# Patient Record
Sex: MALE | Race: BLACK OR AFRICAN AMERICAN | Employment: OTHER | URBAN - METROPOLITAN AREA
[De-identification: names, ages, dates, MRNs, and addresses within clinical notes are randomized per-mention and may not be internally consistent; named-entity substitution may affect disease eponyms.]

---

## 2017-04-05 ENCOUNTER — APPOINTMENT (OUTPATIENT)
Dept: GENERAL RADIOLOGY | Age: 67
End: 2017-04-05
Payer: MEDICARE

## 2017-04-05 ENCOUNTER — HOSPITAL ENCOUNTER (EMERGENCY)
Age: 67
Discharge: HOME | End: 2017-04-05
Attending: EMERGENCY MEDICINE
Payer: MEDICARE

## 2017-04-05 VITALS
BODY MASS INDEX: 23.46 KG/M2 | SYSTOLIC BLOOD PRESSURE: 143 MMHG | OXYGEN SATURATION: 100 % | WEIGHT: 146 LBS | HEIGHT: 66 IN | RESPIRATION RATE: 16 BRPM | TEMPERATURE: 98.8 F | DIASTOLIC BLOOD PRESSURE: 77 MMHG | HEART RATE: 94 BPM

## 2017-04-05 DIAGNOSIS — R06.02 SOB (SHORTNESS OF BREATH): ICD-10-CM

## 2017-04-05 DIAGNOSIS — D50.9 IRON DEFICIENCY ANEMIA, UNSPECIFIED IRON DEFICIENCY ANEMIA TYPE: Primary | ICD-10-CM

## 2017-04-05 LAB
ABSOLUTE EOS #: 0.24 K/UL (ref 0–0.4)
ABSOLUTE LYMPH #: 2.43 K/UL (ref 1–4.8)
ABSOLUTE MONO #: 0.81 K/UL (ref 0.2–0.8)
ANION GAP SERPL CALCULATED.3IONS-SCNC: 16 MMOL/L (ref 9–17)
BASOPHILS # BLD: 1 % (ref 0–2)
BASOPHILS ABSOLUTE: 0.08 K/UL (ref 0–0.2)
BNP INTERPRETATION: ABNORMAL
BUN BLDV-MCNC: 12 MG/DL (ref 8–23)
BUN/CREAT BLD: 14 (ref 9–20)
CALCIUM SERPL-MCNC: 7.6 MG/DL (ref 8.6–10.4)
CHLORIDE BLD-SCNC: 97 MMOL/L (ref 98–107)
CO2: 20 MMOL/L (ref 20–31)
CREAT SERPL-MCNC: 0.87 MG/DL (ref 0.7–1.2)
DATE, STOOL #1: NORMAL
DATE, STOOL #2: NORMAL
DATE, STOOL #3: NORMAL
DIFFERENTIAL TYPE: ABNORMAL
EOSINOPHILS RELATIVE PERCENT: 3 % (ref 1–4)
GFR AFRICAN AMERICAN: >60 ML/MIN
GFR NON-AFRICAN AMERICAN: >60 ML/MIN
GFR SERPL CREATININE-BSD FRML MDRD: ABNORMAL ML/MIN/{1.73_M2}
GFR SERPL CREATININE-BSD FRML MDRD: ABNORMAL ML/MIN/{1.73_M2}
GLUCOSE BLD-MCNC: 73 MG/DL (ref 70–99)
HCT VFR BLD CALC: 23.5 % (ref 41–53)
HEMOCCULT SP1 STL QL: NEGATIVE
HEMOCCULT SP2 STL QL: NORMAL
HEMOCCULT SP3 STL QL: NORMAL
HEMOGLOBIN: 7.6 G/DL (ref 13.5–17.5)
LYMPHOCYTES # BLD: 30 % (ref 24–44)
MCH RBC QN AUTO: 24.1 PG (ref 26–34)
MCHC RBC AUTO-ENTMCNC: 32.3 G/DL (ref 31–37)
MCV RBC AUTO: 74.6 FL (ref 80–100)
MONOCYTES # BLD: 10 % (ref 1–7)
MORPHOLOGY: ABNORMAL
MYOGLOBIN: 69 NG/ML (ref 28–72)
PDW BLD-RTO: 21.9 % (ref 11.5–14.5)
PLATELET # BLD: 307 K/UL (ref 130–400)
PLATELET ESTIMATE: ABNORMAL
PMV BLD AUTO: ABNORMAL FL (ref 6–12)
POTASSIUM SERPL-SCNC: 3.7 MMOL/L (ref 3.7–5.3)
PRO-BNP: 1767 PG/ML
RBC # BLD: 3.15 M/UL (ref 4.5–5.9)
RBC # BLD: ABNORMAL 10*6/UL
SEG NEUTROPHILS: 56 % (ref 36–66)
SEGMENTED NEUTROPHILS ABSOLUTE COUNT: 4.54 K/UL (ref 1.8–7.7)
SODIUM BLD-SCNC: 133 MMOL/L (ref 135–144)
TIME, STOOL #1: 2132
TIME, STOOL #2: NORMAL
TIME, STOOL #3: NORMAL
TROPONIN INTERP: NORMAL
TROPONIN T: <0.03 NG/ML
WBC # BLD: 8.1 K/UL (ref 3.5–11)
WBC # BLD: ABNORMAL 10*3/UL

## 2017-04-05 PROCEDURE — 80048 BASIC METABOLIC PNL TOTAL CA: CPT

## 2017-04-05 PROCEDURE — 93005 ELECTROCARDIOGRAM TRACING: CPT

## 2017-04-05 PROCEDURE — 94760 N-INVAS EAR/PLS OXIMETRY 1: CPT

## 2017-04-05 PROCEDURE — 99285 EMERGENCY DEPT VISIT HI MDM: CPT

## 2017-04-05 PROCEDURE — 6360000002 HC RX W HCPCS: Performed by: PHYSICIAN ASSISTANT

## 2017-04-05 PROCEDURE — 85025 COMPLETE CBC W/AUTO DIFF WBC: CPT

## 2017-04-05 PROCEDURE — 83874 ASSAY OF MYOGLOBIN: CPT

## 2017-04-05 PROCEDURE — 71010 XR CHEST PORTABLE: CPT

## 2017-04-05 PROCEDURE — 94640 AIRWAY INHALATION TREATMENT: CPT

## 2017-04-05 PROCEDURE — 83880 ASSAY OF NATRIURETIC PEPTIDE: CPT

## 2017-04-05 PROCEDURE — 96374 THER/PROPH/DIAG INJ IV PUSH: CPT

## 2017-04-05 PROCEDURE — 84484 ASSAY OF TROPONIN QUANT: CPT

## 2017-04-05 PROCEDURE — G0328 FECAL BLOOD SCRN IMMUNOASSAY: HCPCS

## 2017-04-05 RX ORDER — ALBUTEROL SULFATE 2.5 MG/3ML
5 SOLUTION RESPIRATORY (INHALATION)
Status: DISCONTINUED | OUTPATIENT
Start: 2017-04-05 | End: 2017-04-06 | Stop reason: HOSPADM

## 2017-04-05 RX ORDER — ALBUTEROL SULFATE 90 UG/1
2 AEROSOL, METERED RESPIRATORY (INHALATION)
Status: DISCONTINUED | OUTPATIENT
Start: 2017-04-05 | End: 2017-04-06 | Stop reason: HOSPADM

## 2017-04-05 RX ORDER — GUAIFENESIN 600 MG/1
1200 TABLET, EXTENDED RELEASE ORAL 2 TIMES DAILY
Status: ON HOLD | COMMUNITY
End: 2019-10-26

## 2017-04-05 RX ORDER — IPRATROPIUM BROMIDE AND ALBUTEROL SULFATE 2.5; .5 MG/3ML; MG/3ML
1 SOLUTION RESPIRATORY (INHALATION)
Status: DISCONTINUED | OUTPATIENT
Start: 2017-04-05 | End: 2017-04-06 | Stop reason: HOSPADM

## 2017-04-05 RX ORDER — AMOXICILLIN AND CLAVULANATE POTASSIUM 875; 125 MG/1; MG/1
1 TABLET, FILM COATED ORAL 2 TIMES DAILY
Status: ON HOLD | COMMUNITY
End: 2019-10-26

## 2017-04-05 RX ORDER — CITALOPRAM 10 MG/1
10 TABLET ORAL DAILY
Status: ON HOLD | COMMUNITY
End: 2019-10-26

## 2017-04-05 RX ORDER — BICALUTAMIDE 50 MG/1
50 TABLET, FILM COATED ORAL DAILY
Status: ON HOLD | COMMUNITY
End: 2019-10-26

## 2017-04-05 RX ORDER — METHYLPREDNISOLONE SODIUM SUCCINATE 125 MG/2ML
125 INJECTION, POWDER, LYOPHILIZED, FOR SOLUTION INTRAMUSCULAR; INTRAVENOUS ONCE
Status: COMPLETED | OUTPATIENT
Start: 2017-04-05 | End: 2017-04-05

## 2017-04-05 RX ORDER — TAMSULOSIN HYDROCHLORIDE 0.4 MG/1
0.4 CAPSULE ORAL DAILY
Status: ON HOLD | COMMUNITY
End: 2019-10-26

## 2017-04-05 RX ORDER — FERROUS SULFATE 325(65) MG
325 TABLET ORAL
COMMUNITY
End: 2020-02-25 | Stop reason: ALTCHOICE

## 2017-04-05 RX ADMIN — METHYLPREDNISOLONE SODIUM SUCCINATE 125 MG: 125 INJECTION, POWDER, FOR SOLUTION INTRAMUSCULAR; INTRAVENOUS at 21:04

## 2017-04-05 RX ADMIN — ALBUTEROL SULFATE 5 MG: 5 SOLUTION RESPIRATORY (INHALATION) at 20:35

## 2017-04-06 LAB
EKG ATRIAL RATE: 80 BPM
EKG P AXIS: 26 DEGREES
EKG P-R INTERVAL: 136 MS
EKG Q-T INTERVAL: 396 MS
EKG QRS DURATION: 86 MS
EKG QTC CALCULATION (BAZETT): 456 MS
EKG R AXIS: -7 DEGREES
EKG T AXIS: -5 DEGREES
EKG VENTRICULAR RATE: 80 BPM

## 2018-01-12 ENCOUNTER — HOSPITAL ENCOUNTER (EMERGENCY)
Age: 68
Discharge: HOME | End: 2018-01-12
Attending: EMERGENCY MEDICINE
Payer: MEDICARE

## 2018-01-12 ENCOUNTER — APPOINTMENT (OUTPATIENT)
Dept: CT IMAGING | Age: 68
End: 2018-01-12
Payer: MEDICARE

## 2018-01-12 VITALS
DIASTOLIC BLOOD PRESSURE: 89 MMHG | TEMPERATURE: 98.3 F | WEIGHT: 146 LBS | SYSTOLIC BLOOD PRESSURE: 153 MMHG | RESPIRATION RATE: 18 BRPM | HEIGHT: 66 IN | BODY MASS INDEX: 23.46 KG/M2 | OXYGEN SATURATION: 97 % | HEART RATE: 93 BPM

## 2018-01-12 DIAGNOSIS — K13.70 ORAL LESION: Primary | ICD-10-CM

## 2018-01-12 LAB
ABSOLUTE EOS #: 0 K/UL (ref 0–0.4)
ABSOLUTE IMMATURE GRANULOCYTE: ABNORMAL K/UL (ref 0–0.3)
ABSOLUTE LYMPH #: 0.5 K/UL (ref 1–4.8)
ABSOLUTE MONO #: 0.2 K/UL (ref 0.2–0.8)
ANION GAP SERPL CALCULATED.3IONS-SCNC: 15 MMOL/L (ref 9–17)
BASOPHILS # BLD: 1 % (ref 0–2)
BASOPHILS ABSOLUTE: 0 K/UL (ref 0–0.2)
BUN BLDV-MCNC: 20 MG/DL (ref 8–23)
BUN/CREAT BLD: 17 (ref 9–20)
CALCIUM SERPL-MCNC: 8.4 MG/DL (ref 8.6–10.4)
CHLORIDE BLD-SCNC: 99 MMOL/L (ref 98–107)
CO2: 20 MMOL/L (ref 20–31)
CREAT SERPL-MCNC: 1.17 MG/DL (ref 0.7–1.2)
DIFFERENTIAL TYPE: ABNORMAL
EOSINOPHILS RELATIVE PERCENT: 0 % (ref 1–4)
GFR AFRICAN AMERICAN: >60 ML/MIN
GFR NON-AFRICAN AMERICAN: >60 ML/MIN
GFR SERPL CREATININE-BSD FRML MDRD: ABNORMAL ML/MIN/{1.73_M2}
GFR SERPL CREATININE-BSD FRML MDRD: ABNORMAL ML/MIN/{1.73_M2}
GLUCOSE BLD-MCNC: 116 MG/DL (ref 70–99)
HCT VFR BLD CALC: 38.5 % (ref 41–53)
HEMOGLOBIN: 12.7 G/DL (ref 13.5–17.5)
IMMATURE GRANULOCYTES: ABNORMAL %
LYMPHOCYTES # BLD: 6 % (ref 24–44)
MCH RBC QN AUTO: 30.4 PG (ref 26–34)
MCHC RBC AUTO-ENTMCNC: 33 G/DL (ref 31–37)
MCV RBC AUTO: 92.1 FL (ref 80–100)
MONOCYTES # BLD: 3 % (ref 1–7)
PDW BLD-RTO: 15.3 % (ref 11.5–14.5)
PLATELET # BLD: 306 K/UL (ref 130–400)
PLATELET ESTIMATE: ABNORMAL
PMV BLD AUTO: 8.2 FL (ref 6–12)
POTASSIUM SERPL-SCNC: 4.3 MMOL/L (ref 3.7–5.3)
RBC # BLD: 4.18 M/UL (ref 4.5–5.9)
RBC # BLD: ABNORMAL 10*6/UL
SEG NEUTROPHILS: 90 % (ref 36–66)
SEGMENTED NEUTROPHILS ABSOLUTE COUNT: 7.7 K/UL (ref 1.8–7.7)
SODIUM BLD-SCNC: 134 MMOL/L (ref 135–144)
WBC # BLD: 8.5 K/UL (ref 3.5–11)
WBC # BLD: ABNORMAL 10*3/UL

## 2018-01-12 PROCEDURE — 2580000003 HC RX 258: Performed by: NURSE PRACTITIONER

## 2018-01-12 PROCEDURE — 80048 BASIC METABOLIC PNL TOTAL CA: CPT

## 2018-01-12 PROCEDURE — 70491 CT SOFT TISSUE NECK W/DYE: CPT

## 2018-01-12 PROCEDURE — 85025 COMPLETE CBC W/AUTO DIFF WBC: CPT

## 2018-01-12 PROCEDURE — 6360000004 HC RX CONTRAST MEDICATION: Performed by: NURSE PRACTITIONER

## 2018-01-12 PROCEDURE — 99283 EMERGENCY DEPT VISIT LOW MDM: CPT

## 2018-01-12 RX ORDER — SODIUM CHLORIDE 0.9 % (FLUSH) 0.9 %
10 SYRINGE (ML) INJECTION
Status: COMPLETED | OUTPATIENT
Start: 2018-01-12 | End: 2018-01-12

## 2018-01-12 RX ORDER — 0.9 % SODIUM CHLORIDE 0.9 %
50 INTRAVENOUS SOLUTION INTRAVENOUS ONCE
Status: COMPLETED | OUTPATIENT
Start: 2018-01-12 | End: 2018-01-12

## 2018-01-12 RX ADMIN — IOPAMIDOL 75 ML: 755 INJECTION, SOLUTION INTRAVENOUS at 10:44

## 2018-01-12 RX ADMIN — SODIUM CHLORIDE 50 ML: 9 INJECTION, SOLUTION INTRAVENOUS at 10:44

## 2018-01-12 RX ADMIN — Medication 10 ML: at 10:45

## 2018-01-12 ASSESSMENT — ENCOUNTER SYMPTOMS
COLOR CHANGE: 0
SORE THROAT: 0
COUGH: 0
SHORTNESS OF BREATH: 0
FACIAL SWELLING: 0
BACK PAIN: 0
TROUBLE SWALLOWING: 0

## 2018-01-12 ASSESSMENT — PAIN DESCRIPTION - LOCATION: LOCATION: TEETH

## 2018-01-12 ASSESSMENT — PAIN DESCRIPTION - DESCRIPTORS: DESCRIPTORS: ACHING;CONSTANT

## 2018-01-12 ASSESSMENT — PAIN SCALES - GENERAL: PAINLEVEL_OUTOF10: 8

## 2018-01-12 ASSESSMENT — PAIN DESCRIPTION - ORIENTATION: ORIENTATION: RIGHT;LOWER

## 2018-01-12 ASSESSMENT — PAIN DESCRIPTION - FREQUENCY: FREQUENCY: CONTINUOUS

## 2018-01-12 NOTE — ED PROVIDER NOTES
The patient was seen and examined by me in conjunction with the mid-level provider. I agree with his/her assessment and treatment plan. The patient has a mass on the floor of his mouth which appears to be necrotic and cancerous per radiologist.  He will follow-up promptly with his family physician. He does not require admission to the hospital.  Findings were discussed with the patient and his wife.      Mai Burciaga MD  01/12/18 7766

## 2018-01-12 NOTE — ED PROVIDER NOTES
Team [address] ED  eMERGENCY dEPARTMENT eNCOUnter      Pt Name: Franny Short  MRN: [id number]  [place] 1950  Date of evaluation: 1/12/2018  Provider: Lillie Humphrey NP    CHIEF COMPLAINT       Chief Complaint   Patient presents with    Dental Pain     right lower         HISTORY OF PRESENT ILLNESS  (Location/Symptom, Timing/Onset, Context/Setting, Quality, Duration, Modifying Factors, Severity.)   Franny Short is a 74 y.o. male who presents to the emergency department via private auto for lower dental pain. The patient is a poor historian. Onset is unknown. Wife states he told her about the discomfort today. He has been unable to put in his dentures due to swelling and pain since at least yesterday. Denies fever, chills, difficulty swallowing, SOB. Rates his pain 8/10 at this time. He has a history of lung CA. Nursing Notes were reviewed. ALLERGIES     Review of patient's allergies indicates no known allergies. CURRENT MEDICATIONS       Discharge Medication List as of 1/12/2018 11:52 AM      CONTINUE these medications which have NOT CHANGED    Details   amoxicillin-clavulanate (AUGMENTIN) 875-125 MG per tablet Take 1 tablet by mouth 2 times dailyHistorical Med      guaiFENesin (MUCINEX) 600 MG extended release tablet Take 1,200 mg by mouth 2 times dailyHistorical Med      tamsulosin (FLOMAX) 0.4 MG capsule Take 0.4 mg by mouth dailyHistorical Med      ferrous sulfate 325 (65 FE) MG tablet Take 325 mg by mouth daily (with breakfast)Historical Med      bicalutamide (CASODEX) 50 MG chemo tablet Take 50 mg by mouth dailyHistorical Med      citalopram (CELEXA) 10 MG tablet Take 10 mg by mouth dailyHistorical Med      ibuprofen (ADVIL;MOTRIN) 600 MG tablet Take 1 tablet by mouth every 8 hours as needed for Pain, Disp-20 tablet, R-0Print      ledipasvir-sofosbuvir (HARVONI)  MG per tablet Take 1 tablet by mouth daily.       vitamin B-12 (CYANOCOBALAMIN) 500 MCG tablet Take 500 mcg the left mandible with focus of low-density posteriorly is concerning for a neoplasm with internal necrosis. 2. Suspected bilateral level 1 B lymph nodes are concerning for metastases. 3. Advanced degenerative changes of the cervical spine are grossly similar to the prior exam. 4. Ectasia of the distal aortic arch and proximal descending thoracic aorta. Could consider dedicated CTA for further evaluation, as indicated. LABS:  Labs Reviewed   CBC WITH AUTO DIFFERENTIAL - Abnormal; Notable for the following:        Result Value    RBC 4.18 (*)     Hemoglobin 12.7 (*)     Hematocrit 38.5 (*)     RDW 15.3 (*)     Seg Neutrophils 90 (*)     Lymphocytes 6 (*)     Eosinophils % 0 (*)     Absolute Lymph # 0.50 (*)     All other components within normal limits   BASIC METABOLIC PANEL - Abnormal; Notable for the following:     Glucose 116 (*)     Calcium 8.4 (*)     Sodium 134 (*)     All other components within normal limits       All other labs were within normal range or not returned as of this dictation. EMERGENCY DEPARTMENT COURSE and DIFFERENTIAL DIAGNOSIS/MDM:   Vitals:    Vitals:    01/12/18 0917   BP: (!) 153/89   Pulse: 93   Resp: 18   Temp: 98.3 °F (36.8 °C)   TempSrc: Oral   SpO2: 97%   Weight: 146 lb (66.2 kg)   Height: 5' 6\" (1.676 m)         MEDICATIONS GIVEN IN THE ED:  Medications   iopamidol (ISOVUE-370) 76 % injection 75 mL (75 mLs Intravenous Given 1/12/18 1044)   0.9 % sodium chloride bolus (0 mLs Intravenous Stopped 1/12/18 1156)   sodium chloride flush 0.9 % injection 10 mL (10 mLs Intravenous Given 1/12/18 1045)       CLINICAL DECISION MAKING:  The patient presented alert with a nontoxic appearance and was seen in conjunction with Dr. Joanne Agudelo. CT scan showed an aggressive soft tissue lesion associated with the anterior body of the left mandible with focus of low-density posteriorly is concerning for a neoplasm with internal necrosis. The patient was made aware of the findings.  Dr. Joanne Agudelo put a call out to his pcp. Follow up with pcp in 2-3 days for further evaluation and treatment, return to ED if condition worsens. FINAL IMPRESSION      1.  Oral lesion            DISPOSITION/PLAN   DISPOSITION Decision To Discharge 01/12/2018 11:52:04 AM      PATIENT REFERRED TO:   Ayo Andrade MD  [address] [place] [address]  [phone]    Schedule an appointment as soon as possible for a visit in 2 days      [facility] ED  [address] [facility]  [phone]    If symptoms worsen      DISCHARGE MEDICATIONS:     Discharge Medication List as of 1/12/2018 11:52 AM              (Please note that portions of this note were completed with a voice recognition program.  Efforts were made to edit the dictations but occasionally words are mis-transcribed.)    SHAUN Swanson NP  01/12/18 1936

## 2018-01-25 ENCOUNTER — HOSPITAL ENCOUNTER (OUTPATIENT)
Facility: MEDICAL CENTER | Age: 68
End: 2018-01-25
Payer: MEDICARE

## 2018-01-26 ENCOUNTER — TELEPHONE (OUTPATIENT)
Dept: ONCOLOGY | Age: 68
End: 2018-01-26

## 2018-01-26 ENCOUNTER — INITIAL CONSULT (OUTPATIENT)
Dept: ONCOLOGY | Age: 68
End: 2018-01-26
Payer: MEDICARE

## 2018-01-26 VITALS
HEIGHT: 67 IN | TEMPERATURE: 97.6 F | HEART RATE: 95 BPM | SYSTOLIC BLOOD PRESSURE: 158 MMHG | WEIGHT: 147 LBS | RESPIRATION RATE: 18 BRPM | BODY MASS INDEX: 23.07 KG/M2 | DIASTOLIC BLOOD PRESSURE: 99 MMHG

## 2018-01-26 DIAGNOSIS — C04.0 CANCER OF ANTERIOR PORTION OF FLOOR OF MOUTH (HCC): ICD-10-CM

## 2018-01-26 DIAGNOSIS — C32.9 LARYNGEAL CANCER (HCC): ICD-10-CM

## 2018-01-26 PROCEDURE — G8484 FLU IMMUNIZE NO ADMIN: HCPCS | Performed by: INTERNAL MEDICINE

## 2018-01-26 PROCEDURE — G8420 CALC BMI NORM PARAMETERS: HCPCS | Performed by: INTERNAL MEDICINE

## 2018-01-26 PROCEDURE — 4040F PNEUMOC VAC/ADMIN/RCVD: CPT | Performed by: INTERNAL MEDICINE

## 2018-01-26 PROCEDURE — 99205 OFFICE O/P NEW HI 60 MIN: CPT | Performed by: INTERNAL MEDICINE

## 2018-01-26 PROCEDURE — 99201 HC NEW PT, E/M LEVEL 1: CPT

## 2018-01-26 PROCEDURE — G8427 DOCREV CUR MEDS BY ELIG CLIN: HCPCS | Performed by: INTERNAL MEDICINE

## 2018-01-26 PROCEDURE — 3017F COLORECTAL CA SCREEN DOC REV: CPT | Performed by: INTERNAL MEDICINE

## 2018-01-26 NOTE — TELEPHONE ENCOUNTER
Troy Finch MD CONSULTANT  DR Blade Mccoy IN TO SEE PATIENT  ORDERS RECEIVED  RV 3-4 WEEKS   MD VISIT 2/26/18 @ 12NOON  AVS PRINTED AND GIVEN TO PATIENT W/ INSTRUCTIONS  PATIENT DISCHARGED AMBULATORY

## 2018-01-26 NOTE — PROGRESS NOTES
frequency, urgency or incontinence. Neurological: Denies headaches, decreased LOC, no sensory or motor focal deficits. Musculoskeletal:  No arthralgia no back pain or joint swelling. Skin: There are no rashes or bleeding. Psychiatric:  No anxiety, no depression. Endocrine: No diabetes or thyroid disease. Hematologic: No bleeding, no adenopathy. PHYSICAL EXAM: Shows a well appearing 74y.o.-year-old male who is not in pain or distress. Vital Signs: Blood pressure (!) 158/99, pulse 95, temperature 97.6 °F (36.4 °C), temperature source Oral, resp. rate 18, height 5' 7\" (1.702 m), weight 147 lb (66.7 kg). HEENT: Edentulous, mass on underside of tongue, mass on floor of mouth near exposed bone. Normocephalic and atraumatic. Pupils are equal, round, reactive to light and accommodation. Extraocular muscles are intact. Neck: Showed no JVD, no carotid bruit . Tracheostomy  Lungs: Clear to auscultation bilaterally. Heart: Regular without any murmur. Abdomen: Soft, nontender. No hepatosplenomegaly. Extremities: Lower extremities show no edema, clubbing, or cyanosis. Breasts: Examination not done today. Neuro exam: intact cranial nerves bilaterally no motor or sensory deficit, gait is normal. Lymphatic: no adenopathy appreciated in the supraclavicular, axillary, cervical or inguinal area    REVIEW OF RADIOLOGICAL RESULTS:   01/12/2018 CT SOFT TISSUE NECK W CONTRAST IMPRESSION:  1. Aggressive soft tissue lesion associated with the anterior body of the   left mandible with focus of low-density posteriorly is concerning for a   neoplasm with internal necrosis. 2. Suspected bilateral level 1 B lymph nodes are concerning for metastases. 3. Advanced degenerative changes of the cervical spine are grossly similar to   the prior exam.   4. Ectasia of the distal aortic arch and proximal descending thoracic aorta. Could consider dedicated CTA for further evaluation, as indicated.      PATHOLOGY:      REVIEW OF LABORATORY DATA:   Lab Results   Component Value Date    WBC 8.5 01/12/2018    HGB 12.7 (L) 01/12/2018    HCT 38.5 (L) 01/12/2018    MCV 92.1 01/12/2018     01/12/2018       Chemistry        Component Value Date/Time     (L) 01/12/2018 0018    K 4.3 01/12/2018 0018    CL 99 01/12/2018 0018    CO2 20 01/12/2018 0018    BUN 20 01/12/2018 0018    CREATININE 1.17 01/12/2018 0018        Component Value Date/Time    CALCIUM 8.4 (L) 01/12/2018 0018            IMPRESSION:   1. Hx of laryngeal cancer, s/p laryngectomy, did not need XRt or chemo. 2. 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 PLAN:   1. We reviewed his recent work up and pathology results. 2. He understands plan for PET staging and surgery. Surgery would take place at [facility]. 3. I explained that I doubt his disease is resectable, await PEt scan and evaluation at [address] [place] [address]. 4.  he may need chemo , or chemo radiation    5. I will consult with Dr. Sukhdev Rashid.

## 2018-01-26 NOTE — LETTER
metastases. 3. Advanced degenerative changes of the cervical spine are grossly similar to   the prior exam.   4. Ectasia of the distal aortic arch and proximal descending thoracic aorta. Could consider dedicated CTA for further evaluation, as indicated. PATHOLOGY:      REVIEW OF LABORATORY DATA:   Lab Results   Component Value Date    WBC 8.5 01/12/2018    HGB 12.7 (L) 01/12/2018    HCT 38.5 (L) 01/12/2018    MCV 92.1 01/12/2018     01/12/2018       Chemistry        Component Value Date/Time     (L) 01/12/2018 0018    K 4.3 01/12/2018 0018    CL 99 01/12/2018 0018    CO2 20 01/12/2018 0018    BUN 20 01/12/2018 0018    CREATININE 1.17 01/12/2018 0018        Component Value Date/Time    CALCIUM 8.4 (L) 01/12/2018 0018            IMPRESSION:   1. Hx of laryngeal cancer, s/p laryngectomy, did not need XRt or chemo. 2. 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 PLAN:   1. We reviewed his recent work up and pathology results. 2. He understands plan for PET staging and surgery. Surgery would take place at [facility]. 3. I explained that I doubt his disease is resectable, await PEt scan and evaluation at [address] [place] [address]. 4.  he may need chemo , or chemo radiation    5. I will consult with Dr. Sherice Benjamin. If you have questions, please do not hesitate to call me. I look forward to following Tino along with you.     Sincerely,    Flash Moya MD  Hematology/ Oncology  Cell [phone]

## 2018-01-28 PROBLEM — C32.9 LARYNGEAL CANCER (HCC): Status: ACTIVE | Noted: 2018-01-28

## 2018-01-28 PROBLEM — C04.0: Status: ACTIVE | Noted: 2018-01-28

## 2018-01-29 ENCOUNTER — HOSPITAL ENCOUNTER (OUTPATIENT)
Dept: CT IMAGING | Age: 68
Discharge: HOME | End: 2018-01-29
Payer: MEDICARE

## 2018-01-29 DIAGNOSIS — C32.0 MALIGNANT NEOPLASM OF GLOTTIS (HCC): ICD-10-CM

## 2018-01-29 DIAGNOSIS — D37.09 NEOPLASM OF UNCERTAIN BEHAVIOR OF OTHER SPECIFIED SITES OF THE ORAL CAVITY: ICD-10-CM

## 2018-01-29 PROCEDURE — 6360000004 HC RX CONTRAST MEDICATION: Performed by: OTOLARYNGOLOGY

## 2018-01-29 PROCEDURE — 70491 CT SOFT TISSUE NECK W/DYE: CPT

## 2018-01-29 RX ADMIN — IOPAMIDOL 75 ML: 755 INJECTION, SOLUTION INTRAVENOUS at 10:10

## 2018-02-01 ENCOUNTER — HOSPITAL ENCOUNTER (OUTPATIENT)
Dept: RADIATION ONCOLOGY | Facility: MEDICAL CENTER | Age: 68
Discharge: HOME | End: 2018-02-01
Payer: MEDICARE

## 2018-02-01 PROCEDURE — 99214 OFFICE O/P EST MOD 30 MIN: CPT | Performed by: RADIOLOGY

## 2018-02-01 PROCEDURE — 31575 DIAGNOSTIC LARYNGOSCOPY: CPT | Performed by: RADIOLOGY

## 2018-02-02 ENCOUNTER — TELEPHONE (OUTPATIENT)
Dept: ONCOLOGY | Age: 68
End: 2018-02-02

## 2018-02-02 ENCOUNTER — HOSPITAL ENCOUNTER (OUTPATIENT)
Dept: NUCLEAR MEDICINE | Age: 68
Discharge: HOME | End: 2018-02-04
Payer: MEDICARE

## 2018-02-02 DIAGNOSIS — C04.0 CANCER OF ANTERIOR PORTION OF FLOOR OF MOUTH (HCC): ICD-10-CM

## 2018-02-02 PROCEDURE — 78815 PET IMAGE W/CT SKULL-THIGH: CPT

## 2018-02-02 PROCEDURE — 3430000000 HC RX DIAGNOSTIC RADIOPHARMACEUTICAL: Performed by: OTOLARYNGOLOGY

## 2018-02-02 PROCEDURE — A9552 F18 FDG: HCPCS | Performed by: OTOLARYNGOLOGY

## 2018-02-02 RX ORDER — FLUDEOXYGLUCOSE F 18 200 MCI/ML
15.72 INJECTION, SOLUTION INTRAVENOUS
Status: COMPLETED | OUTPATIENT
Start: 2018-02-02 | End: 2018-02-02

## 2018-02-02 RX ADMIN — FLUDEOXYGLUCOSE F 18 15.72 MILLICURIE: 200 INJECTION, SOLUTION INTRAVENOUS at 08:47

## 2018-02-09 ENCOUNTER — TELEPHONE (OUTPATIENT)
Dept: ONCOLOGY | Age: 68
End: 2018-02-09

## 2018-02-23 ENCOUNTER — HOSPITAL ENCOUNTER (OUTPATIENT)
Facility: MEDICAL CENTER | Age: 68
End: 2018-02-23
Payer: MEDICARE

## 2018-02-26 ENCOUNTER — TELEPHONE (OUTPATIENT)
Dept: ONCOLOGY | Age: 68
End: 2018-02-26

## 2018-02-28 ENCOUNTER — TELEPHONE (OUTPATIENT)
Dept: ONCOLOGY | Age: 68
End: 2018-02-28

## 2018-02-28 NOTE — TELEPHONE ENCOUNTER
REMY coordinated with Guanaco Lindsay, [facility], to get patient to his consult there. Her department is subsidizing a one-night stay at a local hotel for patient's consult for surgery and PAT appointment. Diana Li spoke with patient's sister, Roslyn Haywood, and patient will pay $25 toward the cost of the hotel room and Diana Li states this is a one-time program and patient/sister aware. REMY then spoke with Roslyn Haywood and mailed her a $25 [employer] gas card to off-set the cost of gas. Patient has Medicare and Medicaid in place and may be eligible for Medicaid transportation through [facility] which SW will look into as well, but sister is currently willing to transport patient to appointments. SW will continue to follow for any needs going forward. Radha Carroll.  Niki Moseley

## 2018-03-09 ENCOUNTER — TELEPHONE (OUTPATIENT)
Dept: ONCOLOGY | Age: 68
End: 2018-03-09

## 2018-03-19 ENCOUNTER — TELEPHONE (OUTPATIENT)
Dept: ONCOLOGY | Age: 68
End: 2018-03-19

## 2018-03-22 ENCOUNTER — TELEPHONE (OUTPATIENT)
Dept: ONCOLOGY | Age: 68
End: 2018-03-22

## 2018-03-28 ENCOUNTER — TELEPHONE (OUTPATIENT)
Dept: ONCOLOGY | Age: 68
End: 2018-03-28

## 2018-04-24 ENCOUNTER — TELEPHONE (OUTPATIENT)
Dept: ONCOLOGY | Age: 68
End: 2018-04-24

## 2018-04-25 ENCOUNTER — TELEPHONE (OUTPATIENT)
Dept: ONCOLOGY | Age: 68
End: 2018-04-25

## 2018-04-26 ENCOUNTER — TELEPHONE (OUTPATIENT)
Dept: ONCOLOGY | Age: 68
End: 2018-04-26

## 2018-05-01 ENCOUNTER — TELEPHONE (OUTPATIENT)
Dept: ONCOLOGY | Age: 68
End: 2018-05-01

## 2018-05-08 ENCOUNTER — TELEPHONE (OUTPATIENT)
Dept: ONCOLOGY | Age: 68
End: 2018-05-08

## 2018-05-10 ENCOUNTER — HOSPITAL ENCOUNTER (OUTPATIENT)
Dept: RADIATION ONCOLOGY | Facility: MEDICAL CENTER | Age: 68
Discharge: HOME | End: 2018-05-10
Payer: MEDICARE

## 2018-05-11 ENCOUNTER — TELEPHONE (OUTPATIENT)
Dept: ONCOLOGY | Age: 68
End: 2018-05-11

## 2018-05-14 ENCOUNTER — TELEPHONE (OUTPATIENT)
Dept: ONCOLOGY | Age: 68
End: 2018-05-14

## 2018-06-10 ENCOUNTER — HOSPITAL ENCOUNTER (EMERGENCY)
Age: 68
Discharge: HOME | End: 2018-06-10
Attending: EMERGENCY MEDICINE
Payer: MEDICARE

## 2018-06-10 ENCOUNTER — APPOINTMENT (OUTPATIENT)
Dept: GENERAL RADIOLOGY | Age: 68
End: 2018-06-10
Payer: MEDICARE

## 2018-06-10 VITALS
RESPIRATION RATE: 18 BRPM | TEMPERATURE: 97.4 F | DIASTOLIC BLOOD PRESSURE: 89 MMHG | WEIGHT: 137.13 LBS | BODY MASS INDEX: 25.89 KG/M2 | HEART RATE: 70 BPM | OXYGEN SATURATION: 100 % | SYSTOLIC BLOOD PRESSURE: 146 MMHG | HEIGHT: 61 IN

## 2018-06-10 DIAGNOSIS — K59.00 CONSTIPATION, UNSPECIFIED CONSTIPATION TYPE: Primary | ICD-10-CM

## 2018-06-10 PROCEDURE — 74018 RADEX ABDOMEN 1 VIEW: CPT

## 2018-06-10 PROCEDURE — 6370000000 HC RX 637 (ALT 250 FOR IP): Performed by: EMERGENCY MEDICINE

## 2018-06-10 PROCEDURE — 99283 EMERGENCY DEPT VISIT LOW MDM: CPT

## 2018-06-10 RX ADMIN — MAGESIUM CITRATE 296 ML: 1.75 LIQUID ORAL at 19:21

## 2018-06-10 ASSESSMENT — ENCOUNTER SYMPTOMS
EYES NEGATIVE: 1
RESPIRATORY NEGATIVE: 1
CONSTIPATION: 1
ALLERGIC/IMMUNOLOGIC NEGATIVE: 1

## 2018-06-10 ASSESSMENT — PAIN SCALES - WONG BAKER: WONGBAKER_NUMERICALRESPONSE: 4

## 2018-06-10 ASSESSMENT — PAIN DESCRIPTION - FREQUENCY: FREQUENCY: CONTINUOUS

## 2018-06-10 ASSESSMENT — PAIN DESCRIPTION - ORIENTATION: ORIENTATION: MID;LOWER

## 2018-06-10 ASSESSMENT — PAIN DESCRIPTION - PAIN TYPE: TYPE: ACUTE PAIN

## 2018-06-10 ASSESSMENT — PAIN DESCRIPTION - DESCRIPTORS: DESCRIPTORS: SHARP;CONSTANT

## 2018-06-10 ASSESSMENT — PAIN DESCRIPTION - LOCATION: LOCATION: ABDOMEN

## 2018-06-10 ASSESSMENT — PAIN SCALES - GENERAL: PAINLEVEL_OUTOF10: 10

## 2018-06-12 ENCOUNTER — HOSPITAL ENCOUNTER (OUTPATIENT)
Facility: MEDICAL CENTER | Age: 68
End: 2018-06-12
Payer: MEDICARE

## 2018-06-27 ENCOUNTER — TELEPHONE (OUTPATIENT)
Dept: ONCOLOGY | Age: 68
End: 2018-06-27

## 2018-07-18 ENCOUNTER — TELEPHONE (OUTPATIENT)
Dept: ONCOLOGY | Age: 68
End: 2018-07-18

## 2018-07-18 NOTE — TELEPHONE ENCOUNTER
Upon reviewing [facility] care everywhere noted pt completed f/u with Dr. Emery Sweet 6/19/18 & scheduled for f/u 8/23/18. Upon review of [facility] noted no certified letter sent to pt. Spoke with Vivienne Nunez., MHRO/HS, updated on findings. Amy Camargo stated will speak with Dr. Ivette Yanes. Active navigation to be dc'd.

## 2018-10-10 ENCOUNTER — HOSPITAL ENCOUNTER (OUTPATIENT)
Age: 68
Discharge: HOME | End: 2018-10-10
Payer: MEDICARE

## 2018-10-10 LAB
ANION GAP SERPL CALCULATED.3IONS-SCNC: 16 MMOL/L (ref 9–17)
BUN BLDV-MCNC: 27 MG/DL (ref 8–23)
BUN/CREAT BLD: ABNORMAL (ref 9–20)
CALCIUM SERPL-MCNC: 8 MG/DL (ref 8.6–10.4)
CHLORIDE BLD-SCNC: 103 MMOL/L (ref 98–107)
CO2: 20 MMOL/L (ref 20–31)
CREAT SERPL-MCNC: 1.02 MG/DL (ref 0.7–1.2)
GFR AFRICAN AMERICAN: >60 ML/MIN
GFR NON-AFRICAN AMERICAN: >60 ML/MIN
GFR SERPL CREATININE-BSD FRML MDRD: ABNORMAL ML/MIN/{1.73_M2}
GFR SERPL CREATININE-BSD FRML MDRD: ABNORMAL ML/MIN/{1.73_M2}
GLUCOSE BLD-MCNC: 75 MG/DL (ref 70–99)
POTASSIUM SERPL-SCNC: 3.8 MMOL/L (ref 3.7–5.3)
SODIUM BLD-SCNC: 139 MMOL/L (ref 135–144)
THYROXINE, FREE: 0.98 NG/DL (ref 0.93–1.7)
TSH SERPL DL<=0.05 MIU/L-ACNC: 1.57 MIU/L (ref 0.3–5)

## 2018-10-10 PROCEDURE — 84443 ASSAY THYROID STIM HORMONE: CPT

## 2018-10-10 PROCEDURE — 84439 ASSAY OF FREE THYROXINE: CPT

## 2018-10-10 PROCEDURE — 36415 COLL VENOUS BLD VENIPUNCTURE: CPT

## 2018-10-10 PROCEDURE — 80048 BASIC METABOLIC PNL TOTAL CA: CPT

## 2018-11-13 ENCOUNTER — HOSPITAL ENCOUNTER (EMERGENCY)
Age: 68
Discharge: HOME | End: 2018-11-13
Attending: EMERGENCY MEDICINE
Payer: MEDICARE

## 2018-11-13 VITALS
RESPIRATION RATE: 18 BRPM | DIASTOLIC BLOOD PRESSURE: 83 MMHG | HEIGHT: 66 IN | WEIGHT: 156 LBS | BODY MASS INDEX: 25.07 KG/M2 | HEART RATE: 86 BPM | SYSTOLIC BLOOD PRESSURE: 133 MMHG | OXYGEN SATURATION: 97 % | TEMPERATURE: 98.2 F

## 2018-11-13 DIAGNOSIS — R10.9 ABDOMINAL PAIN, UNSPECIFIED ABDOMINAL LOCATION: Primary | ICD-10-CM

## 2018-11-13 PROCEDURE — 99283 EMERGENCY DEPT VISIT LOW MDM: CPT

## 2018-11-13 RX ORDER — DICYCLOMINE HYDROCHLORIDE 10 MG/1
10 CAPSULE ORAL EVERY 6 HOURS PRN
Qty: 20 CAPSULE | Refills: 0 | Status: SHIPPED | OUTPATIENT
Start: 2018-11-13 | End: 2020-02-25 | Stop reason: ALTCHOICE

## 2018-11-13 ASSESSMENT — ENCOUNTER SYMPTOMS
WHEEZING: 0
SHORTNESS OF BREATH: 0
ABDOMINAL PAIN: 0
DIARRHEA: 0
NAUSEA: 0
COLOR CHANGE: 0
COUGH: 0
SINUS PRESSURE: 0
CONSTIPATION: 0
VOMITING: 0
RHINORRHEA: 0
SORE THROAT: 0

## 2018-11-13 ASSESSMENT — PAIN DESCRIPTION - LOCATION: LOCATION: ABDOMEN

## 2018-11-13 ASSESSMENT — PAIN SCALES - GENERAL: PAINLEVEL_OUTOF10: 7

## 2018-11-13 NOTE — ED PROVIDER NOTES
[address] [place] [address] ED  eMERGENCY dEPARTMENT eNCOUnter      Pt Name: Mitali Lora  MRN: [id number]  [place] 1950  Date of evaluation: 11/13/2018  Provider: Michael Ruelas NP, APRN - CNP    CHIEF COMPLAINT       Chief Complaint   Patient presents with    Other     pain at feeding tube site         HISTORY OF PRESENT ILLNESS  (Location/Symptom, Timing/Onset, Context/Setting, Quality, Duration, Modifying Factors, Severity.)   Mitali Lora is a 79 y.o. male who presents to the emergency department today by private automobile for evaluation of pain at the feeding tube site. The patient has had his 2 been for the last 7 or 8 months. He was placed at [facility].  He states that he has a history of laryngeal cancer which is why the feeding tube was placed. He takes all of his food and liquids by mouth and he has not used his feeding tube in over 4 months. He comes to the emergency room today to try to get his feeding tube removed       Nursing Notes were reviewed. ALLERGIES     Patient has no known allergies.     CURRENT MEDICATIONS       Discharge Medication List as of 11/13/2018  1:39 PM      CONTINUE these medications which have NOT CHANGED    Details   amoxicillin-clavulanate (AUGMENTIN) 875-125 MG per tablet Take 1 tablet by mouth 2 times dailyHistorical Med      guaiFENesin (MUCINEX) 600 MG extended release tablet Take 1,200 mg by mouth 2 times dailyHistorical Med      tamsulosin (FLOMAX) 0.4 MG capsule Take 0.4 mg by mouth dailyHistorical Med      ferrous sulfate 325 (65 FE) MG tablet Take 325 mg by mouth daily (with breakfast)Historical Med      bicalutamide (CASODEX) 50 MG chemo tablet Take 50 mg by mouth dailyHistorical Med      citalopram (CELEXA) 10 MG tablet Take 10 mg by mouth dailyHistorical Med      ibuprofen (ADVIL;MOTRIN) 600 MG tablet Take 1 tablet by mouth every 8 hours as needed for Pain, Disp-20 tablet, R-0Print      ledipasvir-sofosbuvir (HARVONI)  MG per tablet Take 1 Source Pulse Resp SpO2 Height Weight   133/83 98.2 °F (36.8 °C) Oral 86 18 97 % 5' 6\" (1.676 m) 156 lb (70.8 kg)       Physical Exam   Constitutional: He is oriented to person, place, and time. He appears well-developed and well-nourished. HENT:   Head: Normocephalic and atraumatic. Mouth/Throat: Oropharynx is clear and moist.   Eyes: Pupils are equal, round, and reactive to light. Conjunctivae are normal.   Neck: Normal range of motion. Neck supple. Cardiovascular: Normal rate and regular rhythm. Pulmonary/Chest: Effort normal and breath sounds normal. No stridor. No respiratory distress. Abdominal: Soft. Bowel sounds are normal.       Musculoskeletal: Normal range of motion. Lymphadenopathy:     He has no cervical adenopathy. Neurological: He is alert and oriented to person, place, and time. Skin: Skin is warm and dry. No rash noted. Psychiatric: He has a normal mood and affect. Vitals reviewed. LABS:  Labs Reviewed - No data to display    All other labs were within normal range or not returned as of this dictation. EMERGENCY DEPARTMENT COURSE and DIFFERENTIAL DIAGNOSIS/MDM:   Vitals:    Vitals:    11/13/18 1209   BP: 133/83   Pulse: 86   Resp: 18   Temp: 98.2 °F (36.8 °C)   TempSrc: Oral   SpO2: 97%   Weight: 156 lb (70.8 kg)   Height: 5' 6\" (1.676 m)       Medical Decision Making: I found that this patient had his  tube placed or replaced by Dr Preston Cabello in April. I spoke with Their office and the soonest they can see him in the office for an appointment is January 3rd at 215, the patient and visitor were made aware. He will be placed on bentyl for pain. Reurn for worsening pain, vomiting, fever or another concerns  FINAL IMPRESSION      1.  Abdominal pain, unspecified abdominal location          DISPOSITION/PLAN   DISPOSITION Decision To Discharge 11/13/2018 01:38:43 PM      PATIENT REFERRED TO:   Jennifer Lovell MD  [address] [facility]  [phone]    Call in 2

## 2019-10-11 ENCOUNTER — HOSPITAL ENCOUNTER (OUTPATIENT)
Age: 69
Discharge: HOME | End: 2019-10-11
Payer: MEDICARE

## 2019-10-11 LAB
ANION GAP SERPL CALCULATED.3IONS-SCNC: 17 MMOL/L (ref 9–17)
BUN BLDV-MCNC: 17 MG/DL (ref 8–23)
BUN/CREAT BLD: ABNORMAL (ref 9–20)
CALCIUM SERPL-MCNC: 8.5 MG/DL (ref 8.6–10.4)
CHLORIDE BLD-SCNC: 99 MMOL/L (ref 98–107)
CO2: 17 MMOL/L (ref 20–31)
CREAT SERPL-MCNC: 1.65 MG/DL (ref 0.7–1.2)
GFR AFRICAN AMERICAN: 51 ML/MIN
GFR NON-AFRICAN AMERICAN: 42 ML/MIN
GFR SERPL CREATININE-BSD FRML MDRD: ABNORMAL ML/MIN/{1.73_M2}
GFR SERPL CREATININE-BSD FRML MDRD: ABNORMAL ML/MIN/{1.73_M2}
GLUCOSE BLD-MCNC: 86 MG/DL (ref 70–99)
POTASSIUM SERPL-SCNC: 4.3 MMOL/L (ref 3.7–5.3)
SODIUM BLD-SCNC: 133 MMOL/L (ref 135–144)
THYROXINE, FREE: 0.84 NG/DL (ref 0.93–1.7)
TSH SERPL DL<=0.05 MIU/L-ACNC: 1.9 MIU/L (ref 0.3–5)

## 2019-10-11 PROCEDURE — 36415 COLL VENOUS BLD VENIPUNCTURE: CPT

## 2019-10-11 PROCEDURE — 80048 BASIC METABOLIC PNL TOTAL CA: CPT

## 2019-10-11 PROCEDURE — 84443 ASSAY THYROID STIM HORMONE: CPT

## 2019-10-11 PROCEDURE — 84439 ASSAY OF FREE THYROXINE: CPT

## 2019-10-26 ENCOUNTER — HOSPITAL ENCOUNTER (INPATIENT)
Age: 69
LOS: 4 days | Discharge: HOME | DRG: 146 | End: 2019-10-30
Attending: EMERGENCY MEDICINE | Admitting: INTERNAL MEDICINE
Payer: MEDICARE

## 2019-10-26 ENCOUNTER — APPOINTMENT (OUTPATIENT)
Dept: CT IMAGING | Age: 69
DRG: 146 | End: 2019-10-26
Payer: MEDICARE

## 2019-10-26 DIAGNOSIS — N18.2 CHRONIC KIDNEY DISEASE, STAGE 2 (MILD): ICD-10-CM

## 2019-10-26 DIAGNOSIS — R22.0 MANDIBULAR SWELLING: Primary | ICD-10-CM

## 2019-10-26 LAB
ABSOLUTE EOS #: 0 K/UL (ref 0–0.4)
ABSOLUTE EOS #: 0.69 K/UL (ref 0–0.44)
ABSOLUTE IMMATURE GRANULOCYTE: 0 K/UL (ref 0–0.3)
ABSOLUTE IMMATURE GRANULOCYTE: 0.01 K/UL (ref 0–0.3)
ABSOLUTE LYMPH #: 0.42 K/UL (ref 1–4.8)
ABSOLUTE LYMPH #: 1.69 K/UL (ref 1.1–3.7)
ABSOLUTE MONO #: 0.04 K/UL (ref 0.2–0.8)
ABSOLUTE MONO #: 0.65 K/UL (ref 0.1–1.2)
ANION GAP SERPL CALCULATED.3IONS-SCNC: 15 MMOL/L (ref 9–17)
ANION GAP SERPL CALCULATED.3IONS-SCNC: 17 MMOL/L (ref 9–17)
BASOPHILS # BLD: 0 %
BASOPHILS # BLD: 1 % (ref 0–2)
BASOPHILS ABSOLUTE: 0 K/UL (ref 0–0.2)
BASOPHILS ABSOLUTE: 0.05 K/UL (ref 0–0.2)
BUN BLDV-MCNC: 19 MG/DL (ref 8–23)
BUN BLDV-MCNC: 22 MG/DL (ref 8–23)
BUN/CREAT BLD: 12 (ref 9–20)
BUN/CREAT BLD: 14 (ref 9–20)
CALCIUM SERPL-MCNC: 7.9 MG/DL (ref 8.6–10.4)
CALCIUM SERPL-MCNC: 8.1 MG/DL (ref 8.6–10.4)
CHLORIDE BLD-SCNC: 102 MMOL/L (ref 98–107)
CHLORIDE BLD-SCNC: 96 MMOL/L (ref 98–107)
CO2: 15 MMOL/L (ref 20–31)
CO2: 19 MMOL/L (ref 20–31)
CREAT SERPL-MCNC: 1.58 MG/DL (ref 0.7–1.2)
CREAT SERPL-MCNC: 1.58 MG/DL (ref 0.7–1.2)
DIFFERENTIAL TYPE: ABNORMAL
DIFFERENTIAL TYPE: ABNORMAL
EOSINOPHILS RELATIVE PERCENT: 0 % (ref 1–4)
EOSINOPHILS RELATIVE PERCENT: 14 % (ref 1–4)
GFR AFRICAN AMERICAN: 53 ML/MIN
GFR AFRICAN AMERICAN: 53 ML/MIN
GFR NON-AFRICAN AMERICAN: 44 ML/MIN
GFR NON-AFRICAN AMERICAN: 44 ML/MIN
GFR SERPL CREATININE-BSD FRML MDRD: ABNORMAL ML/MIN/{1.73_M2}
GLUCOSE BLD-MCNC: 107 MG/DL (ref 70–99)
GLUCOSE BLD-MCNC: 282 MG/DL (ref 75–110)
GLUCOSE BLD-MCNC: 83 MG/DL (ref 70–99)
GLUCOSE BLD-MCNC: 94 MG/DL (ref 75–110)
GLUCOSE BLD-MCNC: 97 MG/DL (ref 75–110)
HCT VFR BLD CALC: 31.1 % (ref 40.7–50.3)
HCT VFR BLD CALC: 33.3 % (ref 40.7–50.3)
HEMOGLOBIN: 10.4 G/DL (ref 13–17)
HEMOGLOBIN: 10.7 G/DL (ref 13–17)
IMMATURE GRANULOCYTES: 0 %
IMMATURE GRANULOCYTES: 0 %
LYMPHOCYTES # BLD: 11 % (ref 24–44)
LYMPHOCYTES # BLD: 33 % (ref 24–43)
MCH RBC QN AUTO: 27.5 PG (ref 25.2–33.5)
MCH RBC QN AUTO: 28.1 PG (ref 25.2–33.5)
MCHC RBC AUTO-ENTMCNC: 32.1 G/DL (ref 28.4–34.8)
MCHC RBC AUTO-ENTMCNC: 33.4 G/DL (ref 28.4–34.8)
MCV RBC AUTO: 84.1 FL (ref 82.6–102.9)
MCV RBC AUTO: 85.6 FL (ref 82.6–102.9)
MONOCYTES # BLD: 1 % (ref 1–7)
MONOCYTES # BLD: 13 % (ref 3–12)
NRBC AUTOMATED: 0 PER 100 WBC
NRBC AUTOMATED: 0 PER 100 WBC
PDW BLD-RTO: 15.6 % (ref 11.8–14.4)
PDW BLD-RTO: 15.8 % (ref 11.8–14.4)
PLATELET # BLD: 234 K/UL (ref 138–453)
PLATELET # BLD: 237 K/UL (ref 138–453)
PLATELET ESTIMATE: ABNORMAL
PLATELET ESTIMATE: ABNORMAL
PMV BLD AUTO: 9.4 FL (ref 8.1–13.5)
PMV BLD AUTO: 9.4 FL (ref 8.1–13.5)
POTASSIUM SERPL-SCNC: 4.1 MMOL/L (ref 3.7–5.3)
POTASSIUM SERPL-SCNC: 4.4 MMOL/L (ref 3.7–5.3)
RBC # BLD: 3.7 M/UL (ref 4.21–5.77)
RBC # BLD: 3.89 M/UL (ref 4.21–5.77)
RBC # BLD: ABNORMAL 10*6/UL
RBC # BLD: ABNORMAL 10*6/UL
SEG NEUTROPHILS: 39 % (ref 36–65)
SEG NEUTROPHILS: 88 % (ref 36–66)
SEGMENTED NEUTROPHILS ABSOLUTE COUNT: 1.99 K/UL (ref 1.5–8.1)
SEGMENTED NEUTROPHILS ABSOLUTE COUNT: 3.34 K/UL (ref 1.8–7.7)
SODIUM BLD-SCNC: 130 MMOL/L (ref 135–144)
SODIUM BLD-SCNC: 134 MMOL/L (ref 135–144)
WBC # BLD: 3.8 K/UL (ref 3.5–11.3)
WBC # BLD: 5.1 K/UL (ref 3.5–11.3)
WBC # BLD: ABNORMAL 10*3/UL
WBC # BLD: ABNORMAL 10*3/UL

## 2019-10-26 PROCEDURE — 36415 COLL VENOUS BLD VENIPUNCTURE: CPT

## 2019-10-26 PROCEDURE — 83036 HEMOGLOBIN GLYCOSYLATED A1C: CPT

## 2019-10-26 PROCEDURE — 2580000003 HC RX 258: Performed by: EMERGENCY MEDICINE

## 2019-10-26 PROCEDURE — 82947 ASSAY GLUCOSE BLOOD QUANT: CPT

## 2019-10-26 PROCEDURE — 96374 THER/PROPH/DIAG INJ IV PUSH: CPT

## 2019-10-26 PROCEDURE — 2060000000 HC ICU INTERMEDIATE R&B

## 2019-10-26 PROCEDURE — 94761 N-INVAS EAR/PLS OXIMETRY MLT: CPT

## 2019-10-26 PROCEDURE — 96375 TX/PRO/DX INJ NEW DRUG ADDON: CPT

## 2019-10-26 PROCEDURE — 6370000000 HC RX 637 (ALT 250 FOR IP): Performed by: INTERNAL MEDICINE

## 2019-10-26 PROCEDURE — 6360000002 HC RX W HCPCS: Performed by: INTERNAL MEDICINE

## 2019-10-26 PROCEDURE — 80048 BASIC METABOLIC PNL TOTAL CA: CPT

## 2019-10-26 PROCEDURE — 31502 CHANGE OF WINDPIPE AIRWAY: CPT

## 2019-10-26 PROCEDURE — 99285 EMERGENCY DEPT VISIT HI MDM: CPT

## 2019-10-26 PROCEDURE — 6360000004 HC RX CONTRAST MEDICATION: Performed by: EMERGENCY MEDICINE

## 2019-10-26 PROCEDURE — 85025 COMPLETE CBC W/AUTO DIFF WBC: CPT

## 2019-10-26 PROCEDURE — 99222 1ST HOSP IP/OBS MODERATE 55: CPT | Performed by: INTERNAL MEDICINE

## 2019-10-26 PROCEDURE — 70491 CT SOFT TISSUE NECK W/DYE: CPT

## 2019-10-26 PROCEDURE — 6360000002 HC RX W HCPCS: Performed by: EMERGENCY MEDICINE

## 2019-10-26 PROCEDURE — 2580000003 HC RX 258: Performed by: INTERNAL MEDICINE

## 2019-10-26 PROCEDURE — 2500000003 HC RX 250 WO HCPCS: Performed by: EMERGENCY MEDICINE

## 2019-10-26 RX ORDER — LANOLIN ALCOHOL/MO/W.PET/CERES
325 CREAM (GRAM) TOPICAL
Status: DISCONTINUED | OUTPATIENT
Start: 2019-10-26 | End: 2019-10-30 | Stop reason: HOSPADM

## 2019-10-26 RX ORDER — 0.9 % SODIUM CHLORIDE 0.9 %
80 INTRAVENOUS SOLUTION INTRAVENOUS ONCE
Status: COMPLETED | OUTPATIENT
Start: 2019-10-26 | End: 2019-10-26

## 2019-10-26 RX ORDER — MORPHINE SULFATE 4 MG/ML
4 INJECTION, SOLUTION INTRAMUSCULAR; INTRAVENOUS
Status: DISCONTINUED | OUTPATIENT
Start: 2019-10-26 | End: 2019-10-30 | Stop reason: HOSPADM

## 2019-10-26 RX ORDER — SODIUM CHLORIDE 9 MG/ML
INJECTION, SOLUTION INTRAVENOUS CONTINUOUS
Status: DISCONTINUED | OUTPATIENT
Start: 2019-10-26 | End: 2019-10-28

## 2019-10-26 RX ORDER — ACETAMINOPHEN 325 MG/1
650 TABLET ORAL EVERY 4 HOURS PRN
Status: DISCONTINUED | OUTPATIENT
Start: 2019-10-26 | End: 2019-10-30 | Stop reason: HOSPADM

## 2019-10-26 RX ORDER — MORPHINE SULFATE 2 MG/ML
2 INJECTION, SOLUTION INTRAMUSCULAR; INTRAVENOUS
Status: DISCONTINUED | OUTPATIENT
Start: 2019-10-26 | End: 2019-10-30 | Stop reason: HOSPADM

## 2019-10-26 RX ORDER — DEXTROSE MONOHYDRATE 50 MG/ML
100 INJECTION, SOLUTION INTRAVENOUS PRN
Status: DISCONTINUED | OUTPATIENT
Start: 2019-10-26 | End: 2019-10-30 | Stop reason: HOSPADM

## 2019-10-26 RX ORDER — LANOLIN ALCOHOL/MO/W.PET/CERES
500 CREAM (GRAM) TOPICAL DAILY
Status: DISCONTINUED | OUTPATIENT
Start: 2019-10-26 | End: 2019-10-30 | Stop reason: HOSPADM

## 2019-10-26 RX ORDER — METHYLPREDNISOLONE SODIUM SUCCINATE 125 MG/2ML
125 INJECTION, POWDER, LYOPHILIZED, FOR SOLUTION INTRAMUSCULAR; INTRAVENOUS ONCE
Status: COMPLETED | OUTPATIENT
Start: 2019-10-26 | End: 2019-10-26

## 2019-10-26 RX ORDER — ALLOPURINOL 300 MG/1
300 TABLET ORAL DAILY
Status: DISCONTINUED | OUTPATIENT
Start: 2019-10-26 | End: 2019-10-30 | Stop reason: HOSPADM

## 2019-10-26 RX ORDER — ALBUTEROL SULFATE 90 UG/1
2 AEROSOL, METERED RESPIRATORY (INHALATION) EVERY 6 HOURS PRN
COMMUNITY

## 2019-10-26 RX ORDER — OMEPRAZOLE 20 MG/1
20 CAPSULE, DELAYED RELEASE ORAL
COMMUNITY

## 2019-10-26 RX ORDER — SODIUM CHLORIDE 0.9 % (FLUSH) 0.9 %
10 SYRINGE (ML) INJECTION EVERY 12 HOURS SCHEDULED
Status: DISCONTINUED | OUTPATIENT
Start: 2019-10-26 | End: 2019-10-30 | Stop reason: HOSPADM

## 2019-10-26 RX ORDER — BUDESONIDE AND FORMOTEROL FUMARATE DIHYDRATE 160; 4.5 UG/1; UG/1
2 AEROSOL RESPIRATORY (INHALATION) 2 TIMES DAILY
COMMUNITY

## 2019-10-26 RX ORDER — METHYLPREDNISOLONE SODIUM SUCCINATE 40 MG/ML
40 INJECTION, POWDER, LYOPHILIZED, FOR SOLUTION INTRAMUSCULAR; INTRAVENOUS EVERY 6 HOURS
Status: DISCONTINUED | OUTPATIENT
Start: 2019-10-26 | End: 2019-10-27

## 2019-10-26 RX ORDER — DEXTROSE MONOHYDRATE 25 G/50ML
12.5 INJECTION, SOLUTION INTRAVENOUS PRN
Status: DISCONTINUED | OUTPATIENT
Start: 2019-10-26 | End: 2019-10-30 | Stop reason: HOSPADM

## 2019-10-26 RX ORDER — DIPHENHYDRAMINE HYDROCHLORIDE 50 MG/ML
25 INJECTION INTRAMUSCULAR; INTRAVENOUS ONCE
Status: COMPLETED | OUTPATIENT
Start: 2019-10-26 | End: 2019-10-26

## 2019-10-26 RX ORDER — SODIUM CHLORIDE 0.9 % (FLUSH) 0.9 %
10 SYRINGE (ML) INJECTION PRN
Status: DISCONTINUED | OUTPATIENT
Start: 2019-10-26 | End: 2019-10-29 | Stop reason: SDUPTHER

## 2019-10-26 RX ORDER — NICOTINE POLACRILEX 4 MG
15 LOZENGE BUCCAL PRN
Status: DISCONTINUED | OUTPATIENT
Start: 2019-10-26 | End: 2019-10-30 | Stop reason: HOSPADM

## 2019-10-26 RX ORDER — IBUPROFEN 400 MG/1
400 TABLET ORAL EVERY 8 HOURS PRN
Status: ON HOLD | COMMUNITY
End: 2019-10-30 | Stop reason: HOSPADM

## 2019-10-26 RX ORDER — SODIUM CHLORIDE 0.9 % (FLUSH) 0.9 %
10 SYRINGE (ML) INJECTION PRN
Status: DISCONTINUED | OUTPATIENT
Start: 2019-10-26 | End: 2019-10-30 | Stop reason: HOSPADM

## 2019-10-26 RX ORDER — CITALOPRAM 10 MG/1
10 TABLET ORAL DAILY
Status: DISCONTINUED | OUTPATIENT
Start: 2019-10-26 | End: 2019-10-26

## 2019-10-26 RX ORDER — ALBUTEROL SULFATE 2.5 MG/3ML
2.5 SOLUTION RESPIRATORY (INHALATION)
Status: DISCONTINUED | OUTPATIENT
Start: 2019-10-26 | End: 2019-10-30 | Stop reason: HOSPADM

## 2019-10-26 RX ORDER — DOCUSATE SODIUM 100 MG/1
100 CAPSULE, LIQUID FILLED ORAL 2 TIMES DAILY
Status: DISCONTINUED | OUTPATIENT
Start: 2019-10-26 | End: 2019-10-27

## 2019-10-26 RX ADMIN — Medication 10 ML: at 02:00

## 2019-10-26 RX ADMIN — METHYLPREDNISOLONE SODIUM SUCCINATE 62.5 MG: 125 INJECTION, POWDER, FOR SOLUTION INTRAMUSCULAR; INTRAVENOUS at 00:28

## 2019-10-26 RX ADMIN — SODIUM CHLORIDE: 9 INJECTION, SOLUTION INTRAVENOUS at 13:37

## 2019-10-26 RX ADMIN — METHYLPREDNISOLONE SODIUM SUCCINATE 40 MG: 40 INJECTION, POWDER, FOR SOLUTION INTRAMUSCULAR; INTRAVENOUS at 13:38

## 2019-10-26 RX ADMIN — SODIUM CHLORIDE 80 ML: 9 INJECTION, SOLUTION INTRAVENOUS at 02:00

## 2019-10-26 RX ADMIN — ENOXAPARIN SODIUM 40 MG: 40 INJECTION SUBCUTANEOUS at 20:51

## 2019-10-26 RX ADMIN — DIPHENHYDRAMINE HYDROCHLORIDE 25 MG: 50 INJECTION, SOLUTION INTRAMUSCULAR; INTRAVENOUS at 00:27

## 2019-10-26 RX ADMIN — SODIUM CHLORIDE: 9 INJECTION, SOLUTION INTRAVENOUS at 03:35

## 2019-10-26 RX ADMIN — Medication 10 ML: at 03:36

## 2019-10-26 RX ADMIN — IOPAMIDOL 75 ML: 755 INJECTION, SOLUTION INTRAVENOUS at 02:00

## 2019-10-26 RX ADMIN — METHYLPREDNISOLONE SODIUM SUCCINATE 40 MG: 40 INJECTION, POWDER, FOR SOLUTION INTRAMUSCULAR; INTRAVENOUS at 20:47

## 2019-10-26 RX ADMIN — FAMOTIDINE 20 MG: 10 INJECTION, SOLUTION INTRAVENOUS at 00:27

## 2019-10-26 RX ADMIN — Medication 10 ML: at 08:54

## 2019-10-26 RX ADMIN — METHYLPREDNISOLONE SODIUM SUCCINATE 40 MG: 40 INJECTION, POWDER, FOR SOLUTION INTRAMUSCULAR; INTRAVENOUS at 08:54

## 2019-10-26 RX ADMIN — INSULIN LISPRO 2 UNITS: 100 INJECTION, SOLUTION INTRAVENOUS; SUBCUTANEOUS at 20:51

## 2019-10-26 ASSESSMENT — PAIN SCALES - GENERAL: PAINLEVEL_OUTOF10: 0

## 2019-10-27 LAB
ABSOLUTE EOS #: 0 K/UL (ref 0–0.4)
ABSOLUTE IMMATURE GRANULOCYTE: 0 K/UL (ref 0–0.3)
ABSOLUTE LYMPH #: 0.5 K/UL (ref 1–4.8)
ABSOLUTE MONO #: 0.07 K/UL (ref 0.2–0.8)
ANION GAP SERPL CALCULATED.3IONS-SCNC: 15 MMOL/L (ref 9–17)
ANION GAP SERPL CALCULATED.3IONS-SCNC: 20 MMOL/L (ref 9–17)
BASOPHILS # BLD: 0 %
BASOPHILS ABSOLUTE: 0 K/UL (ref 0–0.2)
BUN BLDV-MCNC: 28 MG/DL (ref 8–23)
BUN BLDV-MCNC: 28 MG/DL (ref 8–23)
BUN/CREAT BLD: 16 (ref 9–20)
BUN/CREAT BLD: 17 (ref 9–20)
CALCIUM SERPL-MCNC: 7.8 MG/DL (ref 8.6–10.4)
CALCIUM SERPL-MCNC: 8.1 MG/DL (ref 8.6–10.4)
CHLORIDE BLD-SCNC: 101 MMOL/L (ref 98–107)
CHLORIDE BLD-SCNC: 96 MMOL/L (ref 98–107)
CO2: 17 MMOL/L (ref 20–31)
CO2: 17 MMOL/L (ref 20–31)
CREAT SERPL-MCNC: 1.68 MG/DL (ref 0.7–1.2)
CREAT SERPL-MCNC: 1.8 MG/DL (ref 0.7–1.2)
DIFFERENTIAL TYPE: ABNORMAL
EOSINOPHILS RELATIVE PERCENT: 0 % (ref 1–4)
ESTIMATED AVERAGE GLUCOSE: 108 MG/DL
FERRITIN: 53 UG/L (ref 30–400)
FIO2: 21
FOLATE: 9.8 NG/ML
GFR AFRICAN AMERICAN: 46 ML/MIN
GFR AFRICAN AMERICAN: 50 ML/MIN
GFR NON-AFRICAN AMERICAN: 38 ML/MIN
GFR NON-AFRICAN AMERICAN: 41 ML/MIN
GFR SERPL CREATININE-BSD FRML MDRD: ABNORMAL ML/MIN/{1.73_M2}
GLUCOSE BLD-MCNC: 118 MG/DL (ref 75–110)
GLUCOSE BLD-MCNC: 123 MG/DL (ref 75–110)
GLUCOSE BLD-MCNC: 126 MG/DL (ref 70–99)
GLUCOSE BLD-MCNC: 136 MG/DL (ref 75–110)
GLUCOSE BLD-MCNC: 146 MG/DL (ref 70–99)
GLUCOSE BLD-MCNC: 166 MG/DL (ref 75–110)
HBA1C MFR BLD: 5.4 % (ref 4–6)
HCT VFR BLD CALC: 29.9 % (ref 40.7–50.3)
HEMOGLOBIN: 9.9 G/DL (ref 13–17)
IMMATURE GRANULOCYTES: 0 %
IRON: 39 UG/DL (ref 59–158)
LYMPHOCYTES # BLD: 14 % (ref 24–44)
MCH RBC QN AUTO: 28 PG (ref 25.2–33.5)
MCHC RBC AUTO-ENTMCNC: 33.1 G/DL (ref 28.4–34.8)
MCV RBC AUTO: 84.7 FL (ref 82.6–102.9)
MONOCYTES # BLD: 2 % (ref 1–7)
NEGATIVE BASE EXCESS, ART: 11 (ref 0–2)
NRBC AUTOMATED: 0 PER 100 WBC
O2 DEVICE/FLOW/%: ABNORMAL
PATIENT TEMP: ABNORMAL
PDW BLD-RTO: 15.8 % (ref 11.8–14.4)
PHOSPHORUS: 2.6 MG/DL (ref 2.5–4.5)
PLATELET # BLD: 232 K/UL (ref 138–453)
PLATELET ESTIMATE: ABNORMAL
PMV BLD AUTO: 9.9 FL (ref 8.1–13.5)
POC HCO3: 14.2 MMOL/L (ref 22–27)
POC O2 SATURATION: 98 %
POC PCO2 TEMP: ABNORMAL MM HG
POC PCO2: 24 MM HG (ref 32–45)
POC PH TEMP: ABNORMAL
POC PH: 7.38 (ref 7.35–7.45)
POC PO2 TEMP: ABNORMAL MM HG
POC PO2: 99 MM HG (ref 75–95)
POSITIVE BASE EXCESS, ART: ABNORMAL (ref 0–2)
POTASSIUM SERPL-SCNC: 3.5 MMOL/L (ref 3.7–5.3)
POTASSIUM SERPL-SCNC: 3.7 MMOL/L (ref 3.7–5.3)
RBC # BLD: 3.53 M/UL (ref 4.21–5.77)
RBC # BLD: ABNORMAL 10*6/UL
SEG NEUTROPHILS: 84 % (ref 36–66)
SEGMENTED NEUTROPHILS ABSOLUTE COUNT: 3.03 K/UL (ref 1.8–7.7)
SODIUM BLD-SCNC: 133 MMOL/L (ref 135–144)
SODIUM BLD-SCNC: 133 MMOL/L (ref 135–144)
TCO2 (CALC), ART: 15 MMOL/L (ref 23–28)
VITAMIN B-12: 1051 PG/ML (ref 232–1245)
WBC # BLD: 3.6 K/UL (ref 3.5–11.3)
WBC # BLD: ABNORMAL 10*3/UL

## 2019-10-27 PROCEDURE — 83540 ASSAY OF IRON: CPT

## 2019-10-27 PROCEDURE — 84155 ASSAY OF PROTEIN SERUM: CPT

## 2019-10-27 PROCEDURE — 86038 ANTINUCLEAR ANTIBODIES: CPT

## 2019-10-27 PROCEDURE — 84165 PROTEIN E-PHORESIS SERUM: CPT

## 2019-10-27 PROCEDURE — 82746 ASSAY OF FOLIC ACID SERUM: CPT

## 2019-10-27 PROCEDURE — 82607 VITAMIN B-12: CPT

## 2019-10-27 PROCEDURE — 6370000000 HC RX 637 (ALT 250 FOR IP): Performed by: INTERNAL MEDICINE

## 2019-10-27 PROCEDURE — 85025 COMPLETE CBC W/AUTO DIFF WBC: CPT

## 2019-10-27 PROCEDURE — 83970 ASSAY OF PARATHORMONE: CPT

## 2019-10-27 PROCEDURE — 6360000002 HC RX W HCPCS: Performed by: INTERNAL MEDICINE

## 2019-10-27 PROCEDURE — 82803 BLOOD GASES ANY COMBINATION: CPT

## 2019-10-27 PROCEDURE — 84100 ASSAY OF PHOSPHORUS: CPT

## 2019-10-27 PROCEDURE — 82306 VITAMIN D 25 HYDROXY: CPT

## 2019-10-27 PROCEDURE — 2580000003 HC RX 258: Performed by: INTERNAL MEDICINE

## 2019-10-27 PROCEDURE — 82728 ASSAY OF FERRITIN: CPT

## 2019-10-27 PROCEDURE — 82947 ASSAY GLUCOSE BLOOD QUANT: CPT

## 2019-10-27 PROCEDURE — 1200000000 HC SEMI PRIVATE

## 2019-10-27 PROCEDURE — 80048 BASIC METABOLIC PNL TOTAL CA: CPT

## 2019-10-27 PROCEDURE — 36415 COLL VENOUS BLD VENIPUNCTURE: CPT

## 2019-10-27 RX ORDER — HEPARIN SODIUM 5000 [USP'U]/ML
5000 INJECTION, SOLUTION INTRAVENOUS; SUBCUTANEOUS EVERY 8 HOURS SCHEDULED
Status: DISCONTINUED | OUTPATIENT
Start: 2019-10-27 | End: 2019-10-27 | Stop reason: SDUPTHER

## 2019-10-27 RX ORDER — HEPARIN SODIUM 5000 [USP'U]/ML
5000 INJECTION, SOLUTION INTRAVENOUS; SUBCUTANEOUS EVERY 8 HOURS SCHEDULED
Status: DISCONTINUED | OUTPATIENT
Start: 2019-10-27 | End: 2019-10-30 | Stop reason: HOSPADM

## 2019-10-27 RX ORDER — DOCUSATE SODIUM 100 MG/1
100 CAPSULE, LIQUID FILLED ORAL 2 TIMES DAILY
Status: DISCONTINUED | OUTPATIENT
Start: 2019-10-28 | End: 2019-10-30 | Stop reason: HOSPADM

## 2019-10-27 RX ADMIN — FERROUS SULFATE TAB EC 325 MG (65 MG FE EQUIVALENT) 325 MG: 325 (65 FE) TABLET DELAYED RESPONSE at 08:19

## 2019-10-27 RX ADMIN — ALLOPURINOL 300 MG: 300 TABLET ORAL at 08:19

## 2019-10-27 RX ADMIN — DOCUSATE SODIUM 100 MG: 100 CAPSULE, LIQUID FILLED ORAL at 20:31

## 2019-10-27 RX ADMIN — INSULIN LISPRO 1 UNITS: 100 INJECTION, SOLUTION INTRAVENOUS; SUBCUTANEOUS at 20:31

## 2019-10-27 RX ADMIN — HEPARIN SODIUM 5000 UNITS: 5000 INJECTION INTRAVENOUS; SUBCUTANEOUS at 21:33

## 2019-10-27 RX ADMIN — AMLODIPINE BESYLATE 7.5 MG: 5 TABLET ORAL at 08:19

## 2019-10-27 RX ADMIN — ENOXAPARIN SODIUM 40 MG: 40 INJECTION SUBCUTANEOUS at 08:18

## 2019-10-27 RX ADMIN — METHYLPREDNISOLONE SODIUM SUCCINATE 40 MG: 40 INJECTION, POWDER, FOR SOLUTION INTRAMUSCULAR; INTRAVENOUS at 08:18

## 2019-10-27 RX ADMIN — SODIUM CHLORIDE: 9 INJECTION, SOLUTION INTRAVENOUS at 23:19

## 2019-10-27 RX ADMIN — AMLODIPINE BESYLATE 7.5 MG: 5 TABLET ORAL at 00:06

## 2019-10-27 RX ADMIN — CYANOCOBALAMIN TAB 1000 MCG 500 MCG: 1000 TAB at 08:19

## 2019-10-27 RX ADMIN — DOCUSATE SODIUM 100 MG: 100 CAPSULE, LIQUID FILLED ORAL at 08:19

## 2019-10-27 RX ADMIN — METHYLPREDNISOLONE SODIUM SUCCINATE 40 MG: 40 INJECTION, POWDER, FOR SOLUTION INTRAMUSCULAR; INTRAVENOUS at 01:55

## 2019-10-27 RX ADMIN — SODIUM CHLORIDE: 9 INJECTION, SOLUTION INTRAVENOUS at 03:30

## 2019-10-27 RX ADMIN — ACETAMINOPHEN 650 MG: 325 TABLET ORAL at 00:06

## 2019-10-27 ASSESSMENT — PAIN SCALES - GENERAL
PAINLEVEL_OUTOF10: 0
PAINLEVEL_OUTOF10: 4

## 2019-10-28 ENCOUNTER — APPOINTMENT (OUTPATIENT)
Dept: ULTRASOUND IMAGING | Age: 69
DRG: 146 | End: 2019-10-28
Payer: MEDICARE

## 2019-10-28 ENCOUNTER — APPOINTMENT (OUTPATIENT)
Dept: GENERAL RADIOLOGY | Age: 69
DRG: 146 | End: 2019-10-28
Payer: MEDICARE

## 2019-10-28 PROBLEM — J44.9 COPD (CHRONIC OBSTRUCTIVE PULMONARY DISEASE) (HCC): Status: ACTIVE | Noted: 2019-10-28

## 2019-10-28 LAB
-: ABNORMAL
ABSOLUTE EOS #: <0.03 K/UL (ref 0–0.44)
ABSOLUTE IMMATURE GRANULOCYTE: 0.02 K/UL (ref 0–0.3)
ABSOLUTE LYMPH #: 0.75 K/UL (ref 1.1–3.7)
ABSOLUTE MONO #: 0.49 K/UL (ref 0.1–1.2)
AMORPHOUS: ABNORMAL
ANION GAP SERPL CALCULATED.3IONS-SCNC: 14 MMOL/L (ref 9–17)
BACTERIA: ABNORMAL
BASOPHILS # BLD: 0 % (ref 0–2)
BASOPHILS ABSOLUTE: <0.03 K/UL (ref 0–0.2)
BILIRUBIN URINE: NEGATIVE
BUN BLDV-MCNC: 31 MG/DL (ref 8–23)
BUN/CREAT BLD: 19 (ref 9–20)
CALCIUM SERPL-MCNC: 7.8 MG/DL (ref 8.6–10.4)
CASTS UA: ABNORMAL /LPF
CHLORIDE BLD-SCNC: 103 MMOL/L (ref 98–107)
CO2: 19 MMOL/L (ref 20–31)
COLOR: ABNORMAL
COMMENT UA: ABNORMAL
CREAT SERPL-MCNC: 1.63 MG/DL (ref 0.7–1.2)
CREATININE URINE: 17.6 MG/DL (ref 39–259)
CRYSTALS, UA: ABNORMAL /HPF
DIFFERENTIAL TYPE: ABNORMAL
EOSINOPHILS RELATIVE PERCENT: 0 % (ref 1–4)
EPITHELIAL CELLS UA: ABNORMAL /HPF (ref 0–5)
GFR AFRICAN AMERICAN: 51 ML/MIN
GFR NON-AFRICAN AMERICAN: 42 ML/MIN
GFR SERPL CREATININE-BSD FRML MDRD: ABNORMAL ML/MIN/{1.73_M2}
GFR SERPL CREATININE-BSD FRML MDRD: ABNORMAL ML/MIN/{1.73_M2}
GLUCOSE BLD-MCNC: 101 MG/DL (ref 70–99)
GLUCOSE BLD-MCNC: 67 MG/DL (ref 75–110)
GLUCOSE BLD-MCNC: 94 MG/DL (ref 75–110)
GLUCOSE BLD-MCNC: 95 MG/DL (ref 75–110)
GLUCOSE BLD-MCNC: 96 MG/DL (ref 75–110)
GLUCOSE BLD-MCNC: 97 MG/DL (ref 75–110)
GLUCOSE URINE: NEGATIVE
HCT VFR BLD CALC: 28.9 % (ref 40.7–50.3)
HEMOGLOBIN: 9.5 G/DL (ref 13–17)
IMMATURE GRANULOCYTES: 0 %
KETONES, URINE: NEGATIVE
LEUKOCYTE ESTERASE, URINE: NEGATIVE
LYMPHOCYTES # BLD: 12 % (ref 24–43)
MAGNESIUM: 1.3 MG/DL (ref 1.6–2.6)
MCH RBC QN AUTO: 27.5 PG (ref 25.2–33.5)
MCHC RBC AUTO-ENTMCNC: 32.9 G/DL (ref 28.4–34.8)
MCV RBC AUTO: 83.8 FL (ref 82.6–102.9)
MONOCYTES # BLD: 8 % (ref 3–12)
MUCUS: ABNORMAL
NITRITE, URINE: NEGATIVE
NRBC AUTOMATED: 0 PER 100 WBC
OTHER OBSERVATIONS UA: ABNORMAL
PDW BLD-RTO: 15.6 % (ref 11.8–14.4)
PH UA: 6.5 (ref 5–8)
PHOSPHORUS: 2.7 MG/DL (ref 2.5–4.5)
PLATELET # BLD: 210 K/UL (ref 138–453)
PLATELET ESTIMATE: ABNORMAL
PMV BLD AUTO: 9.8 FL (ref 8.1–13.5)
POTASSIUM SERPL-SCNC: 3.3 MMOL/L (ref 3.7–5.3)
POTASSIUM SERPL-SCNC: 3.7 MMOL/L (ref 3.7–5.3)
PROTEIN UA: ABNORMAL
PTH INTACT: 104.6 PG/ML (ref 15–65)
RBC # BLD: 3.45 M/UL (ref 4.21–5.77)
RBC # BLD: ABNORMAL 10*6/UL
RBC UA: ABNORMAL /HPF (ref 0–2)
RENAL EPITHELIAL, UA: ABNORMAL /HPF
SEG NEUTROPHILS: 81 % (ref 36–65)
SEGMENTED NEUTROPHILS ABSOLUTE COUNT: 5.29 K/UL (ref 1.5–8.1)
SODIUM BLD-SCNC: 136 MMOL/L (ref 135–144)
SODIUM,UR: 58 MMOL/L
SPECIFIC GRAVITY UA: 1.01 (ref 1–1.03)
TRICHOMONAS: ABNORMAL
TURBIDITY: CLEAR
URINE HGB: ABNORMAL
UROBILINOGEN, URINE: NORMAL
VITAMIN D 25-HYDROXY: 8.8 NG/ML (ref 30–100)
WBC # BLD: 6.6 K/UL (ref 3.5–11.3)
WBC # BLD: ABNORMAL 10*3/UL
WBC UA: ABNORMAL /HPF (ref 0–5)
YEAST: ABNORMAL

## 2019-10-28 PROCEDURE — G0378 HOSPITAL OBSERVATION PER HR: HCPCS

## 2019-10-28 PROCEDURE — 71045 X-RAY EXAM CHEST 1 VIEW: CPT

## 2019-10-28 PROCEDURE — 84132 ASSAY OF SERUM POTASSIUM: CPT

## 2019-10-28 PROCEDURE — 84156 ASSAY OF PROTEIN URINE: CPT

## 2019-10-28 PROCEDURE — 84166 PROTEIN E-PHORESIS/URINE/CSF: CPT

## 2019-10-28 PROCEDURE — 82947 ASSAY GLUCOSE BLOOD QUANT: CPT

## 2019-10-28 PROCEDURE — 36415 COLL VENOUS BLD VENIPUNCTURE: CPT

## 2019-10-28 PROCEDURE — 84100 ASSAY OF PHOSPHORUS: CPT

## 2019-10-28 PROCEDURE — 6360000002 HC RX W HCPCS: Performed by: INTERNAL MEDICINE

## 2019-10-28 PROCEDURE — 82570 ASSAY OF URINE CREATININE: CPT

## 2019-10-28 PROCEDURE — 83735 ASSAY OF MAGNESIUM: CPT

## 2019-10-28 PROCEDURE — 84300 ASSAY OF URINE SODIUM: CPT

## 2019-10-28 PROCEDURE — 2060000000 HC ICU INTERMEDIATE R&B

## 2019-10-28 PROCEDURE — 6370000000 HC RX 637 (ALT 250 FOR IP): Performed by: INTERNAL MEDICINE

## 2019-10-28 PROCEDURE — 2580000003 HC RX 258: Performed by: INTERNAL MEDICINE

## 2019-10-28 PROCEDURE — 80048 BASIC METABOLIC PNL TOTAL CA: CPT

## 2019-10-28 PROCEDURE — 81001 URINALYSIS AUTO W/SCOPE: CPT

## 2019-10-28 PROCEDURE — 85025 COMPLETE CBC W/AUTO DIFF WBC: CPT

## 2019-10-28 PROCEDURE — 6360000002 HC RX W HCPCS: Performed by: NURSE PRACTITIONER

## 2019-10-28 PROCEDURE — 76775 US EXAM ABDO BACK WALL LIM: CPT

## 2019-10-28 RX ORDER — MAGNESIUM SULFATE 1 G/100ML
1 INJECTION INTRAVENOUS
Status: COMPLETED | OUTPATIENT
Start: 2019-10-28 | End: 2019-10-28

## 2019-10-28 RX ADMIN — HEPARIN SODIUM 5000 UNITS: 5000 INJECTION INTRAVENOUS; SUBCUTANEOUS at 13:13

## 2019-10-28 RX ADMIN — SODIUM CHLORIDE: 9 INJECTION, SOLUTION INTRAVENOUS at 13:37

## 2019-10-28 RX ADMIN — POTASSIUM BICARBONATE 20 MEQ: 782 TABLET, EFFERVESCENT ORAL at 13:13

## 2019-10-28 RX ADMIN — FERROUS SULFATE TAB EC 325 MG (65 MG FE EQUIVALENT) 325 MG: 325 (65 FE) TABLET DELAYED RESPONSE at 09:14

## 2019-10-28 RX ADMIN — Medication 10 ML: at 21:42

## 2019-10-28 RX ADMIN — MAGNESIUM SULFATE HEPTAHYDRATE 1 G: 1 INJECTION, SOLUTION INTRAVENOUS at 16:45

## 2019-10-28 RX ADMIN — AMLODIPINE BESYLATE 7.5 MG: 5 TABLET ORAL at 09:14

## 2019-10-28 RX ADMIN — MAGNESIUM SULFATE HEPTAHYDRATE 1 G: 1 INJECTION, SOLUTION INTRAVENOUS at 17:47

## 2019-10-28 RX ADMIN — DOCUSATE SODIUM 100 MG: 100 CAPSULE, LIQUID FILLED ORAL at 21:43

## 2019-10-28 RX ADMIN — HEPARIN SODIUM 5000 UNITS: 5000 INJECTION INTRAVENOUS; SUBCUTANEOUS at 05:44

## 2019-10-28 RX ADMIN — HEPARIN SODIUM 5000 UNITS: 5000 INJECTION INTRAVENOUS; SUBCUTANEOUS at 21:43

## 2019-10-28 RX ADMIN — CYANOCOBALAMIN TAB 1000 MCG 500 MCG: 1000 TAB at 09:14

## 2019-10-28 RX ADMIN — ALLOPURINOL 300 MG: 300 TABLET ORAL at 09:14

## 2019-10-28 RX ADMIN — DOCUSATE SODIUM 100 MG: 100 CAPSULE, LIQUID FILLED ORAL at 09:14

## 2019-10-28 ASSESSMENT — PAIN SCALES - GENERAL
PAINLEVEL_OUTOF10: 0
PAINLEVEL_OUTOF10: 0

## 2019-10-29 PROBLEM — E43 SEVERE MALNUTRITION (HCC): Chronic | Status: ACTIVE | Noted: 2019-10-29

## 2019-10-29 LAB
ABSOLUTE EOS #: 0.08 K/UL (ref 0–0.44)
ABSOLUTE IMMATURE GRANULOCYTE: 0.01 K/UL (ref 0–0.3)
ABSOLUTE LYMPH #: 0.89 K/UL (ref 1.1–3.7)
ABSOLUTE MONO #: 0.45 K/UL (ref 0.1–1.2)
ALBUMIN (CALCULATED): 3.1 G/DL (ref 3.2–5.2)
ALBUMIN PERCENT: 51 % (ref 45–65)
ALPHA 1 PERCENT: 3 % (ref 3–6)
ALPHA 2 PERCENT: 14 % (ref 6–13)
ALPHA-1-GLOBULIN: 0.2 G/DL (ref 0.1–0.4)
ALPHA-2-GLOBULIN: 0.9 G/DL (ref 0.5–0.9)
ANION GAP SERPL CALCULATED.3IONS-SCNC: 10 MMOL/L (ref 9–17)
ANION GAP SERPL CALCULATED.3IONS-SCNC: 13 MMOL/L (ref 9–17)
ANTI-NUCLEAR ANTIBODY (ANA): NEGATIVE
BASOPHILS # BLD: 0 % (ref 0–2)
BASOPHILS ABSOLUTE: <0.03 K/UL (ref 0–0.2)
BETA GLOBULIN: 0.9 G/DL (ref 0.5–1.1)
BETA PERCENT: 14 % (ref 11–19)
BUN BLDV-MCNC: 28 MG/DL (ref 8–23)
BUN BLDV-MCNC: 28 MG/DL (ref 8–23)
BUN/CREAT BLD: 18 (ref 9–20)
BUN/CREAT BLD: 21 (ref 9–20)
CALCIUM SERPL-MCNC: 7.8 MG/DL (ref 8.6–10.4)
CALCIUM SERPL-MCNC: 8 MG/DL (ref 8.6–10.4)
CHLORIDE BLD-SCNC: 101 MMOL/L (ref 98–107)
CHLORIDE BLD-SCNC: 101 MMOL/L (ref 98–107)
CO2: 20 MMOL/L (ref 20–31)
CO2: 22 MMOL/L (ref 20–31)
CREAT SERPL-MCNC: 1.32 MG/DL (ref 0.7–1.2)
CREAT SERPL-MCNC: 1.57 MG/DL (ref 0.7–1.2)
DIFFERENTIAL TYPE: ABNORMAL
EOSINOPHILS RELATIVE PERCENT: 1 % (ref 1–4)
GAMMA GLOBULIN %: 19 % (ref 9–20)
GAMMA GLOBULIN: 1.2 G/DL (ref 0.5–1.5)
GFR AFRICAN AMERICAN: 54 ML/MIN
GFR AFRICAN AMERICAN: >60 ML/MIN
GFR NON-AFRICAN AMERICAN: 44 ML/MIN
GFR NON-AFRICAN AMERICAN: 54 ML/MIN
GFR SERPL CREATININE-BSD FRML MDRD: ABNORMAL ML/MIN/{1.73_M2}
GLUCOSE BLD-MCNC: 107 MG/DL (ref 75–110)
GLUCOSE BLD-MCNC: 63 MG/DL (ref 75–110)
GLUCOSE BLD-MCNC: 88 MG/DL (ref 75–110)
GLUCOSE BLD-MCNC: 92 MG/DL (ref 75–110)
GLUCOSE BLD-MCNC: 94 MG/DL (ref 70–99)
GLUCOSE BLD-MCNC: 99 MG/DL (ref 70–99)
HCT VFR BLD CALC: 33.2 % (ref 40.7–50.3)
HEMOGLOBIN: 10.8 G/DL (ref 13–17)
IMMATURE GRANULOCYTES: 0 %
LYMPHOCYTES # BLD: 16 % (ref 24–43)
MAGNESIUM: 1.6 MG/DL (ref 1.6–2.6)
MCH RBC QN AUTO: 27.6 PG (ref 25.2–33.5)
MCHC RBC AUTO-ENTMCNC: 32.5 G/DL (ref 28.4–34.8)
MCV RBC AUTO: 84.9 FL (ref 82.6–102.9)
MONOCYTES # BLD: 8 % (ref 3–12)
NRBC AUTOMATED: 0 PER 100 WBC
P E INTERPRETATION, U: NORMAL
PATHOLOGIST: ABNORMAL
PATHOLOGIST: NORMAL
PDW BLD-RTO: 15.8 % (ref 11.8–14.4)
PHOSPHORUS: 2.7 MG/DL (ref 2.5–4.5)
PLATELET # BLD: 216 K/UL (ref 138–453)
PLATELET ESTIMATE: ABNORMAL
PMV BLD AUTO: 9.5 FL (ref 8.1–13.5)
POTASSIUM SERPL-SCNC: 3.3 MMOL/L (ref 3.7–5.3)
POTASSIUM SERPL-SCNC: 3.9 MMOL/L (ref 3.7–5.3)
PROTEIN ELECTROPHORESIS, SERUM: ABNORMAL
RBC # BLD: 3.91 M/UL (ref 4.21–5.77)
RBC # BLD: ABNORMAL 10*6/UL
SEG NEUTROPHILS: 75 % (ref 36–65)
SEGMENTED NEUTROPHILS ABSOLUTE COUNT: 4.23 K/UL (ref 1.5–8.1)
SODIUM BLD-SCNC: 133 MMOL/L (ref 135–144)
SODIUM BLD-SCNC: 134 MMOL/L (ref 135–144)
SPECIMEN TYPE: NORMAL
TOTAL PROT. SUM,%: 101 % (ref 98–102)
TOTAL PROT. SUM: 6.3 G/DL (ref 6.3–8.2)
TOTAL PROTEIN: 6.2 G/DL (ref 6.4–8.3)
URINE TOTAL PROTEIN: 121 MG/DL
WBC # BLD: 5.7 K/UL (ref 3.5–11.3)
WBC # BLD: ABNORMAL 10*3/UL

## 2019-10-29 PROCEDURE — 83735 ASSAY OF MAGNESIUM: CPT

## 2019-10-29 PROCEDURE — 36415 COLL VENOUS BLD VENIPUNCTURE: CPT

## 2019-10-29 PROCEDURE — 2580000003 HC RX 258: Performed by: INTERNAL MEDICINE

## 2019-10-29 PROCEDURE — 1200000000 HC SEMI PRIVATE

## 2019-10-29 PROCEDURE — 82947 ASSAY GLUCOSE BLOOD QUANT: CPT

## 2019-10-29 PROCEDURE — 80048 BASIC METABOLIC PNL TOTAL CA: CPT

## 2019-10-29 PROCEDURE — 6360000002 HC RX W HCPCS: Performed by: INTERNAL MEDICINE

## 2019-10-29 PROCEDURE — 6370000000 HC RX 637 (ALT 250 FOR IP): Performed by: INTERNAL MEDICINE

## 2019-10-29 PROCEDURE — 84100 ASSAY OF PHOSPHORUS: CPT

## 2019-10-29 PROCEDURE — 85025 COMPLETE CBC W/AUTO DIFF WBC: CPT

## 2019-10-29 RX ORDER — POTASSIUM CHLORIDE 20 MEQ/1
20 TABLET, EXTENDED RELEASE ORAL ONCE
Status: COMPLETED | OUTPATIENT
Start: 2019-10-29 | End: 2019-10-29

## 2019-10-29 RX ORDER — HYDRALAZINE HYDROCHLORIDE 20 MG/ML
5 INJECTION INTRAMUSCULAR; INTRAVENOUS EVERY 6 HOURS PRN
Status: DISCONTINUED | OUTPATIENT
Start: 2019-10-29 | End: 2019-10-30 | Stop reason: HOSPADM

## 2019-10-29 RX ORDER — AMLODIPINE BESYLATE 10 MG/1
10 TABLET ORAL DAILY
Status: DISCONTINUED | OUTPATIENT
Start: 2019-10-29 | End: 2019-10-30 | Stop reason: HOSPADM

## 2019-10-29 RX ADMIN — Medication 10 ML: at 07:54

## 2019-10-29 RX ADMIN — HEPARIN SODIUM 5000 UNITS: 5000 INJECTION INTRAVENOUS; SUBCUTANEOUS at 23:02

## 2019-10-29 RX ADMIN — CYANOCOBALAMIN TAB 1000 MCG 500 MCG: 1000 TAB at 07:53

## 2019-10-29 RX ADMIN — ACETAMINOPHEN 650 MG: 325 TABLET ORAL at 07:52

## 2019-10-29 RX ADMIN — HYDRALAZINE HYDROCHLORIDE 5 MG: 20 INJECTION INTRAMUSCULAR; INTRAVENOUS at 07:53

## 2019-10-29 RX ADMIN — POTASSIUM CHLORIDE 20 MEQ: 20 TABLET, EXTENDED RELEASE ORAL at 13:26

## 2019-10-29 RX ADMIN — DOCUSATE SODIUM 100 MG: 100 CAPSULE, LIQUID FILLED ORAL at 07:53

## 2019-10-29 RX ADMIN — HYDRALAZINE HYDROCHLORIDE 5 MG: 20 INJECTION INTRAMUSCULAR; INTRAVENOUS at 02:13

## 2019-10-29 RX ADMIN — ACETAMINOPHEN 650 MG: 325 TABLET ORAL at 18:26

## 2019-10-29 RX ADMIN — DOCUSATE SODIUM 100 MG: 100 CAPSULE, LIQUID FILLED ORAL at 23:02

## 2019-10-29 RX ADMIN — ALLOPURINOL 300 MG: 300 TABLET ORAL at 07:53

## 2019-10-29 RX ADMIN — FERROUS SULFATE TAB EC 325 MG (65 MG FE EQUIVALENT) 325 MG: 325 (65 FE) TABLET DELAYED RESPONSE at 07:53

## 2019-10-29 RX ADMIN — HEPARIN SODIUM 5000 UNITS: 5000 INJECTION INTRAVENOUS; SUBCUTANEOUS at 13:26

## 2019-10-29 RX ADMIN — AMLODIPINE BESYLATE 10 MG: 10 TABLET ORAL at 07:53

## 2019-10-29 RX ADMIN — HEPARIN SODIUM 5000 UNITS: 5000 INJECTION INTRAVENOUS; SUBCUTANEOUS at 05:54

## 2019-10-29 ASSESSMENT — PAIN SCALES - GENERAL
PAINLEVEL_OUTOF10: 3
PAINLEVEL_OUTOF10: 8

## 2019-10-29 ASSESSMENT — PAIN DESCRIPTION - LOCATION
LOCATION: HEAD
LOCATION: HEAD

## 2019-10-29 ASSESSMENT — PAIN DESCRIPTION - PAIN TYPE
TYPE: ACUTE PAIN
TYPE: OTHER (COMMENT)

## 2019-10-29 ASSESSMENT — PAIN - FUNCTIONAL ASSESSMENT: PAIN_FUNCTIONAL_ASSESSMENT: ACTIVITIES ARE NOT PREVENTED

## 2019-10-29 ASSESSMENT — PAIN DESCRIPTION - FREQUENCY
FREQUENCY: INTERMITTENT
FREQUENCY: INTERMITTENT

## 2019-10-29 ASSESSMENT — PAIN DESCRIPTION - DESCRIPTORS
DESCRIPTORS: HEADACHE
DESCRIPTORS: HEADACHE

## 2019-10-29 ASSESSMENT — PAIN DESCRIPTION - ONSET
ONSET: SUDDEN
ONSET: GRADUAL

## 2019-10-29 ASSESSMENT — PAIN DESCRIPTION - ORIENTATION: ORIENTATION: MID

## 2019-10-29 ASSESSMENT — PAIN DESCRIPTION - PROGRESSION: CLINICAL_PROGRESSION: RAPIDLY IMPROVING

## 2019-10-30 VITALS
WEIGHT: 146.6 LBS | OXYGEN SATURATION: 95 % | HEART RATE: 94 BPM | TEMPERATURE: 98.4 F | BODY MASS INDEX: 19.86 KG/M2 | HEIGHT: 72 IN | SYSTOLIC BLOOD PRESSURE: 142 MMHG | RESPIRATION RATE: 18 BRPM | DIASTOLIC BLOOD PRESSURE: 86 MMHG

## 2019-10-30 LAB
ABSOLUTE EOS #: 0.13 K/UL (ref 0–0.44)
ABSOLUTE IMMATURE GRANULOCYTE: 0.01 K/UL (ref 0–0.3)
ABSOLUTE LYMPH #: 1.01 K/UL (ref 1.1–3.7)
ABSOLUTE MONO #: 0.48 K/UL (ref 0.1–1.2)
ANION GAP SERPL CALCULATED.3IONS-SCNC: 12 MMOL/L (ref 9–17)
BASOPHILS # BLD: 0 % (ref 0–2)
BASOPHILS ABSOLUTE: <0.03 K/UL (ref 0–0.2)
BUN BLDV-MCNC: 23 MG/DL (ref 8–23)
BUN/CREAT BLD: 16 (ref 9–20)
CALCIUM SERPL-MCNC: 7.9 MG/DL (ref 8.6–10.4)
CHLORIDE BLD-SCNC: 100 MMOL/L (ref 98–107)
CO2: 22 MMOL/L (ref 20–31)
CREAT SERPL-MCNC: 1.43 MG/DL (ref 0.7–1.2)
DIFFERENTIAL TYPE: ABNORMAL
EOSINOPHILS RELATIVE PERCENT: 3 % (ref 1–4)
GFR AFRICAN AMERICAN: 60 ML/MIN
GFR NON-AFRICAN AMERICAN: 49 ML/MIN
GFR SERPL CREATININE-BSD FRML MDRD: ABNORMAL ML/MIN/{1.73_M2}
GFR SERPL CREATININE-BSD FRML MDRD: ABNORMAL ML/MIN/{1.73_M2}
GLUCOSE BLD-MCNC: 158 MG/DL (ref 75–110)
GLUCOSE BLD-MCNC: 78 MG/DL (ref 75–110)
GLUCOSE BLD-MCNC: 91 MG/DL (ref 70–99)
HCT VFR BLD CALC: 31.2 % (ref 40.7–50.3)
HEMOGLOBIN: 10.3 G/DL (ref 13–17)
IMMATURE GRANULOCYTES: 0 %
LYMPHOCYTES # BLD: 23 % (ref 24–43)
MCH RBC QN AUTO: 27.7 PG (ref 25.2–33.5)
MCHC RBC AUTO-ENTMCNC: 33 G/DL (ref 28.4–34.8)
MCV RBC AUTO: 83.9 FL (ref 82.6–102.9)
MONOCYTES # BLD: 11 % (ref 3–12)
NRBC AUTOMATED: 0 PER 100 WBC
PDW BLD-RTO: 15.5 % (ref 11.8–14.4)
PLATELET # BLD: 193 K/UL (ref 138–453)
PLATELET ESTIMATE: ABNORMAL
PMV BLD AUTO: 9.6 FL (ref 8.1–13.5)
POTASSIUM SERPL-SCNC: 3.8 MMOL/L (ref 3.7–5.3)
RBC # BLD: 3.72 M/UL (ref 4.21–5.77)
RBC # BLD: ABNORMAL 10*6/UL
SEG NEUTROPHILS: 63 % (ref 36–65)
SEGMENTED NEUTROPHILS ABSOLUTE COUNT: 2.71 K/UL (ref 1.5–8.1)
SODIUM BLD-SCNC: 134 MMOL/L (ref 135–144)
WBC # BLD: 4.4 K/UL (ref 3.5–11.3)
WBC # BLD: ABNORMAL 10*3/UL

## 2019-10-30 PROCEDURE — 85025 COMPLETE CBC W/AUTO DIFF WBC: CPT

## 2019-10-30 PROCEDURE — 36415 COLL VENOUS BLD VENIPUNCTURE: CPT

## 2019-10-30 PROCEDURE — 6370000000 HC RX 637 (ALT 250 FOR IP): Performed by: INTERNAL MEDICINE

## 2019-10-30 PROCEDURE — 80048 BASIC METABOLIC PNL TOTAL CA: CPT

## 2019-10-30 PROCEDURE — 82947 ASSAY GLUCOSE BLOOD QUANT: CPT

## 2019-10-30 PROCEDURE — 6360000002 HC RX W HCPCS: Performed by: INTERNAL MEDICINE

## 2019-10-30 RX ADMIN — ALLOPURINOL 300 MG: 300 TABLET ORAL at 09:41

## 2019-10-30 RX ADMIN — CYANOCOBALAMIN TAB 1000 MCG 500 MCG: 1000 TAB at 09:42

## 2019-10-30 RX ADMIN — FERROUS SULFATE TAB EC 325 MG (65 MG FE EQUIVALENT) 325 MG: 325 (65 FE) TABLET DELAYED RESPONSE at 09:41

## 2019-10-30 RX ADMIN — HEPARIN SODIUM 5000 UNITS: 5000 INJECTION INTRAVENOUS; SUBCUTANEOUS at 05:49

## 2019-10-30 RX ADMIN — DOCUSATE SODIUM 100 MG: 100 CAPSULE, LIQUID FILLED ORAL at 09:41

## 2019-10-30 RX ADMIN — INSULIN LISPRO 1 UNITS: 100 INJECTION, SOLUTION INTRAVENOUS; SUBCUTANEOUS at 16:58

## 2019-10-30 RX ADMIN — AMLODIPINE BESYLATE 10 MG: 10 TABLET ORAL at 09:41

## 2019-10-30 ASSESSMENT — PAIN SCALES - GENERAL: PAINLEVEL_OUTOF10: 0

## 2019-10-31 LAB — GLUCOSE BLD-MCNC: 93 MG/DL (ref 75–110)

## 2020-01-03 ENCOUNTER — HOSPITAL ENCOUNTER (INPATIENT)
Age: 70
LOS: 5 days | Discharge: HOME | DRG: 683 | End: 2020-01-08
Attending: EMERGENCY MEDICINE | Admitting: INTERNAL MEDICINE
Payer: MEDICARE

## 2020-01-03 ENCOUNTER — APPOINTMENT (OUTPATIENT)
Dept: CT IMAGING | Age: 70
DRG: 683 | End: 2020-01-03
Payer: MEDICARE

## 2020-01-03 PROBLEM — N17.9 AKI (ACUTE KIDNEY INJURY) (HCC): Status: ACTIVE | Noted: 2020-01-03

## 2020-01-03 LAB
-: ABNORMAL
ABSOLUTE EOS #: 0.19 K/UL (ref 0–0.44)
ABSOLUTE IMMATURE GRANULOCYTE: 0 K/UL (ref 0–0.3)
ABSOLUTE LYMPH #: 0.67 K/UL (ref 1.1–3.7)
ABSOLUTE MONO #: 0.44 K/UL (ref 0.1–1.2)
AMORPHOUS: ABNORMAL
ANION GAP SERPL CALCULATED.3IONS-SCNC: 11 MMOL/L (ref 9–17)
ANION GAP SERPL CALCULATED.3IONS-SCNC: 11 MMOL/L (ref 9–17)
ANION GAP SERPL CALCULATED.3IONS-SCNC: 12 MMOL/L (ref 9–17)
BACTERIA: ABNORMAL
BASOPHILS # BLD: 1 % (ref 0–2)
BASOPHILS ABSOLUTE: 0.04 K/UL (ref 0–0.2)
BILIRUBIN URINE: NEGATIVE
BUN BLDV-MCNC: 24 MG/DL (ref 8–23)
BUN BLDV-MCNC: 26 MG/DL (ref 8–23)
BUN/CREAT BLD: 12 (ref 9–20)
BUN/CREAT BLD: 12 (ref 9–20)
CALCIUM SERPL-MCNC: 8.5 MG/DL (ref 8.6–10.4)
CALCIUM SERPL-MCNC: 8.7 MG/DL (ref 8.6–10.4)
CASTS UA: ABNORMAL /LPF
CHLORIDE BLD-SCNC: 95 MMOL/L (ref 98–107)
CHLORIDE BLD-SCNC: 97 MMOL/L (ref 98–107)
CHLORIDE BLD-SCNC: 97 MMOL/L (ref 98–107)
CO2: 18 MMOL/L (ref 20–31)
CO2: 18 MMOL/L (ref 20–31)
CO2: 19 MMOL/L (ref 20–31)
COLOR: YELLOW
COMMENT UA: ABNORMAL
CREAT SERPL-MCNC: 1.96 MG/DL (ref 0.7–1.2)
CREAT SERPL-MCNC: 2.17 MG/DL (ref 0.7–1.2)
CRYSTALS, UA: ABNORMAL /HPF
DIFFERENTIAL TYPE: ABNORMAL
EOSINOPHILS RELATIVE PERCENT: 5 % (ref 1–4)
EPITHELIAL CELLS UA: ABNORMAL /HPF (ref 0–5)
GFR AFRICAN AMERICAN: 37 ML/MIN
GFR AFRICAN AMERICAN: 42 ML/MIN
GFR NON-AFRICAN AMERICAN: 30 ML/MIN
GFR NON-AFRICAN AMERICAN: 34 ML/MIN
GFR SERPL CREATININE-BSD FRML MDRD: ABNORMAL ML/MIN/{1.73_M2}
GLUCOSE BLD-MCNC: 129 MG/DL (ref 70–99)
GLUCOSE BLD-MCNC: 92 MG/DL (ref 70–99)
GLUCOSE URINE: NEGATIVE
HCT VFR BLD CALC: 33.5 % (ref 40.7–50.3)
HEMOGLOBIN: 10.8 G/DL (ref 13–17)
IMMATURE GRANULOCYTES: 0 %
KETONES, URINE: NEGATIVE
LEUKOCYTE ESTERASE, URINE: NEGATIVE
LYMPHOCYTES # BLD: 18 % (ref 24–43)
MCH RBC QN AUTO: 28 PG (ref 25.2–33.5)
MCHC RBC AUTO-ENTMCNC: 32.2 G/DL (ref 28.4–34.8)
MCV RBC AUTO: 86.8 FL (ref 82.6–102.9)
MONOCYTES # BLD: 12 % (ref 3–12)
MUCUS: ABNORMAL
NITRITE, URINE: NEGATIVE
NRBC AUTOMATED: 0 PER 100 WBC
OSMOLALITY URINE: 320 MOSM/KG (ref 300–1170)
OTHER OBSERVATIONS UA: ABNORMAL
PDW BLD-RTO: 16.5 % (ref 11.8–14.4)
PH UA: 6 (ref 5–8)
PLATELET # BLD: 226 K/UL (ref 138–453)
PLATELET ESTIMATE: ABNORMAL
PMV BLD AUTO: 8.7 FL (ref 8.1–13.5)
POTASSIUM SERPL-SCNC: 3.9 MMOL/L (ref 3.7–5.3)
POTASSIUM SERPL-SCNC: 4.1 MMOL/L (ref 3.7–5.3)
POTASSIUM SERPL-SCNC: 4.4 MMOL/L (ref 3.7–5.3)
PROTEIN UA: ABNORMAL
RBC # BLD: 3.86 M/UL (ref 4.21–5.77)
RBC # BLD: ABNORMAL 10*6/UL
RBC UA: ABNORMAL /HPF (ref 0–2)
RENAL EPITHELIAL, UA: ABNORMAL /HPF
SEG NEUTROPHILS: 64 % (ref 36–65)
SEGMENTED NEUTROPHILS ABSOLUTE COUNT: 2.36 K/UL (ref 1.5–8.1)
SERUM OSMOLALITY: 277 MOSM/KG (ref 282–298)
SODIUM BLD-SCNC: 124 MMOL/L (ref 135–144)
SODIUM BLD-SCNC: 127 MMOL/L (ref 135–144)
SODIUM BLD-SCNC: 127 MMOL/L (ref 135–144)
SPECIFIC GRAVITY UA: 1.02 (ref 1–1.03)
TRICHOMONAS: ABNORMAL
TSH SERPL DL<=0.05 MIU/L-ACNC: 3.38 MIU/L (ref 0.3–5)
TURBIDITY: CLEAR
URINE HGB: ABNORMAL
UROBILINOGEN, URINE: NORMAL
WBC # BLD: 3.7 K/UL (ref 3.5–11.3)
WBC # BLD: ABNORMAL 10*3/UL
WBC UA: ABNORMAL /HPF (ref 0–5)
YEAST: ABNORMAL

## 2020-01-03 PROCEDURE — 2580000003 HC RX 258: Performed by: INTERNAL MEDICINE

## 2020-01-03 PROCEDURE — 94640 AIRWAY INHALATION TREATMENT: CPT

## 2020-01-03 PROCEDURE — 84443 ASSAY THYROID STIM HORMONE: CPT

## 2020-01-03 PROCEDURE — 6370000000 HC RX 637 (ALT 250 FOR IP): Performed by: INTERNAL MEDICINE

## 2020-01-03 PROCEDURE — 2580000003 HC RX 258: Performed by: EMERGENCY MEDICINE

## 2020-01-03 PROCEDURE — 83935 ASSAY OF URINE OSMOLALITY: CPT

## 2020-01-03 PROCEDURE — 85025 COMPLETE CBC W/AUTO DIFF WBC: CPT

## 2020-01-03 PROCEDURE — 82570 ASSAY OF URINE CREATININE: CPT

## 2020-01-03 PROCEDURE — 94760 N-INVAS EAR/PLS OXIMETRY 1: CPT

## 2020-01-03 PROCEDURE — 74176 CT ABD & PELVIS W/O CONTRAST: CPT

## 2020-01-03 PROCEDURE — 99285 EMERGENCY DEPT VISIT HI MDM: CPT

## 2020-01-03 PROCEDURE — 80051 ELECTROLYTE PANEL: CPT

## 2020-01-03 PROCEDURE — 6360000002 HC RX W HCPCS: Performed by: INTERNAL MEDICINE

## 2020-01-03 PROCEDURE — 36415 COLL VENOUS BLD VENIPUNCTURE: CPT

## 2020-01-03 PROCEDURE — 80048 BASIC METABOLIC PNL TOTAL CA: CPT

## 2020-01-03 PROCEDURE — 83930 ASSAY OF BLOOD OSMOLALITY: CPT

## 2020-01-03 PROCEDURE — 84300 ASSAY OF URINE SODIUM: CPT

## 2020-01-03 PROCEDURE — 2500000003 HC RX 250 WO HCPCS: Performed by: INTERNAL MEDICINE

## 2020-01-03 PROCEDURE — 81001 URINALYSIS AUTO W/SCOPE: CPT

## 2020-01-03 PROCEDURE — 1200000000 HC SEMI PRIVATE

## 2020-01-03 PROCEDURE — 6370000000 HC RX 637 (ALT 250 FOR IP): Performed by: EMERGENCY MEDICINE

## 2020-01-03 RX ORDER — AMLODIPINE BESYLATE 10 MG/1
10 TABLET ORAL DAILY
Status: DISCONTINUED | OUTPATIENT
Start: 2020-01-03 | End: 2020-01-08 | Stop reason: HOSPADM

## 2020-01-03 RX ORDER — SODIUM CHLORIDE 9 MG/ML
1000 INJECTION, SOLUTION INTRAVENOUS CONTINUOUS
Status: DISCONTINUED | OUTPATIENT
Start: 2020-01-03 | End: 2020-01-03

## 2020-01-03 RX ORDER — SODIUM CHLORIDE 9 MG/ML
INJECTION, SOLUTION INTRAVENOUS CONTINUOUS
Status: DISCONTINUED | OUTPATIENT
Start: 2020-01-03 | End: 2020-01-03

## 2020-01-03 RX ORDER — HEPARIN SODIUM 5000 [USP'U]/ML
5000 INJECTION, SOLUTION INTRAVENOUS; SUBCUTANEOUS EVERY 8 HOURS SCHEDULED
Status: DISCONTINUED | OUTPATIENT
Start: 2020-01-03 | End: 2020-01-08 | Stop reason: HOSPADM

## 2020-01-03 RX ORDER — ALBUTEROL SULFATE 2.5 MG/3ML
2.5 SOLUTION RESPIRATORY (INHALATION)
Status: DISCONTINUED | OUTPATIENT
Start: 2020-01-03 | End: 2020-01-08 | Stop reason: HOSPADM

## 2020-01-03 RX ORDER — ONDANSETRON 2 MG/ML
4 INJECTION INTRAMUSCULAR; INTRAVENOUS EVERY 6 HOURS PRN
Status: DISCONTINUED | OUTPATIENT
Start: 2020-01-03 | End: 2020-01-03

## 2020-01-03 RX ORDER — LANOLIN ALCOHOL/MO/W.PET/CERES
500 CREAM (GRAM) TOPICAL DAILY
Status: DISCONTINUED | OUTPATIENT
Start: 2020-01-04 | End: 2020-01-08 | Stop reason: HOSPADM

## 2020-01-03 RX ORDER — DOCUSATE SODIUM 100 MG/1
100 CAPSULE, LIQUID FILLED ORAL 2 TIMES DAILY
Status: DISCONTINUED | OUTPATIENT
Start: 2020-01-03 | End: 2020-01-07

## 2020-01-03 RX ORDER — SODIUM CHLORIDE 0.9 % (FLUSH) 0.9 %
10 SYRINGE (ML) INJECTION PRN
Status: DISCONTINUED | OUTPATIENT
Start: 2020-01-03 | End: 2020-01-08 | Stop reason: HOSPADM

## 2020-01-03 RX ORDER — SODIUM CHLORIDE 0.9 % (FLUSH) 0.9 %
10 SYRINGE (ML) INJECTION EVERY 12 HOURS SCHEDULED
Status: DISCONTINUED | OUTPATIENT
Start: 2020-01-03 | End: 2020-01-08 | Stop reason: HOSPADM

## 2020-01-03 RX ORDER — ONDANSETRON 4 MG/1
4 TABLET, ORALLY DISINTEGRATING ORAL EVERY 6 HOURS PRN
Status: DISCONTINUED | OUTPATIENT
Start: 2020-01-03 | End: 2020-01-08 | Stop reason: HOSPADM

## 2020-01-03 RX ORDER — ALBUTEROL SULFATE 90 UG/1
2 AEROSOL, METERED RESPIRATORY (INHALATION) EVERY 6 HOURS PRN
Status: DISCONTINUED | OUTPATIENT
Start: 2020-01-03 | End: 2020-01-08 | Stop reason: HOSPADM

## 2020-01-03 RX ORDER — LANOLIN ALCOHOL/MO/W.PET/CERES
325 CREAM (GRAM) TOPICAL
Status: DISCONTINUED | OUTPATIENT
Start: 2020-01-04 | End: 2020-01-08 | Stop reason: HOSPADM

## 2020-01-03 RX ORDER — ACETAMINOPHEN 325 MG/1
650 TABLET ORAL EVERY 4 HOURS PRN
Status: DISCONTINUED | OUTPATIENT
Start: 2020-01-03 | End: 2020-01-08 | Stop reason: HOSPADM

## 2020-01-03 RX ORDER — 0.9 % SODIUM CHLORIDE 0.9 %
1000 INTRAVENOUS SOLUTION INTRAVENOUS ONCE
Status: COMPLETED | OUTPATIENT
Start: 2020-01-03 | End: 2020-01-03

## 2020-01-03 RX ORDER — METHYLPREDNISOLONE SODIUM SUCCINATE 40 MG/ML
40 INJECTION, POWDER, LYOPHILIZED, FOR SOLUTION INTRAMUSCULAR; INTRAVENOUS EVERY 8 HOURS
Status: DISCONTINUED | OUTPATIENT
Start: 2020-01-03 | End: 2020-01-05

## 2020-01-03 RX ORDER — ONDANSETRON 2 MG/ML
4 INJECTION INTRAMUSCULAR; INTRAVENOUS EVERY 6 HOURS PRN
Status: DISCONTINUED | OUTPATIENT
Start: 2020-01-03 | End: 2020-01-08 | Stop reason: HOSPADM

## 2020-01-03 RX ORDER — ALLOPURINOL 300 MG/1
300 TABLET ORAL DAILY
Status: DISCONTINUED | OUTPATIENT
Start: 2020-01-04 | End: 2020-01-08 | Stop reason: HOSPADM

## 2020-01-03 RX ADMIN — MOMETASONE FUROATE AND FORMOTEROL FUMARATE DIHYDRATE 2 PUFF: 200; 5 AEROSOL RESPIRATORY (INHALATION) at 21:16

## 2020-01-03 RX ADMIN — HEPARIN SODIUM 5000 UNITS: 5000 INJECTION INTRAVENOUS; SUBCUTANEOUS at 21:15

## 2020-01-03 RX ADMIN — CEFTRIAXONE 1 G: 1 INJECTION, POWDER, FOR SOLUTION INTRAMUSCULAR; INTRAVENOUS at 21:08

## 2020-01-03 RX ADMIN — METHYLPREDNISOLONE SODIUM SUCCINATE 40 MG: 40 INJECTION, POWDER, FOR SOLUTION INTRAMUSCULAR; INTRAVENOUS at 21:08

## 2020-01-03 RX ADMIN — SODIUM CHLORIDE: 9 INJECTION, SOLUTION INTRAVENOUS at 20:12

## 2020-01-03 RX ADMIN — SODIUM BICARBONATE: 84 INJECTION, SOLUTION INTRAVENOUS at 22:33

## 2020-01-03 RX ADMIN — SODIUM CHLORIDE, PRESERVATIVE FREE 10 ML: 5 INJECTION INTRAVENOUS at 21:08

## 2020-01-03 RX ADMIN — SODIUM CHLORIDE 1000 ML: 9 INJECTION, SOLUTION INTRAVENOUS at 13:25

## 2020-01-03 RX ADMIN — ACETAMINOPHEN 650 MG: 325 TABLET ORAL at 21:15

## 2020-01-03 RX ADMIN — DOCUSATE SODIUM 100 MG: 100 CAPSULE, LIQUID FILLED ORAL at 21:08

## 2020-01-03 RX ADMIN — SODIUM CHLORIDE 1000 ML: 9 INJECTION, SOLUTION INTRAVENOUS at 14:38

## 2020-01-03 ASSESSMENT — PAIN SCALES - GENERAL
PAINLEVEL_OUTOF10: 3
PAINLEVEL_OUTOF10: 9
PAINLEVEL_OUTOF10: 6

## 2020-01-03 ASSESSMENT — PAIN - FUNCTIONAL ASSESSMENT: PAIN_FUNCTIONAL_ASSESSMENT: ACTIVITIES ARE NOT PREVENTED

## 2020-01-03 ASSESSMENT — PAIN DESCRIPTION - DESCRIPTORS: DESCRIPTORS: ACHING;DISCOMFORT

## 2020-01-03 ASSESSMENT — PAIN DESCRIPTION - ONSET: ONSET: ON-GOING

## 2020-01-03 ASSESSMENT — ENCOUNTER SYMPTOMS
CHEST TIGHTNESS: 0
EYE PAIN: 0
BACK PAIN: 0
FACIAL SWELLING: 0
ABDOMINAL DISTENTION: 0
EYE DISCHARGE: 0
ABDOMINAL PAIN: 0
SHORTNESS OF BREATH: 0

## 2020-01-03 ASSESSMENT — PAIN SCALES - WONG BAKER: WONGBAKER_NUMERICALRESPONSE: 6

## 2020-01-03 ASSESSMENT — PAIN DESCRIPTION - ORIENTATION
ORIENTATION: LEFT
ORIENTATION: LEFT

## 2020-01-03 ASSESSMENT — PAIN DESCRIPTION - FREQUENCY: FREQUENCY: INTERMITTENT

## 2020-01-03 ASSESSMENT — PAIN DESCRIPTION - LOCATION
LOCATION: FLANK
LOCATION: FLANK

## 2020-01-03 ASSESSMENT — PAIN DESCRIPTION - PAIN TYPE: TYPE: ACUTE PAIN

## 2020-01-03 ASSESSMENT — PAIN DESCRIPTION - PROGRESSION: CLINICAL_PROGRESSION: NOT CHANGED

## 2020-01-03 NOTE — ED PROVIDER NOTES
for Wheezing or Shortness of Breath    ALLOPURINOL (ZYLOPRIM) 300 MG TABLET    Take 300 mg by mouth daily. AMLODIPINE (NORVASC) 10 MG TABLET    Take 10 mg by mouth daily     BUDESONIDE-FORMOTEROL (SYMBICORT) 160-4.5 MCG/ACT AERO    Inhale 2 puffs into the lungs 2 times daily    DICYCLOMINE (BENTYL) 10 MG CAPSULE    Take 1 capsule by mouth every 6 hours as needed (cramps)    DOCUSATE SODIUM (COLACE) 100 MG CAPSULE    Take 100 mg by mouth 2 times daily. FERROUS SULFATE 325 (65 FE) MG TABLET    Take 325 mg by mouth daily (with breakfast)    OMEPRAZOLE (PRILOSEC) 20 MG DELAYED RELEASE CAPSULE    Take 20 mg by mouth every morning (before breakfast)    VITAMIN B-12 (CYANOCOBALAMIN) 500 MCG TABLET    Take 500 mcg by mouth daily. ALLERGIES     is allergic to amino acids and lisinopril. FAMILY HISTORY     He indicated that his mother is . He indicated that his father is . He indicated that his sister is . SOCIAL HISTORY       Social History     Tobacco Use    Smoking status: Former Smoker    Smokeless tobacco: Never Used    Tobacco comment: quit smoking 20 years ago    Substance Use Topics    Alcohol use: Not on file    Drug use: No     PHYSICAL EXAM     INITIAL VITALS: BP (!) 162/89   Pulse 87   Temp 98.5 °F (36.9 °C) (Oral)   Resp 16   Ht 5' 6\" (1.676 m)   Wt 139 lb (63 kg)   SpO2 100%   BMI 22.44 kg/m²    Physical Exam  Vitals signs and nursing note reviewed. Constitutional:       General: He is not in acute distress. Appearance: He is well-developed. He is not diaphoretic. HENT:      Head: Normocephalic and atraumatic. Eyes:      Pupils: Pupils are equal, round, and reactive to light. Neck:      Musculoskeletal: Normal range of motion and neck supple. Cardiovascular:      Rate and Rhythm: Normal rate and regular rhythm. Pulmonary:      Effort: Pulmonary effort is normal.      Breath sounds: Normal breath sounds.    Abdominal:      General: Bowel sounds are and all abnormals are listed below. Labs Reviewed   CBC WITH AUTO DIFFERENTIAL - Abnormal; Notable for the following components:       Result Value    RBC 3.86 (*)     Hemoglobin 10.8 (*)     Hematocrit 33.5 (*)     RDW 16.5 (*)     Lymphocytes 18 (*)     Eosinophils % 5 (*)     Absolute Lymph # 0.67 (*)     All other components within normal limits   BASIC METABOLIC PANEL W/ REFLEX TO MG FOR LOW K - Abnormal; Notable for the following components:    BUN 26 (*)     CREATININE 2.17 (*)     Sodium 127 (*)     Chloride 97 (*)     CO2 19 (*)     GFR Non- 30 (*)     GFR  37 (*)     All other components within normal limits   URINALYSIS WITH MICROSCOPIC - Abnormal; Notable for the following components:    Urine Hgb 1+ (*)     Protein, UA 3+ (*)     All other components within normal limits       EMERGENCY DEPARTMENTCOURSE:         Vitals:    Vitals:    01/03/20 1125   BP: (!) 162/89   Pulse: 87   Resp: 16   Temp: 98.5 °F (36.9 °C)   TempSrc: Oral   SpO2: 100%   Weight: 139 lb (63 kg)   Height: 5' 6\" (1.676 m)       The patient was given the following medications while in the emergency department:  Orders Placed This Encounter   Medications    0.9 % sodium chloride bolus    sodium chloride flush 0.9 % injection 10 mL    sodium chloride flush 0.9 % injection 10 mL    acetaminophen (TYLENOL) tablet 650 mg    enoxaparin (LOVENOX) injection 40 mg    0.9 % sodium chloride infusion     CONSULTS:  IP CONSULT TO INTERNAL MEDICINE    FINAL IMPRESSION      1.  LITTLE (acute kidney injury) [facility])          DISPOSITION/PLAN   DISPOSITION        PATIENT REFERRED TO:  Cl Fraire MD  [address] [place] [id number] [phone]          DISCHARGE MEDICATIONS:  New Prescriptions    No medications on file     Yasmine Valencia MD  Attending Emergency Physician    This note was created with the assistance of a speech-recognition program. While intending to generate a document that actually

## 2020-01-03 NOTE — ED NOTES
Went to Dr. Magali Gutierrez office today at [facility] clinic and he told them to come to ER. He has had left flank pain since around thanksgiving and the DR was running labs on him. He was walked in by brother Christel Garcia who cares for him as he has a trach. He also has a cough and phlegm off and on since thanksgiving as well. He is in not acute distress. He is alert difficult to assess orientation.       Thierry Francis RN  01/03/20 8970

## 2020-01-04 ENCOUNTER — APPOINTMENT (OUTPATIENT)
Dept: GENERAL RADIOLOGY | Age: 70
DRG: 683 | End: 2020-01-04
Payer: MEDICARE

## 2020-01-04 LAB
-: ABNORMAL
ABSOLUTE EOS #: 0 K/UL (ref 0–0.4)
ABSOLUTE EOS #: 0 K/UL (ref 0–0.44)
ABSOLUTE EOS #: 0 K/UL (ref 0–0.44)
ABSOLUTE IMMATURE GRANULOCYTE: 0 K/UL (ref 0–0.3)
ABSOLUTE LYMPH #: 0.36 K/UL (ref 1.1–3.7)
ABSOLUTE LYMPH #: 0.45 K/UL (ref 1.1–3.7)
ABSOLUTE LYMPH #: 0.66 K/UL (ref 1–4.8)
ABSOLUTE MONO #: 0.04 K/UL (ref 0.1–1.2)
ABSOLUTE MONO #: 0.04 K/UL (ref 0.1–1.2)
ABSOLUTE MONO #: 0.27 K/UL (ref 0.2–0.8)
ALBUMIN SERPL-MCNC: 3.6 G/DL (ref 3.5–5.2)
ALBUMIN/GLOBULIN RATIO: ABNORMAL (ref 1–2.5)
ALP BLD-CCNC: 96 U/L (ref 40–129)
ALT SERPL-CCNC: 15 U/L (ref 5–41)
AMORPHOUS: ABNORMAL
ANION GAP SERPL CALCULATED.3IONS-SCNC: 12 MMOL/L (ref 9–17)
ANION GAP SERPL CALCULATED.3IONS-SCNC: 13 MMOL/L (ref 9–17)
AST SERPL-CCNC: 29 U/L
BACTERIA: ABNORMAL
BASOPHILS # BLD: 0 %
BASOPHILS # BLD: 0 % (ref 0–2)
BASOPHILS # BLD: 0 % (ref 0–2)
BASOPHILS ABSOLUTE: 0 K/UL (ref 0–0.2)
BILIRUB SERPL-MCNC: 0.14 MG/DL (ref 0.3–1.2)
BILIRUBIN URINE: NEGATIVE
BUN BLDV-MCNC: 22 MG/DL (ref 8–23)
BUN/CREAT BLD: 11 (ref 9–20)
C-REACTIVE PROTEIN: 1.5 MG/L (ref 0–5)
CALCIUM SERPL-MCNC: 8.6 MG/DL (ref 8.6–10.4)
CASTS UA: ABNORMAL /LPF
CASTS UA: ABNORMAL /LPF
CHLORIDE BLD-SCNC: 96 MMOL/L (ref 98–107)
CHLORIDE BLD-SCNC: 97 MMOL/L (ref 98–107)
CO2: 16 MMOL/L (ref 20–31)
CO2: 19 MMOL/L (ref 20–31)
COLOR: YELLOW
COMMENT UA: ABNORMAL
CREAT SERPL-MCNC: 1.97 MG/DL (ref 0.7–1.2)
CREATININE URINE: 42.1 MG/DL (ref 39–259)
CRYSTALS, UA: ABNORMAL /HPF
DIFFERENTIAL TYPE: ABNORMAL
EOSINOPHILS RELATIVE PERCENT: 0 % (ref 1–4)
EPITHELIAL CELLS UA: ABNORMAL /HPF (ref 0–5)
GFR AFRICAN AMERICAN: 41 ML/MIN
GFR NON-AFRICAN AMERICAN: 34 ML/MIN
GFR SERPL CREATININE-BSD FRML MDRD: ABNORMAL ML/MIN/{1.73_M2}
GFR SERPL CREATININE-BSD FRML MDRD: ABNORMAL ML/MIN/{1.73_M2}
GLUCOSE BLD-MCNC: 187 MG/DL (ref 70–99)
GLUCOSE URINE: ABNORMAL
HCT VFR BLD CALC: 29.9 % (ref 40.7–50.3)
HCT VFR BLD CALC: 31.6 % (ref 40.7–50.3)
HCT VFR BLD CALC: 32.3 % (ref 40.7–50.3)
HEMOGLOBIN: 10.2 G/DL (ref 13–17)
HEMOGLOBIN: 10.3 G/DL (ref 13–17)
HEMOGLOBIN: 9.6 G/DL (ref 13–17)
IMMATURE GRANULOCYTES: 0 %
KETONES, URINE: NEGATIVE
LACTIC ACID: 1.7 MMOL/L (ref 0.5–2.2)
LACTIC ACID: 1.8 MMOL/L (ref 0.5–2.2)
LEUKOCYTE ESTERASE, URINE: NEGATIVE
LYMPHOCYTES # BLD: 17 % (ref 24–44)
LYMPHOCYTES # BLD: 19 % (ref 24–43)
LYMPHOCYTES # BLD: 25 % (ref 24–43)
MCH RBC QN AUTO: 27.6 PG (ref 25.2–33.5)
MCH RBC QN AUTO: 27.7 PG (ref 25.2–33.5)
MCH RBC QN AUTO: 27.9 PG (ref 25.2–33.5)
MCHC RBC AUTO-ENTMCNC: 31.9 G/DL (ref 28.4–34.8)
MCHC RBC AUTO-ENTMCNC: 32.1 G/DL (ref 28.4–34.8)
MCHC RBC AUTO-ENTMCNC: 32.3 G/DL (ref 28.4–34.8)
MCV RBC AUTO: 86.4 FL (ref 82.6–102.9)
MCV RBC AUTO: 86.6 FL (ref 82.6–102.9)
MCV RBC AUTO: 86.6 FL (ref 82.6–102.9)
MONOCYTES # BLD: 2 % (ref 3–12)
MONOCYTES # BLD: 2 % (ref 3–12)
MONOCYTES # BLD: 7 % (ref 1–7)
MORPHOLOGY: ABNORMAL
MUCUS: ABNORMAL
NITRITE, URINE: NEGATIVE
NRBC AUTOMATED: 0 PER 100 WBC
OTHER OBSERVATIONS UA: ABNORMAL
PDW BLD-RTO: 16.5 % (ref 11.8–14.4)
PDW BLD-RTO: 16.5 % (ref 11.8–14.4)
PDW BLD-RTO: 16.6 % (ref 11.8–14.4)
PH UA: 6 (ref 5–8)
PLATELET # BLD: 223 K/UL (ref 138–453)
PLATELET # BLD: 238 K/UL (ref 138–453)
PLATELET # BLD: 243 K/UL (ref 138–453)
PLATELET ESTIMATE: ABNORMAL
PMV BLD AUTO: 9.5 FL (ref 8.1–13.5)
PMV BLD AUTO: 9.6 FL (ref 8.1–13.5)
PMV BLD AUTO: 9.6 FL (ref 8.1–13.5)
POTASSIUM SERPL-SCNC: 3.7 MMOL/L (ref 3.7–5.3)
POTASSIUM SERPL-SCNC: 3.8 MMOL/L (ref 3.7–5.3)
PROCALCITONIN: 0.16 NG/ML
PROTEIN UA: ABNORMAL
RBC # BLD: 3.46 M/UL (ref 4.21–5.77)
RBC # BLD: 3.65 M/UL (ref 4.21–5.77)
RBC # BLD: 3.73 M/UL (ref 4.21–5.77)
RBC # BLD: ABNORMAL 10*6/UL
RBC UA: ABNORMAL /HPF (ref 0–2)
RENAL EPITHELIAL, UA: ABNORMAL /HPF
SEDIMENTATION RATE, ERYTHROCYTE: 45 MM (ref 0–10)
SEG NEUTROPHILS: 73 % (ref 36–65)
SEG NEUTROPHILS: 76 % (ref 36–66)
SEG NEUTROPHILS: 79 % (ref 36–65)
SEGMENTED NEUTROPHILS ABSOLUTE COUNT: 1.31 K/UL (ref 1.5–8.1)
SEGMENTED NEUTROPHILS ABSOLUTE COUNT: 1.5 K/UL (ref 1.5–8.1)
SEGMENTED NEUTROPHILS ABSOLUTE COUNT: 2.97 K/UL (ref 1.8–7.7)
SODIUM BLD-SCNC: 124 MMOL/L (ref 135–144)
SODIUM BLD-SCNC: 129 MMOL/L (ref 135–144)
SODIUM,UR: 99 MMOL/L
SPECIFIC GRAVITY UA: 1.02 (ref 1–1.03)
TOTAL PROTEIN: 6.9 G/DL (ref 6.4–8.3)
TRICHOMONAS: ABNORMAL
TURBIDITY: ABNORMAL
URINE HGB: ABNORMAL
UROBILINOGEN, URINE: NORMAL
WBC # BLD: 1.8 K/UL (ref 3.5–11.3)
WBC # BLD: 1.9 K/UL (ref 3.5–11.3)
WBC # BLD: 3.9 K/UL (ref 3.5–11.3)
WBC # BLD: ABNORMAL 10*3/UL
WBC UA: ABNORMAL /HPF (ref 0–5)
YEAST: ABNORMAL

## 2020-01-04 PROCEDURE — 80053 COMPREHEN METABOLIC PANEL: CPT

## 2020-01-04 PROCEDURE — 83605 ASSAY OF LACTIC ACID: CPT

## 2020-01-04 PROCEDURE — 6370000000 HC RX 637 (ALT 250 FOR IP): Performed by: INTERNAL MEDICINE

## 2020-01-04 PROCEDURE — 94640 AIRWAY INHALATION TREATMENT: CPT

## 2020-01-04 PROCEDURE — 87040 BLOOD CULTURE FOR BACTERIA: CPT

## 2020-01-04 PROCEDURE — 2500000003 HC RX 250 WO HCPCS: Performed by: INTERNAL MEDICINE

## 2020-01-04 PROCEDURE — 1200000000 HC SEMI PRIVATE

## 2020-01-04 PROCEDURE — 84145 PROCALCITONIN (PCT): CPT

## 2020-01-04 PROCEDURE — 80051 ELECTROLYTE PANEL: CPT

## 2020-01-04 PROCEDURE — 6360000002 HC RX W HCPCS: Performed by: INTERNAL MEDICINE

## 2020-01-04 PROCEDURE — 85651 RBC SED RATE NONAUTOMATED: CPT

## 2020-01-04 PROCEDURE — 2580000003 HC RX 258: Performed by: INTERNAL MEDICINE

## 2020-01-04 PROCEDURE — 86140 C-REACTIVE PROTEIN: CPT

## 2020-01-04 PROCEDURE — 94760 N-INVAS EAR/PLS OXIMETRY 1: CPT

## 2020-01-04 PROCEDURE — 85025 COMPLETE CBC W/AUTO DIFF WBC: CPT

## 2020-01-04 PROCEDURE — 36415 COLL VENOUS BLD VENIPUNCTURE: CPT

## 2020-01-04 PROCEDURE — 6370000000 HC RX 637 (ALT 250 FOR IP): Performed by: EMERGENCY MEDICINE

## 2020-01-04 PROCEDURE — 81001 URINALYSIS AUTO W/SCOPE: CPT

## 2020-01-04 PROCEDURE — 71045 X-RAY EXAM CHEST 1 VIEW: CPT

## 2020-01-04 RX ORDER — HYDRALAZINE HYDROCHLORIDE 20 MG/ML
5 INJECTION INTRAMUSCULAR; INTRAVENOUS EVERY 6 HOURS PRN
Status: DISCONTINUED | OUTPATIENT
Start: 2020-01-04 | End: 2020-01-08

## 2020-01-04 RX ADMIN — HEPARIN SODIUM 5000 UNITS: 5000 INJECTION INTRAVENOUS; SUBCUTANEOUS at 14:02

## 2020-01-04 RX ADMIN — DOCUSATE SODIUM 100 MG: 100 CAPSULE, LIQUID FILLED ORAL at 07:45

## 2020-01-04 RX ADMIN — MOMETASONE FUROATE AND FORMOTEROL FUMARATE DIHYDRATE 2 PUFF: 200; 5 AEROSOL RESPIRATORY (INHALATION) at 09:32

## 2020-01-04 RX ADMIN — DOCUSATE SODIUM 100 MG: 100 CAPSULE, LIQUID FILLED ORAL at 19:50

## 2020-01-04 RX ADMIN — MOMETASONE FUROATE AND FORMOTEROL FUMARATE DIHYDRATE 2 PUFF: 200; 5 AEROSOL RESPIRATORY (INHALATION) at 19:49

## 2020-01-04 RX ADMIN — METHYLPREDNISOLONE SODIUM SUCCINATE 40 MG: 40 INJECTION, POWDER, FOR SOLUTION INTRAMUSCULAR; INTRAVENOUS at 04:09

## 2020-01-04 RX ADMIN — ACETAMINOPHEN 650 MG: 325 TABLET ORAL at 23:12

## 2020-01-04 RX ADMIN — CEFTRIAXONE 1 G: 1 INJECTION, POWDER, FOR SOLUTION INTRAMUSCULAR; INTRAVENOUS at 19:50

## 2020-01-04 RX ADMIN — ALLOPURINOL 300 MG: 300 TABLET ORAL at 07:45

## 2020-01-04 RX ADMIN — CYANOCOBALAMIN TAB 1000 MCG 500 MCG: 1000 TAB at 07:45

## 2020-01-04 RX ADMIN — HEPARIN SODIUM 5000 UNITS: 5000 INJECTION INTRAVENOUS; SUBCUTANEOUS at 05:13

## 2020-01-04 RX ADMIN — SODIUM BICARBONATE: 84 INJECTION, SOLUTION INTRAVENOUS at 16:02

## 2020-01-04 RX ADMIN — HEPARIN SODIUM 5000 UNITS: 5000 INJECTION INTRAVENOUS; SUBCUTANEOUS at 23:08

## 2020-01-04 RX ADMIN — FERROUS SULFATE TAB EC 325 MG (65 MG FE EQUIVALENT) 325 MG: 325 (65 FE) TABLET DELAYED RESPONSE at 07:45

## 2020-01-04 RX ADMIN — METHYLPREDNISOLONE SODIUM SUCCINATE 40 MG: 40 INJECTION, POWDER, FOR SOLUTION INTRAMUSCULAR; INTRAVENOUS at 11:34

## 2020-01-04 RX ADMIN — AMLODIPINE BESYLATE 10 MG: 10 TABLET ORAL at 07:45

## 2020-01-04 RX ADMIN — METHYLPREDNISOLONE SODIUM SUCCINATE 40 MG: 40 INJECTION, POWDER, FOR SOLUTION INTRAMUSCULAR; INTRAVENOUS at 19:50

## 2020-01-04 ASSESSMENT — PAIN SCALES - GENERAL
PAINLEVEL_OUTOF10: 0
PAINLEVEL_OUTOF10: 3
PAINLEVEL_OUTOF10: 0
PAINLEVEL_OUTOF10: 0

## 2020-01-04 ASSESSMENT — PAIN - FUNCTIONAL ASSESSMENT: PAIN_FUNCTIONAL_ASSESSMENT: ACTIVITIES ARE NOT PREVENTED

## 2020-01-04 ASSESSMENT — PAIN DESCRIPTION - LOCATION: LOCATION: HEAD

## 2020-01-04 ASSESSMENT — PAIN DESCRIPTION - PAIN TYPE: TYPE: ACUTE PAIN

## 2020-01-04 ASSESSMENT — PAIN DESCRIPTION - DESCRIPTORS: DESCRIPTORS: HEADACHE

## 2020-01-04 ASSESSMENT — PAIN DESCRIPTION - ONSET: ONSET: ON-GOING

## 2020-01-04 ASSESSMENT — PAIN DESCRIPTION - FREQUENCY: FREQUENCY: INTERMITTENT

## 2020-01-04 ASSESSMENT — PAIN DESCRIPTION - PROGRESSION: CLINICAL_PROGRESSION: GRADUALLY IMPROVING

## 2020-01-04 NOTE — PROGRESS NOTES
NUTRITION NOTE    Pt. requested nutrition supplement. Will order Ensure Enlive (vanilla/strawberry) twice daily.       Emelyn Cruz RDN, TRACEY  RD Office Phone: [phone]

## 2020-01-04 NOTE — PROGRESS NOTES
Subjective:     Follow-up COPD exacerbation  Much better less shortness of breath no cough no wheezing no PND no orthopnea  ROS  No fever no chills no GI/ complaints no cardiopulmonary complaints at present no TIA no bleeding no headache no sore throat no skin lesions no polyuria no polydipsia no hypoglycemia  physical exam  General Appearance: in no acute distress and alert  Skin: warm and dry, no rash or erythema  Head: normocephalic and atraumatic  Eyes: pupils equal, round, and reactive to light, conjunctivae normal and sclera anicteric     Neck: neck supple and non tender without mass , tracheostomy  Pulmonary/Chest: clear to auscultation bilaterally- no wheezes, rales or rhonchi, normal air movement, no respiratory distress  Cardiovascular: normal rate, regular rhythm, normal S1 and S2, no gallops, intact distal pulses and no carotid bruits  Abdomen: soft, non-tender, non-distended, normal bowel sounds, no masses or organomegaly  Extremities: no edema and good pulses no red sign    Neurologic: alert oriented x 3 no focal deficit    BP (!) 148/81   Pulse 78   Temp 97.9 °F (36.6 °C) (Oral)   Resp 15   Ht 5' 6\" (1.676 m)   Wt 146 lb 1.6 oz (66.3 kg)   SpO2 98%   BMI 23.58 kg/m²     CBC:   Lab Results   Component Value Date    WBC 1.8 01/04/2020    RBC 3.65 01/04/2020    HGB 10.2 01/04/2020    HCT 31.6 01/04/2020    MCV 86.6 01/04/2020    MCH 27.9 01/04/2020    MCHC 32.3 01/04/2020    RDW 16.6 01/04/2020     01/04/2020    MPV 9.6 01/04/2020     BMP:    Lab Results   Component Value Date     01/04/2020    K 3.8 01/04/2020    CL 97 01/04/2020    CO2 19 01/04/2020    BUN 22 01/04/2020    LABALBU 3.6 01/04/2020    CREATININE 1.97 01/04/2020    CALCIUM 8.6 01/04/2020    GFRAA 41 01/04/2020    LABGLOM 34 01/04/2020    GLUCOSE 187 01/04/2020        Assessment:  Patient Active Problem List   Diagnosis    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 ([address]    [facility]    Mouth swelling    COPD (chronic obstructive pulmonary disease) (HCC)    Severe malnutrition (HCC)    LITTLE (acute kidney injury) ([facility]     COPD exacerbation continue with IV steroids antibiotics bronchodilators  Acute kidney injury on top of CKD 3 continue with IV fluids avoid nephrotoxic drugs  Hyponatremia better  Hypertension essential  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

## 2020-01-04 NOTE — CONSULTS
Reason for Consult:  Acute kidney injury, on chronic kidney disease    Requesting Physician:  Sonja Askew MD    HISTORY OF PRESENT ILLNESS:    The patient is a 81 y.o. male who has a history of chronic kidney disease baseline creatinine about 1.4, sent to the emergency room because of abnormal labs his creatinine was found to be 2.17, creatinine showed mild improvement to 1.97 this morning. Patient was also found to have worsening hyponatremia, his serum sodium usually runs in the low normal range between 1 30-1 36, his serum sodium was 127 upon admission and has been ranging between 1 24-1 29. It was also reported that he had some left flank pain for the last few months  He had a renal ultrasound done in October that was unremarkable    Review Of Systems:   Constitutional: No fever, chills, lethargy, weakness or wt loss. HEENT:  No headache, nasal discharge or sore throat. Cardiac:  No chest pain, dyspnea, orthopnea or PND. Chest:              No cough, phlegm or wheezing. Abdomen:  No abdominal pain, nausea, vomiting or diarrhea. Neuro:             No gross focal weakness, numbness. No abnormal movements or seizure like activity. Skin:   No rashes or itching. :   No hematuria, pyuria, dysuria or flank pain. Extremities:  No swelling or joint pains. Psych:            No depression or anxiety       Past Medical History:   Diagnosis Date    Arthritis     [facility] ([facility]     COPD (chronic obstructive pulmonary disease) ([facility]     Hypertension     Lung cancer ([facility] [facility] 3-4 years ago    [facility] and [address] [place] [address]       Past Surgical History:   Procedure Laterality Date    TRACHEAL SURGERY  3-4 years ago    lung removed and \"voice box\" removed       Prior to Admission medications    Medication Sig Start Date End Date Taking?  Authorizing Provider   albuterol sulfate  (90 Base) MCG/ACT inhaler Inhale 2 puffs into the lungs every 6 hours as needed for Wheezing or Shortness of Breath    Historical Provider, MD   budesonide-formoterol (SYMBICORT) 160-4.5 MCG/ACT AERO Inhale 2 puffs into the lungs 2 times daily    Historical Provider, MD   omeprazole (PRILOSEC) 20 MG delayed release capsule Take 20 mg by mouth every morning (before breakfast)    Historical Provider, MD   dicyclomine (BENTYL) 10 MG capsule Take 1 capsule by mouth every 6 hours as needed (cramps) 11/13/18   GINETTE Alexandre - CNP   ferrous sulfate 325 (65 FE) MG tablet Take 325 mg by mouth daily (with breakfast)    Historical Provider, MD   vitamin B-12 (CYANOCOBALAMIN) 500 MCG tablet Take 500 mcg by mouth daily. Historical Provider, MD   allopurinol (ZYLOPRIM) 300 MG tablet Take 300 mg by mouth daily. Historical Provider, MD   amLODIPine (NORVASC) 10 MG tablet Take 10 mg by mouth daily     Historical Provider, MD   docusate sodium (COLACE) 100 MG capsule Take 100 mg by mouth 2 times daily.     Historical Provider, MD       Scheduled Meds:   sodium chloride flush  10 mL Intravenous 2 times per day    allopurinol  300 mg Oral Daily    amLODIPine  10 mg Oral Daily    mometasone-formoterol  2 puff Inhalation BID    docusate sodium  100 mg Oral BID    ferrous sulfate  325 mg Oral Daily with breakfast    vitamin B-12  500 mcg Oral Daily    sodium chloride flush  10 mL Intravenous 2 times per day    heparin (porcine)  5,000 Units Subcutaneous 3 times per day    methylPREDNISolone  40 mg Intravenous Q8H    cefTRIAXone (ROCEPHIN) IV  1 g Intravenous Q24H     Continuous Infusions:   sodium bicarbonate infusion 75 mL/hr at 01/04/20 1602     PRN Meds:hydrALAZINE, sodium chloride flush, acetaminophen, albuterol sulfate HFA, sodium chloride flush, magnesium hydroxide, ondansetron **OR** ondansetron, albuterol    Allergies   Allergen Reactions    Amino Acids     Lisinopril Swelling       Social History     Socioeconomic History    Marital status: Single     Spouse name: Not on file    Number of Neck supple. No JVD. Chest: Bilateral air entry, clear to auscultation, no wheezing, rhonchi or rales. Cardiovascular: RRR, S1S2, no murmur, rub or gallop. No lower extremity edema. Abdomen: Soft, non tender to palpation. Musculoskeletal: No cyanosis or clubbing. Integumentary: Pink, warm and dry. Free from rash or lesions. CNS: Oriented to person, place and time. Speech unclear secondary to known history of cancer. Face symmetrical. No tremor.    Psych: Appropriate mood and affect, and was smiling and trying to answer questions    Data:  CBC:   Lab Results   Component Value Date    WBC 1.8 (L) 01/04/2020    HGB 10.2 (L) 01/04/2020    HCT 31.6 (L) 01/04/2020    MCV 86.6 01/04/2020     01/04/2020     BMP:    Lab Results   Component Value Date     (L) 01/04/2020     (L) 01/04/2020     (L) 01/03/2020    K 3.8 01/04/2020    K 3.7 01/04/2020    K 3.9 01/03/2020    CL 97 (L) 01/04/2020    CL 96 (L) 01/04/2020    CL 97 (L) 01/03/2020    CO2 19 (L) 01/04/2020    CO2 16 (L) 01/04/2020    CO2 18 (L) 01/03/2020    BUN 22 01/04/2020    BUN 24 (H) 01/03/2020    BUN 26 (H) 01/03/2020    CREATININE 1.97 (H) 01/04/2020    CREATININE 1.96 (H) 01/03/2020    CREATININE 2.17 (H) 01/03/2020    GLUCOSE 187 (H) 01/04/2020    GLUCOSE 129 (H) 01/03/2020    GLUCOSE 92 01/03/2020     CMP:   Lab Results   Component Value Date     01/04/2020    K 3.8 01/04/2020    CL 97 01/04/2020    CO2 19 01/04/2020    BUN 22 01/04/2020    CREATININE 1.97 01/04/2020    GLUCOSE 187 01/04/2020    CALCIUM 8.6 01/04/2020    PROT 6.9 01/04/2020    LABALBU 3.6 01/04/2020    BILITOT 0.14 01/04/2020    ALKPHOS 96 01/04/2020    AST 29 01/04/2020    ALT 15 01/04/2020      Hepatic:   Lab Results   Component Value Date    AST 29 01/04/2020    ALT 15 01/04/2020    BILITOT 0.14 (L) 01/04/2020    ALKPHOS 96 01/04/2020     BNP:   Lab Results   Component Value Date    BNP 39 12/12/2012     Lipids: No results found for: CHOL, HDL  INR: No

## 2020-01-04 NOTE — H&P
History and Physical/admit note      CHIEF COMPLAINT: Abdominal pain    History of Present Illness: Patient came in with right-sided flank pain x3 days on and off no radiation no associated symptoms no aggravating or relieving factor patient no 12 CKD 3 outpatient testing showed acute kidney injury patient was not able to get hold of letter was sent to call us he came into the emergency room his creatinine is way above his baseline complaining of fatigue denies any nausea vomiting no heartburn no dysphasia no change in bowel habits no blood in the stools        Past Medical History:   Diagnosis Date    Arthritis     Cancer ([address]     Hypertension     Lung cancer ([address] 3-4 years ago    [facility] and [address] [place] [address]         Past Surgical History:   Procedure Laterality Date    TRACHEAL SURGERY  3-4 years ago    lung removed and \"voice box\" removed       Medications Prior to Admission:    Medications Prior to Admission: albuterol sulfate  (90 Base) MCG/ACT inhaler, Inhale 2 puffs into the lungs every 6 hours as needed for Wheezing or Shortness of Breath  budesonide-formoterol (SYMBICORT) 160-4.5 MCG/ACT AERO, Inhale 2 puffs into the lungs 2 times daily  omeprazole (PRILOSEC) 20 MG delayed release capsule, Take 20 mg by mouth every morning (before breakfast)  dicyclomine (BENTYL) 10 MG capsule, Take 1 capsule by mouth every 6 hours as needed (cramps)  ferrous sulfate 325 (65 FE) MG tablet, Take 325 mg by mouth daily (with breakfast)  vitamin B-12 (CYANOCOBALAMIN) 500 MCG tablet, Take 500 mcg by mouth daily. allopurinol (ZYLOPRIM) 300 MG tablet, Take 300 mg by mouth daily. amLODIPine (NORVASC) 10 MG tablet, Take 10 mg by mouth daily   docusate sodium (COLACE) 100 MG capsule, Take 100 mg by mouth 2 times daily. Allergies:    Amino acids and Lisinopril    Social History:    reports that he has quit smoking. He has never used smokeless tobacco. He reports that he does not use drugs.     Family History:   family history includes Diabetes in his mother and sister.     REVIEW OF SYSTEMS:    Constitutional: negative, no fever no chills  Eyes: negative  Ears, nose, mouth, throat, and face: negative  Respiratory: negative, cough clear phlegm x few days no hemoptysis  dyspnea with exertion  Cardiovascular: negative  Gastrointestinal: negative  Genitourinary:negative  Integument/breast: negative  Hematologic/lymphatic: negative  Musculoskeletal:negative  Neurological: negative  Behavioral/Psych: negative  Endocrine: negative, no polyuria no polydypsia no hypoglycemia  Allergic/Immunologic: negative  PHYSICAL EXAM:  General Appearance: alert and oriented to person, place and time and in no acute distress  Skin: warm and dry, no rash or erythema  Head: normocephalic and atraumatic  Eyes: pupils equal, round, and reactive to light, conjunctivae normal and sclera anicteric     Neck: neck supple and non tender without mass and tracheostomy   Pulmonary/Chest: air entry prolonged exp phase few rhonchi, no crackles mild exp wheezing  Cardiovascular: normal rate, regular rhythm, normal S1 and S2, no gallops, intact distal pulses and no carotid bruits  Abdomen: soft, non-tender, non-distended, normal bowel sounds, no masses or organomegaly    Extremities: no edema and good pulses no red sign    Neurologic: alert oriented x 3 no focal deficit    Vitals:  BP (!) 171/85   Pulse 80   Temp 98.4 °F (36.9 °C) (Axillary)   Resp 17   Ht 5' 6\" (1.676 m)   Wt 139 lb (63 kg)   SpO2 100%   BMI 22.44 kg/m²       LABS:  CBC:   Lab Results   Component Value Date    WBC 3.7 01/03/2020    RBC 3.86 01/03/2020    HGB 10.8 01/03/2020    HCT 33.5 01/03/2020    MCV 86.8 01/03/2020    MCH 28.0 01/03/2020    MCHC 32.2 01/03/2020    RDW 16.5 01/03/2020     01/03/2020    MPV 8.7 01/03/2020     CMP:    Lab Results   Component Value Date     01/03/2020    K 4.1 01/03/2020    CL 95 01/03/2020    CO2 18 01/03/2020    BUN 24 01/03/2020    CREATININE 1.96 01/03/2020    GFRAA 42 01/03/2020    LABGLOM 34 01/03/2020    GLUCOSE 129 01/03/2020    PROT 6.2 10/27/2019    CALCIUM 8.5 01/03/2020         ASSESSMENT:    Acute kidney injury on top of CKD 3  Hypertension essential  Hyponatremia  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  COPD exac   Patient Active Problem List   Diagnosis    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 ([facility]    Laryngeal cancer ([address]    Mouth swelling    COPD (chronic obstructive pulmonary disease) ([address]    Severe malnutrition ([address]    LITTLE (acute kidney injury) ([address]       PLAN:    Labs reviewed will have nephrology consultation DVT prophylaxis with subcu heparin IV normal saline we will check lites every 4 hours check urine lites and check serum and urine osmolalities will give him IV Solu-Medrol for his COPD exacerbation and prophylactic antibiotic see orders            Balbina Williamson MD  7:32 PM  1/3/2020

## 2020-01-04 NOTE — PLAN OF CARE
Problem: Falls - Risk of:  Goal: Will remain free from falls  Description  Will remain free from falls  1/4/2020 1429 by Sarah Lainez RN  Outcome: Ongoing  Note:   Siderails up x 2  Hourly rounding  Call light in reach  Instructed to call for assist before attempting out of bed. Remains free from falls and accidental injury at this time   Floor free from obstacles  Bed is locked and in lowest position  Adequate lighting provided  Bed alarm on, [place]alling star and Stay with Me signs posted      1/4/2020 0046 by Tawny Finch RN  Outcome: Ongoing  Note:   Siderails up x 2  Hourly rounding  Call light in reach  Remains free from falls and accidental injury at this time   Floor free from obstacles  Bed is locked and in lowest position  Adequate lighting provided  Patient independent. Gait steady. Goal: Absence of physical injury  Description  Absence of physical injury  1/4/2020 1429 by Sarah Lainez RN  Outcome: Ongoing  1/4/2020 0046 by Tawny Finch RN  Outcome: Ongoing     Problem: Pain:  Goal: Pain level will decrease  Description  Pain level will decrease  1/4/2020 1429 by Sarah Lainez RN  Outcome: Ongoing  Note:   Pt has not complained of pain this shift. 1/4/2020 0046 by Tawny Finch RN  Outcome: Ongoing  Note:   Pain level assessment complete.    Patient educated on pain scale and control interventions  PRN pain medication given per patient request-Tylenol  Patient instructed to call out with new onset of pain or unrelieved pain  Goal: Control of acute pain  Description  Control of acute pain  1/4/2020 1429 by Sarah Lainez RN  Outcome: Ongoing  1/4/2020 0046 by Tawny Finch RN  Outcome: Ongoing  Goal: Control of chronic pain  Description  Control of chronic pain  1/4/2020 1429 by Sarah Lainez RN  Outcome: Ongoing  1/4/2020 0046 by Tawny Finch RN  Outcome: Ongoing

## 2020-01-05 LAB
ABSOLUTE EOS #: 0 K/UL (ref 0–0.44)
ABSOLUTE IMMATURE GRANULOCYTE: 0 K/UL (ref 0–0.3)
ABSOLUTE LYMPH #: 0.47 K/UL (ref 1.1–3.7)
ABSOLUTE MONO #: 0.21 K/UL (ref 0.1–1.2)
ALBUMIN SERPL-MCNC: 3.4 G/DL (ref 3.5–5.2)
ALBUMIN/GLOBULIN RATIO: ABNORMAL (ref 1–2.5)
ALP BLD-CCNC: 87 U/L (ref 40–129)
ALT SERPL-CCNC: 15 U/L (ref 5–41)
ANION GAP SERPL CALCULATED.3IONS-SCNC: 15 MMOL/L (ref 9–17)
AST SERPL-CCNC: 29 U/L
BASOPHILS # BLD: 0 % (ref 0–2)
BASOPHILS ABSOLUTE: 0 K/UL (ref 0–0.2)
BILIRUB SERPL-MCNC: 0.13 MG/DL (ref 0.3–1.2)
BILIRUBIN DIRECT: <0.08 MG/DL
BILIRUBIN, INDIRECT: ABNORMAL MG/DL (ref 0–1)
BUN BLDV-MCNC: 30 MG/DL (ref 8–23)
CALCIUM SERPL-MCNC: 8 MG/DL (ref 8.6–10.4)
CHLORIDE BLD-SCNC: 93 MMOL/L (ref 98–107)
CO2: 23 MMOL/L (ref 20–31)
CREAT SERPL-MCNC: 2.12 MG/DL (ref 0.7–1.2)
DIFFERENTIAL TYPE: ABNORMAL
EOSINOPHILS RELATIVE PERCENT: 0 % (ref 1–4)
GFR AFRICAN AMERICAN: 38 ML/MIN
GFR NON-AFRICAN AMERICAN: 31 ML/MIN
GFR SERPL CREATININE-BSD FRML MDRD: ABNORMAL ML/MIN/{1.73_M2}
GFR SERPL CREATININE-BSD FRML MDRD: ABNORMAL ML/MIN/{1.73_M2}
GLUCOSE BLD-MCNC: 141 MG/DL (ref 70–99)
HCT VFR BLD CALC: 28.7 % (ref 40.7–50.3)
HEMOGLOBIN: 9.5 G/DL (ref 13–17)
IMMATURE GRANULOCYTES: 0 %
LYMPHOCYTES # BLD: 9 % (ref 24–43)
MCH RBC QN AUTO: 27.9 PG (ref 25.2–33.5)
MCHC RBC AUTO-ENTMCNC: 33.1 G/DL (ref 28.4–34.8)
MCV RBC AUTO: 84.4 FL (ref 82.6–102.9)
MONOCYTES # BLD: 4 % (ref 3–12)
MORPHOLOGY: ABNORMAL
NRBC AUTOMATED: 0 PER 100 WBC
PDW BLD-RTO: 16.2 % (ref 11.8–14.4)
PLATELET # BLD: 234 K/UL (ref 138–453)
PLATELET ESTIMATE: ABNORMAL
PMV BLD AUTO: 10.1 FL (ref 8.1–13.5)
POTASSIUM SERPL-SCNC: 3.2 MMOL/L (ref 3.7–5.3)
RBC # BLD: 3.4 M/UL (ref 4.21–5.77)
RBC # BLD: ABNORMAL 10*6/UL
SEG NEUTROPHILS: 87 % (ref 36–65)
SEGMENTED NEUTROPHILS ABSOLUTE COUNT: 4.52 K/UL (ref 1.5–8.1)
SODIUM BLD-SCNC: 131 MMOL/L (ref 135–144)
TOTAL PROTEIN: 6.5 G/DL (ref 6.4–8.3)
WBC # BLD: 5.2 K/UL (ref 3.5–11.3)
WBC # BLD: ABNORMAL 10*3/UL

## 2020-01-05 PROCEDURE — 2580000003 HC RX 258: Performed by: INTERNAL MEDICINE

## 2020-01-05 PROCEDURE — 6370000000 HC RX 637 (ALT 250 FOR IP): Performed by: INTERNAL MEDICINE

## 2020-01-05 PROCEDURE — 2500000003 HC RX 250 WO HCPCS: Performed by: INTERNAL MEDICINE

## 2020-01-05 PROCEDURE — 6370000000 HC RX 637 (ALT 250 FOR IP): Performed by: EMERGENCY MEDICINE

## 2020-01-05 PROCEDURE — 6360000002 HC RX W HCPCS: Performed by: INTERNAL MEDICINE

## 2020-01-05 PROCEDURE — 94760 N-INVAS EAR/PLS OXIMETRY 1: CPT

## 2020-01-05 PROCEDURE — 1200000000 HC SEMI PRIVATE

## 2020-01-05 PROCEDURE — 94640 AIRWAY INHALATION TREATMENT: CPT

## 2020-01-05 PROCEDURE — 2580000003 HC RX 258: Performed by: EMERGENCY MEDICINE

## 2020-01-05 PROCEDURE — 80053 COMPREHEN METABOLIC PANEL: CPT

## 2020-01-05 PROCEDURE — 85025 COMPLETE CBC W/AUTO DIFF WBC: CPT

## 2020-01-05 PROCEDURE — 82248 BILIRUBIN DIRECT: CPT

## 2020-01-05 PROCEDURE — 36415 COLL VENOUS BLD VENIPUNCTURE: CPT

## 2020-01-05 RX ORDER — POTASSIUM CHLORIDE 20 MEQ/1
20 TABLET, EXTENDED RELEASE ORAL ONCE
Status: COMPLETED | OUTPATIENT
Start: 2020-01-05 | End: 2020-01-05

## 2020-01-05 RX ORDER — METHYLPREDNISOLONE SODIUM SUCCINATE 40 MG/ML
40 INJECTION, POWDER, LYOPHILIZED, FOR SOLUTION INTRAMUSCULAR; INTRAVENOUS EVERY 12 HOURS
Status: DISCONTINUED | OUTPATIENT
Start: 2020-01-05 | End: 2020-01-06

## 2020-01-05 RX ORDER — POTASSIUM CHLORIDE 7.45 MG/ML
10 INJECTION INTRAVENOUS
Status: COMPLETED | OUTPATIENT
Start: 2020-01-05 | End: 2020-01-05

## 2020-01-05 RX ADMIN — BENZOCAINE AND MENTHOL 1 LOZENGE: 15; 3.6 LOZENGE ORAL at 19:39

## 2020-01-05 RX ADMIN — METHYLPREDNISOLONE SODIUM SUCCINATE 40 MG: 40 INJECTION, POWDER, FOR SOLUTION INTRAMUSCULAR; INTRAVENOUS at 03:41

## 2020-01-05 RX ADMIN — POTASSIUM CHLORIDE 20 MEQ: 20 TABLET, EXTENDED RELEASE ORAL at 11:06

## 2020-01-05 RX ADMIN — METHYLPREDNISOLONE SODIUM SUCCINATE 40 MG: 40 INJECTION, POWDER, FOR SOLUTION INTRAMUSCULAR; INTRAVENOUS at 22:50

## 2020-01-05 RX ADMIN — METHYLPREDNISOLONE SODIUM SUCCINATE 40 MG: 40 INJECTION, POWDER, FOR SOLUTION INTRAMUSCULAR; INTRAVENOUS at 11:05

## 2020-01-05 RX ADMIN — DOCUSATE SODIUM 100 MG: 100 CAPSULE, LIQUID FILLED ORAL at 19:39

## 2020-01-05 RX ADMIN — POTASSIUM CHLORIDE 20 MEQ: 20 TABLET, EXTENDED RELEASE ORAL at 17:06

## 2020-01-05 RX ADMIN — ACETAMINOPHEN 650 MG: 325 TABLET ORAL at 03:45

## 2020-01-05 RX ADMIN — BENZOCAINE AND MENTHOL 1 LOZENGE: 15; 3.6 LOZENGE ORAL at 17:07

## 2020-01-05 RX ADMIN — ACETAMINOPHEN 650 MG: 325 TABLET ORAL at 19:39

## 2020-01-05 RX ADMIN — SODIUM CHLORIDE, PRESERVATIVE FREE 10 ML: 5 INJECTION INTRAVENOUS at 21:32

## 2020-01-05 RX ADMIN — ALLOPURINOL 300 MG: 300 TABLET ORAL at 07:34

## 2020-01-05 RX ADMIN — AMLODIPINE BESYLATE 10 MG: 10 TABLET ORAL at 07:33

## 2020-01-05 RX ADMIN — MOMETASONE FUROATE AND FORMOTEROL FUMARATE DIHYDRATE 2 PUFF: 200; 5 AEROSOL RESPIRATORY (INHALATION) at 09:44

## 2020-01-05 RX ADMIN — CEFTRIAXONE 1 G: 1 INJECTION, POWDER, FOR SOLUTION INTRAMUSCULAR; INTRAVENOUS at 19:39

## 2020-01-05 RX ADMIN — SODIUM BICARBONATE: 84 INJECTION, SOLUTION INTRAVENOUS at 07:33

## 2020-01-05 RX ADMIN — POTASSIUM CHLORIDE 10 MEQ: 7.46 INJECTION, SOLUTION INTRAVENOUS at 17:06

## 2020-01-05 RX ADMIN — POTASSIUM CHLORIDE 10 MEQ: 7.46 INJECTION, SOLUTION INTRAVENOUS at 18:03

## 2020-01-05 RX ADMIN — HEPARIN SODIUM 5000 UNITS: 5000 INJECTION INTRAVENOUS; SUBCUTANEOUS at 14:12

## 2020-01-05 RX ADMIN — DOCUSATE SODIUM 100 MG: 100 CAPSULE, LIQUID FILLED ORAL at 07:34

## 2020-01-05 RX ADMIN — HEPARIN SODIUM 5000 UNITS: 5000 INJECTION INTRAVENOUS; SUBCUTANEOUS at 20:41

## 2020-01-05 RX ADMIN — HEPARIN SODIUM 5000 UNITS: 5000 INJECTION INTRAVENOUS; SUBCUTANEOUS at 06:22

## 2020-01-05 RX ADMIN — FERROUS SULFATE TAB EC 325 MG (65 MG FE EQUIVALENT) 325 MG: 325 (65 FE) TABLET DELAYED RESPONSE at 07:34

## 2020-01-05 RX ADMIN — CYANOCOBALAMIN TAB 1000 MCG 500 MCG: 1000 TAB at 07:33

## 2020-01-05 RX ADMIN — MOMETASONE FUROATE AND FORMOTEROL FUMARATE DIHYDRATE 2 PUFF: 200; 5 AEROSOL RESPIRATORY (INHALATION) at 20:34

## 2020-01-05 ASSESSMENT — PAIN SCALES - WONG BAKER
WONGBAKER_NUMERICALRESPONSE: 2
WONGBAKER_NUMERICALRESPONSE: 4

## 2020-01-05 ASSESSMENT — PAIN SCALES - GENERAL
PAINLEVEL_OUTOF10: 2
PAINLEVEL_OUTOF10: 3
PAINLEVEL_OUTOF10: 4
PAINLEVEL_OUTOF10: 2

## 2020-01-05 ASSESSMENT — PAIN DESCRIPTION - ONSET
ONSET: ON-GOING
ONSET: ON-GOING

## 2020-01-05 ASSESSMENT — PAIN DESCRIPTION - FREQUENCY
FREQUENCY: INTERMITTENT
FREQUENCY: INTERMITTENT

## 2020-01-05 ASSESSMENT — PAIN DESCRIPTION - DESCRIPTORS
DESCRIPTORS: HEADACHE
DESCRIPTORS: HEADACHE

## 2020-01-05 ASSESSMENT — PAIN DESCRIPTION - PAIN TYPE
TYPE: ACUTE PAIN
TYPE: ACUTE PAIN

## 2020-01-05 ASSESSMENT — PAIN DESCRIPTION - PROGRESSION
CLINICAL_PROGRESSION: GRADUALLY IMPROVING
CLINICAL_PROGRESSION: GRADUALLY WORSENING

## 2020-01-05 ASSESSMENT — PAIN DESCRIPTION - LOCATION: LOCATION: HEAD

## 2020-01-05 ASSESSMENT — PAIN - FUNCTIONAL ASSESSMENT
PAIN_FUNCTIONAL_ASSESSMENT: ACTIVITIES ARE NOT PREVENTED
PAIN_FUNCTIONAL_ASSESSMENT: ACTIVITIES ARE NOT PREVENTED

## 2020-01-05 NOTE — PLAN OF CARE
Problem: Falls - Risk of:  Goal: Will remain free from falls  Description  Will remain free from falls  1/5/2020 0357 by Berna Kim RN  Outcome: Ongoing  Note:   Siderails up x 2  Hourly rounding  Call light in reach  Remains free from falls and accidental injury at this time   Floor free from obstacles  Bed is locked and in lowest position  Adequate lighting provided  Patient independent. Gait steady. 1/4/2020 1429 by Taylor Maldonado RN  Outcome: Ongoing  Note:   Siderails up x 2  Hourly rounding  Call light in reach  Instructed to call for assist before attempting out of bed. Remains free from falls and accidental injury at this time   Floor free from obstacles  Bed is locked and in lowest position  Adequate lighting provided  Bed alarm on, [place]alling star and Stay with Me signs posted      Goal: Absence of physical injury  Description  Absence of physical injury  1/5/2020 0357 by Berna Kim RN  Outcome: Ongoing  1/4/2020 1429 by Taylor Maldonado RN  Outcome: Ongoing     Problem: Pain:  Goal: Pain level will decrease  Description  Pain level will decrease  1/5/2020 0357 by Berna Kim RN  Outcome: Ongoing  Note:   Pain level assessment complete. Patient educated on pain scale and control interventions  PRN pain medication given per patient request-Tylenol  Patient instructed to call out with new onset of pain or unrelieved pain    1/4/2020 1429 by Taylor Maldonado RN  Outcome: Ongoing  Note:   Pt has not complained of pain this shift.   Goal: Control of acute pain  Description  Control of acute pain  1/5/2020 0357 by Berna Kim RN  Outcome: Ongoing  1/4/2020 1429 by Taylor Maldonado RN  Outcome: Ongoing  Goal: Control of chronic pain  Description  Control of chronic pain  1/5/2020 0357 by Berna Kim RN  Outcome: Ongoing  1/4/2020 1429 by Taylor Maldonado RN  Outcome: Ongoing

## 2020-01-05 NOTE — FLOWSHEET NOTE
Patient expresses no major needs or prayers. Patient was busy.    leaves prayer card for possible follow up.     01/05/20 3902   Encounter Summary   Services provided to: Patient   Referral/Consult From: Rounding   Continue Visiting   (1-5-20 )   Complexity of Encounter Low   Length of Encounter 15 minutes   Spiritual Assessment Completed Yes   Routine   Type Initial   Assessment Passive   Intervention Explored feelings, thoughts, concerns   Outcome Did not respond;Refused/declined

## 2020-01-05 NOTE — PLAN OF CARE
Problem: Falls - Risk of:  Goal: Will remain free from falls  Description  Will remain free from falls  1/5/2020 1250 by Magda Worrell RN  Outcome: Ongoing  1/5/2020 0357 by Adam Pendleton RN  Outcome: Ongoing  Note:   Siderails up x 2  Hourly rounding  Call light in reach  Remains free from falls and accidental injury at this time   Floor free from obstacles  Bed is locked and in lowest position  Adequate lighting provided  Patient independent. Gait steady. Goal: Absence of physical injury  Description  Absence of physical injury  1/5/2020 1250 by Magda Worrell RN  Outcome: Ongoing  Note:   Siderails up x 2  Hourly rounding  Call light in reach  Instructed to call for assist before attempting out of bed. Remains free from falls and accidental injury at this time   Floor free from obstacles  Bed is locked and in lowest position  Adequate lighting provided  Bed alarm on, [place] star and Stay with Me signs posted      1/5/2020 0357 by Adam Pendleton RN  Outcome: Ongoing     Problem: Pain:  Goal: Pain level will decrease  Description  Pain level will decrease  1/5/2020 1250 by Magda Worrell RN  Outcome: Ongoing  1/5/2020 0357 by Adam Pendleton RN  Outcome: Ongoing  Note:   Pain level assessment complete. Patient educated on pain scale and control interventions  PRN pain medication given per patient request-Tylenol  Patient instructed to call out with new onset of pain or unrelieved pain    Goal: Control of acute pain  Description  Control of acute pain  1/5/2020 1250 by Magda Worrell RN  Outcome: Ongoing  Note:   Pain level assessment complete.    Patient educated on pain scale and control interventions  PRN pain medication given per patient request  Patient instructed to call out with new onset of pain or unrelieved pain    1/5/2020 0357 by Adam Pendleton RN  Outcome: Ongoing  Goal: Control of chronic pain  Description  Control of chronic pain  1/5/2020 1250 by Magda Worrell

## 2020-01-05 NOTE — PROGRESS NOTES
docusate sodium (COLACE) 100 MG capsule Take 100 mg by mouth 2 times daily. Historical Provider, MD       Scheduled Meds:   methylPREDNISolone  40 mg Intravenous Q12H    sodium chloride flush  10 mL Intravenous 2 times per day    allopurinol  300 mg Oral Daily    amLODIPine  10 mg Oral Daily    mometasone-formoterol  2 puff Inhalation BID    docusate sodium  100 mg Oral BID    ferrous sulfate  325 mg Oral Daily with breakfast    vitamin B-12  500 mcg Oral Daily    sodium chloride flush  10 mL Intravenous 2 times per day    heparin (porcine)  5,000 Units Subcutaneous 3 times per day    cefTRIAXone (ROCEPHIN) IV  1 g Intravenous Q24H     Continuous Infusions:   sodium bicarbonate infusion 75 mL/hr at 01/05/20 0733     PRN Meds:hydrALAZINE, sodium chloride flush, acetaminophen, albuterol sulfate HFA, sodium chloride flush, magnesium hydroxide, ondansetron **OR** ondansetron, albuterol       Physical Exam:  Vitals:    01/05/20 0730 01/05/20 0945 01/05/20 1121 01/05/20 1524   BP: (!) 154/90  (!) 140/82 (!) 141/69   Pulse: 71  79 81   Resp: 16  16 16   Temp: 98.6 °F (37 °C)  97.5 °F (36.4 °C) 97.5 °F (36.4 °C)   TempSrc: Oral  Oral Oral   SpO2: 96% 98% 99% 94%   Weight:       Height:         I/O last 3 completed shifts: In: 1750 [I.V.:1700; IV Piggyback:50]  Out: 500 [Urine:500]    General:  Awake, alert, not in distress. Appears to be stated age. HEENT: Has edema and scarring over mouth and tongue, anicteric sclera. Pink and moist oral mucosa. Neck supple. No JVD. Chest: Bilateral air entry, clear to auscultation, no wheezing, rhonchi or rales. Cardiovascular: RRR, S1S2, no murmur, rub or gallop. No lower extremity edema. Abdomen: Soft, non tender to palpation. Musculoskeletal: No cyanosis or clubbing. Integumentary: Pink, warm and dry. Free from rash or lesions. CNS: Oriented to person, place and time. Speech unclear secondary to known history of cancer. Face symmetrical. No tremor.    Psych: x-ray  Mild cardiac silhouette enlargement again noted.  No consolidation,   pneumothorax or evidence for edema.  Chronic blunting of the costophrenic   angles.  Multiple old right rib fractures.  Surgical clips project over the   right axilla.           Assessment:  1. Acute kidney injury  2. Chronic kidney disease stage III  3. Poor natremia with elevated urine sodium and osmolality consistent with SIADH  4. 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 COPD  6. HTN  7.  Metabolic acidosis  8. Hypokalemia    Plan:  Renal function stable with Cr around 2  Serum na improving  Discontinue IVF  Will give additional 20 meq KCL  if serum sodium does not improve by tomorrow we will consider starting sodium chloride tablets if tolerated  Blood pressure improved   No more flank pain  Follow up chemistries ordered for AM.    Thank you for the consultation. Please do not hesitate to contact us for any further questions/concerns. We will continue to follow along with you.        Electronically signed by Leatha Craft MD  on 1/5/2020 at 4:14 PM

## 2020-01-05 NOTE — PROGRESS NOTES
Subjective:     Follow-up COPD  Less cough less shortness of breath no syncope no PND no orthopnea  ROS  Fever no chills no GI/ complaints no cardiac complaints, no TIA no bleeding, no headache no sore throat no skin lesions, no polyuria no polydipsia no hypoglycemia  physical exam  General Appearance: alert and oriented to person, place and time and in no acute distress  Skin: warm and dry, no rash or erythema  Head: normocephalic and atraumatic  Eyes: pupils equal, round, and reactive to light, conjunctivae normal and sclera anicteric    Neck: neck supple and non tender without mass positive tracheostomy pulmonary/Chest: Air entry equal bilateral rhonchi few, no wheezing no crackles no use of intercostal muscles  Cardiovascular: normal rate, regular rhythm, normal S1 and S2, no gallops, intact distal pulses and no carotid bruits  Abdomen: soft, non-tender, non-distended, normal bowel sounds, no masses or organomegaly  Extremities: no edema and good pulses no Homans sign  Neurologic: Alert oriented x3 with no focal deficit    BP (!) 140/82   Pulse 79   Temp 97.5 °F (36.4 °C) (Oral)   Resp 16   Ht 5' 6\" (1.676 m)   Wt 145 lb 1.6 oz (65.8 kg)   SpO2 99%   BMI 23.42 kg/m²     CBC:   Lab Results   Component Value Date    WBC 5.2 01/05/2020    RBC 3.40 01/05/2020    HGB 9.5 01/05/2020    HCT 28.7 01/05/2020    MCV 84.4 01/05/2020    MCH 27.9 01/05/2020    MCHC 33.1 01/05/2020    RDW 16.2 01/05/2020     01/05/2020    MPV 10.1 01/05/2020     BMP:    Lab Results   Component Value Date     01/05/2020    K 3.2 01/05/2020    CL 93 01/05/2020    CO2 23 01/05/2020    BUN 30 01/05/2020    LABALBU 3.4 01/05/2020    CREATININE 2.12 01/05/2020    CALCIUM 8.0 01/05/2020    GFRAA 38 01/05/2020    LABGLOM 31 01/05/2020    GLUCOSE 141 01/05/2020        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 ([facility]     COPD exacerbation decrease IV steroids continue with IV antibiotics and bronchodilators  Acute kidney injury on top of CKD 3 continue with IV fluids avoid nephrotoxic drugs  Hyponatremia better  Hypokalemia K supplement given  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

## 2020-01-06 LAB
ABSOLUTE EOS #: <0.03 K/UL (ref 0–0.44)
ABSOLUTE IMMATURE GRANULOCYTE: 0.02 K/UL (ref 0–0.3)
ABSOLUTE LYMPH #: 0.78 K/UL (ref 1.1–3.7)
ABSOLUTE MONO #: 0.43 K/UL (ref 0.1–1.2)
ANION GAP SERPL CALCULATED.3IONS-SCNC: 10 MMOL/L (ref 9–17)
BASOPHILS # BLD: 0 % (ref 0–2)
BASOPHILS ABSOLUTE: <0.03 K/UL (ref 0–0.2)
BUN BLDV-MCNC: 36 MG/DL (ref 8–23)
BUN/CREAT BLD: 17 (ref 9–20)
CALCIUM SERPL-MCNC: 7.8 MG/DL (ref 8.6–10.4)
CHLORIDE BLD-SCNC: 94 MMOL/L (ref 98–107)
CO2: 27 MMOL/L (ref 20–31)
CREAT SERPL-MCNC: 2.07 MG/DL (ref 0.7–1.2)
DIFFERENTIAL TYPE: ABNORMAL
EOSINOPHILS RELATIVE PERCENT: 0 % (ref 1–4)
GFR AFRICAN AMERICAN: 39 ML/MIN
GFR NON-AFRICAN AMERICAN: 32 ML/MIN
GFR SERPL CREATININE-BSD FRML MDRD: ABNORMAL ML/MIN/{1.73_M2}
GFR SERPL CREATININE-BSD FRML MDRD: ABNORMAL ML/MIN/{1.73_M2}
GLUCOSE BLD-MCNC: 114 MG/DL (ref 70–99)
HCT VFR BLD CALC: 29.3 % (ref 40.7–50.3)
HEMOGLOBIN: 9.5 G/DL (ref 13–17)
IMMATURE GRANULOCYTES: 0 %
LYMPHOCYTES # BLD: 10 % (ref 24–43)
MCH RBC QN AUTO: 27.9 PG (ref 25.2–33.5)
MCHC RBC AUTO-ENTMCNC: 32.4 G/DL (ref 28.4–34.8)
MCV RBC AUTO: 86.2 FL (ref 82.6–102.9)
MONOCYTES # BLD: 6 % (ref 3–12)
NRBC AUTOMATED: 0 PER 100 WBC
PDW BLD-RTO: 16.5 % (ref 11.8–14.4)
PLATELET # BLD: 216 K/UL (ref 138–453)
PLATELET ESTIMATE: ABNORMAL
PMV BLD AUTO: 9.3 FL (ref 8.1–13.5)
POTASSIUM SERPL-SCNC: 3.5 MMOL/L (ref 3.7–5.3)
RBC # BLD: 3.4 M/UL (ref 4.21–5.77)
RBC # BLD: ABNORMAL 10*6/UL
SEG NEUTROPHILS: 84 % (ref 36–65)
SEGMENTED NEUTROPHILS ABSOLUTE COUNT: 6.37 K/UL (ref 1.5–8.1)
SODIUM BLD-SCNC: 131 MMOL/L (ref 135–144)
WBC # BLD: 7.6 K/UL (ref 3.5–11.3)
WBC # BLD: ABNORMAL 10*3/UL

## 2020-01-06 PROCEDURE — 6360000002 HC RX W HCPCS: Performed by: INTERNAL MEDICINE

## 2020-01-06 PROCEDURE — 6370000000 HC RX 637 (ALT 250 FOR IP): Performed by: INTERNAL MEDICINE

## 2020-01-06 PROCEDURE — 94760 N-INVAS EAR/PLS OXIMETRY 1: CPT

## 2020-01-06 PROCEDURE — 2580000003 HC RX 258: Performed by: EMERGENCY MEDICINE

## 2020-01-06 PROCEDURE — 1200000000 HC SEMI PRIVATE

## 2020-01-06 PROCEDURE — 2580000003 HC RX 258: Performed by: INTERNAL MEDICINE

## 2020-01-06 PROCEDURE — 36415 COLL VENOUS BLD VENIPUNCTURE: CPT

## 2020-01-06 PROCEDURE — 6370000000 HC RX 637 (ALT 250 FOR IP): Performed by: EMERGENCY MEDICINE

## 2020-01-06 PROCEDURE — 94640 AIRWAY INHALATION TREATMENT: CPT

## 2020-01-06 PROCEDURE — 85025 COMPLETE CBC W/AUTO DIFF WBC: CPT

## 2020-01-06 PROCEDURE — 80048 BASIC METABOLIC PNL TOTAL CA: CPT

## 2020-01-06 RX ORDER — POTASSIUM CHLORIDE 20 MEQ/1
40 TABLET, EXTENDED RELEASE ORAL ONCE
Status: COMPLETED | OUTPATIENT
Start: 2020-01-06 | End: 2020-01-06

## 2020-01-06 RX ORDER — CEFUROXIME AXETIL 500 MG/1
500 TABLET ORAL EVERY 12 HOURS SCHEDULED
Status: DISCONTINUED | OUTPATIENT
Start: 2020-01-07 | End: 2020-01-08 | Stop reason: HOSPADM

## 2020-01-06 RX ADMIN — SODIUM CHLORIDE, PRESERVATIVE FREE 10 ML: 5 INJECTION INTRAVENOUS at 19:44

## 2020-01-06 RX ADMIN — FERROUS SULFATE TAB EC 325 MG (65 MG FE EQUIVALENT) 325 MG: 325 (65 FE) TABLET DELAYED RESPONSE at 11:15

## 2020-01-06 RX ADMIN — HEPARIN SODIUM 5000 UNITS: 5000 INJECTION INTRAVENOUS; SUBCUTANEOUS at 05:36

## 2020-01-06 RX ADMIN — ACETAMINOPHEN 650 MG: 325 TABLET ORAL at 21:28

## 2020-01-06 RX ADMIN — SODIUM CHLORIDE, PRESERVATIVE FREE 10 ML: 5 INJECTION INTRAVENOUS at 11:17

## 2020-01-06 RX ADMIN — AMLODIPINE BESYLATE 10 MG: 10 TABLET ORAL at 11:15

## 2020-01-06 RX ADMIN — METHYLPREDNISOLONE SODIUM SUCCINATE 40 MG: 40 INJECTION, POWDER, FOR SOLUTION INTRAMUSCULAR; INTRAVENOUS at 11:15

## 2020-01-06 RX ADMIN — DOCUSATE SODIUM 100 MG: 100 CAPSULE, LIQUID FILLED ORAL at 11:15

## 2020-01-06 RX ADMIN — HEPARIN SODIUM 5000 UNITS: 5000 INJECTION INTRAVENOUS; SUBCUTANEOUS at 21:29

## 2020-01-06 RX ADMIN — HEPARIN SODIUM 5000 UNITS: 5000 INJECTION INTRAVENOUS; SUBCUTANEOUS at 14:35

## 2020-01-06 RX ADMIN — CYANOCOBALAMIN TAB 1000 MCG 500 MCG: 1000 TAB at 11:16

## 2020-01-06 RX ADMIN — MOMETASONE FUROATE AND FORMOTEROL FUMARATE DIHYDRATE 2 PUFF: 200; 5 AEROSOL RESPIRATORY (INHALATION) at 09:38

## 2020-01-06 RX ADMIN — DOCUSATE SODIUM 100 MG: 100 CAPSULE, LIQUID FILLED ORAL at 21:28

## 2020-01-06 RX ADMIN — SODIUM CHLORIDE, PRESERVATIVE FREE 10 ML: 5 INJECTION INTRAVENOUS at 21:35

## 2020-01-06 RX ADMIN — SODIUM CHLORIDE, PRESERVATIVE FREE 10 ML: 5 INJECTION INTRAVENOUS at 11:16

## 2020-01-06 RX ADMIN — POTASSIUM CHLORIDE 40 MEQ: 1500 TABLET, EXTENDED RELEASE ORAL at 06:19

## 2020-01-06 RX ADMIN — ALLOPURINOL 300 MG: 300 TABLET ORAL at 11:16

## 2020-01-06 RX ADMIN — MOMETASONE FUROATE AND FORMOTEROL FUMARATE DIHYDRATE 2 PUFF: 200; 5 AEROSOL RESPIRATORY (INHALATION) at 22:11

## 2020-01-06 ASSESSMENT — PAIN SCALES - GENERAL
PAINLEVEL_OUTOF10: 0
PAINLEVEL_OUTOF10: 2

## 2020-01-06 NOTE — PROGRESS NOTES
Subjective:     Follow-up COPD exacerbation  Has any shortness of breath occasional cough no wheezing no PND no orthopnea  ROS  No fever no chills no GI/ complaints no cardiac complaints, no polyuria polydipsia or hypoglycemia no fatigue, no TIA no bleeding no headache no sore throat no skin lesions  physical exam  General Appearance: in no acute distress and alert  Skin: warm and dry, no rash or erythema  Head: normocephalic and atraumatic  Eyes: pupils equal, round, and reactive to light and sclera anicteric  Neck: neck supple and non tender without mass and tracheostomy  Pulmonary/Chest: Air entry equal prolonged expiratory phase minimal rhonchi no wheezing no crackles no use of intercostal muscles  Cardiovascular: normal rate, regular rhythm, normal S1 and S2, no gallops, intact distal pulses and no carotid bruits  Abdomen: soft, non-tender, non-distended, normal bowel sounds, no masses or organomegaly  Extremities: no edema and good pulses no Homans sign    Neurologic: Alert oriented x3 with no focal deficit    BP (!) 165/95   Pulse 67   Temp 97.5 °F (36.4 °C) (Oral)   Resp 16   Ht 5' 6\" (1.676 m)   Wt 153 lb 6.4 oz (69.6 kg)   SpO2 96%   BMI 24.76 kg/m²     CBC:   Lab Results   Component Value Date    WBC 7.6 01/06/2020    RBC 3.40 01/06/2020    HGB 9.5 01/06/2020    HCT 29.3 01/06/2020    MCV 86.2 01/06/2020    MCH 27.9 01/06/2020    MCHC 32.4 01/06/2020    RDW 16.5 01/06/2020     01/06/2020    MPV 9.3 01/06/2020     BMP:    Lab Results   Component Value Date     01/06/2020    K 3.5 01/06/2020    CL 94 01/06/2020    CO2 27 01/06/2020    BUN 36 01/06/2020    LABALBU 3.4 01/05/2020    CREATININE 2.07 01/06/2020    CALCIUM 7.8 01/06/2020    GFRAA 39 01/06/2020    LABGLOM 32 01/06/2020    GLUCOSE 114 01/06/2020        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 ([facility] [address]   COPD exacerbation to oral steroids oral antibiotic continue with bronchodilators  Acute kidney injury on top of CKD 3  Hyponatremia  Hypokalemia  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

## 2020-01-06 NOTE — FLOWSHEET NOTE
Patient's potassium is 3.5, called Dr. Emily Paiz to get orders for a one time dose of 40 ann-marie orally. Will continue too monitor.

## 2020-01-06 NOTE — PROGRESS NOTES
ANTIMICROBIAL STEWARDSHIP  Upon review of the patient's chart, the committee has the following recommendation for your consideration:    Indication: COPD exacerbation  Day of Antimicrobial Therapy: 4    Recommendation:  DC Rocephin in absence of increased sputum purulence or production. Procalcitonin < 0.24.    afebrile >48 hours. .  Recent Labs     01/05/20  0544 01/06/20  0507   WBC 5.2 7.6     Recent Labs     01/04/20  1415   PROCAL 0.16*           Unnecessary or inappropriate antimicrobial use increases the risk of microbial resistance and drug-associated toxicities including C. difficile infection. Thank you. Jose Miguel England, PharmD  1/6/2020  8:42 AM    The Antimicrobial Stewardship Committee at [facility] is led by  Greer Hadley MD, Infectious Diseases Section Chair.

## 2020-01-06 NOTE — PROGRESS NOTES
Reason for Consult:  Acute kidney injury, on chronic kidney disease    Requesting Physician:  Enmanuel Mcneil MD    Interval History:  Cr stable around 2  Received 40 meq of potassium chloride  No new complaints  Serum Na stable at 131    HISTORY OF PRESENT ILLNESS:    The patient is a 81 y.o. male who has a history of chronic kidney disease baseline creatinine about 1.4, sent to the emergency room because of abnormal labs his creatinine was found to be 2.17, creatinine showed mild improvement to 1.97 this morning. Patient was also found to have worsening hyponatremia, his serum sodium usually runs in the low normal range between 1 30-1 36, his serum sodium was 127 upon admission and has been ranging between 1 24-1 29. It was also reported that he had some left flank pain for the last few months  He had a renal ultrasound done in October that was unremarkable       Prior to Admission medications    Medication Sig Start Date End Date Taking? Authorizing Provider   albuterol sulfate  (90 Base) MCG/ACT inhaler Inhale 2 puffs into the lungs every 6 hours as needed for Wheezing or Shortness of Breath    Historical Provider, MD   budesonide-formoterol (SYMBICORT) 160-4.5 MCG/ACT AERO Inhale 2 puffs into the lungs 2 times daily    Historical Provider, MD   omeprazole (PRILOSEC) 20 MG delayed release capsule Take 20 mg by mouth every morning (before breakfast)    Historical Provider, MD   dicyclomine (BENTYL) 10 MG capsule Take 1 capsule by mouth every 6 hours as needed (cramps) 11/13/18   GINETTE De La Fuente - CNP   ferrous sulfate 325 (65 FE) MG tablet Take 325 mg by mouth daily (with breakfast)    Historical Provider, MD   vitamin B-12 (CYANOCOBALAMIN) 500 MCG tablet Take 500 mcg by mouth daily. Historical Provider, MD   allopurinol (ZYLOPRIM) 300 MG tablet Take 300 mg by mouth daily.     Historical Provider, MD   amLODIPine (NORVASC) 10 MG tablet Take 10 mg by mouth daily     Historical Provider, MD questions    Data:  CBC:   Lab Results   Component Value Date    WBC 7.6 01/06/2020    HGB 9.5 (L) 01/06/2020    HCT 29.3 (L) 01/06/2020    MCV 86.2 01/06/2020     01/06/2020     BMP:    Lab Results   Component Value Date     (L) 01/06/2020     (L) 01/05/2020     (L) 01/04/2020    K 3.5 (L) 01/06/2020    K 3.2 (L) 01/05/2020    K 3.8 01/04/2020    CL 94 (L) 01/06/2020    CL 93 (L) 01/05/2020    CL 97 (L) 01/04/2020    CO2 27 01/06/2020    CO2 23 01/05/2020    CO2 19 (L) 01/04/2020    BUN 36 (H) 01/06/2020    BUN 30 (H) 01/05/2020    BUN 22 01/04/2020    CREATININE 2.07 (H) 01/06/2020    CREATININE 2.12 (H) 01/05/2020    CREATININE 1.97 (H) 01/04/2020    GLUCOSE 114 (H) 01/06/2020    GLUCOSE 141 (H) 01/05/2020    GLUCOSE 187 (H) 01/04/2020     CMP:   Lab Results   Component Value Date     01/06/2020    K 3.5 01/06/2020    CL 94 01/06/2020    CO2 27 01/06/2020    BUN 36 01/06/2020    CREATININE 2.07 01/06/2020    GLUCOSE 114 01/06/2020    CALCIUM 7.8 01/06/2020    PROT 6.5 01/05/2020    LABALBU 3.4 01/05/2020    BILITOT 0.13 01/05/2020    ALKPHOS 87 01/05/2020    AST 29 01/05/2020    ALT 15 01/05/2020      Hepatic:   Lab Results   Component Value Date    AST 29 01/05/2020    AST 29 01/04/2020    ALT 15 01/05/2020    ALT 15 01/04/2020    BILITOT 0.13 (L) 01/05/2020    BILITOT 0.14 (L) 01/04/2020    ALKPHOS 87 01/05/2020    ALKPHOS 96 01/04/2020     BNP:   Lab Results   Component Value Date    BNP 39 12/12/2012      Phosphorus:    Lab Results   Component Value Date    PHOS 2.7 10/29/2019     Ionized Calcium: No results found for: IONCA  Magnesium:   Lab Results   Component Value Date    MG 1.6 10/29/2019     Albumin:   Lab Results   Component Value Date    LABALBU 3.4 01/05/2020     Last 3 CK, CKMB, Troponin: @LABRCNT(CKTOTAL:3,CKMB:3,TROPONINI:3)       URINE:)No results found for: Keily Crawley     Radiology:   Reviewed. Assessment:  1. Acute kidney injury.   2. Chronic kidney disease stage III. 3. 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 4. Laryngeal CA with metastasis to the floor of the mouth. 5. COPD. 6. Hypertension. 7. Hypokalemia. Plan:  Renal function stable with Cr around 2. Serum Na stable. Off IVF. Will restrict oral fluids to 1000 ml/24 hours. Received 40 meq KCl today. Monitor blood pressure. Follow up chemistries ordered for AM.    Thank you for the consultation. Please do not hesitate to contact us for any further questions/concerns. We will continue to follow along with you.        Electronically signed by Amy Griffin MD  on 1/6/2020 at 10:43 AM

## 2020-01-07 LAB
ABSOLUTE EOS #: <0.03 K/UL (ref 0–0.44)
ABSOLUTE IMMATURE GRANULOCYTE: 0.03 K/UL (ref 0–0.3)
ABSOLUTE LYMPH #: 1.11 K/UL (ref 1.1–3.7)
ABSOLUTE MONO #: 0.44 K/UL (ref 0.1–1.2)
ANION GAP SERPL CALCULATED.3IONS-SCNC: 10 MMOL/L (ref 9–17)
ANION GAP SERPL CALCULATED.3IONS-SCNC: 14 MMOL/L (ref 9–17)
BASOPHILS # BLD: 0 % (ref 0–2)
BASOPHILS ABSOLUTE: <0.03 K/UL (ref 0–0.2)
BUN BLDV-MCNC: 34 MG/DL (ref 8–23)
BUN/CREAT BLD: 19 (ref 9–20)
CALCIUM SERPL-MCNC: 8 MG/DL (ref 8.6–10.4)
CHLORIDE BLD-SCNC: 94 MMOL/L (ref 98–107)
CHLORIDE BLD-SCNC: 97 MMOL/L (ref 98–107)
CO2: 22 MMOL/L (ref 20–31)
CO2: 26 MMOL/L (ref 20–31)
CREAT SERPL-MCNC: 1.82 MG/DL (ref 0.7–1.2)
DIFFERENTIAL TYPE: ABNORMAL
EOSINOPHILS RELATIVE PERCENT: 0 % (ref 1–4)
GFR AFRICAN AMERICAN: 45 ML/MIN
GFR NON-AFRICAN AMERICAN: 37 ML/MIN
GFR SERPL CREATININE-BSD FRML MDRD: ABNORMAL ML/MIN/{1.73_M2}
GFR SERPL CREATININE-BSD FRML MDRD: ABNORMAL ML/MIN/{1.73_M2}
GLUCOSE BLD-MCNC: 96 MG/DL (ref 70–99)
HCT VFR BLD CALC: 30 % (ref 40.7–50.3)
HEMOGLOBIN: 9.6 G/DL (ref 13–17)
IMMATURE GRANULOCYTES: 0 %
LYMPHOCYTES # BLD: 16 % (ref 24–43)
MAGNESIUM: 1.3 MG/DL (ref 1.6–2.6)
MCH RBC QN AUTO: 28.2 PG (ref 25.2–33.5)
MCHC RBC AUTO-ENTMCNC: 32 G/DL (ref 28.4–34.8)
MCV RBC AUTO: 88.2 FL (ref 82.6–102.9)
MONOCYTES # BLD: 6 % (ref 3–12)
NRBC AUTOMATED: 0 PER 100 WBC
PDW BLD-RTO: 16.6 % (ref 11.8–14.4)
PHOSPHORUS: 2.8 MG/DL (ref 2.5–4.5)
PLATELET # BLD: 233 K/UL (ref 138–453)
PLATELET ESTIMATE: ABNORMAL
PMV BLD AUTO: 9.9 FL (ref 8.1–13.5)
POTASSIUM SERPL-SCNC: 3.4 MMOL/L (ref 3.7–5.3)
POTASSIUM SERPL-SCNC: 3.5 MMOL/L (ref 3.7–5.3)
RBC # BLD: 3.4 M/UL (ref 4.21–5.77)
RBC # BLD: ABNORMAL 10*6/UL
SEG NEUTROPHILS: 78 % (ref 36–65)
SEGMENTED NEUTROPHILS ABSOLUTE COUNT: 5.6 K/UL (ref 1.5–8.1)
SODIUM BLD-SCNC: 130 MMOL/L (ref 135–144)
SODIUM BLD-SCNC: 133 MMOL/L (ref 135–144)
VITAMIN D 25-HYDROXY: 16.4 NG/ML (ref 30–100)
WBC # BLD: 7.2 K/UL (ref 3.5–11.3)
WBC # BLD: ABNORMAL 10*3/UL

## 2020-01-07 PROCEDURE — 83735 ASSAY OF MAGNESIUM: CPT

## 2020-01-07 PROCEDURE — 80051 ELECTROLYTE PANEL: CPT

## 2020-01-07 PROCEDURE — 6370000000 HC RX 637 (ALT 250 FOR IP): Performed by: INTERNAL MEDICINE

## 2020-01-07 PROCEDURE — 2580000003 HC RX 258: Performed by: INTERNAL MEDICINE

## 2020-01-07 PROCEDURE — 1200000000 HC SEMI PRIVATE

## 2020-01-07 PROCEDURE — 84100 ASSAY OF PHOSPHORUS: CPT

## 2020-01-07 PROCEDURE — 36415 COLL VENOUS BLD VENIPUNCTURE: CPT

## 2020-01-07 PROCEDURE — 85025 COMPLETE CBC W/AUTO DIFF WBC: CPT

## 2020-01-07 PROCEDURE — 94640 AIRWAY INHALATION TREATMENT: CPT

## 2020-01-07 PROCEDURE — 80048 BASIC METABOLIC PNL TOTAL CA: CPT

## 2020-01-07 PROCEDURE — 82306 VITAMIN D 25 HYDROXY: CPT

## 2020-01-07 PROCEDURE — 6360000002 HC RX W HCPCS: Performed by: INTERNAL MEDICINE

## 2020-01-07 RX ORDER — POTASSIUM CHLORIDE 750 MG/1
20 CAPSULE, EXTENDED RELEASE ORAL ONCE
Status: DISCONTINUED | OUTPATIENT
Start: 2020-01-07 | End: 2020-01-07

## 2020-01-07 RX ORDER — DOCUSATE SODIUM 100 MG/1
100 CAPSULE, LIQUID FILLED ORAL 2 TIMES DAILY PRN
Status: DISCONTINUED | OUTPATIENT
Start: 2020-01-07 | End: 2020-01-08 | Stop reason: HOSPADM

## 2020-01-07 RX ORDER — PREDNISONE 20 MG/1
20 TABLET ORAL DAILY
Status: DISCONTINUED | OUTPATIENT
Start: 2020-01-08 | End: 2020-01-08 | Stop reason: HOSPADM

## 2020-01-07 RX ORDER — POTASSIUM CHLORIDE 20 MEQ/1
20 TABLET, EXTENDED RELEASE ORAL ONCE
Status: COMPLETED | OUTPATIENT
Start: 2020-01-07 | End: 2020-01-07

## 2020-01-07 RX ORDER — POTASSIUM CHLORIDE 750 MG/1
20 CAPSULE, EXTENDED RELEASE ORAL ONCE
Status: COMPLETED | OUTPATIENT
Start: 2020-01-07 | End: 2020-01-07

## 2020-01-07 RX ORDER — VITAMIN B COMPLEX
2000 TABLET ORAL DAILY
Status: DISCONTINUED | OUTPATIENT
Start: 2020-01-07 | End: 2020-01-08 | Stop reason: HOSPADM

## 2020-01-07 RX ORDER — POTASSIUM CHLORIDE 20 MEQ/1
40 TABLET, EXTENDED RELEASE ORAL ONCE
Status: COMPLETED | OUTPATIENT
Start: 2020-01-07 | End: 2020-01-07

## 2020-01-07 RX ADMIN — POTASSIUM CHLORIDE 20 MEQ: 20 TABLET, EXTENDED RELEASE ORAL at 12:58

## 2020-01-07 RX ADMIN — DOCUSATE SODIUM 100 MG: 100 CAPSULE, LIQUID FILLED ORAL at 08:59

## 2020-01-07 RX ADMIN — SODIUM CHLORIDE, PRESERVATIVE FREE 10 ML: 5 INJECTION INTRAVENOUS at 12:02

## 2020-01-07 RX ADMIN — SODIUM CHLORIDE, PRESERVATIVE FREE 10 ML: 5 INJECTION INTRAVENOUS at 09:00

## 2020-01-07 RX ADMIN — VITAMIN D, TAB 1000IU (100/BT) 2000 UNITS: 25 TAB at 15:29

## 2020-01-07 RX ADMIN — CEFUROXIME AXETIL 500 MG: 500 TABLET ORAL at 21:45

## 2020-01-07 RX ADMIN — HEPARIN SODIUM 5000 UNITS: 5000 INJECTION INTRAVENOUS; SUBCUTANEOUS at 15:29

## 2020-01-07 RX ADMIN — MOMETASONE FUROATE AND FORMOTEROL FUMARATE DIHYDRATE 2 PUFF: 200; 5 AEROSOL RESPIRATORY (INHALATION) at 21:46

## 2020-01-07 RX ADMIN — ALLOPURINOL 300 MG: 300 TABLET ORAL at 09:00

## 2020-01-07 RX ADMIN — MAGNESIUM GLUCONATE 500 MG ORAL TABLET 400 MG: 500 TABLET ORAL at 21:45

## 2020-01-07 RX ADMIN — POTASSIUM CHLORIDE 20 MEQ: 750 CAPSULE, EXTENDED RELEASE ORAL at 12:02

## 2020-01-07 RX ADMIN — POTASSIUM CHLORIDE 40 MEQ: 20 TABLET, EXTENDED RELEASE ORAL at 15:29

## 2020-01-07 RX ADMIN — SODIUM CHLORIDE, PRESERVATIVE FREE 10 ML: 5 INJECTION INTRAVENOUS at 21:47

## 2020-01-07 RX ADMIN — HEPARIN SODIUM 5000 UNITS: 5000 INJECTION INTRAVENOUS; SUBCUTANEOUS at 21:45

## 2020-01-07 RX ADMIN — FERROUS SULFATE TAB EC 325 MG (65 MG FE EQUIVALENT) 325 MG: 325 (65 FE) TABLET DELAYED RESPONSE at 09:00

## 2020-01-07 RX ADMIN — HYDRALAZINE HYDROCHLORIDE 5 MG: 20 INJECTION INTRAMUSCULAR; INTRAVENOUS at 09:00

## 2020-01-07 RX ADMIN — MAGNESIUM GLUCONATE 500 MG ORAL TABLET 400 MG: 500 TABLET ORAL at 12:58

## 2020-01-07 RX ADMIN — PREDNISONE 30 MG: 20 TABLET ORAL at 09:00

## 2020-01-07 RX ADMIN — HEPARIN SODIUM 5000 UNITS: 5000 INJECTION INTRAVENOUS; SUBCUTANEOUS at 06:23

## 2020-01-07 RX ADMIN — CYANOCOBALAMIN TAB 1000 MCG 500 MCG: 1000 TAB at 09:00

## 2020-01-07 RX ADMIN — AMLODIPINE BESYLATE 10 MG: 10 TABLET ORAL at 08:59

## 2020-01-07 RX ADMIN — MOMETASONE FUROATE AND FORMOTEROL FUMARATE DIHYDRATE 2 PUFF: 200; 5 AEROSOL RESPIRATORY (INHALATION) at 09:40

## 2020-01-07 NOTE — PROGRESS NOTES
Perfect served results of lytes and Vit D level drawn at this time to [facility]

## 2020-01-07 NOTE — PROGRESS NOTES
Provider, MD   docusate sodium (COLACE) 100 MG capsule Take 100 mg by mouth 2 times daily. Historical Provider, MD       Scheduled Meds:   cefUROXime  500 mg Oral 2 times per day    predniSONE  30 mg Oral Daily    sodium chloride flush  10 mL Intravenous 2 times per day    allopurinol  300 mg Oral Daily    amLODIPine  10 mg Oral Daily    mometasone-formoterol  2 puff Inhalation BID    docusate sodium  100 mg Oral BID    ferrous sulfate  325 mg Oral Daily with breakfast    vitamin B-12  500 mcg Oral Daily    sodium chloride flush  10 mL Intravenous 2 times per day    heparin (porcine)  5,000 Units Subcutaneous 3 times per day     Continuous Infusions:    PRN Meds:benzocaine-menthol, hydrALAZINE, sodium chloride flush, acetaminophen, albuterol sulfate HFA, sodium chloride flush, ondansetron **OR** ondansetron, albuterol       Physical Exam:  Vitals:    01/07/20 0411 01/07/20 0440 01/07/20 0757 01/07/20 1119   BP: (!) 169/88  (!) 174/99 (!) 140/84   Pulse: 65  67 74   Resp: 18  16 16   Temp: 98.6 °F (37 °C)  97.3 °F (36.3 °C) 97.9 °F (36.6 °C)   TempSrc: Oral  Oral Axillary   SpO2: 96%  97% 96%   Weight:  148 lb 12.8 oz (67.5 kg)     Height:         No intake/output data recorded. General:  Awake, alert, not in distress. Appears to be stated age. HEENT: Has edema and scarring over mouth and tongue, anicteric sclera. Pink and moist oral mucosa. Neck supple. No JVD. Chest: Bilateral air entry, clear to auscultation, no wheezing, rhonchi or rales. Cardiovascular: RRR, S1S2, no murmur, rub or gallop. No lower extremity edema. Abdomen: Soft, non tender to palpation. Musculoskeletal: No cyanosis or clubbing. Integumentary: Pink, warm and dry. Free from rash or lesions. CNS: Oriented to person, place and time. Speech unclear secondary to known history of cancer. Face symmetrical. No tremor.    Psych: Appropriate mood and affect, and was smiling and trying to answer questions    Data:  CBC:   Lab Results   Component Value Date    WBC 7.2 01/07/2020    HGB 9.6 (L) 01/07/2020    HCT 30.0 (L) 01/07/2020    MCV 88.2 01/07/2020     01/07/2020     BMP:    Lab Results   Component Value Date     (L) 01/07/2020     (L) 01/06/2020     (L) 01/05/2020    K 3.4 (L) 01/07/2020    K 3.5 (L) 01/06/2020    K 3.2 (L) 01/05/2020    CL 97 (L) 01/07/2020    CL 94 (L) 01/06/2020    CL 93 (L) 01/05/2020    CO2 26 01/07/2020    CO2 27 01/06/2020    CO2 23 01/05/2020    BUN 34 (H) 01/07/2020    BUN 36 (H) 01/06/2020    BUN 30 (H) 01/05/2020    CREATININE 1.82 (H) 01/07/2020    CREATININE 2.07 (H) 01/06/2020    CREATININE 2.12 (H) 01/05/2020    GLUCOSE 96 01/07/2020    GLUCOSE 114 (H) 01/06/2020    GLUCOSE 141 (H) 01/05/2020     CMP:   Lab Results   Component Value Date     01/07/2020    K 3.4 01/07/2020    CL 97 01/07/2020    CO2 26 01/07/2020    BUN 34 01/07/2020    CREATININE 1.82 01/07/2020    GLUCOSE 96 01/07/2020    CALCIUM 8.0 01/07/2020    PROT 6.5 01/05/2020    LABALBU 3.4 01/05/2020    BILITOT 0.13 01/05/2020    ALKPHOS 87 01/05/2020    AST 29 01/05/2020    ALT 15 01/05/2020      Hepatic:   Lab Results   Component Value Date    AST 29 01/05/2020    AST 29 01/04/2020    ALT 15 01/05/2020    ALT 15 01/04/2020    BILITOT 0.13 (L) 01/05/2020    BILITOT 0.14 (L) 01/04/2020    ALKPHOS 87 01/05/2020    ALKPHOS 96 01/04/2020     BNP:   Lab Results   Component Value Date    BNP 39 12/12/2012      Phosphorus:    Lab Results   Component Value Date    PHOS 2.8 01/07/2020     Ionized Calcium: No results found for: IONCA  Magnesium:   Lab Results   Component Value Date    MG 1.3 01/07/2020     Albumin:   Lab Results   Component Value Date    LABALBU 3.4 01/05/2020     Last 3 CK, CKMB, Troponin: @LABRCNT(CKTOTAL:3,CKMB:3,TROPONINI:3)       URINE:)No results found for: Walker Rodriguez     Radiology:   Reviewed. Assessment:  1. Acute kidney injury. Cr is improving. 2. Chronic kidney disease stage III.   3.

## 2020-01-07 NOTE — PLAN OF CARE
Problem: Falls - Risk of:  Goal: Will remain free from falls  Description  Will remain free from falls  1/7/2020 1335 by Shaunna Martins RN  Outcome: Ongoing  Note:   Patient is a fall risk during this admission. Fall risk assessment was performed. Patient is absent of falls. Bed is in the lowest position. Wheels on the bed are locked. Call light and bed side table are within reach. Clutter is removed. Patient was educated to call out when needing assistance or wanting to get out of bed. Patient offered toileting assistance during rounding. Hourly rounds have been performed. Pt stronger and up in the room with steady gait  1/7/2020 0203 by Sudha Royal RN  Outcome: Ongoing  Goal: Absence of physical injury  Description  Absence of physical injury  1/7/2020 0203 by Sudha Royal RN  Outcome: Ongoing     Problem: Pain:  Goal: Pain level will decrease  Description  Pain level will decrease  1/7/2020 1335 by Shaunna Martins RN  Outcome: Ongoing  Note:   Pain level assessment complete. Patient educated on pain scale and control interventions  PRN pain medication given per patient request  Patient instructed to call out with new onset of pain or unrelieved pain , denies complaints of pain  1/7/2020 0203 by Sudha Royal RN  Outcome: Ongoing  Goal: Control of acute pain  Description  Control of acute pain  1/7/2020 0203 by Sudha Royal RN  Outcome: Ongoing  Goal: Control of chronic pain  Description  Control of chronic pain  1/7/2020 0203 by Sudha Royal RN  Outcome: Ongoing     Problem: Discharge Planning:  Goal: Discharged to appropriate level of care  Description  Discharged to appropriate level of care  Outcome: Ongoing  Note:   Pt will be going home  with follow up appts     Problem:  Activity Intolerance:  Goal: Ability to tolerate increased activity will improve  Description  Ability to tolerate increased activity will improve  Outcome: Ongoing  Note:   Pt able to  tolerate ADL activity, pt

## 2020-01-07 NOTE — PROGRESS NOTES
Transitions of Care Pharmacy Service   Medication Review    The patient's list of current home medications has been reviewed. Source(s) of information: [employer], patient    Based on information provided by the above source(s), I have updated the patient's home med list as described below. Please review the ACTION REQUESTED section of this note below for any discrepancies on current hospital orders. I changed or updated the following medications on the patient's home medication list:  Discontinued Vit B-12     Added      Adjusted   Docusate scheduled -> PRN   Other Notes              Please feel free to call me with any questions about this encounter. Thank you. Brandon Portillo [facility]   Transitions of Care Pharmacy Service  Phone:  [phone]  Fax: [phone]      Electronically signed by Brandon Portillo [facility] on 1/7/2020 at 4:03 PM       Medications Prior to Admission: albuterol sulfate  (90 Base) MCG/ACT inhaler, Inhale 2 puffs into the lungs every 6 hours as needed for Wheezing or Shortness of Breath  budesonide-formoterol (SYMBICORT) 160-4.5 MCG/ACT AERO, Inhale 2 puffs into the lungs 2 times daily  omeprazole (PRILOSEC) 20 MG delayed release capsule, Take 20 mg by mouth every morning (before breakfast)  dicyclomine (BENTYL) 10 MG capsule, Take 1 capsule by mouth every 6 hours as needed (cramps)  ferrous sulfate 325 (65 FE) MG tablet, Take 325 mg by mouth daily (with breakfast)  allopurinol (ZYLOPRIM) 300 MG tablet, Take 300 mg by mouth daily.   amLODIPine (NORVASC) 10 MG tablet, Take 10 mg by mouth daily   docusate sodium (COLACE) 100 MG capsule, Take 100 mg by mouth 2 times daily as needed for Constipation

## 2020-01-08 VITALS
HEIGHT: 66 IN | BODY MASS INDEX: 21.38 KG/M2 | OXYGEN SATURATION: 96 % | WEIGHT: 133 LBS | TEMPERATURE: 97.9 F | RESPIRATION RATE: 16 BRPM | SYSTOLIC BLOOD PRESSURE: 139 MMHG | DIASTOLIC BLOOD PRESSURE: 88 MMHG | HEART RATE: 90 BPM

## 2020-01-08 LAB
ABSOLUTE EOS #: <0.03 K/UL (ref 0–0.44)
ABSOLUTE IMMATURE GRANULOCYTE: 0.03 K/UL (ref 0–0.3)
ABSOLUTE LYMPH #: 1.8 K/UL (ref 1.1–3.7)
ABSOLUTE MONO #: 0.61 K/UL (ref 0.1–1.2)
ANION GAP SERPL CALCULATED.3IONS-SCNC: 9 MMOL/L (ref 9–17)
BASOPHILS # BLD: 0 % (ref 0–2)
BASOPHILS ABSOLUTE: <0.03 K/UL (ref 0–0.2)
BUN BLDV-MCNC: 32 MG/DL (ref 8–23)
BUN/CREAT BLD: 18 (ref 9–20)
CALCIUM SERPL-MCNC: 8.3 MG/DL (ref 8.6–10.4)
CHLORIDE BLD-SCNC: 102 MMOL/L (ref 98–107)
CO2: 23 MMOL/L (ref 20–31)
CREAT SERPL-MCNC: 1.78 MG/DL (ref 0.7–1.2)
DIFFERENTIAL TYPE: ABNORMAL
EOSINOPHILS RELATIVE PERCENT: 0 % (ref 1–4)
GFR AFRICAN AMERICAN: 46 ML/MIN
GFR NON-AFRICAN AMERICAN: 38 ML/MIN
GFR SERPL CREATININE-BSD FRML MDRD: ABNORMAL ML/MIN/{1.73_M2}
GFR SERPL CREATININE-BSD FRML MDRD: ABNORMAL ML/MIN/{1.73_M2}
GLUCOSE BLD-MCNC: 92 MG/DL (ref 70–99)
HCT VFR BLD CALC: 30.7 % (ref 40.7–50.3)
HEMOGLOBIN: 9.9 G/DL (ref 13–17)
IMMATURE GRANULOCYTES: 0 %
LYMPHOCYTES # BLD: 25 % (ref 24–43)
MAGNESIUM: 1.4 MG/DL (ref 1.6–2.6)
MCH RBC QN AUTO: 28 PG (ref 25.2–33.5)
MCHC RBC AUTO-ENTMCNC: 32.2 G/DL (ref 28.4–34.8)
MCV RBC AUTO: 86.7 FL (ref 82.6–102.9)
MONOCYTES # BLD: 8 % (ref 3–12)
NRBC AUTOMATED: 0 PER 100 WBC
PDW BLD-RTO: 16.3 % (ref 11.8–14.4)
PHOSPHORUS: 2.4 MG/DL (ref 2.5–4.5)
PLATELET # BLD: 217 K/UL (ref 138–453)
PLATELET ESTIMATE: ABNORMAL
PMV BLD AUTO: 9 FL (ref 8.1–13.5)
POTASSIUM SERPL-SCNC: 3.8 MMOL/L (ref 3.7–5.3)
RBC # BLD: 3.54 M/UL (ref 4.21–5.77)
RBC # BLD: ABNORMAL 10*6/UL
SEG NEUTROPHILS: 67 % (ref 36–65)
SEGMENTED NEUTROPHILS ABSOLUTE COUNT: 4.82 K/UL (ref 1.5–8.1)
SODIUM BLD-SCNC: 134 MMOL/L (ref 135–144)
WBC # BLD: 7.3 K/UL (ref 3.5–11.3)
WBC # BLD: ABNORMAL 10*3/UL

## 2020-01-08 PROCEDURE — 2580000003 HC RX 258: Performed by: INTERNAL MEDICINE

## 2020-01-08 PROCEDURE — 6370000000 HC RX 637 (ALT 250 FOR IP): Performed by: INTERNAL MEDICINE

## 2020-01-08 PROCEDURE — 36415 COLL VENOUS BLD VENIPUNCTURE: CPT

## 2020-01-08 PROCEDURE — 84100 ASSAY OF PHOSPHORUS: CPT

## 2020-01-08 PROCEDURE — 6360000002 HC RX W HCPCS: Performed by: INTERNAL MEDICINE

## 2020-01-08 PROCEDURE — 85025 COMPLETE CBC W/AUTO DIFF WBC: CPT

## 2020-01-08 PROCEDURE — 83735 ASSAY OF MAGNESIUM: CPT

## 2020-01-08 PROCEDURE — 94640 AIRWAY INHALATION TREATMENT: CPT

## 2020-01-08 PROCEDURE — 80048 BASIC METABOLIC PNL TOTAL CA: CPT

## 2020-01-08 RX ORDER — POTASSIUM CHLORIDE 20 MEQ/1
20 TABLET, EXTENDED RELEASE ORAL ONCE
Status: COMPLETED | OUTPATIENT
Start: 2020-01-08 | End: 2020-01-08

## 2020-01-08 RX ORDER — PREDNISONE 10 MG/1
10 TABLET ORAL DAILY
Qty: 3 TABLET | Refills: 0 | Status: SHIPPED | OUTPATIENT
Start: 2020-01-08 | End: 2020-01-11

## 2020-01-08 RX ORDER — HYDRALAZINE HYDROCHLORIDE 25 MG/1
25 TABLET, FILM COATED ORAL 2 TIMES DAILY
Qty: 60 TABLET | Refills: 0 | Status: ON HOLD | OUTPATIENT
Start: 2020-01-08 | End: 2020-03-04 | Stop reason: SDUPTHER

## 2020-01-08 RX ORDER — PREDNISONE 20 MG/1
30 TABLET ORAL DAILY
Qty: 5 TABLET | Refills: 0 | Status: SHIPPED | OUTPATIENT
Start: 2020-01-08 | End: 2020-01-11

## 2020-01-08 RX ORDER — HYDRALAZINE HYDROCHLORIDE 25 MG/1
25 TABLET, FILM COATED ORAL 2 TIMES DAILY
Status: DISCONTINUED | OUTPATIENT
Start: 2020-01-08 | End: 2020-01-08 | Stop reason: HOSPADM

## 2020-01-08 RX ORDER — CEFUROXIME AXETIL 500 MG/1
500 TABLET ORAL EVERY 12 HOURS SCHEDULED
Qty: 10 TABLET | Refills: 0 | Status: SHIPPED | OUTPATIENT
Start: 2020-01-08 | End: 2020-01-13

## 2020-01-08 RX ORDER — PREDNISONE 1 MG/1
5 TABLET ORAL
Qty: 3 TABLET | Refills: 0 | Status: SHIPPED | OUTPATIENT
Start: 2020-01-08 | End: 2020-01-13

## 2020-01-08 RX ORDER — HYDRALAZINE HYDROCHLORIDE 20 MG/ML
10 INJECTION INTRAMUSCULAR; INTRAVENOUS EVERY 6 HOURS PRN
Status: DISCONTINUED | OUTPATIENT
Start: 2020-01-08 | End: 2020-01-08 | Stop reason: HOSPADM

## 2020-01-08 RX ORDER — PREDNISONE 10 MG/1
20 TABLET ORAL DAILY
Qty: 6 TABLET | Refills: 0 | Status: SHIPPED | OUTPATIENT
Start: 2020-01-08 | End: 2020-01-11

## 2020-01-08 RX ADMIN — Medication 10 ML: at 00:46

## 2020-01-08 RX ADMIN — HEPARIN SODIUM 5000 UNITS: 5000 INJECTION INTRAVENOUS; SUBCUTANEOUS at 06:48

## 2020-01-08 RX ADMIN — HYDRALAZINE HYDROCHLORIDE 5 MG: 20 INJECTION INTRAMUSCULAR; INTRAVENOUS at 00:46

## 2020-01-08 RX ADMIN — ALLOPURINOL 300 MG: 300 TABLET ORAL at 10:02

## 2020-01-08 RX ADMIN — MOMETASONE FUROATE AND FORMOTEROL FUMARATE DIHYDRATE 2 PUFF: 200; 5 AEROSOL RESPIRATORY (INHALATION) at 09:23

## 2020-01-08 RX ADMIN — PREDNISONE 20 MG: 20 TABLET ORAL at 10:02

## 2020-01-08 RX ADMIN — AMLODIPINE BESYLATE 10 MG: 10 TABLET ORAL at 10:02

## 2020-01-08 RX ADMIN — HYDRALAZINE HYDROCHLORIDE 5 MG: 20 INJECTION INTRAMUSCULAR; INTRAVENOUS at 02:13

## 2020-01-08 RX ADMIN — HYDRALAZINE HYDROCHLORIDE 25 MG: 25 TABLET, FILM COATED ORAL at 10:02

## 2020-01-08 RX ADMIN — HEPARIN SODIUM 5000 UNITS: 5000 INJECTION INTRAVENOUS; SUBCUTANEOUS at 15:10

## 2020-01-08 RX ADMIN — POTASSIUM CHLORIDE 20 MEQ: 20 TABLET, EXTENDED RELEASE ORAL at 17:19

## 2020-01-08 RX ADMIN — CEFUROXIME AXETIL 500 MG: 500 TABLET ORAL at 10:01

## 2020-01-08 RX ADMIN — VITAMIN D, TAB 1000IU (100/BT) 2000 UNITS: 25 TAB at 10:02

## 2020-01-08 RX ADMIN — FERROUS SULFATE TAB EC 325 MG (65 MG FE EQUIVALENT) 325 MG: 325 (65 FE) TABLET DELAYED RESPONSE at 10:02

## 2020-01-08 RX ADMIN — MAGNESIUM GLUCONATE 500 MG ORAL TABLET 400 MG: 500 TABLET ORAL at 10:01

## 2020-01-08 RX ADMIN — CYANOCOBALAMIN TAB 1000 MCG 500 MCG: 1000 TAB at 10:02

## 2020-01-08 ASSESSMENT — PAIN SCALES - GENERAL: PAINLEVEL_OUTOF10: 0

## 2020-01-08 NOTE — PROGRESS NOTES
Mamadou in to see the pt , vitals obtained and relayed to the guevara Michelle for discharge from his standpoint with follow up in the office

## 2020-01-08 NOTE — PROGRESS NOTES
Reason for Consult:  Acute kidney injury, on chronic kidney disease    Requesting Physician:  Ashley Silva MD    Interval History:  Cr is gradually improving, down to 1.78 today  Blood pressure was elevated, p.o. hydralazine started  Potassium showed mild improvement  No new complaints  Serum Na stable around 131-134    HISTORY OF PRESENT ILLNESS:    The patient is a 81 y.o. male who has a history of chronic kidney disease baseline creatinine about 1.4, sent to the emergency room because of abnormal labs his creatinine was found to be 2.17, creatinine showed mild improvement to 1.97 this morning. Patient was also found to have worsening hyponatremia, his serum sodium usually runs in the low normal range between 1 30-1 36, his serum sodium was 127 upon admission and has been ranging between 1 24-1 29. It was also reported that he had some left flank pain for the last few months  He had a renal ultrasound done in October that was unremarkable       Prior to Admission medications    Medication Sig Start Date End Date Taking? Authorizing Provider   albuterol sulfate  (90 Base) MCG/ACT inhaler Inhale 2 puffs into the lungs every 6 hours as needed for Wheezing or Shortness of Breath    Historical Provider, MD   budesonide-formoterol (SYMBICORT) 160-4.5 MCG/ACT AERO Inhale 2 puffs into the lungs 2 times daily    Historical Provider, MD   omeprazole (PRILOSEC) 20 MG delayed release capsule Take 20 mg by mouth every morning (before breakfast)    Historical Provider, MD   dicyclomine (BENTYL) 10 MG capsule Take 1 capsule by mouth every 6 hours as needed (cramps) 11/13/18   GINETTE Alexandre - CNP   ferrous sulfate 325 (65 FE) MG tablet Take 325 mg by mouth daily (with breakfast)    Historical Provider, MD   allopurinol (ZYLOPRIM) 300 MG tablet Take 300 mg by mouth daily.     Historical Provider, MD   amLODIPine (NORVASC) 10 MG tablet Take 10 mg by mouth daily     Historical Provider, MD   docusate sodium (COLACE) 100 MG capsule Take 100 mg by mouth 2 times daily as needed for Constipation     Historical Provider, MD       Scheduled Meds:   hydrALAZINE  25 mg Oral BID    magnesium oxide  400 mg Oral BID    Vitamin D  2,000 Units Oral Daily    predniSONE  20 mg Oral Daily    cefUROXime  500 mg Oral 2 times per day    sodium chloride flush  10 mL Intravenous 2 times per day    allopurinol  300 mg Oral Daily    amLODIPine  10 mg Oral Daily    mometasone-formoterol  2 puff Inhalation BID    ferrous sulfate  325 mg Oral Daily with breakfast    vitamin B-12  500 mcg Oral Daily    sodium chloride flush  10 mL Intravenous 2 times per day    heparin (porcine)  5,000 Units Subcutaneous 3 times per day     Continuous Infusions:    PRN Meds:hydrALAZINE, docusate sodium, benzocaine-menthol, sodium chloride flush, acetaminophen, albuterol sulfate HFA, sodium chloride flush, ondansetron **OR** ondansetron, albuterol       Physical Exam:  Vitals:    01/08/20 0746 01/08/20 1115 01/08/20 1516 01/08/20 1608   BP: (!) 180/105 (!) 161/92 (!) 158/89 139/88   Pulse: 73 77 72 90   Resp: 18 16 16 16   Temp: 97.5 °F (36.4 °C) 98.1 °F (36.7 °C) 98.1 °F (36.7 °C) 97.9 °F (36.6 °C)   TempSrc: Oral Oral  Oral   SpO2: 96% 95% 96% 96%   Weight:       Height:         I/O last 3 completed shifts: In: 420 [P.O.:420]  Out: -     General:  Awake, alert, not in distress. Appears to be stated age. HEENT: Has edema and scarring over mouth and tongue, anicteric sclera. Pink and moist oral mucosa. Neck supple. No JVD. Chest: Bilateral air entry, clear to auscultation, no wheezing, rhonchi or rales. Cardiovascular: RRR, S1S2, no murmur, rub or gallop. No lower extremity edema. Abdomen: Soft, non tender to palpation. Musculoskeletal: No cyanosis or clubbing. Integumentary: Pink, warm and dry. Free from rash or lesions. CNS: Oriented to person, place and time. Speech unclear secondary to known history of cancer.  Face symmetrical. No tremor.    Psych: Appropriate mood and affect, and was smiling and trying to answer questions    Data:  CBC:   Lab Results   Component Value Date    WBC 7.3 01/08/2020    HGB 9.9 (L) 01/08/2020    HCT 30.7 (L) 01/08/2020    MCV 86.7 01/08/2020     01/08/2020     BMP:    Lab Results   Component Value Date     (L) 01/08/2020     (L) 01/07/2020     (L) 01/07/2020    K 3.8 01/08/2020    K 3.5 (L) 01/07/2020    K 3.4 (L) 01/07/2020     01/08/2020    CL 94 (L) 01/07/2020    CL 97 (L) 01/07/2020    CO2 23 01/08/2020    CO2 22 01/07/2020    CO2 26 01/07/2020    BUN 32 (H) 01/08/2020    BUN 34 (H) 01/07/2020    BUN 36 (H) 01/06/2020    CREATININE 1.78 (H) 01/08/2020    CREATININE 1.82 (H) 01/07/2020    CREATININE 2.07 (H) 01/06/2020    GLUCOSE 92 01/08/2020    GLUCOSE 96 01/07/2020    GLUCOSE 114 (H) 01/06/2020     CMP:   Lab Results   Component Value Date     01/08/2020    K 3.8 01/08/2020     01/08/2020    CO2 23 01/08/2020    BUN 32 01/08/2020    CREATININE 1.78 01/08/2020    GLUCOSE 92 01/08/2020    CALCIUM 8.3 01/08/2020    PROT 6.5 01/05/2020    LABALBU 3.4 01/05/2020    BILITOT 0.13 01/05/2020    ALKPHOS 87 01/05/2020    AST 29 01/05/2020    ALT 15 01/05/2020      Hepatic:   Lab Results   Component Value Date    AST 29 01/05/2020    AST 29 01/04/2020    ALT 15 01/05/2020    ALT 15 01/04/2020    BILITOT 0.13 (L) 01/05/2020    BILITOT 0.14 (L) 01/04/2020    ALKPHOS 87 01/05/2020    ALKPHOS 96 01/04/2020     BNP:   Lab Results   Component Value Date    BNP 39 12/12/2012      Phosphorus:    Lab Results   Component Value Date    PHOS 2.4 01/08/2020     Ionized Calcium: No results found for: IONCA  Magnesium:   Lab Results   Component Value Date    MG 1.4 01/08/2020     Albumin:   Lab Results   Component Value Date    LABALBU 3.4 01/05/2020     Last 3 CK, CKMB, Troponin: @LABRCNT(CKTOTAL:3,CKMB:3,TROPONINI:3)       URINE:)No results found for: Rei Reyes     Radiology:

## 2020-01-08 NOTE — PLAN OF CARE
Problem: Falls - Risk of:  Goal: Will remain free from falls  Description  Will remain free from falls  1/8/2020 0315 by Karissa Hale RN  Outcome: Ongoing  1/7/2020 1335 by Jasson Tatum RN  Outcome: Ongoing  Note:   Patient is a fall risk during this admission. Fall risk assessment was performed. Patient is absent of falls. Bed is in the lowest position. Wheels on the bed are locked. Call light and bed side table are within reach. Clutter is removed. Patient was educated to call out when needing assistance or wanting to get out of bed. Patient offered toileting assistance during rounding. Hourly rounds have been performed. Pt stronger and up in the room with steady gait  Goal: Absence of physical injury  Description  Absence of physical injury  Outcome: Ongoing     Problem: Pain:  Goal: Pain level will decrease  Description  Pain level will decrease  1/8/2020 0315 by Karissa Hale RN  Outcome: Ongoing  1/7/2020 1335 by Jasson Tatum RN  Outcome: Ongoing  Note:   Pain level assessment complete. Patient educated on pain scale and control interventions  PRN pain medication given per patient request  Patient instructed to call out with new onset of pain or unrelieved pain , denies complaints of pain  Goal: Control of acute pain  Description  Control of acute pain  Outcome: Ongoing  Goal: Control of chronic pain  Description  Control of chronic pain  Outcome: Ongoing     Problem: Discharge Planning:  Goal: Discharged to appropriate level of care  Description  Discharged to appropriate level of care  1/8/2020 0315 by Karissa Hale RN  Outcome: Ongoing  1/7/2020 1335 by Jasson Tatum RN  Outcome: Ongoing  Note:   Pt will be going home  with follow up appts     Problem:  Activity Intolerance:  Goal: Ability to tolerate increased activity will improve  Description  Ability to tolerate increased activity will improve  1/8/2020 0315 by Karissa Hale RN  Outcome: Ongoing  1/7/2020 1335 by Jasson Tatum

## 2020-01-08 NOTE — PROGRESS NOTES
Subjective:     COPD exacerbation  Less shortness of breath less cough no fever no chills no tachypnea no wheezing    ROS  No fever no chills no GI/ complaints no cardiac complaints no TIA no bleeding no headache no sore throat no skin lesions except tracheostomy, no polyuria no polydipsia no hypoglycemia  physical exam  General Appearance: alert and oriented to person, place and time and in no acute distress  Skin: warm and dry, no rash or erythema  Head: normocephalic and atraumatic  Eyes: pupils equal, round, and reactive to light and sclera anicteric     Neck: neck supple and non tender without mass tracheostomy  Pulmonary/Chest: Lungs air entry equal clear to auscultation prolonged expiratory phase no crackles no use of intercostal muscles  Cardiovascular: normal rate, regular rhythm, normal S1 and S2, no gallops, intact distal pulses and no carotid bruits  Abdomen: soft, non-tender, non-distended, normal bowel sounds, no masses or organomegaly  Extremities: no edema and pulses no Homans  Neurologic: Alert oriented x3 with no focal deficit    BP (!) 155/85   Pulse 76   Temp 98.2 °F (36.8 °C) (Oral)   Resp 16   Ht 5' 6\" (1.676 m)   Wt 148 lb 12.8 oz (67.5 kg)   SpO2 98%   BMI 24.02 kg/m²     CBC:   Lab Results   Component Value Date    WBC 7.2 01/07/2020    RBC 3.40 01/07/2020    HGB 9.6 01/07/2020    HCT 30.0 01/07/2020    MCV 88.2 01/07/2020    MCH 28.2 01/07/2020    MCHC 32.0 01/07/2020    RDW 16.6 01/07/2020     01/07/2020    MPV 9.9 01/07/2020     BMP:    Lab Results   Component Value Date     01/07/2020    K 3.5 01/07/2020    CL 94 01/07/2020    CO2 22 01/07/2020    BUN 34 01/07/2020    LABALBU 3.4 01/05/2020    CREATININE 1.82 01/07/2020    CALCIUM 8.0 01/07/2020    GFRAA 45 01/07/2020    LABGLOM 37 01/07/2020    GLUCOSE 96 01/07/2020        Assessment:  Patient Active Problem List   Diagnosis    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

## 2020-01-08 NOTE — PROGRESS NOTES
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

## 2020-01-08 NOTE — PROGRESS NOTES
Discharge AVS reviewed with the pt and the brother, scripts given to the brother and he will try to get the filled tonight, discharged per wheelchair with brother

## 2020-01-08 NOTE — PLAN OF CARE
Problem: Falls - Risk of:  Goal: Will remain free from falls  Description  Will remain free from falls  1/8/2020 1134 by Hari Story RN  Outcome: Ongoing  Note:   Patient is a fall risk during this admission. Fall risk assessment was performed. Patient is absent of falls. Bed is in the lowest position. Wheels on the bed are locked. Call light and bed side table are within reach. Clutter is removed. Patient was educated to call out when needing assistance or wanting to get out of bed. Patient offered toileting assistance during rounding. Hourly rounds have been performed. Fall precautions in place  1/8/2020 0315 by Karyna Contreras RN  Outcome: Ongoing  Goal: Absence of physical injury  Description  Absence of physical injury  1/8/2020 0315 by Karyna Contreras RN  Outcome: Ongoing     Problem: Pain:  Goal: Pain level will decrease  Description  Pain level will decrease  1/8/2020 1134 by Hari Story RN  Outcome: Ongoing  Note:   Pain level assessment complete. Patient educated on pain scale and control interventions  PRN pain medication given per patient request  Patient instructed to call out with new onset of pain or unrelieved pain , pt has denied complaints of pain  1/8/2020 0315 by Karyna Contreras RN  Outcome: Ongoing  Goal: Control of acute pain  Description  Control of acute pain  1/8/2020 0315 by Karyna Contreras RN  Outcome: Ongoing  Goal: Control of chronic pain  Description  Control of chronic pain  1/8/2020 0315 by Karyna Contreras RN  Outcome: Ongoing     Problem: Discharge Planning:  Goal: Discharged to appropriate level of care  Description  Discharged to appropriate level of care  1/8/2020 1134 by Hari Story RN  Outcome: Ongoing  1/8/2020 0315 by Karyna Contreras RN  Outcome: Ongoing     Problem:  Activity Intolerance:  Goal: Ability to tolerate increased activity will improve  Description  Ability to tolerate increased activity will improve  1/8/2020 1134 by Hari Story

## 2020-01-08 NOTE — PROGRESS NOTES
Learning About the Safe Use of Antibiotics  Introduction  Antibiotics are drugs used to kill bacteria. Bacteria can cause infections. These include strep throat, ear infections, and pneumonia. These medicines can't cure everything. They don't kill viruses or help with allergies. They don't help illnesses such as the common cold, the flu, or a runny nose. And they can cause side effects. There are many types of antibiotics. Your doctor will decide which one will work best for your infection. Examples include:  · Amoxicillin. · Cephalexin (Keflex). · Ciprofloxacin (Cipro). What are the possible side effects? Side effects can include:  · Nausea. · Diarrhea. · Skin rash. · Yeast infection. · A severe allergic reaction. It may cause itching, swelling, and breathing problems. This is rare. You may have other side effects or reactions not listed here. Check the information that comes with your medicine. Should you take antibiotics just in case? Don't take antibiotics when you don't need them. If you do that, they may not work when you do need them. Each time you take antibiotics, you are more likely to have some bacteria that survive and aren't killed by the medicine. Bacteria that don't die can change and become even harder to kill. These are called antibiotic-resistant bacteria. They can cause longer and more serious infections. To treat them, you may need different, stronger antibiotics that have more side effects and may cost more. So always ask your doctor if antibiotics are the best treatment. Explain that you do not want antibiotics unless you need them. Help protect the community  Using antibiotics when they're not needed leads to the development of antibiotic-resistant bacteria. These tougher bacteria can spread to family members, children, and coworkers. People in your community will have a risk of getting an infection that is harder to cure and that costs more to treat.   How can you take antibiotics safely? Be safe with medicine. Take your antibiotics as directed. Do not stop taking them just because you feel better. You need to take the full course of medicine. This will help make sure your infection is cured. It will also help prevent the growth of antibiotic-resistant bacteria. Always take the exact amount that the label says to take. If the label says to take the medicine at a certain time, follow those directions. You might feel better after you take an antibiotic for a few days. But it is important to keep taking it for as long as prescribed. That will help you get rid of those bacteria that are a bit stronger and that survive the first few days of treatment. Where can you learn more? Go to https://Webcom.[employer]. org and sign in to your "[employer]" account. Enter N973 in the [address] box to learn more about \"Learning About the Safe Use of Antibiotics. \"     If you do not have an account, please click on the \"Sign Up Now\" link. Current as of: March 3, 2017  Content Version: 11.3  © 4379-3802 [employer]. Care instructions adapted under license by [facility] ([facility] [place]). If you have questions about a medical condition or this instruction, always ask your healthcare professional. [address] any warranty or liability for your use of this information. Antibiotics are powerful drugs that are generally safe and very helpful in fighting disease, but there are times when antibiotics can actually be harmful. Antibiotics can have side effects, including allergic reactions and a potentially deadly diarrhea caused by the bacteria Clostridium difficile (C. diff). Antibiotics can also interfere with the action of other drugs a patient may be taking for another condition. These unintended reactions to antibiotics are called adverse drug events.    When someone takes an antibiotic that they do not need, they are needlessly exposed to the side effects of the drug and do not get any benefit from it. Moreover, taking an antibiotic when it is not needed can lead to the development of antibiotic resistance. When resistance develops, antibiotics may not be able to stop future infections. Every time someone takes an antibiotic they dont need, they increase their risk of developing a resistant infection in the future. Types of Adverse Drug Events Related to Antibiotics  Allergic Reactions  Every year, there are more than 140,000 emergency department visits for reactions to antibiotics. Almost four out of five (79%) emergency department visits for antibiotic-related adverse drug events are due to an allergic reaction. These reactions can range from mild rashes and itching to serious blistering skin reactions swelling of the face and throat, and breathing problems. Minimizing unnecessary antibiotic use is the best way to reduce the risk of adverse drug events from antibiotics. Patients should tell their doctors about any past drug reactions or allergies. C. difficile  C. difficile causes diarrhea linked to at least 14,000 [place] deaths each year. When a person takes antibiotics, good bacteria that protect against infection are destroyed for several months. During this time, patients can get sick from C. difficile picked up from contaminated surfaces or spread from a healthcare providers hands. Those most at risk are people, especially older adults, who take antibiotics and also get medical care. Take antibiotics exactly and only as prescribed. Drug Interactions and Side Effects  Antibiotics can interact with other drugs patients take, making those drugs or the antibiotics less effective. Some drug combinations can worsen the side effects of the antibiotic or other drug. Common side effects of antibiotics include nausea, diarrhea, and stomach pain. Sometimes these symptoms can lead to dehydration and other problems.  Patients should ask their doctors about drug interactions and the potential side effects of antibiotics.  The doctor should be told immediately if a patient has any side effects from antibiotics  Page last updated: February 24, 2017 Content source:   [facility] [employer] [facility] and [employer] [facility] Emerging and Zoonotic Infectious Diseases ([place])  [address] [facility], [facility]     This was given to the pt , reviewed with him

## 2020-01-10 LAB
CULTURE: NORMAL
Lab: NORMAL
SPECIMEN DESCRIPTION: NORMAL

## 2020-01-12 NOTE — DISCHARGE SUMMARY
25 mcg=1000 Units. Please double check dosages. Print      !! predniSONE (DELTASONE) 20 MG tablet Take 1.5 tablets by mouth daily for 3 days, Disp-5 tablet, R-0Print      !! predniSONE (DELTASONE) 10 MG tablet Take 2 tablets by mouth daily for 3 days, Disp-6 tablet, R-0Print      !! predniSONE (DELTASONE) 10 MG tablet Take 1 tablet by mouth daily for 3 days, Disp-3 tablet, R-0Print      !! predniSONE (DELTASONE) 5 MG tablet Take 1 tablet by mouth every 48 hours for 3 doses, Disp-3 tablet, R-0Print       !! - Potential duplicate medications found. Please discuss with provider. CONTINUE these medications which have CHANGED    Details   phosphorus (K PHOS NEUTRAL) [id number] MG tablet Take 1 tablet by mouth 2 times daily for 5 doses, Disp-5 tablet, R-0Print         CONTINUE these medications which have NOT CHANGED    Details   albuterol sulfate  (90 Base) MCG/ACT inhaler Inhale 2 puffs into the lungs every 6 hours as needed for Wheezing or Shortness of BreathHistorical Med      budesonide-formoterol (SYMBICORT) 160-4.5 MCG/ACT AERO Inhale 2 puffs into the lungs 2 times dailyHistorical Med      omeprazole (PRILOSEC) 20 MG delayed release capsule Take 20 mg by mouth every morning (before breakfast)Historical Med      dicyclomine (BENTYL) 10 MG capsule Take 1 capsule by mouth every 6 hours as needed (cramps), Disp-20 capsule, R-0Print      ferrous sulfate 325 (65 FE) MG tablet Take 325 mg by mouth daily (with breakfast)Historical Med      allopurinol (ZYLOPRIM) 300 MG tablet Take 300 mg by mouth daily.       amLODIPine (NORVASC) 10 MG tablet Take 10 mg by mouth daily Historical Med      docusate sodium (COLACE) 100 MG capsule Take 100 mg by mouth 2 times daily as needed for Constipation Historical Med         STOP taking these medications       vitamin B-12 (CYANOCOBALAMIN) 500 MCG tablet Comments:   Reason for Stopping:             Activity: 1500 cc fluid restriction  Diet: regular diet    Follow-up with pCP in 1 week. Nephrology in 2 to 3 weeks we will recheck his BMP during office visit advised against nephrotoxic drugs signed:   Kaveh Whaley MD  1/12/2020  2:21 PM

## 2020-02-25 RX ORDER — ASPIRIN 81 MG/1
81 TABLET, CHEWABLE ORAL ONCE
Status: CANCELLED | OUTPATIENT
Start: 2020-02-26

## 2020-02-25 RX ORDER — CEFUROXIME AXETIL 500 MG/1
500 TABLET ORAL 2 TIMES DAILY
Status: ON HOLD | COMMUNITY
End: 2020-03-04 | Stop reason: HOSPADM

## 2020-02-25 RX ORDER — SODIUM CHLORIDE 9 MG/ML
INJECTION, SOLUTION INTRAVENOUS CONTINUOUS
Status: CANCELLED | OUTPATIENT
Start: 2020-02-26

## 2020-02-25 RX ORDER — ACETAMINOPHEN 160 MG
1 TABLET,DISINTEGRATING ORAL DAILY
COMMUNITY

## 2020-02-26 ENCOUNTER — HOSPITAL ENCOUNTER (OUTPATIENT)
Dept: CARDIAC CATH/INVASIVE PROCEDURES | Age: 70
Discharge: HOME | End: 2020-02-26
Payer: MEDICARE

## 2020-03-03 ENCOUNTER — HOSPITAL ENCOUNTER (OUTPATIENT)
Age: 70
Setting detail: OBSERVATION
Discharge: HOME | DRG: 915 | End: 2020-03-04
Attending: INTERNAL MEDICINE | Admitting: INTERNAL MEDICINE
Payer: MEDICARE

## 2020-03-03 PROBLEM — R07.9 CHEST PAIN: Status: ACTIVE | Noted: 2020-03-03

## 2020-03-03 LAB
ALBUMIN SERPL-MCNC: 3.4 G/DL (ref 3.5–5.2)
ALBUMIN/GLOBULIN RATIO: ABNORMAL (ref 1–2.5)
ALP BLD-CCNC: 107 U/L (ref 40–129)
ALT SERPL-CCNC: 16 U/L (ref 5–41)
ANION GAP SERPL CALCULATED.3IONS-SCNC: 16 MMOL/L (ref 9–17)
AST SERPL-CCNC: 24 U/L
BILIRUB SERPL-MCNC: 0.35 MG/DL (ref 0.3–1.2)
BUN BLDV-MCNC: 21 MG/DL (ref 8–23)
BUN/CREAT BLD: 9 (ref 9–20)
CALCIUM SERPL-MCNC: 8.4 MG/DL (ref 8.6–10.4)
CHLORIDE BLD-SCNC: 96 MMOL/L (ref 98–107)
CO2: 17 MMOL/L (ref 20–31)
CREAT SERPL-MCNC: 2.37 MG/DL (ref 0.7–1.2)
GFR AFRICAN AMERICAN: 33 ML/MIN
GFR NON-AFRICAN AMERICAN: 28 ML/MIN
GFR SERPL CREATININE-BSD FRML MDRD: ABNORMAL ML/MIN/{1.73_M2}
GFR SERPL CREATININE-BSD FRML MDRD: ABNORMAL ML/MIN/{1.73_M2}
GLUCOSE BLD-MCNC: 90 MG/DL (ref 70–99)
HCT VFR BLD CALC: 33.1 % (ref 40.7–50.3)
HEMOGLOBIN: 10.9 G/DL (ref 13–17)
MCH RBC QN AUTO: 29.4 PG (ref 25.2–33.5)
MCHC RBC AUTO-ENTMCNC: 32.9 G/DL (ref 28.4–34.8)
MCV RBC AUTO: 89.2 FL (ref 82.6–102.9)
NRBC AUTOMATED: 0 PER 100 WBC
PDW BLD-RTO: 16.9 % (ref 11.8–14.4)
PLATELET # BLD: 216 K/UL (ref 138–453)
PMV BLD AUTO: 8.9 FL (ref 8.1–13.5)
POTASSIUM SERPL-SCNC: 3.8 MMOL/L (ref 3.7–5.3)
RBC # BLD: 3.71 M/UL (ref 4.21–5.77)
SODIUM BLD-SCNC: 129 MMOL/L (ref 135–144)
TOTAL PROTEIN: 7.3 G/DL (ref 6.4–8.3)
WBC # BLD: 5.1 K/UL (ref 3.5–11.3)

## 2020-03-03 PROCEDURE — G0379 DIRECT REFER HOSPITAL OBSERV: HCPCS

## 2020-03-03 PROCEDURE — 2580000003 HC RX 258: Performed by: NURSE PRACTITIONER

## 2020-03-03 PROCEDURE — 85027 COMPLETE CBC AUTOMATED: CPT

## 2020-03-03 PROCEDURE — 80053 COMPREHEN METABOLIC PANEL: CPT

## 2020-03-03 PROCEDURE — 6370000000 HC RX 637 (ALT 250 FOR IP): Performed by: NURSE PRACTITIONER

## 2020-03-03 PROCEDURE — 36415 COLL VENOUS BLD VENIPUNCTURE: CPT

## 2020-03-03 PROCEDURE — G0378 HOSPITAL OBSERVATION PER HR: HCPCS

## 2020-03-03 RX ORDER — SODIUM BICARBONATE 650 MG/1
650 TABLET ORAL 3 TIMES DAILY
Status: ON HOLD | COMMUNITY
End: 2020-06-06 | Stop reason: HOSPADM

## 2020-03-03 RX ORDER — MAGNESIUM OXIDE 400 MG/1
400 TABLET ORAL 2 TIMES DAILY
Status: ON HOLD | COMMUNITY
End: 2020-03-06

## 2020-03-03 RX ORDER — SODIUM CHLORIDE 9 MG/ML
INJECTION, SOLUTION INTRAVENOUS CONTINUOUS
Status: DISCONTINUED | OUTPATIENT
Start: 2020-03-03 | End: 2020-03-04 | Stop reason: HOSPADM

## 2020-03-03 RX ORDER — PANTOPRAZOLE SODIUM 40 MG/1
40 TABLET, DELAYED RELEASE ORAL
Status: DISCONTINUED | OUTPATIENT
Start: 2020-03-04 | End: 2020-03-03

## 2020-03-03 RX ORDER — MAGNESIUM OXIDE 400 MG/1
400 TABLET ORAL 2 TIMES DAILY
Status: DISCONTINUED | OUTPATIENT
Start: 2020-03-03 | End: 2020-03-04 | Stop reason: HOSPADM

## 2020-03-03 RX ORDER — CEPHALEXIN 500 MG/1
500 CAPSULE ORAL 2 TIMES DAILY
Status: DISCONTINUED | OUTPATIENT
Start: 2020-03-03 | End: 2020-03-04 | Stop reason: HOSPADM

## 2020-03-03 RX ORDER — ACETAMINOPHEN 160 MG
1 TABLET,DISINTEGRATING ORAL DAILY
Status: DISCONTINUED | OUTPATIENT
Start: 2020-03-03 | End: 2020-03-04 | Stop reason: HOSPADM

## 2020-03-03 RX ORDER — BUDESONIDE AND FORMOTEROL FUMARATE DIHYDRATE 160; 4.5 UG/1; UG/1
2 AEROSOL RESPIRATORY (INHALATION) 2 TIMES DAILY
Status: DISCONTINUED | OUTPATIENT
Start: 2020-03-03 | End: 2020-03-04 | Stop reason: HOSPADM

## 2020-03-03 RX ORDER — ALBUTEROL SULFATE 2.5 MG/3ML
2.5 SOLUTION RESPIRATORY (INHALATION)
Status: DISCONTINUED | OUTPATIENT
Start: 2020-03-03 | End: 2020-03-03

## 2020-03-03 RX ORDER — ALBUTEROL SULFATE 90 UG/1
2 AEROSOL, METERED RESPIRATORY (INHALATION) EVERY 6 HOURS PRN
Status: DISCONTINUED | OUTPATIENT
Start: 2020-03-03 | End: 2020-03-03

## 2020-03-03 RX ORDER — HYDRALAZINE HYDROCHLORIDE 25 MG/1
25 TABLET, FILM COATED ORAL 2 TIMES DAILY
Status: DISCONTINUED | OUTPATIENT
Start: 2020-03-03 | End: 2020-03-04 | Stop reason: HOSPADM

## 2020-03-03 RX ORDER — CEPHALEXIN 500 MG/1
500 CAPSULE ORAL 2 TIMES DAILY
Status: ON HOLD | COMMUNITY
End: 2020-03-08 | Stop reason: HOSPADM

## 2020-03-03 RX ORDER — OMEPRAZOLE 20 MG/1
20 CAPSULE, DELAYED RELEASE ORAL
Status: DISCONTINUED | OUTPATIENT
Start: 2020-03-04 | End: 2020-03-04 | Stop reason: HOSPADM

## 2020-03-03 RX ORDER — AMLODIPINE BESYLATE 10 MG/1
10 TABLET ORAL DAILY
Status: DISCONTINUED | OUTPATIENT
Start: 2020-03-03 | End: 2020-03-04 | Stop reason: HOSPADM

## 2020-03-03 RX ADMIN — SODIUM CHLORIDE: 9 INJECTION, SOLUTION INTRAVENOUS at 14:43

## 2020-03-03 RX ADMIN — HYDRALAZINE HYDROCHLORIDE 25 MG: 25 TABLET, FILM COATED ORAL at 21:36

## 2020-03-03 RX ADMIN — AMLODIPINE BESYLATE 10 MG: 10 TABLET ORAL at 14:51

## 2020-03-03 RX ADMIN — CEPHALEXIN 500 MG: 500 CAPSULE ORAL at 21:31

## 2020-03-03 RX ADMIN — CEPHALEXIN 500 MG: 500 CAPSULE ORAL at 14:51

## 2020-03-03 ASSESSMENT — PAIN SCALES - GENERAL
PAINLEVEL_OUTOF10: 0
PAINLEVEL_OUTOF10: 0

## 2020-03-03 NOTE — FLOWSHEET NOTE
Pt admitted to room 2040. Oriented to room, call light and bed mechanics. Side rails up x2. Call light within reach. Assessment in progress and data base started. Updated family(brother Bert) at bedside and questions answered.

## 2020-03-03 NOTE — H&P
2.3    Impression and Plan      1. Essential (primary) hypertension (I10)  His blood pressure is elevated. At this time, he is to continue on his current medications. Consider increasing medication after cardiac cath    Preop CV clearance (Z01.810)  He is in need of surgical clearance for iliac artery aneurysm repair. He has risk factors for CAD and cardiac catheterization was scheduled. Unfortunately, his creatinine/BUN were elevated . He will be rescheduled for this coming Wednesday, however he will be admitted on Tuesday for hydration. He understands the procedure, risks, benefits, and routine post procedure care. I will follow up with the patient after the procedure is complete.

## 2020-03-03 NOTE — CARE COORDINATION
Follow cath results for any needs.          Electronically signed by Adam Pedersen RN on 3/3/20 at 12:27 PM

## 2020-03-04 ENCOUNTER — HOSPITAL ENCOUNTER (OUTPATIENT)
Dept: CARDIAC CATH/INVASIVE PROCEDURES | Age: 70
Discharge: HOME | DRG: 915 | End: 2020-03-04
Attending: INTERNAL MEDICINE
Payer: MEDICARE

## 2020-03-04 VITALS
HEART RATE: 88 BPM | RESPIRATION RATE: 18 BRPM | OXYGEN SATURATION: 93 % | SYSTOLIC BLOOD PRESSURE: 146 MMHG | DIASTOLIC BLOOD PRESSURE: 86 MMHG | WEIGHT: 149.7 LBS | BODY MASS INDEX: 24.16 KG/M2 | TEMPERATURE: 98.3 F

## 2020-03-04 LAB
ABSOLUTE EOS #: 0.08 K/UL (ref 0–0.44)
ABSOLUTE IMMATURE GRANULOCYTE: 0.01 K/UL (ref 0–0.3)
ABSOLUTE LYMPH #: 1.08 K/UL (ref 1.1–3.7)
ABSOLUTE MONO #: 0.71 K/UL (ref 0.1–1.2)
ANION GAP SERPL CALCULATED.3IONS-SCNC: 14 MMOL/L (ref 9–17)
BASOPHILS # BLD: 1 % (ref 0–2)
BASOPHILS ABSOLUTE: 0.03 K/UL (ref 0–0.2)
BUN BLDV-MCNC: 21 MG/DL (ref 8–23)
BUN/CREAT BLD: 9 (ref 9–20)
CALCIUM SERPL-MCNC: 7.9 MG/DL (ref 8.6–10.4)
CHLORIDE BLD-SCNC: 103 MMOL/L (ref 98–107)
CO2: 17 MMOL/L (ref 20–31)
CREAT SERPL-MCNC: 2.38 MG/DL (ref 0.7–1.2)
DIFFERENTIAL TYPE: ABNORMAL
EOSINOPHILS RELATIVE PERCENT: 2 % (ref 1–4)
GFR AFRICAN AMERICAN: 33 ML/MIN
GFR NON-AFRICAN AMERICAN: 27 ML/MIN
GFR SERPL CREATININE-BSD FRML MDRD: ABNORMAL ML/MIN/{1.73_M2}
GFR SERPL CREATININE-BSD FRML MDRD: ABNORMAL ML/MIN/{1.73_M2}
GLUCOSE BLD-MCNC: 90 MG/DL (ref 70–99)
HCT VFR BLD CALC: 28.9 % (ref 40.7–50.3)
HEMOGLOBIN: 9.6 G/DL (ref 13–17)
IMMATURE GRANULOCYTES: 0 %
LYMPHOCYTES # BLD: 22 % (ref 24–43)
MCH RBC QN AUTO: 29.4 PG (ref 25.2–33.5)
MCHC RBC AUTO-ENTMCNC: 33.2 G/DL (ref 28.4–34.8)
MCV RBC AUTO: 88.4 FL (ref 82.6–102.9)
MONOCYTES # BLD: 15 % (ref 3–12)
NRBC AUTOMATED: 0 PER 100 WBC
PDW BLD-RTO: 17.2 % (ref 11.8–14.4)
PLATELET # BLD: 225 K/UL (ref 138–453)
PLATELET ESTIMATE: ABNORMAL
PMV BLD AUTO: 10.1 FL (ref 8.1–13.5)
POTASSIUM SERPL-SCNC: 4.1 MMOL/L (ref 3.7–5.3)
RBC # BLD: 3.27 M/UL (ref 4.21–5.77)
RBC # BLD: ABNORMAL 10*6/UL
SEG NEUTROPHILS: 60 % (ref 36–65)
SEGMENTED NEUTROPHILS ABSOLUTE COUNT: 2.96 K/UL (ref 1.5–8.1)
SODIUM BLD-SCNC: 134 MMOL/L (ref 135–144)
WBC # BLD: 4.9 K/UL (ref 3.5–11.3)
WBC # BLD: ABNORMAL 10*3/UL

## 2020-03-04 PROCEDURE — 2500000003 HC RX 250 WO HCPCS

## 2020-03-04 PROCEDURE — C1894 INTRO/SHEATH, NON-LASER: HCPCS

## 2020-03-04 PROCEDURE — 4A023N7 MEASUREMENT OF CARDIAC SAMPLING AND PRESSURE, LEFT HEART, PERCUTANEOUS APPROACH: ICD-10-PCS | Performed by: INTERNAL MEDICINE

## 2020-03-04 PROCEDURE — G0378 HOSPITAL OBSERVATION PER HR: HCPCS

## 2020-03-04 PROCEDURE — 6360000002 HC RX W HCPCS

## 2020-03-04 PROCEDURE — 93458 L HRT ARTERY/VENTRICLE ANGIO: CPT | Performed by: INTERNAL MEDICINE

## 2020-03-04 PROCEDURE — 80048 BASIC METABOLIC PNL TOTAL CA: CPT

## 2020-03-04 PROCEDURE — 85025 COMPLETE CBC W/AUTO DIFF WBC: CPT

## 2020-03-04 PROCEDURE — 36415 COLL VENOUS BLD VENIPUNCTURE: CPT

## 2020-03-04 PROCEDURE — B2111ZZ FLUOROSCOPY OF MULTIPLE CORONARY ARTERIES USING LOW OSMOLAR CONTRAST: ICD-10-PCS | Performed by: INTERNAL MEDICINE

## 2020-03-04 PROCEDURE — 6360000004 HC RX CONTRAST MEDICATION

## 2020-03-04 PROCEDURE — 2709999900 HC NON-CHARGEABLE SUPPLY

## 2020-03-04 RX ORDER — SODIUM CHLORIDE 0.9 % (FLUSH) 0.9 %
10 SYRINGE (ML) INJECTION PRN
Status: DISCONTINUED | OUTPATIENT
Start: 2020-03-04 | End: 2020-03-04 | Stop reason: HOSPADM

## 2020-03-04 RX ORDER — ACETAMINOPHEN 325 MG/1
650 TABLET ORAL EVERY 4 HOURS PRN
Status: DISCONTINUED | OUTPATIENT
Start: 2020-03-04 | End: 2020-03-04 | Stop reason: HOSPADM

## 2020-03-04 RX ORDER — SODIUM CHLORIDE 0.9 % (FLUSH) 0.9 %
10 SYRINGE (ML) INJECTION EVERY 12 HOURS SCHEDULED
Status: DISCONTINUED | OUTPATIENT
Start: 2020-03-04 | End: 2020-03-04 | Stop reason: HOSPADM

## 2020-03-04 RX ORDER — ALBUTEROL SULFATE 2.5 MG/3ML
2.5 SOLUTION RESPIRATORY (INHALATION) EVERY 6 HOURS PRN
Status: DISCONTINUED | OUTPATIENT
Start: 2020-03-04 | End: 2020-03-04 | Stop reason: HOSPADM

## 2020-03-04 RX ORDER — ATROPINE SULFATE 0.1 MG/ML
INJECTION INTRAVENOUS
Status: DISCONTINUED
Start: 2020-03-04 | End: 2020-03-04 | Stop reason: HOSPADM

## 2020-03-04 RX ORDER — HYDRALAZINE HYDROCHLORIDE 50 MG/1
50 TABLET, FILM COATED ORAL 2 TIMES DAILY
Qty: 60 TABLET | Refills: 3 | Status: ON HOLD | OUTPATIENT
Start: 2020-03-04 | End: 2020-03-08 | Stop reason: HOSPADM

## 2020-03-04 RX ADMIN — AMLODIPINE BESYLATE 10 MG: 10 TABLET ORAL at 09:21

## 2020-03-04 ASSESSMENT — PAIN SCALES - GENERAL
PAINLEVEL_OUTOF10: 0

## 2020-03-04 NOTE — PROGRESS NOTES
Dr. Wisam Ghosh notified of order for discharge. MD chery with patient discharge. No further orders.

## 2020-03-04 NOTE — PROGRESS NOTES
Patient arrived to unit from cath lab at this time. No complications noted to site at this time and distal pulses remain palpable. Pt denies all pain and complaints at this time. See flowsheets for further info. Writer will continue to monitor.

## 2020-03-04 NOTE — PROGRESS NOTES
Discharge Note:      All discharge instructions given at this time as well as all patient belongings returned to patient. Pt denies any further questions regarding discharge at this time. Pt given also given  discharge instructions/restrictions and medication handouts regarding all discharge medications and side effects. Pt denies any further issues at this time. Pt wheeled out to front discharge doors at this time. Pt left premises without any issues in private vehicle at this time.

## 2020-03-05 ENCOUNTER — HOSPITAL ENCOUNTER (INPATIENT)
Age: 70
LOS: 3 days | Discharge: HOME | DRG: 915 | End: 2020-03-08
Attending: EMERGENCY MEDICINE | Admitting: INTERNAL MEDICINE
Payer: MEDICARE

## 2020-03-05 ENCOUNTER — APPOINTMENT (OUTPATIENT)
Dept: CT IMAGING | Age: 70
DRG: 915 | End: 2020-03-05
Payer: MEDICARE

## 2020-03-05 PROBLEM — R60.0 FACIAL EDEMA: Status: ACTIVE | Noted: 2020-03-05

## 2020-03-05 LAB
ABSOLUTE EOS #: 0.2 K/UL (ref 0–0.44)
ABSOLUTE IMMATURE GRANULOCYTE: 0.02 K/UL (ref 0–0.3)
ABSOLUTE LYMPH #: 2.07 K/UL (ref 1.1–3.7)
ABSOLUTE MONO #: 0.47 K/UL (ref 0.1–1.2)
ALBUMIN SERPL-MCNC: 3.3 G/DL (ref 3.5–5.2)
ALBUMIN/GLOBULIN RATIO: ABNORMAL (ref 1–2.5)
ALP BLD-CCNC: 87 U/L (ref 40–129)
ALT SERPL-CCNC: 13 U/L (ref 5–41)
ANION GAP SERPL CALCULATED.3IONS-SCNC: 16 MMOL/L (ref 9–17)
AST SERPL-CCNC: 19 U/L
BASOPHILS # BLD: 1 % (ref 0–2)
BASOPHILS ABSOLUTE: 0.03 K/UL (ref 0–0.2)
BILIRUB SERPL-MCNC: <0.1 MG/DL (ref 0.3–1.2)
BUN BLDV-MCNC: 15 MG/DL (ref 8–23)
BUN/CREAT BLD: 6 (ref 9–20)
CALCIUM SERPL-MCNC: 7.7 MG/DL (ref 8.6–10.4)
CHLORIDE BLD-SCNC: 94 MMOL/L (ref 98–107)
CO2: 15 MMOL/L (ref 20–31)
CREAT SERPL-MCNC: 2.35 MG/DL (ref 0.7–1.2)
DIFFERENTIAL TYPE: ABNORMAL
EOSINOPHILS RELATIVE PERCENT: 4 % (ref 1–4)
FIO2: ABNORMAL
GFR AFRICAN AMERICAN: 34 ML/MIN
GFR NON-AFRICAN AMERICAN: 28 ML/MIN
GFR SERPL CREATININE-BSD FRML MDRD: ABNORMAL ML/MIN/{1.73_M2}
GFR SERPL CREATININE-BSD FRML MDRD: ABNORMAL ML/MIN/{1.73_M2}
GLUCOSE BLD-MCNC: 84 MG/DL (ref 70–99)
HCO3 VENOUS: 15.8 MMOL/L (ref 24–30)
HCT VFR BLD CALC: 28.4 % (ref 40.7–50.3)
HEMOGLOBIN: 9.2 G/DL (ref 13–17)
IMMATURE GRANULOCYTES: 0 %
LYMPHOCYTES # BLD: 36 % (ref 24–43)
MCH RBC QN AUTO: 29 PG (ref 25.2–33.5)
MCHC RBC AUTO-ENTMCNC: 32.4 G/DL (ref 28.4–34.8)
MCV RBC AUTO: 89.6 FL (ref 82.6–102.9)
MONOCYTES # BLD: 8 % (ref 3–12)
NEGATIVE BASE EXCESS, VEN: 9 (ref 0–2)
NRBC AUTOMATED: 0 PER 100 WBC
O2 DEVICE/FLOW/%: ABNORMAL
O2 SAT, VEN: 89 %
PATIENT TEMP: ABNORMAL
PCO2, VEN: 27 MM HG (ref 39–55)
PDW BLD-RTO: 17.2 % (ref 11.8–14.4)
PH VENOUS: 7.38 (ref 7.32–7.42)
PLATELET # BLD: 221 K/UL (ref 138–453)
PLATELET ESTIMATE: ABNORMAL
PMV BLD AUTO: 9.2 FL (ref 8.1–13.5)
PO2, VEN: 56 MM HG (ref 30–50)
POC PCO2 TEMP: ABNORMAL MM HG
POC PH TEMP: ABNORMAL
POC PO2 TEMP: ABNORMAL MM HG
POSITIVE BASE EXCESS, VEN: ABNORMAL (ref 0–2)
POTASSIUM SERPL-SCNC: 4 MMOL/L (ref 3.7–5.3)
RBC # BLD: 3.17 M/UL (ref 4.21–5.77)
RBC # BLD: ABNORMAL 10*6/UL
SEG NEUTROPHILS: 51 % (ref 36–65)
SEGMENTED NEUTROPHILS ABSOLUTE COUNT: 2.99 K/UL (ref 1.5–8.1)
SODIUM BLD-SCNC: 125 MMOL/L (ref 135–144)
TOTAL CO2, VENOUS: 17 MMOL/L (ref 25–31)
TOTAL PROTEIN: 6.4 G/DL (ref 6.4–8.3)
WBC # BLD: 5.8 K/UL (ref 3.5–11.3)
WBC # BLD: ABNORMAL 10*3/UL

## 2020-03-05 PROCEDURE — 70490 CT SOFT TISSUE NECK W/O DYE: CPT

## 2020-03-05 PROCEDURE — 82803 BLOOD GASES ANY COMBINATION: CPT

## 2020-03-05 PROCEDURE — 6360000002 HC RX W HCPCS: Performed by: EMERGENCY MEDICINE

## 2020-03-05 PROCEDURE — 85025 COMPLETE CBC W/AUTO DIFF WBC: CPT

## 2020-03-05 PROCEDURE — 2060000000 HC ICU INTERMEDIATE R&B

## 2020-03-05 PROCEDURE — 80053 COMPREHEN METABOLIC PANEL: CPT

## 2020-03-05 PROCEDURE — 82805 BLOOD GASES W/O2 SATURATION: CPT

## 2020-03-05 PROCEDURE — 99284 EMERGENCY DEPT VISIT MOD MDM: CPT

## 2020-03-05 PROCEDURE — 36415 COLL VENOUS BLD VENIPUNCTURE: CPT

## 2020-03-05 RX ORDER — METHYLPREDNISOLONE SODIUM SUCCINATE 40 MG/ML
40 INJECTION, POWDER, LYOPHILIZED, FOR SOLUTION INTRAMUSCULAR; INTRAVENOUS ONCE
Status: COMPLETED | OUTPATIENT
Start: 2020-03-05 | End: 2020-03-05

## 2020-03-05 RX ADMIN — METHYLPREDNISOLONE SODIUM SUCCINATE 40 MG: 40 INJECTION, POWDER, FOR SOLUTION INTRAMUSCULAR; INTRAVENOUS at 23:45

## 2020-03-05 ASSESSMENT — PAIN SCALES - GENERAL: PAINLEVEL_OUTOF10: 10

## 2020-03-05 ASSESSMENT — ENCOUNTER SYMPTOMS
SHORTNESS OF BREATH: 1
TROUBLE SWALLOWING: 1

## 2020-03-06 ENCOUNTER — APPOINTMENT (OUTPATIENT)
Dept: GENERAL RADIOLOGY | Age: 70
DRG: 915 | End: 2020-03-06
Payer: MEDICARE

## 2020-03-06 LAB
-: NORMAL
ABSOLUTE EOS #: 0 K/UL (ref 0–0.4)
ABSOLUTE IMMATURE GRANULOCYTE: 0 K/UL (ref 0–0.3)
ABSOLUTE LYMPH #: 0.6 K/UL (ref 1–4.8)
ABSOLUTE MONO #: 0.04 K/UL (ref 0.2–0.8)
AMORPHOUS: NORMAL
ANION GAP SERPL CALCULATED.3IONS-SCNC: 15 MMOL/L (ref 9–17)
BACTERIA: NORMAL
BASOPHILS # BLD: 0 %
BASOPHILS ABSOLUTE: 0 K/UL (ref 0–0.2)
BILIRUBIN URINE: NEGATIVE
BUN BLDV-MCNC: 15 MG/DL (ref 8–23)
BUN/CREAT BLD: 6 (ref 9–20)
C-REACTIVE PROTEIN: 6.1 MG/L (ref 0–5)
CALCIUM SERPL-MCNC: 7.8 MG/DL (ref 8.6–10.4)
CASTS UA: NORMAL /LPF
CHLORIDE BLD-SCNC: 97 MMOL/L (ref 98–107)
CO2: 14 MMOL/L (ref 20–31)
COLOR: YELLOW
COMMENT UA: ABNORMAL
CREAT SERPL-MCNC: 2.38 MG/DL (ref 0.7–1.2)
CRYSTALS, UA: NORMAL /HPF
DIFFERENTIAL TYPE: ABNORMAL
EOSINOPHILS RELATIVE PERCENT: 0 % (ref 1–4)
EPITHELIAL CELLS UA: NORMAL /HPF (ref 0–5)
GFR AFRICAN AMERICAN: 33 ML/MIN
GFR NON-AFRICAN AMERICAN: 27 ML/MIN
GFR SERPL CREATININE-BSD FRML MDRD: ABNORMAL ML/MIN/{1.73_M2}
GFR SERPL CREATININE-BSD FRML MDRD: ABNORMAL ML/MIN/{1.73_M2}
GLUCOSE BLD-MCNC: 108 MG/DL (ref 70–99)
GLUCOSE BLD-MCNC: 158 MG/DL (ref 75–110)
GLUCOSE URINE: NEGATIVE
HCT VFR BLD CALC: 29.3 % (ref 40.7–50.3)
HEMOGLOBIN: 9.5 G/DL (ref 13–17)
IMMATURE GRANULOCYTES: 0 %
KETONES, URINE: NEGATIVE
LEUKOCYTE ESTERASE, URINE: NEGATIVE
LYMPHOCYTES # BLD: 17 % (ref 24–44)
MCH RBC QN AUTO: 29.3 PG (ref 25.2–33.5)
MCHC RBC AUTO-ENTMCNC: 32.4 G/DL (ref 28.4–34.8)
MCV RBC AUTO: 90.4 FL (ref 82.6–102.9)
MONOCYTES # BLD: 1 % (ref 1–7)
MUCUS: NORMAL
NITRITE, URINE: NEGATIVE
NRBC AUTOMATED: 0 PER 100 WBC
OTHER OBSERVATIONS UA: NORMAL
PDW BLD-RTO: 17.2 % (ref 11.8–14.4)
PH UA: 6 (ref 5–8)
PLATELET # BLD: 232 K/UL (ref 138–453)
PLATELET ESTIMATE: ABNORMAL
PMV BLD AUTO: 9.4 FL (ref 8.1–13.5)
POTASSIUM SERPL-SCNC: 4.3 MMOL/L (ref 3.7–5.3)
PROTEIN UA: ABNORMAL
RBC # BLD: 3.24 M/UL (ref 4.21–5.77)
RBC # BLD: ABNORMAL 10*6/UL
RBC UA: NORMAL /HPF (ref 0–2)
RENAL EPITHELIAL, UA: NORMAL /HPF
SEDIMENTATION RATE, ERYTHROCYTE: 74 MM (ref 0–10)
SEDIMENTATION RATE, ERYTHROCYTE: 82 MM (ref 0–10)
SEG NEUTROPHILS: 82 % (ref 36–66)
SEGMENTED NEUTROPHILS ABSOLUTE COUNT: 2.86 K/UL (ref 1.8–7.7)
SODIUM BLD-SCNC: 126 MMOL/L (ref 135–144)
SPECIFIC GRAVITY UA: 1.01 (ref 1–1.03)
TRICHOMONAS: NORMAL
TURBIDITY: CLEAR
URINE HGB: ABNORMAL
UROBILINOGEN, URINE: NORMAL
WBC # BLD: 3.5 K/UL (ref 3.5–11.3)
WBC # BLD: ABNORMAL 10*3/UL
WBC UA: NORMAL /HPF (ref 0–5)
YEAST: NORMAL

## 2020-03-06 PROCEDURE — 85651 RBC SED RATE NONAUTOMATED: CPT

## 2020-03-06 PROCEDURE — 92611 MOTION FLUOROSCOPY/SWALLOW: CPT

## 2020-03-06 PROCEDURE — 36415 COLL VENOUS BLD VENIPUNCTURE: CPT

## 2020-03-06 PROCEDURE — 94761 N-INVAS EAR/PLS OXIMETRY MLT: CPT

## 2020-03-06 PROCEDURE — 81001 URINALYSIS AUTO W/SCOPE: CPT

## 2020-03-06 PROCEDURE — 71045 X-RAY EXAM CHEST 1 VIEW: CPT

## 2020-03-06 PROCEDURE — 86140 C-REACTIVE PROTEIN: CPT

## 2020-03-06 PROCEDURE — 85025 COMPLETE CBC W/AUTO DIFF WBC: CPT

## 2020-03-06 PROCEDURE — 6370000000 HC RX 637 (ALT 250 FOR IP): Performed by: INTERNAL MEDICINE

## 2020-03-06 PROCEDURE — 2060000000 HC ICU INTERMEDIATE R&B

## 2020-03-06 PROCEDURE — 84145 PROCALCITONIN (PCT): CPT

## 2020-03-06 PROCEDURE — 94640 AIRWAY INHALATION TREATMENT: CPT

## 2020-03-06 PROCEDURE — 80048 BASIC METABOLIC PNL TOTAL CA: CPT

## 2020-03-06 PROCEDURE — 6360000002 HC RX W HCPCS: Performed by: INTERNAL MEDICINE

## 2020-03-06 PROCEDURE — 2580000003 HC RX 258: Performed by: INTERNAL MEDICINE

## 2020-03-06 PROCEDURE — 83036 HEMOGLOBIN GLYCOSYLATED A1C: CPT

## 2020-03-06 PROCEDURE — 82947 ASSAY GLUCOSE BLOOD QUANT: CPT

## 2020-03-06 PROCEDURE — 74230 X-RAY XM SWLNG FUNCJ C+: CPT

## 2020-03-06 RX ORDER — SODIUM CHLORIDE 0.9 % (FLUSH) 0.9 %
10 SYRINGE (ML) INJECTION EVERY 12 HOURS SCHEDULED
Status: DISCONTINUED | OUTPATIENT
Start: 2020-03-06 | End: 2020-03-08 | Stop reason: HOSPADM

## 2020-03-06 RX ORDER — ALBUTEROL SULFATE 90 UG/1
2 AEROSOL, METERED RESPIRATORY (INHALATION) EVERY 6 HOURS PRN
Status: DISCONTINUED | OUTPATIENT
Start: 2020-03-06 | End: 2020-03-08 | Stop reason: HOSPADM

## 2020-03-06 RX ORDER — AMLODIPINE BESYLATE 10 MG/1
10 TABLET ORAL DAILY
Status: DISCONTINUED | OUTPATIENT
Start: 2020-03-06 | End: 2020-03-07

## 2020-03-06 RX ORDER — ALBUTEROL SULFATE 2.5 MG/3ML
2.5 SOLUTION RESPIRATORY (INHALATION) EVERY 6 HOURS PRN
Status: DISCONTINUED | OUTPATIENT
Start: 2020-03-06 | End: 2020-03-08 | Stop reason: HOSPADM

## 2020-03-06 RX ORDER — SODIUM BICARBONATE 650 MG/1
650 TABLET ORAL 3 TIMES DAILY
Status: DISCONTINUED | OUTPATIENT
Start: 2020-03-06 | End: 2020-03-08 | Stop reason: HOSPADM

## 2020-03-06 RX ORDER — BUDESONIDE AND FORMOTEROL FUMARATE DIHYDRATE 160; 4.5 UG/1; UG/1
2 AEROSOL RESPIRATORY (INHALATION) 2 TIMES DAILY
Status: DISCONTINUED | OUTPATIENT
Start: 2020-03-06 | End: 2020-03-08 | Stop reason: HOSPADM

## 2020-03-06 RX ORDER — VITAMIN B COMPLEX
2000 TABLET ORAL DAILY
Status: DISCONTINUED | OUTPATIENT
Start: 2020-03-06 | End: 2020-03-08 | Stop reason: HOSPADM

## 2020-03-06 RX ORDER — HYDRALAZINE HYDROCHLORIDE 50 MG/1
50 TABLET, FILM COATED ORAL 2 TIMES DAILY
Status: DISCONTINUED | OUTPATIENT
Start: 2020-03-06 | End: 2020-03-07

## 2020-03-06 RX ORDER — DEXTROSE MONOHYDRATE 50 MG/ML
100 INJECTION, SOLUTION INTRAVENOUS PRN
Status: DISCONTINUED | OUTPATIENT
Start: 2020-03-06 | End: 2020-03-08 | Stop reason: HOSPADM

## 2020-03-06 RX ORDER — DEXTROSE MONOHYDRATE 25 G/50ML
12.5 INJECTION, SOLUTION INTRAVENOUS PRN
Status: DISCONTINUED | OUTPATIENT
Start: 2020-03-06 | End: 2020-03-08 | Stop reason: HOSPADM

## 2020-03-06 RX ORDER — METHYLPREDNISOLONE SODIUM SUCCINATE 40 MG/ML
40 INJECTION, POWDER, LYOPHILIZED, FOR SOLUTION INTRAMUSCULAR; INTRAVENOUS EVERY 6 HOURS
Status: DISCONTINUED | OUTPATIENT
Start: 2020-03-06 | End: 2020-03-08 | Stop reason: HOSPADM

## 2020-03-06 RX ORDER — HEPARIN SODIUM 5000 [USP'U]/ML
5000 INJECTION, SOLUTION INTRAVENOUS; SUBCUTANEOUS EVERY 8 HOURS SCHEDULED
Status: DISCONTINUED | OUTPATIENT
Start: 2020-03-07 | End: 2020-03-08 | Stop reason: HOSPADM

## 2020-03-06 RX ORDER — SODIUM CHLORIDE 0.9 % (FLUSH) 0.9 %
10 SYRINGE (ML) INJECTION PRN
Status: DISCONTINUED | OUTPATIENT
Start: 2020-03-06 | End: 2020-03-08 | Stop reason: HOSPADM

## 2020-03-06 RX ORDER — ACETAMINOPHEN 325 MG/1
650 TABLET ORAL EVERY 4 HOURS PRN
Status: DISCONTINUED | OUTPATIENT
Start: 2020-03-06 | End: 2020-03-08 | Stop reason: HOSPADM

## 2020-03-06 RX ORDER — ALLOPURINOL 300 MG/1
300 TABLET ORAL DAILY
Status: DISCONTINUED | OUTPATIENT
Start: 2020-03-06 | End: 2020-03-08 | Stop reason: HOSPADM

## 2020-03-06 RX ORDER — DIPHENHYDRAMINE HYDROCHLORIDE 50 MG/ML
25 INJECTION INTRAMUSCULAR; INTRAVENOUS EVERY 6 HOURS PRN
Status: DISCONTINUED | OUTPATIENT
Start: 2020-03-06 | End: 2020-03-08 | Stop reason: HOSPADM

## 2020-03-06 RX ORDER — NICOTINE POLACRILEX 4 MG
15 LOZENGE BUCCAL PRN
Status: DISCONTINUED | OUTPATIENT
Start: 2020-03-06 | End: 2020-03-08 | Stop reason: HOSPADM

## 2020-03-06 RX ADMIN — SODIUM CHLORIDE, PRESERVATIVE FREE 10 ML: 5 INJECTION INTRAVENOUS at 12:46

## 2020-03-06 RX ADMIN — INSULIN LISPRO 1 UNITS: 100 INJECTION, SOLUTION INTRAVENOUS; SUBCUTANEOUS at 21:03

## 2020-03-06 RX ADMIN — METHYLPREDNISOLONE SODIUM SUCCINATE 40 MG: 40 INJECTION, POWDER, FOR SOLUTION INTRAMUSCULAR; INTRAVENOUS at 05:51

## 2020-03-06 RX ADMIN — METHYLPREDNISOLONE SODIUM SUCCINATE 40 MG: 40 INJECTION, POWDER, FOR SOLUTION INTRAMUSCULAR; INTRAVENOUS at 06:00

## 2020-03-06 RX ADMIN — ALLOPURINOL 300 MG: 300 TABLET ORAL at 21:03

## 2020-03-06 RX ADMIN — AMLODIPINE BESYLATE 10 MG: 10 TABLET ORAL at 21:04

## 2020-03-06 RX ADMIN — DIPHENHYDRAMINE HYDROCHLORIDE 25 MG: 50 INJECTION, SOLUTION INTRAMUSCULAR; INTRAVENOUS at 05:51

## 2020-03-06 RX ADMIN — SODIUM BICARBONATE 650 MG: 650 TABLET ORAL at 21:03

## 2020-03-06 RX ADMIN — ENOXAPARIN SODIUM 30 MG: 30 INJECTION SUBCUTANEOUS at 09:57

## 2020-03-06 RX ADMIN — METHYLPREDNISOLONE SODIUM SUCCINATE 40 MG: 40 INJECTION, POWDER, FOR SOLUTION INTRAMUSCULAR; INTRAVENOUS at 23:35

## 2020-03-06 RX ADMIN — BUDESONIDE AND FORMOTEROL FUMARATE DIHYDRATE 2 PUFF: 160; 4.5 AEROSOL RESPIRATORY (INHALATION) at 20:16

## 2020-03-06 RX ADMIN — METHYLPREDNISOLONE SODIUM SUCCINATE 40 MG: 40 INJECTION, POWDER, FOR SOLUTION INTRAMUSCULAR; INTRAVENOUS at 17:36

## 2020-03-06 RX ADMIN — HYDRALAZINE HYDROCHLORIDE 50 MG: 50 TABLET, FILM COATED ORAL at 21:03

## 2020-03-06 RX ADMIN — SODIUM CHLORIDE, PRESERVATIVE FREE 10 ML: 5 INJECTION INTRAVENOUS at 21:03

## 2020-03-06 RX ADMIN — MELATONIN 2000 UNITS: at 21:04

## 2020-03-06 ASSESSMENT — PAIN SCALES - GENERAL
PAINLEVEL_OUTOF10: 0
PAINLEVEL_OUTOF10: 0

## 2020-03-06 NOTE — ED PROVIDER NOTES
EMERGENCY DEPARTMENT ENCOUNTER    Pt Name: Selene Lugo  MRN: [id number]  [place] 1950  Date of evaluation: 3/5/20  CHIEF COMPLAINT       Chief Complaint   Patient presents with    Oral Swelling     HISTORY OF PRESENT ILLNESS   Presents for facial swelling for 1 day. Unknown cause. Nothing makes it better or worse. Denies new blood pressure pills. Patient had cardiac cath completed yesterday. Was released from the hospital yesterday. Awoke today, with facial swelling. Patient has history of laryngeal cancer with mandibular involvement. Had mandibular surgery with graft placement. Seen in October with jaw swelling. CT showed soft tissue nodularity in the graft to tissue overlying the mandibular reconstruction 2.8 x 1.6 cm, essentially in the midline.  Local recurrence not excluded. Study was done with IV contrast, then. Patient had renal failure. He was given IV fluids before the cardiac cath yesterday, due to history of renal failure. Today, notes swelling to lower lip and left jaw. Endorses trouble swallowing and trouble breathing. Continues to attempt to blow secretions out of his trach site. Does not have a trach in place. His mandibular surgery was done at [facility]. Locally known to Dr Francois Walton. The history is provided by the patient, a relative and medical records (sister). History limited by: facial swelling with inability for me or sister to understand. REVIEW OF SYSTEMS     Review of Systems   Unable to perform ROS: Other (facial/oral swellling with inability for me or sister to understand the patient)   HENT: Positive for trouble swallowing. Respiratory: Positive for shortness of breath.       PASTMEDICAL HISTORY     Past Medical History:   Diagnosis Date    Arthritis     Asthma     CKD (chronic kidney disease), stage III (HCC)     COPD (chronic obstructive pulmonary disease) (HCC)     GERD (gastroesophageal reflux disease)     Gout     Hypertension     Iliac artery aneurysm, right (HCC)     [facility]     Laryngeal Cancer    Lung cancer ([facility] [facility] 3-4 years ago    [facility] [place]    Prostate cancer [facility])      SURGICAL HISTORY       Past Surgical History:   Procedure Laterality Date    TRACHEAL SURGERY  3-4 years ago    lung removed and \"voice box\" removed     CURRENT MEDICATIONS       Previous Medications    ALBUTEROL SULFATE  (90 BASE) MCG/ACT INHALER    Inhale 2 puffs into the lungs every 6 hours as needed for Wheezing or Shortness of Breath    ALBUTEROL SULFATE IN    Inhale into the lungs    ALLOPURINOL (ZYLOPRIM) 300 MG TABLET    Take 300 mg by mouth daily. AMLODIPINE (NORVASC) 10 MG TABLET    Take 10 mg by mouth daily     BUDESONIDE-FORMOTEROL (SYMBICORT) 160-4.5 MCG/ACT AERO    Inhale 2 puffs into the lungs 2 times daily    CEPHALEXIN (KEFLEX) 500 MG CAPSULE    Take 500 mg by mouth 2 times daily For 7 days.(3/3/20 is day #3)    CHOLECALCIFEROL (VITAMIN D3) 50 MCG ( UT) CAPS    Take 1 capsule by mouth daily    HYDRALAZINE (APRESOLINE) 50 MG TABLET    Take 1 tablet by mouth 2 times daily    MAGNESIUM OXIDE (MAG-OX) 400 MG TABLET    Take 400 mg by mouth 2 times daily    OMEPRAZOLE (PRILOSEC) 20 MG DELAYED RELEASE CAPSULE    Take 20 mg by mouth every morning (before breakfast)    SODIUM BICARBONATE PO    Take 10 g by mouth 3 times daily     ALLERGIES     is allergic to amino acids and lisinopril. FAMILY HISTORY     He indicated that his mother is . He indicated that his father is . He indicated that his sister is .      SOCIAL HISTORY       Social History     Tobacco Use    Smoking status: Former Smoker    Smokeless tobacco: Never Used    Tobacco comment: quit smoking 20 years ago    Substance Use Topics    Alcohol use: Not Currently     Alcohol/week: 5.0 standard drinks     Types: 5 Cans of beer per week     Comment: few cans of beer per day    Drug use: No     PHYSICAL EXAM

## 2020-03-06 NOTE — CONSULTS
months ago followed by of visit at [facility] clinic. Patient denies any chest pain or significant cough. He denies any shortness of breath at this time. Lung exam reveals a moderate air exchange no wheezing. Tracheostomy site looks okay. Plan is IV Solu-Medrol, decrease dose and frequency. Continue IV Benadryl. Continue bronchodilators. Adrian labs in the morning. DVT prophylaxis. Above was reviewed and discussed with Joe Santana CNP. And I agree with assessment and plan of care.   Electronically signed by     Alber Boston MD on 3/6/2020 at 12:58 PM  Pulmonary Critical Care and Sleep Medicine,  [facility]  Cell: [phone]  Office: [phone]

## 2020-03-06 NOTE — CONSULTS
Department of Otolaryngology    Assesment/Plan:  Facial edema secondary to post infectious etiology along with chronic kidney disease. Lymphatics are altered resulting in delayed recovery of tissues post inflammation. Recommend massaging the face, avoid caffeine    CHIEF COMPLAINT:  Facial swelling. HISTORY OF PRESENT ILLNESS:              Alvaro Villela  is a 81 y.o. male with facial swelling etiology unclear. Reports having had a cold and experiencing swelling. Trouble communicating. Has esophageal speech and difficult to understand. Denies any pain.     Past Medical History:        Diagnosis Date    Arthritis     Asthma     CKD (chronic kidney disease), stage III (HCC)     COPD (chronic obstructive pulmonary disease) (HCC)     GERD (gastroesophageal reflux disease)     Gout     Hypertension     Iliac artery aneurysm, right (HCC)     Laryngeal cancer (HCC)     Laryngeal Cancer    Lung cancer ([facility] [address] 3-4 years ago    [facility] [place]    Prostate cancer [facility])      Past Surgical History:        Procedure Laterality Date    TRACHEAL SURGERY  3-4 years ago    lung removed and \"voice box\" removed     Current Medications:   Current Facility-Administered Medications: sodium chloride flush 0.9 % injection 10 mL, 10 mL, Intravenous, 2 times per day  sodium chloride flush 0.9 % injection 10 mL, 10 mL, Intravenous, PRN  enoxaparin (LOVENOX) injection 30 mg, 30 mg, Subcutaneous, Daily  methylPREDNISolone sodium (SOLU-MEDROL) injection 40 mg, 40 mg, Intravenous, Q6H  albuterol (PROVENTIL) nebulizer solution 2.5 mg, 2.5 mg, Nebulization, Q6H PRN  acetaminophen (TYLENOL) tablet 650 mg, 650 mg, Oral, Q4H PRN  diphenhydrAMINE (BENADRYL) injection 25 mg, 25 mg, Intravenous, Q6H PRN  Allergies:  Amino acids and Lisinopril    Social History:    Social History     Socioeconomic History    Marital status: Single     Spouse name: None    Number of children: None    Years of education: None    Highest education level: None   Occupational History    None   Social Needs    Financial resource strain: None    Food insecurity:     Worry: None     Inability: None    Transportation needs:     Medical: None     Non-medical: None   Tobacco Use    Smoking status: Former Smoker    Smokeless tobacco: Never Used    Tobacco comment: quit smoking 20 years ago    Substance and Sexual Activity    Alcohol use: Not Currently     Alcohol/week: 5.0 standard drinks     Types: 5 Cans of beer per week     Comment: few cans of beer per day    Drug use: No    Sexual activity: Never   Lifestyle    Physical activity:     Days per week: None     Minutes per session: None    Stress: None   Relationships    Social connections:     Talks on phone: None     Gets together: None     Attends Taoist service: None     Active member of club or organization: None     Attends meetings of clubs or organizations: None     Relationship status: None    Intimate partner violence:     Fear of current or ex partner: None     Emotionally abused: None     Physically abused: None     Forced sexual activity: None   Other Topics Concern    None   Social History Narrative    None       Family History:        Problem Relation Age of Onset    Diabetes Mother     Diabetes Sister        REVIEW OF SYSTEMS:  As above and:  CONSTITUTIONAL:  negative  EYES:  negative   HEENT:  negative   RESPIRATORY:  negative   CARDIOVASCULAR:  negative    GASTROINTESTINAL:  negative   GENITOURINARY:  negative   INTEGUMENT/BREAST:  negative   HEMATOLOGIC/LYMPHATIC:  negative   ALLERGIC/IMMUNOLOGIC:  negative   ENDOCRINE:  negative   MUSCULOSKELETAL:  negative   NEUROLOGICAL:  negative   BEHAVIOR/PSYCH:  negative     PHYSICAL EXAM:    VITALS:  BP (!) 145/86   Pulse 90   Temp 98.1 °F (36.7 °C) (Axillary)   Resp 20   Ht 5' 6\" (1.676 m)   Wt 150 lb (68 kg)   SpO2 98%   BMI 24.21 kg/m²       CONSTITUTIONAL:  awake, alert, cooperative, no apparent distress,

## 2020-03-06 NOTE — PROCEDURES
INSTRUMENTAL SWALLOW REPORT  MODIFIED BARIUM SWALLOW    NAME: Hermilo Lorenzo   : 1950  MRN: [id number]       Date of Eval: 3/6/2020              Referring Diagnosis(es):      Past Medical History:  has a past medical history of Arthritis, Asthma, CKD (chronic kidney disease), stage III ([address] COPD (chronic obstructive pulmonary disease) ([address] GERD (gastroesophageal reflux disease), Gout, Hypertension, Iliac artery aneurysm, right ([address] Laryngeal cancer ([address] Lung cancer ([address] and Prostate cancer ([facility] [address] Past Surgical History:  has a past surgical history that includes Tracheal surgery (3-4 years ago). Current Diet Solid Consistency: Dysphagia Soft and Bite-Sized (Dysphagia III)  Current Diet Liquid Consistency: (pt. reports he was on thickened liquids)       Type of Study: Initial MBS         Patient Complaints/Reason for Referral:  Hermilo Lorenzo was referred for a MBS to assess the efficiency of his/her swallow function, assess for aspiration, and to make recommendations regarding safe dietary consistencies, effective compensatory strategies, and safe eating environment. Onset of problem:     Presents for facial swelling for 1 day. Unknown cause. Nothing makes it better or worse. Denies new blood pressure pills. Patient had cardiac cath completed yesterday. Was released from the hospital yesterday. Awoke today, with facial swelling. Patient has history of laryngeal cancer with mandibular involvement. Had mandibular surgery with graft placement. Seen in October with jaw swelling. CT showed soft tissue nodularity in the graft to tissue overlying the mandibular reconstruction 2.8 x 1.6 cm, essentially in the midline.  Local recurrence not excluded. Study was done with IV contrast, then. Patient had renal failure. He was given IV fluids before the cardiac cath yesterday, due to history of renal failure. Today, notes swelling to lower lip and left jaw.   Endorses trouble

## 2020-03-06 NOTE — PLAN OF CARE
Problem: Falls - Risk of:  Goal: Will remain free from falls  Description  Will remain free from falls  Outcome: Ongoing  Note:   Pt fall risk, fall band present, falling star, safety alarm activated and in use as needed. Bed in lowest position, call light within reach. Hourly rounding performed. Pt encouraged to use call light. See Bogdan Mohr fall risk assessment. Patient offered toileting assistance during rounding. Hourly rounds performed. Problem: Falls - Risk of:  Goal: Will remain free from falls  Description  Will remain free from falls  Outcome: Ongoing  Note:   Pt fall risk, fall band present, falling star, safety alarm activated and in use as needed. Bed in lowest position, call light within reach. Hourly rounding performed. Pt encouraged to use call light. See Bogdan Mohr fall risk assessment. Patient offered toileting assistance during rounding. Hourly rounds performed.

## 2020-03-07 ENCOUNTER — APPOINTMENT (OUTPATIENT)
Dept: GENERAL RADIOLOGY | Age: 70
DRG: 915 | End: 2020-03-07
Payer: MEDICARE

## 2020-03-07 LAB
ABSOLUTE EOS #: 0 K/UL (ref 0–0.44)
ABSOLUTE IMMATURE GRANULOCYTE: 0 K/UL (ref 0–0.3)
ABSOLUTE LYMPH #: 0.56 K/UL (ref 1.1–3.7)
ABSOLUTE MONO #: 0.06 K/UL (ref 0.1–1.2)
ANION GAP SERPL CALCULATED.3IONS-SCNC: 16 MMOL/L (ref 9–17)
BASOPHILS # BLD: 0 % (ref 0–2)
BASOPHILS ABSOLUTE: 0 K/UL (ref 0–0.2)
BUN BLDV-MCNC: 31 MG/DL (ref 8–23)
BUN/CREAT BLD: 11 (ref 9–20)
C-REACTIVE PROTEIN: 5.2 MG/L (ref 0–5)
CALCIUM SERPL-MCNC: 8 MG/DL (ref 8.6–10.4)
CHLORIDE BLD-SCNC: 98 MMOL/L (ref 98–107)
CO2: 15 MMOL/L (ref 20–31)
CREAT SERPL-MCNC: 2.72 MG/DL (ref 0.7–1.2)
DIFFERENTIAL TYPE: ABNORMAL
EOSINOPHILS RELATIVE PERCENT: 0 % (ref 1–4)
GFR AFRICAN AMERICAN: 28 ML/MIN
GFR NON-AFRICAN AMERICAN: 23 ML/MIN
GFR SERPL CREATININE-BSD FRML MDRD: ABNORMAL ML/MIN/{1.73_M2}
GFR SERPL CREATININE-BSD FRML MDRD: ABNORMAL ML/MIN/{1.73_M2}
GLUCOSE BLD-MCNC: 113 MG/DL (ref 75–110)
GLUCOSE BLD-MCNC: 143 MG/DL (ref 75–110)
GLUCOSE BLD-MCNC: 146 MG/DL (ref 70–99)
GLUCOSE BLD-MCNC: 149 MG/DL (ref 75–110)
GLUCOSE BLD-MCNC: 179 MG/DL (ref 75–110)
HCT VFR BLD CALC: 29.1 % (ref 40.7–50.3)
HEMOGLOBIN: 9.7 G/DL (ref 13–17)
IMMATURE GRANULOCYTES: 0 %
LYMPHOCYTES # BLD: 10 % (ref 24–43)
MCH RBC QN AUTO: 29.2 PG (ref 25.2–33.5)
MCHC RBC AUTO-ENTMCNC: 33.3 G/DL (ref 28.4–34.8)
MCV RBC AUTO: 87.7 FL (ref 82.6–102.9)
MONOCYTES # BLD: 1 % (ref 3–12)
NRBC AUTOMATED: 0 PER 100 WBC
PDW BLD-RTO: 17.2 % (ref 11.8–14.4)
PLATELET # BLD: 225 K/UL (ref 138–453)
PLATELET ESTIMATE: ABNORMAL
PMV BLD AUTO: 9.3 FL (ref 8.1–13.5)
POTASSIUM SERPL-SCNC: 4.1 MMOL/L (ref 3.7–5.3)
PROCALCITONIN: 0.15 NG/ML
RBC # BLD: 3.32 M/UL (ref 4.21–5.77)
RBC # BLD: ABNORMAL 10*6/UL
SEG NEUTROPHILS: 89 % (ref 36–65)
SEGMENTED NEUTROPHILS ABSOLUTE COUNT: 4.98 K/UL (ref 1.5–8.1)
SODIUM BLD-SCNC: 129 MMOL/L (ref 135–144)
WBC # BLD: 5.6 K/UL (ref 3.5–11.3)
WBC # BLD: ABNORMAL 10*3/UL

## 2020-03-07 PROCEDURE — 71045 X-RAY EXAM CHEST 1 VIEW: CPT

## 2020-03-07 PROCEDURE — 85025 COMPLETE CBC W/AUTO DIFF WBC: CPT

## 2020-03-07 PROCEDURE — 94640 AIRWAY INHALATION TREATMENT: CPT

## 2020-03-07 PROCEDURE — 6360000002 HC RX W HCPCS: Performed by: INTERNAL MEDICINE

## 2020-03-07 PROCEDURE — 80048 BASIC METABOLIC PNL TOTAL CA: CPT

## 2020-03-07 PROCEDURE — 36415 COLL VENOUS BLD VENIPUNCTURE: CPT

## 2020-03-07 PROCEDURE — 2060000000 HC ICU INTERMEDIATE R&B

## 2020-03-07 PROCEDURE — 94761 N-INVAS EAR/PLS OXIMETRY MLT: CPT

## 2020-03-07 PROCEDURE — 6370000000 HC RX 637 (ALT 250 FOR IP): Performed by: INTERNAL MEDICINE

## 2020-03-07 PROCEDURE — 2580000003 HC RX 258: Performed by: INTERNAL MEDICINE

## 2020-03-07 PROCEDURE — 82947 ASSAY GLUCOSE BLOOD QUANT: CPT

## 2020-03-07 RX ORDER — HYDRALAZINE HYDROCHLORIDE 50 MG/1
50 TABLET, FILM COATED ORAL EVERY 8 HOURS SCHEDULED
Status: DISCONTINUED | OUTPATIENT
Start: 2020-03-07 | End: 2020-03-08 | Stop reason: HOSPADM

## 2020-03-07 RX ADMIN — HYDRALAZINE HYDROCHLORIDE 50 MG: 50 TABLET, FILM COATED ORAL at 21:06

## 2020-03-07 RX ADMIN — AMLODIPINE BESYLATE 10 MG: 10 TABLET ORAL at 08:55

## 2020-03-07 RX ADMIN — INSULIN LISPRO 1 UNITS: 100 INJECTION, SOLUTION INTRAVENOUS; SUBCUTANEOUS at 21:05

## 2020-03-07 RX ADMIN — MELATONIN 2000 UNITS: at 08:55

## 2020-03-07 RX ADMIN — BUDESONIDE AND FORMOTEROL FUMARATE DIHYDRATE 2 PUFF: 160; 4.5 AEROSOL RESPIRATORY (INHALATION) at 21:22

## 2020-03-07 RX ADMIN — BUDESONIDE AND FORMOTEROL FUMARATE DIHYDRATE 2 PUFF: 160; 4.5 AEROSOL RESPIRATORY (INHALATION) at 09:19

## 2020-03-07 RX ADMIN — SODIUM BICARBONATE 650 MG: 650 TABLET ORAL at 08:55

## 2020-03-07 RX ADMIN — ALLOPURINOL 300 MG: 300 TABLET ORAL at 08:56

## 2020-03-07 RX ADMIN — HYDRALAZINE HYDROCHLORIDE 50 MG: 50 TABLET, FILM COATED ORAL at 13:43

## 2020-03-07 RX ADMIN — METHYLPREDNISOLONE SODIUM SUCCINATE 40 MG: 40 INJECTION, POWDER, FOR SOLUTION INTRAMUSCULAR; INTRAVENOUS at 18:24

## 2020-03-07 RX ADMIN — ACETAMINOPHEN 650 MG: 325 TABLET ORAL at 08:55

## 2020-03-07 RX ADMIN — SODIUM CHLORIDE, PRESERVATIVE FREE 10 ML: 5 INJECTION INTRAVENOUS at 21:06

## 2020-03-07 RX ADMIN — HEPARIN SODIUM 5000 UNITS: 5000 INJECTION INTRAVENOUS; SUBCUTANEOUS at 05:25

## 2020-03-07 RX ADMIN — INSULIN LISPRO 1 UNITS: 100 INJECTION, SOLUTION INTRAVENOUS; SUBCUTANEOUS at 18:24

## 2020-03-07 RX ADMIN — METHYLPREDNISOLONE SODIUM SUCCINATE 40 MG: 40 INJECTION, POWDER, FOR SOLUTION INTRAMUSCULAR; INTRAVENOUS at 13:46

## 2020-03-07 RX ADMIN — HEPARIN SODIUM 5000 UNITS: 5000 INJECTION INTRAVENOUS; SUBCUTANEOUS at 21:06

## 2020-03-07 RX ADMIN — METHYLPREDNISOLONE SODIUM SUCCINATE 40 MG: 40 INJECTION, POWDER, FOR SOLUTION INTRAMUSCULAR; INTRAVENOUS at 23:21

## 2020-03-07 RX ADMIN — INSULIN LISPRO 1 UNITS: 100 INJECTION, SOLUTION INTRAVENOUS; SUBCUTANEOUS at 08:56

## 2020-03-07 RX ADMIN — HEPARIN SODIUM 5000 UNITS: 5000 INJECTION INTRAVENOUS; SUBCUTANEOUS at 13:42

## 2020-03-07 RX ADMIN — METHYLPREDNISOLONE SODIUM SUCCINATE 40 MG: 40 INJECTION, POWDER, FOR SOLUTION INTRAMUSCULAR; INTRAVENOUS at 05:26

## 2020-03-07 RX ADMIN — HYDRALAZINE HYDROCHLORIDE 50 MG: 50 TABLET, FILM COATED ORAL at 08:55

## 2020-03-07 RX ADMIN — SODIUM BICARBONATE 650 MG: 650 TABLET ORAL at 13:43

## 2020-03-07 RX ADMIN — CEFTRIAXONE SODIUM 1 G: 1 INJECTION, POWDER, FOR SOLUTION INTRAMUSCULAR; INTRAVENOUS at 13:42

## 2020-03-07 RX ADMIN — SODIUM BICARBONATE 650 MG: 650 TABLET ORAL at 21:06

## 2020-03-07 RX ADMIN — SODIUM CHLORIDE, PRESERVATIVE FREE 10 ML: 5 INJECTION INTRAVENOUS at 08:56

## 2020-03-07 ASSESSMENT — PAIN SCALES - GENERAL
PAINLEVEL_OUTOF10: 1
PAINLEVEL_OUTOF10: 0

## 2020-03-07 NOTE — H&P
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 had corrective surgery at [facility]        Past Medical History:   Diagnosis Date    Arthritis     Asthma     CKD (chronic kidney disease), stage III ([address]     COPD (chronic obstructive pulmonary disease) ([address]     GERD (gastroesophageal reflux disease)     Gout     Hypertension     Iliac artery aneurysm, right ([address]     Laryngeal cancer ([facility] [address]     Laryngeal Cancer    Lung cancer ([facility] [address] 3-4 years ago    [facility] [place]    Prostate cancer [facility])          Past Surgical History:   Procedure Laterality Date    TRACHEAL SURGERY  3-4 years ago    lung removed and \"voice box\" removed       Medications Prior to Admission:    Medications Prior to Admission: hydrALAZINE (APRESOLINE) 50 MG tablet, Take 1 tablet by mouth 2 times daily  cephALEXin (KEFLEX) 500 MG capsule, Take 500 mg by mouth 2 times daily For 7 days.(3/3/20 is day #3)  sodium bicarbonate 650 MG tablet, Take 650 mg by mouth 3 times daily   Cholecalciferol (VITAMIN D3) 50 MCG (2000 UT) CAPS, Take 1 capsule by mouth daily  albuterol sulfate  (90 Base) MCG/ACT inhaler, Inhale 2 puffs into the lungs every 6 hours as needed for Wheezing or Shortness of Breath  budesonide-formoterol (SYMBICORT) 160-4.5 MCG/ACT AERO, Inhale 2 puffs into the lungs 2 times daily  omeprazole (PRILOSEC) 20 MG delayed release capsule, Take 20 mg by mouth every morning (before breakfast)  allopurinol (ZYLOPRIM) 300 MG tablet, Take 300 mg by mouth daily.   amLODIPine (NORVASC) 10 MG tablet, Take 10 mg by mouth daily   [DISCONTINUED] magnesium oxide (MAG-OX) 400 MG tablet, Take 400 mg by mouth 2 times daily  [DISCONTINUED] ALBUTEROL SULFATE IN, Inhale into the MCHC 32.4 03/06/2020    RDW 17.2 03/06/2020     03/06/2020    MPV 9.4 03/06/2020     CMP:    Lab Results   Component Value Date     03/06/2020    K 4.3 03/06/2020    CL 97 03/06/2020    CO2 14 03/06/2020    BUN 15 03/06/2020    CREATININE 2.38 03/06/2020    GFRAA 33 03/06/2020    LABGLOM 27 03/06/2020    GLUCOSE 108 03/06/2020    PROT 6.4 03/05/2020    LABALBU 3.3 03/05/2020    CALCIUM 7.8 03/06/2020    BILITOT <0.10 03/05/2020    ALKPHOS 87 03/05/2020    AST 19 03/05/2020    ALT 13 03/05/2020         ASSESSMENT:    Facial swelling possible angioedema  Laryngeal cancer  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  COPD  CKD 3  Anemia of CKD  Patient Active Problem List   Diagnosis    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

## 2020-03-07 NOTE — PROGRESS NOTES
NUTRITION NOTE     Per patient request, ordered Ensure Vanilla TID. Monitor K+ and Phos labs. If high, switch to Nepro.       Jj Howard, ROBERN, STANLEYN  RD Office Phone: [phone]

## 2020-03-07 NOTE — PROGRESS NOTES
Subjective:    Facial swelling  Less facial swelling no tenderness no pain no fever his CRP sed rate are elevated procalcitonin is borderline  ROS  No fever no chills no GI  complaints no cardiopulmonary complaints at present no TIA no bleeding no headache no sore throat no skin lesions, no polyuria no polydipsia no hypoglycemia  physical exam  General Appearance: in no acute distress and alert  Skin: Left facial swelling much better  Head: normocephalic and atraumatic  Eyes: sclera anicteric and pallor  Neck: Supple positive tracheostomy  Pulmonary/Chest: Air entry equal prolonged expiratory phase no crackles no rhonchi  Cardiovascular: normal rate, regular rhythm, normal S1 and S2, no gallops, intact distal pulses and no carotid bruits  Abdomen: soft, non-tender, non-distended, normal bowel sounds, no masses or organomegaly  Extremities: no edema and good pulses no Homans sign  Neurologic: Alert oriented x3 with no focal deficit    BP (!) 140/73   Pulse 80   Temp 97.7 °F (36.5 °C) (Oral)   Resp 18   Ht 5' 6\" (1.676 m)   Wt 150 lb (68 kg)   SpO2 99%   BMI 24.21 kg/m²     CBC:   Lab Results   Component Value Date    WBC 5.6 03/07/2020    RBC 3.32 03/07/2020    HGB 9.7 03/07/2020    HCT 29.1 03/07/2020    MCV 87.7 03/07/2020    MCH 29.2 03/07/2020    MCHC 33.3 03/07/2020    RDW 17.2 03/07/2020     03/07/2020    MPV 9.3 03/07/2020     BMP:    Lab Results   Component Value Date     03/07/2020    K 4.1 03/07/2020    CL 98 03/07/2020    CO2 15 03/07/2020    BUN 31 03/07/2020    LABALBU 3.3 03/05/2020    CREATININE 2.72 03/07/2020    CALCIUM 8.0 03/07/2020    GFRAA 28 03/07/2020    LABGLOM 23 03/07/2020    GLUCOSE 146 03/07/2020        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

## 2020-03-07 NOTE — PLAN OF CARE
Pt feeling better again today,  Swelling to left side of face improved. No complaints of pain. Tolerating diet. Anxious for discharge. IV Steroids continue .

## 2020-03-07 NOTE — PROGRESS NOTES
Pulmonary Critical Care Progress Note    Patient seen for the follow up of shortness of breath/facial swelling/COPD    Subjective:    He denies chest pain. Mild occasional cough, mostly dry. Shortness of breath improved. He is able to talk by placing his finger on the tracheostomy. He has no chest pain. He has occasional dry cough. Examination:    Vitals: BP (!) 140/73   Pulse 80   Temp 97.7 °F (36.5 °C) (Oral)   Resp 18   Ht 5' 6\" (1.676 m)   Wt 150 lb (68 kg)   SpO2 99%   BMI 24.21 kg/m²   SpO2  Av.8 %  Min: 97 %  Max: 99 %  General appearance: alert and cooperative with exam  Neck: No JVD tracheostomy hole  Lungs: Decreased breath sound no crackles or wheezing  Heart: regular rate and rhythm, S1, S2 normal, no gallop  Abdomen: Soft, non tender, + BS  Extremities: no cyanosis or clubbing. No significant edema    LABs:    CBC:   Recent Labs     20  0546 20  0521   WBC 3.5 5.6   HGB 9.5* 9.7*   HCT 29.3* 29.1*    225     BMP:   Recent Labs     20  0546 20  0521   * 129*   K 4.3 4.1   CO2 14* 15*   BUN 15 31*   CREATININE 2.38* 2.72*   LABGLOM 27* 23*   GLUCOSE 108* 146*   LIVER PROFILE:  Recent Labs     20  2200   AST 19   ALT 13   LABALBU 3.3*       Radiology:  Chest x-ray  Question of COPD.       Mild bilateral pleural effusions, right more than left.       Stable mild cardiomegaly.             Video swallow study    . Postsurgical changes consistent with prior laryngectomy, tracheostomy,   graft reconstruction and mandibulectomy. 2. No penetration or aspiration with the above administered substances. Thick liquid and cookie solid substances were not attempted       Impression:  ·  Facial swelling ? Angioedema  · COPD  · Trace bilateral pleural effusions  · History of laryngeal CA S/P fibular flap/tracheostomy,?   Recurrence/progression  · History of smoking, >60 pack-year  · CKD, GERD, gout, HTN      Recommendations:  · Oxygen by nasal cannula  · Albuterol by nebulizer  · Symbicort 160 Rocephin  · Rocephin IV  · Repeat chest x-ray  · Solu-Medrol 40 every 6  · Benadryl  · Follow-up [facility] for possible debulking  · DVT prophylaxis      Inez Mccabe MD, [facility]  Pulmonary Critical Care and Sleep Medicine,  [facility]  Cell: [phone]  Office: [phone]

## 2020-03-07 NOTE — DISCHARGE INSTR - COC
Continuity of Care Form    Patient Name: Delgado Martínez   :  1950  MRN:  [id number]    Admit date:  3/5/2020  Discharge date:  ***    Code Status Order: Full Code   Advance Directives:   Advance Care Flowsheet Documentation     Date/Time Healthcare Directive Type of Healthcare Directive Copy in [address] Agent's Name Healthcare Agent's Phone Number    20 0021  No, patient does not have an advance directive for healthcare treatment -- -- -- -- --          Admitting Physician:  Sonja Askew MD  PCP: Sonja Askew MD    Discharging Nurse: [facility] Unit/Room#: 1005/1005-01  Discharging Unit Phone Number: ***    Emergency Contact:   Extended Emergency Contact Information  Primary Emergency Contact: [address] Phone: [phone]  Mobile Phone: [phone]  Relation: Brother/Sister  Secondary Emergency Contact: [address] [place] [address] Phone: [phone]  Mobile Phone: [phone]  Relation: Brother/Sister    Past Surgical History:  Past Surgical History:   Procedure Laterality Date    TRACHEAL SURGERY  3-4 years ago    lung removed and \"voice box\" removed       Immunization History:   Immunization History   Administered Date(s) Administered    Influenza, High Dose (Fluzone 65 yrs and older) 10/20/2015    Influenza, Triv, inactivated, subunit, adjuvanted, IM (Fluad 65 yrs and older) 10/16/2019    Pneumococcal Conjugate 13-valent (Juan Brisk) 2016    Pneumococcal Polysaccharide ([id number]) 2012       Active Problems:  Patient Active Problem List   Diagnosis Code    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 ([address] C04.0    Laryngeal cancer ([address] C32.9    Mouth swelling R22.0    COPD (chronic obstructive pulmonary disease) ([address] J44.9    Severe malnutrition ([address] E43    LITTLE (acute kidney injury) ([address] N17.9    Chest pain R07.9    Facial edema R60.0       Isolation/Infection:   Isolation {THERAPEUTIC INTERVENTION:5209426407}  Weight Bearing Status/Restrictions: [address] [employer] Weight Bear[employer]}  Other Medical Equipment (for information only, NOT a DME order):  {EQUIPMENT:863269696}  Other Treatments: ***    Patient's personal belongings (please select all that are sent with patient):  {CHP DME Belongings:237917973}    RN SIGNATURE:  {Esignature:[id number]}    CASE MANAGEMENT/SOCIAL WORK SECTION    Inpatient Status Date: ***    Readmission Risk Assessment Score:  Readmission Risk              Risk of Unplanned Readmission:        27           Discharging to Facility/ Agency   · Name:   · Address:  · Phone:  · Fax:    Dialysis Facility (if applicable)   · Name:  · Address:  · Dialysis Schedule:  · Phone:  · Fax:    / signature: {Esignature:[id number]}    PHYSICIAN SECTION    Prognosis: Fair    Condition at Discharge: Stable    Rehab Potential (if transferring to Rehab): Good    Recommended Labs or Other Treatments After Discharge: CBC BMP in 3 days    Physician Certification: I certify the above information and transfer of Ajay Jenkins  is necessary for the continuing treatment of the diagnosis listed and that he requires Home Care for less 30 days.      Update Admission H&P: No change in H&P    PHYSICIAN SIGNATURE:  Electronically signed by Erika Berrios MD on 3/8/20 at 1:13 PM EDT

## 2020-03-08 VITALS
WEIGHT: 150 LBS | RESPIRATION RATE: 18 BRPM | HEIGHT: 66 IN | SYSTOLIC BLOOD PRESSURE: 155 MMHG | OXYGEN SATURATION: 97 % | TEMPERATURE: 97.7 F | HEART RATE: 88 BPM | BODY MASS INDEX: 24.11 KG/M2 | DIASTOLIC BLOOD PRESSURE: 90 MMHG

## 2020-03-08 LAB
ABSOLUTE EOS #: <0.03 K/UL (ref 0–0.44)
ABSOLUTE IMMATURE GRANULOCYTE: 0.03 K/UL (ref 0–0.3)
ABSOLUTE LYMPH #: 0.37 K/UL (ref 1.1–3.7)
ABSOLUTE MONO #: 0.07 K/UL (ref 0.1–1.2)
ANION GAP SERPL CALCULATED.3IONS-SCNC: 16 MMOL/L (ref 9–17)
BASOPHILS # BLD: 0 % (ref 0–2)
BASOPHILS ABSOLUTE: <0.03 K/UL (ref 0–0.2)
BUN BLDV-MCNC: 40 MG/DL (ref 8–23)
BUN/CREAT BLD: 15 (ref 9–20)
C-REACTIVE PROTEIN: 2.2 MG/L (ref 0–5)
CALCIUM SERPL-MCNC: 7.8 MG/DL (ref 8.6–10.4)
CHLORIDE BLD-SCNC: 100 MMOL/L (ref 98–107)
CO2: 16 MMOL/L (ref 20–31)
CREAT SERPL-MCNC: 2.75 MG/DL (ref 0.7–1.2)
DIFFERENTIAL TYPE: ABNORMAL
EOSINOPHILS RELATIVE PERCENT: 0 % (ref 1–4)
ESTIMATED AVERAGE GLUCOSE: 114 MG/DL
GFR AFRICAN AMERICAN: 28 ML/MIN
GFR NON-AFRICAN AMERICAN: 23 ML/MIN
GFR SERPL CREATININE-BSD FRML MDRD: ABNORMAL ML/MIN/{1.73_M2}
GFR SERPL CREATININE-BSD FRML MDRD: ABNORMAL ML/MIN/{1.73_M2}
GLUCOSE BLD-MCNC: 119 MG/DL (ref 75–110)
GLUCOSE BLD-MCNC: 130 MG/DL (ref 70–99)
GLUCOSE BLD-MCNC: 140 MG/DL (ref 75–110)
HBA1C MFR BLD: 5.6 % (ref 4–6)
HCT VFR BLD CALC: 27.9 % (ref 40.7–50.3)
HEMOGLOBIN: 9.5 G/DL (ref 13–17)
IMMATURE GRANULOCYTES: 0 %
LYMPHOCYTES # BLD: 5 % (ref 24–43)
MCH RBC QN AUTO: 30 PG (ref 25.2–33.5)
MCHC RBC AUTO-ENTMCNC: 34.1 G/DL (ref 28.4–34.8)
MCV RBC AUTO: 88 FL (ref 82.6–102.9)
MONOCYTES # BLD: 1 % (ref 3–12)
NRBC AUTOMATED: 0 PER 100 WBC
PDW BLD-RTO: 17.2 % (ref 11.8–14.4)
PLATELET # BLD: 227 K/UL (ref 138–453)
PLATELET ESTIMATE: ABNORMAL
PMV BLD AUTO: 9.5 FL (ref 8.1–13.5)
POTASSIUM SERPL-SCNC: 3.9 MMOL/L (ref 3.7–5.3)
RBC # BLD: 3.17 M/UL (ref 4.21–5.77)
RBC # BLD: ABNORMAL 10*6/UL
SEDIMENTATION RATE, ERYTHROCYTE: 65 MM (ref 0–10)
SEG NEUTROPHILS: 94 % (ref 36–65)
SEGMENTED NEUTROPHILS ABSOLUTE COUNT: 7.25 K/UL (ref 1.5–8.1)
SODIUM BLD-SCNC: 132 MMOL/L (ref 135–144)
WBC # BLD: 7.7 K/UL (ref 3.5–11.3)
WBC # BLD: ABNORMAL 10*3/UL

## 2020-03-08 PROCEDURE — 82947 ASSAY GLUCOSE BLOOD QUANT: CPT

## 2020-03-08 PROCEDURE — 86140 C-REACTIVE PROTEIN: CPT

## 2020-03-08 PROCEDURE — 6370000000 HC RX 637 (ALT 250 FOR IP): Performed by: INTERNAL MEDICINE

## 2020-03-08 PROCEDURE — 36415 COLL VENOUS BLD VENIPUNCTURE: CPT

## 2020-03-08 PROCEDURE — 85651 RBC SED RATE NONAUTOMATED: CPT

## 2020-03-08 PROCEDURE — 2580000003 HC RX 258: Performed by: INTERNAL MEDICINE

## 2020-03-08 PROCEDURE — 6360000002 HC RX W HCPCS: Performed by: INTERNAL MEDICINE

## 2020-03-08 PROCEDURE — 85025 COMPLETE CBC W/AUTO DIFF WBC: CPT

## 2020-03-08 PROCEDURE — 94640 AIRWAY INHALATION TREATMENT: CPT

## 2020-03-08 PROCEDURE — 80048 BASIC METABOLIC PNL TOTAL CA: CPT

## 2020-03-08 RX ORDER — FEXOFENADINE HCL 180 MG/1
180 TABLET ORAL DAILY
Qty: 30 TABLET | Refills: 0 | Status: ON HOLD | OUTPATIENT
Start: 2020-03-08 | End: 2020-04-08 | Stop reason: HOSPADM

## 2020-03-08 RX ORDER — CEPHALEXIN 500 MG/1
500 CAPSULE ORAL 2 TIMES DAILY
Qty: 10 CAPSULE | Refills: 0 | Status: ON HOLD | OUTPATIENT
Start: 2020-03-08 | End: 2020-04-13 | Stop reason: ALTCHOICE

## 2020-03-08 RX ORDER — METHYLPREDNISOLONE 4 MG/1
TABLET ORAL
Qty: 1 KIT | Refills: 0 | Status: SHIPPED | OUTPATIENT
Start: 2020-03-08 | End: 2020-03-14

## 2020-03-08 RX ORDER — HYDRALAZINE HYDROCHLORIDE 50 MG/1
50 TABLET, FILM COATED ORAL 3 TIMES DAILY
Qty: 90 TABLET | Refills: 3 | Status: ON HOLD | OUTPATIENT
Start: 2020-03-08 | End: 2020-04-17 | Stop reason: HOSPADM

## 2020-03-08 RX ADMIN — ACETAMINOPHEN 650 MG: 325 TABLET ORAL at 04:02

## 2020-03-08 RX ADMIN — ALLOPURINOL 300 MG: 300 TABLET ORAL at 09:04

## 2020-03-08 RX ADMIN — ACETAMINOPHEN 650 MG: 325 TABLET ORAL at 12:06

## 2020-03-08 RX ADMIN — CEFTRIAXONE SODIUM 1 G: 1 INJECTION, POWDER, FOR SOLUTION INTRAMUSCULAR; INTRAVENOUS at 11:57

## 2020-03-08 RX ADMIN — SODIUM BICARBONATE 650 MG: 650 TABLET ORAL at 09:04

## 2020-03-08 RX ADMIN — BUDESONIDE AND FORMOTEROL FUMARATE DIHYDRATE 2 PUFF: 160; 4.5 AEROSOL RESPIRATORY (INHALATION) at 09:32

## 2020-03-08 RX ADMIN — METHYLPREDNISOLONE SODIUM SUCCINATE 40 MG: 40 INJECTION, POWDER, FOR SOLUTION INTRAMUSCULAR; INTRAVENOUS at 05:39

## 2020-03-08 RX ADMIN — SODIUM CHLORIDE, PRESERVATIVE FREE 10 ML: 5 INJECTION INTRAVENOUS at 09:04

## 2020-03-08 RX ADMIN — MELATONIN 2000 UNITS: at 09:04

## 2020-03-08 RX ADMIN — SODIUM BICARBONATE 650 MG: 650 TABLET ORAL at 13:50

## 2020-03-08 RX ADMIN — HYDRALAZINE HYDROCHLORIDE 50 MG: 50 TABLET, FILM COATED ORAL at 13:50

## 2020-03-08 RX ADMIN — HEPARIN SODIUM 5000 UNITS: 5000 INJECTION INTRAVENOUS; SUBCUTANEOUS at 05:39

## 2020-03-08 RX ADMIN — METHYLPREDNISOLONE SODIUM SUCCINATE 40 MG: 40 INJECTION, POWDER, FOR SOLUTION INTRAMUSCULAR; INTRAVENOUS at 11:56

## 2020-03-08 RX ADMIN — HYDRALAZINE HYDROCHLORIDE 50 MG: 50 TABLET, FILM COATED ORAL at 05:39

## 2020-03-08 ASSESSMENT — PAIN SCALES - GENERAL
PAINLEVEL_OUTOF10: 3
PAINLEVEL_OUTOF10: 6

## 2020-03-08 NOTE — DISCHARGE SUMMARY
Details   sodium bicarbonate 650 MG tablet Take 650 mg by mouth 3 times daily       Cholecalciferol (VITAMIN D3) 50 MCG (2000 UT) CAPS Take 1 capsule by mouth daily      albuterol sulfate  (90 Base) MCG/ACT inhaler Inhale 2 puffs into the lungs every 6 hours as needed for Wheezing or Shortness of Breath      budesonide-formoterol (SYMBICORT) 160-4.5 MCG/ACT AERO Inhale 2 puffs into the lungs 2 times daily      omeprazole (PRILOSEC) 20 MG delayed release capsule Take 20 mg by mouth every morning (before breakfast)      allopurinol (ZYLOPRIM) 300 MG tablet Take 300 mg by mouth daily. STOP taking these medications       magnesium oxide (MAG-OX) 400 MG tablet Comments:   Reason for Stopping:         ALBUTEROL SULFATE IN Comments:   Reason for Stopping:         amLODIPine (NORVASC) 10 MG tablet Comments:   Reason for Stopping:             Activity: activity as tolerated  Diet: No added salt low-cholesterol diet 1800 cc fluid restriction    Follow-up with PCP in 4 days. Community referral completed more than 30 minutes spent on discharge, advised to avoid nephrotoxic drugs  Signed:   Tierra Del Rosario MD  3/8/2020  1:16 PM

## 2020-03-08 NOTE — PROGRESS NOTES
Recurrence/progression  · History of smoking, >60 pack-year  · CKD, GERD, gout, HTN      Recommendations:  · Oxygen by nasal cannula  · Albuterol by nebulizer  · Symbicort 160  2 puffs b.i.d.  · Rocephin IV  ·  discontinue Solu-Medrol  IV  ·  prednisone oral taper  · Benadryl switch to p.o.   ·   Discharge planning per primary  · [facility] for possible [facility]  · DVT prophylaxis      Mark Woo MD, [facility]  Pulmonary Critical Care and Sleep Medicine,  [facility]  Cell: [phone]  Office: [phone]

## 2020-03-08 NOTE — PROGRESS NOTES
Ok to discharge per pulmonology. Wound RN Visit (30 minutes)    Reason for visit: Requested to see weeping wounds on LLE    Wound background: Patient states wounds leak from time to time, MD office has given \"compression tube socks\" in the past that have not helped. Recommend Wound care PROVIDER Guru Stallings.     Assessment: See Epic Wound Flowsheet    Recommendations:   Cleansing: Wash with soap and water    Dressing: Oil emulsion gauze and ABD/gauze    Frequency: Change every Day, and prn.                    See medical provider Ana Perez NP note for wound care recommendations and orders, Per Ana measured Medium Tetragrips SIZE E, applied bilaterally.

## 2020-03-08 NOTE — PROGRESS NOTES
Subjective:     Follow-up facial swelling  Less facial swelling no fever no chills no tenderness no pain  ROS  No fever no chills no GI/ complaints no cardiopulmonary complaints at present no TIA no bleeding no headache no sore throat no skin lesions, no polyuria no polydipsia no hypoglycemia  physical exam  General Appearance: in no acute distress and alert  Skin: warm and dry, no rash or erythema  Head: normocephalic and atraumatic  Eyes: conjunctivae normal and sclera anicteric    Neck: neck supple and non tender without mass tracheostomy  Pulmonary/Chest: clear to auscultation bilaterally- no wheezes, rales or rhonchi, normal air movement, no respiratory distress  Cardiovascular: normal rate, regular rhythm, normal S1 and S2, no gallops, intact distal pulses and no carotid bruits  Abdomen: soft, non-tender, non-distended, normal bowel sounds, no masses or organomegaly  Extremities: no edema and good pulses no Homans sign  Neurologic: Alert oriented x3 with no focal deficit    BP (!) 155/90   Pulse 88   Temp 97.7 °F (36.5 °C) (Oral)   Resp 18   Ht 5' 6\" (1.676 m)   Wt 150 lb (68 kg)   SpO2 97%   BMI 24.21 kg/m²     CBC:   Lab Results   Component Value Date    WBC 7.7 03/08/2020    RBC 3.17 03/08/2020    HGB 9.5 03/08/2020    HCT 27.9 03/08/2020    MCV 88.0 03/08/2020    MCH 30.0 03/08/2020    MCHC 34.1 03/08/2020    RDW 17.2 03/08/2020     03/08/2020    MPV 9.5 03/08/2020     BMP:    Lab Results   Component Value Date     03/08/2020    K 3.9 03/08/2020     03/08/2020    CO2 16 03/08/2020    BUN 40 03/08/2020    LABALBU 3.3 03/05/2020    CREATININE 2.75 03/08/2020    CALCIUM 7.8 03/08/2020    GFRAA 28 03/08/2020    LABGLOM 23 03/08/2020    GLUCOSE 130 03/08/2020        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

## 2020-03-17 ENCOUNTER — HOSPITAL ENCOUNTER (OUTPATIENT)
Dept: PREADMISSION TESTING | Age: 70
Discharge: HOME | End: 2020-03-21
Payer: MEDICARE

## 2020-03-17 VITALS
SYSTOLIC BLOOD PRESSURE: 168 MMHG | TEMPERATURE: 97.5 F | DIASTOLIC BLOOD PRESSURE: 93 MMHG | WEIGHT: 156.75 LBS | RESPIRATION RATE: 16 BRPM | HEIGHT: 66 IN | HEART RATE: 82 BPM | BODY MASS INDEX: 25.19 KG/M2 | OXYGEN SATURATION: 100 %

## 2020-03-17 LAB
-: ABNORMAL
AMORPHOUS: ABNORMAL
ANION GAP SERPL CALCULATED.3IONS-SCNC: 13 MMOL/L (ref 9–17)
BACTERIA: ABNORMAL
BILIRUBIN URINE: NEGATIVE
BUN BLDV-MCNC: 41 MG/DL (ref 8–23)
CASTS UA: ABNORMAL /LPF
CHLORIDE BLD-SCNC: 96 MMOL/L (ref 98–107)
CO2: 19 MMOL/L (ref 20–31)
COLOR: YELLOW
COMMENT UA: ABNORMAL
CREAT SERPL-MCNC: 2.78 MG/DL (ref 0.7–1.2)
CRYSTALS, UA: ABNORMAL /HPF
EPITHELIAL CELLS UA: ABNORMAL /HPF (ref 0–5)
GFR AFRICAN AMERICAN: 28 ML/MIN
GFR NON-AFRICAN AMERICAN: 23 ML/MIN
GFR SERPL CREATININE-BSD FRML MDRD: ABNORMAL ML/MIN/{1.73_M2}
GFR SERPL CREATININE-BSD FRML MDRD: ABNORMAL ML/MIN/{1.73_M2}
GLUCOSE URINE: NEGATIVE
HCT VFR BLD CALC: 30.6 % (ref 40.7–50.3)
HEMOGLOBIN: 10.1 G/DL (ref 13–17)
INR BLD: 1
KETONES, URINE: NEGATIVE
LEUKOCYTE ESTERASE, URINE: NEGATIVE
MCH RBC QN AUTO: 29.8 PG (ref 25.2–33.5)
MCHC RBC AUTO-ENTMCNC: 33 G/DL (ref 28.4–34.8)
MCV RBC AUTO: 90.3 FL (ref 82.6–102.9)
MUCUS: ABNORMAL
NITRITE, URINE: NEGATIVE
NRBC AUTOMATED: 0 PER 100 WBC
OTHER OBSERVATIONS UA: ABNORMAL
PARTIAL THROMBOPLASTIN TIME: 25 SEC (ref 23–31)
PDW BLD-RTO: 17.6 % (ref 11.8–14.4)
PH UA: 6 (ref 5–8)
PLATELET # BLD: 344 K/UL (ref 138–453)
PMV BLD AUTO: 9 FL (ref 8.1–13.5)
POTASSIUM SERPL-SCNC: 4.8 MMOL/L (ref 3.7–5.3)
PROTEIN UA: ABNORMAL
PROTHROMBIN TIME: 10 SEC (ref 9.7–11.6)
RBC # BLD: 3.39 M/UL (ref 4.21–5.77)
RBC UA: ABNORMAL /HPF (ref 0–2)
RENAL EPITHELIAL, UA: ABNORMAL /HPF
SODIUM BLD-SCNC: 128 MMOL/L (ref 135–144)
SPECIFIC GRAVITY UA: 1.02 (ref 1–1.03)
TRICHOMONAS: ABNORMAL
TURBIDITY: ABNORMAL
URINE HGB: NEGATIVE
UROBILINOGEN, URINE: NORMAL
WBC # BLD: 9.1 K/UL (ref 3.5–11.3)
WBC UA: ABNORMAL /HPF (ref 0–5)
YEAST: ABNORMAL

## 2020-03-17 PROCEDURE — 85610 PROTHROMBIN TIME: CPT

## 2020-03-17 PROCEDURE — 85730 THROMBOPLASTIN TIME PARTIAL: CPT

## 2020-03-17 PROCEDURE — 86900 BLOOD TYPING SEROLOGIC ABO: CPT

## 2020-03-17 PROCEDURE — 86901 BLOOD TYPING SEROLOGIC RH(D): CPT

## 2020-03-17 PROCEDURE — 86920 COMPATIBILITY TEST SPIN: CPT

## 2020-03-17 PROCEDURE — 93005 ELECTROCARDIOGRAM TRACING: CPT | Performed by: SURGERY

## 2020-03-17 PROCEDURE — 81001 URINALYSIS AUTO W/SCOPE: CPT

## 2020-03-17 PROCEDURE — 82565 ASSAY OF CREATININE: CPT

## 2020-03-17 PROCEDURE — 85027 COMPLETE CBC AUTOMATED: CPT

## 2020-03-17 PROCEDURE — 84520 ASSAY OF UREA NITROGEN: CPT

## 2020-03-17 PROCEDURE — 86850 RBC ANTIBODY SCREEN: CPT

## 2020-03-17 PROCEDURE — 80051 ELECTROLYTE PANEL: CPT

## 2020-03-17 RX ORDER — CEFAZOLIN SODIUM 2 G/50ML
2 SOLUTION INTRAVENOUS ONCE
Status: CANCELLED | OUTPATIENT
Start: 2020-04-07

## 2020-03-17 NOTE — H&P
History and Physical Service   [facility]    HISTORY AND PHYSICAL EXAMINATION            Date of Evaluation: 3/17/2020  Patient name:  Nickie Carter  MRN:   [id number]  YOB: 1950  PCP:    Jose Marroquin MD    History Obtained From:     Patient, Medical records    History of Present Illness: This is Nickie Carter a 81 y.o. male who presents for a pre-admission testing appointment for an upcoming endovascular right iliac artery aneurysm repair by Dr. Chica Keith scheduled on 04/07/2020 at 0930 due to right iliac artery aneurysm. CT of the abdomen and pelvis without contrast from 01/03/2020 revealed a right common iliac artery aneurysm measuring 3.9 cm. Pt denies abdominal pain, back pain, dizziness, and lightheadedness. Pt is a poor historian. 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 S/p lung surgery and tracheostomy 3-4 years ago. Pt cannot remember if the surgery was on the left or right lung. ECHO 02/11/2020 EF 45% (See paper chart). S/p cardiac catheterization without stent placement on 03/04/2020. Pt is cleared for upcoming surgery per Dr. Xochilt Zapien cardiac catheterization documentation from 03/04/2020. Pt was admitted to the hospital prior to the cardiac catheterization for hydration due to history of renal failure. He was re-admitted on 03/05/2020 due to difficulty swallowing, facial edema, and dyspnea. The facial edema has improved, but the pt is still taking Keflex. The pt is alert and oriented without apparent distress. Denies chest pain, dyspnea at rest, dizziness, and palpitations. Pt follows-up with Dr. Yamileth Magaña from cardiology. Pt also follows-up with a nephrologist and a pulmonologist but cannot remember their names. Functional Capacity per pt:   1) Pt is not able to walk 2 city blocks on level ground without SOB. Pt denies SOB while getting dressed or making a bed.    2) Pt is not able to climb 2 flights of stairs without SOB. 3) Pt is not able to walk up a hill for 1-2 city blocks without SOB. Past Medical History:     Past Medical History:   Diagnosis Date    Arthritis     Asthma     CKD (chronic kidney disease), stage III ([facility] [address] [facility]     COPD (chronic obstructive pulmonary disease) (HCC)     GERD (gastroesophageal reflux disease)     Gout     Hypertension     Hyponatremia     Iliac artery aneurysm, right (HCC)     Laryngeal cancer (HCC)     Laryngeal Cancer. Chronic left facial edema.  Lung cancer ([facility] [address] 3-4 years ago    [facility] [place]    Prostate cancer [facility])         Past Surgical History:     Past Surgical History:   Procedure Laterality Date    CARDIAC CATHETERIZATION  03/04/2020    No stents placed    PROSTATE SURGERY      TRACHEAL SURGERY  3-4 years ago    lung removed and \"voice box\" removed        Medications Prior to Admission:     Prior to Admission medications    Medication Sig Start Date End Date Taking?  Authorizing Provider   hydrALAZINE (APRESOLINE) 50 MG tablet Take 1 tablet by mouth 3 times daily 3/8/20   Arcelia Alcantara MD   fexofenadine [facility]) 180 MG tablet Take 1 tablet by mouth daily 3/8/20 4/7/20  Arcelia Alcantara MD   cephALEXin (KEFLEX) 500 MG capsule Take 1 capsule by mouth 2 times daily 3/8/20   Arcelia Alcantara MD   sodium bicarbonate 650 MG tablet Take 650 mg by mouth 3 times daily     Historical Provider, MD   Cholecalciferol (VITAMIN D3) 50 MCG (2000 UT) CAPS Take 1 capsule by mouth daily    Historical Provider, MD   albuterol sulfate  (90 Base) MCG/ACT inhaler Inhale 2 puffs into the lungs every 6 hours as needed for Wheezing or Shortness of Breath    Historical Provider, MD   budesonide-formoterol (SYMBICORT) 160-4.5 MCG/ACT AERO Inhale 2 puffs into the lungs 2 times daily    Historical Provider, MD   omeprazole (PRILOSEC) 20 MG delayed release capsule Take 20 mg by mouth every morning (before breakfast)    Historical Provider, MD allopurinol (ZYLOPRIM) 300 MG tablet Take 300 mg by mouth daily. Historical Provider, MD        Allergies:     Amino acids and Lisinopril    Social History:     Tobacco:    reports that he has quit smoking. He has never used smokeless tobacco.  Alcohol:      reports previous alcohol use of about 5.0 standard drinks of alcohol per week. Drug Use:  reports no history of drug use. Family History:     Family History   Problem Relation Age of Onset    Diabetes Mother     Heart Disease Mother         Sudden cardiac death    Diabetes Sister     Heart Disease Sister        Review of Systems:     Positive and Negative as described in HPI. CONSTITUTIONAL: Negative for fevers, chills, sweats, fatigue, and weight loss. HEENT: Negative for glasses, hearing changes, rhinorrhea, and throat pain. RESPIRATORY: SOB with exertion. Wheezing. Pt has a cough that produces white sputum from his tracheostomy. Negative for current shortness of breath and congestion. CARDIOVASCULAR: Negative for chest pain, blood clot, irregular heartbeat, and palpitations. GASTROINTESTINAL: GERD. Negative for nausea, vomiting, diarrhea, constipation, change in bowel habits, and abdominal pain. GENITOURINARY: Negative for difficulty of urination, burning with urination, and frequency. INTEGUMENT: Negative for rash, skin lesions, and easy bruising. HEMATOLOGIC/LYMPHATIC: Chronic left facial edema. ALLERGIC/IMMUNOLOGIC: Negative for urticaria and itching. ENDOCRINE: Increased thirst. Negative for heat or cold intolerance. Pt denies history of diabetes. MUSCULOSKELETAL: Left shoulder pain at night. NEUROLOGICAL: Negative for headaches, dizziness, lightheadedness, numbness, and tingling extremities. Pt denies history of seizures and strokes. BEHAVIOR/PSYCH: Negative for depression and anxiety.     Physical Exam:   BP (!) 168/93   Pulse 82   Temp 97.5 °F (36.4 °C) (Oral)   Resp 16   Ht 5' 6\" (1.676 m)   Wt 156 lb 12 oz (71.1 kg)   SpO2 100%   BMI 25.30 kg/m²     No results for input(s): POCGLU in the last 72 hours. General Appearance:  Alert, well appearing, and in no acute distress. Poor historian. Mental status: Oriented to person, place, and time. Head: Normocephalic and atraumatic. Eye: No icterus, redness, pupils equal and reactive, extraocular eye movements intact, and conjunctiva clear. Ear: Hearing grossly intact. Nose: No drainage noted. Mouth: Mild left facial edema (Chronic per pt). Mucous membranes moist.  Neck: Tracheostomy. Distant bilateral carotid sounds. Supple. Lungs: Bilateral equal air entry, clear to auscultation, no wheezing, rales or rhonchi, and normal effort. Cardiovascular: Murmur 2/6. Normal rate, regular rhythm, no gallop or rub. Abdomen: Soft, non-tender, non-distended, and active bowel sounds. Neurologic: Garbled speech when the pt covers his tracheostomy. Cranial nerves II through XII grossly intact. Strength 5/5 bilaterally. Skin: No gross lesions, rashes, bruising, or bleeding on exposed skin area. Extremities: Shuffled gait. Bilateral lower leg and bilateral ankle 3+ pitting edema. Posterior tibial pulses are palpable bilaterally. No calf tenderness with palpation. Psych: Normal affect.      Investigations:      Laboratory Testing:  Recent Results (from the past 24 hour(s))   CBC    Collection Time: 03/17/20  2:45 PM   Result Value Ref Range    WBC 9.1 3.5 - 11.3 k/uL    RBC 3.39 (L) 4.21 - 5.77 m/uL    Hemoglobin 10.1 (L) 13.0 - 17.0 g/dL    Hematocrit 30.6 (L) 40.7 - 50.3 %    MCV 90.3 82.6 - 102.9 fL    MCH 29.8 25.2 - 33.5 pg    MCHC 33.0 28.4 - 34.8 g/dL    RDW 17.6 (H) 11.8 - 14.4 %    Platelets 120 449 - 528 k/uL    MPV 9.0 8.1 - 13.5 fL    NRBC Automated 0.0 0.0 per 100 WBC   APTT    Collection Time: 03/17/20  2:45 PM   Result Value Ref Range    PTT 25.0 23 - 31 sec   Protime-INR    Collection Time: 03/17/20  2:45 PM   Result Value Ref Range    Protime 10.0 9.7 - 11.6 sec hx of olivia, htn, trache, laryngeal ca FINDINGS: PHARYNX/LARYNX:  Previous laryngectomy and anterior mandibulectomy status post fibular strut graph construction. Hardware is intact. Tracheostomy. SALIVARY GLANDS/THYROID:  Parotid unremarkable. Submandibular glands absent. Residual thyroid gland unremarkable and midline. LYMPH NODES:  No suspicious cervical adenopathy on this noncontrast exam. SOFT TISSUES:  Stable appearance of the fatty replaced muscle flap. Stable midline granulation tissue versus fluid collection pre mandibular region. No definite current soft tissue nodular mass along these resection margins. BRAIN/ORBITS/SINUSES:  Generalized involutional changes. Mild ethmoid sinus mucosal thickening. Mastoids are clear. Orbital contents grossly unremarkable as visualized. LUNG APICES/SUPERIOR MEDIASTINUM:  Stable right paratracheal lymph node. Prominence of the right jugular vein noted as on prior exam.  Aortic vascular calcifications. Stable right apical pleural thickening and scarring. BONES:  No suspicious osseous lesion. Degenerative changes of the cervical spine. Stable/evolving postsurgical changes. No noncontrast findings suggest recurrence or progression     Xr Chest 1 Vw    Result Date: 3/6/2020  EXAMINATION: ONE XRAY VIEW OF THE CHEST 3/6/2020 4:44 am COMPARISON: January 4, 2020 HISTORY: ORDERING SYSTEM PROVIDED HISTORY: SOB TECHNOLOGIST PROVIDED HISTORY: SOB Reason for Exam: SOB Acuity: Unknown Type of Exam: Unknown FINDINGS: The cardiomediastinal silhouette is stable. There is flattening of the diaphragms, likely to COPD. There are chronic lung changes. There are mild bilateral pleural effusions, right more than left. There is no pneumothorax. The osseous structures are stable including multiple bilateral old rib fractures. There are surgical clips overlying the right axilla. Question of COPD. Mild bilateral pleural effusions, right more than left. Stable mild cardiomegaly. Fl Modified Barium Swallow W Video    Result Date: 3/6/2020  EXAMINATION: MODIFIED BARIUM SWALLOW WAS PERFORMED IN CONJUNCTION WITH SPEECH PATHOLOGY SERVICES TECHNIQUE: Fluoroscopic evaluation of the swallowing mechanism was performed with multiple consistency of barium product. FLUOROSCOPY DOSE AND TYPE OR TIME AND EXPOSURES: Fluoro time: 2 minutes, DAP: 1.663 Gy cm2 COMPARISON: CT neck from 2020 HISTORY: ORDERING SYSTEM PROVIDED HISTORY: swelling TECHNOLOGIST PROVIDED HISTORY: swelling Reason for Exam: Swelling - 2 min - 1.663 Gycm2 Acuity: Unknown Type of Exam: Unknown 37-year-old male with swelling; status post laryngectomy FINDINGS: Postsurgical changes likely related to prior laryngectomy, tracheostomy, and graft reconstruction. Evidence of prior mandibulectomy. Thick liquid substance was not attempted. No penetration or aspiration with the thin liquid substance by straw, pureed/pudding thick substance and soft solid substance. No cookie solid substance was attempted. 1. Postsurgical changes consistent with prior laryngectomy, tracheostomy, graft reconstruction and mandibulectomy. 2. No penetration or aspiration with the above administered substances. Thick liquid and cookie solid substances were not attempted. Please see separate speech pathology report for full discussion of findings and recommendations. [id number]. See Epic. Diagnosis:      1. Right iliac artery aneurysm    Plans:     1.  Endovascular right iliac artery aneurysm repair       GINETTE Auguste CNP  3/17/2020  4:01 PM

## 2020-03-17 NOTE — PRE-PROCEDURE INSTRUCTIONS
[facility]00 AM    Once you enter the hospital lobby, take the elevators to the second floor. Check-In is at the surgery registration desk. If you have been given a blood band, you must bring it with you the day of surgery. Continue to take your home medications as you normally do up to and including the night before surgery with the exception of any blood thinning medications. Please stop any blood thinning medications as directed by your surgeon or prescribing physician. Failure to stop certain medications may interfere with your scheduled surgery. These may include:  Aspirin, Warfarin (Coumadin), Clopidogrel (Plavix), Ibuprofen (Motrin, Advil), Naproxen (Aleve), Meloxicam (Mobic), Celecoxib (Celebrex), Eliquis, Pradaxa, Xarelto, Effient, Fish Oil, Herbal supplements. Tylenol is okay to take    If you are diabetic, do not take any of your diabetic medications by mouth the morning of surgery. If you are taking insulin contact the doctor that manages your diabetes for instructions about any changes to your insulin dosages the day before surgery. Do not inject insulin or other injectable diabetic medications the morning of surgery unless otherwise instructed by the doctor who manages your diabetes. Please take the following medication(s) the day of surgery with a small sip of water:  Hydralazine and omeprazole  Please use your inhalers at home the day of surgery. PREPARING FOR YOUR SURGERY:     Before surgery, you can play an important role in your own health. Because skin is not sterile, we need to be sure that your skin is as free of germs as possible before surgery by carefully washing before surgery. Preparing or prepping skin before surgery can reduce the risk of a surgical site infection.   Do not shave the area of your body where your surgery will be performed unless you received specific permission from your physician.     You will need to shower at home the night before surgery and the morning of surgery with a special soap called chlorhexidine gluconate (CHG*). *Not to be used by people allergic to Chlorhexidine Gluconate (CHG). Following these instructions will help you be sure that your skin is clean before surgery. Instructions on cleaning your skin before surgery: The night before your surgery:      You will need to shower with warm water (not hot) and the CHG soap.  Use a clean wash cloth and a clean towel. Have clean clothes available to put on after the shower.    First wash your hair with regular shampoo. Rinse your hair and body thoroughly to remove the shampoo. [facility] Wash your face with your regular soap or water only. Thoroughly rinse your body with warm water from the neck down.  Turn water off to prevent rinsing the soap off too soon.  With a clean wet washcloth and half of the CHG soap in the bottle, lather your entire body from the neck down. Do not use CHG soap near your eyes or ears to avoid injury to those areas.  Wash thoroughly, paying special attention to the area where your surgery will be performed.  Wash your body gently for five (5) minutes. Avoid scrubbing your skin too hard.  Turn the water back on and rinse your body thoroughly.  Pat yourself dry with a clean, soft towel. Do not apply lotion, cream or powder.  Dress with clean freshly washed clothes. The morning of surgery:     Repeat shower following steps above - using remaining half of CHG soap in bottle. Patient Instructions:    [facility] If you are having any type of anesthesia you are to have nothing to eat or drink after midnight the night before your surgery. This includes gum, mints, water or smoking or chewing tobacco.  The only exception to this is a small sip of water to take with any morning dose of heart, blood pressure, or seizure medications.   No alcoholic beverages for 24 hours prior to surgery.  Bring your eyeglasses and case with you. No contacts are to be worn the day of surgery. You also may bring your hearing aids. Most surgical procedures involving anesthesia will require that you remove your dentures prior to surgery.  If you are on C-PAP or Bi-PAP at home and plan on staying in the hospital overnight for your surgery please bring the machine with you. · Do not wear any jewelry or body piercings day of surgery. Also, NO lotion, perfume or deodorant to be used the day of surgery. No nail polish on the operative extremity (arm/leg surgeries)    ·   If you are staying overnight with us, please bring a small bag of personal items.  Please wear loose, comfortable clothing. If you are potentially going to have a cast or brace bring clothing that will fit over them.  In case of illness - If you have cold or flu like symptoms (high fever, runny nose, sore throat, cough, etc.) rash, nausea, vomiting, loose stools, and/or recent contact with someone who has a contagious disease (chicken pox, measles, etc.) Please call your doctor before coming to the hospital.     If your child is having surgery please make arrangements for any other children to be cared for at home on the day of surgery. Other children are not permitted in recovery room and we want you to be able to spend time with the patient. If other arrangements are not available then we suggest that you have a second adult to stay in the waiting room. Day of Surgery/Procedure:    As a patient at [place] [address] you can expect quality medical and nursing care that is centered on your individual needs. Our goal is to make your surgical experience as comfortable as possible    . Transportation After Your Surgery/Procedure:     You will need a friend or family member to drive you home after your procedure. Your  must be 25years of age or older and able to sign off on your discharge instructions. A taxi cab or any other form of public transportation is not acceptable. Your friend or family member must stay at the hospital throughout your procedure. Someone must remain with you for the first 24 hours after your surgery if you receive anesthesia or medication. If you do not have someone to stay with you, your procedure may be cancelled.       If you have any other questions regarding your procedure or the day of surgery, please call [phone]      _________________________  ____________________________  Signature (Patient)              Signature (Provider) & date

## 2020-03-19 LAB
EKG ATRIAL RATE: 72 BPM
EKG P AXIS: 36 DEGREES
EKG P-R INTERVAL: 146 MS
EKG Q-T INTERVAL: 394 MS
EKG QRS DURATION: 84 MS
EKG QTC CALCULATION (BAZETT): 431 MS
EKG R AXIS: -11 DEGREES
EKG T AXIS: -29 DEGREES
EKG VENTRICULAR RATE: 72 BPM

## 2020-04-02 ENCOUNTER — ANESTHESIA EVENT (OUTPATIENT)
Dept: OPERATING ROOM | Age: 70
DRG: 270 | End: 2020-04-02
Payer: MEDICARE

## 2020-04-07 ENCOUNTER — ANESTHESIA (OUTPATIENT)
Dept: OPERATING ROOM | Age: 70
DRG: 270 | End: 2020-04-07
Payer: MEDICARE

## 2020-04-07 ENCOUNTER — HOSPITAL ENCOUNTER (INPATIENT)
Age: 70
LOS: 1 days | Discharge: HOME | DRG: 270 | End: 2020-04-08
Attending: SURGERY | Admitting: SURGERY
Payer: MEDICARE

## 2020-04-07 VITALS — TEMPERATURE: 97.5 F | DIASTOLIC BLOOD PRESSURE: 68 MMHG | OXYGEN SATURATION: 98 % | SYSTOLIC BLOOD PRESSURE: 110 MMHG

## 2020-04-07 PROBLEM — I72.3 COMMON ILIAC ANEURYSM (HCC): Status: ACTIVE | Noted: 2020-04-07

## 2020-04-07 LAB
HCT VFR BLD CALC: 28 % (ref 40.7–50.3)
HEMOGLOBIN: 9.3 G/DL (ref 13–17)
MCH RBC QN AUTO: 30.8 PG (ref 25.2–33.5)
MCHC RBC AUTO-ENTMCNC: 33.2 G/DL (ref 28.4–34.8)
MCV RBC AUTO: 92.7 FL (ref 82.6–102.9)
NRBC AUTOMATED: 0 PER 100 WBC
PDW BLD-RTO: 17 % (ref 11.8–14.4)
PLATELET # BLD: 302 K/UL (ref 138–453)
PMV BLD AUTO: 10 FL (ref 8.1–13.5)
RBC # BLD: 3.02 M/UL (ref 4.21–5.77)
WBC # BLD: 7.2 K/UL (ref 3.5–11.3)

## 2020-04-07 PROCEDURE — 6360000002 HC RX W HCPCS: Performed by: SURGERY

## 2020-04-07 PROCEDURE — 51702 INSERT TEMP BLADDER CATH: CPT

## 2020-04-07 PROCEDURE — 047F3ZZ DILATION OF LEFT INTERNAL ILIAC ARTERY, PERCUTANEOUS APPROACH: ICD-10-PCS | Performed by: SURGERY

## 2020-04-07 PROCEDURE — 2580000003 HC RX 258: Performed by: SURGERY

## 2020-04-07 PROCEDURE — C1769 GUIDE WIRE: HCPCS | Performed by: SURGERY

## 2020-04-07 PROCEDURE — 6360000002 HC RX W HCPCS

## 2020-04-07 PROCEDURE — 2580000003 HC RX 258: Performed by: NURSE ANESTHETIST, CERTIFIED REGISTERED

## 2020-04-07 PROCEDURE — 2500000003 HC RX 250 WO HCPCS: Performed by: NURSE ANESTHETIST, CERTIFIED REGISTERED

## 2020-04-07 PROCEDURE — 6370000000 HC RX 637 (ALT 250 FOR IP): Performed by: SURGERY

## 2020-04-07 PROCEDURE — 04VC3DZ RESTRICTION OF RIGHT COMMON ILIAC ARTERY WITH INTRALUMINAL DEVICE, PERCUTANEOUS APPROACH: ICD-10-PCS | Performed by: SURGERY

## 2020-04-07 PROCEDURE — 7100000001 HC PACU RECOVERY - ADDTL 15 MIN: Performed by: SURGERY

## 2020-04-07 PROCEDURE — 3600000012 HC SURGERY LEVEL 2 ADDTL 15MIN: Performed by: SURGERY

## 2020-04-07 PROCEDURE — 6370000000 HC RX 637 (ALT 250 FOR IP): Performed by: ANESTHESIOLOGY

## 2020-04-07 PROCEDURE — C1887 CATHETER, GUIDING: HCPCS | Performed by: SURGERY

## 2020-04-07 PROCEDURE — 7100000000 HC PACU RECOVERY - FIRST 15 MIN: Performed by: SURGERY

## 2020-04-07 PROCEDURE — C1894 INTRO/SHEATH, NON-LASER: HCPCS | Performed by: SURGERY

## 2020-04-07 PROCEDURE — 2500000003 HC RX 250 WO HCPCS: Performed by: SURGERY

## 2020-04-07 PROCEDURE — 3700000001 HC ADD 15 MINUTES (ANESTHESIA): Performed by: SURGERY

## 2020-04-07 PROCEDURE — C1725 CATH, TRANSLUMIN NON-LASER: HCPCS | Performed by: SURGERY

## 2020-04-07 PROCEDURE — 3700000000 HC ANESTHESIA ATTENDED CARE: Performed by: SURGERY

## 2020-04-07 PROCEDURE — 6360000002 HC RX W HCPCS: Performed by: ANESTHESIOLOGY

## 2020-04-07 PROCEDURE — 047E3ZZ DILATION OF RIGHT INTERNAL ILIAC ARTERY, PERCUTANEOUS APPROACH: ICD-10-PCS | Performed by: SURGERY

## 2020-04-07 PROCEDURE — 6360000002 HC RX W HCPCS: Performed by: NURSE ANESTHETIST, CERTIFIED REGISTERED

## 2020-04-07 PROCEDURE — 2000000000 HC ICU R&B

## 2020-04-07 PROCEDURE — 2709999900 HC NON-CHARGEABLE SUPPLY: Performed by: SURGERY

## 2020-04-07 PROCEDURE — 04VE3DZ RESTRICTION OF RIGHT INTERNAL ILIAC ARTERY WITH INTRALUMINAL DEVICE, PERCUTANEOUS APPROACH: ICD-10-PCS | Performed by: SURGERY

## 2020-04-07 PROCEDURE — 94640 AIRWAY INHALATION TREATMENT: CPT

## 2020-04-07 PROCEDURE — 3600000002 HC SURGERY LEVEL 2 BASE: Performed by: SURGERY

## 2020-04-07 PROCEDURE — 2780000010 HC IMPLANT OTHER: Performed by: SURGERY

## 2020-04-07 PROCEDURE — 85027 COMPLETE CBC AUTOMATED: CPT

## 2020-04-07 PROCEDURE — 6360000004 HC RX CONTRAST MEDICATION: Performed by: SURGERY

## 2020-04-07 DEVICE — GRAFT EVAR 16FR L156MM DIA16X10MM HI DENS MULTIFILAMENT: Type: IMPLANTABLE DEVICE | Site: GROIN | Status: FUNCTIONAL

## 2020-04-07 DEVICE — IMPLANTABLE DEVICE: Type: IMPLANTABLE DEVICE | Site: GROIN | Status: FUNCTIONAL

## 2020-04-07 RX ORDER — ALLOPURINOL 300 MG/1
300 TABLET ORAL DAILY
Status: DISCONTINUED | OUTPATIENT
Start: 2020-04-08 | End: 2020-04-08 | Stop reason: HOSPADM

## 2020-04-07 RX ORDER — IODIXANOL 320 MG/ML
INJECTION, SOLUTION INTRAVASCULAR PRN
Status: DISCONTINUED | OUTPATIENT
Start: 2020-04-07 | End: 2020-04-07 | Stop reason: ALTCHOICE

## 2020-04-07 RX ORDER — CEFAZOLIN SODIUM 2 G/50ML
2 SOLUTION INTRAVENOUS EVERY 8 HOURS
Status: COMPLETED | OUTPATIENT
Start: 2020-04-07 | End: 2020-04-08

## 2020-04-07 RX ORDER — FENTANYL CITRATE 50 UG/ML
50 INJECTION, SOLUTION INTRAMUSCULAR; INTRAVENOUS EVERY 5 MIN PRN
Status: DISCONTINUED | OUTPATIENT
Start: 2020-04-07 | End: 2020-04-07 | Stop reason: HOSPADM

## 2020-04-07 RX ORDER — HYDROMORPHONE HCL 110MG/55ML
0.25 PATIENT CONTROLLED ANALGESIA SYRINGE INTRAVENOUS EVERY 5 MIN PRN
Status: DISCONTINUED | OUTPATIENT
Start: 2020-04-07 | End: 2020-04-07 | Stop reason: HOSPADM

## 2020-04-07 RX ORDER — SODIUM CHLORIDE, SODIUM LACTATE, POTASSIUM CHLORIDE, CALCIUM CHLORIDE 600; 310; 30; 20 MG/100ML; MG/100ML; MG/100ML; MG/100ML
INJECTION, SOLUTION INTRAVENOUS CONTINUOUS
Status: DISCONTINUED | OUTPATIENT
Start: 2020-04-07 | End: 2020-04-07

## 2020-04-07 RX ORDER — SODIUM CHLORIDE 9 MG/ML
INJECTION, SOLUTION INTRAVENOUS CONTINUOUS
Status: DISCONTINUED | OUTPATIENT
Start: 2020-04-08 | End: 2020-04-07

## 2020-04-07 RX ORDER — SODIUM CHLORIDE, SODIUM LACTATE, POTASSIUM CHLORIDE, CALCIUM CHLORIDE 600; 310; 30; 20 MG/100ML; MG/100ML; MG/100ML; MG/100ML
INJECTION, SOLUTION INTRAVENOUS CONTINUOUS
Status: DISCONTINUED | OUTPATIENT
Start: 2020-04-07 | End: 2020-04-08 | Stop reason: HOSPADM

## 2020-04-07 RX ORDER — CEFAZOLIN SODIUM 2 G/50ML
2 SOLUTION INTRAVENOUS ONCE
Status: COMPLETED | OUTPATIENT
Start: 2020-04-07 | End: 2020-04-07

## 2020-04-07 RX ORDER — HEPARIN SODIUM 10000 [USP'U]/ML
INJECTION, SOLUTION INTRAVENOUS; SUBCUTANEOUS PRN
Status: DISCONTINUED | OUTPATIENT
Start: 2020-04-07 | End: 2020-04-07 | Stop reason: SDUPTHER

## 2020-04-07 RX ORDER — ACETAMINOPHEN 325 MG/1
650 TABLET ORAL EVERY 4 HOURS PRN
Status: DISCONTINUED | OUTPATIENT
Start: 2020-04-07 | End: 2020-04-08 | Stop reason: HOSPADM

## 2020-04-07 RX ORDER — SODIUM BICARBONATE 650 MG/1
650 TABLET ORAL 3 TIMES DAILY
Status: DISCONTINUED | OUTPATIENT
Start: 2020-04-07 | End: 2020-04-08 | Stop reason: HOSPADM

## 2020-04-07 RX ORDER — SODIUM CHLORIDE, SODIUM LACTATE, POTASSIUM CHLORIDE, CALCIUM CHLORIDE 600; 310; 30; 20 MG/100ML; MG/100ML; MG/100ML; MG/100ML
INJECTION, SOLUTION INTRAVENOUS CONTINUOUS PRN
Status: DISCONTINUED | OUTPATIENT
Start: 2020-04-07 | End: 2020-04-07 | Stop reason: SDUPTHER

## 2020-04-07 RX ORDER — VITAMIN B COMPLEX
2000 TABLET ORAL DAILY
Status: DISCONTINUED | OUTPATIENT
Start: 2020-04-08 | End: 2020-04-08 | Stop reason: HOSPADM

## 2020-04-07 RX ORDER — MAGNESIUM SULFATE 1 G/100ML
1 INJECTION INTRAVENOUS PRN
Status: DISCONTINUED | OUTPATIENT
Start: 2020-04-07 | End: 2020-04-07

## 2020-04-07 RX ORDER — CEFAZOLIN SODIUM 2 G/50ML
2 SOLUTION INTRAVENOUS ONCE
Status: DISCONTINUED | OUTPATIENT
Start: 2020-04-07 | End: 2020-04-07 | Stop reason: SDUPTHER

## 2020-04-07 RX ORDER — SODIUM CHLORIDE 0.9 % (FLUSH) 0.9 %
10 SYRINGE (ML) INJECTION PRN
Status: DISCONTINUED | OUTPATIENT
Start: 2020-04-07 | End: 2020-04-08 | Stop reason: HOSPADM

## 2020-04-07 RX ORDER — SODIUM CHLORIDE, SODIUM LACTATE, POTASSIUM CHLORIDE, CALCIUM CHLORIDE 600; 310; 30; 20 MG/100ML; MG/100ML; MG/100ML; MG/100ML
INJECTION, SOLUTION INTRAVENOUS CONTINUOUS
Status: DISCONTINUED | OUTPATIENT
Start: 2020-04-08 | End: 2020-04-07

## 2020-04-07 RX ORDER — PANTOPRAZOLE SODIUM 40 MG/1
40 TABLET, DELAYED RELEASE ORAL
Status: DISCONTINUED | OUTPATIENT
Start: 2020-04-08 | End: 2020-04-08 | Stop reason: HOSPADM

## 2020-04-07 RX ORDER — FENTANYL CITRATE 50 UG/ML
INJECTION, SOLUTION INTRAMUSCULAR; INTRAVENOUS PRN
Status: DISCONTINUED | OUTPATIENT
Start: 2020-04-07 | End: 2020-04-07 | Stop reason: SDUPTHER

## 2020-04-07 RX ORDER — CETIRIZINE HYDROCHLORIDE 10 MG/1
10 TABLET ORAL DAILY
Status: DISCONTINUED | OUTPATIENT
Start: 2020-04-07 | End: 2020-04-07

## 2020-04-07 RX ORDER — DEXAMETHASONE SODIUM PHOSPHATE 10 MG/ML
INJECTION INTRAMUSCULAR; INTRAVENOUS PRN
Status: DISCONTINUED | OUTPATIENT
Start: 2020-04-07 | End: 2020-04-07 | Stop reason: SDUPTHER

## 2020-04-07 RX ORDER — FEXOFENADINE HCL 180 MG/1
180 TABLET ORAL DAILY
Status: DISCONTINUED | OUTPATIENT
Start: 2020-04-07 | End: 2020-04-07 | Stop reason: CLARIF

## 2020-04-07 RX ORDER — ONDANSETRON 2 MG/ML
4 INJECTION INTRAMUSCULAR; INTRAVENOUS
Status: DISCONTINUED | OUTPATIENT
Start: 2020-04-07 | End: 2020-04-07 | Stop reason: HOSPADM

## 2020-04-07 RX ORDER — FENTANYL CITRATE 50 UG/ML
25 INJECTION, SOLUTION INTRAMUSCULAR; INTRAVENOUS EVERY 5 MIN PRN
Status: DISCONTINUED | OUTPATIENT
Start: 2020-04-07 | End: 2020-04-07 | Stop reason: HOSPADM

## 2020-04-07 RX ORDER — SODIUM CHLORIDE 0.9 % (FLUSH) 0.9 %
10 SYRINGE (ML) INJECTION EVERY 12 HOURS SCHEDULED
Status: DISCONTINUED | OUTPATIENT
Start: 2020-04-07 | End: 2020-04-07 | Stop reason: HOSPADM

## 2020-04-07 RX ORDER — POTASSIUM CHLORIDE 7.45 MG/ML
10 INJECTION INTRAVENOUS PRN
Status: DISCONTINUED | OUTPATIENT
Start: 2020-04-07 | End: 2020-04-07

## 2020-04-07 RX ORDER — ONDANSETRON 2 MG/ML
INJECTION INTRAMUSCULAR; INTRAVENOUS PRN
Status: DISCONTINUED | OUTPATIENT
Start: 2020-04-07 | End: 2020-04-07 | Stop reason: SDUPTHER

## 2020-04-07 RX ORDER — PROPOFOL 10 MG/ML
INJECTION, EMULSION INTRAVENOUS PRN
Status: DISCONTINUED | OUTPATIENT
Start: 2020-04-07 | End: 2020-04-07 | Stop reason: SDUPTHER

## 2020-04-07 RX ORDER — MORPHINE SULFATE 4 MG/ML
4 INJECTION, SOLUTION INTRAMUSCULAR; INTRAVENOUS
Status: DISCONTINUED | OUTPATIENT
Start: 2020-04-07 | End: 2020-04-08 | Stop reason: HOSPADM

## 2020-04-07 RX ORDER — OXYCODONE HYDROCHLORIDE AND ACETAMINOPHEN 5; 325 MG/1; MG/1
2 TABLET ORAL EVERY 4 HOURS PRN
Status: DISCONTINUED | OUTPATIENT
Start: 2020-04-07 | End: 2020-04-08 | Stop reason: HOSPADM

## 2020-04-07 RX ORDER — LIDOCAINE HYDROCHLORIDE 10 MG/ML
1 INJECTION, SOLUTION EPIDURAL; INFILTRATION; INTRACAUDAL; PERINEURAL
Status: DISCONTINUED | OUTPATIENT
Start: 2020-04-08 | End: 2020-04-07 | Stop reason: HOSPADM

## 2020-04-07 RX ORDER — HYDROMORPHONE HCL 110MG/55ML
0.5 PATIENT CONTROLLED ANALGESIA SYRINGE INTRAVENOUS EVERY 5 MIN PRN
Status: DISCONTINUED | OUTPATIENT
Start: 2020-04-07 | End: 2020-04-07 | Stop reason: HOSPADM

## 2020-04-07 RX ORDER — BUDESONIDE AND FORMOTEROL FUMARATE DIHYDRATE 160; 4.5 UG/1; UG/1
2 AEROSOL RESPIRATORY (INHALATION) 2 TIMES DAILY
Status: DISCONTINUED | OUTPATIENT
Start: 2020-04-07 | End: 2020-04-08 | Stop reason: HOSPADM

## 2020-04-07 RX ORDER — CETIRIZINE HYDROCHLORIDE 5 MG/1
5 TABLET ORAL DAILY
Status: DISCONTINUED | OUTPATIENT
Start: 2020-04-08 | End: 2020-04-08 | Stop reason: HOSPADM

## 2020-04-07 RX ORDER — HYDRALAZINE HYDROCHLORIDE 50 MG/1
50 TABLET, FILM COATED ORAL ONCE
Status: COMPLETED | OUTPATIENT
Start: 2020-04-07 | End: 2020-04-07

## 2020-04-07 RX ORDER — PHENYLEPHRINE HCL IN 0.9% NACL 1 MG/10 ML
SYRINGE (ML) INTRAVENOUS PRN
Status: DISCONTINUED | OUTPATIENT
Start: 2020-04-07 | End: 2020-04-07 | Stop reason: SDUPTHER

## 2020-04-07 RX ORDER — OXYCODONE HYDROCHLORIDE AND ACETAMINOPHEN 5; 325 MG/1; MG/1
1 TABLET ORAL EVERY 4 HOURS PRN
Status: DISCONTINUED | OUTPATIENT
Start: 2020-04-07 | End: 2020-04-08 | Stop reason: HOSPADM

## 2020-04-07 RX ORDER — SODIUM CHLORIDE 0.9 % (FLUSH) 0.9 %
10 SYRINGE (ML) INJECTION EVERY 12 HOURS SCHEDULED
Status: DISCONTINUED | OUTPATIENT
Start: 2020-04-07 | End: 2020-04-08 | Stop reason: HOSPADM

## 2020-04-07 RX ORDER — MIDAZOLAM HYDROCHLORIDE 1 MG/ML
INJECTION INTRAMUSCULAR; INTRAVENOUS PRN
Status: DISCONTINUED | OUTPATIENT
Start: 2020-04-07 | End: 2020-04-07 | Stop reason: SDUPTHER

## 2020-04-07 RX ORDER — SODIUM CHLORIDE 0.9 % (FLUSH) 0.9 %
10 SYRINGE (ML) INJECTION PRN
Status: DISCONTINUED | OUTPATIENT
Start: 2020-04-07 | End: 2020-04-07 | Stop reason: HOSPADM

## 2020-04-07 RX ORDER — POTASSIUM CHLORIDE 20 MEQ/1
40 TABLET, EXTENDED RELEASE ORAL PRN
Status: DISCONTINUED | OUTPATIENT
Start: 2020-04-07 | End: 2020-04-07

## 2020-04-07 RX ORDER — HYDRALAZINE HYDROCHLORIDE 50 MG/1
50 TABLET, FILM COATED ORAL 3 TIMES DAILY
Status: DISCONTINUED | OUTPATIENT
Start: 2020-04-07 | End: 2020-04-08 | Stop reason: HOSPADM

## 2020-04-07 RX ORDER — MORPHINE SULFATE 2 MG/ML
2 INJECTION, SOLUTION INTRAMUSCULAR; INTRAVENOUS
Status: DISCONTINUED | OUTPATIENT
Start: 2020-04-07 | End: 2020-04-08 | Stop reason: HOSPADM

## 2020-04-07 RX ORDER — ALBUTEROL SULFATE 90 UG/1
2 AEROSOL, METERED RESPIRATORY (INHALATION) EVERY 6 HOURS PRN
Status: DISCONTINUED | OUTPATIENT
Start: 2020-04-07 | End: 2020-04-08 | Stop reason: HOSPADM

## 2020-04-07 RX ADMIN — ONDANSETRON 4 MG: 2 INJECTION, SOLUTION INTRAMUSCULAR; INTRAVENOUS at 13:12

## 2020-04-07 RX ADMIN — Medication 100 MCG: at 11:31

## 2020-04-07 RX ADMIN — HYDRALAZINE HYDROCHLORIDE 50 MG: 50 TABLET, FILM COATED ORAL at 14:55

## 2020-04-07 RX ADMIN — CEFAZOLIN SODIUM 2 G: 2 SOLUTION INTRAVENOUS at 18:24

## 2020-04-07 RX ADMIN — SODIUM BICARBONATE 650 MG: 650 TABLET ORAL at 16:53

## 2020-04-07 RX ADMIN — Medication 100 MCG: at 11:08

## 2020-04-07 RX ADMIN — FENTANYL CITRATE 50 MCG: 50 INJECTION, SOLUTION INTRAMUSCULAR; INTRAVENOUS at 14:02

## 2020-04-07 RX ADMIN — Medication 100 MCG: at 11:32

## 2020-04-07 RX ADMIN — ACETAMINOPHEN 650 MG: 325 TABLET ORAL at 20:36

## 2020-04-07 RX ADMIN — FENTANYL CITRATE 50 MCG: 50 INJECTION, SOLUTION INTRAMUSCULAR; INTRAVENOUS at 10:53

## 2020-04-07 RX ADMIN — FENTANYL CITRATE 25 MCG: 50 INJECTION, SOLUTION INTRAMUSCULAR; INTRAVENOUS at 14:59

## 2020-04-07 RX ADMIN — HYDRALAZINE HYDROCHLORIDE 50 MG: 50 TABLET, FILM COATED ORAL at 20:11

## 2020-04-07 RX ADMIN — FENTANYL CITRATE 50 MCG: 50 INJECTION, SOLUTION INTRAMUSCULAR; INTRAVENOUS at 10:59

## 2020-04-07 RX ADMIN — FENTANYL CITRATE 50 MCG: 50 INJECTION, SOLUTION INTRAMUSCULAR; INTRAVENOUS at 12:04

## 2020-04-07 RX ADMIN — PROPOFOL 100 MG: 10 INJECTION, EMULSION INTRAVENOUS at 12:04

## 2020-04-07 RX ADMIN — CEFAZOLIN SODIUM 2 G: 2 SOLUTION INTRAVENOUS at 10:30

## 2020-04-07 RX ADMIN — Medication 100 MCG: at 11:27

## 2020-04-07 RX ADMIN — PROPOFOL 100 MG: 10 INJECTION, EMULSION INTRAVENOUS at 11:15

## 2020-04-07 RX ADMIN — Medication 100 MCG: at 11:45

## 2020-04-07 RX ADMIN — ENOXAPARIN SODIUM 30 MG: 30 INJECTION SUBCUTANEOUS at 20:11

## 2020-04-07 RX ADMIN — MIDAZOLAM 2 MG: 1 INJECTION INTRAMUSCULAR; INTRAVENOUS at 10:16

## 2020-04-07 RX ADMIN — ALBUTEROL SULFATE 2 PUFF: 90 AEROSOL, METERED RESPIRATORY (INHALATION) at 23:17

## 2020-04-07 RX ADMIN — OXYCODONE HYDROCHLORIDE AND ACETAMINOPHEN 1 TABLET: 5; 325 TABLET ORAL at 18:17

## 2020-04-07 RX ADMIN — Medication 200 MCG: at 11:15

## 2020-04-07 RX ADMIN — SODIUM CHLORIDE, POTASSIUM CHLORIDE, SODIUM LACTATE AND CALCIUM CHLORIDE: 600; 310; 30; 20 INJECTION, SOLUTION INTRAVENOUS at 09:10

## 2020-04-07 RX ADMIN — FENTANYL CITRATE 100 MCG: 50 INJECTION, SOLUTION INTRAMUSCULAR; INTRAVENOUS at 10:25

## 2020-04-07 RX ADMIN — BUDESONIDE AND FORMOTEROL FUMARATE DIHYDRATE 2 PUFF: 160; 4.5 AEROSOL RESPIRATORY (INHALATION) at 19:23

## 2020-04-07 RX ADMIN — SODIUM BICARBONATE 650 MG: 650 TABLET ORAL at 20:12

## 2020-04-07 RX ADMIN — DEXAMETHASONE SODIUM PHOSPHATE 10 MG: 10 INJECTION INTRAMUSCULAR; INTRAVENOUS at 10:59

## 2020-04-07 RX ADMIN — SODIUM CHLORIDE, POTASSIUM CHLORIDE, SODIUM LACTATE AND CALCIUM CHLORIDE: 600; 310; 30; 20 INJECTION, SOLUTION INTRAVENOUS at 10:23

## 2020-04-07 RX ADMIN — CETIRIZINE HYDROCHLORIDE 10 MG: 10 TABLET, FILM COATED ORAL at 16:53

## 2020-04-07 RX ADMIN — OXYCODONE HYDROCHLORIDE AND ACETAMINOPHEN 1 TABLET: 5; 325 TABLET ORAL at 23:51

## 2020-04-07 RX ADMIN — Medication 100 MCG: at 11:41

## 2020-04-07 RX ADMIN — SODIUM CHLORIDE, POTASSIUM CHLORIDE, SODIUM LACTATE AND CALCIUM CHLORIDE: 600; 310; 30; 20 INJECTION, SOLUTION INTRAVENOUS at 10:08

## 2020-04-07 RX ADMIN — Medication 100 MCG: at 11:20

## 2020-04-07 RX ADMIN — PROPOFOL 100 MG: 10 INJECTION, EMULSION INTRAVENOUS at 12:28

## 2020-04-07 RX ADMIN — SODIUM CHLORIDE, POTASSIUM CHLORIDE, SODIUM LACTATE AND CALCIUM CHLORIDE: 600; 310; 30; 20 INJECTION, SOLUTION INTRAVENOUS at 16:08

## 2020-04-07 RX ADMIN — HEPARIN SODIUM 6000 UNITS: 10000 INJECTION, SOLUTION INTRAVENOUS; SUBCUTANEOUS at 11:04

## 2020-04-07 ASSESSMENT — PULMONARY FUNCTION TESTS
PIF_VALUE: 15
PIF_VALUE: 2
PIF_VALUE: 11
PIF_VALUE: 16
PIF_VALUE: 14
PIF_VALUE: 15
PIF_VALUE: 11
PIF_VALUE: 13
PIF_VALUE: 16
PIF_VALUE: 2
PIF_VALUE: 2
PIF_VALUE: 16
PIF_VALUE: 16
PIF_VALUE: 14
PIF_VALUE: 2
PIF_VALUE: 17
PIF_VALUE: 2
PIF_VALUE: 2
PIF_VALUE: 14
PIF_VALUE: 2
PIF_VALUE: 1
PIF_VALUE: 16
PIF_VALUE: 16
PIF_VALUE: 2
PIF_VALUE: 14
PIF_VALUE: 16
PIF_VALUE: 9
PIF_VALUE: 4
PIF_VALUE: 14
PIF_VALUE: 31
PIF_VALUE: 14
PIF_VALUE: 15
PIF_VALUE: 2
PIF_VALUE: 14
PIF_VALUE: 18
PIF_VALUE: 29
PIF_VALUE: 14
PIF_VALUE: 16
PIF_VALUE: 16
PIF_VALUE: 2
PIF_VALUE: 14
PIF_VALUE: 17
PIF_VALUE: 3
PIF_VALUE: 2
PIF_VALUE: 16
PIF_VALUE: 15
PIF_VALUE: 2
PIF_VALUE: 13
PIF_VALUE: 17
PIF_VALUE: 13
PIF_VALUE: 2
PIF_VALUE: 14
PIF_VALUE: 16
PIF_VALUE: 2
PIF_VALUE: 14
PIF_VALUE: 3
PIF_VALUE: 16
PIF_VALUE: 2
PIF_VALUE: 17
PIF_VALUE: 2
PIF_VALUE: 2
PIF_VALUE: 12
PIF_VALUE: 17
PIF_VALUE: 2
PIF_VALUE: 2
PIF_VALUE: 4
PIF_VALUE: 3
PIF_VALUE: 2
PIF_VALUE: 13
PIF_VALUE: 14
PIF_VALUE: 19
PIF_VALUE: 14
PIF_VALUE: 2
PIF_VALUE: 17
PIF_VALUE: 16
PIF_VALUE: 2
PIF_VALUE: 2
PIF_VALUE: 15
PIF_VALUE: 17
PIF_VALUE: 3
PIF_VALUE: 3
PIF_VALUE: 2
PIF_VALUE: 16
PIF_VALUE: 16
PIF_VALUE: 4
PIF_VALUE: 2
PIF_VALUE: 4
PIF_VALUE: 2
PIF_VALUE: 2
PIF_VALUE: 4
PIF_VALUE: 15
PIF_VALUE: 2
PIF_VALUE: 16
PIF_VALUE: 2
PIF_VALUE: 16
PIF_VALUE: 2
PIF_VALUE: 16
PIF_VALUE: 3
PIF_VALUE: 2
PIF_VALUE: 2
PIF_VALUE: 4
PIF_VALUE: 15
PIF_VALUE: 12
PIF_VALUE: 3
PIF_VALUE: 2
PIF_VALUE: 2
PIF_VALUE: 15
PIF_VALUE: 13
PIF_VALUE: 14
PIF_VALUE: 16
PIF_VALUE: 15
PIF_VALUE: 16
PIF_VALUE: 14
PIF_VALUE: 24
PIF_VALUE: 2
PIF_VALUE: 17
PIF_VALUE: 2
PIF_VALUE: 14
PIF_VALUE: 2
PIF_VALUE: 3
PIF_VALUE: 2
PIF_VALUE: 14
PIF_VALUE: 16
PIF_VALUE: 14
PIF_VALUE: 21
PIF_VALUE: 14
PIF_VALUE: 14
PIF_VALUE: 2
PIF_VALUE: 3
PIF_VALUE: 16
PIF_VALUE: 2
PIF_VALUE: 17
PIF_VALUE: 13
PIF_VALUE: 20
PIF_VALUE: 2
PIF_VALUE: 14
PIF_VALUE: 2
PIF_VALUE: 14
PIF_VALUE: 2
PIF_VALUE: 15
PIF_VALUE: 2
PIF_VALUE: 25
PIF_VALUE: 14
PIF_VALUE: 14
PIF_VALUE: 1
PIF_VALUE: 2
PIF_VALUE: 2
PIF_VALUE: 16
PIF_VALUE: 11
PIF_VALUE: 16
PIF_VALUE: 2
PIF_VALUE: 17
PIF_VALUE: 14
PIF_VALUE: 15
PIF_VALUE: 0
PIF_VALUE: 16
PIF_VALUE: 2
PIF_VALUE: 1
PIF_VALUE: 13
PIF_VALUE: 1
PIF_VALUE: 0
PIF_VALUE: 12
PIF_VALUE: 4
PIF_VALUE: 2
PIF_VALUE: 16
PIF_VALUE: 17
PIF_VALUE: 4
PIF_VALUE: 4
PIF_VALUE: 2
PIF_VALUE: 2
PIF_VALUE: 14
PIF_VALUE: 11
PIF_VALUE: 15
PIF_VALUE: 2

## 2020-04-07 ASSESSMENT — PAIN SCALES - GENERAL
PAINLEVEL_OUTOF10: 5
PAINLEVEL_OUTOF10: 0
PAINLEVEL_OUTOF10: 4
PAINLEVEL_OUTOF10: 0
PAINLEVEL_OUTOF10: 9
PAINLEVEL_OUTOF10: 6
PAINLEVEL_OUTOF10: 6
PAINLEVEL_OUTOF10: 5

## 2020-04-07 ASSESSMENT — PAIN DESCRIPTION - LOCATION
LOCATION: ABDOMEN;GROIN
LOCATION: ABDOMEN;GROIN

## 2020-04-07 ASSESSMENT — PAIN DESCRIPTION - DESCRIPTORS
DESCRIPTORS: PATIENT UNABLE TO DESCRIBE
DESCRIPTORS: PATIENT UNABLE TO DESCRIBE

## 2020-04-07 ASSESSMENT — PAIN DESCRIPTION - ORIENTATION
ORIENTATION: RIGHT;LEFT;LOWER
ORIENTATION: RIGHT;LEFT;LOWER

## 2020-04-07 NOTE — H&P
History and Physical Update    Pt Name: Meenakshi Higgins  MRN: [id number]  YOB: 1950  Date of evaluation: 4/7/2020      [x] I have reviewed the H & P found in Epic by Akil Ceballos CNP from 03/17/2020 which meets the criteria for an Interval History and Physical note. [x] I have examined  Meenakshi Higgins a 81 y.o., male who is scheduled for an endovascular right iliac artery aneurysm repair by Dr. Jodi Phalen due to right iliac artery aneurysm. The patient denies health changes since his appointment with Akil Ceballos CNP on 03/17/2020. Pt denies fever, chills, SOB at rest, chest pain, open sores, rashes, and wounds. History of chronic SOB with exertion. Pt has an occasional cough that produces white sputum from his tracheostomy. Pt denies history of diabetes and denies taking blood thinning medications in the past 10 days. Pt is a poor historian. Pt states he is having left lateral side pain due to bumping into a closet door 4 days ago. Vital signs: BP (!) 162/102   Pulse 90   Temp 98.4 °F (36.9 °C) (Oral)   Resp 16   SpO2 97%      Allergies:  Amino acids and Lisinopril    Past medical history, surgical history, social history, and family history were reviewed and updated in EPIC as indicated. Medications:    Prior to Admission medications    Medication Sig Start Date End Date Taking?  Authorizing Provider   hydrALAZINE (APRESOLINE) 50 MG tablet Take 1 tablet by mouth 3 times daily 3/8/20   Fatmata Hsu MD   fexofenadine [facility]) 180 MG tablet Take 1 tablet by mouth daily 3/8/20 4/7/20  Fatmata Hsu MD   cephALEXin (KEFLEX) 500 MG capsule Take 1 capsule by mouth 2 times daily 3/8/20   Fatmata Hsu MD   sodium bicarbonate 650 MG tablet Take 650 mg by mouth 3 times daily     Historical Provider, MD   Cholecalciferol (VITAMIN D3) 50 MCG (2000 UT) CAPS Take 1 capsule by mouth daily    Historical Provider, MD   albuterol sulfate  (90 Base) MCG/ACT inhaler Inhale 2 puffs Annie Jaramillo MD     History Obtained From:      Patient, Medical records     History of Present Illness: This is Mal Lasso a 81 y.o. male who presents for a pre-admission testing appointment for an upcoming endovascular right iliac artery aneurysm repair by Dr. Anaya Osorio scheduled on 04/07/2020 at 0930 due to right iliac artery aneurysm. CT of the abdomen and pelvis without contrast from 01/03/2020 revealed a right common iliac artery aneurysm measuring 3.9 cm. Pt denies abdominal pain, back pain, dizziness, and lightheadedness. Pt is a poor historian. 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 S/p lung surgery and tracheostomy 3-4 years ago. Pt cannot remember if the surgery was on the left or right lung. ECHO 02/11/2020 EF 45% (See paper chart). S/p cardiac catheterization without stent placement on 03/04/2020. Pt is cleared for upcoming surgery per Dr. Elysa Schirmer cardiac catheterization documentation from 03/04/2020. Pt was admitted to the hospital prior to the cardiac catheterization for hydration due to history of renal failure. He was re-admitted on 03/05/2020 due to difficulty swallowing, facial edema, and dyspnea. The facial edema has improved, but the pt is still taking Keflex. The pt is alert and oriented without apparent distress. Denies chest pain, dyspnea at rest, dizziness, and palpitations. Pt follows-up with Dr. Sandro Lema from cardiology. Pt also follows-up with a nephrologist and a pulmonologist but cannot remember their names. Functional Capacity per pt:              1) Pt is not able to walk 2 city blocks on level ground without SOB. Pt denies SOB while getting dressed or making a bed. 2) Pt is not able to climb 2 flights of stairs without SOB. 3) Pt is not able to walk up a hill for 1-2 city blocks without SOB.      Past Medical History:      Past Medical History        Past 300 MG tablet Take 300 mg by mouth daily. Historical Provider, MD            Allergies:      Amino acids and Lisinopril     Social History:      Tobacco:    reports that he has quit smoking. He has never used smokeless tobacco.  Alcohol:      reports previous alcohol use of about 5.0 standard drinks of alcohol per week. Drug Use:  reports no history of drug use. Family History:      Family History   Family History   Problem Relation Age of Onset    Diabetes Mother      Heart Disease Mother           Sudden cardiac death    Diabetes Sister      Heart Disease Sister              Review of Systems:      Positive and Negative as described in HPI. CONSTITUTIONAL: Negative for fevers, chills, sweats, fatigue, and weight loss. HEENT: Negative for glasses, hearing changes, rhinorrhea, and throat pain. RESPIRATORY: SOB with exertion. Wheezing. Pt has a cough that produces white sputum from his tracheostomy. Negative for current shortness of breath and congestion. CARDIOVASCULAR: Negative for chest pain, blood clot, irregular heartbeat, and palpitations. GASTROINTESTINAL: GERD. Negative for nausea, vomiting, diarrhea, constipation, change in bowel habits, and abdominal pain. GENITOURINARY: Negative for difficulty of urination, burning with urination, and frequency. INTEGUMENT: Negative for rash, skin lesions, and easy bruising. HEMATOLOGIC/LYMPHATIC: Chronic left facial edema. ALLERGIC/IMMUNOLOGIC: Negative for urticaria and itching. ENDOCRINE: Increased thirst. Negative for heat or cold intolerance. Pt denies history of diabetes. MUSCULOSKELETAL: Left shoulder pain at night. NEUROLOGICAL: Negative for headaches, dizziness, lightheadedness, numbness, and tingling extremities. Pt denies history of seizures and strokes. BEHAVIOR/PSYCH: Negative for depression and anxiety.      Physical Exam:   BP (!) 168/93   Pulse 82   Temp 97.5 °F (36.4 °C) (Oral)   Resp 16   Ht 5' 6\" (1.676 m)   Wt 156 lb 12 oz (71.1 kg)   SpO2 100%   BMI 25.30 kg/m²     No results for input(s): POCGLU in the last 72 hours. General Appearance:  Alert, well appearing, and in no acute distress. Poor historian. Mental status: Oriented to person, place, and time. Head: Normocephalic and atraumatic. Eye: No icterus, redness, pupils equal and reactive, extraocular eye movements intact, and conjunctiva clear. Ear: Hearing grossly intact. Nose: No drainage noted. Mouth: Mild left facial edema (Chronic per pt). Mucous membranes moist.  Neck: Tracheostomy. Distant bilateral carotid sounds. Supple. Lungs: Bilateral equal air entry, clear to auscultation, no wheezing, rales or rhonchi, and normal effort. Cardiovascular: Murmur 2/6. Normal rate, regular rhythm, no gallop or rub. Abdomen: Soft, non-tender, non-distended, and active bowel sounds. Neurologic: Garbled speech when the pt covers his tracheostomy. Cranial nerves II through XII grossly intact. Strength 5/5 bilaterally. Skin: No gross lesions, rashes, bruising, or bleeding on exposed skin area. Extremities: Shuffled gait. Bilateral lower leg and bilateral ankle 3+ pitting edema. Posterior tibial pulses are palpable bilaterally. No calf tenderness with palpation. Psych: Normal affect.       Investigations:       Laboratory Testing:  Recent Results         Recent Results (from the past 24 hour(s))   CBC     Collection Time: 03/17/20  2:45 PM   Result Value Ref Range     WBC 9.1 3.5 - 11.3 k/uL     RBC 3.39 (L) 4.21 - 5.77 m/uL     Hemoglobin 10.1 (L) 13.0 - 17.0 g/dL     Hematocrit 30.6 (L) 40.7 - 50.3 %     MCV 90.3 82.6 - 102.9 fL     MCH 29.8 25.2 - 33.5 pg     MCHC 33.0 28.4 - 34.8 g/dL     RDW 17.6 (H) 11.8 - 14.4 %     Platelets 065 023 - 573 k/uL     MPV 9.0 8.1 - 13.5 fL     NRBC Automated 0.0 0.0 per 100 WBC   APTT     Collection Time: 03/17/20  2:45 PM   Result Value Ref Range     PTT 25.0 23 - 31 sec   Protime-INR     Collection Time: 03/17/20  2:45 PM Result Value Ref Range     Protime 10.0 9.7 - 11.6 sec     INR 1.0     BUN & Creatinine     Collection Time: 03/17/20  2:45 PM   Result Value Ref Range     BUN 41 (H) 8 - 23 mg/dL     CREATININE 2.78 (H) 0.70 - 1.20 mg/dL     GFR Non-African American 23 (L) >60 mL/min     GFR  28 (L) >60 mL/min     GFR Comment            GFR Staging NOT REPORTED     Electrolyte Panel     Collection Time: 03/17/20  2:45 PM   Result Value Ref Range     Sodium 128 (L) 135 - 144 mmol/L     Potassium 4.8 3.7 - 5.3 mmol/L     Chloride 96 (L) 98 - 107 mmol/L     CO2 19 (L) 20 - 31 mmol/L     Anion Gap 13 9 - 17 mmol/L   EKG 12 Lead     Collection Time: 03/17/20  3:04 PM   Result Value Ref Range     Ventricular Rate 72 BPM     Atrial Rate 72 BPM     P-R Interval 146 ms     QRS Duration 84 ms     Q-T Interval 394 ms     QTc Calculation (Bazett) 431 ms     P Axis 36 degrees     R Axis -11 degrees     T Axis -29 degrees   Urinalysis     Collection Time: 03/17/20  3:10 PM   Result Value Ref Range     Color, UA YELLOW YELLOW     Turbidity UA SLIGHTLY CLOUDY (A) CLEAR     Glucose, Ur NEGATIVE NEGATIVE     Bilirubin Urine NEGATIVE NEGATIVE     Ketones, Urine NEGATIVE NEGATIVE     Specific Gravity, UA 1.020 1.005 - 1.030     Urine Hgb NEGATIVE NEGATIVE     pH, UA 6.0 5.0 - 8.0     Protein, UA 3+ (A) NEGATIVE     Urobilinogen, Urine Normal Normal     Nitrite, Urine NEGATIVE NEGATIVE     Leukocyte Esterase, Urine NEGATIVE NEGATIVE     Urinalysis Comments NOT REPORTED     Microscopic Urinalysis     Collection Time: 03/17/20  3:10 PM   Result Value Ref Range     -            WBC, UA 2 TO 5 0 - 5 /HPF     RBC, UA 0 TO 2 0 - 2 /HPF     Casts UA 2 TO 5 /LPF     Casts UA 5 TO 10 /LPF     Casts UA 2 TO 5 /LPF     Casts UA 2 TO 5 /LPF     Casts UA HYALINE /LPF     Crystals, UA NOT REPORTED None /HPF     Epithelial Cells UA 0 TO 2 0 - 5 /HPF     Renal Epithelial, UA NOT REPORTED 0 /HPF     Bacteria, UA FEW (A) None     Mucus, UA NOT REPORTED

## 2020-04-08 ENCOUNTER — APPOINTMENT (OUTPATIENT)
Dept: GENERAL RADIOLOGY | Age: 70
DRG: 270 | End: 2020-04-08
Attending: SURGERY
Payer: MEDICARE

## 2020-04-08 VITALS
RESPIRATION RATE: 15 BRPM | HEIGHT: 66 IN | BODY MASS INDEX: 25.19 KG/M2 | SYSTOLIC BLOOD PRESSURE: 151 MMHG | OXYGEN SATURATION: 96 % | HEART RATE: 86 BPM | TEMPERATURE: 97.5 F | WEIGHT: 156.75 LBS | DIASTOLIC BLOOD PRESSURE: 94 MMHG

## 2020-04-08 LAB
ABSOLUTE EOS #: <0.03 K/UL (ref 0–0.44)
ABSOLUTE IMMATURE GRANULOCYTE: 0.03 K/UL (ref 0–0.3)
ABSOLUTE LYMPH #: 0.9 K/UL (ref 1.1–3.7)
ABSOLUTE MONO #: 0.61 K/UL (ref 0.1–1.2)
ANION GAP SERPL CALCULATED.3IONS-SCNC: 13 MMOL/L (ref 9–17)
ANION GAP SERPL CALCULATED.3IONS-SCNC: 17 MMOL/L (ref 9–17)
ANION GAP SERPL CALCULATED.3IONS-SCNC: 17 MMOL/L (ref 9–17)
BASOPHILS # BLD: 0 % (ref 0–2)
BASOPHILS ABSOLUTE: <0.03 K/UL (ref 0–0.2)
BUN BLDV-MCNC: 27 MG/DL (ref 8–23)
BUN BLDV-MCNC: 28 MG/DL (ref 8–23)
BUN BLDV-MCNC: 28 MG/DL (ref 8–23)
BUN/CREAT BLD: 8 (ref 9–20)
CALCIUM SERPL-MCNC: 7.6 MG/DL (ref 8.6–10.4)
CALCIUM SERPL-MCNC: 7.7 MG/DL (ref 8.6–10.4)
CALCIUM SERPL-MCNC: 7.7 MG/DL (ref 8.6–10.4)
CHLORIDE BLD-SCNC: 94 MMOL/L (ref 98–107)
CHLORIDE BLD-SCNC: 96 MMOL/L (ref 98–107)
CHLORIDE BLD-SCNC: 96 MMOL/L (ref 98–107)
CO2: 17 MMOL/L (ref 20–31)
CO2: 19 MMOL/L (ref 20–31)
CO2: 19 MMOL/L (ref 20–31)
CREAT SERPL-MCNC: 3.3 MG/DL (ref 0.7–1.2)
CREAT SERPL-MCNC: 3.34 MG/DL (ref 0.7–1.2)
CREAT SERPL-MCNC: 3.45 MG/DL (ref 0.7–1.2)
DIFFERENTIAL TYPE: ABNORMAL
EOSINOPHILS RELATIVE PERCENT: 0 % (ref 1–4)
GFR AFRICAN AMERICAN: 22 ML/MIN
GFR AFRICAN AMERICAN: 22 ML/MIN
GFR AFRICAN AMERICAN: 23 ML/MIN
GFR NON-AFRICAN AMERICAN: 18 ML/MIN
GFR NON-AFRICAN AMERICAN: 19 ML/MIN
GFR NON-AFRICAN AMERICAN: 19 ML/MIN
GFR SERPL CREATININE-BSD FRML MDRD: ABNORMAL ML/MIN/{1.73_M2}
GLUCOSE BLD-MCNC: 103 MG/DL (ref 70–99)
GLUCOSE BLD-MCNC: 158 MG/DL (ref 70–99)
GLUCOSE BLD-MCNC: 181 MG/DL (ref 70–99)
HCT VFR BLD CALC: 26.9 % (ref 40.7–50.3)
HEMOGLOBIN: 8.7 G/DL (ref 13–17)
IMMATURE GRANULOCYTES: 0 %
LYMPHOCYTES # BLD: 13 % (ref 24–43)
MAGNESIUM: 1.5 MG/DL (ref 1.6–2.6)
MAGNESIUM: 1.8 MG/DL (ref 1.6–2.6)
MCH RBC QN AUTO: 30.3 PG (ref 25.2–33.5)
MCHC RBC AUTO-ENTMCNC: 32.3 G/DL (ref 28.4–34.8)
MCV RBC AUTO: 93.7 FL (ref 82.6–102.9)
MONOCYTES # BLD: 9 % (ref 3–12)
NRBC AUTOMATED: 0 PER 100 WBC
PDW BLD-RTO: 17.1 % (ref 11.8–14.4)
PHOSPHORUS: 4.6 MG/DL (ref 2.5–4.5)
PLATELET # BLD: 287 K/UL (ref 138–453)
PLATELET ESTIMATE: ABNORMAL
PMV BLD AUTO: 9.8 FL (ref 8.1–13.5)
POTASSIUM SERPL-SCNC: 4.1 MMOL/L (ref 3.7–5.3)
POTASSIUM SERPL-SCNC: 4.5 MMOL/L (ref 3.7–5.3)
POTASSIUM SERPL-SCNC: 4.6 MMOL/L (ref 3.7–5.3)
RBC # BLD: 2.87 M/UL (ref 4.21–5.77)
RBC # BLD: ABNORMAL 10*6/UL
SEG NEUTROPHILS: 78 % (ref 36–65)
SEGMENTED NEUTROPHILS ABSOLUTE COUNT: 5.59 K/UL (ref 1.5–8.1)
SODIUM BLD-SCNC: 128 MMOL/L (ref 135–144)
SODIUM BLD-SCNC: 130 MMOL/L (ref 135–144)
SODIUM BLD-SCNC: 130 MMOL/L (ref 135–144)
WBC # BLD: 7.1 K/UL (ref 3.5–11.3)
WBC # BLD: ABNORMAL 10*3/UL

## 2020-04-08 PROCEDURE — 6370000000 HC RX 637 (ALT 250 FOR IP): Performed by: SURGERY

## 2020-04-08 PROCEDURE — 36415 COLL VENOUS BLD VENIPUNCTURE: CPT

## 2020-04-08 PROCEDURE — 84100 ASSAY OF PHOSPHORUS: CPT

## 2020-04-08 PROCEDURE — 2580000003 HC RX 258: Performed by: SURGERY

## 2020-04-08 PROCEDURE — 80048 BASIC METABOLIC PNL TOTAL CA: CPT

## 2020-04-08 PROCEDURE — 94640 AIRWAY INHALATION TREATMENT: CPT

## 2020-04-08 PROCEDURE — 6360000002 HC RX W HCPCS: Performed by: SURGERY

## 2020-04-08 PROCEDURE — 2700000000 HC OXYGEN THERAPY PER DAY

## 2020-04-08 PROCEDURE — 83735 ASSAY OF MAGNESIUM: CPT

## 2020-04-08 PROCEDURE — 74019 RADEX ABDOMEN 2 VIEWS: CPT

## 2020-04-08 PROCEDURE — 85025 COMPLETE CBC W/AUTO DIFF WBC: CPT

## 2020-04-08 PROCEDURE — 6360000002 HC RX W HCPCS: Performed by: INTERNAL MEDICINE

## 2020-04-08 RX ORDER — HYDROCODONE BITARTRATE AND ACETAMINOPHEN 5; 325 MG/1; MG/1
1 TABLET ORAL EVERY 4 HOURS PRN
Qty: 42 TABLET | Refills: 0 | Status: ON HOLD | OUTPATIENT
Start: 2020-04-08 | End: 2020-04-17 | Stop reason: HOSPADM

## 2020-04-08 RX ORDER — MAGNESIUM SULFATE 1 G/100ML
1 INJECTION INTRAVENOUS ONCE
Status: COMPLETED | OUTPATIENT
Start: 2020-04-08 | End: 2020-04-08

## 2020-04-08 RX ADMIN — CEFAZOLIN SODIUM 2 G: 2 SOLUTION INTRAVENOUS at 00:00

## 2020-04-08 RX ADMIN — ACETAMINOPHEN 650 MG: 325 TABLET ORAL at 05:41

## 2020-04-08 RX ADMIN — CETIRIZINE HYDROCHLORIDE 5 MG: 5 TABLET ORAL at 08:39

## 2020-04-08 RX ADMIN — HYDRALAZINE HYDROCHLORIDE 50 MG: 50 TABLET, FILM COATED ORAL at 08:39

## 2020-04-08 RX ADMIN — ALLOPURINOL 300 MG: 300 TABLET ORAL at 08:39

## 2020-04-08 RX ADMIN — OXYCODONE HYDROCHLORIDE AND ACETAMINOPHEN 1 TABLET: 5; 325 TABLET ORAL at 10:00

## 2020-04-08 RX ADMIN — MELATONIN 2000 UNITS: at 08:39

## 2020-04-08 RX ADMIN — PANTOPRAZOLE SODIUM 40 MG: 40 TABLET, DELAYED RELEASE ORAL at 06:36

## 2020-04-08 RX ADMIN — Medication 10 ML: at 08:39

## 2020-04-08 RX ADMIN — ENOXAPARIN SODIUM 30 MG: 30 INJECTION SUBCUTANEOUS at 08:39

## 2020-04-08 RX ADMIN — MAGNESIUM SULFATE HEPTAHYDRATE 1 G: 1 INJECTION, SOLUTION INTRAVENOUS at 06:27

## 2020-04-08 RX ADMIN — ALBUTEROL SULFATE 2 PUFF: 90 AEROSOL, METERED RESPIRATORY (INHALATION) at 05:56

## 2020-04-08 RX ADMIN — SODIUM BICARBONATE 650 MG: 650 TABLET ORAL at 08:39

## 2020-04-08 ASSESSMENT — PAIN DESCRIPTION - LOCATION: LOCATION: HIP

## 2020-04-08 ASSESSMENT — PAIN DESCRIPTION - PROGRESSION: CLINICAL_PROGRESSION: GRADUALLY IMPROVING

## 2020-04-08 ASSESSMENT — PAIN SCALES - GENERAL
PAINLEVEL_OUTOF10: 8
PAINLEVEL_OUTOF10: 3
PAINLEVEL_OUTOF10: 5

## 2020-04-08 ASSESSMENT — PAIN DESCRIPTION - ONSET: ONSET: ON-GOING

## 2020-04-08 ASSESSMENT — PAIN DESCRIPTION - FREQUENCY: FREQUENCY: CONTINUOUS

## 2020-04-08 ASSESSMENT — PAIN DESCRIPTION - PAIN TYPE: TYPE: CHRONIC PAIN

## 2020-04-08 ASSESSMENT — PAIN DESCRIPTION - ORIENTATION: ORIENTATION: RIGHT

## 2020-04-08 ASSESSMENT — PAIN DESCRIPTION - DESCRIPTORS: DESCRIPTORS: CONSTANT;ACHING

## 2020-04-08 NOTE — CARE COORDINATION
Case Management Initial Discharge Plan  Marley Corey,         Readmission Risk              Risk of Unplanned Readmission:        28             Met with:patient to discuss discharge plans. Information verified: address, contacts, phone number, , insurance Yes  PCP: Gena Rivas MD  Date of last visit:     Insurance Provider: Medicare    Discharge Planning  Current Residence:  House- lives with brother  Living Arrangements:      Home has 1 stories/2 stairs to climb  Support Systems:     Current Services PTA:  none Agency: na  Patient able to perform ADL's:Independent  DME in home:  cane  DME used to aid ambulation prior to admission:     DME used during admission:      Potential Assistance Needed:       Pharmacy: [facility]  Medications:     Does patient want to participate in local refill/ meds to beds program?  No    Patient agreeable to home care: No  [place] of choice provided:  n/a      Type of Home Care Services:    Patient expects to be discharged to:      Prior SNF/Rehab Placement and Facility: none  Agreeable to SNF/Rehab: No  [place] of choice provided: n/a   Evaluation: no    Expected Discharge date: Follow Up Appointment: Best Day/ Time:     Transportation provider:   Transportation arrangements needed for discharge: No    Discharge Plan: Met with patient at bedside. Assessment from 3/6 reviewed, all information the same. Pt resides with brother, uses cane occasionally. No identified needs.          Electronically signed by DANNA Lopez on 20 at 12:13 PM EDT

## 2020-04-08 NOTE — OP NOTE
[id number] [facility] [address]                [address] [place] [address]                                OPERATIVE REPORT    PATIENT NAME: Ankush Maldonado                    :        1950  MED REC NO:   [id number]                             ROOM:       [facility]  ACCOUNT NO:   [de-identified]                           ADMIT DATE: 2020  PROVIDER:     Corinne Humphrey MD    DATE OF PROCEDURE:  2020    PREOPERATIVE DIAGNOSIS:  A 4 cm right common iliac artery aneurysm. POSTOPERATIVE DIAGNOSIS:  A 4 cm right common iliac artery aneurysm. PROCEDURES PERFORMED:  1. Endovascular aortic repair with 23 mm x 13 mm x 145 mm main body  device (left). 2.  Placement of right iliac limb, Endurant 16 mm x 10 mm x 156 mm.  3.  Coil embolization of right internal iliac artery with 12 mm x 40 cm  Interlock coil. 4.  Bilateral open femoral exposure. 5.  Diagnostic and completion angiography. 6.  Aortic and bilateral iliac limb angioplasty with Reliant balloons. SURGEON:  Corinne Humphrey MD    ANESTHESIA:  General endotracheal.    ESTIMATED BLOOD LOSS:  35 mL. COMPLICATIONS:  None. OPERATIVE INDICATIONS:  The patient is a 73-year-old male who presents  with a very large 4 cm right common iliac artery aneurysm with native  vessel measuring approximately 10 mm. Due to the proximity of the  aorta, endovascular aortic repair is required and with coil embolization  of the right internal iliac artery. OPERATIVE TECHNIQUE:  After informed consent had been obtained, the  patient was brought to the operating room, placed in the supine  position, and general endotracheal anesthesia was induced by his  preexisting tracheostomy. The patient was then prepped and draped in  the usual sterile fashion. Oblique incisions were then made in both  groins and subcutaneous tissue was sharply taken down. Bleeding points  being controlled with electrocautery.   Femoral vessels were readily  identified and circumferentially dissected. The vessels were then  placed surround them. Wire access was obtained in both groins and  8-Hungarian sheaths were advanced bilaterally. A 5-Hungarian SOS Omni Flush  catheter was then advanced into the suprarenal aorta and using 50%  diluted Visipaque 320 contrast and digital imaging, abdominal  aortography was performed. A Lunderquist guidewire was then advanced  from the right side in order to straighten the tortuous anatomy of the  right iliac system. On the left side, a 14-Hungarian sheath was placed  followed by a 7-Hungarian TourGuide and the contralateral iliac artery was  cannulated and the TourGuide advanced into the right iliac aneurysm. Using a 0.035 stiff-angled Glidewire, the internal iliac artery was  cannulated and a 5-Hungarian H1 catheter was advanced into the origin of  the common iliac artery. Contrast angiography was performed to confirm  placement. A 12 mm x 40 cm Interlock coil was then advanced and  deployed into the proximal portion of the right internal iliac artery. The patient's angiography was performed demonstrating a successful  embolization of the right internal iliac artery. Next, a Lunderquist  guidewire was advanced from the left side and an Endurant 23 mm x 13 mm  x 145 mm main body device was brought into the field and oriented with  the gate lateral.  The device was then advanced from the left side and  contrast angiography was performed in order to confirm the position of  the renal arteries. The device was then deployed several millimeters  distal to the lowest renal artery and the contralateral gate was  released. Omni flush catheter was brought down and the contralateral  gate was cannulated and the Lunderquist guidewire was re-advanced from  the right side. An Endurant 16 mm x 10 mm x 156 mm iliac limb was then  advanced from the right side and deployed into the right external iliac  artery across the bifurcation.

## 2020-04-08 NOTE — DISCHARGE SUMMARY
VASCULAR SURGERY   DISCHARGE SUMMARY      Patient Identification  Chantelle Virgen is a 81 y.o. male. :  1950  Admit Date:  2020    Discharge date:   No discharge date for patient encounter. Disposition: home    Discharge Diagnoses:   Patient Active Problem List   Diagnosis    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    Laryngeal cancer [facility]    Mouth swelling    COPD (chronic obstructive pulmonary disease) ([facility]    Severe malnutrition ([facility]    LITTLE (acute kidney injury) ([facility]    Chest pain    Facial edema    Common iliac aneurysm (HCC)         Consults: IM    Surgery: EVAR/endovascular repair right common iliac artery aneurysm  Embolization of right internal iliac artery    Patient Instructions: Activity: no heavy lifting, pushing, pulling for 6 weeks, no driving for 2 weeks or while on analgesics  Diet: As tolerated  Follow-up with Janusz Camejo MD in 3-4 weeks. See pre-printed instructions in chart and given to patient upon discharge. Discharge Medications:      [facility] Medication Instructions OOT:[id number]    Printed on:20 [id number]   Medication Information                      albuterol sulfate  (90 Base) MCG/ACT inhaler  Inhale 2 puffs into the lungs every 6 hours as needed for Wheezing or Shortness of Breath             allopurinol (ZYLOPRIM) 300 MG tablet  Take 300 mg by mouth daily. budesonide-formoterol (SYMBICORT) 160-4.5 MCG/ACT AERO  Inhale 2 puffs into the lungs 2 times daily             cephALEXin (KEFLEX) 500 MG capsule  Take 1 capsule by mouth 2 times daily             Cholecalciferol (VITAMIN D3) 50 MCG ( UT) CAPS  Take 1 capsule by mouth daily             hydrALAZINE (APRESOLINE) 50 MG tablet  Take 1 tablet by mouth 3 times daily             HYDROcodone-acetaminophen (NORCO) 5-325 MG per tablet  Take 1 tablet by mouth every 4 hours as needed for Pain for up to 7 days. Intended supply: 7 days. Take lowest dose possible to manage pain             omeprazole (PRILOSEC) 20 MG delayed release capsule  Take 20 mg by mouth every morning (before breakfast)             sodium bicarbonate 650 MG tablet  Take 650 mg by mouth 3 times daily                   HPI and Hospital Course: 46-year-old male underwent EVAR/endovascular pair of right common iliac artery aneurysm and embolization of the right internal iliac artery without incident. Normal postoperative course. He was discharged home on postoperative day #1 in good condition. Instructed follow-up in the office in 3 to 4 weeks.         Rylee Russell MD FACS

## 2020-04-08 NOTE — PROGRESS NOTES
Patient given written and verbal instructions, f/u w/ Dr Mesfin Messer on Monday, Lab draw on Friday. Patient verbalized understanding. DC via [facility] w/escort.  Dmitriy Liu RN

## 2020-04-08 NOTE — PROGRESS NOTES
VASCULAR SURGERY  PROGRESS NOTE  POST-OP EVAR    4/8/2020  9:48 AM     Katerine Valladares    1950   [id number]        SUBJECTIVE:  Patient awake and alert. No complaints. Good pain control. Denies nausea. OBJECTIVE    Physical  VITALS:  BP (!) 151/94   Pulse 76   Temp 97.5 °F (36.4 °C) (Temporal)   Resp 18   Ht 5' 6\" (1.676 m)   Wt 156 lb 12 oz (71.1 kg)   SpO2 98%   BMI 25.30 kg/m²     CONSTITUTIONAL:  awake, alert, cooperative, no apparent distress and appears stated age  ABDOMEN: soft, non-tender, non-distended, incision sites C+D  EXTREMITIES: warm, no ischemia or edema  NEUROLOGIC:  Mental status unchanged. Motor and sensory function intact. Data  Hemoglobin   Date/Time Value Ref Range Status   04/08/2020 05:16 AM 8.7 (L) 13.0 - 17.0 g/dL Final     Hematocrit   Date/Time Value Ref Range Status   04/08/2020 05:16 AM 26.9 (L) 40.7 - 50.3 % Final     Sodium   Date/Time Value Ref Range Status   04/08/2020 08:54  (L) 135 - 144 mmol/L Final     Potassium   Date/Time Value Ref Range Status   04/08/2020 08:54 AM 4.1 3.7 - 5.3 mmol/L Final     Chloride   Date/Time Value Ref Range Status   04/08/2020 08:54 AM 94 (L) 98 - 107 mmol/L Final     CO2   Date/Time Value Ref Range Status   04/08/2020 08:54 AM 19 (L) 20 - 31 mmol/L Final     BUN   Date/Time Value Ref Range Status   04/08/2020 08:54 AM 28 (H) 8 - 23 mg/dL Final       ASSESSMENT AND PLAN    81 y.o. male doing well status post EVAR / Rt iliac aneurysm repair      Plan home today. Follow-up in Office. Continue aspirin. Call for problems.     Electronically signed by Arcelia Starr MD on 4/8/2020 at 9:48 AM

## 2020-04-09 ENCOUNTER — CARE COORDINATION (OUTPATIENT)
Dept: CASE MANAGEMENT | Age: 70
End: 2020-04-09

## 2020-04-09 LAB
ABO/RH: NORMAL
ANTIBODY SCREEN: NEGATIVE
ARM BAND NUMBER: NORMAL
BLD PROD TYP BPU: NORMAL
CROSSMATCH RESULT: NORMAL
DISPENSE STATUS BLOOD BANK: NORMAL
EXPIRATION DATE: NORMAL
TRANSFUSION STATUS: NORMAL
UNIT DIVISION: 0
UNIT NUMBER: NORMAL

## 2020-04-09 NOTE — CARE COORDINATION
James Loop and agrees to enroll no  Patient's preferred e-mail:    Patient's preferred phone number:   Based on Loop alert triggers, patient will be contacted by nurse care manager for worsening symptoms. Plan for follow-up call in 14 days based on severity of symptoms and risk factors      Call to pt brother (pt in bathroom) who states pt doing okay just having pain in groins but taking the pain med- writer advised taking norco ATC for now until aching/ pain subsides- v/u  States pt has not complained of swelling or drainage from groin sites  States understanding no lifting more than a gallon of milk until cleared by Dr Joselyn Zepeda pt has not complained of SOB or fever  States understanding about staying home and prevention measures with regards to COVID-19   Declines Loop  Denies needs  Encouraged to call writer/ CTC or providers if questions or concerns- v/u       Facility: [facility]     Non-face-to-face services provided:  Scheduled appointment with PCP-pt brother calling to schedule with PCP   Scheduled appointment with Specialist-pt brother calling to schedule with Dr Bobby Gandhi and support for treatment adherence and medication management-confirms new med    Care Transitions 24 Hour Call    Do you have any ongoing symptoms?:  No  Do you have a copy of your discharge instructions?:  Yes  Do you have all of your prescriptions and are they filled?:  Yes  Have you been contacted by a [facility] Pharmacist?:  No  Have you scheduled your follow up appointment?:  Yes  How are you going to get to your appointment?:  Car - family or friend to transport  Were you discharged with any Home Care or Post Acute Services:  No  Do you feel like you have everything you need to keep you well at home?:  Yes  Care Transitions Interventions         Follow Up  No future appointments.     Jeremie Toure, RN

## 2020-04-12 ENCOUNTER — APPOINTMENT (OUTPATIENT)
Dept: GENERAL RADIOLOGY | Age: 70
DRG: 194 | End: 2020-04-12
Payer: MEDICARE

## 2020-04-12 ENCOUNTER — HOSPITAL ENCOUNTER (INPATIENT)
Age: 70
LOS: 5 days | Discharge: HOME | DRG: 194 | End: 2020-04-17
Attending: EMERGENCY MEDICINE | Admitting: INTERNAL MEDICINE
Payer: MEDICARE

## 2020-04-12 PROBLEM — R06.02 SOB (SHORTNESS OF BREATH): Status: RESOLVED | Noted: 2020-04-12 | Resolved: 2020-04-12

## 2020-04-12 PROBLEM — R06.02 SOB (SHORTNESS OF BREATH): Status: ACTIVE | Noted: 2020-04-12

## 2020-04-12 LAB
ABSOLUTE EOS #: 0.08 K/UL (ref 0–0.44)
ABSOLUTE EOS #: 0.08 K/UL (ref 0–0.44)
ABSOLUTE IMMATURE GRANULOCYTE: 0.06 K/UL (ref 0–0.3)
ABSOLUTE IMMATURE GRANULOCYTE: 0.06 K/UL (ref 0–0.3)
ABSOLUTE LYMPH #: 1.28 K/UL (ref 1.1–3.7)
ABSOLUTE LYMPH #: 2.01 K/UL (ref 1.1–3.7)
ABSOLUTE MONO #: 0.99 K/UL (ref 0.1–1.2)
ABSOLUTE MONO #: 1.26 K/UL (ref 0.1–1.2)
ALBUMIN SERPL-MCNC: 2.7 G/DL (ref 3.5–5.2)
ALBUMIN/GLOBULIN RATIO: ABNORMAL (ref 1–2.5)
ALP BLD-CCNC: 72 U/L (ref 40–129)
ALT SERPL-CCNC: <5 U/L (ref 5–41)
ANION GAP SERPL CALCULATED.3IONS-SCNC: 16 MMOL/L (ref 9–17)
AST SERPL-CCNC: 17 U/L
BASOPHILS # BLD: 0 % (ref 0–2)
BASOPHILS # BLD: 0 % (ref 0–2)
BASOPHILS ABSOLUTE: 0.03 K/UL (ref 0–0.2)
BASOPHILS ABSOLUTE: <0.03 K/UL (ref 0–0.2)
BILIRUB SERPL-MCNC: 0.36 MG/DL (ref 0.3–1.2)
BUN BLDV-MCNC: 28 MG/DL (ref 8–23)
BUN/CREAT BLD: 10 (ref 9–20)
C-REACTIVE PROTEIN: 62.9 MG/L (ref 0–5)
CALCIUM SERPL-MCNC: 8.2 MG/DL (ref 8.6–10.4)
CHLORIDE BLD-SCNC: 100 MMOL/L (ref 98–107)
CO2: 19 MMOL/L (ref 20–31)
CREAT SERPL-MCNC: 2.79 MG/DL (ref 0.7–1.2)
DIFFERENTIAL TYPE: ABNORMAL
DIFFERENTIAL TYPE: ABNORMAL
EOSINOPHILS RELATIVE PERCENT: 1 % (ref 1–4)
EOSINOPHILS RELATIVE PERCENT: 1 % (ref 1–4)
FERRITIN: 145 UG/L (ref 30–400)
GFR AFRICAN AMERICAN: 28 ML/MIN
GFR NON-AFRICAN AMERICAN: 23 ML/MIN
GFR SERPL CREATININE-BSD FRML MDRD: ABNORMAL ML/MIN/{1.73_M2}
GFR SERPL CREATININE-BSD FRML MDRD: ABNORMAL ML/MIN/{1.73_M2}
GLUCOSE BLD-MCNC: 95 MG/DL (ref 70–99)
HCT VFR BLD CALC: 24.3 % (ref 40.7–50.3)
HCT VFR BLD CALC: 25.9 % (ref 40.7–50.3)
HEMOGLOBIN: 7.8 G/DL (ref 13–17)
HEMOGLOBIN: 8 G/DL (ref 13–17)
IMMATURE GRANULOCYTES: 1 %
IMMATURE GRANULOCYTES: 1 %
LACTATE DEHYDROGENASE: 230 U/L (ref 135–225)
LACTIC ACID, WHOLE BLOOD: NORMAL MMOL/L (ref 0.7–2.1)
LACTIC ACID: 1.1 MMOL/L (ref 0.5–2.2)
LYMPHOCYTES # BLD: 19 % (ref 24–43)
LYMPHOCYTES # BLD: 23 % (ref 24–43)
MCH RBC QN AUTO: 29.6 PG (ref 25.2–33.5)
MCH RBC QN AUTO: 30.7 PG (ref 25.2–33.5)
MCHC RBC AUTO-ENTMCNC: 30.9 G/DL (ref 28.4–34.8)
MCHC RBC AUTO-ENTMCNC: 32.1 G/DL (ref 28.4–34.8)
MCV RBC AUTO: 95.7 FL (ref 82.6–102.9)
MCV RBC AUTO: 95.9 FL (ref 82.6–102.9)
MONOCYTES # BLD: 14 % (ref 3–12)
MONOCYTES # BLD: 14 % (ref 3–12)
NRBC AUTOMATED: 0 PER 100 WBC
NRBC AUTOMATED: 0 PER 100 WBC
PDW BLD-RTO: 16.5 % (ref 11.8–14.4)
PDW BLD-RTO: 16.7 % (ref 11.8–14.4)
PLATELET # BLD: 245 K/UL (ref 138–453)
PLATELET # BLD: 256 K/UL (ref 138–453)
PLATELET ESTIMATE: ABNORMAL
PLATELET ESTIMATE: ABNORMAL
PMV BLD AUTO: 9.8 FL (ref 8.1–13.5)
PMV BLD AUTO: 9.9 FL (ref 8.1–13.5)
POTASSIUM SERPL-SCNC: 4.1 MMOL/L (ref 3.7–5.3)
PROCALCITONIN: 0.17 NG/ML
RBC # BLD: 2.54 M/UL (ref 4.21–5.77)
RBC # BLD: 2.7 M/UL (ref 4.21–5.77)
RBC # BLD: ABNORMAL 10*6/UL
RBC # BLD: ABNORMAL 10*6/UL
SEG NEUTROPHILS: 61 % (ref 36–65)
SEG NEUTROPHILS: 65 % (ref 36–65)
SEGMENTED NEUTROPHILS ABSOLUTE COUNT: 4.5 K/UL (ref 1.5–8.1)
SEGMENTED NEUTROPHILS ABSOLUTE COUNT: 5.39 K/UL (ref 1.5–8.1)
SODIUM BLD-SCNC: 135 MMOL/L (ref 135–144)
TOTAL PROTEIN: 5.9 G/DL (ref 6.4–8.3)
WBC # BLD: 6.9 K/UL (ref 3.5–11.3)
WBC # BLD: 8.8 K/UL (ref 3.5–11.3)
WBC # BLD: ABNORMAL 10*3/UL
WBC # BLD: ABNORMAL 10*3/UL

## 2020-04-12 PROCEDURE — 87186 SC STD MICRODIL/AGAR DIL: CPT

## 2020-04-12 PROCEDURE — 85025 COMPLETE CBC W/AUTO DIFF WBC: CPT

## 2020-04-12 PROCEDURE — 80053 COMPREHEN METABOLIC PANEL: CPT

## 2020-04-12 PROCEDURE — 85379 FIBRIN DEGRADATION QUANT: CPT

## 2020-04-12 PROCEDURE — U0002 COVID-19 LAB TEST NON-CDC: HCPCS

## 2020-04-12 PROCEDURE — 87070 CULTURE OTHR SPECIMN AEROBIC: CPT

## 2020-04-12 PROCEDURE — 86140 C-REACTIVE PROTEIN: CPT

## 2020-04-12 PROCEDURE — 82728 ASSAY OF FERRITIN: CPT

## 2020-04-12 PROCEDURE — APPNB30 APP NON BILLABLE TIME 0-30 MINS: Performed by: NURSE PRACTITIONER

## 2020-04-12 PROCEDURE — 87449 NOS EACH ORGANISM AG IA: CPT

## 2020-04-12 PROCEDURE — 99285 EMERGENCY DEPT VISIT HI MDM: CPT

## 2020-04-12 PROCEDURE — 36415 COLL VENOUS BLD VENIPUNCTURE: CPT

## 2020-04-12 PROCEDURE — 71045 X-RAY EXAM CHEST 1 VIEW: CPT

## 2020-04-12 PROCEDURE — 83605 ASSAY OF LACTIC ACID: CPT

## 2020-04-12 PROCEDURE — 84145 PROCALCITONIN (PCT): CPT

## 2020-04-12 PROCEDURE — 85610 PROTHROMBIN TIME: CPT

## 2020-04-12 PROCEDURE — 6370000000 HC RX 637 (ALT 250 FOR IP): Performed by: NURSE PRACTITIONER

## 2020-04-12 PROCEDURE — 87205 SMEAR GRAM STAIN: CPT

## 2020-04-12 PROCEDURE — 83615 LACTATE (LD) (LDH) ENZYME: CPT

## 2020-04-12 PROCEDURE — 2580000003 HC RX 258: Performed by: NURSE PRACTITIONER

## 2020-04-12 PROCEDURE — 87899 AGENT NOS ASSAY W/OPTIC: CPT

## 2020-04-12 PROCEDURE — 86403 PARTICLE AGGLUT ANTBDY SCRN: CPT

## 2020-04-12 PROCEDURE — 84484 ASSAY OF TROPONIN QUANT: CPT

## 2020-04-12 PROCEDURE — 6360000002 HC RX W HCPCS: Performed by: NURSE PRACTITIONER

## 2020-04-12 PROCEDURE — 1200000000 HC SEMI PRIVATE

## 2020-04-12 PROCEDURE — 2700000000 HC OXYGEN THERAPY PER DAY

## 2020-04-12 PROCEDURE — 0100U HC RESPIRPTHGN MULT REV TRANS & AMP PRB TECH 21 TRGT: CPT

## 2020-04-12 RX ORDER — HEPARIN SODIUM 5000 [USP'U]/ML
5000 INJECTION, SOLUTION INTRAVENOUS; SUBCUTANEOUS EVERY 8 HOURS SCHEDULED
Status: DISCONTINUED | OUTPATIENT
Start: 2020-04-13 | End: 2020-04-17 | Stop reason: HOSPADM

## 2020-04-12 RX ORDER — BUDESONIDE AND FORMOTEROL FUMARATE DIHYDRATE 160; 4.5 UG/1; UG/1
2 AEROSOL RESPIRATORY (INHALATION) 2 TIMES DAILY
Status: DISCONTINUED | OUTPATIENT
Start: 2020-04-12 | End: 2020-04-12

## 2020-04-12 RX ORDER — SODIUM CHLORIDE 0.9 % (FLUSH) 0.9 %
10 SYRINGE (ML) INJECTION EVERY 12 HOURS SCHEDULED
Status: DISCONTINUED | OUTPATIENT
Start: 2020-04-12 | End: 2020-04-17 | Stop reason: HOSPADM

## 2020-04-12 RX ORDER — ALLOPURINOL 300 MG/1
300 TABLET ORAL DAILY
Status: DISCONTINUED | OUTPATIENT
Start: 2020-04-13 | End: 2020-04-17 | Stop reason: HOSPADM

## 2020-04-12 RX ORDER — PROMETHAZINE HYDROCHLORIDE 12.5 MG/1
12.5 TABLET ORAL EVERY 6 HOURS PRN
Status: DISCONTINUED | OUTPATIENT
Start: 2020-04-12 | End: 2020-04-17 | Stop reason: HOSPADM

## 2020-04-12 RX ORDER — ACETAMINOPHEN 325 MG/1
650 TABLET ORAL EVERY 6 HOURS PRN
Status: DISCONTINUED | OUTPATIENT
Start: 2020-04-12 | End: 2020-04-17 | Stop reason: HOSPADM

## 2020-04-12 RX ORDER — NICOTINE 21 MG/24HR
1 PATCH, TRANSDERMAL 24 HOURS TRANSDERMAL DAILY PRN
Status: DISCONTINUED | OUTPATIENT
Start: 2020-04-12 | End: 2020-04-17 | Stop reason: HOSPADM

## 2020-04-12 RX ORDER — ACETAMINOPHEN 325 MG/1
650 TABLET ORAL EVERY 6 HOURS PRN
Status: DISCONTINUED | OUTPATIENT
Start: 2020-04-12 | End: 2020-04-12 | Stop reason: SDUPTHER

## 2020-04-12 RX ORDER — ACETAMINOPHEN 650 MG/1
650 SUPPOSITORY RECTAL EVERY 6 HOURS PRN
Status: DISCONTINUED | OUTPATIENT
Start: 2020-04-12 | End: 2020-04-12 | Stop reason: SDUPTHER

## 2020-04-12 RX ORDER — VITAMIN B COMPLEX
2000 TABLET ORAL DAILY
Status: DISCONTINUED | OUTPATIENT
Start: 2020-04-13 | End: 2020-04-17 | Stop reason: HOSPADM

## 2020-04-12 RX ORDER — ACETAMINOPHEN 650 MG/1
650 SUPPOSITORY RECTAL EVERY 6 HOURS PRN
Status: DISCONTINUED | OUTPATIENT
Start: 2020-04-12 | End: 2020-04-17 | Stop reason: HOSPADM

## 2020-04-12 RX ORDER — SODIUM CHLORIDE 0.9 % (FLUSH) 0.9 %
10 SYRINGE (ML) INJECTION PRN
Status: DISCONTINUED | OUTPATIENT
Start: 2020-04-12 | End: 2020-04-17 | Stop reason: HOSPADM

## 2020-04-12 RX ORDER — BUDESONIDE AND FORMOTEROL FUMARATE DIHYDRATE 160; 4.5 UG/1; UG/1
2 AEROSOL RESPIRATORY (INHALATION) 2 TIMES DAILY
Status: DISCONTINUED | OUTPATIENT
Start: 2020-04-12 | End: 2020-04-17 | Stop reason: HOSPADM

## 2020-04-12 RX ORDER — HYDROCODONE BITARTRATE AND ACETAMINOPHEN 5; 325 MG/1; MG/1
1 TABLET ORAL EVERY 4 HOURS PRN
Status: DISCONTINUED | OUTPATIENT
Start: 2020-04-12 | End: 2020-04-17 | Stop reason: HOSPADM

## 2020-04-12 RX ORDER — HYDRALAZINE HYDROCHLORIDE 50 MG/1
50 TABLET, FILM COATED ORAL 3 TIMES DAILY
Status: DISCONTINUED | OUTPATIENT
Start: 2020-04-12 | End: 2020-04-12

## 2020-04-12 RX ORDER — PANTOPRAZOLE SODIUM 40 MG/1
40 TABLET, DELAYED RELEASE ORAL
Status: DISCONTINUED | OUTPATIENT
Start: 2020-04-13 | End: 2020-04-17 | Stop reason: HOSPADM

## 2020-04-12 RX ORDER — HYDRALAZINE HYDROCHLORIDE 50 MG/1
50 TABLET, FILM COATED ORAL 2 TIMES DAILY
Status: DISCONTINUED | OUTPATIENT
Start: 2020-04-13 | End: 2020-04-14

## 2020-04-12 RX ORDER — ONDANSETRON 2 MG/ML
4 INJECTION INTRAMUSCULAR; INTRAVENOUS EVERY 6 HOURS PRN
Status: DISCONTINUED | OUTPATIENT
Start: 2020-04-12 | End: 2020-04-17 | Stop reason: HOSPADM

## 2020-04-12 RX ORDER — SODIUM BICARBONATE 650 MG/1
650 TABLET ORAL 3 TIMES DAILY
Status: DISCONTINUED | OUTPATIENT
Start: 2020-04-12 | End: 2020-04-17 | Stop reason: HOSPADM

## 2020-04-12 RX ADMIN — ACETAMINOPHEN 650 MG: 325 TABLET ORAL at 22:56

## 2020-04-12 RX ADMIN — AZITHROMYCIN MONOHYDRATE 500 MG: 500 INJECTION, POWDER, LYOPHILIZED, FOR SOLUTION INTRAVENOUS at 23:00

## 2020-04-12 RX ADMIN — SODIUM CHLORIDE, PRESERVATIVE FREE 10 ML: 5 INJECTION INTRAVENOUS at 22:57

## 2020-04-12 RX ADMIN — SODIUM BICARBONATE 650 MG: 650 TABLET ORAL at 22:59

## 2020-04-12 RX ADMIN — HYDRALAZINE HYDROCHLORIDE 50 MG: 50 TABLET, FILM COATED ORAL at 22:59

## 2020-04-12 ASSESSMENT — PAIN SCALES - GENERAL
PAINLEVEL_OUTOF10: 0
PAINLEVEL_OUTOF10: 6

## 2020-04-12 ASSESSMENT — PAIN DESCRIPTION - PROGRESSION: CLINICAL_PROGRESSION: GRADUALLY IMPROVING

## 2020-04-12 ASSESSMENT — PAIN DESCRIPTION - ORIENTATION: ORIENTATION: RIGHT;LEFT

## 2020-04-12 ASSESSMENT — PAIN DESCRIPTION - PAIN TYPE
TYPE: ACUTE PAIN
TYPE: ACUTE PAIN

## 2020-04-12 ASSESSMENT — PAIN DESCRIPTION - LOCATION: LOCATION: FLANK

## 2020-04-12 NOTE — ED PROVIDER NOTES
EMERGENCY DEPARTMENT ENCOUNTER    Pt Name: Kavon Salcedo  MRN: [id number]  [place] 1950  Date of evaluation: 4/12/20  CHIEF COMPLAINT       Chief Complaint   Patient presents with    Shortness of Breath    Cough    Fever     HISTORY OF PRESENT ILLNESS   73-year-old male presents emergency department with cough and difficulty breathing. Patient has a trach due to history of laryngeal cancer-history is limited due to this. From my discussion with him it sounds as though cough started yesterday with some increased difficulty breathing. Patient unclear how long the fever had been going on. Patient was just recently admitted for right iliac aneurysm repair; operative site is clean, without drainage or swelling. REVIEW OF SYSTEMS     Review of Systems  PASTMEDICAL HISTORY     Past Medical History:   Diagnosis Date    Arthritis     Asthma     CKD (chronic kidney disease), stage III ([address]     COPD (chronic obstructive pulmonary disease) (HCC)     GERD (gastroesophageal reflux disease)     Gout     Hypertension     Hyponatremia     Iliac artery aneurysm, right (HCC)     Laryngeal cancer (HCC)     Laryngeal Cancer. Chronic left facial edema.      Lung cancer ([address] 3-4 years ago    [facility] [place]    Prostate cancer [facility])      SURGICAL HISTORY       Past Surgical History:   Procedure Laterality Date    ABDOMINAL AORTIC ANEURYSM REPAIR, ENDOVASCULAR Right 4/7/2020    RIGHT ILIAC ARTERY ANEURYSM REPAIR ENDOVASCULAR performed by Tayler Lord MD at [address]  03/04/2020    No stents placed    PROSTATE SURGERY      TRACHEAL SURGERY  3-4 years ago    lung removed and \"voice box\" removed     CURRENT MEDICATIONS       Previous Medications    ALBUTEROL SULFATE  (90 BASE) MCG/ACT INHALER    Inhale 2 puffs into the lungs every 6 hours as needed for Wheezing or Shortness of Breath    ALLOPURINOL (ZYLOPRIM) 300 MG TABLET    Take 300 mg by mouth

## 2020-04-13 PROBLEM — J06.9 URI (UPPER RESPIRATORY INFECTION): Status: ACTIVE | Noted: 2020-04-13

## 2020-04-13 LAB
ADENOVIRUS PCR: NOT DETECTED
ALBUMIN SERPL-MCNC: 3 G/DL (ref 3.5–5.2)
ALBUMIN/GLOBULIN RATIO: ABNORMAL (ref 1–2.5)
ALP BLD-CCNC: 75 U/L (ref 40–129)
ALT SERPL-CCNC: <5 U/L (ref 5–41)
ANION GAP SERPL CALCULATED.3IONS-SCNC: 17 MMOL/L (ref 9–17)
ANION GAP SERPL CALCULATED.3IONS-SCNC: 18 MMOL/L (ref 9–17)
AST SERPL-CCNC: 18 U/L
BILIRUB SERPL-MCNC: 0.37 MG/DL (ref 0.3–1.2)
BNP INTERPRETATION: ABNORMAL
BORDETELLA PARAPERTUSSIS: NOT DETECTED
BORDETELLA PERTUSSIS PCR: NOT DETECTED
BUN BLDV-MCNC: 28 MG/DL (ref 8–23)
BUN BLDV-MCNC: 28 MG/DL (ref 8–23)
BUN/CREAT BLD: 10 (ref 9–20)
BUN/CREAT BLD: 11 (ref 9–20)
C-REACTIVE PROTEIN: 74.4 MG/L (ref 0–5)
CALCIUM SERPL-MCNC: 8.4 MG/DL (ref 8.6–10.4)
CALCIUM SERPL-MCNC: 8.4 MG/DL (ref 8.6–10.4)
CHLAMYDIA PNEUMONIAE BY PCR: NOT DETECTED
CHLORIDE BLD-SCNC: 100 MMOL/L (ref 98–107)
CHLORIDE BLD-SCNC: 99 MMOL/L (ref 98–107)
CO2: 18 MMOL/L (ref 20–31)
CO2: 20 MMOL/L (ref 20–31)
CORONAVIRUS 229E PCR: NOT DETECTED
CORONAVIRUS HKU1 PCR: NOT DETECTED
CORONAVIRUS NL63 PCR: NOT DETECTED
CORONAVIRUS OC43 PCR: NOT DETECTED
CREAT SERPL-MCNC: 2.65 MG/DL (ref 0.7–1.2)
CREAT SERPL-MCNC: 2.78 MG/DL (ref 0.7–1.2)
D-DIMER QUANTITATIVE: 4.51 MG/L FEU
EKG ATRIAL RATE: 101 BPM
EKG P AXIS: 28 DEGREES
EKG P-R INTERVAL: 136 MS
EKG Q-T INTERVAL: 390 MS
EKG QRS DURATION: 84 MS
EKG QTC CALCULATION (BAZETT): 505 MS
EKG R AXIS: -7 DEGREES
EKG T AXIS: -45 DEGREES
EKG VENTRICULAR RATE: 101 BPM
FERRITIN: 168 UG/L (ref 30–400)
GFR AFRICAN AMERICAN: 28 ML/MIN
GFR AFRICAN AMERICAN: 29 ML/MIN
GFR NON-AFRICAN AMERICAN: 23 ML/MIN
GFR NON-AFRICAN AMERICAN: 24 ML/MIN
GFR SERPL CREATININE-BSD FRML MDRD: ABNORMAL ML/MIN/{1.73_M2}
GLUCOSE BLD-MCNC: 122 MG/DL (ref 70–99)
GLUCOSE BLD-MCNC: 94 MG/DL (ref 70–99)
HCT VFR BLD CALC: 25.2 % (ref 40.7–50.3)
HCT VFR BLD CALC: 25.3 % (ref 40.7–50.3)
HCT VFR BLD CALC: 27.2 % (ref 40.7–50.3)
HEMOGLOBIN: 7.8 G/DL (ref 13–17)
HEMOGLOBIN: 8.1 G/DL (ref 13–17)
HEMOGLOBIN: 8.4 G/DL (ref 13–17)
HUMAN METAPNEUMOVIRUS PCR: NOT DETECTED
INFLUENZA A BY PCR: NOT DETECTED
INFLUENZA A H1 (2009) PCR: NORMAL
INFLUENZA A H1 PCR: NORMAL
INFLUENZA A H3 PCR: NORMAL
INFLUENZA B BY PCR: NOT DETECTED
INR BLD: 1
LACTATE DEHYDROGENASE: 241 U/L (ref 135–225)
MCH RBC QN AUTO: 29.8 PG (ref 25.2–33.5)
MCHC RBC AUTO-ENTMCNC: 30.9 G/DL (ref 28.4–34.8)
MCV RBC AUTO: 96.5 FL (ref 82.6–102.9)
MYCOPLASMA PNEUMONIAE PCR: NOT DETECTED
MYOGLOBIN: 111 NG/ML (ref 28–72)
MYOGLOBIN: 116 NG/ML (ref 28–72)
MYOGLOBIN: 132 NG/ML (ref 28–72)
NRBC AUTOMATED: 0 PER 100 WBC
PARAINFLUENZA 1 PCR: NOT DETECTED
PARAINFLUENZA 2 PCR: NOT DETECTED
PARAINFLUENZA 3 PCR: NOT DETECTED
PARAINFLUENZA 4 PCR: NOT DETECTED
PDW BLD-RTO: 16.8 % (ref 11.8–14.4)
PLATELET # BLD: 256 K/UL (ref 138–453)
PMV BLD AUTO: 10.5 FL (ref 8.1–13.5)
POTASSIUM SERPL-SCNC: 4.2 MMOL/L (ref 3.7–5.3)
POTASSIUM SERPL-SCNC: 4.6 MMOL/L (ref 3.7–5.3)
PRO-BNP: ABNORMAL PG/ML
PROCALCITONIN: 0.19 NG/ML
PROTHROMBIN TIME: 10.5 SEC (ref 9.7–11.6)
RBC # BLD: 2.82 M/UL (ref 4.21–5.77)
RESP SYNCYTIAL VIRUS PCR: NOT DETECTED
RHINO/ENTEROVIRUS PCR: NOT DETECTED
SARS-COV-2, NAA: NORMAL
SARS-COV-2, PCR: NORMAL
SARS-COV-2: NOT DETECTED
SODIUM BLD-SCNC: 136 MMOL/L (ref 135–144)
SODIUM BLD-SCNC: 136 MMOL/L (ref 135–144)
SOURCE: NORMAL
SPECIMEN DESCRIPTION: NORMAL
TOTAL PROTEIN: 6.3 G/DL (ref 6.4–8.3)
TROPONIN INTERP: ABNORMAL
TROPONIN T: ABNORMAL NG/ML
TROPONIN, HIGH SENSITIVITY: 153 NG/L (ref 0–22)
TROPONIN, HIGH SENSITIVITY: 156 NG/L (ref 0–22)
TROPONIN, HIGH SENSITIVITY: 164 NG/L (ref 0–22)
TROPONIN, HIGH SENSITIVITY: 167 NG/L (ref 0–22)
WBC # BLD: 7.5 K/UL (ref 3.5–11.3)

## 2020-04-13 PROCEDURE — 83874 ASSAY OF MYOGLOBIN: CPT

## 2020-04-13 PROCEDURE — 6370000000 HC RX 637 (ALT 250 FOR IP): Performed by: NURSE PRACTITIONER

## 2020-04-13 PROCEDURE — 6360000002 HC RX W HCPCS: Performed by: NURSE PRACTITIONER

## 2020-04-13 PROCEDURE — 83880 ASSAY OF NATRIURETIC PEPTIDE: CPT

## 2020-04-13 PROCEDURE — 2580000003 HC RX 258: Performed by: NURSE PRACTITIONER

## 2020-04-13 PROCEDURE — 2700000000 HC OXYGEN THERAPY PER DAY

## 2020-04-13 PROCEDURE — 84484 ASSAY OF TROPONIN QUANT: CPT

## 2020-04-13 PROCEDURE — 80048 BASIC METABOLIC PNL TOTAL CA: CPT

## 2020-04-13 PROCEDURE — 2500000003 HC RX 250 WO HCPCS: Performed by: NURSE PRACTITIONER

## 2020-04-13 PROCEDURE — 85027 COMPLETE CBC AUTOMATED: CPT

## 2020-04-13 PROCEDURE — 85014 HEMATOCRIT: CPT

## 2020-04-13 PROCEDURE — 94761 N-INVAS EAR/PLS OXIMETRY MLT: CPT

## 2020-04-13 PROCEDURE — 93005 ELECTROCARDIOGRAM TRACING: CPT | Performed by: NURSE PRACTITIONER

## 2020-04-13 PROCEDURE — 2060000000 HC ICU INTERMEDIATE R&B

## 2020-04-13 PROCEDURE — 99223 1ST HOSP IP/OBS HIGH 75: CPT | Performed by: INTERNAL MEDICINE

## 2020-04-13 PROCEDURE — 85018 HEMOGLOBIN: CPT

## 2020-04-13 PROCEDURE — 36415 COLL VENOUS BLD VENIPUNCTURE: CPT

## 2020-04-13 RX ORDER — METOPROLOL TARTRATE 5 MG/5ML
5 INJECTION INTRAVENOUS EVERY 4 HOURS PRN
Status: DISCONTINUED | OUTPATIENT
Start: 2020-04-13 | End: 2020-04-17 | Stop reason: HOSPADM

## 2020-04-13 RX ADMIN — HEPARIN SODIUM 5000 UNITS: 5000 INJECTION INTRAVENOUS; SUBCUTANEOUS at 22:38

## 2020-04-13 RX ADMIN — HYDRALAZINE HYDROCHLORIDE 50 MG: 50 TABLET, FILM COATED ORAL at 08:16

## 2020-04-13 RX ADMIN — SODIUM BICARBONATE 650 MG: 650 TABLET ORAL at 08:16

## 2020-04-13 RX ADMIN — SODIUM CHLORIDE, PRESERVATIVE FREE 10 ML: 5 INJECTION INTRAVENOUS at 20:13

## 2020-04-13 RX ADMIN — HEPARIN SODIUM 5000 UNITS: 5000 INJECTION INTRAVENOUS; SUBCUTANEOUS at 14:35

## 2020-04-13 RX ADMIN — HYDROCODONE BITARTRATE AND ACETAMINOPHEN 1 TABLET: 5; 325 TABLET ORAL at 11:45

## 2020-04-13 RX ADMIN — SODIUM BICARBONATE 650 MG: 650 TABLET ORAL at 14:35

## 2020-04-13 RX ADMIN — METOPROLOL TARTRATE 5 MG: 5 INJECTION INTRAVENOUS at 12:14

## 2020-04-13 RX ADMIN — HYDRALAZINE HYDROCHLORIDE 50 MG: 50 TABLET, FILM COATED ORAL at 20:13

## 2020-04-13 RX ADMIN — ONDANSETRON 4 MG: 2 INJECTION INTRAMUSCULAR; INTRAVENOUS at 11:45

## 2020-04-13 RX ADMIN — BUDESONIDE AND FORMOTEROL FUMARATE DIHYDRATE 2 PUFF: 160; 4.5 AEROSOL RESPIRATORY (INHALATION) at 08:18

## 2020-04-13 RX ADMIN — PANTOPRAZOLE SODIUM 40 MG: 40 TABLET, DELAYED RELEASE ORAL at 07:14

## 2020-04-13 RX ADMIN — METOPROLOL TARTRATE 5 MG: 5 INJECTION INTRAVENOUS at 07:14

## 2020-04-13 RX ADMIN — ALLOPURINOL 300 MG: 300 TABLET ORAL at 08:16

## 2020-04-13 RX ADMIN — SODIUM BICARBONATE 650 MG: 650 TABLET ORAL at 20:13

## 2020-04-13 RX ADMIN — AZITHROMYCIN MONOHYDRATE 500 MG: 500 INJECTION, POWDER, LYOPHILIZED, FOR SOLUTION INTRAVENOUS at 22:38

## 2020-04-13 RX ADMIN — CEFTRIAXONE 1 G: 1 INJECTION, POWDER, FOR SOLUTION INTRAMUSCULAR; INTRAVENOUS at 00:01

## 2020-04-13 RX ADMIN — MELATONIN 2000 UNITS: at 08:16

## 2020-04-13 ASSESSMENT — PAIN SCALES - GENERAL
PAINLEVEL_OUTOF10: 0
PAINLEVEL_OUTOF10: 0
PAINLEVEL_OUTOF10: 7

## 2020-04-13 ASSESSMENT — PAIN DESCRIPTION - PROGRESSION
CLINICAL_PROGRESSION: GRADUALLY IMPROVING

## 2020-04-13 NOTE — H&P
deficits, normal muscle tone and bulk, no abnormal sensation, normal speech, cranial nerves II through XII grossly intact  Skin: No gross lesions, rashes, bruising or bleeding on exposed skin area  Extremities:  peripheral pulses palpable, no edema or calf pain with palpation  Psych: normal affect     Investigations:      Laboratory Testing:  Recent Results (from the past 24 hour(s))   CBC Auto Differential    Collection Time: 04/12/20  7:01 PM   Result Value Ref Range    WBC 6.9 3.5 - 11.3 k/uL    RBC 2.70 (L) 4.21 - 5.77 m/uL    Hemoglobin 8.0 (L) 13.0 - 17.0 g/dL    Hematocrit 25.9 (L) 40.7 - 50.3 %    MCV 95.9 82.6 - 102.9 fL    MCH 29.6 25.2 - 33.5 pg    MCHC 30.9 28.4 - 34.8 g/dL    RDW 16.7 (H) 11.8 - 14.4 %    Platelets 077 469 - 447 k/uL    MPV 9.8 8.1 - 13.5 fL    NRBC Automated 0.0 0.0 per 100 WBC    Differential Type NOT REPORTED     Seg Neutrophils 65 36 - 65 %    Lymphocytes 19 (L) 24 - 43 %    Monocytes 14 (H) 3 - 12 %    Eosinophils % 1 1 - 4 %    Basophils 0 0 - 2 %    Immature Granulocytes 1 (H) 0 %    Segs Absolute 4.50 1.50 - 8.10 k/uL    Absolute Lymph # 1.28 1.10 - 3.70 k/uL    Absolute Mono # 0.99 0.10 - 1.20 k/uL    Absolute Eos # 0.08 0.00 - 0.44 k/uL    Basophils Absolute <0.03 0.00 - 0.20 k/uL    Absolute Immature Granulocyte 0.06 0.00 - 0.30 k/uL    WBC Morphology NOT REPORTED     RBC Morphology ANISOCYTOSIS PRESENT     Platelet Estimate NOT REPORTED    Comprehensive Metabolic Panel w/ Reflex to MG    Collection Time: 04/12/20  7:01 PM   Result Value Ref Range    Glucose 95 70 - 99 mg/dL    BUN 28 (H) 8 - 23 mg/dL    CREATININE 2.79 (H) 0.70 - 1.20 mg/dL    Bun/Cre Ratio 10 9 - 20    Calcium 8.2 (L) 8.6 - 10.4 mg/dL    Sodium 135 135 - 144 mmol/L    Potassium 4.1 3.7 - 5.3 mmol/L    Chloride 100 98 - 107 mmol/L    CO2 19 (L) 20 - 31 mmol/L    Anion Gap 16 9 - 17 mmol/L    Alkaline Phosphatase 72 40 - 129 U/L    ALT <5 (L) 5 - 41 U/L    AST 17 <40 U/L    Total Bilirubin 0.36 0.3 - 1.2 - 20    Calcium 8.4 (L) 8.6 - 10.4 mg/dL    Sodium 136 135 - 144 mmol/L    Potassium 4.2 3.7 - 5.3 mmol/L    Chloride 99 98 - 107 mmol/L    CO2 20 20 - 31 mmol/L    Anion Gap 17 9 - 17 mmol/L    Alkaline Phosphatase 75 40 - 129 U/L    ALT <5 (L) 5 - 41 U/L    AST 18 <40 U/L    Total Bilirubin 0.37 0.3 - 1.2 mg/dL    Total Protein 6.3 (L) 6.4 - 8.3 g/dL    Alb 3.0 (L) 3.5 - 5.2 g/dL    Albumin/Globulin Ratio NOT REPORTED 1.0 - 2.5    GFR Non-African American 23 (L) >60 mL/min    GFR  28 (L) >60 mL/min    GFR Comment          GFR Staging NOT REPORTED    Protime-INR    Collection Time: 04/12/20 11:09 PM   Result Value Ref Range    Protime 10.5 9.7 - 11.6 sec    INR 1.0    Lactate Dehydrogenase    Collection Time: 04/12/20 11:09 PM   Result Value Ref Range     (H) 135 - 225 U/L   Ferritin    Collection Time: 04/12/20 11:09 PM   Result Value Ref Range    Ferritin 168 30 - 400 ug/L   D-Dimer, Quantitative    Collection Time: 04/12/20 11:09 PM   Result Value Ref Range    D-Dimer, Quant 4.51 mg/L FEU   Lactic Acid, Plasma    Collection Time: 04/12/20 11:09 PM   Result Value Ref Range    Lactic Acid 1.1 0.5 - 2.2 mmol/L    Lactic Acid, Whole Blood NOT REPORTED 0.7 - 2.1 mmol/L   Troponin    Collection Time: 04/12/20 11:09 PM   Result Value Ref Range    Troponin, High Sensitivity 164 (HH) 0 - 22 ng/L    Troponin T NOT REPORTED <0.03 ng/mL    Troponin Interp NOT REPORTED    TROP/MYOGLOBIN    Collection Time: 04/13/20  2:43 AM   Result Value Ref Range    Troponin, High Sensitivity 167 (HH) 0 - 22 ng/L    Troponin T NOT REPORTED <0.03 ng/mL    Troponin Interp NOT REPORTED     Myoglobin 132 (H) 28 - 72 ng/mL   Basic Metabolic Panel w/ Reflex to MG    Collection Time: 04/13/20  5:24 AM   Result Value Ref Range    Glucose 94 70 - 99 mg/dL    BUN 28 (H) 8 - 23 mg/dL    CREATININE 2.65 (H) 0.70 - 1.20 mg/dL    Bun/Cre Ratio 11 9 - 20    Calcium 8.4 (L) 8.6 - 10.4 mg/dL    Sodium 136 135 - 144 mmol/L    Potassium

## 2020-04-13 NOTE — PROGRESS NOTES
Patient transferred from ICU to [facility] room 1014. Pt admitted to room. Oriented to room, call light and bed mechanics. Side rails up x2. Call light within reach. Orders reviewed. Vitals taken and telemetry on. Will continue to monitor.

## 2020-04-13 NOTE — PROGRESS NOTES
Physical Therapy  DATE: 2020    NAME: Syed Bradshaw  MRN: [id number]   : 1950      Patient not seen this date for Physical Therapy due to:  [] Blood transfusion in progress  [] Cancel by RN  [] Hemodialysis  []  Refusal by Patient   [] Spine Precautions   [] Strict Bedrest  [] Surgery  [] Testing      [x] Other(medical instability pending r/o covid-19)       [] PT being discontinued at this time. Patient independent. No further needs. [] PT being discontinued at this time as the patient has been transferred to hospice care. No further needs.       Deann Hull, PT

## 2020-04-14 LAB
ABSOLUTE EOS #: 0.15 K/UL (ref 0–0.44)
ABSOLUTE IMMATURE GRANULOCYTE: 0.04 K/UL (ref 0–0.3)
ABSOLUTE LYMPH #: 1.68 K/UL (ref 1.1–3.7)
ABSOLUTE MONO #: 0.91 K/UL (ref 0.1–1.2)
ANION GAP SERPL CALCULATED.3IONS-SCNC: 12 MMOL/L (ref 9–17)
BASOPHILS # BLD: 1 % (ref 0–2)
BASOPHILS ABSOLUTE: 0.03 K/UL (ref 0–0.2)
BUN BLDV-MCNC: 28 MG/DL (ref 8–23)
BUN/CREAT BLD: 10 (ref 9–20)
CALCIUM SERPL-MCNC: 7.9 MG/DL (ref 8.6–10.4)
CHLORIDE BLD-SCNC: 102 MMOL/L (ref 98–107)
CO2: 22 MMOL/L (ref 20–31)
CREAT SERPL-MCNC: 2.81 MG/DL (ref 0.7–1.2)
DIFFERENTIAL TYPE: ABNORMAL
EOSINOPHILS RELATIVE PERCENT: 2 % (ref 1–4)
GFR AFRICAN AMERICAN: 27 ML/MIN
GFR NON-AFRICAN AMERICAN: 23 ML/MIN
GFR SERPL CREATININE-BSD FRML MDRD: ABNORMAL ML/MIN/{1.73_M2}
GFR SERPL CREATININE-BSD FRML MDRD: ABNORMAL ML/MIN/{1.73_M2}
GLUCOSE BLD-MCNC: 114 MG/DL (ref 70–99)
HCT VFR BLD CALC: 23.6 % (ref 40.7–50.3)
HCT VFR BLD CALC: 24.2 % (ref 40.7–50.3)
HCT VFR BLD CALC: 24.6 % (ref 40.7–50.3)
HEMOGLOBIN: 7.6 G/DL (ref 13–17)
HEMOGLOBIN: 7.7 G/DL (ref 13–17)
HEMOGLOBIN: 7.8 G/DL (ref 13–17)
IMMATURE GRANULOCYTES: 1 %
LEGIONELLA PNEUMOPHILIA AG, URINE: NEGATIVE
LV EF: 45 %
LVEF MODALITY: NORMAL
LYMPHOCYTES # BLD: 27 % (ref 24–43)
MCH RBC QN AUTO: 30.1 PG (ref 25.2–33.5)
MCHC RBC AUTO-ENTMCNC: 31.7 G/DL (ref 28.4–34.8)
MCV RBC AUTO: 95 FL (ref 82.6–102.9)
MONOCYTES # BLD: 15 % (ref 3–12)
NRBC AUTOMATED: 0 PER 100 WBC
PDW BLD-RTO: 16.5 % (ref 11.8–14.4)
PLATELET # BLD: 231 K/UL (ref 138–453)
PLATELET ESTIMATE: ABNORMAL
PMV BLD AUTO: 10.2 FL (ref 8.1–13.5)
POTASSIUM SERPL-SCNC: 4.2 MMOL/L (ref 3.7–5.3)
RBC # BLD: 2.59 M/UL (ref 4.21–5.77)
RBC # BLD: ABNORMAL 10*6/UL
SEG NEUTROPHILS: 54 % (ref 36–65)
SEGMENTED NEUTROPHILS ABSOLUTE COUNT: 3.34 K/UL (ref 1.5–8.1)
SODIUM BLD-SCNC: 136 MMOL/L (ref 135–144)
WBC # BLD: 6.2 K/UL (ref 3.5–11.3)
WBC # BLD: ABNORMAL 10*3/UL

## 2020-04-14 PROCEDURE — 97535 SELF CARE MNGMENT TRAINING: CPT

## 2020-04-14 PROCEDURE — 97163 PT EVAL HIGH COMPLEX 45 MIN: CPT

## 2020-04-14 PROCEDURE — 97129 THER IVNTJ 1ST 15 MIN: CPT

## 2020-04-14 PROCEDURE — 2700000000 HC OXYGEN THERAPY PER DAY

## 2020-04-14 PROCEDURE — 97530 THERAPEUTIC ACTIVITIES: CPT

## 2020-04-14 PROCEDURE — 6360000002 HC RX W HCPCS: Performed by: INTERNAL MEDICINE

## 2020-04-14 PROCEDURE — 2060000000 HC ICU INTERMEDIATE R&B

## 2020-04-14 PROCEDURE — 85025 COMPLETE CBC W/AUTO DIFF WBC: CPT

## 2020-04-14 PROCEDURE — 97166 OT EVAL MOD COMPLEX 45 MIN: CPT

## 2020-04-14 PROCEDURE — 85014 HEMATOCRIT: CPT

## 2020-04-14 PROCEDURE — 94640 AIRWAY INHALATION TREATMENT: CPT

## 2020-04-14 PROCEDURE — 93306 TTE W/DOPPLER COMPLETE: CPT

## 2020-04-14 PROCEDURE — 36415 COLL VENOUS BLD VENIPUNCTURE: CPT

## 2020-04-14 PROCEDURE — 97116 GAIT TRAINING THERAPY: CPT

## 2020-04-14 PROCEDURE — 6370000000 HC RX 637 (ALT 250 FOR IP): Performed by: NURSE PRACTITIONER

## 2020-04-14 PROCEDURE — 6360000002 HC RX W HCPCS: Performed by: NURSE PRACTITIONER

## 2020-04-14 PROCEDURE — 85018 HEMOGLOBIN: CPT

## 2020-04-14 PROCEDURE — 2580000003 HC RX 258: Performed by: INTERNAL MEDICINE

## 2020-04-14 PROCEDURE — 6370000000 HC RX 637 (ALT 250 FOR IP): Performed by: INTERNAL MEDICINE

## 2020-04-14 PROCEDURE — 2580000003 HC RX 258: Performed by: NURSE PRACTITIONER

## 2020-04-14 PROCEDURE — 80048 BASIC METABOLIC PNL TOTAL CA: CPT

## 2020-04-14 RX ORDER — METHYLPREDNISOLONE SODIUM SUCCINATE 40 MG/ML
40 INJECTION, POWDER, LYOPHILIZED, FOR SOLUTION INTRAMUSCULAR; INTRAVENOUS EVERY 12 HOURS
Status: DISCONTINUED | OUTPATIENT
Start: 2020-04-15 | End: 2020-04-15

## 2020-04-14 RX ORDER — HYDRALAZINE HYDROCHLORIDE 50 MG/1
50 TABLET, FILM COATED ORAL 3 TIMES DAILY
Status: DISCONTINUED | OUTPATIENT
Start: 2020-04-14 | End: 2020-04-16

## 2020-04-14 RX ORDER — AZITHROMYCIN 250 MG/1
500 TABLET, FILM COATED ORAL DAILY
Status: DISCONTINUED | OUTPATIENT
Start: 2020-04-14 | End: 2020-04-17 | Stop reason: HOSPADM

## 2020-04-14 RX ORDER — CEFUROXIME AXETIL 250 MG/1
250 TABLET ORAL EVERY 12 HOURS SCHEDULED
Status: DISCONTINUED | OUTPATIENT
Start: 2020-04-15 | End: 2020-04-17 | Stop reason: HOSPADM

## 2020-04-14 RX ORDER — SODIUM CHLORIDE 9 MG/ML
INJECTION, SOLUTION INTRAVENOUS CONTINUOUS
Status: ACTIVE | OUTPATIENT
Start: 2020-04-14 | End: 2020-04-15

## 2020-04-14 RX ORDER — ALBUTEROL SULFATE 2.5 MG/3ML
2.5 SOLUTION RESPIRATORY (INHALATION) 3 TIMES DAILY
Status: DISCONTINUED | OUTPATIENT
Start: 2020-04-14 | End: 2020-04-15

## 2020-04-14 RX ORDER — METHYLPREDNISOLONE SODIUM SUCCINATE 40 MG/ML
40 INJECTION, POWDER, LYOPHILIZED, FOR SOLUTION INTRAMUSCULAR; INTRAVENOUS EVERY 8 HOURS
Status: DISCONTINUED | OUTPATIENT
Start: 2020-04-14 | End: 2020-04-14

## 2020-04-14 RX ORDER — ACETYLCYSTEINE 200 MG/ML
600 SOLUTION ORAL; RESPIRATORY (INHALATION) 3 TIMES DAILY
Status: DISCONTINUED | OUTPATIENT
Start: 2020-04-14 | End: 2020-04-14

## 2020-04-14 RX ADMIN — HYDROCODONE BITARTRATE AND ACETAMINOPHEN 1 TABLET: 5; 325 TABLET ORAL at 14:45

## 2020-04-14 RX ADMIN — HYDRALAZINE HYDROCHLORIDE 50 MG: 50 TABLET, FILM COATED ORAL at 17:01

## 2020-04-14 RX ADMIN — METHYLPREDNISOLONE SODIUM SUCCINATE 40 MG: 40 INJECTION, POWDER, FOR SOLUTION INTRAMUSCULAR; INTRAVENOUS at 14:46

## 2020-04-14 RX ADMIN — HEPARIN SODIUM 5000 UNITS: 5000 INJECTION INTRAVENOUS; SUBCUTANEOUS at 05:55

## 2020-04-14 RX ADMIN — SODIUM CHLORIDE, PRESERVATIVE FREE 10 ML: 5 INJECTION INTRAVENOUS at 14:57

## 2020-04-14 RX ADMIN — PANTOPRAZOLE SODIUM 40 MG: 40 TABLET, DELAYED RELEASE ORAL at 05:55

## 2020-04-14 RX ADMIN — HEPARIN SODIUM 5000 UNITS: 5000 INJECTION INTRAVENOUS; SUBCUTANEOUS at 20:08

## 2020-04-14 RX ADMIN — AZITHROMYCIN MONOHYDRATE 500 MG: 250 TABLET ORAL at 20:08

## 2020-04-14 RX ADMIN — MELATONIN 2000 UNITS: at 09:07

## 2020-04-14 RX ADMIN — SODIUM BICARBONATE 650 MG: 650 TABLET ORAL at 20:08

## 2020-04-14 RX ADMIN — ALLOPURINOL 300 MG: 300 TABLET ORAL at 09:07

## 2020-04-14 RX ADMIN — CEFTRIAXONE 1 G: 1 INJECTION, POWDER, FOR SOLUTION INTRAMUSCULAR; INTRAVENOUS at 01:04

## 2020-04-14 RX ADMIN — HEPARIN SODIUM 5000 UNITS: 5000 INJECTION INTRAVENOUS; SUBCUTANEOUS at 14:46

## 2020-04-14 RX ADMIN — SODIUM BICARBONATE 650 MG: 650 TABLET ORAL at 14:45

## 2020-04-14 RX ADMIN — HYDRALAZINE HYDROCHLORIDE 50 MG: 50 TABLET, FILM COATED ORAL at 09:07

## 2020-04-14 RX ADMIN — SODIUM CHLORIDE: 9 INJECTION, SOLUTION INTRAVENOUS at 17:05

## 2020-04-14 RX ADMIN — ALBUTEROL SULFATE 2.5 MG: 2.5 SOLUTION RESPIRATORY (INHALATION) at 20:15

## 2020-04-14 RX ADMIN — SODIUM BICARBONATE 650 MG: 650 TABLET ORAL at 09:07

## 2020-04-14 RX ADMIN — HYDRALAZINE HYDROCHLORIDE 50 MG: 50 TABLET, FILM COATED ORAL at 20:08

## 2020-04-14 ASSESSMENT — PAIN DESCRIPTION - PAIN TYPE: TYPE: ACUTE PAIN

## 2020-04-14 ASSESSMENT — PAIN SCALES - GENERAL
PAINLEVEL_OUTOF10: 9
PAINLEVEL_OUTOF10: 8
PAINLEVEL_OUTOF10: 0

## 2020-04-14 ASSESSMENT — PAIN DESCRIPTION - ORIENTATION: ORIENTATION: RIGHT;LEFT

## 2020-04-14 ASSESSMENT — PAIN DESCRIPTION - LOCATION: LOCATION: FLANK

## 2020-04-14 NOTE — CONSULTS
2,000 Units Oral Daily    pantoprazole  40 mg Oral QAM AC    sodium bicarbonate  650 mg Oral TID    sodium chloride flush  10 mL Intravenous 2 times per day    azithromycin  500 mg Intravenous Q24H    And    cefTRIAXone (ROCEPHIN) IV  1 g Intravenous Q24H    budesonide-formoterol  2 puff Inhalation BID    heparin (porcine)  5,000 Units Subcutaneous 3 times per day     metoprolol, HYDROcodone-acetaminophen, sodium chloride flush, acetaminophen **OR** acetaminophen, magnesium hydroxide, promethazine **OR** ondansetron, nicotine  IV Drips/Infusions    Home Medications  Medications Prior to Admission: HYDROcodone-acetaminophen (NORCO) 5-325 MG per tablet, Take 1 tablet by mouth every 4 hours as needed for Pain for up to 7 days. Intended supply: 7 days. Take lowest dose possible to manage pain  hydrALAZINE (APRESOLINE) 50 MG tablet, Take 1 tablet by mouth 3 times daily  sodium bicarbonate 650 MG tablet, Take 650 mg by mouth 3 times daily   Cholecalciferol (VITAMIN D3) 50 MCG (2000 UT) CAPS, Take 1 capsule by mouth daily  albuterol sulfate  (90 Base) MCG/ACT inhaler, Inhale 2 puffs into the lungs every 6 hours as needed for Wheezing or Shortness of Breath  budesonide-formoterol (SYMBICORT) 160-4.5 MCG/ACT AERO, Inhale 2 puffs into the lungs 2 times daily  omeprazole (PRILOSEC) 20 MG delayed release capsule, Take 20 mg by mouth every morning (before breakfast)  allopurinol (ZYLOPRIM) 300 MG tablet, Take 300 mg by mouth daily.     Allergies    Amino acids and Lisinopril  Social History     Social History     Socioeconomic History    Marital status: Single     Spouse name: Not on file    Number of children: Not on file    Years of education: Not on file    Highest education level: Not on file   Occupational History    Not on file   Social Needs    Financial resource strain: Not on file    Food insecurity     Worry: Not on file     Inability: Not on file    Transportation needs     Medical: Not on file

## 2020-04-14 NOTE — PROGRESS NOTES
Precautions  Required Braces or Orthoses?: No  Position Activity Restriction  Other position/activity restrictions: trach, O2, impulsive  Vision/Hearing  Vision: Within Functional Limits  Hearing: Within functional limits     Subjective  General  Chart Reviewed: Yes  Patient assessed for rehabilitation services?: Yes  Family / Caregiver Present: No  Follows Commands: Within Functional Limits  Pain Screening  Patient Currently in Pain: Denies          Orientation  Orientation  Overall Orientation Status: Within Functional Limits  Social/Functional History  Social/Functional History  Lives With: Family(brother)  Type of Home: House  Home Layout: Two level, 1/2 bath on main level(sleeps on first floor, shower is on 2nd floor)  Bathroom Shower/Tub: Tub/Shower unit  Bathroom Toilet: Handicap height  Bathroom Equipment: Shower chair  Home Equipment: Cane  ADL Assistance: Independent  Homemaking Assistance: Needs assistance(brother )  Ambulation Assistance: Independent(typically does not use AD)  Transfer Assistance: Independent  Active : No  Patient's  Info: brother  Additional Comments: pt has not been on O2 at home. Pt states he occasionally use RW when knees are hurting. No falls reported   Cognition   Cognition  Overall Cognitive Status: Exceptions  Arousal/Alertness: Appropriate responses to stimuli  Following Commands: Follows one step commands with repetition  Attention Span: Attends with cues to redirect  Memory: Appears intact  Safety Judgement: Decreased awareness of need for safety;Decreased awareness of need for assistance  Problem Solving: Assistance required to identify errors made;Assistance required to correct errors made  Insights: Decreased awareness of deficits  Initiation: Requires cues for some  Sequencing: Requires cues for some    Objective     Observation/Palpation  Observation: pt has trach, O2 collar.  Monitored sats throughout session    AROM RLE (degrees)  RLE AROM: WFL  AROM LLE (degrees)  LLE AROM : WFL  AROM RUE (degrees)  RUE AROM : WFL  Strength RLE  Strength RLE: WFL  Strength LLE  Strength LLE: WFL  Strength RUE  Strength RUE: WFL  Strength LUE  Strength LUE: WFL  Tone RLE  RLE Tone: Normotonic  Tone LLE  LLE Tone: Normotonic  Sensation  Overall Sensation Status: Impaired(numbness on L side (since a surgery))  Bed mobility  Supine to Sit: Stand by assistance  Transfers  Sit to Stand: Contact guard assistance(wide PA in standing)  Stand to sit: Contact guard assistance  Comment: up to chair with chair alarm  Ambulation  Ambulation?: Yes  Ambulation 1  Surface: level tile  Device: No Device; Single point cane  Quality of Gait: Statrted out with no device as pt. states this is how he gets around at home. Pt. unsteady so given st. cane. Balance improved, however pt. still unsteady. Recommend RW trial next session. Gait Deviations: Decreased step length  Distance: 30ft. x 3 with seated rest breaks  Comments: decreased awareness of O2 tubing. Pt assessed with 5L, 2L, and room air with rest breaks in between. Pt 93% on 2 and 5 L but then did potentially drop on room air. Questionable accuracy of readings, but fluctuates enough to continue to monitor for home O2. Pt does have poor safety awareness with managing tubing. Pt also use no AD and then cane. Balance deficits noted; pt is a fall risk with dec insight into deficits. On room air, pt. did drop in to upper 80s. Recommending respiratory to do another home O2 eval as it appears pt. will need some O2 at home and amt. needs to be determined.        Balance  Posture: Good  Sitting - Static: Good  Sitting - Dynamic: Good  Standing - Static: Fair(wide PA)        Plan   Plan  Times per week: 1-2x/day; 5-6days/wk  Specific instructions for Next Treatment: try RW vs. st. cane  Current Treatment Recommendations: Strengthening, ROM, Balance Training, Functional Mobility Training, Transfer Training, Gait Training, Patient/Caregiver Education & Training, Safety Education & Training, Home Exercise Program  Safety Devices  Type of devices: All fall risk precautions in place, Call light within reach, Gait belt, Left in chair, Chair alarm in place, Nurse notified      Goals  Short term goals  Time Frame for Short term goals: 12 visits:  Short term goal 1: Pt. to be indep with bed mob. Short term goal 2: Pt. to be SBA with sit to stand transfer with approp. device  Short term goal 3: Pt. to be SBA with gait with approp. AD, 50ft. (household distance) (do RW trial and talk to social work if pt. needs RW for home)  Short term goal 4: Pt. to tolerate 25+ min. of PT for ther ex/ gait/ balance training  Patient Goals   Patient goals : did not state       Therapy Time   Individual Concurrent Group Co-treatment   Time In          Time Out 1026         Minutes 55               Treatment time: 38min.   Violetta Dent, PT no loss of consciousness, no gait abnormality, no headache, no sensory deficits, and no weakness.

## 2020-04-14 NOTE — PROGRESS NOTES
fluctuates enough to continue to monitor for home O2. Pt does have poor safety awareness with managing tubing. Pt also use no AD and then cane. Balance deficits noted; pt is a fall risk with dec insight into deficits  Toilet Transfers  Equipment Used: Standard toilet  Toilet Transfer: Contact guard assistance  Shower Transfers  Shower - Transfer From: Other(no device)  Shower - Transfer Type: To and From  Shower - Transfer To: Transfer tub bench  Shower - Technique: Ambulating  Shower Transfers: Stand by assistance  Shower Transfers Comments: poor safety, no awareness of O2 line, impulsive at times  ADL  Grooming: Stand by assistance  UE Bathing: Setup;Stand by assistance  LE Bathing: Setup;Minimal assistance  UE Dressing: Setup;Minimal assistance  LE Dressing: Setup;Minimal assistance  Toileting: Contact guard assistance  Additional Comments: pt needing cues for safety, managing O2 tubing. O2 sats monitored and primarily ~93% but does flucuate and at times dipping to ~88% briefly  Tone RUE  RUE Tone: Normotonic  Tone LUE  LUE Tone: Normotonic  Coordination  Movements Are Fluid And Coordinated: Yes     Bed mobility  Supine to Sit: Stand by assistance  Comment: up to chair  Transfers  Stand Step Transfers: Stand by assistance  Sit to stand: Contact guard assistance  Stand to sit: Contact guard assistance  Transfer Comments: poor safety, no awareness of O2 line     Cognition  Overall Cognitive Status: Exceptions  Arousal/Alertness: Appropriate responses to stimuli  Following Commands:  Follows one step commands with repetition  Attention Span: Attends with cues to redirect  Memory: Appears intact  Safety Judgement: Decreased awareness of need for safety;Decreased awareness of need for assistance  Problem Solving: Assistance required to identify errors made;Assistance required to correct errors made  Insights: Decreased awareness of deficits  Initiation: Requires cues for some  Sequencing: Requires cues for some  Perception  Overall Perceptual Status: WFL     Sensation  Overall Sensation Status: Impaired(numbness on L side (since a surgery))        LUE AROM (degrees)  LUE AROM : WFL  RUE AROM (degrees)  RUE AROM : WFL  LUE Strength  Gross LUE Strength: WFL  RUE Strength  Gross RUE Strength: WFL                   Plan   Plan  Times per week: 4-5x/week, 1-2x/day  Current Treatment Recommendations: Strengthening, Functional Mobility Training, Balance Training, Endurance Training, Equipment Evaluation, Education, & procurement, Patient/Caregiver Education & Training, Self-Care / ADL, Safety Education & Training            Goals  Short term goals  Time Frame for Short term goals: by discharge, pt will  Short term goal 1: demo S/MI with ADL transfers with good safety, approp AD/DME as needed  Short term goal 2: demo S/MI with functional mob in room for ADL completion with approp AD and good safety/pacing  Short term goal 3: demo S/MI with toileting routine  Short term goal 4: demo I with UB ADLs and SBA with LB ADLs with approp AE/DME, good pacing  Short term goal 5: demo and verb good understanding of fall prevention techs, EC/WS techs, and possible equip needs for home   Patient Goals   Patient goals : to go home       Therapy Time   Individual Concurrent Group Co-treatment   Time In 0951(+10 min chart review)         Time Out 1038         Minutes 52              Pt would benefit from skilled home OT services in order to maximize occupational performance, safety, and independence in the home environment.      Wisam Key, OT

## 2020-04-15 ENCOUNTER — APPOINTMENT (OUTPATIENT)
Dept: GENERAL RADIOLOGY | Age: 70
DRG: 194 | End: 2020-04-15
Payer: MEDICARE

## 2020-04-15 LAB
ABSOLUTE EOS #: 0.05 K/UL (ref 0–0.4)
ABSOLUTE IMMATURE GRANULOCYTE: 0 K/UL (ref 0–0.3)
ABSOLUTE LYMPH #: 0.78 K/UL (ref 1–4.8)
ABSOLUTE MONO #: 0.1 K/UL (ref 0.2–0.8)
ANION GAP SERPL CALCULATED.3IONS-SCNC: 15 MMOL/L (ref 9–17)
BASOPHILS # BLD: 0 %
BASOPHILS ABSOLUTE: 0 K/UL (ref 0–0.2)
BNP INTERPRETATION: ABNORMAL
BUN BLDV-MCNC: 30 MG/DL (ref 8–23)
BUN/CREAT BLD: 11 (ref 9–20)
CALCIUM SERPL-MCNC: 7.8 MG/DL (ref 8.6–10.4)
CHLORIDE BLD-SCNC: 100 MMOL/L (ref 98–107)
CO2: 21 MMOL/L (ref 20–31)
CREAT SERPL-MCNC: 2.71 MG/DL (ref 0.7–1.2)
CULTURE: ABNORMAL
CULTURE: ABNORMAL
DIFFERENTIAL TYPE: ABNORMAL
DIRECT EXAM: ABNORMAL
EOSINOPHILS RELATIVE PERCENT: 1 % (ref 1–4)
GFR AFRICAN AMERICAN: 29 ML/MIN
GFR NON-AFRICAN AMERICAN: 24 ML/MIN
GFR SERPL CREATININE-BSD FRML MDRD: ABNORMAL ML/MIN/{1.73_M2}
GFR SERPL CREATININE-BSD FRML MDRD: ABNORMAL ML/MIN/{1.73_M2}
GLUCOSE BLD-MCNC: 152 MG/DL (ref 70–99)
HCT VFR BLD CALC: 22.9 % (ref 40.7–50.3)
HCT VFR BLD CALC: 23.1 % (ref 40.7–50.3)
HCT VFR BLD CALC: 24.1 % (ref 40.7–50.3)
HCT VFR BLD CALC: 24.3 % (ref 40.7–50.3)
HEMOGLOBIN: 7.4 G/DL (ref 13–17)
HEMOGLOBIN: 7.5 G/DL (ref 13–17)
HEMOGLOBIN: 7.6 G/DL (ref 13–17)
HEMOGLOBIN: 7.8 G/DL (ref 13–17)
IMMATURE GRANULOCYTES: 0 %
LYMPHOCYTES # BLD: 15 % (ref 24–44)
Lab: ABNORMAL
MCH RBC QN AUTO: 29.8 PG (ref 25.2–33.5)
MCHC RBC AUTO-ENTMCNC: 31.5 G/DL (ref 28.4–34.8)
MCV RBC AUTO: 94.5 FL (ref 82.6–102.9)
MONOCYTES # BLD: 2 % (ref 1–7)
NRBC AUTOMATED: 0 PER 100 WBC
PDW BLD-RTO: 16.2 % (ref 11.8–14.4)
PLATELET # BLD: 234 K/UL (ref 138–453)
PLATELET ESTIMATE: ABNORMAL
PMV BLD AUTO: 10.5 FL (ref 8.1–13.5)
POTASSIUM SERPL-SCNC: 4.4 MMOL/L (ref 3.7–5.3)
PRO-BNP: ABNORMAL PG/ML
PROCALCITONIN: 0.23 NG/ML
RBC # BLD: 2.55 M/UL (ref 4.21–5.77)
RBC # BLD: ABNORMAL 10*6/UL
SEG NEUTROPHILS: 82 % (ref 36–66)
SEGMENTED NEUTROPHILS ABSOLUTE COUNT: 4.27 K/UL (ref 1.8–7.7)
SODIUM BLD-SCNC: 136 MMOL/L (ref 135–144)
SOURCE: NORMAL
SPECIMEN DESCRIPTION: ABNORMAL
STREP PNEUMONIAE ANTIGEN: NEGATIVE
WBC # BLD: 5.2 K/UL (ref 3.5–11.3)
WBC # BLD: ABNORMAL 10*3/UL

## 2020-04-15 PROCEDURE — 6370000000 HC RX 637 (ALT 250 FOR IP): Performed by: NURSE PRACTITIONER

## 2020-04-15 PROCEDURE — 83880 ASSAY OF NATRIURETIC PEPTIDE: CPT

## 2020-04-15 PROCEDURE — 85014 HEMATOCRIT: CPT

## 2020-04-15 PROCEDURE — 2700000000 HC OXYGEN THERAPY PER DAY

## 2020-04-15 PROCEDURE — 97116 GAIT TRAINING THERAPY: CPT

## 2020-04-15 PROCEDURE — 6360000002 HC RX W HCPCS: Performed by: INTERNAL MEDICINE

## 2020-04-15 PROCEDURE — 2580000003 HC RX 258: Performed by: NURSE PRACTITIONER

## 2020-04-15 PROCEDURE — 80048 BASIC METABOLIC PNL TOTAL CA: CPT

## 2020-04-15 PROCEDURE — 6370000000 HC RX 637 (ALT 250 FOR IP): Performed by: INTERNAL MEDICINE

## 2020-04-15 PROCEDURE — 71045 X-RAY EXAM CHEST 1 VIEW: CPT

## 2020-04-15 PROCEDURE — 97110 THERAPEUTIC EXERCISES: CPT

## 2020-04-15 PROCEDURE — 71046 X-RAY EXAM CHEST 2 VIEWS: CPT

## 2020-04-15 PROCEDURE — 94761 N-INVAS EAR/PLS OXIMETRY MLT: CPT

## 2020-04-15 PROCEDURE — 6360000002 HC RX W HCPCS: Performed by: NURSE PRACTITIONER

## 2020-04-15 PROCEDURE — 84145 PROCALCITONIN (PCT): CPT

## 2020-04-15 PROCEDURE — 97530 THERAPEUTIC ACTIVITIES: CPT

## 2020-04-15 PROCEDURE — 85025 COMPLETE CBC W/AUTO DIFF WBC: CPT

## 2020-04-15 PROCEDURE — 85018 HEMOGLOBIN: CPT

## 2020-04-15 PROCEDURE — 2060000000 HC ICU INTERMEDIATE R&B

## 2020-04-15 PROCEDURE — 36415 COLL VENOUS BLD VENIPUNCTURE: CPT

## 2020-04-15 PROCEDURE — 94640 AIRWAY INHALATION TREATMENT: CPT

## 2020-04-15 PROCEDURE — 97535 SELF CARE MNGMENT TRAINING: CPT

## 2020-04-15 RX ORDER — PREDNISONE 20 MG/1
40 TABLET ORAL DAILY
Status: DISCONTINUED | OUTPATIENT
Start: 2020-04-15 | End: 2020-04-17 | Stop reason: HOSPADM

## 2020-04-15 RX ORDER — ALBUTEROL SULFATE 2.5 MG/3ML
2.5 SOLUTION RESPIRATORY (INHALATION) EVERY 4 HOURS PRN
Status: DISCONTINUED | OUTPATIENT
Start: 2020-04-15 | End: 2020-04-17 | Stop reason: HOSPADM

## 2020-04-15 RX ADMIN — SODIUM BICARBONATE 650 MG: 650 TABLET ORAL at 20:16

## 2020-04-15 RX ADMIN — PANTOPRAZOLE SODIUM 40 MG: 40 TABLET, DELAYED RELEASE ORAL at 06:06

## 2020-04-15 RX ADMIN — HYDRALAZINE HYDROCHLORIDE 50 MG: 50 TABLET, FILM COATED ORAL at 20:16

## 2020-04-15 RX ADMIN — HEPARIN SODIUM 5000 UNITS: 5000 INJECTION INTRAVENOUS; SUBCUTANEOUS at 21:39

## 2020-04-15 RX ADMIN — SODIUM BICARBONATE 650 MG: 650 TABLET ORAL at 14:02

## 2020-04-15 RX ADMIN — AZITHROMYCIN MONOHYDRATE 500 MG: 250 TABLET ORAL at 20:15

## 2020-04-15 RX ADMIN — ACETAMINOPHEN 650 MG: 325 TABLET ORAL at 02:11

## 2020-04-15 RX ADMIN — METHYLPREDNISOLONE SODIUM SUCCINATE 40 MG: 40 INJECTION, POWDER, FOR SOLUTION INTRAMUSCULAR; INTRAVENOUS at 02:50

## 2020-04-15 RX ADMIN — HYDROCODONE BITARTRATE AND ACETAMINOPHEN 1 TABLET: 5; 325 TABLET ORAL at 20:16

## 2020-04-15 RX ADMIN — HEPARIN SODIUM 5000 UNITS: 5000 INJECTION INTRAVENOUS; SUBCUTANEOUS at 14:02

## 2020-04-15 RX ADMIN — SODIUM CHLORIDE, PRESERVATIVE FREE 10 ML: 5 INJECTION INTRAVENOUS at 08:44

## 2020-04-15 RX ADMIN — HYDRALAZINE HYDROCHLORIDE 50 MG: 50 TABLET, FILM COATED ORAL at 14:02

## 2020-04-15 RX ADMIN — ALLOPURINOL 300 MG: 300 TABLET ORAL at 08:44

## 2020-04-15 RX ADMIN — SODIUM CHLORIDE, PRESERVATIVE FREE 10 ML: 5 INJECTION INTRAVENOUS at 22:06

## 2020-04-15 RX ADMIN — SODIUM BICARBONATE 650 MG: 650 TABLET ORAL at 08:44

## 2020-04-15 RX ADMIN — ALBUTEROL SULFATE 2.5 MG: 2.5 SOLUTION RESPIRATORY (INHALATION) at 07:30

## 2020-04-15 RX ADMIN — CEFUROXIME AXETIL 250 MG: 250 TABLET ORAL at 21:39

## 2020-04-15 RX ADMIN — MELATONIN 2000 UNITS: at 08:44

## 2020-04-15 RX ADMIN — METHYLPREDNISOLONE SODIUM SUCCINATE 40 MG: 40 INJECTION, POWDER, FOR SOLUTION INTRAMUSCULAR; INTRAVENOUS at 14:02

## 2020-04-15 RX ADMIN — HEPARIN SODIUM 5000 UNITS: 5000 INJECTION INTRAVENOUS; SUBCUTANEOUS at 06:06

## 2020-04-15 RX ADMIN — CEFUROXIME AXETIL 250 MG: 250 TABLET ORAL at 08:43

## 2020-04-15 RX ADMIN — PREDNISONE 40 MG: 20 TABLET ORAL at 17:25

## 2020-04-15 RX ADMIN — ACETAMINOPHEN 650 MG: 325 TABLET ORAL at 17:25

## 2020-04-15 RX ADMIN — HYDRALAZINE HYDROCHLORIDE 50 MG: 50 TABLET, FILM COATED ORAL at 08:43

## 2020-04-15 RX ADMIN — BUDESONIDE AND FORMOTEROL FUMARATE DIHYDRATE 2 PUFF: 160; 4.5 AEROSOL RESPIRATORY (INHALATION) at 07:34

## 2020-04-15 ASSESSMENT — PAIN DESCRIPTION - PROGRESSION: CLINICAL_PROGRESSION: NOT CHANGED

## 2020-04-15 ASSESSMENT — PAIN DESCRIPTION - PAIN TYPE
TYPE: ACUTE PAIN
TYPE: ACUTE PAIN

## 2020-04-15 ASSESSMENT — PAIN DESCRIPTION - FREQUENCY: FREQUENCY: CONTINUOUS

## 2020-04-15 ASSESSMENT — PAIN DESCRIPTION - LOCATION
LOCATION: HEAD
LOCATION: HEAD

## 2020-04-15 ASSESSMENT — PAIN SCALES - GENERAL
PAINLEVEL_OUTOF10: 6
PAINLEVEL_OUTOF10: 0
PAINLEVEL_OUTOF10: 7
PAINLEVEL_OUTOF10: 2
PAINLEVEL_OUTOF10: 0

## 2020-04-15 ASSESSMENT — PAIN DESCRIPTION - DESCRIPTORS: DESCRIPTORS: ACHING;DISCOMFORT

## 2020-04-15 ASSESSMENT — PAIN DESCRIPTION - ONSET: ONSET: ON-GOING

## 2020-04-15 NOTE — PROGRESS NOTES
(acute kidney injury) ([facility] [address]    Chest pain    Facial edema    Common iliac aneurysm (HCC)    Shortness of breath    Pneumonia of both lower lobes due to infectious organism 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

## 2020-04-15 NOTE — PROGRESS NOTES
Pulmonary Critical Care Progress Note  GINETTE Singletary-NADIYA/Gus Trevizo MD     Patient seen for the follow up of hypoxic respiratory insufficiency, possible exacerbation of COPD, community-acquired pneumonia, history of laryngeal cancer, atypical chest pain, recent angioedema, URI (upper respiratory infection)     Subjective:  He sitting up in bed in no distress. He is not on any oxygen. He has some mild discomfort in his left side. He denies significant cough or shortness of breath. Examination:  Vitals: BP (!) 151/71   Pulse 83   Temp 97.5 °F (36.4 °C) (Oral)   Resp 20   Ht 5' 6\" (1.676 m)   Wt 133 lb 11.2 oz (60.6 kg)   SpO2 98%   BMI 21.58 kg/m²   General appearance:  alert and cooperative with exam  Neck: No JVD  Lungs: Moderate to good air exchange, no wheezing  Heart: regular rate and rhythm, S1, S2 normal, no gallop  Abdomen: Soft, non tender, + BS  Extremities: no cyanosis or clubbing. No significant edema    LABs:  CBC:   Recent Labs     04/14/20  0827  04/15/20  0046 04/15/20  0607   WBC 6.2  --   --  5.2   HGB 7.8*   < > 7.5* 7.6*   HCT 24.6*   < > 23.1* 24.1*     --   --  234    < > = values in this interval not displayed.      BMP:   Recent Labs     04/14/20  0827 04/15/20  0607    136   K 4.2 4.4   CO2 22 21   BUN 28* 30*   CREATININE 2.81* 2.71*   LABGLOM 23* 24*   GLUCOSE 114* 152*     PT/INR:   Recent Labs     04/12/20  2309   PROTIME 10.5   INR 1.0     LIVER PROFILE:  Recent Labs     04/12/20  1901 04/12/20  2309   AST 17 18   ALT <5* <5*   LABALBU 2.7* 3.0*     ABG:  Lab Results   Component Value Date    PKW0KDM 15 10/27/2019    FIO2 NOT REPORTED 03/05/2020       Lab Results   Component Value Date    POCPH 7.38 10/27/2019    POCPCO2 24 10/27/2019    POCPO2 99 10/27/2019    POCHCO3 14.2 10/27/2019    NBEA 11 10/27/2019    PBEA NOT REPORTED 10/27/2019    ZQR6MRL 15 10/27/2019    HDBL4QQS 98 10/27/2019    FIO2 NOT REPORTED 03/05/2020 Radiology:  4/15/2020      Impression:  · Acute hypoxic respiratory insufficiency  · Small bilateral pleural effusions  · Atelectasis  · COPD   · Moderate pulmonary hypertension, RVSP 64 mmHg  · Possible community-acquired pneumonia  · CHF, EF 45%  · History of laryngeal CA status post tracheostomy and subsequent development of oral cancer status post surgery with reconstruction   · atypical left-sided chest pain  · History of angioedema recent admission for facial swelling  · CKD, HTN, GERD, gout    Recommendations:  · Continue antibiotics, oral Zithromax and Ceftin   · Discontinue IV solu-medrol 40 mg every 12 hours  · Prednisone taper  · Albuterol 3 times daily and as needed  · Symbicort  · Incentive spirometry every hour while awake  · 2 liters/min via nasal cannula  · DVT prophylaxis with subcu heparin  · Follow-up with [facility]  · Discharge planning  · Will follow with you    GINETTE Rincon-CNP   Pulmonary Critical Care and Sleep Medicine,  Patient seen under the supervision of Sandra Duverney, MD, [facility]     Patient seen and evaluated by me. He is resting comfortably in the bed. He denies significant shortness of breath or cough. He has some discomfort on his left side. Lung exam reveals moderate air exchange, no wheezing. Plan is to continue oral antibiotics. Discontinue IV Solu-Medrol and start prednisone taper. Continue bronchodilators as needed. He is encouraged for incentive spirometry every hour while awake. He may need follow-up with [facility] clinic. He is okay for discharge planning from pulmonary standpoint. Above was reviewed and discussed with Thelma Mcintyre CNP. And I agree with assessment and plan of care.   Electronically signed by     Jose Eduardo Fairbanks MD on 4/15/2020 at 2:18 PM  Pulmonary Critical Care and Sleep Medicine,  [facility]  Cell: [phone]  Office: [phone]

## 2020-04-15 NOTE — PROGRESS NOTES
Physical Therapy     04/15/20 5340   Restrictions/Precautions   Restrictions/Precautions Fall Risk;Up as Tolerated;General Precautions   Required Braces or Orthoses? No   General   Chart Reviewed Yes   Response To Previous Treatment Patient with no complaints from previous session. Family / Caregiver Present No   General Comment   Comments OK for PT per Jerrell Torres RN   Pain Screening   Patient Currently in Pain Denies   Pain Assessment   Pain Assessment 0-10   Orientation   Overall Orientation Status WFL   Bed Mobility   Rolling Independent   Supine to Sit Modified independent   Sit to Supine Modified independent   Scooting Modified independent   Transfers   Sit to Stand Contact guard assistance   Stand to sit Contact guard assistance   Stand Pivot Transfers Contact guard assistance   Ambulation   Ambulation? Yes   Ambulation 1   Surface level tile   Device No Device  (IV pole)   Assistance Contact guard assistance   Gait Deviations Slow Yasmeen;Decreased step length   Distance 125' x 1 back and forth in room   Comments BTB   Stairs/Curb   Stairs? No   Balance   Posture Good   Sitting - Static Good   Sitting - Dynamic Good   Standing - Static Good   Standing - Dynamic Good;-   Exercises   Heelslides 2x10   Hip Abduction 2x10   Knee Short Arc Quad 2x10   Ankle Pumps 2x10   Upper Extremity Wand program 2x10 each   Comments Ankle pumps handout and instruction   Patient Goals    Patient goals  Home   Short term goals   Time Frame for Short term goals 12 visits:   Short term goal 1 Pt. to be indep with bed mob. (Met)   Short term goal 2 Pt. to be SBA with sit to stand transfer with approp. device   Short term goal 3 Pt. to be SBA with gait with approp. AD, 50ft. (household distance) (do RW trial and talk to social work if pt. needs RW for home)   Short term goal 4 Pt. to tolerate 25+ min.  of PT for ther ex/ gait/ balance training  (Met)   Conditions Requiring Skilled Therapeutic Intervention   Body structures, Functions,

## 2020-04-16 LAB
ABSOLUTE EOS #: <0.03 K/UL (ref 0–0.44)
ABSOLUTE IMMATURE GRANULOCYTE: 0.06 K/UL (ref 0–0.3)
ABSOLUTE LYMPH #: 0.73 K/UL (ref 1.1–3.7)
ABSOLUTE MONO #: 0.25 K/UL (ref 0.1–1.2)
ANION GAP SERPL CALCULATED.3IONS-SCNC: 14 MMOL/L (ref 9–17)
BASOPHILS # BLD: 0 % (ref 0–2)
BASOPHILS ABSOLUTE: <0.03 K/UL (ref 0–0.2)
BUN BLDV-MCNC: 33 MG/DL (ref 8–23)
BUN/CREAT BLD: 13 (ref 9–20)
CALCIUM SERPL-MCNC: 7.7 MG/DL (ref 8.6–10.4)
CHLORIDE BLD-SCNC: 97 MMOL/L (ref 98–107)
CO2: 21 MMOL/L (ref 20–31)
CREAT SERPL-MCNC: 2.54 MG/DL (ref 0.7–1.2)
DIFFERENTIAL TYPE: ABNORMAL
EOSINOPHILS RELATIVE PERCENT: 0 % (ref 1–4)
FERRITIN: 176 UG/L (ref 30–400)
GFR AFRICAN AMERICAN: 31 ML/MIN
GFR NON-AFRICAN AMERICAN: 25 ML/MIN
GFR SERPL CREATININE-BSD FRML MDRD: ABNORMAL ML/MIN/{1.73_M2}
GFR SERPL CREATININE-BSD FRML MDRD: ABNORMAL ML/MIN/{1.73_M2}
GLUCOSE BLD-MCNC: 123 MG/DL (ref 70–99)
HCT VFR BLD CALC: 22.9 % (ref 40.7–50.3)
HCT VFR BLD CALC: 23.5 % (ref 40.7–50.3)
HCT VFR BLD CALC: 23.8 % (ref 40.7–50.3)
HEMOGLOBIN: 7.4 G/DL (ref 13–17)
HEMOGLOBIN: 7.7 G/DL (ref 13–17)
HEMOGLOBIN: 7.7 G/DL (ref 13–17)
IMMATURE GRANULOCYTES: 1 %
IRON SATURATION: 12 % (ref 20–55)
IRON: 26 UG/DL (ref 59–158)
LYMPHOCYTES # BLD: 10 % (ref 24–43)
MAGNESIUM: 1.3 MG/DL (ref 1.6–2.6)
MCH RBC QN AUTO: 30.1 PG (ref 25.2–33.5)
MCHC RBC AUTO-ENTMCNC: 32.3 G/DL (ref 28.4–34.8)
MCV RBC AUTO: 93.1 FL (ref 82.6–102.9)
MONOCYTES # BLD: 3 % (ref 3–12)
NRBC AUTOMATED: 0 PER 100 WBC
PDW BLD-RTO: 16.1 % (ref 11.8–14.4)
PLATELET # BLD: 253 K/UL (ref 138–453)
PLATELET ESTIMATE: ABNORMAL
PMV BLD AUTO: 10.8 FL (ref 8.1–13.5)
POTASSIUM SERPL-SCNC: 4.4 MMOL/L (ref 3.7–5.3)
RBC # BLD: 2.46 M/UL (ref 4.21–5.77)
RBC # BLD: ABNORMAL 10*6/UL
SEG NEUTROPHILS: 86 % (ref 36–65)
SEGMENTED NEUTROPHILS ABSOLUTE COUNT: 6.5 K/UL (ref 1.5–8.1)
SODIUM BLD-SCNC: 132 MMOL/L (ref 135–144)
TOTAL IRON BINDING CAPACITY: 220 UG/DL (ref 250–450)
UNSATURATED IRON BINDING CAPACITY: 194 UG/DL (ref 112–347)
WBC # BLD: 7.6 K/UL (ref 3.5–11.3)
WBC # BLD: ABNORMAL 10*3/UL

## 2020-04-16 PROCEDURE — 6370000000 HC RX 637 (ALT 250 FOR IP): Performed by: INTERNAL MEDICINE

## 2020-04-16 PROCEDURE — 6360000002 HC RX W HCPCS: Performed by: NURSE PRACTITIONER

## 2020-04-16 PROCEDURE — 97110 THERAPEUTIC EXERCISES: CPT

## 2020-04-16 PROCEDURE — 2060000000 HC ICU INTERMEDIATE R&B

## 2020-04-16 PROCEDURE — 6360000002 HC RX W HCPCS: Performed by: INTERNAL MEDICINE

## 2020-04-16 PROCEDURE — 85014 HEMATOCRIT: CPT

## 2020-04-16 PROCEDURE — 2580000003 HC RX 258: Performed by: INTERNAL MEDICINE

## 2020-04-16 PROCEDURE — 83735 ASSAY OF MAGNESIUM: CPT

## 2020-04-16 PROCEDURE — 83550 IRON BINDING TEST: CPT

## 2020-04-16 PROCEDURE — 6370000000 HC RX 637 (ALT 250 FOR IP): Performed by: NURSE PRACTITIONER

## 2020-04-16 PROCEDURE — 83540 ASSAY OF IRON: CPT

## 2020-04-16 PROCEDURE — 82728 ASSAY OF FERRITIN: CPT

## 2020-04-16 PROCEDURE — 97530 THERAPEUTIC ACTIVITIES: CPT

## 2020-04-16 PROCEDURE — 36415 COLL VENOUS BLD VENIPUNCTURE: CPT

## 2020-04-16 PROCEDURE — 85018 HEMOGLOBIN: CPT

## 2020-04-16 PROCEDURE — 80048 BASIC METABOLIC PNL TOTAL CA: CPT

## 2020-04-16 PROCEDURE — 94761 N-INVAS EAR/PLS OXIMETRY MLT: CPT

## 2020-04-16 PROCEDURE — 2580000003 HC RX 258: Performed by: NURSE PRACTITIONER

## 2020-04-16 PROCEDURE — 85025 COMPLETE CBC W/AUTO DIFF WBC: CPT

## 2020-04-16 PROCEDURE — 94640 AIRWAY INHALATION TREATMENT: CPT

## 2020-04-16 RX ORDER — MAGNESIUM SULFATE 1 G/100ML
1 INJECTION INTRAVENOUS PRN
Status: DISCONTINUED | OUTPATIENT
Start: 2020-04-16 | End: 2020-04-17 | Stop reason: HOSPADM

## 2020-04-16 RX ORDER — AMLODIPINE BESYLATE 10 MG/1
10 TABLET ORAL DAILY
Status: DISCONTINUED | OUTPATIENT
Start: 2020-04-16 | End: 2020-04-17 | Stop reason: HOSPADM

## 2020-04-16 RX ADMIN — BUDESONIDE AND FORMOTEROL FUMARATE DIHYDRATE 2 PUFF: 160; 4.5 AEROSOL RESPIRATORY (INHALATION) at 09:41

## 2020-04-16 RX ADMIN — HEPARIN SODIUM 5000 UNITS: 5000 INJECTION INTRAVENOUS; SUBCUTANEOUS at 14:33

## 2020-04-16 RX ADMIN — BUDESONIDE AND FORMOTEROL FUMARATE DIHYDRATE 2 PUFF: 160; 4.5 AEROSOL RESPIRATORY (INHALATION) at 20:28

## 2020-04-16 RX ADMIN — MAGNESIUM SULFATE HEPTAHYDRATE 1 G: 1 INJECTION, SOLUTION INTRAVENOUS at 16:03

## 2020-04-16 RX ADMIN — CEFUROXIME AXETIL 250 MG: 250 TABLET ORAL at 11:36

## 2020-04-16 RX ADMIN — MAGNESIUM SULFATE HEPTAHYDRATE 1 G: 1 INJECTION, SOLUTION INTRAVENOUS at 13:25

## 2020-04-16 RX ADMIN — AMLODIPINE BESYLATE 10 MG: 10 TABLET ORAL at 09:27

## 2020-04-16 RX ADMIN — IRON SUCROSE 200 MG: 20 INJECTION, SOLUTION INTRAVENOUS at 11:36

## 2020-04-16 RX ADMIN — MAGNESIUM SULFATE HEPTAHYDRATE 1 G: 1 INJECTION, SOLUTION INTRAVENOUS at 14:19

## 2020-04-16 RX ADMIN — HEPARIN SODIUM 5000 UNITS: 5000 INJECTION INTRAVENOUS; SUBCUTANEOUS at 06:16

## 2020-04-16 RX ADMIN — MELATONIN 2000 UNITS: at 09:27

## 2020-04-16 RX ADMIN — SODIUM CHLORIDE, PRESERVATIVE FREE 10 ML: 5 INJECTION INTRAVENOUS at 09:27

## 2020-04-16 RX ADMIN — MAGNESIUM SULFATE HEPTAHYDRATE 1 G: 1 INJECTION, SOLUTION INTRAVENOUS at 17:21

## 2020-04-16 RX ADMIN — PANTOPRAZOLE SODIUM 40 MG: 40 TABLET, DELAYED RELEASE ORAL at 06:16

## 2020-04-16 RX ADMIN — SODIUM BICARBONATE 650 MG: 650 TABLET ORAL at 09:26

## 2020-04-16 RX ADMIN — AZITHROMYCIN MONOHYDRATE 500 MG: 250 TABLET ORAL at 20:36

## 2020-04-16 RX ADMIN — PREDNISONE 40 MG: 20 TABLET ORAL at 09:26

## 2020-04-16 RX ADMIN — CEFUROXIME AXETIL 250 MG: 250 TABLET ORAL at 20:36

## 2020-04-16 RX ADMIN — ALLOPURINOL 300 MG: 300 TABLET ORAL at 09:27

## 2020-04-16 RX ADMIN — HEPARIN SODIUM 5000 UNITS: 5000 INJECTION INTRAVENOUS; SUBCUTANEOUS at 20:35

## 2020-04-16 RX ADMIN — SODIUM BICARBONATE 650 MG: 650 TABLET ORAL at 14:33

## 2020-04-16 RX ADMIN — SODIUM CHLORIDE, PRESERVATIVE FREE 10 ML: 5 INJECTION INTRAVENOUS at 20:36

## 2020-04-16 RX ADMIN — HYDROCODONE BITARTRATE AND ACETAMINOPHEN 1 TABLET: 5; 325 TABLET ORAL at 01:50

## 2020-04-16 RX ADMIN — SODIUM BICARBONATE 650 MG: 650 TABLET ORAL at 20:36

## 2020-04-16 ASSESSMENT — PAIN DESCRIPTION - DESCRIPTORS: DESCRIPTORS: ACHING;DISCOMFORT

## 2020-04-16 ASSESSMENT — PAIN DESCRIPTION - PAIN TYPE: TYPE: ACUTE PAIN

## 2020-04-16 ASSESSMENT — PAIN DESCRIPTION - LOCATION: LOCATION: HEAD

## 2020-04-16 ASSESSMENT — PAIN SCALES - GENERAL
PAINLEVEL_OUTOF10: 0
PAINLEVEL_OUTOF10: 6
PAINLEVEL_OUTOF10: 0

## 2020-04-16 ASSESSMENT — PAIN DESCRIPTION - FREQUENCY: FREQUENCY: CONTINUOUS

## 2020-04-16 ASSESSMENT — PAIN DESCRIPTION - ONSET: ONSET: ON-GOING

## 2020-04-16 NOTE — PROGRESS NOTES
Subjective:   Follow up copd   less cough less dyspnea no tachypnea no pedal edema    ROS  No fever no chills no du cgi co  No tia no bleeding no headache no polyuria no polydypsia  physical exam  General Appearance: alert and oriented to person, place and time and in no acute distress  Skin: warm and dry, no rash or erythema  Head: normocephalic and atraumatic  Eyes: pupils equal, round, and reactive to light, sclera anicteric and pallor    Neck: neck supple and non tender without mass and trsacheostomy   Pulmonary/Chest: air entry equal decreased at bases no rhonchi  Cardiovascular: normal rate, regular rhythm, normal S1 and S2, no gallops, intact distal pulses and no carotid bruits  Abdomen: soft, non-tender, non-distended, normal bowel sounds, no masses or organomegaly  Extremities: no edema and good pulses no red sign    Neurologic: alert oriented x 3 no focal deficit    /78   Pulse 90   Temp 97.7 °F (36.5 °C) (Oral)   Resp 16   Ht 5' 6\" (1.676 m)   Wt 140 lb (63.5 kg)   SpO2 93%   BMI 22.60 kg/m²     CBC:   Lab Results   Component Value Date    WBC 7.6 04/16/2020    RBC 2.46 04/16/2020    HGB 7.7 04/16/2020    HCT 23.8 04/16/2020    MCV 93.1 04/16/2020    MCH 30.1 04/16/2020    MCHC 32.3 04/16/2020    RDW 16.1 04/16/2020     04/16/2020    MPV 10.8 04/16/2020     BMP:    Lab Results   Component Value Date     04/16/2020    K 4.4 04/16/2020    CL 97 04/16/2020    CO2 21 04/16/2020    BUN 33 04/16/2020    LABALBU 3.0 04/12/2020    CREATININE 2.54 04/16/2020    CALCIUM 7.7 04/16/2020    GFRAA 31 04/16/2020    LABGLOM 25 04/16/2020    GLUCOSE 123 04/16/2020        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

## 2020-04-16 NOTE — CONSULTS
Nephrology Consult Note    Reason for Consult:  LITTLE on CKD3  Requesting Physician:  Dr Tiffany Hollis    Chief Complaint:  SOB, Facial/ oral swelling  History Obtained From:  patient    History of Present Illness: This is a 81 y.o. male who presents with SOB, facial and oral swelling, difficulty eating secondary to the swelling. The patient is known to have a h/o:  1. Chronic kidney disease stage III, Baseline creatinine 2 mg/dl, no monoclonal bands  2. 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 3. 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 S/P trach  5. COPD. 6. Hypertension. 7. h/o Hypomagnesemia. 8. Aneurysm  The patient was admitted to the hospital in 4/7/20, he underwent EVAR/endovascular repair right common iliac artery aneurysm  Embolization of right internal iliac artery, he states that hydralazine was started during that visit, and he believes that it is the cause of the swelling, he was discharged on keflex, he denies diarrhea  The patient's creatinine has been worsening since March 7th, peak 3.45 mg/dl in 4/8/20 and gradually improving to 2.54 mg/dl today  The patient's serum sodium is 132    Past Medical History:        Diagnosis Date    Arthritis     Asthma     CKD (chronic kidney disease), stage III ([address]     COPD (chronic obstructive pulmonary disease) ([address]     GERD (gastroesophageal reflux disease)     Gout     Hypertension     Hyponatremia     Iliac artery aneurysm, right (HCC)     Laryngeal cancer ([address]     Laryngeal Cancer. Chronic left facial edema.      Lung cancer ([address] 3-4 years ago    [facility] [place]    Prostate cancer [facility])        Past Surgical History:        Procedure Laterality Date    ABDOMINAL AORTIC ANEURYSM REPAIR, ENDOVASCULAR Right 4/7/2020    RIGHT ILIAC ARTERY ANEURYSM REPAIR ENDOVASCULAR performed by Lion Pope MD at [address] [facility] [address]  03/04/2020 No stents placed    PROSTATE SURGERY      TRACHEAL SURGERY  3-4 years ago    lung removed and \"voice box\" removed       Current Medications:    predniSONE (DELTASONE) tablet 40 mg, Daily  albuterol (PROVENTIL) nebulizer solution 2.5 mg, Q4H PRN  hydrALAZINE (APRESOLINE) tablet 50 mg, TID  azithromycin (ZITHROMAX) tablet 500 mg, Daily  cefUROXime (CEFTIN) tablet 250 mg, 2 times per day  metoprolol (LOPRESSOR) injection 5 mg, Q4H PRN  allopurinol (ZYLOPRIM) tablet 300 mg, Daily  Vitamin D (CHOLECALCIFEROL) tablet 2,000 Units, Daily  HYDROcodone-acetaminophen (NORCO) 5-325 MG per tablet 1 tablet, Q4H PRN  pantoprazole (PROTONIX) tablet 40 mg, QAM AC  sodium bicarbonate tablet 650 mg, TID  sodium chloride flush 0.9 % injection 10 mL, 2 times per day  sodium chloride flush 0.9 % injection 10 mL, PRN  acetaminophen (TYLENOL) tablet 650 mg, Q6H PRN    Or  acetaminophen (TYLENOL) suppository 650 mg, Q6H PRN  magnesium hydroxide (MILK OF MAGNESIA) 400 MG/5ML suspension 30 mL, Daily PRN  promethazine (PHENERGAN) tablet 12.5 mg, Q6H PRN    Or  ondansetron (ZOFRAN) injection 4 mg, Q6H PRN  nicotine (NICODERM CQ) 21 MG/24HR 1 patch, Daily PRN  budesonide-formoterol (SYMBICORT) 160-4.5 MCG/ACT inhaler 2 puff, BID  heparin (porcine) injection 5,000 Units, 3 times per day        Allergies:  Amino acids and Lisinopril    Social History:   Social History     Socioeconomic History    Marital status: Single     Spouse name: Not on file    Number of children: Not on file    Years of education: Not on file    Highest education level: Not on file   Occupational History    Not on file   Social Needs    Financial resource strain: Not on file    Food insecurity     Worry: Not on file     Inability: Not on file    Transportation needs     Medical: Not on file     Non-medical: Not on file   Tobacco Use    Smoking status: Former Smoker    Smokeless tobacco: Never Used    Tobacco comment: quit smoking 20 years ago    Substance and OF THE CHEST 4/12/2020 6:35 pm COMPARISON: March 7 HISTORY: ORDERING SYSTEM PROVIDED HISTORY: SOB TECHNOLOGIST PROVIDED HISTORY: SOB Reason for Exam: Cough, sob, fever x 1 week. Hx of COPD, asthma Acuity: Acute Type of Exam: Initial Relevant Medical/Surgical History: COPD, asthma FINDINGS: Surgical clips in the right chest wall and axillary region are noted. Surgical clips in the lower cervical region are again noted. Bone findings are stable. Heart size is stable. Ill-defined increased density in the left lung base is noted. There is silhouetting of the descending thoracic aorta and the left hemidiaphragm. Left pleural effusion is noted. Ill-defined increased density in the right lung base is noted with partial silhouetting of the hemidiaphragm and CP angle. Scattered ground-glass density more superiorly in the right hemithorax is not grossly changed. No pneumothorax is noted     Left greater than right multifocal basilar airspace disease along with bilateral effusions. Findings are nonspecific and may represent atypical edema or pneumonia. 2 d echo 4/14/20;  Summary  Mild left ventricular hypertrophy  Global left ventricular systolic function is mildly reduced  Estimated ejection fraction is 45 % . Right atrium is mildly dilated . Moderate tricuspid regurgitation. Moderate pulmonary hypertension. No pericardial effusion seen. Normal aortic root dimension.     Assessment:  1. LITTLE secondary to contrast induced nephropathy, non oliguric, improving  2. Chronic kidney disease stage III, Baseline creatinine 2 mg/dl, no monoclonal bands  3. 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 4. Pneumonia   5. Facial swelling, he believes since hydralazine was started  6. Anemia   7. 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 S/P trach  9. COPD. 10. Hypertension.   11. S/p  EVAR/endovascular repair right common iliac artery aneurysm,Embolization of

## 2020-04-16 NOTE — PROGRESS NOTES
Independent  Supine to Sit: Independent  Sit to Supine: Independent  Scooting: Independent  Transfers  Sit to Stand: Independent  Stand to sit: Independent  Bed to Chair: Independent  Stand Pivot Transfers: Independent  Ambulation  Ambulation?: Yes  Ambulation 1  Surface: level tile  Device: No Device  Assistance: Stand by assistance  Quality of Gait: Pt with improved balance this date; no need to use r.walker w/ gait. No device used for stability. Pt did demonstrate + limp w/ gait but good stability noted at this time. Gait Deviations: Slow Yasmeen;Decreased step length  Distance: 130 ft x 1      Balance  Posture: Good  Sitting - Static: Good  Sitting - Dynamic: Good  Standing - Static: Good  Standing - Dynamic: Good;-  Exercises  Comments: standing closed kinetic chain exercises x 10 reps w/ walker used for balancing: heel lifts, marching, squats, hip flex lunge)          Comment: standing balance activities      AM-PAC Score 22/24     Goals  Short term goals  Time Frame for Short term goals: 12 visits:  Short term goal 1: Pt. to be indep with bed mob. (Met)  Short term goal 2: Pt. to be SBA with sit to stand transfer with approp. device  Short term goal 3: Pt. to be SBA with gait with approp. AD, 50ft. (household distance) (do RW trial and talk to social work if pt. needs RW for home)  Short term goal 4: Pt. to tolerate 25+ min. of PT for ther ex/ gait/ balance training(Met)  Patient Goals   Patient goals : Home    Plan    Plan  Times per week: 1-2x/day; 5-6days/wk  Specific instructions for Next Treatment: try RW vs. st. cane  Current Treatment Recommendations: Strengthening, Transfer Training, Gait Training, Endurance Training, Balance Training, Patient/Caregiver Education & Training  Safety Devices  Type of devices:  All fall risk precautions in place, Gait belt, Call light within reach  Restraints  Initially in place: No     Therapy Time   Individual Concurrent Group Co-treatment   Time In [phone]

## 2020-04-16 NOTE — PROGRESS NOTES
ADL, Safety Education & Training    AM-PAC Score        AM-PAC Inpatient Daily Activity Raw Score: 21 (04/16/20 1353)  AM-PAC Inpatient ADL T-Scale Score : 44.27 (04/16/20 1353)  ADL Inpatient CMS 0-100% Score: 32.79 (04/16/20 1353)  ADL Inpatient CMS G-Code Modifier : Gladys Green (04/16/20 1353)    Goals  Short term goals  Time Frame for Short term goals: by discharge, pt will  Short term goal 1: demo S/MI with ADL transfers with good safety, approp AD/DME as needed  Short term goal 2: demo S/MI with functional mob in room for ADL completion with approp AD and good safety/pacing  Short term goal 3: demo S/MI with toileting routine  Short term goal 4: demo I with UB ADLs and SBA with LB ADLs with approp AE/DME, good pacing  Short term goal 5: demo and verb good understanding of fall prevention techs, EC/WS techs, and possible equip needs for home   Patient Goals   Patient goals : to go home       Therapy Time   Individual Concurrent Group Co-treatment   Time In 1310         Time Out 1349         Minutes [facility], hospitals

## 2020-04-16 NOTE — PLAN OF CARE
Problem: Falls - Risk of:  Goal: Will remain free from falls  Description: Will remain free from falls  Outcome: Ongoing  Note: Patient is a fall risk during this admission. Fall risk assessment was performed. Patient is absent of falls. Bed is in the lowest position. Wheels on the bed are locked. Call light and bed side table are within reach. Clutter is removed. Patient was educated to call out when needing assistance or wanting to get out of bed. Patient offered toileting assistance during rounding. Hourly rounds have been performed.
Pt Sp02 stable at 93% on room air. Lungs clear/diminished. Magnesium replacement today. Up with therapy and showered per self; activity increased. Hgb stable at 7.4. Possible discharge tomorrow .
Shift  Note: The patient's skin remains dry and intact. Assist patient with turning Q2 hours and PRN.

## 2020-04-17 VITALS
OXYGEN SATURATION: 93 % | TEMPERATURE: 98.6 F | BODY MASS INDEX: 22.5 KG/M2 | DIASTOLIC BLOOD PRESSURE: 94 MMHG | WEIGHT: 140 LBS | HEART RATE: 93 BPM | HEIGHT: 66 IN | RESPIRATION RATE: 20 BRPM | SYSTOLIC BLOOD PRESSURE: 156 MMHG

## 2020-04-17 LAB
ABSOLUTE EOS #: <0.03 K/UL (ref 0–0.44)
ABSOLUTE IMMATURE GRANULOCYTE: 0.06 K/UL (ref 0–0.3)
ABSOLUTE LYMPH #: 1.36 K/UL (ref 1.1–3.7)
ABSOLUTE MONO #: 0.64 K/UL (ref 0.1–1.2)
ANION GAP SERPL CALCULATED.3IONS-SCNC: 14 MMOL/L (ref 9–17)
BASOPHILS # BLD: 0 % (ref 0–2)
BASOPHILS ABSOLUTE: <0.03 K/UL (ref 0–0.2)
BUN BLDV-MCNC: 38 MG/DL (ref 8–23)
BUN/CREAT BLD: 15 (ref 9–20)
CALCIUM SERPL-MCNC: 8.3 MG/DL (ref 8.6–10.4)
CHLORIDE BLD-SCNC: 99 MMOL/L (ref 98–107)
CO2: 21 MMOL/L (ref 20–31)
CREAT SERPL-MCNC: 2.54 MG/DL (ref 0.7–1.2)
DIFFERENTIAL TYPE: ABNORMAL
EOSINOPHILS RELATIVE PERCENT: 0 % (ref 1–4)
GFR AFRICAN AMERICAN: 31 ML/MIN
GFR NON-AFRICAN AMERICAN: 25 ML/MIN
GFR SERPL CREATININE-BSD FRML MDRD: ABNORMAL ML/MIN/{1.73_M2}
GFR SERPL CREATININE-BSD FRML MDRD: ABNORMAL ML/MIN/{1.73_M2}
GLUCOSE BLD-MCNC: 96 MG/DL (ref 70–99)
HCT VFR BLD CALC: 23.7 % (ref 40.7–50.3)
HCT VFR BLD CALC: 24.9 % (ref 40.7–50.3)
HCT VFR BLD CALC: 25.3 % (ref 40.7–50.3)
HEMOGLOBIN: 7.7 G/DL (ref 13–17)
HEMOGLOBIN: 8.1 G/DL (ref 13–17)
HEMOGLOBIN: 8.1 G/DL (ref 13–17)
IMMATURE GRANULOCYTES: 1 %
LYMPHOCYTES # BLD: 15 % (ref 24–43)
MAGNESIUM: 2.2 MG/DL (ref 1.6–2.6)
MCH RBC QN AUTO: 29.9 PG (ref 25.2–33.5)
MCHC RBC AUTO-ENTMCNC: 32.5 G/DL (ref 28.4–34.8)
MCV RBC AUTO: 91.9 FL (ref 82.6–102.9)
MONOCYTES # BLD: 7 % (ref 3–12)
NRBC AUTOMATED: 0 PER 100 WBC
PDW BLD-RTO: 15.9 % (ref 11.8–14.4)
PLATELET # BLD: 260 K/UL (ref 138–453)
PLATELET ESTIMATE: ABNORMAL
PMV BLD AUTO: 10.1 FL (ref 8.1–13.5)
POTASSIUM SERPL-SCNC: 4.2 MMOL/L (ref 3.7–5.3)
RBC # BLD: 2.71 M/UL (ref 4.21–5.77)
RBC # BLD: ABNORMAL 10*6/UL
SEG NEUTROPHILS: 77 % (ref 36–65)
SEGMENTED NEUTROPHILS ABSOLUTE COUNT: 6.76 K/UL (ref 1.5–8.1)
SODIUM BLD-SCNC: 134 MMOL/L (ref 135–144)
WBC # BLD: 8.8 K/UL (ref 3.5–11.3)
WBC # BLD: ABNORMAL 10*3/UL

## 2020-04-17 PROCEDURE — 6370000000 HC RX 637 (ALT 250 FOR IP): Performed by: INTERNAL MEDICINE

## 2020-04-17 PROCEDURE — 94760 N-INVAS EAR/PLS OXIMETRY 1: CPT

## 2020-04-17 PROCEDURE — 85025 COMPLETE CBC W/AUTO DIFF WBC: CPT

## 2020-04-17 PROCEDURE — 97110 THERAPEUTIC EXERCISES: CPT

## 2020-04-17 PROCEDURE — 36415 COLL VENOUS BLD VENIPUNCTURE: CPT

## 2020-04-17 PROCEDURE — 80048 BASIC METABOLIC PNL TOTAL CA: CPT

## 2020-04-17 PROCEDURE — 97110 THERAPEUTIC EXERCISES: CPT | Performed by: NURSE PRACTITIONER

## 2020-04-17 PROCEDURE — 6370000000 HC RX 637 (ALT 250 FOR IP): Performed by: NURSE PRACTITIONER

## 2020-04-17 PROCEDURE — 6360000002 HC RX W HCPCS: Performed by: NURSE PRACTITIONER

## 2020-04-17 PROCEDURE — 2580000003 HC RX 258: Performed by: NURSE PRACTITIONER

## 2020-04-17 PROCEDURE — 85014 HEMATOCRIT: CPT

## 2020-04-17 PROCEDURE — 83735 ASSAY OF MAGNESIUM: CPT

## 2020-04-17 PROCEDURE — 94640 AIRWAY INHALATION TREATMENT: CPT

## 2020-04-17 PROCEDURE — 6360000002 HC RX W HCPCS: Performed by: INTERNAL MEDICINE

## 2020-04-17 PROCEDURE — 97116 GAIT TRAINING THERAPY: CPT

## 2020-04-17 PROCEDURE — 2580000003 HC RX 258: Performed by: INTERNAL MEDICINE

## 2020-04-17 PROCEDURE — 85018 HEMOGLOBIN: CPT

## 2020-04-17 RX ORDER — PREDNISONE 10 MG/1
20 TABLET ORAL DAILY
Qty: 6 TABLET | Refills: 0 | Status: SHIPPED | OUTPATIENT
Start: 2020-04-17 | End: 2020-04-20

## 2020-04-17 RX ORDER — PREDNISONE 1 MG/1
10 TABLET ORAL
Qty: 6 TABLET | Refills: 0 | Status: SHIPPED | OUTPATIENT
Start: 2020-04-17 | End: 2020-04-22

## 2020-04-17 RX ORDER — PREDNISONE 10 MG/1
10 TABLET ORAL DAILY
Qty: 3 TABLET | Refills: 0 | Status: SHIPPED | OUTPATIENT
Start: 2020-04-17 | End: 2020-04-20

## 2020-04-17 RX ORDER — AZITHROMYCIN 500 MG/1
500 TABLET, FILM COATED ORAL DAILY
Qty: 3 TABLET | Refills: 0 | Status: SHIPPED | OUTPATIENT
Start: 2020-04-17 | End: 2020-04-20

## 2020-04-17 RX ORDER — AMLODIPINE BESYLATE 10 MG/1
10 TABLET ORAL DAILY
Qty: 30 TABLET | Refills: 0 | Status: SHIPPED | OUTPATIENT
Start: 2020-04-18

## 2020-04-17 RX ORDER — PREDNISONE 20 MG/1
30 TABLET ORAL DAILY
Qty: 5 TABLET | Refills: 0 | Status: SHIPPED | OUTPATIENT
Start: 2020-04-17 | End: 2020-04-20

## 2020-04-17 RX ORDER — CEFUROXIME AXETIL 250 MG/1
250 TABLET ORAL EVERY 12 HOURS SCHEDULED
Qty: 10 TABLET | Refills: 0 | Status: SHIPPED | OUTPATIENT
Start: 2020-04-17 | End: 2020-04-22

## 2020-04-17 RX ADMIN — SODIUM BICARBONATE 650 MG: 650 TABLET ORAL at 13:37

## 2020-04-17 RX ADMIN — AMLODIPINE BESYLATE 10 MG: 10 TABLET ORAL at 09:18

## 2020-04-17 RX ADMIN — ALLOPURINOL 300 MG: 300 TABLET ORAL at 09:18

## 2020-04-17 RX ADMIN — HEPARIN SODIUM 5000 UNITS: 5000 INJECTION INTRAVENOUS; SUBCUTANEOUS at 13:37

## 2020-04-17 RX ADMIN — MELATONIN 2000 UNITS: at 09:17

## 2020-04-17 RX ADMIN — HEPARIN SODIUM 5000 UNITS: 5000 INJECTION INTRAVENOUS; SUBCUTANEOUS at 06:02

## 2020-04-17 RX ADMIN — PANTOPRAZOLE SODIUM 40 MG: 40 TABLET, DELAYED RELEASE ORAL at 06:02

## 2020-04-17 RX ADMIN — CEFUROXIME AXETIL 250 MG: 250 TABLET ORAL at 09:17

## 2020-04-17 RX ADMIN — SODIUM CHLORIDE, PRESERVATIVE FREE 10 ML: 5 INJECTION INTRAVENOUS at 09:18

## 2020-04-17 RX ADMIN — SODIUM BICARBONATE 650 MG: 650 TABLET ORAL at 09:18

## 2020-04-17 RX ADMIN — IRON SUCROSE 200 MG: 20 INJECTION, SOLUTION INTRAVENOUS at 09:18

## 2020-04-17 RX ADMIN — BUDESONIDE AND FORMOTEROL FUMARATE DIHYDRATE 2 PUFF: 160; 4.5 AEROSOL RESPIRATORY (INHALATION) at 07:44

## 2020-04-17 RX ADMIN — PREDNISONE 40 MG: 20 TABLET ORAL at 09:17

## 2020-04-17 ASSESSMENT — PAIN SCALES - GENERAL
PAINLEVEL_OUTOF10: 0
PAINLEVEL_OUTOF10: 0

## 2020-04-17 NOTE — DISCHARGE INSTR - COC
certify the above information and transfer of Trent Balderrama  is necessary for the continuing treatment of the diagnosis listed and that he requires [address] for less 30 days.      Update Admission H&P: No change in H&P    PHYSICIAN SIGNATURE:  Electronically signed by Jean Paul Jose MD on 4/17/20 at 6:03 PM EDT

## 2020-04-17 NOTE — PROGRESS NOTES
D (CHOLECALCIFEROL) tablet 2,000 Units  2,000 Units Oral Daily Nahum Nieto, APRN - CNP   2,000 Units at 04/17/20 0917    HYDROcodone-acetaminophen (NORCO) 5-325 MG per tablet 1 tablet  1 tablet Oral Q4H PRN Nahum Nieto, APRN - CNP   1 tablet at 04/16/20 0150    pantoprazole (PROTONIX) tablet 40 mg  40 mg Oral QAM AC Nahum Nieto, APRN - CNP   40 mg at 04/17/20 0602    sodium bicarbonate tablet 650 mg  650 mg Oral TID Nahum Nieto, APRN - CNP   650 mg at 04/17/20 1337    sodium chloride flush 0.9 % injection 10 mL  10 mL Intravenous 2 times per day Nahum Nieto, APRN - CNP   10 mL at 04/17/20 0918    sodium chloride flush 0.9 % injection 10 mL  10 mL Intravenous PRN Nahum Nieto, APRN - CNP   10 mL at 04/14/20 1457    acetaminophen (TYLENOL) tablet 650 mg  650 mg Oral Q6H PRN Nahum Caier, APRN - CNP   650 mg at 04/15/20 1725    Or    acetaminophen (TYLENOL) suppository 650 mg  650 mg Rectal Q6H PRN Nahum Nieto, APRN - CNP        magnesium hydroxide (MILK OF MAGNESIA) 400 MG/5ML suspension 30 mL  30 mL Oral Daily PRN Nahum Nieto, APRN - CNP        promethazine (PHENERGAN) tablet 12.5 mg  12.5 mg Oral Q6H PRN Nahum Nieto, APRN - CNP        Or    ondansetron (ZOFRAN) injection 4 mg  4 mg Intravenous Q6H PRN Nahum Nieto, APRN - CNP   4 mg at 04/13/20 1145    nicotine (NICODERM CQ) 21 MG/24HR 1 patch  1 patch Transdermal Daily PRN Nahum Nieto, APRN - CNP        budesonide-formoterol (SYMBICORT) 160-4.5 MCG/ACT inhaler 2 puff  2 puff Inhalation BID Nahum Nieto, APRN - CNP   2 puff at 04/17/20 0744    heparin (porcine) injection 5,000 Units  5,000 Units Subcutaneous 3 times per day Chandra Bonilla APRN - CNP   5,000 Units at 04/17/20 1337       REVIEW OF SYSTEMS     Constitutional: No fever/chills  Cardiac: No chest pain,no dyspnea,no orthopnea.   Chest: No wheezing, has productive cough  Abdomen: No pain, no constipation , no diarrhea  : No difficultly passing urine, No [place]        SUBJECTIVE     No events overnight. No complaints.  Doing well    Vitals:    04/17/20 1128   BP: (!) 152/94   Pulse: 81   Resp: 20   Temp: 97.3 °F (36.3 °C)   SpO2: 96%     Wt Readings from Last 2 Encounters:   04/16/20 140 lb (63.5 kg)   04/07/20 156 lb 12 oz (71.1 kg)     In: 120 [P.O.:120]  Out: -   In: 120   Out: -      PHYSICAL EXAM      GENERAL APPEARANCE:Awake, alert, in no acute distress  SKIN: warm and dry, no rash or erythema  PULMONARY:   diminshed  CADRDIOVASCULAR: distant regular rate  ABDOMEN: soft nontender, bowel sounds are present, no organomegaly,  no ascitic wave  EXTREMITIES: no cyanosis or edema  NEUROLOGY: Alert, motor non focal, no tremor  LABS   CBC:   Lab Results   Component Value Date    WBC 8.8 04/17/2020    RBC 2.71 04/17/2020    HGB 8.1 04/17/2020    HCT 25.3 04/17/2020    MCV 91.9 04/17/2020    MCH 29.9 04/17/2020    MCHC 32.5 04/17/2020    RDW 15.9 04/17/2020     04/17/2020    MPV 10.1 04/17/2020      BMP:   Lab Results   Component Value Date     04/17/2020    K 4.2 04/17/2020    CL 99 04/17/2020    CO2 21 04/17/2020    BUN 38 04/17/2020    CREATININE 2.54 04/17/2020    GLUCOSE 96 04/17/2020    CALCIUM 8.3 04/17/2020      BNP:  Lab Results   Component Value Date    BNP 39 12/12/2012     PHOSPHORUS:    Lab Results   Component Value Date    PHOS 4.6 04/08/2020     MAGNESIUM:   Lab Results   Component Value Date    MG 2.2 04/17/2020     ALBUMIN:   Lab Results   Component Value Date    LABALBU 3.0 04/12/2020     OTHER:                   URINALYSIS/URINE CHEMISTRIES    Urine Sodium:     Lab Results   Component Value Date    CASTILLO 99 01/03/2020     Urine Potassium:  No results found for: KUR  Urine Chloride:  No results found for: CLUR  Urine Osmolarity:   Lab Results   Component Value Date    OSMOU 320 01/03/2020     Urine Protein:   No components found for:

## 2020-04-17 NOTE — PROGRESS NOTES
Therapy  O2 Device: None (Room air)       Orientation  Orientation  Overall Orientation Status: Within Functional Limits  Cognition      Objective   Bed mobility  Bridging: Independent  Scooting: Independent  Transfers  Sit to Stand: Independent  Stand to sit: Independent  Bed to Chair: Independent  Stand Pivot Transfers: Independent  Ambulation  Ambulation?: Yes  Ambulation 1  Surface: level tile  Device: No Device  Assistance: Supervision  Quality of Gait: step through pattern, good stability  Distance: 50ftx3 laps in room     Balance  Posture: Good  Sitting - Static: Good  Sitting - Dynamic: Good  Standing - Static: Good  Standing - Dynamic: Good;-  Exercises  Comments: Standing Ex Program: Heel/Toe raises, hip flexion, hip abduction, mini squats, hip extension, HS curls. Reps: 15; sit to stands x 10    (issued written home ex's & reviewed with good understanding)           G-Code     OutComes Score                                                     AM-PAC Score  AM-PAC Inpatient Mobility Raw Score : 23 (04/17/20 1513)  AM-PAC Inpatient T-Scale Score : 56.93 (04/17/20 1513)  Mobility Inpatient CMS 0-100% Score: 11.2 (04/17/20 1513)  Mobility Inpatient CMS G-Code Modifier : CI (04/17/20 1513)          Goals  Short term goals  Time Frame for Short term goals: 12 visits:  Short term goal 1: Pt. to be indep with bed mob. (Met)  Short term goal 2: Pt. to be SBA with sit to stand transfer with approp. device  Short term goal 3: Pt. to be SBA with gait with approp. AD, 50ft. (household distance) (do RW trial and talk to social work if pt. needs RW for home)  Short term goal 4: Pt. to tolerate 25+ min.  of PT for ther ex/ gait/ balance training(Met)  Patient Goals   Patient goals : Home    Plan    Plan  Times per week: 1-2x/day; 5-6days/wk  Specific instructions for Next Treatment: try RW vs. st. cane  Current Treatment Recommendations: Strengthening, Transfer Training, Gait Training, Endurance Training, Balance Training, Patient/Caregiver Education & Training  Safety Devices  Type of devices:  All fall risk precautions in place, Gait belt, Call light within reach  Restraints  Initially in place: No     Therapy Time   Individual Concurrent Group Co-treatment   Time In 1320         Time Out 1359         Minutes Bridget 62, PT

## 2020-04-17 NOTE — PROGRESS NOTES
No  Position Activity Restriction  Other position/activity restrictions:  Up with assist, IV, telemetry, trach  Subjective   General  Chart Reviewed: Yes  Patient assessed for rehabilitation services?: Yes  Additional Pertinent Hx: trach  Response to previous treatment: Patient with no complaints from previous session  Family / Caregiver Present: No  Oxygen Therapy  O2 Device: None (Room air)   Orientation  Orientation  Overall Orientation Status: Within Functional Limits  Objective        Type of ROM/Therapeutic Exercise  Comment:      Exercises   Supine Chin Tuck - 10 reps - 3 sets - 1x daily - 7x weekly   Supine Chest Stretch with Elbows Bent - 10 reps - 3 sets - 1x daily - 7x weekly                    Plan   Plan  Times per week: 4-5x/week, 1-2x/day  Current Treatment Recommendations: Strengthening, Functional Mobility Training, Balance Training, Endurance Training, Equipment Evaluation, Education, & procurement, Patient/Caregiver Education & Training, Self-Care / ADL, Safety Education & Training    AM-PAC Score        AM-PAC Inpatient Daily Activity Raw Score: 21 (04/17/20 1141)  AM-PAC Inpatient ADL T-Scale Score : 44.27 (04/17/20 1141)  ADL Inpatient CMS 0-100% Score: 32.79 (04/17/20 1141)  ADL Inpatient CMS G-Code Modifier : Katiuska Chairez (04/17/20 1141)    Goals  Short term goals  Time Frame for Short term goals: by discharge, pt will  Short term goal 1: demo S/MI with ADL transfers with good safety, approp AD/DME as needed  Short term goal 2: demo S/MI with functional mob in room for ADL completion with approp AD and good safety/pacing  Short term goal 3: demo S/MI with toileting routine  Short term goal 4: demo I with UB ADLs and SBA with LB ADLs with approp AE/DME, good pacing  Short term goal 5: demo and verb good understanding of fall prevention techs, EC/WS techs, and possible equip needs for home   Patient Goals   Patient goals : to go home       Therapy Time   Individual Concurrent Group Co-treatment   Time In

## 2020-04-17 NOTE — DISCHARGE SUMMARY
Physician Discharge Summary     Patient ID:  Yamini Sparks  [id number]  81 y.o.  1950    Admit date: 4/12/2020    Discharge date and time:  4/17/2020    Admission Diagnoses:   Patient Active Problem List   Diagnosis    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 malnutrition (HCC)    LITTLE (acute kidney injury) ([address]    Chest pain    Facial edema    Common iliac aneurysm (HCC)    Shortness of breath    Pneumonia of both lower lobes due to infectious organism ([address]    Essential hypertension    Suspected COVID-19 virus infection    Stage 3 chronic kidney disease (HCC)    Acute kidney injury superimposed on chronic kidney disease ([address]    URI (upper respiratory infection)       Discharge Diagnoses:   Pneumonia  Pulmonary atelectasis  CKD 3   hypomagnesemia  LV dysfunction  COPD  CA larynx with metastasis to the floor of mouth both in remission right now  Iron deficiency anemia      Consults: pulmonary/intensive care and nephrology    Procedures: none    Hospital Course:   82-year-old -American gentleman admitted with respiratory problems admitted to ICU as alcohol withdrawal out which was ruled out patient was found to have pneumonia with COPD possibly exacerbation and acute kidney failure on top of CKD 3 treated with antibiotics bronchodilators nephrology consultation during his stay magnesium was replaced he did not have any major complications 2D echo showed mild LV dysfunction      Discharge Exam:  See progress note from today    Disposition: home  Stable improved  Patient Instructions:   Current Discharge Medication List      START taking these medications    Details   amLODIPine (NORVASC) 10 MG tablet Take 1 tablet by mouth daily  Qty: 30 tablet, Refills: 0      cefUROXime (CEFTIN) 250 MG tablet Take 1 tablet by mouth every 12 hours for 10 doses  Qty: 10 tablet, Refills: 0

## 2020-04-18 ENCOUNTER — CARE COORDINATION (OUTPATIENT)
Dept: CASE MANAGEMENT | Age: 70
End: 2020-04-18

## 2020-04-20 ENCOUNTER — CARE COORDINATION (OUTPATIENT)
Dept: CASE MANAGEMENT | Age: 70
End: 2020-04-20

## 2020-04-20 NOTE — CARE COORDINATION
[facility] Transitions Initial Follow Up Call- 2nd attempt at [facility] follow up call    Call within 2 business days of discharge: Yes    Patient: Alex Matthews Patient : 1950   MRN: [id number]  Reason for Admission: bilateral LL pneumonia   Discharge Date: 20 RARS: Readmission Risk Score: 45      Last Discharge [facility]       Complaint Diagnosis Description Type Department Provider    20 Shortness of Breath; Cough; Fever Shortness of breath . .. ED to Hosp-Admission (Discharged) (ADMITTED) SANJU Freeman MD; Harshal Lyons. .. Attempted to reach patient for initial transitional call. VM left to return call to [phone] (pt on COVID- 19 risk discharge list). Will attempt later          Facility: [place] [address]     Follow Up  No future appointments.     Cintia Goodman RN

## 2020-04-27 ENCOUNTER — CARE COORDINATION (OUTPATIENT)
Dept: CASE MANAGEMENT | Age: 70
End: 2020-04-27

## 2020-04-30 ENCOUNTER — CARE COORDINATION (OUTPATIENT)
Dept: CASE MANAGEMENT | Age: 70
End: 2020-04-30

## 2020-04-30 NOTE — CARE COORDINATION
[facility] Transitions Follow Up Call- final COVID 19 risk follow up call     2020    Patient: Meenakshi Higgins  Patient : 1950   MRN: [id number]  Reason for Admission: bilateral LL pneumonia    Discharge Date: 20 RARS: Readmission Risk Score: 45         Spoke with: pt brother Lee Gutierrez Patient resolved from the Care Transitions episode on 2020  Patient/family has been provided the following resources and education related to COVID-19:                         Signs, symptoms and red flags related to COVID-19            CDC exposure and quarantine guidelines            [facility] exposure contact - [phone]            Contact for their local Department of Health                 Patient currently reports that the following symptoms have improved:  fatigue     No further outreach scheduled with this CTN/ACM. Episode of Care resolved. Patient has this CTN/ACM contact information if future needs arise. Care Transitions Subsequent and Final Call    Subsequent and Final Calls  Do you have any ongoing symptoms?:  No  Have your medications changed?:  No  Do you have any questions related to your medications?:  No  Do you currently have any active services?:  No  Do you have any needs or concerns that I can assist you with?:  No  Identified Barriers:  Lack of Education  Care Transitions Interventions  Other Interventions: Follow Up  No future appointments.     Hui Corona RN

## 2020-05-11 ENCOUNTER — APPOINTMENT (OUTPATIENT)
Dept: GENERAL RADIOLOGY | Age: 70
End: 2020-05-11
Payer: MEDICARE

## 2020-05-11 ENCOUNTER — HOSPITAL ENCOUNTER (EMERGENCY)
Age: 70
Discharge: HOME | End: 2020-05-11
Attending: EMERGENCY MEDICINE
Payer: MEDICARE

## 2020-05-11 ENCOUNTER — TELEPHONE (OUTPATIENT)
Dept: OTHER | Facility: CLINIC | Age: 70
End: 2020-05-11

## 2020-05-11 VITALS
HEIGHT: 66 IN | BODY MASS INDEX: 22.5 KG/M2 | RESPIRATION RATE: 16 BRPM | WEIGHT: 140 LBS | OXYGEN SATURATION: 95 % | TEMPERATURE: 98.9 F | DIASTOLIC BLOOD PRESSURE: 121 MMHG | SYSTOLIC BLOOD PRESSURE: 160 MMHG | HEART RATE: 75 BPM

## 2020-05-11 PROCEDURE — 99283 EMERGENCY DEPT VISIT LOW MDM: CPT

## 2020-05-11 PROCEDURE — 72100 X-RAY EXAM L-S SPINE 2/3 VWS: CPT

## 2020-05-11 RX ORDER — HYDROCODONE BITARTRATE AND ACETAMINOPHEN 5; 325 MG/1; MG/1
1 TABLET ORAL EVERY 6 HOURS PRN
Qty: 20 TABLET | Refills: 0 | Status: SHIPPED | OUTPATIENT
Start: 2020-05-11 | End: 2020-05-16

## 2020-05-11 ASSESSMENT — PAIN SCALES - GENERAL: PAINLEVEL_OUTOF10: 10

## 2020-05-11 NOTE — TELEPHONE ENCOUNTER
Writer contacted  ED provider Juan David Camacoh to inform of 30 day readmission risk. No Decision on disposition at this time.

## 2020-05-11 NOTE — ED PROVIDER NOTES
eMERGENCY dEPARTMENT eNCOUnter   Independent Attestation     Pt Name: Bettye Gasca  MRN: [id number]  [place] 1950  Date of evaluation: 5/11/20     Bettye Gasca is a 81 y.o. male with CC: Fall and Back Pain      Based on the medical record the care appears appropriate. I was personally available for consultation in the Emergency Department.     Evelia Almanza MD  Attending Emergency Physician                    Evelia Almanza MD  05/11/20 7152

## 2020-05-12 ENCOUNTER — CARE COORDINATION (OUTPATIENT)
Dept: CASE MANAGEMENT | Age: 70
End: 2020-05-12

## 2020-05-12 ENCOUNTER — TELEPHONE (OUTPATIENT)
Dept: OTHER | Facility: CLINIC | Age: 70
End: 2020-05-12

## 2020-05-12 ASSESSMENT — ENCOUNTER SYMPTOMS
CONSTIPATION: 0
SINUS PRESSURE: 0
DIARRHEA: 0
NAUSEA: 0
COLOR CHANGE: 0
RHINORRHEA: 0
BACK PAIN: 1
SHORTNESS OF BREATH: 0
SORE THROAT: 0
VOMITING: 0
COUGH: 0
WHEEZING: 0
ABDOMINAL PAIN: 0

## 2020-05-12 NOTE — ED PROVIDER NOTES
dizziness, weakness and headaches. Hematological: Negative for adenopathy. Except as noted above the remainder of the review of systems was reviewed and negative. PHYSICAL EXAM    (up to 7 for level 4, 8 or more for level 5)     ED Triage Vitals [05/11/20 1432]   BP Temp Temp Source Pulse Resp SpO2 Height Weight   (!) 160/121 98.9 °F (37.2 °C) Oral 75 16 95 % 5' 6\" (1.676 m) 140 lb (63.5 kg)       Physical Exam  Vitals signs reviewed. Constitutional:       Appearance: He is well-developed. HENT:      Head: Normocephalic and atraumatic. Eyes:      Conjunctiva/sclera: Conjunctivae normal.      Pupils: Pupils are equal, round, and reactive to light. Neck:      Musculoskeletal: Normal range of motion and neck supple. Cardiovascular:      Rate and Rhythm: Normal rate and regular rhythm. Pulmonary:      Effort: Pulmonary effort is normal. No respiratory distress. Breath sounds: Normal breath sounds. No stridor. Abdominal:      General: Bowel sounds are normal.      Palpations: Abdomen is soft. Musculoskeletal: Normal range of motion. Lumbar back: He exhibits tenderness and bony tenderness. Lymphadenopathy:      Cervical: No cervical adenopathy. Skin:     General: Skin is warm and dry. Findings: No rash. Neurological:      Mental Status: He is alert and oriented to person, place, and time. RADIOLOGY:   Non-plain film images such as CT, Ultrasound and MRI are read by the radiologist. [facility] radiographic images are visualized and preliminarily interpreted by the emergency physician with the below findings:    Xr Chest Standard (2 Vw)    Result Date: 4/15/2020  EXAMINATION: TWO XRAY VIEWS OF THE CHEST 4/15/2020 9:40 am COMPARISON: 04/12/2020 HISTORY: ORDERING SYSTEM PROVIDED HISTORY: pneumonia TECHNOLOGIST PROVIDED HISTORY: pneumonia Reason for Exam: Pt c/o SOB, follow up pneumonia.   AP/LAT UPRIGHT Acuity: Acute Type of Exam: Initial FINDINGS: The heart and mediastinal [address]  [phone]            DISCHARGE MEDICATIONS:     Discharge Medication List as of 5/11/2020  4:11 PM      START taking these medications    Details   HYDROcodone-acetaminophen (NORCO) 5-325 MG per tablet Take 1 tablet by mouth every 6 hours as needed for Pain for up to 5 days. , Disp-20 tablet, R-0Print                 (Please note that portions of this note were completed with a voice recognition program.  Efforts were made to edit the dictations but occasionally words are mis-transcribed.)    [address] [facility] NP, APRN - CNP  Certified Nurse Practitioner          GINETTE Hou CNP  05/12/20 0199

## 2020-05-12 NOTE — CARE COORDINATION
two arm lengths) with people who are sick.  Put distance between yourself and other people if COVID-19 is spreading in your community.  Clean and disinfect frequently touched surfaces.  Avoid all cruise travel and non-essential air travel.  Call your healthcare professional if you have concerns about COVID-19 and your underlying condition or if you are sick. For more information on steps you can take to protect yourself, see CDC's How to Protect Yourself      final call  based on severity of symptoms and risk factors. Care Transitions Subsequent and Final Call    Subsequent and Final Calls  Do you have any ongoing symptoms?:  No  Have your medications changed?:  Yes  Patient Reports:  norco from ED visit   Do you have any questions related to your medications?:  No  Do you currently have any active services?:  No  Do you have any needs or concerns that I can assist you with?:  No  Identified Barriers:  Lack of Motivation, Lack of Education, Impairment  Care Transitions Interventions  Other Interventions: Follow Up  No future appointments.     Sulma Ramirez RN

## 2020-05-13 PROBLEM — J06.9 URI (UPPER RESPIRATORY INFECTION): Status: RESOLVED | Noted: 2020-04-13 | Resolved: 2020-05-13

## 2020-05-14 ENCOUNTER — HOSPITAL ENCOUNTER (EMERGENCY)
Age: 70
Discharge: HOME | End: 2020-05-14
Attending: EMERGENCY MEDICINE
Payer: MEDICARE

## 2020-05-14 ENCOUNTER — APPOINTMENT (OUTPATIENT)
Dept: CT IMAGING | Age: 70
End: 2020-05-14
Payer: MEDICARE

## 2020-05-14 ENCOUNTER — APPOINTMENT (OUTPATIENT)
Dept: GENERAL RADIOLOGY | Age: 70
End: 2020-05-14
Payer: MEDICARE

## 2020-05-14 VITALS
RESPIRATION RATE: 16 BRPM | SYSTOLIC BLOOD PRESSURE: 153 MMHG | DIASTOLIC BLOOD PRESSURE: 100 MMHG | TEMPERATURE: 98.4 F | WEIGHT: 146 LBS | HEART RATE: 83 BPM | OXYGEN SATURATION: 97 % | BODY MASS INDEX: 23.46 KG/M2 | HEIGHT: 66 IN

## 2020-05-14 LAB
% CKMB: 4.5 % (ref 0–3.5)
ABSOLUTE EOS #: 0.15 K/UL (ref 0–0.44)
ABSOLUTE IMMATURE GRANULOCYTE: 0.01 K/UL (ref 0–0.3)
ABSOLUTE LYMPH #: 1.3 K/UL (ref 1.1–3.7)
ABSOLUTE MONO #: 0.47 K/UL (ref 0.1–1.2)
ALBUMIN SERPL-MCNC: 3.4 G/DL (ref 3.5–5.2)
ALBUMIN/GLOBULIN RATIO: ABNORMAL (ref 1–2.5)
ALP BLD-CCNC: 73 U/L (ref 40–129)
ALT SERPL-CCNC: 6 U/L (ref 5–41)
ANION GAP SERPL CALCULATED.3IONS-SCNC: 16 MMOL/L (ref 9–17)
AST SERPL-CCNC: 11 U/L
BASOPHILS # BLD: 0 % (ref 0–2)
BASOPHILS ABSOLUTE: <0.03 K/UL (ref 0–0.2)
BILIRUB SERPL-MCNC: 0.3 MG/DL (ref 0.3–1.2)
BUN BLDV-MCNC: 36 MG/DL (ref 8–23)
BUN/CREAT BLD: 12 (ref 9–20)
C-REACTIVE PROTEIN: 5 MG/L (ref 0–5)
CALCIUM SERPL-MCNC: 8.5 MG/DL (ref 8.6–10.4)
CHLORIDE BLD-SCNC: 99 MMOL/L (ref 98–107)
CK MB: 4.1 NG/ML
CKMB INTERPRETATION: ABNORMAL
CO2: 18 MMOL/L (ref 20–31)
CREAT SERPL-MCNC: 3.1 MG/DL (ref 0.7–1.2)
DIFFERENTIAL TYPE: ABNORMAL
EKG ATRIAL RATE: 77 BPM
EKG ATRIAL RATE: 85 BPM
EKG P AXIS: 23 DEGREES
EKG P AXIS: 29 DEGREES
EKG P-R INTERVAL: 140 MS
EKG P-R INTERVAL: 140 MS
EKG Q-T INTERVAL: 422 MS
EKG Q-T INTERVAL: 430 MS
EKG QRS DURATION: 100 MS
EKG QRS DURATION: 94 MS
EKG QTC CALCULATION (BAZETT): 486 MS
EKG QTC CALCULATION (BAZETT): 502 MS
EKG R AXIS: -22 DEGREES
EKG R AXIS: -23 DEGREES
EKG T AXIS: -115 DEGREES
EKG T AXIS: -126 DEGREES
EKG VENTRICULAR RATE: 77 BPM
EKG VENTRICULAR RATE: 85 BPM
EOSINOPHILS RELATIVE PERCENT: 3 % (ref 1–4)
FERRITIN: 154 UG/L (ref 30–400)
GFR AFRICAN AMERICAN: 24 ML/MIN
GFR NON-AFRICAN AMERICAN: 20 ML/MIN
GFR SERPL CREATININE-BSD FRML MDRD: ABNORMAL ML/MIN/{1.73_M2}
GFR SERPL CREATININE-BSD FRML MDRD: ABNORMAL ML/MIN/{1.73_M2}
GLUCOSE BLD-MCNC: 92 MG/DL (ref 70–99)
HCT VFR BLD CALC: 27 % (ref 40.7–50.3)
HEMOGLOBIN: 8.8 G/DL (ref 13–17)
IMMATURE GRANULOCYTES: 0 %
INR BLD: 1
LACTATE DEHYDROGENASE: 209 U/L (ref 135–225)
LYMPHOCYTES # BLD: 28 % (ref 24–43)
MCH RBC QN AUTO: 30.8 PG (ref 25.2–33.5)
MCHC RBC AUTO-ENTMCNC: 32.6 G/DL (ref 28.4–34.8)
MCV RBC AUTO: 94.4 FL (ref 82.6–102.9)
MONOCYTES # BLD: 10 % (ref 3–12)
MYOGLOBIN: 138 NG/ML (ref 28–72)
NRBC AUTOMATED: 0 PER 100 WBC
PDW BLD-RTO: 18.1 % (ref 11.8–14.4)
PLATELET # BLD: 178 K/UL (ref 138–453)
PLATELET ESTIMATE: ABNORMAL
PMV BLD AUTO: 10 FL (ref 8.1–13.5)
POTASSIUM SERPL-SCNC: 4.1 MMOL/L (ref 3.7–5.3)
PROCALCITONIN: 0.12 NG/ML
PROTHROMBIN TIME: 13.1 SEC (ref 11.5–14.2)
RBC # BLD: 2.86 M/UL (ref 4.21–5.77)
RBC # BLD: ABNORMAL 10*6/UL
SEG NEUTROPHILS: 59 % (ref 36–65)
SEGMENTED NEUTROPHILS ABSOLUTE COUNT: 2.68 K/UL (ref 1.5–8.1)
SODIUM BLD-SCNC: 133 MMOL/L (ref 135–144)
TOTAL CK: 92 U/L (ref 39–308)
TOTAL PROTEIN: 6.3 G/DL (ref 6.4–8.3)
TROPONIN INTERP: ABNORMAL
TROPONIN INTERP: ABNORMAL
TROPONIN T: ABNORMAL NG/ML
TROPONIN T: ABNORMAL NG/ML
TROPONIN, HIGH SENSITIVITY: 134 NG/L (ref 0–22)
TROPONIN, HIGH SENSITIVITY: 139 NG/L (ref 0–22)
WBC # BLD: 4.6 K/UL (ref 3.5–11.3)
WBC # BLD: ABNORMAL 10*3/UL

## 2020-05-14 PROCEDURE — 99285 EMERGENCY DEPT VISIT HI MDM: CPT

## 2020-05-14 PROCEDURE — 84145 PROCALCITONIN (PCT): CPT

## 2020-05-14 PROCEDURE — 73630 X-RAY EXAM OF FOOT: CPT

## 2020-05-14 PROCEDURE — 36415 COLL VENOUS BLD VENIPUNCTURE: CPT

## 2020-05-14 PROCEDURE — 82553 CREATINE MB FRACTION: CPT

## 2020-05-14 PROCEDURE — 82550 ASSAY OF CK (CPK): CPT

## 2020-05-14 PROCEDURE — 86140 C-REACTIVE PROTEIN: CPT

## 2020-05-14 PROCEDURE — 71250 CT THORAX DX C-: CPT

## 2020-05-14 PROCEDURE — 80053 COMPREHEN METABOLIC PANEL: CPT

## 2020-05-14 PROCEDURE — 83874 ASSAY OF MYOGLOBIN: CPT

## 2020-05-14 PROCEDURE — 85610 PROTHROMBIN TIME: CPT

## 2020-05-14 PROCEDURE — 70490 CT SOFT TISSUE NECK W/O DYE: CPT

## 2020-05-14 PROCEDURE — 93005 ELECTROCARDIOGRAM TRACING: CPT | Performed by: EMERGENCY MEDICINE

## 2020-05-14 PROCEDURE — 6360000002 HC RX W HCPCS: Performed by: EMERGENCY MEDICINE

## 2020-05-14 PROCEDURE — 85025 COMPLETE CBC W/AUTO DIFF WBC: CPT

## 2020-05-14 PROCEDURE — 83615 LACTATE (LD) (LDH) ENZYME: CPT

## 2020-05-14 PROCEDURE — 96374 THER/PROPH/DIAG INJ IV PUSH: CPT

## 2020-05-14 PROCEDURE — 96375 TX/PRO/DX INJ NEW DRUG ADDON: CPT

## 2020-05-14 PROCEDURE — 82728 ASSAY OF FERRITIN: CPT

## 2020-05-14 PROCEDURE — 84484 ASSAY OF TROPONIN QUANT: CPT

## 2020-05-14 PROCEDURE — 2580000003 HC RX 258: Performed by: EMERGENCY MEDICINE

## 2020-05-14 RX ORDER — ACETAMINOPHEN 500 MG
1000 TABLET ORAL ONCE
Status: DISCONTINUED | OUTPATIENT
Start: 2020-05-14 | End: 2020-05-14

## 2020-05-14 RX ORDER — ONDANSETRON 2 MG/ML
4 INJECTION INTRAMUSCULAR; INTRAVENOUS ONCE
Status: COMPLETED | OUTPATIENT
Start: 2020-05-14 | End: 2020-05-14

## 2020-05-14 RX ORDER — SODIUM CHLORIDE 9 MG/ML
INJECTION, SOLUTION INTRAVENOUS CONTINUOUS
Status: DISCONTINUED | OUTPATIENT
Start: 2020-05-14 | End: 2020-05-14 | Stop reason: HOSPADM

## 2020-05-14 RX ORDER — HYDROMORPHONE HYDROCHLORIDE 1 MG/ML
1 INJECTION, SOLUTION INTRAMUSCULAR; INTRAVENOUS; SUBCUTANEOUS ONCE
Status: COMPLETED | OUTPATIENT
Start: 2020-05-14 | End: 2020-05-14

## 2020-05-14 RX ADMIN — SODIUM CHLORIDE: 9 INJECTION, SOLUTION INTRAVENOUS at 06:52

## 2020-05-14 RX ADMIN — Medication 1 MG: at 06:53

## 2020-05-14 RX ADMIN — ONDANSETRON 4 MG: 2 INJECTION INTRAMUSCULAR; INTRAVENOUS at 06:53

## 2020-05-14 ASSESSMENT — PAIN SCALES - GENERAL
PAINLEVEL_OUTOF10: 8
PAINLEVEL_OUTOF10: 10

## 2020-05-14 ASSESSMENT — PAIN DESCRIPTION - LOCATION: LOCATION: BACK

## 2020-05-14 ASSESSMENT — PAIN DESCRIPTION - PAIN TYPE: TYPE: ACUTE PAIN

## 2020-05-14 NOTE — ED PROVIDER NOTES
[address] [place] [address] ED  eMERGENCY dEPARTMENT eNCOUnter      Pt Name: Martina Marques  MRN: [id number]  [place] 1950  Date of evaluation: 5/14/2020  Provider: Ok Bray MD    [address] [place] [address]       Chief Complaint   Patient presents with    Cough     x2 days, increased today    Shortness of Breath         HISTORY OF PRESENT ILLNESS  (Location/Symptom, Timing/Onset, Context/Setting, Quality, Duration, Modifying Factors, Severity.)   Martina Marques is a 81 y.o. male who presents to the emergency department for evaluation of increasing cough over the last 2 days. Patient also endorses increasing shortness of breath. Patient does have a trach. He has had gross hemoptysis noted from the trach site. He endorses increasing pain to the neck as well. He does not clean the trach site or utilize any instrumentation to the site. He denies fevers. He states this morning he had a large amount of blood noted. Patient also had recent compression fracture causing him increased back pain      Nursing Notes were reviewed. ALLERGIES     Amino acids and Lisinopril    CURRENT MEDICATIONS       Previous Medications    ALBUTEROL SULFATE  (90 BASE) MCG/ACT INHALER    Inhale 2 puffs into the lungs every 6 hours as needed for Wheezing or Shortness of Breath    ALLOPURINOL (ZYLOPRIM) 300 MG TABLET    Take 300 mg by mouth daily. AMLODIPINE (NORVASC) 10 MG TABLET    Take 1 tablet by mouth daily    BUDESONIDE-FORMOTEROL (SYMBICORT) 160-4.5 MCG/ACT AERO    Inhale 2 puffs into the lungs 2 times daily    CHOLECALCIFEROL (VITAMIN D3) 50 MCG (2000 UT) CAPS    Take 1 capsule by mouth daily    HYDROCODONE-ACETAMINOPHEN (NORCO) 5-325 MG PER TABLET    Take 1 tablet by mouth every 6 hours as needed for Pain for up to 5 days.     OMEPRAZOLE (PRILOSEC) 20 MG DELAYED RELEASE CAPSULE    Take 20 mg by mouth every morning (before breakfast)    SODIUM BICARBONATE 650 MG TABLET    Take 650 mg by mouth 3 times daily        PAST

## 2020-05-15 ENCOUNTER — CARE COORDINATION (OUTPATIENT)
Dept: CASE MANAGEMENT | Age: 70
End: 2020-05-15

## 2020-05-16 ENCOUNTER — APPOINTMENT (OUTPATIENT)
Dept: GENERAL RADIOLOGY | Age: 70
End: 2020-05-16
Payer: MEDICARE

## 2020-05-16 ENCOUNTER — APPOINTMENT (OUTPATIENT)
Dept: CT IMAGING | Age: 70
End: 2020-05-16
Payer: MEDICARE

## 2020-05-16 ENCOUNTER — HOSPITAL ENCOUNTER (EMERGENCY)
Age: 70
Discharge: HOME | End: 2020-05-16
Attending: EMERGENCY MEDICINE
Payer: MEDICARE

## 2020-05-16 VITALS
OXYGEN SATURATION: 97 % | DIASTOLIC BLOOD PRESSURE: 110 MMHG | SYSTOLIC BLOOD PRESSURE: 176 MMHG | RESPIRATION RATE: 16 BRPM | WEIGHT: 145 LBS | HEIGHT: 66 IN | HEART RATE: 94 BPM | BODY MASS INDEX: 23.3 KG/M2 | TEMPERATURE: 98.2 F

## 2020-05-16 LAB
ABSOLUTE EOS #: <0.03 K/UL (ref 0–0.44)
ABSOLUTE IMMATURE GRANULOCYTE: 0.02 K/UL (ref 0–0.3)
ABSOLUTE LYMPH #: 0.78 K/UL (ref 1.1–3.7)
ABSOLUTE MONO #: 0.25 K/UL (ref 0.1–1.2)
ALBUMIN SERPL-MCNC: 3.5 G/DL (ref 3.5–5.2)
ALBUMIN/GLOBULIN RATIO: ABNORMAL (ref 1–2.5)
ALP BLD-CCNC: 77 U/L (ref 40–129)
ALT SERPL-CCNC: 11 U/L (ref 5–41)
ANION GAP SERPL CALCULATED.3IONS-SCNC: 15 MMOL/L (ref 9–17)
AST SERPL-CCNC: 35 U/L
BASOPHILS # BLD: 0 % (ref 0–2)
BASOPHILS ABSOLUTE: <0.03 K/UL (ref 0–0.2)
BILIRUB SERPL-MCNC: 0.16 MG/DL (ref 0.3–1.2)
BUN BLDV-MCNC: 41 MG/DL (ref 8–23)
BUN/CREAT BLD: 12 (ref 9–20)
CALCIUM SERPL-MCNC: 8 MG/DL (ref 8.6–10.4)
CHLORIDE BLD-SCNC: 101 MMOL/L (ref 98–107)
CO2: 21 MMOL/L (ref 20–31)
CREAT SERPL-MCNC: 3.37 MG/DL (ref 0.7–1.2)
DIFFERENTIAL TYPE: ABNORMAL
EOSINOPHILS RELATIVE PERCENT: 0 % (ref 1–4)
GFR AFRICAN AMERICAN: 22 ML/MIN
GFR NON-AFRICAN AMERICAN: 18 ML/MIN
GFR SERPL CREATININE-BSD FRML MDRD: ABNORMAL ML/MIN/{1.73_M2}
GFR SERPL CREATININE-BSD FRML MDRD: ABNORMAL ML/MIN/{1.73_M2}
GLUCOSE BLD-MCNC: 113 MG/DL (ref 70–99)
HCT VFR BLD CALC: 26.6 % (ref 40.7–50.3)
HEMOGLOBIN: 8.7 G/DL (ref 13–17)
IMMATURE GRANULOCYTES: 0 %
LIPASE: 39 U/L (ref 13–60)
LYMPHOCYTES # BLD: 16 % (ref 24–43)
MCH RBC QN AUTO: 31.3 PG (ref 25.2–33.5)
MCHC RBC AUTO-ENTMCNC: 32.7 G/DL (ref 28.4–34.8)
MCV RBC AUTO: 95.7 FL (ref 82.6–102.9)
MONOCYTES # BLD: 5 % (ref 3–12)
NRBC AUTOMATED: 0 PER 100 WBC
PDW BLD-RTO: 18.5 % (ref 11.8–14.4)
PLATELET # BLD: 284 K/UL (ref 138–453)
PLATELET ESTIMATE: ABNORMAL
PMV BLD AUTO: 10.7 FL (ref 8.1–13.5)
POTASSIUM SERPL-SCNC: 5.1 MMOL/L (ref 3.7–5.3)
RBC # BLD: 2.78 M/UL (ref 4.21–5.77)
RBC # BLD: ABNORMAL 10*6/UL
SEG NEUTROPHILS: 78 % (ref 36–65)
SEGMENTED NEUTROPHILS ABSOLUTE COUNT: 3.82 K/UL (ref 1.5–8.1)
SODIUM BLD-SCNC: 137 MMOL/L (ref 135–144)
TOTAL PROTEIN: 6.5 G/DL (ref 6.4–8.3)
WBC # BLD: 4.9 K/UL (ref 3.5–11.3)
WBC # BLD: ABNORMAL 10*3/UL

## 2020-05-16 PROCEDURE — 85025 COMPLETE CBC W/AUTO DIFF WBC: CPT

## 2020-05-16 PROCEDURE — 99285 EMERGENCY DEPT VISIT HI MDM: CPT

## 2020-05-16 PROCEDURE — 83690 ASSAY OF LIPASE: CPT

## 2020-05-16 PROCEDURE — 2580000003 HC RX 258: Performed by: EMERGENCY MEDICINE

## 2020-05-16 PROCEDURE — 96374 THER/PROPH/DIAG INJ IV PUSH: CPT

## 2020-05-16 PROCEDURE — 71045 X-RAY EXAM CHEST 1 VIEW: CPT

## 2020-05-16 PROCEDURE — 74176 CT ABD & PELVIS W/O CONTRAST: CPT

## 2020-05-16 PROCEDURE — 6370000000 HC RX 637 (ALT 250 FOR IP): Performed by: EMERGENCY MEDICINE

## 2020-05-16 PROCEDURE — 80053 COMPREHEN METABOLIC PANEL: CPT

## 2020-05-16 PROCEDURE — 6360000002 HC RX W HCPCS: Performed by: EMERGENCY MEDICINE

## 2020-05-16 RX ORDER — FAMOTIDINE 20 MG/1
20 TABLET, FILM COATED ORAL ONCE
Status: COMPLETED | OUTPATIENT
Start: 2020-05-16 | End: 2020-05-16

## 2020-05-16 RX ORDER — 0.9 % SODIUM CHLORIDE 0.9 %
1000 INTRAVENOUS SOLUTION INTRAVENOUS ONCE
Status: COMPLETED | OUTPATIENT
Start: 2020-05-16 | End: 2020-05-16

## 2020-05-16 RX ORDER — DICYCLOMINE HYDROCHLORIDE 10 MG/1
10 CAPSULE ORAL 2 TIMES DAILY
Qty: 28 CAPSULE | Refills: 0 | Status: SHIPPED | OUTPATIENT
Start: 2020-05-16 | End: 2020-11-10 | Stop reason: ALTCHOICE

## 2020-05-16 RX ORDER — DICYCLOMINE HYDROCHLORIDE 10 MG/1
10 CAPSULE ORAL ONCE
Status: COMPLETED | OUTPATIENT
Start: 2020-05-16 | End: 2020-05-16

## 2020-05-16 RX ORDER — ONDANSETRON 4 MG/1
4 TABLET, ORALLY DISINTEGRATING ORAL ONCE
Status: COMPLETED | OUTPATIENT
Start: 2020-05-16 | End: 2020-05-16

## 2020-05-16 RX ORDER — KETOROLAC TROMETHAMINE 15 MG/ML
15 INJECTION, SOLUTION INTRAMUSCULAR; INTRAVENOUS ONCE
Status: COMPLETED | OUTPATIENT
Start: 2020-05-16 | End: 2020-05-16

## 2020-05-16 RX ORDER — CYCLOBENZAPRINE HCL 5 MG
5 TABLET ORAL 2 TIMES DAILY PRN
Qty: 10 TABLET | Refills: 0 | Status: SHIPPED | OUTPATIENT
Start: 2020-05-16 | End: 2020-05-26

## 2020-05-16 RX ADMIN — FAMOTIDINE 20 MG: 20 TABLET ORAL at 10:21

## 2020-05-16 RX ADMIN — DICYCLOMINE HYDROCHLORIDE 10 MG: 10 CAPSULE ORAL at 10:21

## 2020-05-16 RX ADMIN — KETOROLAC TROMETHAMINE 15 MG: 15 INJECTION, SOLUTION INTRAMUSCULAR; INTRAVENOUS at 10:52

## 2020-05-16 RX ADMIN — SODIUM CHLORIDE 1000 ML: 9 INJECTION, SOLUTION INTRAVENOUS at 09:15

## 2020-05-16 RX ADMIN — ONDANSETRON 4 MG: 4 TABLET, ORALLY DISINTEGRATING ORAL at 10:21

## 2020-05-16 ASSESSMENT — PAIN SCALES - GENERAL
PAINLEVEL_OUTOF10: 5
PAINLEVEL_OUTOF10: 10

## 2020-05-16 ASSESSMENT — PAIN DESCRIPTION - LOCATION: LOCATION: ABDOMEN

## 2020-05-16 NOTE — ED PROVIDER NOTES
EMERGENCY DEPARTMENT ENCOUNTER    Pt Name: Corrie Jaime  MRN: [id number]  [place] 1950  Date of evaluation: 5/16/20  CHIEF COMPLAINT       Chief Complaint   Patient presents with    Shortness of Breath     seen here 5/14/20    Abdominal Pain     HISTORY OF PRESENT ILLNESS   Patient is a 60-year-old male with PMH of lung cancer, laryngeal cancer status post trach, prostate cancer, hypertension, GERD, CKD, COPD presents to the ED complaining abdominal pain and shortness of breath. Patient was evaluated in our emergency room 2 days ago with identical symptoms. At that time lab work and imaging were unremarkable. Patient states that since that time abdominal pain has persisted. He does not have fever, cough, chest pain, nausea, vomiting, changes in urine. He states his bowel movements have decreased, however he can pass gas. REVIEW OF SYSTEMS     Review of Systems   All other systems reviewed and are negative. PASTMEDICAL HISTORY     Past Medical History:   Diagnosis Date    Arthritis     Asthma     CKD (chronic kidney disease), stage III (HCC)     COPD (chronic obstructive pulmonary disease) (HCC)     GERD (gastroesophageal reflux disease)     Gout     Hypertension     Hyponatremia     Iliac artery aneurysm, right (HCC)     Laryngeal cancer (HCC)     Laryngeal Cancer. Chronic left facial edema.      Lung cancer ([address] 3-4 years ago    [facility] [place]    Prostate cancer [facility])      SURGICAL HISTORY       Past Surgical History:   Procedure Laterality Date    ABDOMINAL AORTIC ANEURYSM REPAIR, ENDOVASCULAR Right 4/7/2020    RIGHT ILIAC ARTERY ANEURYSM REPAIR ENDOVASCULAR performed by Yahaira Garrido MD at [facility]  03/04/2020    No stents placed    PROSTATE SURGERY      TRACHEAL SURGERY  3-4 years ago    lung removed and \"voice box\" removed     CURRENT MEDICATIONS       Previous Medications    ALBUTEROL SULFATE  (90 BASE) MCG/ACT INHALER    Inhale 2 puffs into the lungs every 6 hours as needed for Wheezing or Shortness of Breath    ALLOPURINOL (ZYLOPRIM) 300 MG TABLET    Take 300 mg by mouth daily. AMLODIPINE (NORVASC) 10 MG TABLET    Take 1 tablet by mouth daily    BUDESONIDE-FORMOTEROL (SYMBICORT) 160-4.5 MCG/ACT AERO    Inhale 2 puffs into the lungs 2 times daily    CHOLECALCIFEROL (VITAMIN D3) 50 MCG (2000 UT) CAPS    Take 1 capsule by mouth daily    HYDROCODONE-ACETAMINOPHEN (NORCO) 5-325 MG PER TABLET    Take 1 tablet by mouth every 6 hours as needed for Pain for up to 5 days. OMEPRAZOLE (PRILOSEC) 20 MG DELAYED RELEASE CAPSULE    Take 20 mg by mouth every morning (before breakfast)    SODIUM BICARBONATE 650 MG TABLET    Take 650 mg by mouth 3 times daily      ALLERGIES     is allergic to amino acids and lisinopril. FAMILY HISTORY     He indicated that his mother is . He indicated that his father is . He indicated that his sister is . SOCIAL HISTORY       Social History     Tobacco Use    Smoking status: Former Smoker    Smokeless tobacco: Never Used    Tobacco comment: quit smoking 20 years ago    Substance Use Topics    Alcohol use: Not Currently     Alcohol/week: 5.0 standard drinks     Types: 5 Cans of beer per week    Drug use: No     PHYSICAL EXAM     INITIAL VITALS: BP (!) 176/110   Pulse 94   Temp 98.2 °F (36.8 °C) (Oral)   Resp 16   Ht 5' 6\" (1.676 m)   Wt 145 lb (65.8 kg)   SpO2 97%   BMI 23.40 kg/m²    Physical Exam  HENT:      Head: Normocephalic. Right Ear: External ear normal.      Left Ear: External ear normal.      Nose: Nose normal.      Mouth/Throat:      Comments: Tracheostomy, clean, dry. Eyes:      Conjunctiva/sclera: Conjunctivae normal.   Cardiovascular:      Rate and Rhythm: Normal rate. Pulmonary:      Effort: Pulmonary effort is normal.   Abdominal:      General: Abdomen is flat. There is no distension. Tenderness: There is abdominal tenderness. Comments: Mild generalized abdominal tenderness. Skin:     General: Skin is dry. Neurological:      Mental Status: He is alert. Mental status is at baseline. Psychiatric:         Mood and Affect: Mood normal.         Behavior: Behavior normal.         MEDICAL DECISION MAKING:   The patient is hemodynamically stable, afebrile, nontoxic-appearing. Physical exam notable for mild generalized abdominal tenderness. Based on history and exam differential includes diverticulosis, SBO, constipation. ED plan for basic labs, chest x-ray, CT abdomen pelvis, reassess. ED Course as of May 16 1030   Sat May 16, 2020   1028 The patient is hemodynamically stable, afebrile, nontoxic-appearing. Labs remarkable for creatinine 3.37    Potassium 5.1    Hemoglobin 8.7    Lipase 39    Labs elevated however within patient's normal range. CT abdomen pelvis negative for acute pathology, chronic changes mild pleural effusion. Patient has follow-up appointment this Monday with his PCP. We will discharge home with Rx for Bentyl. Patient is stable and ready for discharge. [AMINATA]      ED Course User Index  [AMINATA] Mitch Hqa MD         DIAGNOSTIC RESULTS   EKG:All EKG's are interpreted by the Emergency Department Physician who either signs or Co-signs this chart in the absence of a cardiologist.        RADIOLOGY:All plain film, CT, MRI, and formal ultrasound images (except ED bedside ultrasound) are read by the radiologist, see reports below, unless otherwisenoted in MDM or here. CT ABDOMEN PELVIS WO CONTRAST Additional Contrast? None   Final Result   1. Pleural effusions and dependent atelectasis. Interstitial findings in   the lung bases suggestive of edema. 2.  No acute inflammatory process identified in the abdomen or pelvis. Mild   stool burden. 3.  Status post aorto bi-iliac stent placement. Similar appearance of right   common iliac artery aneurysm.       4.  Additional chronic and benign findings, as above. XR CHEST PORTABLE   Final Result   Cardiomegaly with probable mild vascular congestion superimposed on chronic   changes; mildly increased airspace opacity right base, either atelectasis or   edema/infiltrate. Small stable pleural effusions. Stable paratracheal adenopathy and multiple additional unchanged findings, as   above. LABS: All lab results were reviewed by myself, and all abnormals are listed below. Labs Reviewed   CBC WITH AUTO DIFFERENTIAL - Abnormal; Notable for the following components:       Result Value    RBC 2.78 (*)     Hemoglobin 8.7 (*)     Hematocrit 26.6 (*)     RDW 18.5 (*)     Seg Neutrophils 78 (*)     Lymphocytes 16 (*)     Eosinophils % 0 (*)     Absolute Lymph # 0.78 (*)     All other components within normal limits   COMPREHENSIVE METABOLIC PANEL W/ REFLEX TO MG FOR LOW K - Abnormal; Notable for the following components:    Glucose 113 (*)     BUN 41 (*)     CREATININE 3.37 (*)     Calcium 8.0 (*)     Total Bilirubin 0.16 (*)     GFR Non- 18 (*)     GFR  22 (*)     All other components within normal limits   LIPASE       EMERGENCY DEPARTMENTCOURSE:         Vitals:    Vitals:    05/16/20 0826   BP: (!) 176/110   Pulse: 94   Resp: 16   Temp: 98.2 °F (36.8 °C)   TempSrc: Oral   SpO2: 97%   Weight: 145 lb (65.8 kg)   Height: 5' 6\" (1.676 m)       The patient was given the following medications while in the emergency department:  Orders Placed This Encounter   Medications    0.9 % sodium chloride bolus    dicyclomine (BENTYL) capsule 10 mg    famotidine (PEPCID) tablet 20 mg    ondansetron (ZOFRAN-ODT) disintegrating tablet 4 mg    dicyclomine (BENTYL) 10 MG capsule     Sig: Take 1 capsule by mouth 2 times daily for 14 days     Dispense:  28 capsule     Refill:  0     CONSULTS:  None    FINAL IMPRESSION      1.  Undifferentiated abdominal pain          DISPOSITION/PLAN   DISPOSITION Discharge - Pending Orders Complete 05/16/2020 10:25:01 AM      PATIENT REFERRED TO:  Agustin Villanueva MD  [place] [id number]  [phone]          DISCHARGE MEDICATIONS:  New Prescriptions    DICYCLOMINE (BENTYL) 10 MG CAPSULE    Take 1 capsule by mouth 2 times daily for 14 days     Russell Sanders MD  Attending Emergency Physician                   Juli Rojas MD  05/16/20 7977

## 2020-05-16 NOTE — ED NOTES
Patient presents to the er with c/o left sided abdominal pain. Patient verbalizing was recently seen here for sob as well abdominal pain on 5/14/2020. Patient states has not been moving bowels for couple days. Patient difficult to understand due to has old tracheostomy from cancer.       Becky Timmons RN  05/16/20 0010

## 2020-05-19 ENCOUNTER — APPOINTMENT (OUTPATIENT)
Dept: GENERAL RADIOLOGY | Age: 70
End: 2020-05-19
Payer: MEDICARE

## 2020-05-19 ENCOUNTER — HOSPITAL ENCOUNTER (EMERGENCY)
Age: 70
Discharge: HOME | End: 2020-05-19
Attending: EMERGENCY MEDICINE
Payer: MEDICARE

## 2020-05-19 ENCOUNTER — HOSPITAL ENCOUNTER (OUTPATIENT)
Dept: CT IMAGING | Age: 70
Discharge: HOME | End: 2020-05-21
Payer: MEDICARE

## 2020-05-19 VITALS
OXYGEN SATURATION: 96 % | HEIGHT: 66 IN | BODY MASS INDEX: 23.3 KG/M2 | DIASTOLIC BLOOD PRESSURE: 88 MMHG | WEIGHT: 145 LBS | RESPIRATION RATE: 20 BRPM | SYSTOLIC BLOOD PRESSURE: 170 MMHG | HEART RATE: 98 BPM | TEMPERATURE: 98.5 F

## 2020-05-19 PROBLEM — N17.9 AKI (ACUTE KIDNEY INJURY) (HCC): Status: RESOLVED | Noted: 2020-01-03 | Resolved: 2020-05-19

## 2020-05-19 PROBLEM — R06.02 SHORTNESS OF BREATH: Status: RESOLVED | Noted: 2020-04-12 | Resolved: 2020-05-19

## 2020-05-19 PROBLEM — R07.9 CHEST PAIN: Status: RESOLVED | Noted: 2020-03-03 | Resolved: 2020-05-19

## 2020-05-19 PROCEDURE — 99284 EMERGENCY DEPT VISIT MOD MDM: CPT

## 2020-05-19 PROCEDURE — 71045 X-RAY EXAM CHEST 1 VIEW: CPT

## 2020-05-19 PROCEDURE — 72128 CT CHEST SPINE W/O DYE: CPT

## 2020-05-19 ASSESSMENT — PAIN DESCRIPTION - LOCATION: LOCATION: BACK;ABDOMEN;NECK

## 2020-05-19 ASSESSMENT — PAIN DESCRIPTION - DESCRIPTORS: DESCRIPTORS: CONSTANT;SHARP

## 2020-05-19 ASSESSMENT — PAIN SCALES - GENERAL: PAINLEVEL_OUTOF10: 10

## 2020-05-19 ASSESSMENT — PAIN DESCRIPTION - FREQUENCY: FREQUENCY: CONTINUOUS

## 2020-05-19 NOTE — ED PROVIDER NOTES
2 times daily for 14 days, Disp-28 capsule, R-0Print      cyclobenzaprine (FLEXERIL) 5 MG tablet Take 1 tablet by mouth 2 times daily as needed for Muscle spasms, Disp-10 tablet, R-0Print      amLODIPine (NORVASC) 10 MG tablet Take 1 tablet by mouth daily, Disp-30 tablet, R-0Print      sodium bicarbonate 650 MG tablet Take 650 mg by mouth 3 times daily Historical Med      Cholecalciferol (VITAMIN D3) 50 MCG (2000 UT) CAPS Take 1 capsule by mouth dailyHistorical Med      albuterol sulfate  (90 Base) MCG/ACT inhaler Inhale 2 puffs into the lungs every 6 hours as needed for Wheezing or Shortness of BreathHistorical Med      budesonide-formoterol (SYMBICORT) 160-4.5 MCG/ACT AERO Inhale 2 puffs into the lungs 2 times dailyHistorical Med      omeprazole (PRILOSEC) 20 MG delayed release capsule Take 20 mg by mouth every morning (before breakfast)Historical Med      allopurinol (ZYLOPRIM) 300 MG tablet Take 300 mg by mouth daily. ALLERGIES     is allergic to amino acids and lisinopril. FAMILY HISTORY     He indicated that his mother is . He indicated that his father is . He indicated that his sister is . SOCIAL HISTORY       Social History     Tobacco Use    Smoking status: Former Smoker    Smokeless tobacco: Never Used    Tobacco comment: quit smoking 20 years ago    Substance Use Topics    Alcohol use: Not Currently     Alcohol/week: 5.0 standard drinks     Types: 5 Cans of beer per week    Drug use: No     PHYSICAL EXAM     INITIAL VITALS: BP (!) 170/88   Pulse 98   Temp 98.5 °F (36.9 °C) (Oral)   Resp 20   Ht 5' 6\" (1.676 m)   Wt 145 lb (65.8 kg)   SpO2 96%   BMI 23.40 kg/m²    Physical Exam  HENT:      Head: Normocephalic. Right Ear: External ear normal.      Left Ear: External ear normal.      Nose: Nose normal.   Eyes:      Conjunctiva/sclera: Conjunctivae normal.   Neck:      Comments: Tracheostomy patent, dry, no signs of infection.   Cardiovascular:

## 2020-05-22 ENCOUNTER — HOSPITAL ENCOUNTER (OUTPATIENT)
Dept: PREADMISSION TESTING | Age: 70
Discharge: HOME | End: 2020-05-26
Payer: MEDICARE

## 2020-05-22 VITALS
WEIGHT: 145 LBS | TEMPERATURE: 97.7 F | DIASTOLIC BLOOD PRESSURE: 92 MMHG | HEART RATE: 95 BPM | SYSTOLIC BLOOD PRESSURE: 164 MMHG | OXYGEN SATURATION: 97 % | BODY MASS INDEX: 23.3 KG/M2 | HEIGHT: 66 IN | RESPIRATION RATE: 18 BRPM

## 2020-05-22 LAB
ANION GAP SERPL CALCULATED.3IONS-SCNC: 13 MMOL/L (ref 9–17)
BUN BLDV-MCNC: 31 MG/DL (ref 8–23)
CHLORIDE BLD-SCNC: 103 MMOL/L (ref 98–107)
CO2: 21 MMOL/L (ref 20–31)
CREAT SERPL-MCNC: 2.71 MG/DL (ref 0.7–1.2)
GFR AFRICAN AMERICAN: 29 ML/MIN
GFR NON-AFRICAN AMERICAN: 24 ML/MIN
GFR SERPL CREATININE-BSD FRML MDRD: ABNORMAL ML/MIN/{1.73_M2}
GFR SERPL CREATININE-BSD FRML MDRD: ABNORMAL ML/MIN/{1.73_M2}
HCT VFR BLD CALC: 31.6 % (ref 40.7–50.3)
HEMOGLOBIN: 9.8 G/DL (ref 13–17)
MCH RBC QN AUTO: 30.1 PG (ref 25.2–33.5)
MCHC RBC AUTO-ENTMCNC: 31 G/DL (ref 28.4–34.8)
MCV RBC AUTO: 96.9 FL (ref 82.6–102.9)
NRBC AUTOMATED: 0 PER 100 WBC
PDW BLD-RTO: 17.7 % (ref 11.8–14.4)
PLATELET # BLD: 234 K/UL (ref 138–453)
PMV BLD AUTO: 10 FL (ref 8.1–13.5)
POTASSIUM SERPL-SCNC: 3.9 MMOL/L (ref 3.7–5.3)
RBC # BLD: 3.26 M/UL (ref 4.21–5.77)
SODIUM BLD-SCNC: 137 MMOL/L (ref 135–144)
WBC # BLD: 4.8 K/UL (ref 3.5–11.3)

## 2020-05-22 PROCEDURE — 85027 COMPLETE CBC AUTOMATED: CPT

## 2020-05-22 PROCEDURE — 80051 ELECTROLYTE PANEL: CPT

## 2020-05-22 PROCEDURE — 84520 ASSAY OF UREA NITROGEN: CPT

## 2020-05-22 PROCEDURE — 36415 COLL VENOUS BLD VENIPUNCTURE: CPT

## 2020-05-22 PROCEDURE — 82565 ASSAY OF CREATININE: CPT

## 2020-05-22 ASSESSMENT — PAIN DESCRIPTION - PROGRESSION: CLINICAL_PROGRESSION: GRADUALLY WORSENING

## 2020-05-22 ASSESSMENT — PAIN DESCRIPTION - LOCATION: LOCATION: BACK

## 2020-05-22 ASSESSMENT — PAIN DESCRIPTION - FREQUENCY: FREQUENCY: CONTINUOUS

## 2020-05-22 ASSESSMENT — PAIN DESCRIPTION - PAIN TYPE: TYPE: ACUTE PAIN

## 2020-05-22 ASSESSMENT — PAIN DESCRIPTION - DESCRIPTORS: DESCRIPTORS: SHARP

## 2020-05-22 ASSESSMENT — PAIN DESCRIPTION - ONSET: ONSET: ON-GOING

## 2020-05-22 NOTE — H&P
History and Physical Service   [place] [address]    HISTORY AND PHYSICAL EXAMINATION            Date of Evaluation: 5/22/2020  Patient name:  Abiola Kraus  MRN:   [id number]  YOB: 1950  PCP:    Ashwin Mccray MD    History Obtained From:     Patient, Medical records    History of Present Illness: This is Abiola Kraus a 81 y.o. male who presents for a pre-admission testing appointment for an upcoming kyphoplasty L2 with biopsy by Dr. Niraj Ramirez scheduled on 05/29/2020 at 1230 due to L2 compression fracture. The patient's chief complaint is intermittent, 9/10 low back pain since he fell in early May 2020. The pain is occasionally aggravated by walking; and is minimally relieved with Flexeril. Pt went to the ED on 05/11/2020 due to back pain. The x-ray of the lumbar spine completed in the ED revealed a L2 compression fracture. Pt denies numbness and tingling in bilateral legs. History of MRSA. Pt is a poor historian. History of stage 3 CKD, COPD, asthma, laryngeal cancer, lung cancer, hypertension, and right iliac artery aneurysm (Repaired in 04/2020). S/p lung removal 3-4 years ago. S/p cardiac catheterization without stent placement in 03/2020. Alert and oriented without apparent distress. Denies chest pain, dizziness, and palpitations. Pt follows-up with Dr. Christiano Live from cardiology, a nephrologist, and a pulmonologist.     Functional Capacity per pt:  Pt becomes dyspneic with minimally activity such as eating or making a bed. He sometimes becomes dyspneic while lying supine. Past Medical History:     Past Medical History:   Diagnosis Date    Arthritis     Asthma     CKD (chronic kidney disease), stage III ([facility] [address]     COPD (chronic obstructive pulmonary disease) (HCC)     GERD (gastroesophageal reflux disease)     Gout     Hypertension     Hyponatremia     Iliac artery aneurysm, right (HCC)     Laryngeal cancer (HCC)     Laryngeal Cancer.  Chronic left Oriented to person, place, and time. Head: Normocephalic and atraumatic. Eye: No icterus, redness, pupils equal and reactive, extraocular eye movements intact, and conjunctiva clear. Ear: Hearing grossly intact. Nose: No drainage noted. Mouth: Mild left facial edema. Mucous membranes moist.  Neck: Tracheostomy. Distant bilateral carotid sounds. Supple. Lungs: Clear to auscultation. No wheezing, rales, or rhonchi. Normal effort. Cardiovascular: Murmur 2/6. Normal rate, regular rhythm, no gallop or rub. Abdomen: Soft, non-tender, non-distended, and active bowel sounds. Neurologic: Difficult to understand. Garbled speech. Cranial nerves II through XII grossly intact. Dorsal plantar flexion 5/5 bilaterally. Skin: No gross lesions, rashes, bruising, or bleeding on exposed skin area. Extremities: Posterior tibial pulses are palpable bilaterally. No ankle edema. No calf tenderness with palpation. Psych: Normal affect. Investigations:      Laboratory Testing:  No results found for this or any previous visit (from the past 24 hour(s)). Recent Labs     05/16/20  0850 05/14/20  0653   HGB 8.7* 8.8*   HCT 26.6* 27.0*   WBC 4.9 4.6   MCV 95.7 94.4    133*   K 5.1 4.1    99   CO2 21 18*   BUN 41* 36*   CREATININE 3.37* 3.10*   GLUCOSE 113* 92   INR  --  1.0   PROTIME  --  13.1   AST 35 11   ALT 11 6   LABALBU 3.5 3.4*       No results for input(s): COVID19 in the last 720 hours. Imaging/Diagnostics:    CT Abdomen Pelvis Wo Contrast Additional Contrast? None    Result Date: 5/16/2020  EXAMINATION: CT OF THE ABDOMEN AND PELVIS WITHOUT CONTRAST 5/16/2020 9:54 am TECHNIQUE: CT of the abdomen and pelvis was performed without the administration of intravenous contrast. Multiplanar reformatted images are provided for review. Dose modulation, iterative reconstruction, and/or weight based adjustment of the mA/kV was utilized to reduce the radiation dose to as low as reasonably achievable.  COMPARISON: CT 01/03/2020. Lumbar radiographs 05/11/2020. HISTORY: ORDERING SYSTEM PROVIDED HISTORY: pain TECHNOLOGIST PROVIDED HISTORY: pain Reason for Exam: Left side abd pain x several days, constipation Acuity: Acute Type of Exam: Initial FINDINGS: LOWER CHEST: Pleural effusions and dependent atelectasis. Interstitial opacities in the lung bases are suggestive of underlying edema. Lipomatous enlargement in the inferolateral left pleural space is noted. ORGANS: Lack of intravenous contrast limits evaluation of the solid organs and bowel. The liver, gallbladder, pancreas, spleen, kidneys and adrenals reveal no acute abnormality. Cholelithiasis. GI/BOWEL: No bowel dilatation or wall thickening. Diverticulosis. Mild stool burden. PELVIS: No acute findings. PERITONEUM/RETROPERITONEUM: No lymphadenopathy is noted. No free air or free fluid. Status post aorto bi iliac stent graft placement. Residual right common iliac aneurysm sac measures up to 4.3 cm, similar in appearance. Right hypogastric vascular coiling. Extensive calcified atheromatous plaque. BONES/SOFT TISSUES: Mild induration of the soft tissues in the groin may be related to recent vascular access. Small fat containing periumbilical hernia. Interval mild superior endplate deformity of L2, while new since the prior CT exam this was identified on more recent lumbar imaging. Severe degenerative change in the hips, left greater than right. 1.  Pleural effusions and dependent atelectasis. Interstitial findings in the lung bases suggestive of edema. 2.  No acute inflammatory process identified in the abdomen or pelvis. Mild stool burden. 3.  Status post aorto bi-iliac stent placement. Similar appearance of right common iliac artery aneurysm. 4.  Additional chronic and benign findings, as above. Xr Lumbar Spine (2-3 Views)    Result Date: 5/11/2020  EXAMINATION: THREE XRAY VIEWS OF THE LUMBAR SPINE 5/11/2020 3:29 pm COMPARISON: None.  HISTORY: ORDERING SYSTEM PROVIDED HISTORY: back pain TECHNOLOGIST PROVIDED HISTORY: back pain Reason for Exam: Pt states low back pain due to fall x 6 days Acuity: Acute Type of Exam: Initial FINDINGS: Mild compression fracture L2. Vertebral bodies are otherwise normal in height and alignment. Moderate spondylosis. Moderate disc space narrowing and vacuum disc phenomenon L4-5 and L5-S1. Aorto bi-iliac stent graft. Compression fracture L2, age indeterminate. Xr Foot Right (min 3 Views)    Result Date: 5/14/2020  EXAMINATION: THREE XRAY VIEWS OF THE RIGHT FOOT 5/14/2020 10:29 am COMPARISON: None. HISTORY: ORDERING SYSTEM PROVIDED HISTORY: pain medial great to toe TECHNOLOGIST PROVIDED HISTORY: pain medial great to toe Reason for Exam: Patient states no foot issues. Having difficulty breathing and cough. Acuity: Unknown Type of Exam: Unknown FINDINGS: There is a hallux valgus deformity present. Moderate to severe degenerative changes of the 1st metatarsal phalangeal joint are noted. There is diffuse osteopenia. No acute fracture is identified. Severe atherosclerotic calcifications are present. 1. No acute osseous abnormality of the right foot evident. 2. Hallux valgus deformity with moderate to severe degenerative changes of the 1st metatarsal phalangeal joint. 3. Osteopenia. CT Soft Tissue Neck Wo Contrast    Result Date: 5/14/2020  EXAMINATION: CT OF THE NECK WITHOUT CONTRAST  5/14/2020 TECHNIQUE: CT of the neck was performed without the administration of intravenous contrast. Multiplanar reformatted images are provided for review. Dose modulation, iterative reconstruction, and/or weight based adjustment of the mA/kV was utilized to reduce the radiation dose to as low as reasonably achievable.  COMPARISON: 03/05/2020 and earlier studies HISTORY: ORDERING SYSTEM PROVIDED HISTORY: trach sit with sig blood, pain, swelling TECHNOLOGIST PROVIDED HISTORY: trach sit with sig blood, pain, swelling History of laryngeal cancer, tracheostomy placement with worsening cough, shortness of breath and hemoptysis. FINDINGS: PHARYNX/LARYNX:  Postsurgical changes status post laryngectomy, anterior mandibulectomy and flap reconstruction are noted. There is no soft tissue mass identified. There is no focal hematoma evident. SALIVARY GLANDS/THYROID:  The parotid glands are normal in appearance. The submandibular glands are surgically absent. A small amount of residual thyroid tissue is noted. LYMPH NODES:  There is no pathologically enlarged lymphadenopathy identified. There is a stable 2 cm subcutaneous nodule along the inferior margin of the right face suggestive of a sebaceous cyst given its stability since 2018. BRAIN/ORBITS/SINUSES:  Cerebral volume loss is again noted. There is no acute abnormality identified within the visualized portions of the brain. The orbits are normal in appearance. The visualized portions of the paranasal sinuses and mastoid air cells are clear. LUNG APICES/SUPERIOR MEDIASTINUM:  Bilateral pleural effusions are present. Please see separate CT chest, reported separately but performed in the same setting. BONES:  There is severe disc space narrowing from C3-4 to C6-7. There is multilevel degenerative facet arthropathy. There is mild-to-moderate spinal canal stenosis most pronounced at C5-6 and C6-7. Severe multilevel neural foraminal narrowing is present. 1. Stable postsurgical changes with no recurrent tumor evident. 2. No etiology identified to explain the patient's hemoptysis. CT Chest Wo Contrast    Result Date: 5/14/2020  EXAMINATION: CT OF THE CHEST WITHOUT CONTRAST 5/14/2020 8:52 am TECHNIQUE: CT of the chest was performed without the administration of intravenous contrast. Multiplanar reformatted images are provided for review. Dose modulation, iterative reconstruction, and/or weight based adjustment of the mA/kV was utilized to reduce the radiation dose to as low as reasonably achievable.

## 2020-05-22 NOTE — PRE-PROCEDURE INSTRUCTIONS
[facility] ON Friday, May 29  at 11:00 AM    Once you enter the hospital lobby, take the elevators to the second floor. Check-In is at the surgery registration desk. Continue to take your home medications as you normally do up to and including the night before surgery with the exception of any blood thinning medications. Please stop any blood thinning medications as directed by your surgeon or prescribing physician. Failure to stop certain medications may interfere with your scheduled surgery. These may include:  Aspirin, Warfarin (Coumadin), Clopidogrel (Plavix), Ibuprofen (Motrin, Advil), Naproxen (Aleve), Meloxicam (Mobic), Celecoxib (Celebrex), Eliquis, Pradaxa, Xarelto, Effient, Fish Oil, Herbal supplements. If you are diabetic, do not take any of your diabetic medications by mouth the morning of surgery. If you are taking insulin contact the doctor that manages your diabetes for instructions about any changes to your insulin dosages the day before surgery. Do not inject insulin or other injectable diabetic medications the morning of surgery unless otherwise instructed by the doctor who manages your diabetes. Please take the following medication(s) the day of surgery with a small sip of water:  Amlodipine,prilosec      Please use your inhaler(s) if needed and bring your inhaler(s) from home the day of surgery. PREPARING FOR YOUR SURGERY:     Before surgery, you can play an important role in your own health. Because skin is not sterile, we need to be sure that your skin is as free of germs as possible before surgery by carefully washing before surgery. Preparing or prepping skin before surgery can reduce the risk of a surgical site infection.   Do not shave the area of your body where your surgery will be performed unless you received specific permission from your physician.     You will need to shower at home the night before surgery and the morning of surgery with a special soap called chlorhexidine gluconate (CHG*). *Not to be used by people allergic to Chlorhexidine Gluconate (CHG). Following these instructions will help you be sure that your skin is clean before surgery. Instructions on cleaning your skin before surgery: The night before your surgery:      You will need to shower with warm water (not hot) and the CHG soap.  Use a clean wash cloth and a clean towel. Have clean clothes available to put on after the shower.   First wash your hair with regular shampoo. Rinse your hair and body thoroughly to remove the shampoo.  Wash your face and genital area (private parts) with your regular soap or water only. Thoroughly rinse your body with warm water from the neck down.  Turn water off to prevent rinsing the soap off too soon.  With a clean wet washcloth and half of the CHG soap in the bottle, lather your entire body from the neck down. Do not use CHG soap near your eyes or ears to avoid injury to those areas.  Wash thoroughly, paying special attention to the area where your surgery will be performed.  Wash your body gently for five (5) minutes. Avoid scrubbing your skin too hard.  Turn the water back on and rinse your body thoroughly.  Pat yourself dry with a clean, soft towel. Do not apply lotion, cream or powder.  Dress with clean freshly washed clothes. The morning of surgery:     Repeat shower following steps above - using remaining half of CHG soap in bottle. Patient Instructions:    Maru June If you are having any type of anesthesia you are to have nothing to eat or drink after midnight the night before your surgery. This includes gum, hard candy, mints, water or smoking or chewing tobacco.  The only exception to this is a small sip of water to take with any morning dose of heart, blood pressure, or seizure medications. No alcoholic beverages for 24 hours prior to surgery.      Brush your teeth but do not swallow water.  Bring your eyeglasses and case with you. No contacts are to be worn the day of surgery. You also may bring your hearing aids. Most surgical procedures involving anesthesia will require that you remove your dentures prior to surgery.  If you are on C-PAP or Bi-PAP at home and plan on staying in the hospital overnight for your surgery please bring the machine with you. · Do not wear any jewelry or body piercings day of surgery. Also, NO lotion, perfume or deodorant to be used the day of surgery. No nail polish on the operative extremity (arm/leg surgeries)    · If you are staying overnight with us, please bring a small bag of necessary personal items.  Please wear loose, comfortable clothing. If you are potentially going to have a cast or brace bring clothing that will fit over them.  In case of illness - If you have cold or flu like symptoms (high fever, runny nose, sore throat, cough, etc.) rash, nausea, vomiting, loose stools, and/or recent contact with someone who has a contagious disease (chicken pox, measles, etc.) Please call your doctor before coming to the hospital.         Day of Surgery/Procedure:    As a patient at [facility] INPATIENT you can expect quality medical and nursing care that is centered on your individual needs. Our goal is to make your surgical experience as comfortable as possible    . Transportation After Your Surgery/Procedure: You will need a friend or family member to drive you home after your procedure. Your  must be 25years of age or older and able to sign off on your discharge instructions. A taxi cab or any other form of public transportation is not acceptable. Your friend or family member must stay at the hospital throughout your procedure.     Someone must remain with you for the first 24 hours after your surgery

## 2020-05-26 ENCOUNTER — HOSPITAL ENCOUNTER (OUTPATIENT)
Dept: PREADMISSION TESTING | Age: 70
Discharge: HOME | End: 2020-05-30
Payer: MEDICARE

## 2020-05-26 PROCEDURE — U0004 COV-19 TEST NON-CDC HGH THRU: HCPCS

## 2020-05-27 LAB
SARS-COV-2, PCR: NOT DETECTED
SARS-COV-2, RAPID: NORMAL
SARS-COV-2: NORMAL
SOURCE: NORMAL

## 2020-05-28 ENCOUNTER — TELEPHONE (OUTPATIENT)
Dept: PRIMARY CARE CLINIC | Age: 70
End: 2020-05-28

## 2020-05-29 ENCOUNTER — HOSPITAL ENCOUNTER (INPATIENT)
Age: 70
LOS: 8 days | Discharge: HOME HEALTH | DRG: 190 | End: 2020-06-06
Attending: EMERGENCY MEDICINE | Admitting: INTERNAL MEDICINE
Payer: MEDICARE

## 2020-05-29 ENCOUNTER — APPOINTMENT (OUTPATIENT)
Dept: CT IMAGING | Age: 70
DRG: 190 | End: 2020-05-29
Payer: MEDICARE

## 2020-05-29 PROBLEM — I21.4 NSTEMI (NON-ST ELEVATED MYOCARDIAL INFARCTION) (HCC): Status: ACTIVE | Noted: 2020-05-29

## 2020-05-29 LAB
-: NORMAL
ABSOLUTE EOS #: 0.26 K/UL (ref 0–0.44)
ABSOLUTE IMMATURE GRANULOCYTE: 0.02 K/UL (ref 0–0.3)
ABSOLUTE LYMPH #: 1.03 K/UL (ref 1.1–3.7)
ABSOLUTE MONO #: 0.49 K/UL (ref 0.1–1.2)
ALBUMIN SERPL-MCNC: 3.1 G/DL (ref 3.5–5.2)
ALBUMIN/GLOBULIN RATIO: ABNORMAL (ref 1–2.5)
ALP BLD-CCNC: 87 U/L (ref 40–129)
ALT SERPL-CCNC: 11 U/L (ref 5–41)
ANION GAP SERPL CALCULATED.3IONS-SCNC: 16 MMOL/L (ref 9–17)
AST SERPL-CCNC: 17 U/L
BASOPHILS # BLD: 1 % (ref 0–2)
BASOPHILS ABSOLUTE: 0.04 K/UL (ref 0–0.2)
BILIRUB SERPL-MCNC: 0.32 MG/DL (ref 0.3–1.2)
BUN BLDV-MCNC: 26 MG/DL (ref 8–23)
BUN/CREAT BLD: 9 (ref 9–20)
CALCIUM SERPL-MCNC: 8.1 MG/DL (ref 8.6–10.4)
CHLORIDE BLD-SCNC: 98 MMOL/L (ref 98–107)
CO2: 18 MMOL/L (ref 20–31)
CREAT SERPL-MCNC: 2.94 MG/DL (ref 0.7–1.2)
DIFFERENTIAL TYPE: ABNORMAL
EKG ATRIAL RATE: 85 BPM
EKG P AXIS: 20 DEGREES
EKG P-R INTERVAL: 140 MS
EKG Q-T INTERVAL: 438 MS
EKG QRS DURATION: 90 MS
EKG QTC CALCULATION (BAZETT): 521 MS
EKG R AXIS: -24 DEGREES
EKG T AXIS: -169 DEGREES
EKG VENTRICULAR RATE: 85 BPM
EOSINOPHILS RELATIVE PERCENT: 5 % (ref 1–4)
FERRITIN: 166 UG/L (ref 30–400)
FOLATE: 11.3 NG/ML
GFR AFRICAN AMERICAN: 26 ML/MIN
GFR NON-AFRICAN AMERICAN: 21 ML/MIN
GFR SERPL CREATININE-BSD FRML MDRD: ABNORMAL ML/MIN/{1.73_M2}
GFR SERPL CREATININE-BSD FRML MDRD: ABNORMAL ML/MIN/{1.73_M2}
GLUCOSE BLD-MCNC: 92 MG/DL (ref 70–99)
HCT VFR BLD CALC: 29.9 % (ref 40.7–50.3)
HEMOGLOBIN: 9.7 G/DL (ref 13–17)
IMMATURE GRANULOCYTES: 0 %
IRON SATURATION: 18 % (ref 20–55)
IRON: 40 UG/DL (ref 59–158)
LYMPHOCYTES # BLD: 18 % (ref 24–43)
MCH RBC QN AUTO: 30.3 PG (ref 25.2–33.5)
MCHC RBC AUTO-ENTMCNC: 32.4 G/DL (ref 28.4–34.8)
MCV RBC AUTO: 93.4 FL (ref 82.6–102.9)
MONOCYTES # BLD: 9 % (ref 3–12)
NRBC AUTOMATED: 0 PER 100 WBC
PARTIAL THROMBOPLASTIN TIME: 29.4 SEC (ref 23.9–33.8)
PDW BLD-RTO: 16.7 % (ref 11.8–14.4)
PLATELET # BLD: 162 K/UL (ref 138–453)
PLATELET ESTIMATE: ABNORMAL
PMV BLD AUTO: 10.4 FL (ref 8.1–13.5)
POTASSIUM SERPL-SCNC: 3.6 MMOL/L (ref 3.7–5.3)
PROCALCITONIN: 0.17 NG/ML
RBC # BLD: 3.2 M/UL (ref 4.21–5.77)
RBC # BLD: ABNORMAL 10*6/UL
REASON FOR REJECTION: NORMAL
SEG NEUTROPHILS: 67 % (ref 36–65)
SEGMENTED NEUTROPHILS ABSOLUTE COUNT: 3.85 K/UL (ref 1.5–8.1)
SODIUM BLD-SCNC: 132 MMOL/L (ref 135–144)
TOTAL IRON BINDING CAPACITY: 228 UG/DL (ref 250–450)
TOTAL PROTEIN: 6.4 G/DL (ref 6.4–8.3)
TROPONIN INTERP: ABNORMAL
TROPONIN INTERP: ABNORMAL
TROPONIN T: ABNORMAL NG/ML
TROPONIN T: ABNORMAL NG/ML
TROPONIN, HIGH SENSITIVITY: 98 NG/L (ref 0–22)
TROPONIN, HIGH SENSITIVITY: 98 NG/L (ref 0–22)
UNSATURATED IRON BINDING CAPACITY: 188 UG/DL (ref 112–347)
VITAMIN B-12: >2000 PG/ML (ref 232–1245)
WBC # BLD: 5.7 K/UL (ref 3.5–11.3)
WBC # BLD: ABNORMAL 10*3/UL
ZZ NTE CLEAN UP: ORDERED TEST: NORMAL
ZZ NTE WITH NAME CLEAN UP: SPECIMEN SOURCE: NORMAL

## 2020-05-29 PROCEDURE — 82728 ASSAY OF FERRITIN: CPT

## 2020-05-29 PROCEDURE — 84484 ASSAY OF TROPONIN QUANT: CPT

## 2020-05-29 PROCEDURE — 80053 COMPREHEN METABOLIC PANEL: CPT

## 2020-05-29 PROCEDURE — 93005 ELECTROCARDIOGRAM TRACING: CPT | Performed by: EMERGENCY MEDICINE

## 2020-05-29 PROCEDURE — 2060000000 HC ICU INTERMEDIATE R&B

## 2020-05-29 PROCEDURE — 82607 VITAMIN B-12: CPT

## 2020-05-29 PROCEDURE — 82746 ASSAY OF FOLIC ACID SERUM: CPT

## 2020-05-29 PROCEDURE — 2700000000 HC OXYGEN THERAPY PER DAY

## 2020-05-29 PROCEDURE — 6370000000 HC RX 637 (ALT 250 FOR IP): Performed by: INTERNAL MEDICINE

## 2020-05-29 PROCEDURE — 85025 COMPLETE CBC W/AUTO DIFF WBC: CPT

## 2020-05-29 PROCEDURE — 2580000003 HC RX 258: Performed by: EMERGENCY MEDICINE

## 2020-05-29 PROCEDURE — 94761 N-INVAS EAR/PLS OXIMETRY MLT: CPT

## 2020-05-29 PROCEDURE — 2500000003 HC RX 250 WO HCPCS: Performed by: NURSE PRACTITIONER

## 2020-05-29 PROCEDURE — 93010 ELECTROCARDIOGRAM REPORT: CPT | Performed by: INTERNAL MEDICINE

## 2020-05-29 PROCEDURE — 2580000003 HC RX 258: Performed by: INTERNAL MEDICINE

## 2020-05-29 PROCEDURE — 6360000002 HC RX W HCPCS: Performed by: EMERGENCY MEDICINE

## 2020-05-29 PROCEDURE — 84145 PROCALCITONIN (PCT): CPT

## 2020-05-29 PROCEDURE — 83550 IRON BINDING TEST: CPT

## 2020-05-29 PROCEDURE — 85730 THROMBOPLASTIN TIME PARTIAL: CPT

## 2020-05-29 PROCEDURE — 83540 ASSAY OF IRON: CPT

## 2020-05-29 PROCEDURE — 94760 N-INVAS EAR/PLS OXIMETRY 1: CPT

## 2020-05-29 PROCEDURE — 99285 EMERGENCY DEPT VISIT HI MDM: CPT

## 2020-05-29 PROCEDURE — 6360000002 HC RX W HCPCS: Performed by: INTERNAL MEDICINE

## 2020-05-29 PROCEDURE — 94640 AIRWAY INHALATION TREATMENT: CPT

## 2020-05-29 PROCEDURE — 71250 CT THORAX DX C-: CPT

## 2020-05-29 PROCEDURE — 6370000000 HC RX 637 (ALT 250 FOR IP): Performed by: NURSE PRACTITIONER

## 2020-05-29 PROCEDURE — 36415 COLL VENOUS BLD VENIPUNCTURE: CPT

## 2020-05-29 RX ORDER — POLYETHYLENE GLYCOL 3350 17 G/17G
17 POWDER, FOR SOLUTION ORAL DAILY PRN
Status: DISCONTINUED | OUTPATIENT
Start: 2020-05-29 | End: 2020-06-06 | Stop reason: HOSPADM

## 2020-05-29 RX ORDER — ACETAMINOPHEN 325 MG/1
650 TABLET ORAL EVERY 4 HOURS PRN
Status: DISCONTINUED | OUTPATIENT
Start: 2020-05-29 | End: 2020-05-29

## 2020-05-29 RX ORDER — ACETAMINOPHEN 650 MG/1
650 SUPPOSITORY RECTAL EVERY 6 HOURS PRN
Status: DISCONTINUED | OUTPATIENT
Start: 2020-05-29 | End: 2020-06-06 | Stop reason: HOSPADM

## 2020-05-29 RX ORDER — HEPARIN SODIUM 5000 [USP'U]/ML
5000 INJECTION, SOLUTION INTRAVENOUS; SUBCUTANEOUS EVERY 8 HOURS SCHEDULED
Status: DISCONTINUED | OUTPATIENT
Start: 2020-05-29 | End: 2020-06-06 | Stop reason: HOSPADM

## 2020-05-29 RX ORDER — ALBUTEROL SULFATE 2.5 MG/3ML
2.5 SOLUTION RESPIRATORY (INHALATION)
Status: DISCONTINUED | OUTPATIENT
Start: 2020-05-29 | End: 2020-05-29

## 2020-05-29 RX ORDER — HEPARIN SODIUM 1000 [USP'U]/ML
4000 INJECTION, SOLUTION INTRAVENOUS; SUBCUTANEOUS PRN
Status: DISCONTINUED | OUTPATIENT
Start: 2020-05-29 | End: 2020-05-29

## 2020-05-29 RX ORDER — SODIUM CHLORIDE 0.9 % (FLUSH) 0.9 %
10 SYRINGE (ML) INJECTION PRN
Status: DISCONTINUED | OUTPATIENT
Start: 2020-05-29 | End: 2020-06-06 | Stop reason: HOSPADM

## 2020-05-29 RX ORDER — PROMETHAZINE HYDROCHLORIDE 12.5 MG/1
12.5 TABLET ORAL EVERY 6 HOURS PRN
Status: DISCONTINUED | OUTPATIENT
Start: 2020-05-29 | End: 2020-06-06 | Stop reason: HOSPADM

## 2020-05-29 RX ORDER — HEPARIN SODIUM 5000 [USP'U]/ML
5000 INJECTION, SOLUTION INTRAVENOUS; SUBCUTANEOUS EVERY 8 HOURS SCHEDULED
Status: DISCONTINUED | OUTPATIENT
Start: 2020-05-29 | End: 2020-05-29

## 2020-05-29 RX ORDER — ACETAMINOPHEN 325 MG/1
650 TABLET ORAL EVERY 6 HOURS PRN
Status: DISCONTINUED | OUTPATIENT
Start: 2020-05-29 | End: 2020-06-06 | Stop reason: HOSPADM

## 2020-05-29 RX ORDER — HEPARIN SODIUM 1000 [USP'U]/ML
4000 INJECTION, SOLUTION INTRAVENOUS; SUBCUTANEOUS ONCE
Status: COMPLETED | OUTPATIENT
Start: 2020-05-29 | End: 2020-05-29

## 2020-05-29 RX ORDER — HEPARIN SODIUM 10000 [USP'U]/100ML
12 INJECTION, SOLUTION INTRAVENOUS CONTINUOUS
Status: DISCONTINUED | OUTPATIENT
Start: 2020-05-29 | End: 2020-05-29

## 2020-05-29 RX ORDER — METHYLPREDNISOLONE SODIUM SUCCINATE 40 MG/ML
40 INJECTION, POWDER, LYOPHILIZED, FOR SOLUTION INTRAMUSCULAR; INTRAVENOUS EVERY 6 HOURS
Status: DISCONTINUED | OUTPATIENT
Start: 2020-05-29 | End: 2020-05-30

## 2020-05-29 RX ORDER — BUMETANIDE 0.25 MG/ML
2 INJECTION, SOLUTION INTRAMUSCULAR; INTRAVENOUS ONCE
Status: COMPLETED | OUTPATIENT
Start: 2020-05-29 | End: 2020-05-29

## 2020-05-29 RX ORDER — ONDANSETRON 2 MG/ML
4 INJECTION INTRAMUSCULAR; INTRAVENOUS EVERY 6 HOURS PRN
Status: DISCONTINUED | OUTPATIENT
Start: 2020-05-29 | End: 2020-06-06 | Stop reason: HOSPADM

## 2020-05-29 RX ORDER — IPRATROPIUM BROMIDE AND ALBUTEROL SULFATE 2.5; .5 MG/3ML; MG/3ML
1 SOLUTION RESPIRATORY (INHALATION)
Status: DISCONTINUED | OUTPATIENT
Start: 2020-05-29 | End: 2020-06-06 | Stop reason: HOSPADM

## 2020-05-29 RX ORDER — HEPARIN SODIUM 1000 [USP'U]/ML
2000 INJECTION, SOLUTION INTRAVENOUS; SUBCUTANEOUS PRN
Status: DISCONTINUED | OUTPATIENT
Start: 2020-05-29 | End: 2020-05-29

## 2020-05-29 RX ORDER — CEFTRIAXONE 1 G/1
1 INJECTION, POWDER, FOR SOLUTION INTRAMUSCULAR; INTRAVENOUS ONCE
Status: DISCONTINUED | OUTPATIENT
Start: 2020-05-29 | End: 2020-05-29

## 2020-05-29 RX ORDER — POTASSIUM CHLORIDE 20 MEQ/1
10 TABLET, EXTENDED RELEASE ORAL ONCE
Status: COMPLETED | OUTPATIENT
Start: 2020-05-29 | End: 2020-05-29

## 2020-05-29 RX ORDER — ALBUTEROL SULFATE 2.5 MG/3ML
2.5 SOLUTION RESPIRATORY (INHALATION) EVERY 4 HOURS PRN
Status: DISCONTINUED | OUTPATIENT
Start: 2020-05-29 | End: 2020-06-06 | Stop reason: HOSPADM

## 2020-05-29 RX ORDER — SODIUM CHLORIDE 0.9 % (FLUSH) 0.9 %
10 SYRINGE (ML) INJECTION EVERY 12 HOURS SCHEDULED
Status: DISCONTINUED | OUTPATIENT
Start: 2020-05-29 | End: 2020-06-06 | Stop reason: HOSPADM

## 2020-05-29 RX ADMIN — BUMETANIDE 2 MG: 0.25 INJECTION INTRAMUSCULAR; INTRAVENOUS at 17:15

## 2020-05-29 RX ADMIN — SODIUM CHLORIDE, PRESERVATIVE FREE 10 ML: 5 INJECTION INTRAVENOUS at 20:53

## 2020-05-29 RX ADMIN — HEPARIN SODIUM 4000 UNITS: 1000 INJECTION INTRAVENOUS; SUBCUTANEOUS at 12:55

## 2020-05-29 RX ADMIN — ACETAMINOPHEN 650 MG: 325 TABLET ORAL at 16:16

## 2020-05-29 RX ADMIN — CEFTRIAXONE SODIUM 1 G: 1 INJECTION, POWDER, FOR SOLUTION INTRAMUSCULAR; INTRAVENOUS at 12:53

## 2020-05-29 RX ADMIN — ALBUTEROL SULFATE 2.5 MG: 5 SOLUTION RESPIRATORY (INHALATION) at 15:53

## 2020-05-29 RX ADMIN — HEPARIN SODIUM 5000 UNITS: 5000 INJECTION INTRAVENOUS; SUBCUTANEOUS at 20:53

## 2020-05-29 RX ADMIN — POTASSIUM CHLORIDE 10 MEQ: 20 TABLET, EXTENDED RELEASE ORAL at 17:15

## 2020-05-29 RX ADMIN — HEPARIN SODIUM AND DEXTROSE 12 UNITS/KG/HR: 10000; 5 INJECTION INTRAVENOUS at 12:58

## 2020-05-29 RX ADMIN — METHYLPREDNISOLONE SODIUM SUCCINATE 40 MG: 40 INJECTION, POWDER, FOR SOLUTION INTRAMUSCULAR; INTRAVENOUS at 20:53

## 2020-05-29 RX ADMIN — AZITHROMYCIN MONOHYDRATE 500 MG: 500 INJECTION, POWDER, LYOPHILIZED, FOR SOLUTION INTRAVENOUS at 13:29

## 2020-05-29 RX ADMIN — IPRATROPIUM BROMIDE AND ALBUTEROL SULFATE 1 AMPULE: .5; 3 SOLUTION RESPIRATORY (INHALATION) at 21:14

## 2020-05-29 RX ADMIN — METHYLPREDNISOLONE SODIUM SUCCINATE 40 MG: 40 INJECTION, POWDER, FOR SOLUTION INTRAMUSCULAR; INTRAVENOUS at 16:16

## 2020-05-29 ASSESSMENT — PAIN SCALES - GENERAL
PAINLEVEL_OUTOF10: 9
PAINLEVEL_OUTOF10: 0
PAINLEVEL_OUTOF10: 9
PAINLEVEL_OUTOF10: 1

## 2020-05-29 ASSESSMENT — PAIN DESCRIPTION - DESCRIPTORS: DESCRIPTORS: PATIENT UNABLE TO DESCRIBE

## 2020-05-29 ASSESSMENT — PAIN DESCRIPTION - PROGRESSION: CLINICAL_PROGRESSION: GRADUALLY WORSENING

## 2020-05-29 ASSESSMENT — PAIN DESCRIPTION - PAIN TYPE: TYPE: ACUTE PAIN

## 2020-05-29 ASSESSMENT — PAIN DESCRIPTION - LOCATION: LOCATION: BACK;CHEST

## 2020-05-29 ASSESSMENT — PAIN DESCRIPTION - FREQUENCY: FREQUENCY: CONTINUOUS

## 2020-05-29 ASSESSMENT — PAIN DESCRIPTION - ONSET: ONSET: ON-GOING

## 2020-05-29 NOTE — FLOWSHEET NOTE
Patient is awake but has trach. Patient is not able to communicate seems to want the nurse. Writer notifies patient's nurse. Spiritual Care will follow as needed.        05/29/20 1512   Encounter Summary   Services provided to: Patient   Referral/Consult From: [facility] Family members   Continue Visiting   (5/29/20 Patient has trach)   Complexity of Encounter Low   Length of Encounter 15 minutes   Routine   Type Initial   Assessment Unable to respond   Intervention Active listening   Outcome Did not respond

## 2020-05-29 NOTE — PLAN OF CARE
Problem: Falls - Risk of:  Goal: Will remain free from falls  Description: Will remain free from falls  Outcome: Ongoing  Note: Siderails up x 2  Hourly rounding. Call light in reach. Instructed to call for assist before attempting out of bed. Remains free from falls and accidental injury at this time. Floor free from obstacles, and bed is locked and in lowest position. Adequate lighting provided. Bed alarm on. Fall sticker on wristband.  Fall Sign posted in doorway      Problem: Pain:  Goal: Pain level will decrease  Description: Pain level will decrease  Outcome: Ongoing

## 2020-05-29 NOTE — ED NOTES
Pt to room 20 via w/c with c/o ongoing SOB x 4 days. Pt appears anxious on cart, continually stating he needs a breathing tx. Pt has hx of lung/laryngeal CA, has tracheostomy present with tan/white colored sputum noted. Pt reports he is having pain in his back that radiates into mid chest. Pt denies any fevers or N/V. Venti mask at 50%(6L) placed onto trach site upon arrival to room and Sa02 has now increased from 93% to 97%. Pt appears to be more comfortable and less anxious. Respirations even and slightly labored. Skin pale, warm, and dry, MMM. NSR on monitor with occasional PVC's. NAD noted.       Rudolph Fisher RN  05/29/20 [id number]

## 2020-05-29 NOTE — H&P
MCG/ACT AERO, Inhale 2 puffs into the lungs 2 times daily  allopurinol (ZYLOPRIM) 300 MG tablet, Take 300 mg by mouth daily. dicyclomine (BENTYL) 10 MG capsule, Take 1 capsule by mouth 2 times daily for 14 days  omeprazole (PRILOSEC) 20 MG delayed release capsule, Take 20 mg by mouth every morning (before breakfast)    Allergies:    Amino acids and Lisinopril    Social History:    reports that he has quit smoking. His smoking use included cigarettes. He has never used smokeless tobacco. He reports previous alcohol use of about 5.0 standard drinks of alcohol per week. He reports that he does not use drugs. Family History:   family history includes Diabetes in his mother and sister; Heart Disease in his mother and sister.     REVIEW OF SYSTEMS:  Constitutional: negative, No fever no chills  Eyes: negative  Ears, nose, mouth, throat, and face: negative  Respiratory: negative, Cough shortness of breath wheezing  Cardiovascular: negative, No chest pain no palpitation no dizziness  Gastrointestinal: negative, No abdominal pain no nausea no vomiting no heartburn no dysphagia  Genitourinary:negative  Integument/breast: negative  Hematologic/lymphatic: negative  Musculoskeletal:negative  Neurological: negative, No headache no TIA no seizures  Behavioral/Psych: negative  Endocrine: negative, No polyuria no polydipsia no hypoglycemia  Allergic/Immunologic: negative  PHYSICAL EXAM:  General Appearance: alert and oriented to person, place and time and in no acute distress  Skin: warm and dry, no rash or erythema  Head: normocephalic and atraumatic  Eyes: pupils equal, round, and reactive to light, sclera anicteric and pallor  Neck: neck supple and non tender without mass and tracheostomy  Pulmonary/Chest: Air entry equal bilateral rhonchi and expiratory wheezing decreased at the bases no use of intercostal muscles  Cardiovascular: normal rate, regular rhythm, normal S1 and S2, no gallops, intact distal pulses and no carotid bruits  Abdomen: soft, non-tender, non-distended, normal bowel sounds, no masses or organomegaly    Extremities: no edema and good pulses no Homans sign  Neurologic: Alert oriented x3 with no focal deficit    Vitals:  BP (!) 139/92   Pulse 89   Temp 97.5 °F (36.4 °C) (Oral)   Resp 22   Ht 5' 9\" (1.753 m)   Wt 150 lb (68 kg)   SpO2 92%   BMI 22.15 kg/m²     LABS:  CBC:   Lab Results   Component Value Date    WBC 5.7 05/29/2020    RBC 3.20 05/29/2020    HGB 9.7 05/29/2020    HCT 29.9 05/29/2020    MCV 93.4 05/29/2020    MCH 30.3 05/29/2020    MCHC 32.4 05/29/2020    RDW 16.7 05/29/2020     05/29/2020    MPV 10.4 05/29/2020     BMP:    Lab Results   Component Value Date     05/29/2020    K 3.6 05/29/2020    CL 98 05/29/2020    CO2 18 05/29/2020    BUN 26 05/29/2020    LABALBU 3.1 05/29/2020    CREATININE 2.94 05/29/2020    CALCIUM 8.1 05/29/2020    GFRAA 26 05/29/2020    LABGLOM 21 05/29/2020    GLUCOSE 92 05/29/2020         ASSESSMENT:    Acute bronchitis  COPD exacerbation  CKD 4  Hypertension essential  Laryngeal cancer with recurrence to the floor of the mouth that is already treated  No evidence of ACS elevated troponins  Anemia  Patient Active Problem List   Diagnosis    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    COPD (chronic obstructive pulmonary disease) ([address]    Severe malnutrition (HCC)    Facial edema    Common iliac aneurysm (HCC)    Essential hypertension    Stage 3 chronic kidney disease (HCC)    Acute kidney injury superimposed on chronic kidney disease ([address]    NSTEMI (non-ST elevated myocardial infarction) ([address]       PLAN:    Meds labs reviewed IV steroids IV antibiotics bronchodilators oxygen DVT prophylaxis cardiology pulmonary consultation resume home meds avoid nephrotoxic drugs we will check before meals and at bedtime Accu-Cheks with insulin coverage if needed see orders          Rachid Freitas MD  5:20

## 2020-05-29 NOTE — ED PROVIDER NOTES
EMERGENCY DEPARTMENT ENCOUNTER    Pt Name: Rasheed Petersen  MRN: [id number]  [place] 1950  Date of evaluation: 5/29/20  CHIEF COMPLAINT       Chief Complaint   Patient presents with    Shortness of Breath     HISTORY OF PRESENT ILLNESS   Patient is a 82-year-old male with PMH of lung cancer status post tracheotomy, CKD, COPD, hypertension and hyponatremia who presents to the ED complaining of shortness of breath. Symptoms have been present for weeks to months. He has had multiple visits to emergency rooms for similar complaints. Does not have fever, cough, chest pain, abdominal pain, nausea, vomiting, changes in urine or stool. REVIEW OF SYSTEMS     Review of Systems   All other systems reviewed and are negative. PASTMEDICAL HISTORY     Past Medical History:   Diagnosis Date    Arthritis     Asthma     CKD (chronic kidney disease), stage III (HCC)     COPD (chronic obstructive pulmonary disease) (HCC)     GERD (gastroesophageal reflux disease)     Gout     Hypertension     Hyponatremia     Iliac artery aneurysm, right (HCC)     Laryngeal cancer (HCC)     Laryngeal Cancer. Chronic left facial edema.  Lung cancer ([address] 3-4 years ago    [facility] [place]    Prostate cancer [facility])      SURGICAL HISTORY       Past Surgical History:   Procedure Laterality Date    ABDOMINAL AORTIC ANEURYSM REPAIR, ENDOVASCULAR Right 4/7/2020    RIGHT ILIAC ARTERY ANEURYSM REPAIR ENDOVASCULAR performed by Vicente Dykes MD at [address] [place] [address] [place]  03/04/2020    No stents placed    PROSTATE SURGERY      TRACHEAL SURGERY  3-4 years ago    lung removed and \"voice box\" removed     CURRENT MEDICATIONS       Previous Medications    ALBUTEROL SULFATE  (90 BASE) MCG/ACT INHALER    Inhale 2 puffs into the lungs every 6 hours as needed for Wheezing or Shortness of Breath    ALLOPURINOL (ZYLOPRIM) 300 MG TABLET    Take 300 mg by mouth daily.     AMLODIPINE (NORVASC) 10 MG TABLET Take 1 tablet by mouth daily    BUDESONIDE-FORMOTEROL (SYMBICORT) 160-4.5 MCG/ACT AERO    Inhale 2 puffs into the lungs 2 times daily    CHOLECALCIFEROL (VITAMIN D3) 50 MCG ( UT) CAPS    Take 1 capsule by mouth daily    DICYCLOMINE (BENTYL) 10 MG CAPSULE    Take 1 capsule by mouth 2 times daily for 14 days    OMEPRAZOLE (PRILOSEC) 20 MG DELAYED RELEASE CAPSULE    Take 20 mg by mouth every morning (before breakfast)    SODIUM BICARBONATE 650 MG TABLET    Take 650 mg by mouth 3 times daily      ALLERGIES     is allergic to amino acids and lisinopril. FAMILY HISTORY     He indicated that his mother is . He indicated that his father is . He indicated that his sister is . SOCIAL HISTORY       Social History     Tobacco Use    Smoking status: Former Smoker     Types: Cigarettes    Smokeless tobacco: Never Used    Tobacco comment: quit smoking 20 years ago    Substance Use Topics    Alcohol use: Not Currently     Alcohol/week: 5.0 standard drinks     Types: 5 Cans of beer per week    Drug use: No     PHYSICAL EXAM     INITIAL VITALS: BP (!) 144/95   Pulse 86   Temp 98.7 °F (37.1 °C) (Oral)   Resp 20   Ht 5' 9\" (1.753 m)   Wt 150 lb (68 kg)   SpO2 93%   BMI 22.15 kg/m²    Physical Exam  Neck:      Comments: Tracheostomy site clean, dry, with no signs of infection. MEDICAL DECISION MAKING:   The patient is hemodynamically stable, afebrile, nontoxic-appearing. Physical exam remarkable. Based on history and exam differential includes viral infection, pneumonia, COPD exacerbation. ED plan for basic labs, CT scan of chest, reassess. ED Course as of May 29 1328   Fri May 29, 2020   3810 Patient has virtual telemedicine appointment with Dr. Carlos Alarcon on  at [phone]. [AMINATA]   1135 The patient is hemodynamically stable, afebrile, nontoxic-appearing.     Labs remarkable for:    Troponin 98    Creatinine 2.94    Potassium 3.6    Hemoglobin 9.7    EKG shows new onset T wave Sodium 132 (*)     Potassium 3.6 (*)     CO2 18 (*)     Alb 3.1 (*)     GFR Non- 21 (*)     GFR  26 (*)     All other components within normal limits   TROPONIN - Abnormal; Notable for the following components:    Troponin, High Sensitivity 98 (*)     All other components within normal limits   APTT   SPECIMEN REJECTION   APTT   APTT   PROCALCITONIN   TROPONIN       EMERGENCY DEPARTMENTCOURSE:         Vitals:    Vitals:    05/29/20 1155 05/29/20 1210 05/29/20 1302 05/29/20 1317   BP: (!) 145/106 (!) 140/98 (!) 144/102 (!) 144/95   Pulse: 90 85 86 86   Resp: 18 24 23 20   Temp:       TempSrc:       SpO2: 96% 96% 95% 93%   Weight:       Height:           The patient was given the following medications while in the emergency department:  Orders Placed This Encounter   Medications    azithromycin (ZITHROMAX) 500 mg in D5W 250ml addavial    DISCONTD: cefTRIAXone (ROCEPHIN) injection 1 g    cefTRIAXone (ROCEPHIN) 1 g IVPB in 50 mL D5W minibag    heparin (porcine) injection 4,000 Units    heparin (porcine) injection 4,000 Units    heparin (porcine) injection 2,000 Units    heparin 25,000 units in dextrose 5% 250 mL infusion     CONSULTS:  IP CONSULT TO INTERNAL MEDICINE  IP CONSULT TO CARDIOLOGY    FINAL IMPRESSION      1. Pneumonia due to organism    2. Dyspnea, unspecified type    3.  Elevated troponin          DISPOSITION/PLAN   DISPOSITION Admitted 05/29/2020 11:57:50 AM      PATIENT REFERRED TO:  Christal Vanegas MD  [address] [place] [id number] [phone]          DISCHARGE MEDICATIONS:  New Prescriptions    No medications on file     Azalea Wood MD  Attending Emergency Physician                    Dennis Peng MD  05/29/20 2615

## 2020-05-29 NOTE — CONSULTS
Reason for Consult:  Elevated troponin    HPI: Mr. Osorio Dunn is a 80 yo patient known to our practice for HTN, mild to moderate MR, and mild TR who presented to the ED with complaints of dyspnea that has been ongoing over the past couple of weeks when he lays down and well as occasional productive cough. He denies fever or chills. He has also had back pain. He denies chest pain, PND, dizziness, palpitations, or leg edema. Troponin was 98 and cardiology is asked to evaluate. PMH:   Past Medical History:   Diagnosis Date    Arthritis     Asthma     CKD (chronic kidney disease), stage III ([address]     COPD (chronic obstructive pulmonary disease) (HCC)     GERD (gastroesophageal reflux disease)     Gout     Hypertension     Hyponatremia     Iliac artery aneurysm, right (HCC)     Laryngeal cancer (HCC)     Laryngeal Cancer. Chronic left facial edema.  Lung cancer ([facility] 3-4 years ago    [facility]       essentially normal coronary arteries documented on a cardiac catheterization done 3/4/2020.  The LMCA was normal, LAD had mild irregularities 10-20%, Circ was normal, RCA was normal, and Ramus was normal      Low normal LV systolic function with an ejection fraction of 45%, mild hypokinesis of the inferior wall, mild hypokinesis of the posterolateral wall, normal LV size and wall thickness, mildly dilated LA, mildly dilated RA, and normal RV, sclerotic aortic valve, mild to moderate MR, mild TR, mild pulmonary hypertension, and evidence of LV diastolic dysfunction documented on an echocardiogram done 2/11/20    PSH:   Past Surgical History:   Procedure Laterality Date    ABDOMINAL AORTIC ANEURYSM REPAIR, ENDOVASCULAR Right 4/7/2020    RIGHT ILIAC ARTERY ANEURYSM REPAIR ENDOVASCULAR performed by Jamin Werner MD at [facility]  03/04/2020    No stents placed   [facility]  3-4 years ago    lung removed

## 2020-05-29 NOTE — PROGRESS NOTES
Dr. Nicole Vasquez to floor to see patient, after looking through chart seen Dr. Gustavo Gibson patient. New consult for Dr. Gustavo Gibson entered.

## 2020-05-30 ENCOUNTER — APPOINTMENT (OUTPATIENT)
Dept: GENERAL RADIOLOGY | Age: 70
DRG: 190 | End: 2020-05-30
Payer: MEDICARE

## 2020-05-30 LAB
ABSOLUTE EOS #: 0 K/UL (ref 0–0.44)
ABSOLUTE IMMATURE GRANULOCYTE: 0 K/UL (ref 0–0.3)
ABSOLUTE LYMPH #: 0.51 K/UL (ref 1.1–3.7)
ABSOLUTE MONO #: 0.04 K/UL (ref 0.1–1.2)
ALBUMIN SERPL-MCNC: 3.2 G/DL (ref 3.5–5.2)
ALBUMIN/GLOBULIN RATIO: ABNORMAL (ref 1–2.5)
ALP BLD-CCNC: 88 U/L (ref 40–129)
ALT SERPL-CCNC: 11 U/L (ref 5–41)
ANION GAP SERPL CALCULATED.3IONS-SCNC: 17 MMOL/L (ref 9–17)
AST SERPL-CCNC: 17 U/L
BASOPHILS # BLD: 0 % (ref 0–2)
BASOPHILS ABSOLUTE: 0 K/UL (ref 0–0.2)
BILIRUB SERPL-MCNC: 0.25 MG/DL (ref 0.3–1.2)
BUN BLDV-MCNC: 29 MG/DL (ref 8–23)
BUN/CREAT BLD: 9 (ref 9–20)
CALCIUM SERPL-MCNC: 8.2 MG/DL (ref 8.6–10.4)
CHLORIDE BLD-SCNC: 97 MMOL/L (ref 98–107)
CO2: 18 MMOL/L (ref 20–31)
CREAT SERPL-MCNC: 3.2 MG/DL (ref 0.7–1.2)
DIFFERENTIAL TYPE: ABNORMAL
EOSINOPHILS RELATIVE PERCENT: 0 % (ref 1–4)
GFR AFRICAN AMERICAN: 24 ML/MIN
GFR NON-AFRICAN AMERICAN: 19 ML/MIN
GFR SERPL CREATININE-BSD FRML MDRD: ABNORMAL ML/MIN/{1.73_M2}
GFR SERPL CREATININE-BSD FRML MDRD: ABNORMAL ML/MIN/{1.73_M2}
GLUCOSE BLD-MCNC: 114 MG/DL (ref 75–110)
GLUCOSE BLD-MCNC: 124 MG/DL (ref 75–110)
GLUCOSE BLD-MCNC: 155 MG/DL (ref 70–99)
HCT VFR BLD CALC: 30 % (ref 40.7–50.3)
HEMOGLOBIN: 9.8 G/DL (ref 13–17)
IMMATURE GRANULOCYTES: 0 %
LACTIC ACID: 3.6 MMOL/L (ref 0.5–2.2)
LYMPHOCYTES # BLD: 13 % (ref 24–43)
MCH RBC QN AUTO: 30.1 PG (ref 25.2–33.5)
MCHC RBC AUTO-ENTMCNC: 32.7 G/DL (ref 28.4–34.8)
MCV RBC AUTO: 92 FL (ref 82.6–102.9)
MONOCYTES # BLD: 1 % (ref 3–12)
NRBC AUTOMATED: 0 PER 100 WBC
PDW BLD-RTO: 16.3 % (ref 11.8–14.4)
PLATELET # BLD: 182 K/UL (ref 138–453)
PLATELET ESTIMATE: ABNORMAL
PMV BLD AUTO: 10.6 FL (ref 8.1–13.5)
POTASSIUM SERPL-SCNC: 4.1 MMOL/L (ref 3.7–5.3)
RBC # BLD: 3.26 M/UL (ref 4.21–5.77)
RBC # BLD: ABNORMAL 10*6/UL
SEG NEUTROPHILS: 86 % (ref 36–65)
SEGMENTED NEUTROPHILS ABSOLUTE COUNT: 3.35 K/UL (ref 1.5–8.1)
SODIUM BLD-SCNC: 132 MMOL/L (ref 135–144)
TOTAL PROTEIN: 6.5 G/DL (ref 6.4–8.3)
WBC # BLD: 3.9 K/UL (ref 3.5–11.3)
WBC # BLD: ABNORMAL 10*3/UL

## 2020-05-30 PROCEDURE — 6360000002 HC RX W HCPCS: Performed by: INTERNAL MEDICINE

## 2020-05-30 PROCEDURE — 36415 COLL VENOUS BLD VENIPUNCTURE: CPT

## 2020-05-30 PROCEDURE — 83605 ASSAY OF LACTIC ACID: CPT

## 2020-05-30 PROCEDURE — 94761 N-INVAS EAR/PLS OXIMETRY MLT: CPT

## 2020-05-30 PROCEDURE — 71045 X-RAY EXAM CHEST 1 VIEW: CPT

## 2020-05-30 PROCEDURE — 80053 COMPREHEN METABOLIC PANEL: CPT

## 2020-05-30 PROCEDURE — 2700000000 HC OXYGEN THERAPY PER DAY

## 2020-05-30 PROCEDURE — 85025 COMPLETE CBC W/AUTO DIFF WBC: CPT

## 2020-05-30 PROCEDURE — 83036 HEMOGLOBIN GLYCOSYLATED A1C: CPT

## 2020-05-30 PROCEDURE — 6370000000 HC RX 637 (ALT 250 FOR IP): Performed by: INTERNAL MEDICINE

## 2020-05-30 PROCEDURE — 2060000000 HC ICU INTERMEDIATE R&B

## 2020-05-30 PROCEDURE — 82947 ASSAY GLUCOSE BLOOD QUANT: CPT

## 2020-05-30 PROCEDURE — 94640 AIRWAY INHALATION TREATMENT: CPT

## 2020-05-30 PROCEDURE — 2580000003 HC RX 258: Performed by: INTERNAL MEDICINE

## 2020-05-30 RX ORDER — METHYLPREDNISOLONE SODIUM SUCCINATE 40 MG/ML
40 INJECTION, POWDER, LYOPHILIZED, FOR SOLUTION INTRAMUSCULAR; INTRAVENOUS EVERY 8 HOURS
Status: DISCONTINUED | OUTPATIENT
Start: 2020-05-30 | End: 2020-05-31

## 2020-05-30 RX ORDER — VITAMIN B COMPLEX
1000 TABLET ORAL DAILY
Status: DISCONTINUED | OUTPATIENT
Start: 2020-05-30 | End: 2020-06-06 | Stop reason: HOSPADM

## 2020-05-30 RX ORDER — BUDESONIDE AND FORMOTEROL FUMARATE DIHYDRATE 160; 4.5 UG/1; UG/1
2 AEROSOL RESPIRATORY (INHALATION) 2 TIMES DAILY
Status: DISCONTINUED | OUTPATIENT
Start: 2020-05-30 | End: 2020-05-31

## 2020-05-30 RX ORDER — NICOTINE POLACRILEX 4 MG
15 LOZENGE BUCCAL PRN
Status: DISCONTINUED | OUTPATIENT
Start: 2020-05-30 | End: 2020-06-06 | Stop reason: HOSPADM

## 2020-05-30 RX ORDER — AMLODIPINE BESYLATE 10 MG/1
10 TABLET ORAL DAILY
Status: DISCONTINUED | OUTPATIENT
Start: 2020-05-30 | End: 2020-06-03

## 2020-05-30 RX ORDER — ACETYLCYSTEINE 200 MG/ML
600 SOLUTION ORAL; RESPIRATORY (INHALATION) 2 TIMES DAILY
Status: DISCONTINUED | OUTPATIENT
Start: 2020-05-30 | End: 2020-06-04

## 2020-05-30 RX ORDER — FUROSEMIDE 40 MG/1
40 TABLET ORAL DAILY
Status: DISCONTINUED | OUTPATIENT
Start: 2020-05-30 | End: 2020-06-02

## 2020-05-30 RX ORDER — SODIUM BICARBONATE 650 MG/1
650 TABLET ORAL 2 TIMES DAILY
Status: DISCONTINUED | OUTPATIENT
Start: 2020-05-30 | End: 2020-06-06 | Stop reason: HOSPADM

## 2020-05-30 RX ORDER — DEXTROSE MONOHYDRATE 25 G/50ML
12.5 INJECTION, SOLUTION INTRAVENOUS PRN
Status: DISCONTINUED | OUTPATIENT
Start: 2020-05-30 | End: 2020-06-06 | Stop reason: HOSPADM

## 2020-05-30 RX ORDER — DEXTROSE MONOHYDRATE 50 MG/ML
100 INJECTION, SOLUTION INTRAVENOUS PRN
Status: DISCONTINUED | OUTPATIENT
Start: 2020-05-30 | End: 2020-06-06 | Stop reason: HOSPADM

## 2020-05-30 RX ORDER — ALLOPURINOL 300 MG/1
300 TABLET ORAL DAILY
Status: DISCONTINUED | OUTPATIENT
Start: 2020-05-30 | End: 2020-06-02

## 2020-05-30 RX ADMIN — IPRATROPIUM BROMIDE AND ALBUTEROL SULFATE 1 AMPULE: .5; 3 SOLUTION RESPIRATORY (INHALATION) at 07:30

## 2020-05-30 RX ADMIN — AZITHROMYCIN 500 MG: 500 INJECTION, POWDER, LYOPHILIZED, FOR SOLUTION INTRAVENOUS at 13:11

## 2020-05-30 RX ADMIN — SODIUM BICARBONATE 650 MG: 650 TABLET ORAL at 20:23

## 2020-05-30 RX ADMIN — METHYLPREDNISOLONE SODIUM SUCCINATE 40 MG: 40 INJECTION, POWDER, FOR SOLUTION INTRAMUSCULAR; INTRAVENOUS at 17:41

## 2020-05-30 RX ADMIN — ALBUTEROL SULFATE 2.5 MG: 2.5 SOLUTION RESPIRATORY (INHALATION) at 22:33

## 2020-05-30 RX ADMIN — ACETAMINOPHEN 650 MG: 325 TABLET ORAL at 18:23

## 2020-05-30 RX ADMIN — ACETYLCYSTEINE 600 MG: 200 SOLUTION ORAL; RESPIRATORY (INHALATION) at 19:55

## 2020-05-30 RX ADMIN — HEPARIN SODIUM 5000 UNITS: 5000 INJECTION INTRAVENOUS; SUBCUTANEOUS at 05:11

## 2020-05-30 RX ADMIN — IPRATROPIUM BROMIDE AND ALBUTEROL SULFATE 1 AMPULE: .5; 3 SOLUTION RESPIRATORY (INHALATION) at 11:28

## 2020-05-30 RX ADMIN — SODIUM CHLORIDE, PRESERVATIVE FREE 10 ML: 5 INJECTION INTRAVENOUS at 22:03

## 2020-05-30 RX ADMIN — IPRATROPIUM BROMIDE AND ALBUTEROL SULFATE 1 AMPULE: .5; 3 SOLUTION RESPIRATORY (INHALATION) at 15:51

## 2020-05-30 RX ADMIN — AMLODIPINE BESYLATE 10 MG: 10 TABLET ORAL at 13:11

## 2020-05-30 RX ADMIN — FUROSEMIDE 40 MG: 40 TABLET ORAL at 20:23

## 2020-05-30 RX ADMIN — ALLOPURINOL 300 MG: 300 TABLET ORAL at 13:11

## 2020-05-30 RX ADMIN — IPRATROPIUM BROMIDE AND ALBUTEROL SULFATE 1 AMPULE: .5; 3 SOLUTION RESPIRATORY (INHALATION) at 19:55

## 2020-05-30 RX ADMIN — CEFTRIAXONE SODIUM 1 G: 1 INJECTION, POWDER, FOR SOLUTION INTRAMUSCULAR; INTRAVENOUS at 14:45

## 2020-05-30 RX ADMIN — METHYLPREDNISOLONE SODIUM SUCCINATE 40 MG: 40 INJECTION, POWDER, FOR SOLUTION INTRAMUSCULAR; INTRAVENOUS at 05:11

## 2020-05-30 RX ADMIN — MELATONIN 1000 UNITS: at 13:11

## 2020-05-30 RX ADMIN — HEPARIN SODIUM 5000 UNITS: 5000 INJECTION INTRAVENOUS; SUBCUTANEOUS at 21:02

## 2020-05-30 RX ADMIN — METHYLPREDNISOLONE SODIUM SUCCINATE 40 MG: 40 INJECTION, POWDER, FOR SOLUTION INTRAMUSCULAR; INTRAVENOUS at 09:53

## 2020-05-30 RX ADMIN — SODIUM CHLORIDE, PRESERVATIVE FREE 10 ML: 5 INJECTION INTRAVENOUS at 20:23

## 2020-05-30 RX ADMIN — ACETAMINOPHEN 650 MG: 325 TABLET ORAL at 23:46

## 2020-05-30 ASSESSMENT — PAIN SCALES - GENERAL
PAINLEVEL_OUTOF10: 0
PAINLEVEL_OUTOF10: 2
PAINLEVEL_OUTOF10: 0
PAINLEVEL_OUTOF10: 6

## 2020-05-30 NOTE — CONSULTS
budesonide-formoterol  2 puff Inhalation BID    Vitamin D  1,000 Units Oral Daily    methylPREDNISolone  40 mg Intravenous Q8H    insulin lispro  0-6 Units Subcutaneous TID WC    insulin lispro  0-3 Units Subcutaneous Nightly    sodium chloride flush  10 mL Intravenous 2 times per day    ipratropium-albuterol  1 ampule Inhalation Q4H WA    azithromycin  500 mg Intravenous Q24H    cefTRIAXone (ROCEPHIN) IV  1 g Intravenous Q24H    heparin (porcine)  5,000 Units Subcutaneous 3 times per day    sodium chloride flush  10 mL Intravenous 2 times per day     glucose, dextrose, glucagon (rDNA), dextrose, sodium chloride flush, sodium chloride flush, acetaminophen **OR** acetaminophen, polyethylene glycol, promethazine **OR** ondansetron, albuterol  IV Drips/Infusions   dextrose       Home Medications  Medications Prior to Admission: amLODIPine (NORVASC) 10 MG tablet, Take 1 tablet by mouth daily  sodium bicarbonate 650 MG tablet, Take 650 mg by mouth 3 times daily   Cholecalciferol (VITAMIN D3) 50 MCG (2000 UT) CAPS, Take 1 capsule by mouth daily  albuterol sulfate  (90 Base) MCG/ACT inhaler, Inhale 2 puffs into the lungs every 6 hours as needed for Wheezing or Shortness of Breath  budesonide-formoterol (SYMBICORT) 160-4.5 MCG/ACT AERO, Inhale 2 puffs into the lungs 2 times daily  allopurinol (ZYLOPRIM) 300 MG tablet, Take 300 mg by mouth daily.   dicyclomine (BENTYL) 10 MG capsule, Take 1 capsule by mouth 2 times daily for 14 days  omeprazole (PRILOSEC) 20 MG delayed release capsule, Take 20 mg by mouth every morning (before breakfast)    Allergies    Amino acids and Lisinopril  Social History     Social History     Socioeconomic History    Marital status: Single     Spouse name: Not on file    Number of children: Not on file    Years of education: Not on file    Highest education level: Not on file   Occupational History    Not on file   Social Needs    Financial resource strain: Not on file   [facility] Food insecurity     Worry: Not on file     Inability: Not on file    Transportation needs     Medical: Not on file     Non-medical: Not on file   Tobacco Use    Smoking status: Former Smoker     Types: Cigarettes    Smokeless tobacco: Never Used    Tobacco comment: quit smoking 20 years ago    Substance and Sexual Activity    Alcohol use: Not Currently     Alcohol/week: 5.0 standard drinks     Types: 5 Cans of beer per week    Drug use: No    Sexual activity: Never   Lifestyle    Physical activity     Days per week: Not on file     Minutes per session: Not on file    Stress: Not on file   Relationships    Social connections     Talks on phone: Not on file     Gets together: Not on file     Attends [employer] service: Not on file     Active member of club or organization: Not on file     Attends meetings of clubs or organizations: Not on file     Relationship status: Not on file    Intimate partner violence     Fear of current or ex partner: Not on file     Emotionally abused: Not on file     Physically abused: Not on file     Forced sexual activity: Not on file   Other Topics Concern    Not on file   Social History Narrative    Not on file     Family History          Problem Relation Age of Onset    Diabetes Mother     Heart Disease Mother         Sudden cardiac death    Diabetes Sister     Heart Disease Sister      ROS - 11 systems   Attempted as above limited by difficulty communicating due to tracheostomy    Vitals     height is 5' 9\" (1.753 m) and weight is 144 lb 6.4 oz (65.5 kg). His oral temperature is 97.5 °F (36.4 °C). His blood pressure is 141/88 (abnormal) and his pulse is 91. His respiration is 16 and oxygen saturation is 93%. Body mass index is 21.32 kg/m². I/O    No intake or output data in the 24 hours ending 05/30/20 1336  No intake/output data recorded.    Patient Vitals for the past 96 hrs (Last 3 readings):   Weight   05/30/20 0518 144 lb 6.4 oz (65.5 kg)   05/29/20 0941 150 lb Component Value Date    INR 1.0 05/14/2020    INR 1.0 04/12/2020    INR 1.0 03/17/2020    PROTIME 13.1 05/14/2020    PROTIME 10.5 04/12/2020    PROTIME 10.0 03/17/2020       Radiology    CXR 5/30  Vascular congestion and bilateral pleural effusions right greater than left. Right effusion slightly increased         CT chest 5/29    1.  Cardiomegaly with moderate right and small left pleural effusion, similar   to the previous CT chest.       2. Mild bibasilar consolidation in the dependent lungs, mildly increased from   the previous study.  This could be due to worsened atelectasis, edema, or   developing infiltrate.       3.  Unchanged right paratracheal lymphadenopathy.       4.  Unchanged mild compression fracture at L2.               Echocardiogram 4/14 2020    Mild left ventricular hypertrophy  Global left ventricular systolic function is mildly reduced  Estimated ejection fraction is 45 % . Right atrium is mildly dilated . Moderate tricuspid regurgitation. Moderate pulmonary hypertension. No pericardial effusion seen. Normal aortic root dimension.       (See actual reports for details)    \"Thank you for asking us to see this patient\"    Case discussed with nurse and patient/family. Questions and concerns addressed.     Electronically signed by     Carolina Levin MD on 5/30/2020 at 1:36 PM

## 2020-05-30 NOTE — PROGRESS NOTES
Cardiovascular progress Note          Patient name: Avery Thomas    YOB: 1950  Date of admission:  5/29/2020       Patient seen, examined. Previous clinical entries reviewed. All available laboratory, imaging and ancillary data reviewed. Subjective:      No new complaints of chest pain or palpitations. Systems review:  Constitutional: No fever/chills. HENT: No headache, neck pain or neck stiffness. No sore throat or dysphagia. Gastrointestinal: No abdominal pain, nausea or vomiting. Cardiac: As Above  Respiratory: As above  Neurologic: No new focal weakness or numbness  Psychiatric: Normal mood and mentation       Examination:   Vitals: BP (!) 141/88   Pulse 91   Temp 97.5 °F (36.4 °C) (Oral)   Resp 16   Ht 5' 9\" (1.753 m)   Wt 144 lb 6.4 oz (65.5 kg)   SpO2 93%   BMI 21.32 kg/m²   No intake or output data in the 24 hours ending 05/30/20 1507    General appearance: Comfortable in no apparent distress. HEENT: No pallor. No icterus  Neck: Supple. Lungs:Generally decreased breath sounds  Heart: S1,S2  Abdomen: Soft  Extremities: No peripheral edema  Skin: No obvious rashes. Musculoskeletal: No obvious deformities. Neurologic: No focal deficits.      Labs/ Ancillary data:     CBC:   Recent Labs     05/29/20  1036 05/30/20  0512   WBC 5.7 3.9   HGB 9.7* 9.8*    182     BMP:    Recent Labs     05/29/20 1036 05/30/20  0512   * 132*   K 3.6* 4.1   CL 98 97*   CO2 18* 18*   BUN 26* 29*   CREATININE 2.94* 3.20*   GLUCOSE 92 155*     Hepatic:   Recent Labs     05/29/20  1036 05/30/20  0512   AST 17 17   ALT 11 11   BILITOT 0.32 0.25*   ALKPHOS 87 88     Troponin:   Recent Labs     05/29/20  1036 05/29/20  1310   TROPONINT NOT REPORTED NOT REPORTED       Medications:   Scheduled Meds:   allopurinol  300 mg Oral Daily    amLODIPine  10 mg Oral Daily    budesonide-formoterol  2 puff Inhalation BID    Vitamin D  1,000 Units Oral Daily    methylPREDNISolone  40 mg Intravenous Q8H    insulin lispro  0-6 Units Subcutaneous TID WC    insulin lispro  0-3 Units Subcutaneous Nightly    acetylcysteine  600 mg Inhalation BID    sodium chloride flush  10 mL Intravenous 2 times per day    ipratropium-albuterol  1 ampule Inhalation Q4H WA    azithromycin  500 mg Intravenous Q24H    cefTRIAXone (ROCEPHIN) IV  1 g Intravenous Q24H    heparin (porcine)  5,000 Units Subcutaneous 3 times per day    sodium chloride flush  10 mL Intravenous 2 times per day     Continuous Infusions:   dextrose           Assessment/ Plan :   Elevated troponin-secondary to renal insufficiency. Chronic respiratory failure. History of laryngeal CA. Iron deficiency anemia. Agree with current management. We will follow patient with you. Thank you very much for allowing us to participate in the care of this patient. Please call us with any questions.       Electronically signed by Winsome Serrano MD on 5/30/2020 at 3:07 PM

## 2020-05-30 NOTE — DISCHARGE INSTR - COC
C32.9    Mouth swelling R22.0    COPD (chronic obstructive pulmonary disease) (HCC) J44.9    Severe malnutrition (HCC) E43    Facial edema R60.0    Common iliac aneurysm ([facility]) I72.3    Essential hypertension I10    Stage 3 chronic kidney disease (HCC) N18.3    Acute kidney injury superimposed on chronic kidney disease (HCC) N17.9, N18.9    NSTEMI (non-ST elevated myocardial infarction) ([facility] [address] I21.4       Isolation/Infection:   Isolation          Contact        Patient Infection Status     Infection Onset Added Last Indicated Last Indicated By Review Planned Expiration Resolved Resolved By    MRSA 04/12/20 04/15/20 04/12/20 Respiratory Culture        Sputum - 4/2020    Resolved    COVID-19 Rule Out 05/26/20 05/26/20 05/26/20 COVID-19 (Ordered)   05/27/20 Rule-Out Test Resulted    COVID-19 Rule Out 04/12/20 04/12/20 04/12/20 COVID-19 (Ordered)   04/13/20 Rule-Out Test Resulted          Nurse Assessment:  Last Vital Signs: BP (!) 141/88   Pulse 91   Temp 97.5 °F (36.4 °C) (Oral)   Resp 16   Ht 5' 9\" (1.753 m)   Wt 144 lb 6.4 oz (65.5 kg)   SpO2 93%   BMI 21.32 kg/m²     Last documented pain score (0-10 scale): Pain Level: 0  Last Weight:   Wt Readings from Last 1 Encounters:   05/30/20 144 lb 6.4 oz (65.5 kg)     Mental Status:  oriented and alert    IV Access:  - None    Nursing Mobility/ADLs:  Walking   Independent  Transfer  Independent  Bathing  Independent  Dressing  Independent  Toileting  Independent  Feeding  Independent  Med Admin  Independent  Med Delivery   whole    Wound Care Documentation and Therapy:        Elimination:  Continence:   · Bowel: Yes  · Bladder: Yes  Urinary Catheter: None   Colostomy/Ileostomy/Ileal Conduit: No       Date of Last BM: 06/05/2020  No intake or output data in the 24 hours ending 05/30/20 1230  No intake/output data recorded. Safety Concerns:      At Risk for Falls    Impairments/Disabilities:      Speech    Nutrition Therapy:  Current Nutrition Therapy:   -

## 2020-05-30 NOTE — CONSULTS
Reason for Consult:  Acute kidney injury. Requesting Physician:  Liam Rudolph MD    HISTORY OF PRESENT ILLNESS:    The patient is a 81 y.o. male who has history of chronic kidney disease, his creatinine has been recently ranging between 2.5 to 3, he went to the emergency room about 2 weeks ago secondary to hemoptysis at that time his creatinine was 3.37. 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 He has been complaining of swelling of his tongue which he reported that comes and goes  Denied any recent use of steroidal anti-inflammatory drugs, denied any recent diarrhea or vomiting  Has extensive renal failure workup earlier this year    Review Of Systems:   Constitutional: No fever, chills, lethargy, weakness or wt loss. HEENT:  No headache, nasal discharge , reported swallowing in the tongue  Cardiac:  No chest pain, dyspnea, orthopnea or PND. Chest:              No cough, phlegm or wheezing. Abdomen:  No abdominal pain, nausea, vomiting or diarrhea. Neuro:             No gross focal weakness, numbness. No abnormal movements or seizure like activity. Skin:   No rashes or itching. :   No hematuria, pyuria, dysuria or flank pain. Extremities:  No swelling or joint pains. Psych              No depression or anxiety     Past Medical History:   Diagnosis Date    Arthritis     Asthma     CKD (chronic kidney disease), stage III ([address]     COPD (chronic obstructive pulmonary disease) (HCC)     GERD (gastroesophageal reflux disease)     Gout     Hypertension     Hyponatremia     Iliac artery aneurysm, right (HCC)     Laryngeal cancer (HCC)     Laryngeal Cancer. Chronic left facial edema.      Lung cancer ([address] 3-4 years ago    [facility] [place]    Prostate cancer [facility])        Past Surgical History:   Procedure Laterality Date    ABDOMINAL AORTIC ANEURYSM REPAIR, ENDOVASCULAR Right 4/7/2020    RIGHT ILIAC ARTERY ANEURYSM REPAIR ENDOVASCULAR performed by Enzo Goodell, MD at [place] [address]  03/04/2020    No stents placed   [facility]  3-4 years ago    lung removed and \"voice box\" removed       Prior to Admission medications    Medication Sig Start Date End Date Taking? Authorizing Provider   amLODIPine (NORVASC) 10 MG tablet Take 1 tablet by mouth daily 4/18/20  Yes Mirtha Whyte MD   sodium bicarbonate 650 MG tablet Take 650 mg by mouth 3 times daily    Yes Historical Provider, MD   Cholecalciferol (VITAMIN D3) 50 MCG (2000 UT) CAPS Take 1 capsule by mouth daily   Yes Historical Provider, MD   albuterol sulfate  (90 Base) MCG/ACT inhaler Inhale 2 puffs into the lungs every 6 hours as needed for Wheezing or Shortness of Breath   Yes Historical Provider, MD   budesonide-formoterol (SYMBICORT) 160-4.5 MCG/ACT AERO Inhale 2 puffs into the lungs 2 times daily   Yes Historical Provider, MD   allopurinol (ZYLOPRIM) 300 MG tablet Take 300 mg by mouth daily.    Yes Historical Provider, MD   dicyclomine (BENTYL) 10 MG capsule Take 1 capsule by mouth 2 times daily for 14 days 5/16/20 5/30/20  Angi Saravia MD   omeprazole (PRILOSEC) 20 MG delayed release capsule Take 20 mg by mouth every morning (before breakfast)    Historical Provider, MD       Scheduled Meds:   allopurinol  300 mg Oral Daily    amLODIPine  10 mg Oral Daily    budesonide-formoterol  2 puff Inhalation BID    Vitamin D  1,000 Units Oral Daily    methylPREDNISolone  40 mg Intravenous Q8H    insulin lispro  0-6 Units Subcutaneous TID     insulin lispro  0-3 Units Subcutaneous Nightly    acetylcysteine  600 mg Inhalation BID    sodium chloride flush  10 mL Intravenous 2 times per day    ipratropium-albuterol  1 ampule Inhalation Q4H WA    azithromycin  500 mg Intravenous Q24H    cefTRIAXone (ROCEPHIN) IV  1 g Intravenous Q24H    heparin (porcine)  5,000 Units Subcutaneous 3 times per day    sodium chloride flush  10 mL Intravenous 2 times per day     Continuous Infusions:   dextrose       PRN Meds:glucose, dextrose, glucagon (rDNA), dextrose, sodium chloride flush, sodium chloride flush, acetaminophen **OR** acetaminophen, polyethylene glycol, promethazine **OR** ondansetron, albuterol    Allergies   Allergen Reactions    Amino Acids     Lisinopril Swelling       Social History     Socioeconomic History    Marital status: Single     Spouse name: Not on file    Number of children: Not on file    Years of education: Not on file    Highest education level: Not on file   Occupational History    Not on file   Social Needs    Financial resource strain: Not on file    Food insecurity     Worry: Not on file     Inability: Not on file    Transportation needs     Medical: Not on file     Non-medical: Not on file   Tobacco Use    Smoking status: Former Smoker     Types: Cigarettes    Smokeless tobacco: Never Used    Tobacco comment: quit smoking 20 years ago    Substance and Sexual Activity    Alcohol use: Not Currently     Alcohol/week: 5.0 standard drinks     Types: 5 Cans of beer per week    Drug use: No    Sexual activity: Never   Lifestyle    Physical activity     Days per week: Not on file     Minutes per session: Not on file    Stress: Not on file   Relationships    Social connections     Talks on phone: Not on file     Gets together: Not on file     Attends Scientologist service: Not on file     Active member of club or organization: Not on file     Attends meetings of clubs or organizations: Not on file     Relationship status: Not on file    Intimate partner violence     Fear of current or ex partner: Not on file     Emotionally abused: Not on file     Physically abused: Not on file     Forced sexual activity: Not on file   Other Topics Concern    Not on file Social History Narrative    Not on file       Family History   Problem Relation Age of Onset    Diabetes Mother     Heart Disease Mother         Sudden cardiac death    Diabetes Sister     Heart Disease Sister          Physical Exam:  Vitals:    05/30/20 1129 05/30/20 1133 05/30/20 1555 05/30/20 1611   BP:    126/71   Pulse:    91   Resp:    16   Temp:    97.3 °F (36.3 °C)   TempSrc:    Oral   SpO2: 90% 93% 94% 97%   Weight:       Height:         No intake/output data recorded. General:  Awake, alert, not in distress. Appears to be stated age. HEENT: Atraumatic, normocephalic. Anicteric sclera. Pink and moist oral mucosa. Neck supple. No JVD. Has swelling in the lower lip  Chest: Bilateral air entry, clear to auscultation, no wheezing, rhonchi or rales. Cardiovascular: RRR, S1S2, no murmur, rub or gallop. No lower extremity edema. Abdomen: Soft, non tender to palpation. Musculoskeletal: No cyanosis or clubbing. Integumentary: Pink, warm and dry. Free from rash or lesions. CNS: Oriented to person, place and time. Speech clear. Face symmetrical. No tremor.    Psych: Appropriate mood and affect    Data:  CBC:   Lab Results   Component Value Date    WBC 3.9 05/30/2020    HGB 9.8 (L) 05/30/2020    HCT 30.0 (L) 05/30/2020    MCV 92.0 05/30/2020     05/30/2020     BMP:    Lab Results   Component Value Date     (L) 05/30/2020     (L) 05/29/2020     05/22/2020    K 4.1 05/30/2020    K 3.6 (L) 05/29/2020    K 3.9 05/22/2020    CL 97 (L) 05/30/2020    CL 98 05/29/2020     05/22/2020    CO2 18 (L) 05/30/2020    CO2 18 (L) 05/29/2020    CO2 21 05/22/2020    BUN 29 (H) 05/30/2020    BUN 26 (H) 05/29/2020    BUN 31 (H) 05/22/2020    CREATININE 3.20 (H) 05/30/2020    CREATININE 2.94 (H) 05/29/2020    CREATININE 2.71 (H) 05/22/2020    GLUCOSE 155 (H) 05/30/2020    GLUCOSE 92 05/29/2020    GLUCOSE 113 (H) 05/16/2020     CMP:   Lab Results   Component Value Date     05/30/2020

## 2020-05-30 NOTE — PROGRESS NOTES
(chronic obstructive pulmonary disease) (HCC)    Severe malnutrition (HCC)    Facial edema    Common iliac aneurysm (HCC)    Essential hypertension    Stage 3 chronic kidney disease (HCC)    Acute kidney injury superimposed on chronic kidney disease (HCC)    NSTEMI (non-ST elevated myocardial infarction) (HCC)     Acute bronchitis COPD exacerbation  Iron deficiency anemia  CKD 4 versus LITTLE  Laryngeal CA hyperglycemia    Plan:    Meds labs reviewed start decreasing steroids continue with IV antibiotics DVT prophylaxis we will ask nephrology to see since creatinine creeping up see orders avoid nephrotoxic      Gena Rivas MD  1:21 PM

## 2020-05-30 NOTE — CARE COORDINATION
Case Management Initial Discharge Plan  Walker Spry,         Readmission Risk              Risk of Unplanned Readmission:        44             Met with:patient to discuss discharge plans. Information verified: address, contacts, phone number, , insurance Yes  PCP: Jeremy Covarrubias MD  Date of last visit: 2020    Insurance Provider: Medicare/ Medicaid    Discharge Planning  Current Residence:  Private home  Living Arrangements:    lives with  [facility] has 1 stories/2 stairs to climb  Support Systems:     Current Services PTA:  SN, PT, OT Agency: Current with [facility]  Patient able to perform ADL's:Independent  DME in home:  Cane, bath bench  DME used to aid ambulation prior to admission:   none  DME used during admission:  none    Potential Assistance Needed:       Pharmacy: [facility] on  Medications:     Does patient want to participate in local refill/ meds to beds program?  No    Patient agreeable to home care: Yes  Freedom of choice provided:  yes  The Plan for Transition of Care is related to the following treatment goals: continue current services of SN, PT and OT    The Patient and/or patient representative brother Foreign Rivas was provided with a choice of provider and agrees   with the discharge plan. [x] Yes [] No    Freedom of choice list was provided with basic dialogue that supports the patient's individualized plan of care/goals, treatment preferences and shares the quality data associated with the providers.  [x] Yes [] No      Type of Home Care Services:     Patient expects to be discharged to:       Prior SNF/Rehab Placement and Facility: [facility] to SNF/Rehab: No  [place] of choice provided: n/a   Evaluation: n/a    Expected Discharge date:  20  Follow Up Appointment: Best Day/ Time:      Transportation provider:   Transportation arrangements needed for discharge: No    Discharge Plan: Met with patient to review plan of care. Pt shook head yes when asked if he has home care. Phone call to [address] and spoke with [facility] and confirmed that pt is current with them for SN, PT and OT. Pt indicates he would like to continue services with [address] Due to trach having difficult time understanding pt and he gives me permission to call his brother. Phone call to Tiffanie Dumont who lives with him. He confirms that pt does not use any respiratory equipment. Ambulates indep but has a cane if needed. Tiffanie Dumont has no other concerns. Cardiac consult done,  Pt had heart cath March 2020. Elevated troponin is secondary to renal insuff and no need for further cardiac work up. PLAN  Current with [address] for SN, PT and OT resume those services. Awaiting attending to address elevated creatine.         Electronically signed by Alan Rose on 5/30/20 at 12:32 PM EDT

## 2020-05-31 LAB
-: NORMAL
ABSOLUTE EOS #: 0 K/UL (ref 0–0.44)
ABSOLUTE IMMATURE GRANULOCYTE: 0 K/UL (ref 0–0.3)
ABSOLUTE LYMPH #: 0.41 K/UL (ref 1.1–3.7)
ABSOLUTE MONO #: 0.12 K/UL (ref 0.1–1.2)
AMORPHOUS: NORMAL
ANION GAP SERPL CALCULATED.3IONS-SCNC: 20 MMOL/L (ref 9–17)
BACTERIA: NORMAL
BASOPHILS # BLD: 0 % (ref 0–2)
BASOPHILS ABSOLUTE: 0 K/UL (ref 0–0.2)
BILIRUBIN URINE: NEGATIVE
BNP INTERPRETATION: ABNORMAL
BUN BLDV-MCNC: 34 MG/DL (ref 8–23)
BUN/CREAT BLD: 11 (ref 9–20)
CALCIUM SERPL-MCNC: 8.2 MG/DL (ref 8.6–10.4)
CASTS UA: NORMAL /LPF
CHLORIDE BLD-SCNC: 96 MMOL/L (ref 98–107)
CO2: 17 MMOL/L (ref 20–31)
COLOR: YELLOW
COMMENT UA: ABNORMAL
CREAT SERPL-MCNC: 2.96 MG/DL (ref 0.7–1.2)
CREATININE URINE: 139.5 MG/DL (ref 39–259)
CRYSTALS, UA: NORMAL /HPF
DIFFERENTIAL TYPE: ABNORMAL
EOSINOPHIL,URINE: NORMAL
EOSINOPHILS RELATIVE PERCENT: 0 % (ref 1–4)
EPITHELIAL CELLS UA: NORMAL /HPF (ref 0–5)
ESTIMATED AVERAGE GLUCOSE: 103 MG/DL
GFR AFRICAN AMERICAN: 26 ML/MIN
GFR NON-AFRICAN AMERICAN: 21 ML/MIN
GFR SERPL CREATININE-BSD FRML MDRD: ABNORMAL ML/MIN/{1.73_M2}
GFR SERPL CREATININE-BSD FRML MDRD: ABNORMAL ML/MIN/{1.73_M2}
GLUCOSE BLD-MCNC: 104 MG/DL (ref 75–110)
GLUCOSE BLD-MCNC: 122 MG/DL (ref 70–99)
GLUCOSE BLD-MCNC: 136 MG/DL (ref 75–110)
GLUCOSE BLD-MCNC: 137 MG/DL (ref 75–110)
GLUCOSE URINE: NEGATIVE
HBA1C MFR BLD: 5.2 % (ref 4–6)
HCT VFR BLD CALC: 26.5 % (ref 40.7–50.3)
HEMOGLOBIN: 8.7 G/DL (ref 13–17)
IMMATURE GRANULOCYTES: 0 %
KETONES, URINE: NEGATIVE
LACTIC ACID: 3 MMOL/L (ref 0.5–2.2)
LEUKOCYTE ESTERASE, URINE: NEGATIVE
LYMPHOCYTES # BLD: 7 % (ref 24–43)
MCH RBC QN AUTO: 29.7 PG (ref 25.2–33.5)
MCHC RBC AUTO-ENTMCNC: 32.8 G/DL (ref 28.4–34.8)
MCV RBC AUTO: 90.4 FL (ref 82.6–102.9)
MONOCYTES # BLD: 2 % (ref 3–12)
MUCUS: NORMAL
NITRITE, URINE: NEGATIVE
NRBC AUTOMATED: 0 PER 100 WBC
OTHER OBSERVATIONS UA: NORMAL
PDW BLD-RTO: 16.4 % (ref 11.8–14.4)
PH UA: 5.5 (ref 5–8)
PLATELET # BLD: 166 K/UL (ref 138–453)
PLATELET ESTIMATE: ABNORMAL
PMV BLD AUTO: 11.1 FL (ref 8.1–13.5)
POTASSIUM SERPL-SCNC: 4.1 MMOL/L (ref 3.7–5.3)
PRO-BNP: ABNORMAL PG/ML
PROTEIN UA: ABNORMAL
RBC # BLD: 2.93 M/UL (ref 4.21–5.77)
RBC # BLD: ABNORMAL 10*6/UL
RBC UA: NORMAL /HPF (ref 0–2)
RENAL EPITHELIAL, UA: NORMAL /HPF
SEG NEUTROPHILS: 91 % (ref 36–65)
SEGMENTED NEUTROPHILS ABSOLUTE COUNT: 5.37 K/UL (ref 1.5–8.1)
SODIUM BLD-SCNC: 133 MMOL/L (ref 135–144)
SODIUM,UR: <20 MMOL/L
SPECIFIC GRAVITY UA: 1.01 (ref 1–1.03)
TOTAL PROTEIN, URINE: 394 MG/DL
TRICHOMONAS: NORMAL
TURBIDITY: CLEAR
URINE HGB: ABNORMAL
URINE TOTAL PROTEIN CREATININE RATIO: 2.82 (ref 0–0.2)
UROBILINOGEN, URINE: NORMAL
WBC # BLD: 5.9 K/UL (ref 3.5–11.3)
WBC # BLD: ABNORMAL 10*3/UL
WBC UA: NORMAL /HPF (ref 0–5)
YEAST: NORMAL

## 2020-05-31 PROCEDURE — 82570 ASSAY OF URINE CREATININE: CPT

## 2020-05-31 PROCEDURE — 87040 BLOOD CULTURE FOR BACTERIA: CPT

## 2020-05-31 PROCEDURE — 80048 BASIC METABOLIC PNL TOTAL CA: CPT

## 2020-05-31 PROCEDURE — 81001 URINALYSIS AUTO W/SCOPE: CPT

## 2020-05-31 PROCEDURE — 6360000002 HC RX W HCPCS: Performed by: INTERNAL MEDICINE

## 2020-05-31 PROCEDURE — 36415 COLL VENOUS BLD VENIPUNCTURE: CPT

## 2020-05-31 PROCEDURE — 84300 ASSAY OF URINE SODIUM: CPT

## 2020-05-31 PROCEDURE — 2700000000 HC OXYGEN THERAPY PER DAY

## 2020-05-31 PROCEDURE — 85025 COMPLETE CBC W/AUTO DIFF WBC: CPT

## 2020-05-31 PROCEDURE — 83880 ASSAY OF NATRIURETIC PEPTIDE: CPT

## 2020-05-31 PROCEDURE — 2580000003 HC RX 258: Performed by: INTERNAL MEDICINE

## 2020-05-31 PROCEDURE — 6370000000 HC RX 637 (ALT 250 FOR IP): Performed by: INTERNAL MEDICINE

## 2020-05-31 PROCEDURE — 94640 AIRWAY INHALATION TREATMENT: CPT

## 2020-05-31 PROCEDURE — 84156 ASSAY OF PROTEIN URINE: CPT

## 2020-05-31 PROCEDURE — 82947 ASSAY GLUCOSE BLOOD QUANT: CPT

## 2020-05-31 PROCEDURE — 94761 N-INVAS EAR/PLS OXIMETRY MLT: CPT

## 2020-05-31 PROCEDURE — 83605 ASSAY OF LACTIC ACID: CPT

## 2020-05-31 PROCEDURE — 83516 IMMUNOASSAY NONANTIBODY: CPT

## 2020-05-31 PROCEDURE — 2060000000 HC ICU INTERMEDIATE R&B

## 2020-05-31 PROCEDURE — 87205 SMEAR GRAM STAIN: CPT

## 2020-05-31 RX ORDER — GUAIFENESIN/DEXTROMETHORPHAN 100-10MG/5
5 SYRUP ORAL EVERY 4 HOURS PRN
Status: DISCONTINUED | OUTPATIENT
Start: 2020-05-31 | End: 2020-06-06 | Stop reason: HOSPADM

## 2020-05-31 RX ORDER — METHYLPREDNISOLONE SODIUM SUCCINATE 40 MG/ML
40 INJECTION, POWDER, LYOPHILIZED, FOR SOLUTION INTRAMUSCULAR; INTRAVENOUS EVERY 12 HOURS
Status: DISCONTINUED | OUTPATIENT
Start: 2020-05-31 | End: 2020-05-31

## 2020-05-31 RX ORDER — CEFUROXIME AXETIL 250 MG/1
250 TABLET ORAL EVERY 12 HOURS SCHEDULED
Status: DISCONTINUED | OUTPATIENT
Start: 2020-06-01 | End: 2020-06-06 | Stop reason: HOSPADM

## 2020-05-31 RX ORDER — BUDESONIDE 0.5 MG/2ML
0.5 INHALANT ORAL 2 TIMES DAILY
Status: DISCONTINUED | OUTPATIENT
Start: 2020-05-31 | End: 2020-06-06 | Stop reason: HOSPADM

## 2020-05-31 RX ORDER — DIPHENHYDRAMINE HCL 25 MG
12.5 TABLET ORAL ONCE
Status: COMPLETED | OUTPATIENT
Start: 2020-06-01 | End: 2020-06-01

## 2020-05-31 RX ORDER — ARFORMOTEROL TARTRATE 15 UG/2ML
15 SOLUTION RESPIRATORY (INHALATION) 2 TIMES DAILY
Status: DISCONTINUED | OUTPATIENT
Start: 2020-05-31 | End: 2020-06-06 | Stop reason: HOSPADM

## 2020-05-31 RX ORDER — CEFUROXIME AXETIL 500 MG/1
500 TABLET ORAL EVERY 12 HOURS SCHEDULED
Status: DISCONTINUED | OUTPATIENT
Start: 2020-06-01 | End: 2020-05-31

## 2020-05-31 RX ORDER — METHYLPREDNISOLONE SODIUM SUCCINATE 40 MG/ML
40 INJECTION, POWDER, LYOPHILIZED, FOR SOLUTION INTRAMUSCULAR; INTRAVENOUS EVERY 12 HOURS
Status: COMPLETED | OUTPATIENT
Start: 2020-05-31 | End: 2020-05-31

## 2020-05-31 RX ADMIN — IPRATROPIUM BROMIDE AND ALBUTEROL SULFATE 1 AMPULE: .5; 3 SOLUTION RESPIRATORY (INHALATION) at 06:18

## 2020-05-31 RX ADMIN — ARFORMOTEROL TARTRATE 15 MCG: 15 SOLUTION RESPIRATORY (INHALATION) at 20:12

## 2020-05-31 RX ADMIN — AMLODIPINE BESYLATE 10 MG: 10 TABLET ORAL at 08:58

## 2020-05-31 RX ADMIN — HEPARIN SODIUM 5000 UNITS: 5000 INJECTION INTRAVENOUS; SUBCUTANEOUS at 22:18

## 2020-05-31 RX ADMIN — ALLOPURINOL 300 MG: 300 TABLET ORAL at 08:58

## 2020-05-31 RX ADMIN — GUAIFENESIN AND DEXTROMETHORPHAN 5 ML: 100; 10 SYRUP ORAL at 14:48

## 2020-05-31 RX ADMIN — SODIUM CHLORIDE, PRESERVATIVE FREE 10 ML: 5 INJECTION INTRAVENOUS at 22:19

## 2020-05-31 RX ADMIN — HEPARIN SODIUM 5000 UNITS: 5000 INJECTION INTRAVENOUS; SUBCUTANEOUS at 06:06

## 2020-05-31 RX ADMIN — IPRATROPIUM BROMIDE AND ALBUTEROL SULFATE 1 AMPULE: .5; 3 SOLUTION RESPIRATORY (INHALATION) at 10:42

## 2020-05-31 RX ADMIN — METHYLPREDNISOLONE SODIUM SUCCINATE 40 MG: 40 INJECTION, POWDER, FOR SOLUTION INTRAMUSCULAR; INTRAVENOUS at 10:07

## 2020-05-31 RX ADMIN — IPRATROPIUM BROMIDE AND ALBUTEROL SULFATE 1 AMPULE: .5; 3 SOLUTION RESPIRATORY (INHALATION) at 14:57

## 2020-05-31 RX ADMIN — METHYLPREDNISOLONE SODIUM SUCCINATE 40 MG: 40 INJECTION, POWDER, FOR SOLUTION INTRAMUSCULAR; INTRAVENOUS at 02:44

## 2020-05-31 RX ADMIN — INSULIN LISPRO 1 UNITS: 100 INJECTION, SOLUTION INTRAVENOUS; SUBCUTANEOUS at 22:17

## 2020-05-31 RX ADMIN — ACETYLCYSTEINE 600 MG: 200 SOLUTION ORAL; RESPIRATORY (INHALATION) at 06:18

## 2020-05-31 RX ADMIN — BUDESONIDE INHALATION 500 MCG: 0.5 SUSPENSION RESPIRATORY (INHALATION) at 20:12

## 2020-05-31 RX ADMIN — METHYLPREDNISOLONE SODIUM SUCCINATE 40 MG: 40 INJECTION, POWDER, FOR SOLUTION INTRAMUSCULAR; INTRAVENOUS at 22:17

## 2020-05-31 RX ADMIN — PROMETHAZINE HYDROCHLORIDE 12.5 MG: 12.5 TABLET ORAL at 22:17

## 2020-05-31 RX ADMIN — SODIUM CHLORIDE, PRESERVATIVE FREE 10 ML: 5 INJECTION INTRAVENOUS at 08:55

## 2020-05-31 RX ADMIN — ACETAMINOPHEN 650 MG: 325 TABLET ORAL at 08:58

## 2020-05-31 RX ADMIN — FUROSEMIDE 40 MG: 40 TABLET ORAL at 08:58

## 2020-05-31 RX ADMIN — HEPARIN SODIUM 5000 UNITS: 5000 INJECTION INTRAVENOUS; SUBCUTANEOUS at 14:48

## 2020-05-31 RX ADMIN — ACETYLCYSTEINE 600 MG: 200 SOLUTION ORAL; RESPIRATORY (INHALATION) at 20:12

## 2020-05-31 RX ADMIN — IPRATROPIUM BROMIDE AND ALBUTEROL SULFATE 1 AMPULE: .5; 3 SOLUTION RESPIRATORY (INHALATION) at 20:12

## 2020-05-31 RX ADMIN — SODIUM BICARBONATE 650 MG: 650 TABLET ORAL at 22:17

## 2020-05-31 RX ADMIN — SODIUM BICARBONATE 650 MG: 650 TABLET ORAL at 08:58

## 2020-05-31 RX ADMIN — MELATONIN 1000 UNITS: at 08:58

## 2020-05-31 ASSESSMENT — PAIN SCALES - GENERAL
PAINLEVEL_OUTOF10: 1
PAINLEVEL_OUTOF10: 5

## 2020-05-31 NOTE — PROGRESS NOTES
gout       Recommendations:  · Oxygen by nasal cannula  · Incentive spirometry every hour while awake  · Home O2 eval before discharge  · Albuterol by nebulizer  · Mucomyst 20 % nebs TID   · Symbicort 160  2 puffs b.i.d.  · Off Zithromax IV; cefuroxime po   · Check sputum culture  · Right thoracentesis by IR check BNP  · Make Solu-Medrol 40 IV every 12 hours  · Cardiology consult  · Diuresis per nephrology/check ultrasound kidneys  · Speech swallow evaluation  · Follow-up [facility] for possible recurrence of tongue cancer ?  Surgery  ·  discussed with RN  · DVT prophylaxis    Hardik Ace MD, [facility]  Pulmonary Critical Care and Sleep Medicine,  [facility]  Cell: [phone]  Office: [phone]

## 2020-05-31 NOTE — PLAN OF CARE
Problem: Falls - Risk of:  Goal: Will remain free from falls  Description: Will remain free from falls  Outcome: Ongoing  Note: Patient is fall risk per fall scale. Hourly rounding performed. Personal belongings and call light within reach. Bed in low position.  Restraint alternatives in place     Goal: Absence of physical injury  Description: Absence of physical injury  Outcome: Ongoing

## 2020-05-31 NOTE — PROGRESS NOTES
Subjective:     Follow-up COPD  Less shortness of breath less cough clear phlegm no hemoptysis no wheezing no tachypnea  ROS  Fever no chills no GI/ complaints no cardiac complaints no polyuria polydipsia or hypoglycemia no fatigue no TIA no bleeding no headache no sore throat no skin lesion  physical exam  General Appearance: alert and oriented to person, place and time and in no acute distress  Skin: warm and dry, no rash or erythema  Head: normocephalic and atraumatic  Eyes: pupils equal, round, and reactive to light and sclera anicteric    Neck: neck supple and non tender without mass and tracheostomy  Pulmonary/Chest: Air entry equal decreased at the bases minimal crackles no rhonchi  Cardiovascular: normal rate, regular rhythm, normal S1 and S2, no gallops, intact distal pulses and no carotid bruits  Abdomen: soft, non-tender, non-distended, normal bowel sounds, no masses or organomegaly  Extremities: no edema and good pulses no Homans sign  Neurologic: Alert oriented x3 with no focal deficit    /74   Pulse 92   Temp 98.2 °F (36.8 °C) (Oral)   Resp 16   Ht 5' 9\" (1.753 m)   Wt 145 lb (65.8 kg)   SpO2 99%   BMI 21.41 kg/m²     CBC with Differential:    Lab Results   Component Value Date    WBC 5.9 05/31/2020    RBC 2.93 05/31/2020    HGB 8.7 05/31/2020    HCT 26.5 05/31/2020     05/31/2020    MCV 90.4 05/31/2020    MCH 29.7 05/31/2020    MCHC 32.8 05/31/2020    RDW 16.4 05/31/2020    LYMPHOPCT 7 05/31/2020    MONOPCT 2 05/31/2020    BASOPCT 0 05/31/2020    MONOSABS 0.12 05/31/2020    LYMPHSABS 0.41 05/31/2020    EOSABS 0.00 05/31/2020    BASOSABS 0.00 05/31/2020    DIFFTYPE NOT REPORTED 05/31/2020     BMP:    Lab Results   Component Value Date     05/31/2020    K 4.1 05/31/2020    CL 96 05/31/2020    CO2 17 05/31/2020    BUN 34 05/31/2020    LABALBU 3.2 05/30/2020    CREATININE 2.96 05/31/2020    CALCIUM 8.2 05/31/2020    GFRAA 26 05/31/2020    LABGLOM 21 05/31/2020    GLUCOSE 122 05/31/2020        Assessment:  Patient Active Problem List   Diagnosis    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 [address]    Laryngeal cancer (HCC)    Mouth swelling    COPD (chronic obstructive pulmonary disease) ([address]    Severe malnutrition (HCC)    Facial edema    Common iliac aneurysm (HCC)    Essential hypertension    Stage 3 chronic kidney disease (HCC)    Acute kidney injury superimposed on chronic kidney disease ([facility]    NSTEMI (non-ST elevated myocardial infarction) ([facility]     Acute  bronchitis to oral antibiotics  COPD exacerbation we will decrease steroids continue with bronchodilators  Iron deficiency anemia iron supplements GI work-up when stable  Acute kidney injury on top of CKD 3  Laryngeal CA in remission  Hyperglycemia  Acute systolic CHF  Plan:    Labs reviewed continue with diuresis decrease steroids to oral antibiotics ambulate avoid nephrotoxic drugs DVT prophylaxis see orders      Gena Rivas MD  12:30 PM

## 2020-06-01 ENCOUNTER — APPOINTMENT (OUTPATIENT)
Dept: ULTRASOUND IMAGING | Age: 70
DRG: 190 | End: 2020-06-01
Payer: MEDICARE

## 2020-06-01 ENCOUNTER — APPOINTMENT (OUTPATIENT)
Dept: GENERAL RADIOLOGY | Age: 70
DRG: 190 | End: 2020-06-01
Payer: MEDICARE

## 2020-06-01 ENCOUNTER — APPOINTMENT (OUTPATIENT)
Dept: INTERVENTIONAL RADIOLOGY/VASCULAR | Age: 70
DRG: 190 | End: 2020-06-01
Payer: MEDICARE

## 2020-06-01 ENCOUNTER — APPOINTMENT (OUTPATIENT)
Dept: CT IMAGING | Age: 70
DRG: 190 | End: 2020-06-01
Payer: MEDICARE

## 2020-06-01 LAB
ABSOLUTE EOS #: 0 K/UL (ref 0–0.4)
ABSOLUTE IMMATURE GRANULOCYTE: 0.06 K/UL (ref 0–0.3)
ABSOLUTE LYMPH #: 0.45 K/UL (ref 1–4.8)
ABSOLUTE MONO #: 0.13 K/UL (ref 0.2–0.8)
ANION GAP SERPL CALCULATED.3IONS-SCNC: 17 MMOL/L (ref 9–17)
BASOPHILS # BLD: 0 %
BASOPHILS ABSOLUTE: 0 K/UL (ref 0–0.2)
BUN BLDV-MCNC: 40 MG/DL (ref 8–23)
BUN/CREAT BLD: 14 (ref 9–20)
CALCIUM SERPL-MCNC: 8.2 MG/DL (ref 8.6–10.4)
CHLORIDE BLD-SCNC: 96 MMOL/L (ref 98–107)
CO2: 18 MMOL/L (ref 20–31)
CREAT SERPL-MCNC: 2.93 MG/DL (ref 0.7–1.2)
DIFFERENTIAL TYPE: ABNORMAL
EOSINOPHILS RELATIVE PERCENT: 0 % (ref 1–4)
GFR AFRICAN AMERICAN: 26 ML/MIN
GFR NON-AFRICAN AMERICAN: 22 ML/MIN
GFR SERPL CREATININE-BSD FRML MDRD: ABNORMAL ML/MIN/{1.73_M2}
GFR SERPL CREATININE-BSD FRML MDRD: ABNORMAL ML/MIN/{1.73_M2}
GLUCOSE BLD-MCNC: 111 MG/DL (ref 75–110)
GLUCOSE BLD-MCNC: 116 MG/DL (ref 70–99)
GLUCOSE BLD-MCNC: 124 MG/DL (ref 75–110)
GLUCOSE BLD-MCNC: 124 MG/DL (ref 75–110)
GLUCOSE BLD-MCNC: 135 MG/DL (ref 75–110)
GLUCOSE BLD-MCNC: 141 MG/DL (ref 75–110)
GLUCOSE, FLUID: 139 MG/DL
HCT VFR BLD CALC: 26.4 % (ref 40.7–50.3)
HEMOGLOBIN: 8.7 G/DL (ref 13–17)
IMMATURE GRANULOCYTES: 1 %
INR BLD: 1
LACTATE DEHYDROGENASE, FLUID: 73 U/L
LYMPHOCYTES # BLD: 7 % (ref 24–44)
MCH RBC QN AUTO: 30.1 PG (ref 25.2–33.5)
MCHC RBC AUTO-ENTMCNC: 33 G/DL (ref 28.4–34.8)
MCV RBC AUTO: 91.3 FL (ref 82.6–102.9)
MONOCYTES # BLD: 2 % (ref 1–7)
MORPHOLOGY: ABNORMAL
NRBC AUTOMATED: 0 PER 100 WBC
PARTIAL THROMBOPLASTIN TIME: 37.8 SEC (ref 23.9–33.8)
PDW BLD-RTO: 16.6 % (ref 11.8–14.4)
PH FLUID: 8
PLATELET # BLD: 192 K/UL (ref 138–453)
PLATELET ESTIMATE: ABNORMAL
PMV BLD AUTO: 11.6 FL (ref 8.1–13.5)
POTASSIUM SERPL-SCNC: 4.4 MMOL/L (ref 3.7–5.3)
PROSTATE SPECIFIC ANTIGEN: 11.27 UG/L
PROTHROMBIN TIME: 13.3 SEC (ref 11.5–14.2)
RBC # BLD: 2.89 M/UL (ref 4.21–5.77)
RBC # BLD: ABNORMAL 10*6/UL
SARS-COV-2, PCR: NORMAL
SARS-COV-2, RAPID: NOT DETECTED
SARS-COV-2: NORMAL
SEG NEUTROPHILS: 90 % (ref 36–66)
SEGMENTED NEUTROPHILS ABSOLUTE COUNT: 5.76 K/UL (ref 1.8–7.7)
SODIUM BLD-SCNC: 131 MMOL/L (ref 135–144)
SOURCE: NORMAL
SPECIMEN TYPE: NORMAL
TOTAL PROTEIN, BODY FLUID: 2 G/DL
WBC # BLD: 6.4 K/UL (ref 3.5–11.3)
WBC # BLD: ABNORMAL 10*3/UL

## 2020-06-01 PROCEDURE — 74176 CT ABD & PELVIS W/O CONTRAST: CPT

## 2020-06-01 PROCEDURE — 83986 ASSAY PH BODY FLUID NOS: CPT

## 2020-06-01 PROCEDURE — 87102 FUNGUS ISOLATION CULTURE: CPT

## 2020-06-01 PROCEDURE — 6360000002 HC RX W HCPCS: Performed by: INTERNAL MEDICINE

## 2020-06-01 PROCEDURE — 84157 ASSAY OF PROTEIN OTHER: CPT

## 2020-06-01 PROCEDURE — 85025 COMPLETE CBC W/AUTO DIFF WBC: CPT

## 2020-06-01 PROCEDURE — 87075 CULTR BACTERIA EXCEPT BLOOD: CPT

## 2020-06-01 PROCEDURE — 2500000003 HC RX 250 WO HCPCS: Performed by: INTERNAL MEDICINE

## 2020-06-01 PROCEDURE — 82947 ASSAY GLUCOSE BLOOD QUANT: CPT

## 2020-06-01 PROCEDURE — 2580000003 HC RX 258: Performed by: INTERNAL MEDICINE

## 2020-06-01 PROCEDURE — G0103 PSA SCREENING: HCPCS

## 2020-06-01 PROCEDURE — 6370000000 HC RX 637 (ALT 250 FOR IP): Performed by: INTERNAL MEDICINE

## 2020-06-01 PROCEDURE — 85610 PROTHROMBIN TIME: CPT

## 2020-06-01 PROCEDURE — 87206 SMEAR FLUORESCENT/ACID STAI: CPT

## 2020-06-01 PROCEDURE — 87015 SPECIMEN INFECT AGNT CONCNTJ: CPT

## 2020-06-01 PROCEDURE — 71045 X-RAY EXAM CHEST 1 VIEW: CPT

## 2020-06-01 PROCEDURE — 76770 US EXAM ABDO BACK WALL COMP: CPT

## 2020-06-01 PROCEDURE — 87116 MYCOBACTERIA CULTURE: CPT

## 2020-06-01 PROCEDURE — 2060000000 HC ICU INTERMEDIATE R&B

## 2020-06-01 PROCEDURE — U0002 COVID-19 LAB TEST NON-CDC: HCPCS

## 2020-06-01 PROCEDURE — 74230 X-RAY XM SWLNG FUNCJ C+: CPT

## 2020-06-01 PROCEDURE — 92611 MOTION FLUOROSCOPY/SWALLOW: CPT

## 2020-06-01 PROCEDURE — 0W993ZZ DRAINAGE OF RIGHT PLEURAL CAVITY, PERCUTANEOUS APPROACH: ICD-10-PCS | Performed by: RADIOLOGY

## 2020-06-01 PROCEDURE — 88305 TISSUE EXAM BY PATHOLOGIST: CPT

## 2020-06-01 PROCEDURE — 94761 N-INVAS EAR/PLS OXIMETRY MLT: CPT

## 2020-06-01 PROCEDURE — 36415 COLL VENOUS BLD VENIPUNCTURE: CPT

## 2020-06-01 PROCEDURE — 94640 AIRWAY INHALATION TREATMENT: CPT

## 2020-06-01 PROCEDURE — 32555 ASPIRATE PLEURA W/ IMAGING: CPT | Performed by: RADIOLOGY

## 2020-06-01 PROCEDURE — 80048 BASIC METABOLIC PNL TOTAL CA: CPT

## 2020-06-01 PROCEDURE — C1729 CATH, DRAINAGE: HCPCS

## 2020-06-01 PROCEDURE — 87205 SMEAR GRAM STAIN: CPT

## 2020-06-01 PROCEDURE — 2700000000 HC OXYGEN THERAPY PER DAY

## 2020-06-01 PROCEDURE — 87070 CULTURE OTHR SPECIMN AEROBIC: CPT

## 2020-06-01 PROCEDURE — 88112 CYTOPATH CELL ENHANCE TECH: CPT

## 2020-06-01 PROCEDURE — 85730 THROMBOPLASTIN TIME PARTIAL: CPT

## 2020-06-01 PROCEDURE — 83615 LACTATE (LD) (LDH) ENZYME: CPT

## 2020-06-01 PROCEDURE — 82945 GLUCOSE OTHER FLUID: CPT

## 2020-06-01 PROCEDURE — 89051 BODY FLUID CELL COUNT: CPT

## 2020-06-01 RX ORDER — PANTOPRAZOLE SODIUM 40 MG/1
40 TABLET, DELAYED RELEASE ORAL
Status: DISCONTINUED | OUTPATIENT
Start: 2020-06-01 | End: 2020-06-06 | Stop reason: HOSPADM

## 2020-06-01 RX ADMIN — ACETAMINOPHEN 650 MG: 325 TABLET ORAL at 00:49

## 2020-06-01 RX ADMIN — IPRATROPIUM BROMIDE AND ALBUTEROL SULFATE 1 AMPULE: .5; 3 SOLUTION RESPIRATORY (INHALATION) at 20:34

## 2020-06-01 RX ADMIN — CEFUROXIME AXETIL 250 MG: 250 TABLET ORAL at 22:34

## 2020-06-01 RX ADMIN — BARIUM SULFATE 450 ML: 20 SUSPENSION ORAL at 18:18

## 2020-06-01 RX ADMIN — CEFUROXIME AXETIL 250 MG: 250 TABLET ORAL at 10:18

## 2020-06-01 RX ADMIN — HEPARIN SODIUM 5000 UNITS: 5000 INJECTION INTRAVENOUS; SUBCUTANEOUS at 06:10

## 2020-06-01 RX ADMIN — MELATONIN 1000 UNITS: at 10:18

## 2020-06-01 RX ADMIN — ACETYLCYSTEINE 600 MG: 200 SOLUTION ORAL; RESPIRATORY (INHALATION) at 20:34

## 2020-06-01 RX ADMIN — HEPARIN SODIUM 5000 UNITS: 5000 INJECTION INTRAVENOUS; SUBCUTANEOUS at 22:34

## 2020-06-01 RX ADMIN — SODIUM BICARBONATE 650 MG: 650 TABLET ORAL at 10:18

## 2020-06-01 RX ADMIN — ARFORMOTEROL TARTRATE 15 MCG: 15 SOLUTION RESPIRATORY (INHALATION) at 08:29

## 2020-06-01 RX ADMIN — SODIUM CHLORIDE, PRESERVATIVE FREE 10 ML: 5 INJECTION INTRAVENOUS at 10:19

## 2020-06-01 RX ADMIN — ARFORMOTEROL TARTRATE 15 MCG: 15 SOLUTION RESPIRATORY (INHALATION) at 20:34

## 2020-06-01 RX ADMIN — IPRATROPIUM BROMIDE AND ALBUTEROL SULFATE 1 AMPULE: .5; 3 SOLUTION RESPIRATORY (INHALATION) at 11:43

## 2020-06-01 RX ADMIN — SODIUM CHLORIDE, PRESERVATIVE FREE 10 ML: 5 INJECTION INTRAVENOUS at 22:35

## 2020-06-01 RX ADMIN — AMLODIPINE BESYLATE 10 MG: 10 TABLET ORAL at 10:18

## 2020-06-01 RX ADMIN — PREDNISONE 30 MG: 20 TABLET ORAL at 10:24

## 2020-06-01 RX ADMIN — SODIUM BICARBONATE 650 MG: 650 TABLET ORAL at 22:34

## 2020-06-01 RX ADMIN — ACETAMINOPHEN 650 MG: 325 TABLET ORAL at 10:18

## 2020-06-01 RX ADMIN — BUDESONIDE INHALATION 500 MCG: 0.5 SUSPENSION RESPIRATORY (INHALATION) at 08:17

## 2020-06-01 RX ADMIN — SODIUM CHLORIDE, PRESERVATIVE FREE 10 ML: 5 INJECTION INTRAVENOUS at 22:49

## 2020-06-01 RX ADMIN — BUDESONIDE INHALATION 500 MCG: 0.5 SUSPENSION RESPIRATORY (INHALATION) at 20:34

## 2020-06-01 RX ADMIN — PANTOPRAZOLE SODIUM 40 MG: 40 TABLET, DELAYED RELEASE ORAL at 17:43

## 2020-06-01 RX ADMIN — FUROSEMIDE 40 MG: 40 TABLET ORAL at 10:18

## 2020-06-01 RX ADMIN — IPRATROPIUM BROMIDE AND ALBUTEROL SULFATE 1 AMPULE: .5; 3 SOLUTION RESPIRATORY (INHALATION) at 08:17

## 2020-06-01 RX ADMIN — ALLOPURINOL 300 MG: 300 TABLET ORAL at 10:24

## 2020-06-01 RX ADMIN — ACETYLCYSTEINE 600 MG: 200 SOLUTION ORAL; RESPIRATORY (INHALATION) at 08:17

## 2020-06-01 RX ADMIN — DIPHENHYDRAMINE HCL 12.5 MG: 25 TABLET ORAL at 00:46

## 2020-06-01 ASSESSMENT — PAIN SCALES - GENERAL
PAINLEVEL_OUTOF10: 3
PAINLEVEL_OUTOF10: 3
PAINLEVEL_OUTOF10: 1

## 2020-06-01 NOTE — PROGRESS NOTES
05/31/2020    K 4.1 05/30/2020    CL 96 (L) 06/01/2020    CL 96 (L) 05/31/2020    CL 97 (L) 05/30/2020    CO2 18 (L) 06/01/2020    CO2 17 (L) 05/31/2020    CO2 18 (L) 05/30/2020    BUN 40 (H) 06/01/2020    BUN 34 (H) 05/31/2020    BUN 29 (H) 05/30/2020    CREATININE 2.93 (H) 06/01/2020    CREATININE 2.96 (H) 05/31/2020    CREATININE 3.20 (H) 05/30/2020    GLUCOSE 116 (H) 06/01/2020    GLUCOSE 122 (H) 05/31/2020    GLUCOSE 155 (H) 05/30/2020     CMP:   Lab Results   Component Value Date     06/01/2020    K 4.4 06/01/2020    CL 96 06/01/2020    CO2 18 06/01/2020    BUN 40 06/01/2020    CREATININE 2.93 06/01/2020    GLUCOSE 116 06/01/2020    CALCIUM 8.2 06/01/2020    PROT 6.5 05/30/2020    LABALBU 3.2 05/30/2020    BILITOT 0.25 05/30/2020    ALKPHOS 88 05/30/2020    AST 17 05/30/2020    ALT 11 05/30/2020      Hepatic:   Lab Results   Component Value Date    AST 17 05/30/2020    AST 17 05/29/2020    AST 35 05/16/2020    ALT 11 05/30/2020    ALT 11 05/29/2020    ALT 11 05/16/2020    BILITOT 0.25 (L) 05/30/2020    BILITOT 0.32 05/29/2020    BILITOT 0.16 (L) 05/16/2020    ALKPHOS 88 05/30/2020    ALKPHOS 87 05/29/2020    ALKPHOS 77 05/16/2020     BNP:   Lab Results   Component Value Date    BNP 39 12/12/2012     Lipids: No results found for: CHOL, HDL  INR:   Lab Results   Component Value Date    INR 1.0 06/01/2020    INR 1.0 05/14/2020    INR 1.0 04/12/2020     PTH: No results found for: PTH  Phosphorus:    Lab Results   Component Value Date    PHOS 4.6 04/08/2020     Ionized Calcium: No results found for: IONCA  Magnesium:   Lab Results   Component Value Date    MG 2.2 04/17/2020     Albumin:   Lab Results   Component Value Date    LABALBU 3.2 05/30/2020     Last 3 CK, CKMB, Troponin: @LABRCNT(CKTOTAL:3,CKMB:3,TROPONINI:3)       URINE:)No results found for: [facility]     Radiology:   Normal cardiomediastinal silhouette.  Bilateral pleural effusion right   greater than left slightly increased on the right side.  Vascular congestion. No pneumothorax.  Multiple old right rib fractures.  Right axillary surgical   clips. Assessment:    1. Acute kidney injury. 2.  CKD 4  3. Hyponatremia  4. Cardiomyopathy  5. Recent history of hemoptysis  6. Bilateral pleural effusion  7. Metabolic acidosis     Plan:    Continue current dose of Lasix   Fluid restriction recommended at 1400 mL per 24 hours   Urine studies remarkable for very low urine sodium suggestive of decreased effective intravascular volume. Patient does have a high degree of proteinuria with underlying CKD at stage IV. Has protein creatinine ratio of 2.8   Negative urine eosinophils   HORACIO negative. Continue sodium Bicarb replacement PO  Renal cardiac diet  Avoid nephrotoxic drugs and IV contrast exposure. Follow up chemistries ordered for AM.    Please do not hesitate to contact us for any further questions/concerns. We will continue to follow along with you.      Electronically signed by Katherine Howell MD  on 6/1/2020 at 1:15 PM

## 2020-06-01 NOTE — PROGRESS NOTES
respiratory insufficiency  · Pulmonary edema/bilateral pleural effusions/atelectasis, unlikely pneumonia  · COPD  · Moderate pulmonary hypertension, RVSP 64 mmHg  · Hemoptysis, improved  · History of laryngeal cancer s/p tracheostomy and subsequent development of oral cancer s/p surgery with reconstruction  · Elevated troponin, secondary to renal insufficiency  · LITTLE on CKD/hyponatremia  · 1.5 cm right renal cyst  · History of angioedema, iron deficient anemia, CKD, HTN, GERD, gout    Recommendations:  · Thoracentesis per IR today with fluid analysis  · Continue p.o. cefuroxime  · Prednisone taper  · Albuterol and Ipratropium Q 4 hours and prn  · Brovana and Pulmicort via nebulizer every 12 hours  · Mucomyst nebulizers 3 times daily  · Diuresis/fluid restriction per nephrology  · Cardiology following  · Video swallow evaluation  · X-ray chest in am  · Labs: CBC and BMP in am  · 2 liters/min via nasal cannula if necessary  · Follow-up at [facility] for possible recurrence of tongue cancer  · DVT prophylaxis with subcu heparin  · Discussed with RN  · Will follow with you    GINETTE Collier-CNP   Pulmonary Critical Care and Sleep Medicine,  Patient seen under the supervision of Hu Sexton MD, [facility]    Patient seen and evaluated by me. He is resting comfortably in the bed in no distress. He denies any significant cough or chest pain. He does have some mild shortness of breath with activity. He also is complaining of some increased secretions. Plan is to have right thoracentesis done today by IR. Continue oral antibiotics, oral prednisone, bronchodilators, Mucomyst and Brovana and Pulmicort via nebulizer. Continue diuresis per nephrology. Video swallow evaluation today. Will repeat x-ray chest in the a.m. Above was reviewed and discussed with Lisa Paredes CNP. And I agree with assessment and plan of care.   Electronically signed by     Ronaldo Jamison MD on 6/1/2020 at 12:33 PM  Pulmonary Critical Care and Sleep Medicine,  [facility]  Cell: [phone]  Office: [phone]

## 2020-06-01 NOTE — CONSULTS
Previous  surgery: Robotic assisted radical prostatectomy     Medications:    Scheduled Meds:   budesonide  0.5 mg Nebulization BID    Arformoterol Tartrate  15 mcg Nebulization BID    cefUROXime  250 mg Oral 2 times per day    predniSONE  30 mg Oral Daily    allopurinol  300 mg Oral Daily    amLODIPine  10 mg Oral Daily    Vitamin D  1,000 Units Oral Daily    insulin lispro  0-6 Units Subcutaneous TID WC    insulin lispro  0-3 Units Subcutaneous Nightly    acetylcysteine  600 mg Inhalation BID    furosemide  40 mg Oral Daily    sodium bicarbonate  650 mg Oral BID    sodium chloride flush  10 mL Intravenous 2 times per day    ipratropium-albuterol  1 ampule Inhalation Q4H WA    heparin (porcine)  5,000 Units Subcutaneous 3 times per day    sodium chloride flush  10 mL Intravenous 2 times per day     Continuous Infusions:   dextrose       PRN Meds:.guaiFENesin-dextromethorphan, glucose, dextrose, glucagon (rDNA), dextrose, sodium chloride flush, sodium chloride flush, acetaminophen **OR** acetaminophen, polyethylene glycol, promethazine **OR** ondansetron, albuterol    Allergies:  Amino acids and Lisinopril    Social History:    Social History     Socioeconomic History    Marital status: Single     Spouse name: Not on file    Number of children: Not on file    Years of education: Not on file    Highest education level: Not on file   Occupational History    Not on file   Social Needs    Financial resource strain: Not on file    Food insecurity     Worry: Not on file     Inability: Not on file   [employer] needs     Medical: Not on file     Non-medical: Not on file   Tobacco Use    Smoking status: Former Smoker     Types: Cigarettes    Smokeless tobacco: Never Used    Tobacco comment: quit smoking 20 years ago    Substance and Sexual Activity    Alcohol use: Not Currently     Alcohol/week: 5.0 standard drinks     Types: 5 Cans of beer per week    Drug use: No    Sexual activity: Never   Lifestyle    Physical activity     Days per week: Not on file     Minutes per session: Not on file    Stress: Not on file   Relationships    Social connections     Talks on phone: Not on file     Gets together: Not on file     Attends Voodoo service: Not on file     Active member of club or organization: Not on file     Attends meetings of clubs or organizations: Not on file     Relationship status: Not on file    Intimate partner violence     Fear of current or ex partner: Not on file     Emotionally abused: Not on file     Physically abused: Not on file     Forced sexual activity: Not on file   Other Topics Concern    Not on file   Social History Narrative    Not on file       Family History:    Family History   Problem Relation Age of Onset    Diabetes Mother     Heart Disease Mother         Sudden cardiac death    Diabetes Sister     Heart Disease Sister      Previous Urologic Family history: none    REVIEW OF SYSTEMS:  Constitutional: negative, patient has a tracheostomy  Eyes: negative  Respiratory: See history of present illness  Cardiovascular: negative  Gastrointestinal: negative  Genitourinary: see HPI  Musculoskeletal: negative  Skin: negative   Neurological: negative  Hematological/Lymphatic: negative  Psychological: negative    Physical Exam:    This a 81 y.o. male   Patient Vitals for the past 24 hrs:   BP Temp Temp src Pulse Resp SpO2 Weight   06/01/20 1228 133/79 97.3 °F (36.3 °C) Oral 96 16 96 % --   06/01/20 0822 -- -- -- -- -- 97 % --   06/01/20 0817 (!) 133/91 97.2 °F (36.2 °C) Oral 94 16 97 % --   06/01/20 0614 -- -- -- -- -- -- 142 lb 11.2 oz (64.7 kg)   06/01/20 0342 -- -- -- -- 18 97 % --   06/01/20 0027 (!) 146/76 97.7 °F (36.5 °C) Oral 96 22 97 % --   05/31/20 2350 -- -- -- -- 16 96 % --   05/31/20 2120 138/60 97.3 °F (36.3 °C) Oral 106 14 96 % --   05/31/20 2012 -- -- -- -- 20 95 % --   05/31/20 1626 124/77 97.3 °F (36.3 °C) Oral 107 16 92 % -- Constitutional: Patient in no acute distress; Neuro: alert and oriented to person place and time. Psych: Mood and affect normal.  Skin: Normal  Lungs: See history of present illness  Patient with permanent tracheostomy    Cardiovascular: See history of present illness  Abdomen: Soft, non-tender, non-distended with no CVA, flank pain, hepatosplenomegaly or hernia. Kidneys normal.  Bladder non-tender and not distended.   Lymphatics: no palpable lymphadenopathy  Penis normal and circumcised  Urethral meatus normal  Scrotal exam normal  Testicles normal bilaterally  Epididymis normal bilaterally  No evidence of inguinal hernia  We will check postvoid residual.   LABS:  Recent Labs     05/30/20  0512 05/31/20  0549 06/01/20  0508   WBC 3.9 5.9 6.4   HGB 9.8* 8.7* 8.7*   HCT 30.0* 26.5* 26.4*   MCV 92.0 90.4 91.3    166 192     Recent Labs     05/30/20  0512 05/31/20  0549 06/01/20  0508   * 133* 131*   K 4.1 4.1 4.4   CL 97* 96* 96*   CO2 18* 17* 18*   BUN 29* 34* 40*   CREATININE 3.20* 2.96* 2.93*     Lab Results   Component Value Date    PSA 1.77 08/20/2013    PSA 1.71 05/21/2013       Additional Lab/culture results:    Urinalysis:   Recent Labs     05/31/20  1210   COLORU YELLOW   PHUR 5.5   WBCUA None   RBCUA 2 TO 5   MUCUS NOT REPORTED   TRICHOMONAS NOT REPORTED   YEAST NOT REPORTED   BACTERIA NOT REPORTED   SPECGRAV 1.015   LEUKOCYTESUR NEGATIVE   UROBILINOGEN Normal   BILIRUBINUR NEGATIVE        -----------------------------------------------------------------  Imaging Results:  No hydronephrosis or renal masses a CT scan, was performed  Assessment and Plan   Impression:    Patient Active Problem List   Diagnosis    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    Laryngeal cancer (HCC)    Mouth swelling    [facility]    Severe malnutrition (HCC)    Facial edema    Common iliac aneurysm (HCC)    Essential hypertension    Stage 3 chronic [facility] disease [facility]    Acute kidney injury superimposed on chronic kidney disease ([facility]    [facility]       Plan:  We will monitor postvoid residual by bladder scan   bone scan will be requested    Chadwick Forward  12:44 PM 6/1/2020

## 2020-06-01 NOTE — CARE COORDINATION
Discharge planning    Patient chart reviewed. Appreciate prior CM  notes. Per CM patient lives with brother Kavin Cullen and is current with [facility] for RN, PT, OT . He has trach and uses cane if needed for ambulation. Notified per RT that patient states his neb machine is over 13years old and not working properly. Will need to have pulm order and document . NEPHRO  Assessment:  1. Acute kidney injury. 2.  CKD 4  3. Hyponatremia  Plan:  Continue low-dose Lasix  Start 1500 mL/day fluid restriction  Urine studies remarkable for very low urine sodium suggestive of decreased effective intravascular volume   Has protein creatinine ratio of 2.8     CARD    Assessment/ Plan :   Elevated troponin-secondary to renal insufficiency. Chronic respiratory failure. History of laryngeal CA.     PULM  Impression:  · Acute hypoxic respiratory insufficiency  · COPD possible exacerbation  · Pulmonary edema  · Bilateral effusions right more than left/atelectasis unlikely pneumonia  Recommendations:  · Oxygen by nasal cannula  · Incentive spirometry every hour while awake  · Home O2 eval before discharge  · Albuterol by nebulizer  · Mucomyst 20 % nebs TID   · Symbicort 160  2 puffs b.i.d.  · Off Zithromax IV; cefuroxime po   · Check sputum culture  · Right thoracentesis by IR check BNP  · Make Solu-Medrol 40 IV every 12 hours

## 2020-06-01 NOTE — PROCEDURES
diet with thin liquids as evidenced by no observed aspiration noted with consistencies tested. Recommend small sips and bites, only feed when alert and awake and upright at 90 degrees for all PO intake. Recommend close monitoring for overt/clinical s/s of aspiration and D/C PO intake and complete Modified Barium Swallow Study should they occur. Results and recommendations reported to RN. Patient Position: Lateral and Patient Degrees: 90      Consistencies Administered: Dysphagia Soft and Bite-Sized (Dysphagia III); Dysphagia Pureed (Dysphagia I); Thin straw        Recommended Diet:  Solid consistency: Dysphagia Minced and Moist (Dysphagia II)  Liquid consistency: Thin  Liquid administration via: Cup;Straw    Medication administration: PO    Safe Swallow Protocol:  Supervision: Distant  Compensatory Swallowing Strategies: Alternate solids and liquids;Small bites/sips;Eat/Feed slowly;Upright as possible for all oral intake    Recommendations/Treatment  Requires SLP Intervention: Yes           Postural Changes and/or Swallow Maneuvers: Upright 90 degrees      Recommended Exercises:    Therapeutic Interventions: Diet tolerance monitoring         Education: Images and recommendations were reviewed with pt following this exam.   Patient Education: yes  Patient Education Response: Verbalizes understanding    Prognosis  Prognosis for safe diet advancement: good  Duration/Frequency of Treatment  Duration/Frequency of Treatment: 2-3      Goals:    Long Term:     To Maximize safety with intake, optimize nutrition/hydration and minimize risk for aspiration. Short Term:  Goals:  The patient will tolerate recommended diet without observed clinical signs of aspiration      Oral Preparation / Oral Phase  Oral Phase: Impaired  Oral Phase: Extended mastication, pt. edentulous, had to spit out peach      Pharyngeal Phase  Pharyngeal Phase: WFL  Pharyngeal: Thin, Puree, Fruit: No penetration and no aspiration  with no stasis

## 2020-06-01 NOTE — PROGRESS NOTES
Active Problem List   Diagnosis    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    Laryngeal cancer (HCC)    Mouth swelling    COPD (chronic obstructive pulmonary disease) (HCC)    Severe malnutrition (HCC)    Facial edema    Common iliac aneurysm (HCC)    Essential hypertension    Stage 3 chronic kidney disease (HCC)    Acute kidney injury superimposed on chronic kidney disease [facility]    NSTEMI (non-ST elevated myocardial infarction) (HCC)     Acute bronchitis on oral antibiotics  COPD exacerbation on oral steroids continue with bronchodilator  Acute kidney injury on top of CKD 4 avoid nephrotoxic drugs  Laryngeal CA with 3 current sent to the floor of the mouth surgery  Acute systolic CHF last ejection fraction was 45%  Elevated PSA  Possible renal mass  Iron deficiency anemia  Plan  Oral antibiotics oral steroids patient had thoracentesis today.   Much better urology consultation for possible renal mass and elevated PSA continue with DVT prophylaxis see orders      Fatmata Hsu MD radiation  Plan:    6:11 PM

## 2020-06-02 ENCOUNTER — APPOINTMENT (OUTPATIENT)
Dept: NUCLEAR MEDICINE | Age: 70
DRG: 190 | End: 2020-06-02
Payer: MEDICARE

## 2020-06-02 ENCOUNTER — APPOINTMENT (OUTPATIENT)
Dept: GENERAL RADIOLOGY | Age: 70
DRG: 190 | End: 2020-06-02
Payer: MEDICARE

## 2020-06-02 LAB
ABSOLUTE EOS #: 0 K/UL (ref 0–0.44)
ABSOLUTE IMMATURE GRANULOCYTE: 0 K/UL (ref 0–0.3)
ABSOLUTE LYMPH #: 0.66 K/UL (ref 1.1–3.7)
ABSOLUTE MONO #: 0.36 K/UL (ref 0.1–1.2)
ANCA MYELOPEROXIDASE: 12 AU/ML
ANCA PROTEINASE 3: 8 AU/ML
ANION GAP SERPL CALCULATED.3IONS-SCNC: 16 MMOL/L (ref 9–17)
APPEARANCE FLUID: NORMAL
BASO FLUID: NORMAL %
BASOPHILS # BLD: 0 % (ref 0–2)
BASOPHILS ABSOLUTE: 0 K/UL (ref 0–0.2)
BUN BLDV-MCNC: 45 MG/DL (ref 8–23)
BUN/CREAT BLD: 14 (ref 9–20)
CALCIUM SERPL-MCNC: 8.4 MG/DL (ref 8.6–10.4)
CASE NUMBER:: NORMAL
CHLORIDE BLD-SCNC: 96 MMOL/L (ref 98–107)
CO2: 20 MMOL/L (ref 20–31)
COLOR FLUID: NORMAL
CREAT SERPL-MCNC: 3.16 MG/DL (ref 0.7–1.2)
DIFFERENTIAL TYPE: ABNORMAL
EOSINOPHIL FLUID: NORMAL %
EOSINOPHILS RELATIVE PERCENT: 0 % (ref 1–4)
FLUID DIFF COMMENT: NORMAL
GBM ANTIBODY IGG: 9 AU/ML
GFR AFRICAN AMERICAN: 24 ML/MIN
GFR NON-AFRICAN AMERICAN: 20 ML/MIN
GFR SERPL CREATININE-BSD FRML MDRD: ABNORMAL ML/MIN/{1.73_M2}
GFR SERPL CREATININE-BSD FRML MDRD: ABNORMAL ML/MIN/{1.73_M2}
GLUCOSE BLD-MCNC: 100 MG/DL (ref 75–110)
GLUCOSE BLD-MCNC: 121 MG/DL (ref 75–110)
GLUCOSE BLD-MCNC: 143 MG/DL (ref 75–110)
GLUCOSE BLD-MCNC: 160 MG/DL (ref 75–110)
GLUCOSE BLD-MCNC: 88 MG/DL (ref 75–110)
GLUCOSE BLD-MCNC: 98 MG/DL (ref 70–99)
HCT VFR BLD CALC: 27.2 % (ref 40.7–50.3)
HEMOGLOBIN: 8.9 G/DL (ref 13–17)
IMMATURE GRANULOCYTES: 0 %
LYMPHOCYTES # BLD: 11 % (ref 24–43)
LYMPHOCYTES, BODY FLUID: 84 %
MCH RBC QN AUTO: 30 PG (ref 25.2–33.5)
MCHC RBC AUTO-ENTMCNC: 32.7 G/DL (ref 28.4–34.8)
MCV RBC AUTO: 91.6 FL (ref 82.6–102.9)
MONOCYTE, FLUID: NORMAL %
MONOCYTES # BLD: 6 % (ref 3–12)
NEUTROPHIL, FLUID: 8 %
NRBC AUTOMATED: 0 PER 100 WBC
OTHER CELLS FLUID: NORMAL %
PDW BLD-RTO: 16.2 % (ref 11.8–14.4)
PLATELET # BLD: 179 K/UL (ref 138–453)
PLATELET ESTIMATE: ABNORMAL
PMV BLD AUTO: 10.9 FL (ref 8.1–13.5)
POTASSIUM SERPL-SCNC: 3.7 MMOL/L (ref 3.7–5.3)
RBC # BLD: 2.97 M/UL (ref 4.21–5.77)
RBC # BLD: ABNORMAL 10*6/UL
RBC FLUID: <3000 /MM3
SEG NEUTROPHILS: 83 % (ref 36–65)
SEGMENTED NEUTROPHILS ABSOLUTE COUNT: 4.98 K/UL (ref 1.5–8.1)
SODIUM BLD-SCNC: 132 MMOL/L (ref 135–144)
SPECIMEN DESCRIPTION: NORMAL
SPECIMEN TYPE: NORMAL
URIC ACID: 4.2 MG/DL (ref 3.4–7)
WBC # BLD: 6 K/UL (ref 3.5–11.3)
WBC # BLD: ABNORMAL 10*3/UL
WBC FLUID: 422 /MM3

## 2020-06-02 PROCEDURE — 94640 AIRWAY INHALATION TREATMENT: CPT

## 2020-06-02 PROCEDURE — 84550 ASSAY OF BLOOD/URIC ACID: CPT

## 2020-06-02 PROCEDURE — 36415 COLL VENOUS BLD VENIPUNCTURE: CPT

## 2020-06-02 PROCEDURE — 85025 COMPLETE CBC W/AUTO DIFF WBC: CPT

## 2020-06-02 PROCEDURE — 6370000000 HC RX 637 (ALT 250 FOR IP): Performed by: INTERNAL MEDICINE

## 2020-06-02 PROCEDURE — 6360000002 HC RX W HCPCS: Performed by: INTERNAL MEDICINE

## 2020-06-02 PROCEDURE — 3430000000 HC RX DIAGNOSTIC RADIOPHARMACEUTICAL: Performed by: UROLOGY

## 2020-06-02 PROCEDURE — 51798 US URINE CAPACITY MEASURE: CPT

## 2020-06-02 PROCEDURE — 99222 1ST HOSP IP/OBS MODERATE 55: CPT | Performed by: INTERNAL MEDICINE

## 2020-06-02 PROCEDURE — 94761 N-INVAS EAR/PLS OXIMETRY MLT: CPT

## 2020-06-02 PROCEDURE — 82947 ASSAY GLUCOSE BLOOD QUANT: CPT

## 2020-06-02 PROCEDURE — 2060000000 HC ICU INTERMEDIATE R&B

## 2020-06-02 PROCEDURE — 80048 BASIC METABOLIC PNL TOTAL CA: CPT

## 2020-06-02 PROCEDURE — 2580000003 HC RX 258: Performed by: INTERNAL MEDICINE

## 2020-06-02 PROCEDURE — A9503 TC99M MEDRONATE: HCPCS | Performed by: UROLOGY

## 2020-06-02 PROCEDURE — 71045 X-RAY EXAM CHEST 1 VIEW: CPT

## 2020-06-02 PROCEDURE — 78306 BONE IMAGING WHOLE BODY: CPT

## 2020-06-02 RX ORDER — DIPHENHYDRAMINE HCL 25 MG
12.5 TABLET ORAL ONCE
Status: COMPLETED | OUTPATIENT
Start: 2020-06-02 | End: 2020-06-02

## 2020-06-02 RX ORDER — ALLOPURINOL 100 MG/1
100 TABLET ORAL DAILY
Status: DISCONTINUED | OUTPATIENT
Start: 2020-06-03 | End: 2020-06-06 | Stop reason: HOSPADM

## 2020-06-02 RX ORDER — TC 99M MEDRONATE 20 MG/10ML
25 INJECTION, POWDER, LYOPHILIZED, FOR SOLUTION INTRAVENOUS
Status: COMPLETED | OUTPATIENT
Start: 2020-06-02 | End: 2020-06-02

## 2020-06-02 RX ORDER — FUROSEMIDE 40 MG/1
40 TABLET ORAL
Status: DISCONTINUED | OUTPATIENT
Start: 2020-06-04 | End: 2020-06-06 | Stop reason: HOSPADM

## 2020-06-02 RX ADMIN — AMLODIPINE BESYLATE 10 MG: 10 TABLET ORAL at 07:53

## 2020-06-02 RX ADMIN — SODIUM BICARBONATE 650 MG: 650 TABLET ORAL at 21:51

## 2020-06-02 RX ADMIN — INSULIN LISPRO 1 UNITS: 100 INJECTION, SOLUTION INTRAVENOUS; SUBCUTANEOUS at 17:01

## 2020-06-02 RX ADMIN — IPRATROPIUM BROMIDE AND ALBUTEROL SULFATE 1 AMPULE: .5; 3 SOLUTION RESPIRATORY (INHALATION) at 08:00

## 2020-06-02 RX ADMIN — HEPARIN SODIUM 5000 UNITS: 5000 INJECTION INTRAVENOUS; SUBCUTANEOUS at 14:13

## 2020-06-02 RX ADMIN — SODIUM CHLORIDE, PRESERVATIVE FREE 10 ML: 5 INJECTION INTRAVENOUS at 07:54

## 2020-06-02 RX ADMIN — SODIUM BICARBONATE 650 MG: 650 TABLET ORAL at 07:53

## 2020-06-02 RX ADMIN — CEFUROXIME AXETIL 250 MG: 250 TABLET ORAL at 22:03

## 2020-06-02 RX ADMIN — TC 99M MEDRONATE 24.9 MILLICURIE: 20 INJECTION, POWDER, LYOPHILIZED, FOR SOLUTION INTRAVENOUS at 08:35

## 2020-06-02 RX ADMIN — BUDESONIDE INHALATION 500 MCG: 0.5 SUSPENSION RESPIRATORY (INHALATION) at 20:42

## 2020-06-02 RX ADMIN — FUROSEMIDE 40 MG: 40 TABLET ORAL at 07:53

## 2020-06-02 RX ADMIN — SODIUM CHLORIDE, PRESERVATIVE FREE 10 ML: 5 INJECTION INTRAVENOUS at 22:05

## 2020-06-02 RX ADMIN — DIPHENHYDRAMINE HCL 12.5 MG: 25 TABLET ORAL at 23:29

## 2020-06-02 RX ADMIN — IPRATROPIUM BROMIDE AND ALBUTEROL SULFATE 1 AMPULE: .5; 3 SOLUTION RESPIRATORY (INHALATION) at 15:07

## 2020-06-02 RX ADMIN — SODIUM CHLORIDE, PRESERVATIVE FREE 10 ML: 5 INJECTION INTRAVENOUS at 10:51

## 2020-06-02 RX ADMIN — SODIUM CHLORIDE, PRESERVATIVE FREE 10 ML: 5 INJECTION INTRAVENOUS at 22:04

## 2020-06-02 RX ADMIN — ARFORMOTEROL TARTRATE 15 MCG: 15 SOLUTION RESPIRATORY (INHALATION) at 08:00

## 2020-06-02 RX ADMIN — CEFUROXIME AXETIL 250 MG: 250 TABLET ORAL at 07:53

## 2020-06-02 RX ADMIN — ALLOPURINOL 300 MG: 300 TABLET ORAL at 07:53

## 2020-06-02 RX ADMIN — MELATONIN 1000 UNITS: at 07:53

## 2020-06-02 RX ADMIN — PREDNISONE 30 MG: 20 TABLET ORAL at 07:53

## 2020-06-02 RX ADMIN — PANTOPRAZOLE SODIUM 40 MG: 40 TABLET, DELAYED RELEASE ORAL at 07:53

## 2020-06-02 RX ADMIN — IPRATROPIUM BROMIDE AND ALBUTEROL SULFATE 1 AMPULE: .5; 3 SOLUTION RESPIRATORY (INHALATION) at 20:42

## 2020-06-02 RX ADMIN — IPRATROPIUM BROMIDE AND ALBUTEROL SULFATE 1 AMPULE: .5; 3 SOLUTION RESPIRATORY (INHALATION) at 11:36

## 2020-06-02 RX ADMIN — ACETYLCYSTEINE 600 MG: 200 SOLUTION ORAL; RESPIRATORY (INHALATION) at 20:42

## 2020-06-02 RX ADMIN — ARFORMOTEROL TARTRATE 15 MCG: 15 SOLUTION RESPIRATORY (INHALATION) at 20:42

## 2020-06-02 RX ADMIN — HEPARIN SODIUM 5000 UNITS: 5000 INJECTION INTRAVENOUS; SUBCUTANEOUS at 21:51

## 2020-06-02 RX ADMIN — ACETYLCYSTEINE 600 MG: 200 SOLUTION ORAL; RESPIRATORY (INHALATION) at 08:02

## 2020-06-02 RX ADMIN — BUDESONIDE INHALATION 500 MCG: 0.5 SUSPENSION RESPIRATORY (INHALATION) at 08:00

## 2020-06-02 RX ADMIN — HEPARIN SODIUM 5000 UNITS: 5000 INJECTION INTRAVENOUS; SUBCUTANEOUS at 07:02

## 2020-06-02 NOTE — PLAN OF CARE
Problem: Breathing Pattern - Ineffective:  Goal: Ability to achieve and maintain a regular respiratory rate will improve  Description: Ability to achieve and maintain a regular respiratory rate will improve  Outcome: Ongoing   Patient states he has improved breathing today. Had thoracentesis yesterday and 900 ml removed. Lung sounds diminished bases. Oxygen WNL's and on continuous pulse ox. Breathing tx's and steroids as ordered. Pulmonary consulted. Trach stoma. Will monitor.

## 2020-06-02 NOTE — PROGRESS NOTES
Reason for Consult:  Acute kidney injury. Requesting Physician:  Pelon Shabazz MD    Interval history:   Patient seen and examined at the bedside  No new complaints    HISTORY OF PRESENT ILLNESS:    The patient is a 81 y.o. male who has history of chronic kidney disease, his creatinine has been recently ranging between 2.5 to 3, he went to the emergency room about 2 weeks ago secondary to hemoptysis at that time his creatinine was 3.37. 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 He has been complaining of swelling of his tongue which he reported that comes and goes  Denied any recent use of steroidal anti-inflammatory drugs, denied any recent diarrhea or vomiting  Has extensive renal failure workup earlier this year      Prior to Admission medications    Medication Sig Start Date End Date Taking? Authorizing Provider   amLODIPine (NORVASC) 10 MG tablet Take 1 tablet by mouth daily 4/18/20  Yes Pelon Shabazz MD   sodium bicarbonate 650 MG tablet Take 650 mg by mouth 3 times daily    Yes Historical Provider, MD   Cholecalciferol (VITAMIN D3) 50 MCG (2000 UT) CAPS Take 1 capsule by mouth daily   Yes Historical Provider, MD   albuterol sulfate  (90 Base) MCG/ACT inhaler Inhale 2 puffs into the lungs every 6 hours as needed for Wheezing or Shortness of Breath   Yes Historical Provider, MD   budesonide-formoterol (SYMBICORT) 160-4.5 MCG/ACT AERO Inhale 2 puffs into the lungs 2 times daily   Yes Historical Provider, MD   allopurinol (ZYLOPRIM) 300 MG tablet Take 300 mg by mouth daily.    Yes Historical Provider, MD   dicyclomine (BENTYL) 10 MG capsule Take 1 capsule by mouth 2 times daily for 14 days 5/16/20 5/30/20  Kalani Casarez MD   omeprazole (PRILOSEC) 20 MG delayed release capsule Take 20 mg by mouth every morning (before breakfast)

## 2020-06-02 NOTE — PROGRESS NOTES
[facility]   Urology Progress Note            Subjective:  Follow-up prostate cancer status post radical prostatectomy rising PSA  Acute kidney injury chronic kidney disease    Patient Vitals for the past 24 hrs:   BP Temp Temp src Pulse Resp SpO2 Weight   06/02/20 0832 (!) 149/80 97.5 °F (36.4 °C) Oral 89 20 95 % --   06/02/20 0804 -- -- -- -- -- 94 % --   06/02/20 0535 -- -- -- -- -- -- 143 lb 14.4 oz (65.3 kg)   06/02/20 0452 (!) 147/54 98.3 °F (36.8 °C) Oral 104 18 92 % --   06/02/20 0325 -- -- -- -- -- 93 % --   06/02/20 0058 (!) 142/84 97.3 °F (36.3 °C) Oral 95 18 92 % --   06/01/20 2304 -- -- -- -- 16 96 % --   06/01/20 2003 126/74 98.2 °F (36.8 °C) Oral 95 18 95 % --   06/01/20 1608 134/77 98.1 °F (36.7 °C) Oral 95 16 98 % --   06/01/20 1524 123/68 -- -- 96 18 98 % --       Intake/Output Summary (Last 24 hours) at 6/2/2020 1442  Last data filed at 6/1/2020 1826  Gross per 24 hour   Intake 350 ml   Output 900 ml   Net -550 ml       Recent Labs     05/31/20  0549 06/01/20  0508 06/02/20  0534   WBC 5.9 6.4 6.0   HGB 8.7* 8.7* 8.9*   HCT 26.5* 26.4* 27.2*   MCV 90.4 91.3 91.6    192 179     Recent Labs     05/31/20  0549 06/01/20  0508 06/02/20  0534   * 131* 132*   K 4.1 4.4 3.7   CL 96* 96* 96*   CO2 17* 18* 20   BUN 34* 40* 45*   CREATININE 2.96* 2.93* 3.16*       Recent Labs     05/31/20  1210   COLORU YELLOW   PHUR 5.5   WBCUA None   RBCUA 2 TO 5   MUCUS NOT REPORTED   TRICHOMONAS NOT REPORTED   YEAST NOT REPORTED   BACTERIA NOT REPORTED   SPECGRAV 1.015   LEUKOCYTESUR NEGATIVE   UROBILINOGEN Normal   BILIRUBINUR NEGATIVE       Additional Lab/culture results:    Physical Exam: Patient in bed, not in acute distress, creatinine continues to be trending upward    Bone scan results did not demonstrate any metastatic disease CT imaging reviewed    We will continue to monitor the left kidney by follow-up ultrasonography in 3-month    Interval Imaging

## 2020-06-02 NOTE — PROGRESS NOTES
06/02/2020     06/02/2020    MCV 91.6 06/02/2020    MCH 30.0 06/02/2020    MCHC 32.7 06/02/2020    RDW 16.2 06/02/2020   [  BMP:     Lab Results   Component Value Date     06/02/2020    K 3.7 06/02/2020    CL 96 06/02/2020    CO2 20 06/02/2020    BUN 45 06/02/2020    CREATININE 3.16 06/02/2020    LABCREA 139.5 05/31/2020     CXR:  Impression   Relatively stable cardiopulmonary status.         Assessment/Plan:    Active Problems:    NSTEMI (non-ST elevated myocardial infarction) ([facility]  Resolved Problems:    * No resolved hospital problems. *     1. Pleural effusion  -S/P thoracentesis with 900mL removed. As per Pulmonary. On Lasix 40mg daily.      2. HTN  -Blood pressure stable.     3. Anemia  -As per PMD. Hgb improved.     4. LITTLE on CKD  -As per Nephrology. 5. Elevated PSA/Possible renal mass  -Note Urology consult.     I will review with Dr. Siva Callejas. Electronically signed by GINETTE Wynn - CNP on 6/2/2020 at 10:01 AM      Chart reviewed   Pt examined  Agree with above  Concern that pleural effusion is malignant in etiology. At best it is due to renal insuffiencey. No cardiac etiology for pt's clinical situation.   Increase in troponin is due to renal insufficiency and NOT an MI  Continue supportive Rx

## 2020-06-02 NOTE — CARE COORDINATION
Discharge planning    Patient chart reviewed. Discussed with patient and has no preference to company. Also spoke with  pulmonary NP yesterday regarding  need for new nebulizer. Verbal order given. Await documentation and will send to [facility]>     Will also watch for home 02 needs. Last admission in early April he was tested and did not qualify     NP documented need for nebulizer and did fax over to [facility] and added to discharge instructions.

## 2020-06-02 NOTE — CONSULTS
Gastroenterology Consult Note      Patient: Bettye Gasca  : 1950  Acct#:  [de-identified]     Date:  2020    Subjective:       History of Present Illness  Patient is a 81 y.o.  male admitted with NSTEMI (non-ST elevated myocardial infarction) ([facility] [I21.4]  NSTEMI (non-ST elevated myocardial infarction) ([facility] [I21.4] who is seen in consult for *anemia    This gentleman has multiple and significant cardiac history please refer to the cardiology consult came to the emergency room complaining of dyspnea which been going on for at least to 3 weeks, some cough, no fever no chills, had some back pain and some chest pain and currently being evaluated by our cardiology colleagues, he has positive troponin    We in the GI department are consulted because of anemia, which seems to be chronic as at least this March his hemoglobin is been running around 8 g range    The patient reports no hematemesis or hematochezia he is not sure about melena, but not been reported here in the hospital and his hemoglobin been stable without any need for transfusion. He has a history of iron deficiency in the past.    He had a CT scan done in this admission in , which showed cardiomegaly and bilateral pleural effusion, cholelithiasis which is unchanged from before with no signs of cholecystitis, liver enzymes are been normal.  There was some questionable hemoptysis and bilateral pleural effusion. He is not sure about endoscopies            Past Medical History:   Diagnosis Date    Arthritis     Asthma     CKD (chronic kidney disease), stage III (HCC)     COPD (chronic obstructive pulmonary disease) (HCC)     GERD (gastroesophageal reflux disease)     Gout     Hypertension     Hyponatremia     Iliac artery aneurysm, right (HCC)     Laryngeal cancer (HCC)     Laryngeal Cancer. Chronic left facial edema.      Lung cancer ([facility] 3-4 years ago    [facility] [place]    Prostate cancer [facility]) Mother     Heart Disease Mother         Sudden cardiac death    Diabetes Sister     Heart Disease Sister       No family history of colon cancer, Crohn's disease, or ulcerative colitis. Review of Systems  Constitutional: negative  Eyes: negative  Ears, nose, mouth, throat, and face: negative  Respiratory: negative  Cardiovascular: negative  Gastrointestinal: negative  Genitourinary:negative  Integument/breast: negative  Hematologic/lymphatic: negative  Musculoskeletal:negative  Endocrine: negative           Physical Exam  Blood pressure (!) 149/74, pulse 94, temperature 98.1 °F (36.7 °C), temperature source Oral, resp. rate 16, height 5' 9\" (1.753 m), weight 143 lb 14.4 oz (65.3 kg), SpO2 96 %.          General Appearance: alert and oriented to person, place and time, well-developed and well-nourished, in no acute distress  Skin: warm and dry, no rash or erythema  Head: normocephalic and atraumatic  Eyes: pupils equal, round, and reactive to light, extraocular eye movements intact, conjunctivae normal  ENT: hearing grossly normal bilaterally  Neck: neck supple and non tender without mass, no thyromegaly or thyroid nodules, no cervical lymphadenopathy   Pulmonary/Chest: clear to auscultation bilaterally- no wheezes, rales or rhonchi, normal air movement, no respiratory distress  Cardiovascular: normal rate, regular rhythm, normal S1 and S2, no murmurs, rubs, clicks or gallops, distal pulses intact, no carotid bruits  Abdomen: soft, non-tender, non-distended, normal bowel sounds, no masses or organomegaly  Extremities: no cyanosis, clubbing or edema  Musculoskeletal: normal range of motion, no joint swelling, deformity or tenderness  Neurologic: no cranial nerve deficit and muscle strength normal    Data Review:    Recent Labs     05/31/20  0549 06/01/20  0508 06/02/20  0534   WBC 5.9 6.4 6.0   HGB 8.7* 8.7* 8.9*   HCT 26.5* 26.4* 27.2*   MCV 90.4 91.3 91.6    192 179     Recent Labs     05/31/20  0549 06/01/20  0508 06/02/20  0534   * 131* 132*   K 4.1 4.4 3.7   CL 96* 96* 96*   CO2 17* 18* 20   BUN 34* 40* 45*   CREATININE 2.96* 2.93* 3.16*     No results for input(s): AST, ALT, ALB, BILIDIR, BILITOT, ALKPHOS in the last 72 hours. No results for input(s): LIPASE, AMYLASE in the last 72 hours. Recent Labs     06/01/20  0851   PROTIME 13.3   INR 1.0     No results for input(s): PTT in the last 72 hours. No results for input(s): OCCULTBLD in the last 72 hours. CEA:  No results found for: CEA  Ca 125:  No results found for:   Ca 19-9:  No results found for:   Ca 15-3:  No results found for:   AFP:  No components found for: AFAFP  Beta HCG:  No components found for: BHCG  Neuron Specific Enolase:  No results found for: NSE  Imaging Studies:                           All appropriate imaging studies and reports reviewed: Yes                 Assessment:     Active Problems:    NSTEMI (non-ST elevated myocardial infarction) ([facility]  Resolved Problems:    * No resolved hospital problems. *    Anemia  ? Melena  Iron deficiency  Cholelithiasis with no cholecystitis on imaging and normal liver enzymes  Significant cardiopulmonary status and chronic renal failure    Recommendations:     Iron supplement  EGD and colonoscopy when he is more stable after the cardiopulmonary status is stabilized  PPI  H&H monitoring                      Thank you for allowing me to participate in the care of your patient. Please feel free to contact me with any questions or concerns.      Eduardo Ovalle MD

## 2020-06-03 ENCOUNTER — APPOINTMENT (OUTPATIENT)
Dept: GENERAL RADIOLOGY | Age: 70
DRG: 190 | End: 2020-06-03
Payer: MEDICARE

## 2020-06-03 PROBLEM — M80.88XA PATHOLOGICAL FRACTURE OF LUMBAR VERTEBRA DUE TO SECONDARY OSTEOPOROSIS (HCC): Status: ACTIVE | Noted: 2020-06-03

## 2020-06-03 PROBLEM — M54.9 INTRACTABLE BACK PAIN: Status: ACTIVE | Noted: 2020-06-03

## 2020-06-03 PROBLEM — M80.00XA AGE-RELATED OSTEOPOROSIS WITH CURRENT PATHOLOGICAL FRACTURE: Status: ACTIVE | Noted: 2020-06-03

## 2020-06-03 LAB
ABSOLUTE EOS #: <0.03 K/UL (ref 0–0.44)
ABSOLUTE IMMATURE GRANULOCYTE: 0.02 K/UL (ref 0–0.3)
ABSOLUTE LYMPH #: 0.97 K/UL (ref 1.1–3.7)
ABSOLUTE MONO #: 0.44 K/UL (ref 0.1–1.2)
ANION GAP SERPL CALCULATED.3IONS-SCNC: 14 MMOL/L (ref 9–17)
BASOPHILS # BLD: 0 % (ref 0–2)
BASOPHILS ABSOLUTE: <0.03 K/UL (ref 0–0.2)
BUN BLDV-MCNC: 41 MG/DL (ref 8–23)
BUN/CREAT BLD: 14 (ref 9–20)
CALCIUM SERPL-MCNC: 8.3 MG/DL (ref 8.6–10.4)
CHLORIDE BLD-SCNC: 100 MMOL/L (ref 98–107)
CO2: 20 MMOL/L (ref 20–31)
CREAT SERPL-MCNC: 2.94 MG/DL (ref 0.7–1.2)
DIFFERENTIAL TYPE: ABNORMAL
EOSINOPHILS RELATIVE PERCENT: 0 % (ref 1–4)
GFR AFRICAN AMERICAN: 26 ML/MIN
GFR NON-AFRICAN AMERICAN: 21 ML/MIN
GFR SERPL CREATININE-BSD FRML MDRD: ABNORMAL ML/MIN/{1.73_M2}
GFR SERPL CREATININE-BSD FRML MDRD: ABNORMAL ML/MIN/{1.73_M2}
GLUCOSE BLD-MCNC: 138 MG/DL (ref 75–110)
GLUCOSE BLD-MCNC: 159 MG/DL (ref 75–110)
GLUCOSE BLD-MCNC: 83 MG/DL (ref 75–110)
GLUCOSE BLD-MCNC: 92 MG/DL (ref 70–99)
GLUCOSE BLD-MCNC: 97 MG/DL (ref 75–110)
HCT VFR BLD CALC: 26 % (ref 40.7–50.3)
HEMOGLOBIN: 8.5 G/DL (ref 13–17)
IMMATURE GRANULOCYTES: 0 %
LYMPHOCYTES # BLD: 16 % (ref 24–43)
MCH RBC QN AUTO: 29.7 PG (ref 25.2–33.5)
MCHC RBC AUTO-ENTMCNC: 32.7 G/DL (ref 28.4–34.8)
MCV RBC AUTO: 90.9 FL (ref 82.6–102.9)
MONOCYTES # BLD: 7 % (ref 3–12)
NRBC AUTOMATED: 0 PER 100 WBC
PDW BLD-RTO: 16.2 % (ref 11.8–14.4)
PHOSPHORUS: 4.1 MG/DL (ref 2.5–4.5)
PLATELET # BLD: 169 K/UL (ref 138–453)
PLATELET ESTIMATE: ABNORMAL
PMV BLD AUTO: 11 FL (ref 8.1–13.5)
POTASSIUM SERPL-SCNC: 3.6 MMOL/L (ref 3.7–5.3)
RBC # BLD: 2.86 M/UL (ref 4.21–5.77)
RBC # BLD: ABNORMAL 10*6/UL
SEG NEUTROPHILS: 77 % (ref 36–65)
SEGMENTED NEUTROPHILS ABSOLUTE COUNT: 4.62 K/UL (ref 1.5–8.1)
SODIUM BLD-SCNC: 134 MMOL/L (ref 135–144)
SURGICAL PATHOLOGY REPORT: NORMAL
TESTOSTERONE TOTAL: 466 NG/DL (ref 220–1000)
WBC # BLD: 6.1 K/UL (ref 3.5–11.3)
WBC # BLD: ABNORMAL 10*3/UL

## 2020-06-03 PROCEDURE — 94640 AIRWAY INHALATION TREATMENT: CPT

## 2020-06-03 PROCEDURE — 6360000002 HC RX W HCPCS: Performed by: INTERNAL MEDICINE

## 2020-06-03 PROCEDURE — 82947 ASSAY GLUCOSE BLOOD QUANT: CPT

## 2020-06-03 PROCEDURE — 84100 ASSAY OF PHOSPHORUS: CPT

## 2020-06-03 PROCEDURE — 6370000000 HC RX 637 (ALT 250 FOR IP): Performed by: INTERNAL MEDICINE

## 2020-06-03 PROCEDURE — 2060000000 HC ICU INTERMEDIATE R&B

## 2020-06-03 PROCEDURE — 99232 SBSQ HOSP IP/OBS MODERATE 35: CPT | Performed by: INTERNAL MEDICINE

## 2020-06-03 PROCEDURE — 84403 ASSAY OF TOTAL TESTOSTERONE: CPT

## 2020-06-03 PROCEDURE — 80048 BASIC METABOLIC PNL TOTAL CA: CPT

## 2020-06-03 PROCEDURE — 2580000003 HC RX 258: Performed by: INTERNAL MEDICINE

## 2020-06-03 PROCEDURE — 6370000000 HC RX 637 (ALT 250 FOR IP): Performed by: SPECIALIST

## 2020-06-03 PROCEDURE — 94761 N-INVAS EAR/PLS OXIMETRY MLT: CPT

## 2020-06-03 PROCEDURE — APPNB30 APP NON BILLABLE TIME 0-30 MINS: Performed by: NURSE PRACTITIONER

## 2020-06-03 PROCEDURE — 85025 COMPLETE CBC W/AUTO DIFF WBC: CPT

## 2020-06-03 PROCEDURE — 6370000000 HC RX 637 (ALT 250 FOR IP): Performed by: UROLOGY

## 2020-06-03 PROCEDURE — 6370000000 HC RX 637 (ALT 250 FOR IP): Performed by: NURSE PRACTITIONER

## 2020-06-03 PROCEDURE — 36415 COLL VENOUS BLD VENIPUNCTURE: CPT

## 2020-06-03 PROCEDURE — 71045 X-RAY EXAM CHEST 1 VIEW: CPT

## 2020-06-03 RX ORDER — DIPHENHYDRAMINE HCL 25 MG
12.5 TABLET ORAL ONCE
Status: COMPLETED | OUTPATIENT
Start: 2020-06-03 | End: 2020-06-03

## 2020-06-03 RX ORDER — BICALUTAMIDE 50 MG/1
50 TABLET, FILM COATED ORAL DAILY
Status: DISCONTINUED | OUTPATIENT
Start: 2020-06-03 | End: 2020-06-06 | Stop reason: HOSPADM

## 2020-06-03 RX ORDER — METOPROLOL SUCCINATE 25 MG/1
25 TABLET, EXTENDED RELEASE ORAL DAILY
Status: DISCONTINUED | OUTPATIENT
Start: 2020-06-03 | End: 2020-06-06

## 2020-06-03 RX ADMIN — IPRATROPIUM BROMIDE AND ALBUTEROL SULFATE 1 AMPULE: .5; 3 SOLUTION RESPIRATORY (INHALATION) at 14:53

## 2020-06-03 RX ADMIN — SODIUM CHLORIDE, PRESERVATIVE FREE 10 ML: 5 INJECTION INTRAVENOUS at 08:53

## 2020-06-03 RX ADMIN — SODIUM BICARBONATE 650 MG: 650 TABLET ORAL at 08:52

## 2020-06-03 RX ADMIN — ACETAMINOPHEN 650 MG: 325 TABLET ORAL at 21:16

## 2020-06-03 RX ADMIN — MELATONIN 1000 UNITS: at 08:52

## 2020-06-03 RX ADMIN — CEFUROXIME AXETIL 250 MG: 250 TABLET ORAL at 21:17

## 2020-06-03 RX ADMIN — IPRATROPIUM BROMIDE AND ALBUTEROL SULFATE 1 AMPULE: .5; 3 SOLUTION RESPIRATORY (INHALATION) at 19:37

## 2020-06-03 RX ADMIN — ACETAMINOPHEN 650 MG: 325 TABLET ORAL at 14:40

## 2020-06-03 RX ADMIN — SODIUM CHLORIDE, PRESERVATIVE FREE 10 ML: 5 INJECTION INTRAVENOUS at 21:17

## 2020-06-03 RX ADMIN — IPRATROPIUM BROMIDE AND ALBUTEROL SULFATE 1 AMPULE: .5; 3 SOLUTION RESPIRATORY (INHALATION) at 07:39

## 2020-06-03 RX ADMIN — PREDNISONE 30 MG: 20 TABLET ORAL at 08:52

## 2020-06-03 RX ADMIN — HEPARIN SODIUM 5000 UNITS: 5000 INJECTION INTRAVENOUS; SUBCUTANEOUS at 21:17

## 2020-06-03 RX ADMIN — SODIUM BICARBONATE 650 MG: 650 TABLET ORAL at 21:16

## 2020-06-03 RX ADMIN — BUDESONIDE INHALATION 500 MCG: 0.5 SUSPENSION RESPIRATORY (INHALATION) at 19:37

## 2020-06-03 RX ADMIN — ARFORMOTEROL TARTRATE 15 MCG: 15 SOLUTION RESPIRATORY (INHALATION) at 07:39

## 2020-06-03 RX ADMIN — ACETYLCYSTEINE 600 MG: 200 SOLUTION ORAL; RESPIRATORY (INHALATION) at 07:39

## 2020-06-03 RX ADMIN — HEPARIN SODIUM 5000 UNITS: 5000 INJECTION INTRAVENOUS; SUBCUTANEOUS at 06:27

## 2020-06-03 RX ADMIN — HEPARIN SODIUM 5000 UNITS: 5000 INJECTION INTRAVENOUS; SUBCUTANEOUS at 14:33

## 2020-06-03 RX ADMIN — BUDESONIDE INHALATION 500 MCG: 0.5 SUSPENSION RESPIRATORY (INHALATION) at 07:39

## 2020-06-03 RX ADMIN — ARFORMOTEROL TARTRATE 15 MCG: 15 SOLUTION RESPIRATORY (INHALATION) at 19:37

## 2020-06-03 RX ADMIN — ALLOPURINOL 100 MG: 100 TABLET ORAL at 08:52

## 2020-06-03 RX ADMIN — AMLODIPINE BESYLATE 10 MG: 10 TABLET ORAL at 08:52

## 2020-06-03 RX ADMIN — IPRATROPIUM BROMIDE AND ALBUTEROL SULFATE 1 AMPULE: .5; 3 SOLUTION RESPIRATORY (INHALATION) at 11:23

## 2020-06-03 RX ADMIN — PANTOPRAZOLE SODIUM 40 MG: 40 TABLET, DELAYED RELEASE ORAL at 09:10

## 2020-06-03 RX ADMIN — DIPHENHYDRAMINE HCL 12.5 MG: 25 TABLET ORAL at 22:39

## 2020-06-03 RX ADMIN — ACETYLCYSTEINE 600 MG: 200 SOLUTION ORAL; RESPIRATORY (INHALATION) at 19:37

## 2020-06-03 RX ADMIN — BICALUTAMIDE 50 MG: 50 TABLET ORAL at 12:36

## 2020-06-03 RX ADMIN — CEFUROXIME AXETIL 250 MG: 250 TABLET ORAL at 08:52

## 2020-06-03 RX ADMIN — SODIUM CHLORIDE, PRESERVATIVE FREE 10 ML: 5 INJECTION INTRAVENOUS at 22:09

## 2020-06-03 RX ADMIN — METOPROLOL SUCCINATE 25 MG: 25 TABLET, EXTENDED RELEASE ORAL at 22:08

## 2020-06-03 RX ADMIN — INSULIN LISPRO 1 UNITS: 100 INJECTION, SOLUTION INTRAVENOUS; SUBCUTANEOUS at 21:18

## 2020-06-03 RX ADMIN — ACETAMINOPHEN 650 MG: 325 TABLET ORAL at 08:52

## 2020-06-03 ASSESSMENT — PAIN SCALES - GENERAL
PAINLEVEL_OUTOF10: 0
PAINLEVEL_OUTOF10: 3
PAINLEVEL_OUTOF10: 5
PAINLEVEL_OUTOF10: 9
PAINLEVEL_OUTOF10: 0
PAINLEVEL_OUTOF10: 0
PAINLEVEL_OUTOF10: 9
PAINLEVEL_OUTOF10: 5
PAINLEVEL_OUTOF10: 9

## 2020-06-03 ASSESSMENT — PAIN DESCRIPTION - PAIN TYPE
TYPE: ACUTE PAIN
TYPE: ACUTE PAIN

## 2020-06-03 ASSESSMENT — PAIN DESCRIPTION - ORIENTATION
ORIENTATION: RIGHT
ORIENTATION: RIGHT

## 2020-06-03 ASSESSMENT — PAIN DESCRIPTION - LOCATION
LOCATION: RIB CAGE
LOCATION: RIB CAGE

## 2020-06-03 NOTE — PROGRESS NOTES
Verena Osorio   Urology Progress Note            Subjective:  Follow-up prostate cancer, rising PSA, renal cyst    Patient Vitals for the past 24 hrs:   BP Temp Temp src Pulse Resp SpO2 Weight   06/03/20 0649 -- -- -- -- -- -- 149 lb 4.8 oz (67.7 kg)   06/02/20 2357 (!) 141/77 98.2 °F (36.8 °C) Oral 88 18 97 % --   06/02/20 1913 (!) 149/74 98.1 °F (36.7 °C) Oral 94 16 96 % --   06/02/20 1541 133/62 97.3 °F (36.3 °C) Oral 92 20 93 % --   06/02/20 1508 -- -- -- -- -- 95 % --   06/02/20 0832 (!) 149/80 97.5 °F (36.4 °C) Oral 89 20 95 % --   06/02/20 0804 -- -- -- -- -- 94 % --       Intake/Output Summary (Last 24 hours) at 6/3/2020 0491  Last data filed at 6/2/2020 2102  Gross per 24 hour   Intake 790 ml   Output --   Net 790 ml       Recent Labs     06/01/20  0508 06/02/20  0534 06/03/20  0553   WBC 6.4 6.0 6.1   HGB 8.7* 8.9* 8.5*   HCT 26.4* 27.2* 26.0*   MCV 91.3 91.6 90.9    179 169     Recent Labs     06/01/20  0508 06/02/20  0534 06/03/20  0553   * 132* 134*   K 4.4 3.7 3.6*   CL 96* 96* 100   CO2 18* 20 20   PHOS  --   --  4.1   BUN 40* 45* 41*   CREATININE 2.93* 3.16* 2.94*       Recent Labs     05/31/20  1210   COLORU YELLOW   PHUR 5.5   WBCUA None   RBCUA 2 TO 5   MUCUS NOT REPORTED   TRICHOMONAS NOT REPORTED   YEAST NOT REPORTED   BACTERIA NOT REPORTED   SPECGRAV 1.015   LEUKOCYTESUR NEGATIVE   UROBILINOGEN Normal   BILIRUBINUR NEGATIVE       Additional Lab/culture results:    Physical Exam: Patient not in acute distress discussed with the patient the rising PSA    We can initiate Casodex 50 mg daily as part of androgen deprivation    We will check serum testosterone levels baseline prior to androgen deprivation    Interval Imaging Findings:    Impression:    Patient Active Problem List   Diagnosis    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

## 2020-06-03 NOTE — PROGRESS NOTES
Historical Provider, MD       Scheduled Meds:   bicalutamide  50 mg Oral Daily    allopurinol  100 mg Oral Daily    [START ON 6/4/2020] furosemide  40 mg Oral Q48H    pantoprazole  40 mg Oral QAM AC    budesonide  0.5 mg Nebulization BID    Arformoterol Tartrate  15 mcg Nebulization BID    cefUROXime  250 mg Oral 2 times per day    predniSONE  30 mg Oral Daily    amLODIPine  10 mg Oral Daily    Vitamin D  1,000 Units Oral Daily    insulin lispro  0-6 Units Subcutaneous TID WC    insulin lispro  0-3 Units Subcutaneous Nightly    acetylcysteine  600 mg Inhalation BID    sodium bicarbonate  650 mg Oral BID    sodium chloride flush  10 mL Intravenous 2 times per day    ipratropium-albuterol  1 ampule Inhalation Q4H WA    heparin (porcine)  5,000 Units Subcutaneous 3 times per day    sodium chloride flush  10 mL Intravenous 2 times per day   Physical Exam:  Vitals:    06/03/20 0739 06/03/20 0815 06/03/20 1127 06/03/20 1148   BP:  138/85  (!) 141/82   Pulse:  85  92   Resp:  18 18 20   Temp:  98.1 °F (36.7 °C)  97.9 °F (36.6 °C)   TempSrc:  Oral  Oral   SpO2: 100% 98% 100% 100%   Weight:       Height:         I/O last 3 completed shifts: In: 790 [P.O.:790]  Out: -     General:  Awake, alert, not in distress. Appears to be stated age. HEENT: Atraumatic, normocephalic. Anicteric sclera. Pink and moist oral mucosa. Neck No JVD. Ostomy+  Chest: Bilateral air entry, decreased air exchange on the right. Cardiovascular: S1S2, no murmur, rub or gallop. No lower extremity edema. Abdomen: Soft, non tender to palpation. Musculoskeletal: No cyanosis or clubbing. Integumentary: Pink, warm and dry. Free from rash or lesions.   Psych: Appropriate mood and affect    Data:  CBC:   Lab Results   Component Value Date    WBC 6.1 06/03/2020    HGB 8.5 (L) 06/03/2020    HCT 26.0 (L) 06/03/2020    MCV 90.9 06/03/2020     06/03/2020     BMP:    Lab Results   Component Value Date     (L) 06/03/2020    NA

## 2020-06-03 NOTE — PROGRESS NOTES
Subjective:     Follow-up COPD  Doing much better less shortness of breath less cough no fever no chills  ROS  No fever no chills no GI/ complaints no cardiac pulmonary complaints at present complaining of right lower chest pain, no TIA no bleeding no headache no sore throat no polyuria no polydipsia  physical exam  General Appearance: alert and oriented to person, place and time and in no acute distress  Skin: warm and dry, no rash or erythema  Head: normocephalic and atraumatic  Eyes: pupils equal, round, and reactive to light, sclera anicteric and positive pallor  Neck: neck supple and non tender without mass and tracheostomy  Pulmonary/Chest: Air entry equal decreased at the bases minimal crackles no rhonchi no use of intercostal muscles  Cardiovascular: normal rate, regular rhythm, normal S1 and S2, no gallops, intact distal pulses and no carotid bruits  Abdomen: soft, non-tender, non-distended, normal bowel sounds, no masses or organomegaly  Extremities: no edema and pulses no Homans sign  Neurologic: Alert oriented x3 with no focal    /77   Pulse 91   Temp 98 °F (36.7 °C) (Oral)   Resp 18   Ht 5' 9\" (1.753 m)   Wt 149 lb 4.8 oz (67.7 kg)   SpO2 98%   BMI 22.05 kg/m²     CBC:   Lab Results   Component Value Date    WBC 6.1 06/03/2020    RBC 2.86 06/03/2020    HGB 8.5 06/03/2020    HCT 26.0 06/03/2020    MCV 90.9 06/03/2020    MCH 29.7 06/03/2020    MCHC 32.7 06/03/2020    RDW 16.2 06/03/2020     06/03/2020    MPV 11.0 06/03/2020     BMP:    Lab Results   Component Value Date     06/03/2020    K 3.6 06/03/2020     06/03/2020    CO2 20 06/03/2020    BUN 41 06/03/2020    LABALBU 3.2 05/30/2020    CREATININE 2.94 06/03/2020    CALCIUM 8.3 06/03/2020    GFRAA 26 06/03/2020    LABGLOM 21 06/03/2020    GLUCOSE 92 06/03/2020        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

## 2020-06-03 NOTE — PROGRESS NOTES
Pulmonary Critical Care Progress Note  Obed Higgins CNP / Analisa Schmitz MD     Patient seen for the follow up of acute hypoxic respiratory insufficiency, bilateral pleural effusions, pulmonary edema, COPD, pulmonary hypertension, hemoptysis    Subjective:  He is sitting up in bed in no distress. No significant events noted overnight. He had right thoracentesis Monday. Denies significant shortness of breath. He denies any hemoptysis. He denies any chest pain. He does have some discomfort in his right side with coughing. Examination:  Vitals: BP (!) 141/82   Pulse 92   Temp 97.9 °F (36.6 °C) (Oral)   Resp 20   Ht 5' 9\" (1.753 m)   Wt 149 lb 4.8 oz (67.7 kg)   SpO2 100%   BMI 22.05 kg/m²   General appearance: alert and cooperative with exam, resting in bed  Neck: No JVD  Lungs: Decreased air exchange on the right  Heart: regular rate and rhythm, S1, S2 normal, no gallop  Abdomen: Soft, non tender, + BS  Extremities: no cyanosis or clubbing.  No significant edema    LABs:  CBC:   Recent Labs     06/02/20  0534 06/03/20  0553   WBC 6.0 6.1   HGB 8.9* 8.5*   HCT 27.2* 26.0*    169     BMP:   Recent Labs     06/02/20  0534 06/03/20  0553   * 134*   K 3.7 3.6*   CO2 20 20   BUN 45* 41*   CREATININE 3.16* 2.94*   LABGLOM 20* 21*   GLUCOSE 98 92     PT/INR:   Recent Labs     06/01/20  0851   PROTIME 13.3   INR 1.0     APTT:  Recent Labs     06/01/20  0851   APTT 37.8*     ABG:  Lab Results   Component Value Date    ALV4VAC 15 10/27/2019    FIO2 NOT REPORTED 03/05/2020       Lab Results   Component Value Date    POCPH 7.38 10/27/2019    POCPCO2 24 10/27/2019    POCPO2 99 10/27/2019    POCHCO3 14.2 10/27/2019    NBEA 11 10/27/2019    PBEA NOT REPORTED 10/27/2019    MWG9KGC 15 10/27/2019    BMMG5WTM 98 10/27/2019    FIO2 NOT REPORTED 03/05/2020     Radiology:  6/3/2020        Impression:  · Acute hypoxic respiratory insufficiency  · Pulmonary edema/bilateral pleural effusions/atelectasis, unlikely

## 2020-06-03 NOTE — PROGRESS NOTES
[facility]    Gastroenterology Daily Progress Note      Patient:   Martina Marques   :    1950   Facility:   [facility]  Date:     6/3/2020  Consultant:   Florian Solorio CNP      SUBJECTIVE  81 y.o. male admitted 2020 with NSTEMI (non-ST elevated myocardial infarction) ([facility] [I21.4]  NSTEMI (non-ST elevated myocardial infarction) ([facility] [I21.4] and seen for anemia. The pt was seen and examined. hgb 8.5 no overt gi bleeding overnight. Reports intermittent right sided lateral rib pain from thoracentesis. No c/o abdominal pain or nausea.          OBJECTIVE  Scheduled Meds:   bicalutamide  50 mg Oral Daily    allopurinol  100 mg Oral Daily    [START ON 2020] furosemide  40 mg Oral Q48H    pantoprazole  40 mg Oral QAM AC    budesonide  0.5 mg Nebulization BID    Arformoterol Tartrate  15 mcg Nebulization BID    cefUROXime  250 mg Oral 2 times per day    predniSONE  30 mg Oral Daily    amLODIPine  10 mg Oral Daily    Vitamin D  1,000 Units Oral Daily    insulin lispro  0-6 Units Subcutaneous TID WC    insulin lispro  0-3 Units Subcutaneous Nightly    acetylcysteine  600 mg Inhalation BID    sodium bicarbonate  650 mg Oral BID    sodium chloride flush  10 mL Intravenous 2 times per day    ipratropium-albuterol  1 ampule Inhalation Q4H WA    heparin (porcine)  5,000 Units Subcutaneous 3 times per day    sodium chloride flush  10 mL Intravenous 2 times per day       Vital Signs:  BP (!) 141/82   Pulse 92   Temp 97.9 °F (36.6 °C) (Oral)   Resp 18   Ht 5' 9\" (1.753 m)   Wt 149 lb 4.8 oz (67.7 kg)   SpO2 100%   BMI 22.05 kg/m²      Physical Exam:       General Appearance: alert and oriented to person, place and time, well-developed and well-nourished, in no acute distress  Skin: warm and dry, no rash or erythema  Head: normocephalic and atraumatic  Eyes: pupils equal, round, and reactive to light, extraocular eye movements intact, conjunctivae normal  ENT: hearing grossly normal bilaterally  Neck: neck supple and non tender without mass, no thyromegaly or thyroid nodules, no cervical lymphadenopathy midline stoma with no drainage  Pulmonary/Chest: clear to auscultation bilaterally- no wheezes, rales or rhonchi, normal air movement, no respiratory distress  Cardiovascular: normal rate, regular rhythm, normal S1 and S2, no murmurs, rubs, clicks or gallops, distal pulses intact, no carotid bruits  Abdomen: soft, non-tender, non-distended, normal bowel sounds, no masses or organomegaly  Extremities: no cyanosis, clubbing or edema  Musculoskeletal: normal range of motion, no joint swelling, deformity or tenderness  Neurologic: no cranial nerve deficit and muscle strength normal    Lab and Imaging Review     CBC  Recent Labs     06/01/20  0508 06/02/20  0534 06/03/20  0553   WBC 6.4 6.0 6.1   HGB 8.7* 8.9* 8.5*   HCT 26.4* 27.2* 26.0*   MCV 91.3 91.6 90.9    179 169       BMP  Recent Labs     06/01/20  0508 06/02/20  0534 06/03/20  0553   * 132* 134*   K 4.4 3.7 3.6*   CL 96* 96* 100   CO2 18* 20 20   BUN 40* 45* 41*   CREATININE 2.93* 3.16* 2.94*   GLUCOSE 116* 98 92   CALCIUM 8.2* 8.4* 8.3*       PT/INR  Recent Labs     06/01/20  0851   PROTIME 13.3   INR 1.0       FINDINGS:ct abd 6/1/20   Lower Chest: Cardiomegaly.  Bilateral pleural effusion and adjacent   subsegmental atelectasis similar to prior.       Organs: Cholelithiasis unchanged.  The liver, spleen, pancreas, kidneys and   adrenal glands show no significant abnormalities.       GI/Bowel: Stomach is collapsed and not optimally assessed.  This is otherwise   unremarkable.  Evaluation of the small bowel shows no acute process.  No   focal lesions.  Moderate stool in the colon.  No acute process in the colon.       Pelvis: Moderate distention of the urinary bladder.  No suspicious pelvic   mass.       Peritoneum/Retroperitoneum: Severe atherosclerotic disease.  Redemonstration   of endovascular repaired

## 2020-06-03 NOTE — CONSULTS
0-3 Units  0-3 Units Subcutaneous Nightly Angella Jiménez MD   1 Units at 05/31/20 2217    acetylcysteine (MUCOMYST) 20 % solution 600 mg  600 mg Inhalation BID Linda Del Toro MD   600 mg at 06/02/20 2042    sodium bicarbonate tablet 650 mg  650 mg Oral BID Sp Beck MD   650 mg at 06/02/20 2151    sodium chloride flush 0.9 % injection 10 mL  10 mL Intravenous 2 times per day Angella Jiménez MD   10 mL at 06/02/20 2205    sodium chloride flush 0.9 % injection 10 mL  10 mL Intravenous PRN Angella Jiménez MD        ipratropium-albuterol (DUONEB) nebulizer solution 1 ampule  1 ampule Inhalation Q4H WA Angella Jiménez MD   1 ampule at 06/02/20 2042    heparin (porcine) injection 5,000 Units  5,000 Units Subcutaneous 3 times per day Angella Jiménez MD   5,000 Units at 06/03/20 7593    sodium chloride flush 0.9 % injection 10 mL  10 mL Intravenous 2 times per day Angella Jiménez MD   10 mL at 06/02/20 2204    sodium chloride flush 0.9 % injection 10 mL  10 mL Intravenous PRN Angella Jiménez MD        acetaminophen (TYLENOL) tablet 650 mg  650 mg Oral Q6H PRN Angella Jimnéez MD   650 mg at 06/01/20 1018    Or    acetaminophen (TYLENOL) suppository 650 mg  650 mg Rectal Q6H PRN Angella Jiménez MD        polyethylene glycol (GLYCOLAX) packet 17 g  17 g Oral Daily PRN Angella Jiménez MD        promethazine (PHENERGAN) tablet 12.5 mg  12.5 mg Oral Q6H PRN Angella Jiménez MD   12.5 mg at 05/31/20 2217    Or    ondansetron (ZOFRAN) injection 4 mg  4 mg Intravenous Q6H PRN Angella Jiménez MD        albuterol (PROVENTIL) nebulizer solution 2.5 mg  2.5 mg Nebulization Q4H PRN Angella Jiménez MD   2.5 mg at 05/30/20 2233         Allergies:  Amino acids and Lisinopril    Social History:   Social History     Tobacco Use   Smoking Status Former Smoker    Types: Cigarettes   Smokeless Tobacco Never Used   Tobacco Comment    quit smoking 20 years ago      Social History     Substance and Sexual Activity   Alcohol Use Not Currently    Alcohol/week: 5.0 standard drinks    Types: 5 Cans of beer per week     Social History     Substance and Sexual Activity   Drug Use No       Family History:  Family History   Problem Relation Age of Onset    Diabetes Mother     Heart Disease Mother         Sudden cardiac death    Diabetes Sister     Heart Disease Sister          REVIEW OF SYSTEMS:  Gen: Negative for nausea, vomiting, diarrhea, fever, chills, night sweats  Heart: Negative for HTN, palpitations, chest pain  Lungs: Negative for wheezes, asthma or SOB  GI: Negative for nausea, vomiting  Endo: Negative for diabetes  Heme: Negative for DVT       PHYSICAL EXAM:  Patient Vitals for the past 24 hrs:   BP Temp Temp src Pulse Resp SpO2 Weight   06/03/20 0649 -- -- -- -- -- -- 149 lb 4.8 oz (67.7 kg)   06/02/20 2357 (!) 141/77 98.2 °F (36.8 °C) Oral 88 18 97 % --   06/02/20 1913 (!) 149/74 98.1 °F (36.7 °C) Oral 94 16 96 % --   06/02/20 1541 133/62 97.3 °F (36.3 °C) Oral 92 20 93 % --   06/02/20 1508 -- -- -- -- -- 95 % --   06/02/20 0832 (!) 149/80 97.5 °F (36.4 °C) Oral 89 20 95 % --   06/02/20 0804 -- -- -- -- -- 94 % --     Gen: alert and oriented  Head: normorcephalic, atraumatic  Neck: supple  Heart: RRR  Lungs: No audible wheezes  Abdomen: soft  Pelvis: stable  Extremity no evidence of lateralizing radiculopathy or myopathy motor or sensory reflex C5-S1. Pain at his L2 fracture site. Surgical changes at his HEENT consistent with laryngeal cancer, cancer of the anterior portion of the floor of his mouth. Chest symptoms in part consistent with rib trauma with pain on palpation. Right and left upper extremity unremarkable. Shoulder, elbow, wrist, hand without edema, ecchymosis, crepitus. Right and left lower extremity satisfactory. Hip, knee, ankle, foot again without edema, ecchymosis, crepitus, pain.         DATA:  CBC:   Lab Results   Component Value Date    WBC 6.1 06/03/2020    HGB 8.5 06/03/2020  06/03/2020     BMP:    Lab Results   Component Value Date     06/03/2020    K 3.6 06/03/2020     06/03/2020    CO2 20 06/03/2020    BUN 41 06/03/2020    CREATININE 2.94 06/03/2020    CALCIUM 8.3 06/03/2020    GLUCOSE 92 06/03/2020     PT/INR:    Lab Results   Component Value Date    PROTIME 13.3 06/01/2020    INR 1.0 06/01/2020     Troponin:    Lab Results   Component Value Date    TROPONINI 0.01 12/12/2012       Radiology:     Imaging reviewed. Whole-body bone scan activity L2.    6-1-2020 CT of the abdomen look for bone windows confirms superior endplate compression fracture L2. DDD L5-S1.    5-29 CT of the chest pleural effusions, rib trauma. 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 *       PLAN:  1) treatment options reviewed. Patient wishes to proceed with the prescheduled surgery. We would with medical clearance, optimization. It would involve kyphoplasty vertebral augmentation, biopsy of his pathological L2 superior endplate compression fracture. 2.  I will follow him closely with you.   Thank you very much for the consultation        Edis Rosales

## 2020-06-04 ENCOUNTER — APPOINTMENT (OUTPATIENT)
Dept: GENERAL RADIOLOGY | Age: 70
DRG: 190 | End: 2020-06-04
Payer: MEDICARE

## 2020-06-04 LAB
ABSOLUTE EOS #: <0.03 K/UL (ref 0–0.44)
ABSOLUTE IMMATURE GRANULOCYTE: 0.03 K/UL (ref 0–0.3)
ABSOLUTE LYMPH #: 0.89 K/UL (ref 1.1–3.7)
ABSOLUTE MONO #: 0.39 K/UL (ref 0.1–1.2)
ANION GAP SERPL CALCULATED.3IONS-SCNC: 13 MMOL/L (ref 9–17)
BASOPHILS # BLD: 0 % (ref 0–2)
BASOPHILS ABSOLUTE: <0.03 K/UL (ref 0–0.2)
BUN BLDV-MCNC: 41 MG/DL (ref 8–23)
BUN/CREAT BLD: 15 (ref 9–20)
CALCIUM SERPL-MCNC: 8.3 MG/DL (ref 8.6–10.4)
CHLORIDE BLD-SCNC: 100 MMOL/L (ref 98–107)
CO2: 20 MMOL/L (ref 20–31)
CREAT SERPL-MCNC: 2.73 MG/DL (ref 0.7–1.2)
DIFFERENTIAL TYPE: ABNORMAL
EOSINOPHILS RELATIVE PERCENT: 0 % (ref 1–4)
GFR AFRICAN AMERICAN: 28 ML/MIN
GFR NON-AFRICAN AMERICAN: 23 ML/MIN
GFR SERPL CREATININE-BSD FRML MDRD: ABNORMAL ML/MIN/{1.73_M2}
GFR SERPL CREATININE-BSD FRML MDRD: ABNORMAL ML/MIN/{1.73_M2}
GLUCOSE BLD-MCNC: 103 MG/DL (ref 75–110)
GLUCOSE BLD-MCNC: 119 MG/DL (ref 75–110)
GLUCOSE BLD-MCNC: 140 MG/DL (ref 75–110)
GLUCOSE BLD-MCNC: 86 MG/DL (ref 75–110)
GLUCOSE BLD-MCNC: 91 MG/DL (ref 70–99)
HCT VFR BLD CALC: 24.8 % (ref 40.7–50.3)
HEMOGLOBIN: 8.3 G/DL (ref 13–17)
IMMATURE GRANULOCYTES: 0 %
LYMPHOCYTES # BLD: 13 % (ref 24–43)
MAGNESIUM: 1.4 MG/DL (ref 1.6–2.6)
MCH RBC QN AUTO: 30.1 PG (ref 25.2–33.5)
MCHC RBC AUTO-ENTMCNC: 33.5 G/DL (ref 28.4–34.8)
MCV RBC AUTO: 89.9 FL (ref 82.6–102.9)
MONOCYTES # BLD: 6 % (ref 3–12)
NRBC AUTOMATED: 0 PER 100 WBC
PDW BLD-RTO: 16 % (ref 11.8–14.4)
PHOSPHORUS: 3.7 MG/DL (ref 2.5–4.5)
PLATELET # BLD: 178 K/UL (ref 138–453)
PLATELET ESTIMATE: ABNORMAL
PMV BLD AUTO: 10.8 FL (ref 8.1–13.5)
POTASSIUM SERPL-SCNC: 4.3 MMOL/L (ref 3.7–5.3)
RBC # BLD: 2.76 M/UL (ref 4.21–5.77)
RBC # BLD: ABNORMAL 10*6/UL
SEG NEUTROPHILS: 81 % (ref 36–65)
SEGMENTED NEUTROPHILS ABSOLUTE COUNT: 5.44 K/UL (ref 1.5–8.1)
SODIUM BLD-SCNC: 133 MMOL/L (ref 135–144)
WBC # BLD: 6.8 K/UL (ref 3.5–11.3)
WBC # BLD: ABNORMAL 10*3/UL

## 2020-06-04 PROCEDURE — 6370000000 HC RX 637 (ALT 250 FOR IP): Performed by: UROLOGY

## 2020-06-04 PROCEDURE — 6370000000 HC RX 637 (ALT 250 FOR IP): Performed by: INTERNAL MEDICINE

## 2020-06-04 PROCEDURE — 85025 COMPLETE CBC W/AUTO DIFF WBC: CPT

## 2020-06-04 PROCEDURE — 2580000003 HC RX 258: Performed by: INTERNAL MEDICINE

## 2020-06-04 PROCEDURE — 80048 BASIC METABOLIC PNL TOTAL CA: CPT

## 2020-06-04 PROCEDURE — 6360000002 HC RX W HCPCS: Performed by: INTERNAL MEDICINE

## 2020-06-04 PROCEDURE — APPNB30 APP NON BILLABLE TIME 0-30 MINS: Performed by: NURSE PRACTITIONER

## 2020-06-04 PROCEDURE — 2700000000 HC OXYGEN THERAPY PER DAY

## 2020-06-04 PROCEDURE — 36415 COLL VENOUS BLD VENIPUNCTURE: CPT

## 2020-06-04 PROCEDURE — 94761 N-INVAS EAR/PLS OXIMETRY MLT: CPT

## 2020-06-04 PROCEDURE — 99232 SBSQ HOSP IP/OBS MODERATE 35: CPT | Performed by: INTERNAL MEDICINE

## 2020-06-04 PROCEDURE — 6370000000 HC RX 637 (ALT 250 FOR IP): Performed by: SPECIALIST

## 2020-06-04 PROCEDURE — 94640 AIRWAY INHALATION TREATMENT: CPT

## 2020-06-04 PROCEDURE — 2060000000 HC ICU INTERMEDIATE R&B

## 2020-06-04 PROCEDURE — 84100 ASSAY OF PHOSPHORUS: CPT

## 2020-06-04 PROCEDURE — 71045 X-RAY EXAM CHEST 1 VIEW: CPT

## 2020-06-04 PROCEDURE — 83735 ASSAY OF MAGNESIUM: CPT

## 2020-06-04 PROCEDURE — 6370000000 HC RX 637 (ALT 250 FOR IP): Performed by: NURSE PRACTITIONER

## 2020-06-04 PROCEDURE — 82947 ASSAY GLUCOSE BLOOD QUANT: CPT

## 2020-06-04 RX ORDER — ONDANSETRON 2 MG/ML
4 INJECTION INTRAMUSCULAR; INTRAVENOUS
Status: DISCONTINUED | OUTPATIENT
Start: 2020-06-04 | End: 2020-06-04

## 2020-06-04 RX ORDER — MAGNESIUM SULFATE 1 G/100ML
1 INJECTION INTRAVENOUS ONCE
Status: COMPLETED | OUTPATIENT
Start: 2020-06-04 | End: 2020-06-04

## 2020-06-04 RX ORDER — DIPHENHYDRAMINE HCL 25 MG
12.5 TABLET ORAL ONCE
Status: COMPLETED | OUTPATIENT
Start: 2020-06-04 | End: 2020-06-04

## 2020-06-04 RX ORDER — FENTANYL CITRATE 50 UG/ML
25 INJECTION, SOLUTION INTRAMUSCULAR; INTRAVENOUS EVERY 5 MIN PRN
Status: DISCONTINUED | OUTPATIENT
Start: 2020-06-04 | End: 2020-06-04

## 2020-06-04 RX ADMIN — PREDNISONE 30 MG: 20 TABLET ORAL at 08:44

## 2020-06-04 RX ADMIN — SODIUM CHLORIDE, PRESERVATIVE FREE 10 ML: 5 INJECTION INTRAVENOUS at 08:46

## 2020-06-04 RX ADMIN — HEPARIN SODIUM 5000 UNITS: 5000 INJECTION INTRAVENOUS; SUBCUTANEOUS at 21:49

## 2020-06-04 RX ADMIN — ALLOPURINOL 100 MG: 100 TABLET ORAL at 08:44

## 2020-06-04 RX ADMIN — SODIUM CHLORIDE, PRESERVATIVE FREE 10 ML: 5 INJECTION INTRAVENOUS at 21:54

## 2020-06-04 RX ADMIN — MAGNESIUM SULFATE HEPTAHYDRATE 1 G: 1 INJECTION, SOLUTION INTRAVENOUS at 10:14

## 2020-06-04 RX ADMIN — SODIUM CHLORIDE, PRESERVATIVE FREE 10 ML: 5 INJECTION INTRAVENOUS at 08:43

## 2020-06-04 RX ADMIN — FUROSEMIDE 40 MG: 40 TABLET ORAL at 08:44

## 2020-06-04 RX ADMIN — INSULIN LISPRO 1 UNITS: 100 INJECTION, SOLUTION INTRAVENOUS; SUBCUTANEOUS at 18:03

## 2020-06-04 RX ADMIN — IPRATROPIUM BROMIDE AND ALBUTEROL SULFATE 1 AMPULE: .5; 3 SOLUTION RESPIRATORY (INHALATION) at 19:26

## 2020-06-04 RX ADMIN — METOPROLOL SUCCINATE 25 MG: 25 TABLET, EXTENDED RELEASE ORAL at 08:44

## 2020-06-04 RX ADMIN — SODIUM CHLORIDE, PRESERVATIVE FREE 10 ML: 5 INJECTION INTRAVENOUS at 21:49

## 2020-06-04 RX ADMIN — HEPARIN SODIUM 5000 UNITS: 5000 INJECTION INTRAVENOUS; SUBCUTANEOUS at 05:06

## 2020-06-04 RX ADMIN — BICALUTAMIDE 50 MG: 50 TABLET ORAL at 08:43

## 2020-06-04 RX ADMIN — ACETYLCYSTEINE 600 MG: 200 SOLUTION ORAL; RESPIRATORY (INHALATION) at 07:33

## 2020-06-04 RX ADMIN — DIPHENHYDRAMINE HCL 12.5 MG: 25 TABLET ORAL at 21:49

## 2020-06-04 RX ADMIN — IPRATROPIUM BROMIDE AND ALBUTEROL SULFATE 1 AMPULE: .5; 3 SOLUTION RESPIRATORY (INHALATION) at 10:27

## 2020-06-04 RX ADMIN — CEFUROXIME AXETIL 250 MG: 250 TABLET ORAL at 21:48

## 2020-06-04 RX ADMIN — BUDESONIDE INHALATION 500 MCG: 0.5 SUSPENSION RESPIRATORY (INHALATION) at 07:33

## 2020-06-04 RX ADMIN — IPRATROPIUM BROMIDE AND ALBUTEROL SULFATE 1 AMPULE: .5; 3 SOLUTION RESPIRATORY (INHALATION) at 15:06

## 2020-06-04 RX ADMIN — ARFORMOTEROL TARTRATE 15 MCG: 15 SOLUTION RESPIRATORY (INHALATION) at 19:26

## 2020-06-04 RX ADMIN — MAGNESIUM SULFATE HEPTAHYDRATE 1 G: 1 INJECTION, SOLUTION INTRAVENOUS at 08:52

## 2020-06-04 RX ADMIN — ARFORMOTEROL TARTRATE 15 MCG: 15 SOLUTION RESPIRATORY (INHALATION) at 07:48

## 2020-06-04 RX ADMIN — SODIUM BICARBONATE 650 MG: 650 TABLET ORAL at 08:44

## 2020-06-04 RX ADMIN — SODIUM BICARBONATE 650 MG: 650 TABLET ORAL at 21:49

## 2020-06-04 RX ADMIN — MELATONIN 1000 UNITS: at 08:43

## 2020-06-04 RX ADMIN — CEFUROXIME AXETIL 250 MG: 250 TABLET ORAL at 08:44

## 2020-06-04 RX ADMIN — BUDESONIDE INHALATION 500 MCG: 0.5 SUSPENSION RESPIRATORY (INHALATION) at 19:26

## 2020-06-04 RX ADMIN — HEPARIN SODIUM 5000 UNITS: 5000 INJECTION INTRAVENOUS; SUBCUTANEOUS at 14:50

## 2020-06-04 RX ADMIN — PANTOPRAZOLE SODIUM 40 MG: 40 TABLET, DELAYED RELEASE ORAL at 05:06

## 2020-06-04 RX ADMIN — IPRATROPIUM BROMIDE AND ALBUTEROL SULFATE 1 AMPULE: .5; 3 SOLUTION RESPIRATORY (INHALATION) at 07:33

## 2020-06-04 ASSESSMENT — PAIN SCALES - GENERAL
PAINLEVEL_OUTOF10: 0

## 2020-06-04 NOTE — PROGRESS NOTES
MG delayed release capsule Take 20 mg by mouth every morning (before breakfast)    Historical Provider, MD       Scheduled Meds:   magnesium sulfate  1 g Intravenous Once    bicalutamide  50 mg Oral Daily    metoprolol succinate  25 mg Oral Daily    allopurinol  100 mg Oral Daily    furosemide  40 mg Oral Q48H    pantoprazole  40 mg Oral QAM AC    budesonide  0.5 mg Nebulization BID    Arformoterol Tartrate  15 mcg Nebulization BID    cefUROXime  250 mg Oral 2 times per day    predniSONE  30 mg Oral Daily    Vitamin D  1,000 Units Oral Daily    insulin lispro  0-6 Units Subcutaneous TID WC    insulin lispro  0-3 Units Subcutaneous Nightly    acetylcysteine  600 mg Inhalation BID    sodium bicarbonate  650 mg Oral BID    sodium chloride flush  10 mL Intravenous 2 times per day    ipratropium-albuterol  1 ampule Inhalation Q4H WA    heparin (porcine)  5,000 Units Subcutaneous 3 times per day    sodium chloride flush  10 mL Intravenous 2 times per day   Physical Exam:  Vitals:    06/04/20 0739 06/04/20 0748 06/04/20 0803 06/04/20 1027   BP:   (!) 140/83    Pulse:   74    Resp: 18 18 18    Temp:   97.5 °F (36.4 °C)    TempSrc:   Oral    SpO2: 97% 97% 100% 99%   Weight:       Height:         I/O last 3 completed shifts: In: 1530 [P.O.:1530]  Out: 300 [Urine:300]    General:  Awake, alert, not in distress. Appears to be stated age. HEENT: Atraumatic, normocephalic. Anicteric sclera. Pink and moist oral mucosa. Neck No JVD. Ostomy+  Chest: Bilateral air entry, decreased air exchange on the right. Cardiovascular: S1S2, no murmur, rub or gallop. No lower extremity edema. Abdomen: Soft, non tender to palpation. Musculoskeletal: No cyanosis or clubbing. Integumentary: Pink, warm and dry. Free from rash or lesions.   Psych: Appropriate mood and affect    Data:  CBC:   Lab Results   Component Value Date    WBC 6.8 06/04/2020    HGB 8.3 (L) 06/04/2020    HCT 24.8 (L) 06/04/2020    MCV 89.9 PROTUR     Radiology:   Normal cardiomediastinal silhouette.  Bilateral pleural effusion right   greater than left slightly increased on the right side.  Vascular congestion. No pneumothorax.  Multiple old right rib fractures.  Right axillary surgical   clips. Assessment:  Acute kidney injury. Negative urine eosinophils. Cr is improving. CKD stage 3/4  Hyponatremia  Cardiomyopathy  Recent history of hemoptysis  Bilateral pleural effusion  Metabolic acidosis  Proteinuria with protein creatinine ratio of 2.8. HORACIO, ANCA and anti GBM negative. Plan:  Creatinine improving. Changed Lasix to 40 mg every other day (6/3). Monitor GFR electrolytes volume status blood pressure   Fluid restriction 1200 mL per 24 hours, once taking po. Continue sodium bicarbonate at the current dose with a target serum bicarb of 22. Avoid nephrotoxic drugs and IV contrast exposure. We will continue to follow along with you. Pt seen in collaboration with Dr. Juan C Joyner. Electronically signed by GINETTE Glasgow CNP  on 6/4/2020 at 10:39 AM       Physician Addendum  I have seen and examined pt at bed side.    I reviewed and agree with CNP's note. I performed all key and critical portions of this evaluation. I agree with the plan of care as noted above.   Patient renal function seems to be close to baseline, recent baseline creatinine has been ranging between 2.5 -3  Continue fluid restriction    electronically signed by Martínez Bobo MD on 06/04/20 12:08 PM

## 2020-06-04 NOTE — PROGRESS NOTES
notes reviewed   · GI planning for EGD/colonoscopy ?tomorrow  · DVT prophylaxis with low molecular weight heparin  · Incentive spirometry every hour while awake  · Patient is okay for surgery from pulmonary standpoint with moderate risk for perioperative pulmonary complications   · Follow-up at [facility] for possible recurrence of tongue cancer  · Will follow with you    Electronically signed by     GINETTE Betancourt CNP on 6/4/2020 at 10:31 AM  Patient was seen under the supervision of Dr. Flako Mcarthur and Sleep Medicine,    [facility] [place]: [phone]

## 2020-06-04 NOTE — PROGRESS NOTES
Shelli Baeza   Urology Progress Note            Subjective: Follow-up prostate cancer, rising PSA, status post robotic prostatectomy    Patient Vitals for the past 24 hrs:   BP Temp Temp src Pulse Resp SpO2 Weight   06/04/20 0803 (!) 140/83 97.5 °F (36.4 °C) Oral 74 18 100 % --   06/04/20 0748 -- -- -- -- 18 97 % --   06/04/20 0739 -- -- -- -- 18 97 % --   06/04/20 0647 -- -- -- -- -- -- 147 lb 12.8 oz (67 kg)   06/04/20 0505 135/89 98.4 °F (36.9 °C) Axillary 86 18 99 % --   06/04/20 0242 -- -- -- -- 20 99 % --   06/04/20 0017 137/77 98.2 °F (36.8 °C) Oral 84 16 99 % --   06/03/20 2322 -- -- -- -- 16 98 % --   06/03/20 2057 123/74 97.9 °F (36.6 °C) Oral 90 16 100 % --   06/03/20 1937 -- -- -- -- 20 98 % --   06/03/20 1616 130/77 98 °F (36.7 °C) Oral 91 18 98 % --   06/03/20 1455 -- -- -- -- 18 100 % --   06/03/20 1148 (!) 141/82 97.9 °F (36.6 °C) Oral 92 20 100 % --   06/03/20 1127 -- -- -- -- 18 100 % --       Intake/Output Summary (Last 24 hours) at 6/4/2020 0817  Last data filed at 6/4/2020 3433  Gross per 24 hour   Intake 1530 ml   Output 300 ml   Net 1230 ml       Recent Labs     06/02/20  0534 06/03/20  0553 06/04/20  0445   WBC 6.0 6.1 6.8   HGB 8.9* 8.5* 8.3*   HCT 27.2* 26.0* 24.8*   MCV 91.6 90.9 89.9    169 178     Recent Labs     06/02/20  0534 06/03/20  0553 06/04/20  0445   * 134* 133*   K 3.7 3.6* 4.3   CL 96* 100 100   CO2 20 20 20   PHOS  --  4.1 3.7   BUN 45* 41* 41*   CREATININE 3.16* 2.94* 2.73*       No results for input(s): COLORU, PHUR, LABCAST, WBCUA, RBCUA, MUCUS, TRICHOMONAS, YEAST, BACTERIA, CLARITYU, SPECGRAV, LEUKOCYTESUR, UROBILINOGEN, BILIRUBINUR, BLOODU in the last 72 hours.     Invalid input(s): NITRATE, GLUCOSEUKETONESUAMORPHOUS    Additional Lab/culture results:    Physical Exam: Patient not in acute distress, we have restarted bicalutamide 50 mg    Interval Imaging Findings:   CT imaging did not show any hydronephrosis or any pelvic

## 2020-06-04 NOTE — PLAN OF CARE
Problem: Falls - Risk of:  Goal: Will remain free from falls  Description: Will remain free from falls  6/4/2020 0156 by Abdiaziz Sutton RN  Outcome: Ongoing  Will remain free from falls. Fall risk assessment completed. Patient instructed to use call light. Bed locked and in lowest position, side rails up 2/4, call light and bedside table within reach, clutter removed, and non-skid footwear on when pt out of bed. Hourly rounds will continue. Problem: Pain:  Goal: Pain level will decrease  Description: Pain level will decrease  Outcome: Ongoing   Pain level will decrease. Pain assessment completed. Patient demonstrates understanding of pain rating scale and interventions. At this time patient states pain is well controled with current interventions. Will continue to monitor and reassess with each rounding and PRN.       Problem: Breathing Pattern - Ineffective:  Goal: Ability to achieve and maintain a regular respiratory rate will improve  Description: Ability to achieve and maintain a regular respiratory rate will improve  6/4/2020 0156 by Abdiaziz Sutton RN  Outcome: Ongoing

## 2020-06-05 ENCOUNTER — APPOINTMENT (OUTPATIENT)
Dept: GENERAL RADIOLOGY | Age: 70
DRG: 190 | End: 2020-06-05
Payer: MEDICARE

## 2020-06-05 LAB
ABSOLUTE EOS #: 0.03 K/UL (ref 0–0.44)
ABSOLUTE IMMATURE GRANULOCYTE: 0.04 K/UL (ref 0–0.3)
ABSOLUTE LYMPH #: 1.16 K/UL (ref 1.1–3.7)
ABSOLUTE MONO #: 0.47 K/UL (ref 0.1–1.2)
ANION GAP SERPL CALCULATED.3IONS-SCNC: 13 MMOL/L (ref 9–17)
BASOPHILS # BLD: 0 % (ref 0–2)
BASOPHILS ABSOLUTE: <0.03 K/UL (ref 0–0.2)
BUN BLDV-MCNC: 45 MG/DL (ref 8–23)
BUN/CREAT BLD: 15 (ref 9–20)
CALCIUM SERPL-MCNC: 8.6 MG/DL (ref 8.6–10.4)
CHLORIDE BLD-SCNC: 98 MMOL/L (ref 98–107)
CO2: 22 MMOL/L (ref 20–31)
CREAT SERPL-MCNC: 2.94 MG/DL (ref 0.7–1.2)
DIFFERENTIAL TYPE: ABNORMAL
EOSINOPHILS RELATIVE PERCENT: 0 % (ref 1–4)
GFR AFRICAN AMERICAN: 26 ML/MIN
GFR NON-AFRICAN AMERICAN: 21 ML/MIN
GFR SERPL CREATININE-BSD FRML MDRD: ABNORMAL ML/MIN/{1.73_M2}
GFR SERPL CREATININE-BSD FRML MDRD: ABNORMAL ML/MIN/{1.73_M2}
GLUCOSE BLD-MCNC: 114 MG/DL (ref 75–110)
GLUCOSE BLD-MCNC: 141 MG/DL (ref 75–110)
GLUCOSE BLD-MCNC: 71 MG/DL (ref 75–110)
GLUCOSE BLD-MCNC: 80 MG/DL (ref 75–110)
GLUCOSE BLD-MCNC: 84 MG/DL (ref 70–99)
HCT VFR BLD CALC: 28 % (ref 40.7–50.3)
HEMOGLOBIN: 9.1 G/DL (ref 13–17)
IMMATURE GRANULOCYTES: 1 %
LYMPHOCYTES # BLD: 15 % (ref 24–43)
MAGNESIUM: 1.8 MG/DL (ref 1.6–2.6)
MCH RBC QN AUTO: 29.6 PG (ref 25.2–33.5)
MCHC RBC AUTO-ENTMCNC: 32.5 G/DL (ref 28.4–34.8)
MCV RBC AUTO: 91.2 FL (ref 82.6–102.9)
MONOCYTES # BLD: 6 % (ref 3–12)
NRBC AUTOMATED: 0 PER 100 WBC
PDW BLD-RTO: 16 % (ref 11.8–14.4)
PHOSPHORUS: 3.5 MG/DL (ref 2.5–4.5)
PLATELET # BLD: 199 K/UL (ref 138–453)
PLATELET ESTIMATE: ABNORMAL
PMV BLD AUTO: 11.1 FL (ref 8.1–13.5)
POTASSIUM SERPL-SCNC: 4.3 MMOL/L (ref 3.7–5.3)
RBC # BLD: 3.07 M/UL (ref 4.21–5.77)
RBC # BLD: ABNORMAL 10*6/UL
SEG NEUTROPHILS: 78 % (ref 36–65)
SEGMENTED NEUTROPHILS ABSOLUTE COUNT: 6.25 K/UL (ref 1.5–8.1)
SODIUM BLD-SCNC: 133 MMOL/L (ref 135–144)
WBC # BLD: 8 K/UL (ref 3.5–11.3)
WBC # BLD: ABNORMAL 10*3/UL

## 2020-06-05 PROCEDURE — 36415 COLL VENOUS BLD VENIPUNCTURE: CPT

## 2020-06-05 PROCEDURE — 6360000002 HC RX W HCPCS: Performed by: INTERNAL MEDICINE

## 2020-06-05 PROCEDURE — 94761 N-INVAS EAR/PLS OXIMETRY MLT: CPT

## 2020-06-05 PROCEDURE — 99232 SBSQ HOSP IP/OBS MODERATE 35: CPT | Performed by: INTERNAL MEDICINE

## 2020-06-05 PROCEDURE — 71045 X-RAY EXAM CHEST 1 VIEW: CPT

## 2020-06-05 PROCEDURE — 6370000000 HC RX 637 (ALT 250 FOR IP): Performed by: INTERNAL MEDICINE

## 2020-06-05 PROCEDURE — 6370000000 HC RX 637 (ALT 250 FOR IP): Performed by: UROLOGY

## 2020-06-05 PROCEDURE — 94640 AIRWAY INHALATION TREATMENT: CPT

## 2020-06-05 PROCEDURE — 84100 ASSAY OF PHOSPHORUS: CPT

## 2020-06-05 PROCEDURE — APPNB30 APP NON BILLABLE TIME 0-30 MINS: Performed by: NURSE PRACTITIONER

## 2020-06-05 PROCEDURE — 82947 ASSAY GLUCOSE BLOOD QUANT: CPT

## 2020-06-05 PROCEDURE — 85025 COMPLETE CBC W/AUTO DIFF WBC: CPT

## 2020-06-05 PROCEDURE — 80048 BASIC METABOLIC PNL TOTAL CA: CPT

## 2020-06-05 PROCEDURE — 6370000000 HC RX 637 (ALT 250 FOR IP): Performed by: NURSE PRACTITIONER

## 2020-06-05 PROCEDURE — 6370000000 HC RX 637 (ALT 250 FOR IP): Performed by: SPECIALIST

## 2020-06-05 PROCEDURE — 2580000003 HC RX 258: Performed by: INTERNAL MEDICINE

## 2020-06-05 PROCEDURE — 2060000000 HC ICU INTERMEDIATE R&B

## 2020-06-05 PROCEDURE — 83735 ASSAY OF MAGNESIUM: CPT

## 2020-06-05 RX ORDER — HYDRALAZINE HYDROCHLORIDE 25 MG/1
25 TABLET, FILM COATED ORAL EVERY 8 HOURS SCHEDULED
Status: DISCONTINUED | OUTPATIENT
Start: 2020-06-05 | End: 2020-06-06

## 2020-06-05 RX ORDER — PREDNISONE 20 MG/1
20 TABLET ORAL DAILY
Status: DISCONTINUED | OUTPATIENT
Start: 2020-06-06 | End: 2020-06-06 | Stop reason: HOSPADM

## 2020-06-05 RX ORDER — ISOSORBIDE MONONITRATE 30 MG/1
30 TABLET, EXTENDED RELEASE ORAL DAILY
Status: DISCONTINUED | OUTPATIENT
Start: 2020-06-05 | End: 2020-06-06 | Stop reason: HOSPADM

## 2020-06-05 RX ORDER — DIPHENHYDRAMINE HCL 25 MG
12.5 TABLET ORAL NIGHTLY PRN
Status: COMPLETED | OUTPATIENT
Start: 2020-06-05 | End: 2020-06-05

## 2020-06-05 RX ADMIN — SODIUM CHLORIDE, PRESERVATIVE FREE 10 ML: 5 INJECTION INTRAVENOUS at 10:12

## 2020-06-05 RX ADMIN — ALLOPURINOL 100 MG: 100 TABLET ORAL at 10:10

## 2020-06-05 RX ADMIN — ARFORMOTEROL TARTRATE 15 MCG: 15 SOLUTION RESPIRATORY (INHALATION) at 07:54

## 2020-06-05 RX ADMIN — HEPARIN SODIUM 5000 UNITS: 5000 INJECTION INTRAVENOUS; SUBCUTANEOUS at 13:21

## 2020-06-05 RX ADMIN — IPRATROPIUM BROMIDE AND ALBUTEROL SULFATE 1 AMPULE: .5; 3 SOLUTION RESPIRATORY (INHALATION) at 19:59

## 2020-06-05 RX ADMIN — DIPHENHYDRAMINE HCL 12.5 MG: 25 TABLET ORAL at 23:52

## 2020-06-05 RX ADMIN — SODIUM CHLORIDE, PRESERVATIVE FREE 10 ML: 5 INJECTION INTRAVENOUS at 10:10

## 2020-06-05 RX ADMIN — SODIUM CHLORIDE, PRESERVATIVE FREE 10 ML: 5 INJECTION INTRAVENOUS at 21:29

## 2020-06-05 RX ADMIN — ISOSORBIDE MONONITRATE 30 MG: 30 TABLET ORAL at 18:39

## 2020-06-05 RX ADMIN — PANTOPRAZOLE SODIUM 40 MG: 40 TABLET, DELAYED RELEASE ORAL at 05:12

## 2020-06-05 RX ADMIN — IPRATROPIUM BROMIDE AND ALBUTEROL SULFATE 1 AMPULE: .5; 3 SOLUTION RESPIRATORY (INHALATION) at 14:57

## 2020-06-05 RX ADMIN — HEPARIN SODIUM 5000 UNITS: 5000 INJECTION INTRAVENOUS; SUBCUTANEOUS at 05:13

## 2020-06-05 RX ADMIN — SODIUM BICARBONATE 650 MG: 650 TABLET ORAL at 10:10

## 2020-06-05 RX ADMIN — HYDRALAZINE HYDROCHLORIDE 25 MG: 25 TABLET, FILM COATED ORAL at 21:28

## 2020-06-05 RX ADMIN — SODIUM BICARBONATE 650 MG: 650 TABLET ORAL at 21:28

## 2020-06-05 RX ADMIN — HEPARIN SODIUM 5000 UNITS: 5000 INJECTION INTRAVENOUS; SUBCUTANEOUS at 21:30

## 2020-06-05 RX ADMIN — ARFORMOTEROL TARTRATE 15 MCG: 15 SOLUTION RESPIRATORY (INHALATION) at 19:59

## 2020-06-05 RX ADMIN — METOPROLOL SUCCINATE 25 MG: 25 TABLET, EXTENDED RELEASE ORAL at 10:10

## 2020-06-05 RX ADMIN — MELATONIN 1000 UNITS: at 10:10

## 2020-06-05 RX ADMIN — HYDRALAZINE HYDROCHLORIDE 25 MG: 25 TABLET, FILM COATED ORAL at 18:39

## 2020-06-05 RX ADMIN — IPRATROPIUM BROMIDE AND ALBUTEROL SULFATE 1 AMPULE: .5; 3 SOLUTION RESPIRATORY (INHALATION) at 07:54

## 2020-06-05 RX ADMIN — IPRATROPIUM BROMIDE AND ALBUTEROL SULFATE 1 AMPULE: .5; 3 SOLUTION RESPIRATORY (INHALATION) at 11:23

## 2020-06-05 RX ADMIN — BICALUTAMIDE 50 MG: 50 TABLET ORAL at 10:10

## 2020-06-05 RX ADMIN — PREDNISONE 30 MG: 20 TABLET ORAL at 10:10

## 2020-06-05 RX ADMIN — CEFUROXIME AXETIL 250 MG: 250 TABLET ORAL at 21:28

## 2020-06-05 RX ADMIN — BUDESONIDE INHALATION 500 MCG: 0.5 SUSPENSION RESPIRATORY (INHALATION) at 07:54

## 2020-06-05 RX ADMIN — SODIUM CHLORIDE, PRESERVATIVE FREE 10 ML: 5 INJECTION INTRAVENOUS at 21:30

## 2020-06-05 RX ADMIN — CEFUROXIME AXETIL 250 MG: 250 TABLET ORAL at 10:10

## 2020-06-05 RX ADMIN — BUDESONIDE INHALATION 500 MCG: 0.5 SUSPENSION RESPIRATORY (INHALATION) at 19:59

## 2020-06-05 RX ADMIN — INSULIN LISPRO 1 UNITS: 100 INJECTION, SOLUTION INTRAVENOUS; SUBCUTANEOUS at 18:33

## 2020-06-05 ASSESSMENT — PAIN SCALES - GENERAL
PAINLEVEL_OUTOF10: 0
PAINLEVEL_OUTOF10: 0

## 2020-06-05 NOTE — PROGRESS NOTES
06/05/2020    EOSABS 0.03 06/05/2020    BASOSABS <0.03 06/05/2020    DIFFTYPE NOT REPORTED 06/05/2020     CMP:    Lab Results   Component Value Date     06/05/2020    K 4.3 06/05/2020    CL 98 06/05/2020    CO2 22 06/05/2020    BUN 45 06/05/2020    CREATININE 2.94 06/05/2020    GFRAA 26 06/05/2020    LABGLOM 21 06/05/2020    GLUCOSE 84 06/05/2020    PROT 6.5 05/30/2020    LABALBU 3.2 05/30/2020    CALCIUM 8.6 06/05/2020    BILITOT 0.25 05/30/2020    ALKPHOS 88 05/30/2020    AST 17 05/30/2020    ALT 11 05/30/2020     CXR: Persistent small bibasilar lung opacities. A/P:1. Pleural effusion  -S/P thoracentesis with 900mL removed 6/1. Diuretics per renal.      2. HTN  -Blood pressure stable.      4. LITTLE/CKD  -Nephrology following.     5. Anemia  -GI following. Plans for EGD/colonoscopy when medically stable.        Will discuss with Dr. Rosales Courser    Electronically signed by GINETTE Loyd CNP on 6/5/2020 at 11:00 AM     Chart reviewed  Pt examined  Agree with above  Echo from 2 mths ago was reviewed. In my opinion LV was severely decreased. And not only mildly diminished. Still concerned that pleural effusion is malignant however would treat for CHF too by adding nitrate and hydralazine.  Continue b blocker

## 2020-06-05 NOTE — PROGRESS NOTES
Reason for Consult:  Acute kidney injury. Requesting Physician:  Zoraida Franklin MD    Interval history:   Patient seen and examined at the bedside  No new complaints  SBP trending 120-150s  Creatinine ranging from 2.73 to 2.94. . No new complaints. HISTORY OF PRESENT ILLNESS:    The patient is a 81 y.o. male who has history of chronic kidney disease, his creatinine has been recently ranging between 2.5 to 3, he went to the emergency room about 2 weeks ago secondary to hemoptysis at that time his creatinine was 3.37. 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 He has been complaining of swelling of his tongue which he reported that comes and goes  Denied any recent use of steroidal anti-inflammatory drugs, denied any recent diarrhea or vomiting  Has extensive renal failure workup earlier this year      Prior to Admission medications    Medication Sig Start Date End Date Taking? Authorizing Provider   amLODIPine (NORVASC) 10 MG tablet Take 1 tablet by mouth daily 4/18/20  Yes Zoraida Franklin MD   sodium bicarbonate 650 MG tablet Take 650 mg by mouth 3 times daily    Yes Historical Provider, MD   Cholecalciferol (VITAMIN D3) 50 MCG (2000 UT) CAPS Take 1 capsule by mouth daily   Yes Historical Provider, MD   albuterol sulfate  (90 Base) MCG/ACT inhaler Inhale 2 puffs into the lungs every 6 hours as needed for Wheezing or Shortness of Breath   Yes Historical Provider, MD   budesonide-formoterol (SYMBICORT) 160-4.5 MCG/ACT AERO Inhale 2 puffs into the lungs 2 times daily   Yes Historical Provider, MD   allopurinol (ZYLOPRIM) 300 MG tablet Take 300 mg by mouth daily.    Yes Historical Provider, MD   dicyclomine (BENTYL) 10 MG capsule Take 1 capsule by mouth 2 times daily for 14 days 5/16/20 5/30/20  Charleen Mora MD   omeprazole Normal cardiomediastinal silhouette.  Bilateral pleural effusion right   greater than left slightly increased on the right side.  Vascular congestion. No pneumothorax.  Multiple old right rib fractures.  Right axillary surgical   clips. Assessment:  Acute kidney injury. Negative urine eosinophils. Cr is stable. CKD stage 3/4  Hyponatremia  Cardiomyopathy, EF 45%  Recent history of hemoptysis  Bilateral pleural effusion  Metabolic acidosis  Proteinuria with protein creatinine ratio of 2.8. HORACIO, ANCA and antiGBM negative. Plan:  Changed Lasix to 40 mg every other day (6/3). Monitor GFR electrolytes volume status blood pressure   Fluid restriction of 1200 mL per 24 hours, once taking po. Continue sodium bicarbonate at the current dose with a target serum bicarb of 22. Avoid nephrotoxic drugs and IV contrast exposure. We will continue to follow along with you.        Electronically signed by Malorie Hay MD  on 6/5/2020 at 2:19 PM

## 2020-06-05 NOTE — PROGRESS NOTES
Pulmonary Critical Care Progress Note  Kelsey Piña M.D. Patient seen for the follow up of respiratory insufficiency, pleural effusion, COPD, history of laryngeal cancer status post tracheostomy    Subjective:  He is resting in bed in no distress. He denies any shortness of breath, feeling at his baseline. Continues to complain of discomfort in his low back/right side. He denies chest pain, no complaints of significant cough. No events noted overnight. Examination:  Vitals: BP (!) 152/86   Pulse 80   Temp 98.2 °F (36.8 °C) (Oral)   Resp 18   Ht 5' 9\" (1.753 m)   Wt 149 lb 11.2 oz (67.9 kg)   SpO2 93%   BMI 22.11 kg/m²     General appearance: alert and cooperative with exam, resting in bed in no distress  Neck: No JVD  Lungs: decreased air exchange, occasional rhonchi  Heart: regular rate and rhythm, S1, S2 normal, no gallop  Abdomen: Soft, non tender, + BS  Extremities: no cyanosis or clubbing.  No significant edema    LABs:  CBC:   Recent Labs     06/04/20  0445 06/05/20  0539   WBC 6.8 8.0   HGB 8.3* 9.1*   HCT 24.8* 28.0*    199     BMP:   Recent Labs     06/04/20  0445 06/05/20  0539   * 133*   K 4.3 4.3   CO2 20 22   BUN 41* 45*   CREATININE 2.73* 2.94*   LABGLOM 23* 21*   GLUCOSE 91 84     Radiology:  6/5/2020      Impression:  · Acute hypoxic respiratory insufficiency  · Pulmonary edema/bilateral pleural effusions/atelectasis, unlikely pneumonia  · s/p right thoracentesis on 6/1/2028 with 900 mL's, negative for malignancy  · Hemoptysis, improved  · COPD  · Moderate pulmonary hypertension, RVSP 64 mmHg  · History of laryngeal cancer s/p tracheostomy   · subsequent development of oral cancer s/p surgery with reconstruction  · LITTLE on CKD/hyponatremia  · Right renal cyst 1.5 cm    Recommendations:  · Oxygen via trach collar  · Continue oral Ceftin  · Albuterol and Ipratropium Q 4 hours and prn  · Brovana and Pulmicort aerosols every 12 hours  · Prednisone taper  · Diuresis/fluid

## 2020-06-05 NOTE — PROGRESS NOTES
[facility]   Urology Progress Note            Subjective: Follow-up prostate cancer rising PSA    Patient Vitals for the past 24 hrs:   BP Temp Temp src Pulse Resp SpO2 Weight   06/05/20 1318 (!) 143/94 97.3 °F (36.3 °C) Oral 83 16 95 % --   06/05/20 0920 (!) 152/86 98.2 °F (36.8 °C) Oral 80 18 93 % --   06/05/20 0754 -- -- -- -- -- 97 % --   06/05/20 0644 -- -- -- -- -- -- 149 lb 11.2 oz (67.9 kg)   06/04/20 2357 129/82 97.5 °F (36.4 °C) Oral 78 16 100 % --   06/04/20 2016 136/81 97.5 °F (36.4 °C) Oral 76 16 96 % --   06/04/20 1926 -- -- -- -- -- 98 % --       Intake/Output Summary (Last 24 hours) at 6/5/2020 1837  Last data filed at 6/5/2020 6583  Gross per 24 hour   Intake 358 ml   Output --   Net 358 ml       Recent Labs     06/03/20  0553 06/04/20  0445 06/05/20  0539   WBC 6.1 6.8 8.0   HGB 8.5* 8.3* 9.1*   HCT 26.0* 24.8* 28.0*   MCV 90.9 89.9 91.2    178 199     Recent Labs     06/03/20  0553 06/04/20  0445 06/05/20  0539   * 133* 133*   K 3.6* 4.3 4.3    100 98   CO2 20 20 22   PHOS 4.1 3.7 3.5   BUN 41* 41* 45*   CREATININE 2.94* 2.73* 2.94*       No results for input(s): COLORU, PHUR, LABCAST, WBCUA, RBCUA, MUCUS, TRICHOMONAS, YEAST, BACTERIA, CLARITYU, SPECGRAV, LEUKOCYTESUR, UROBILINOGEN, BILIRUBINUR, BLOODU in the last 72 hours.     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

## 2020-06-05 NOTE — PROGRESS NOTES
[facility]    Gastroenterology Daily Progress Note      Patient:   Avery Thomas   :    1950   Facility:   [facility]   Date:     2020  Consultant:   Dimitri Zuluaga, NADIYA      SUBJECTIVE  81 y.o. male admitted 2020 with NSTEMI (non-ST elevated myocardial infarction) ([facility] [I21.4]  NSTEMI (non-ST elevated myocardial infarction) ([facility] [I21.4] and seen for anemia and melena. The pt was seen and examined. Reports normal appearing BM overnight.  No abdominal pain or nausea. hgb 9.1        OBJECTIVE  Scheduled Meds:   bicalutamide  50 mg Oral Daily    metoprolol succinate  25 mg Oral Daily    allopurinol  100 mg Oral Daily    furosemide  40 mg Oral Q48H    pantoprazole  40 mg Oral QAM AC    budesonide  0.5 mg Nebulization BID    Arformoterol Tartrate  15 mcg Nebulization BID    cefUROXime  250 mg Oral 2 times per day    predniSONE  30 mg Oral Daily    Vitamin D  1,000 Units Oral Daily    insulin lispro  0-6 Units Subcutaneous TID WC    insulin lispro  0-3 Units Subcutaneous Nightly    sodium bicarbonate  650 mg Oral BID    sodium chloride flush  10 mL Intravenous 2 times per day    ipratropium-albuterol  1 ampule Inhalation Q4H WA    heparin (porcine)  5,000 Units Subcutaneous 3 times per day    sodium chloride flush  10 mL Intravenous 2 times per day       Vital Signs:  /82   Pulse 78   Temp 97.5 °F (36.4 °C) (Oral)   Resp 16   Ht 5' 9\" (1.753 m)   Wt 149 lb 11.2 oz (67.9 kg)   SpO2 100%   BMI 22.11 kg/m²      Physical Exam:     General Appearance: alert and oriented to person, place and time, well-developed and well-nourished, in no acute distress  Skin: warm and dry, no rash or erythema  Head: normocephalic and atraumatic  Eyes: pupils equal, round, and reactive to light, extraocular eye movements intact, conjunctivae normal  ENT: hearing grossly normal bilaterally  Neck: neck supple and non tender without mass, no thyromegaly or thyroid nodules, no cervical lymphadenopathy midline stoma with no drainage  Pulmonary/Chest: clear to auscultation bilaterally- no wheezes, rales or rhonchi, normal air movement, no respiratory distress  Cardiovascular: normal rate, regular rhythm, normal S1 and S2, no murmurs, rubs, clicks or gallops, distal pulses intact, no carotid bruits  Abdomen: soft, non-tender, non-distended, normal bowel sounds, no masses or organomegaly  Extremities: no cyanosis, clubbing or edema  Musculoskeletal: normal range of motion, no joint swelling, deformity or tenderness  Neurologic: no cranial nerve deficit and muscle strength normal       Lab and Imaging Review     CBC  Recent Labs     06/03/20  0553 06/04/20  0445 06/05/20  0539   WBC 6.1 6.8 8.0   HGB 8.5* 8.3* 9.1*   HCT 26.0* 24.8* 28.0*   MCV 90.9 89.9 91.2    178 199       BMP  Recent Labs     06/03/20  0553 06/04/20  0445 06/05/20  0539   * 133* 133*   K 3.6* 4.3 4.3    100 98   CO2 20 20 22   BUN 41* 41* 45*   CREATININE 2.94* 2.73* 2.94*   GLUCOSE 92 91 84   CALCIUM 8.3* 8.3* 8.6     FINDINGS:ct abd 6/1/20   Lower Chest: Cardiomegaly.  Bilateral pleural effusion and adjacent   subsegmental atelectasis similar to prior.       Organs: Cholelithiasis unchanged.  The liver, spleen, pancreas, kidneys and   adrenal glands show no significant abnormalities.       GI/Bowel: Stomach is collapsed and not optimally assessed.  This is otherwise   unremarkable.  Evaluation of the small bowel shows no acute process.  No   focal lesions.  Moderate stool in the colon.  No acute process in the colon.       Pelvis: Moderate distention of the urinary bladder.  No suspicious pelvic   mass.       Peritoneum/Retroperitoneum: Severe atherosclerotic disease.  Redemonstration   of endovascular repaired right common iliac artery aneurysm.  No free   intraperitoneal fluid.  No significant lymphadenopathy.       Bones/Soft Tissues: Diffuse degenerative changes.           Impression   1.

## 2020-06-06 VITALS
HEART RATE: 78 BPM | TEMPERATURE: 97.3 F | DIASTOLIC BLOOD PRESSURE: 71 MMHG | OXYGEN SATURATION: 95 % | WEIGHT: 145.2 LBS | BODY MASS INDEX: 21.51 KG/M2 | HEIGHT: 69 IN | RESPIRATION RATE: 18 BRPM | SYSTOLIC BLOOD PRESSURE: 141 MMHG

## 2020-06-06 LAB
ABSOLUTE EOS #: <0.03 K/UL (ref 0–0.44)
ABSOLUTE IMMATURE GRANULOCYTE: 0.07 K/UL (ref 0–0.3)
ABSOLUTE LYMPH #: 1.27 K/UL (ref 1.1–3.7)
ABSOLUTE MONO #: 0.44 K/UL (ref 0.1–1.2)
ANION GAP SERPL CALCULATED.3IONS-SCNC: 13 MMOL/L (ref 9–17)
BASOPHILS # BLD: 0 % (ref 0–2)
BASOPHILS ABSOLUTE: <0.03 K/UL (ref 0–0.2)
BUN BLDV-MCNC: 46 MG/DL (ref 8–23)
BUN/CREAT BLD: 18 (ref 9–20)
CALCIUM SERPL-MCNC: 8.4 MG/DL (ref 8.6–10.4)
CHLORIDE BLD-SCNC: 99 MMOL/L (ref 98–107)
CO2: 21 MMOL/L (ref 20–31)
CREAT SERPL-MCNC: 2.58 MG/DL (ref 0.7–1.2)
CULTURE: NORMAL
CULTURE: NORMAL
DIFFERENTIAL TYPE: ABNORMAL
EOSINOPHILS RELATIVE PERCENT: 0 % (ref 1–4)
GFR AFRICAN AMERICAN: 30 ML/MIN
GFR NON-AFRICAN AMERICAN: 25 ML/MIN
GFR SERPL CREATININE-BSD FRML MDRD: ABNORMAL ML/MIN/{1.73_M2}
GFR SERPL CREATININE-BSD FRML MDRD: ABNORMAL ML/MIN/{1.73_M2}
GLUCOSE BLD-MCNC: 77 MG/DL (ref 75–110)
GLUCOSE BLD-MCNC: 82 MG/DL (ref 70–99)
GLUCOSE BLD-MCNC: 83 MG/DL (ref 75–110)
HCT VFR BLD CALC: 27.7 % (ref 40.7–50.3)
HEMOGLOBIN: 8.8 G/DL (ref 13–17)
IMMATURE GRANULOCYTES: 1 %
LV EF: 45 %
LVEF MODALITY: NORMAL
LYMPHOCYTES # BLD: 15 % (ref 24–43)
Lab: NORMAL
Lab: NORMAL
MAGNESIUM: 1.8 MG/DL (ref 1.6–2.6)
MCH RBC QN AUTO: 29.8 PG (ref 25.2–33.5)
MCHC RBC AUTO-ENTMCNC: 31.8 G/DL (ref 28.4–34.8)
MCV RBC AUTO: 93.9 FL (ref 82.6–102.9)
MONOCYTES # BLD: 5 % (ref 3–12)
NRBC AUTOMATED: 0 PER 100 WBC
PDW BLD-RTO: 16.1 % (ref 11.8–14.4)
PHOSPHORUS: 3.7 MG/DL (ref 2.5–4.5)
PLATELET # BLD: 226 K/UL (ref 138–453)
PLATELET ESTIMATE: ABNORMAL
PMV BLD AUTO: 10.7 FL (ref 8.1–13.5)
POTASSIUM SERPL-SCNC: 4.5 MMOL/L (ref 3.7–5.3)
RBC # BLD: 2.95 M/UL (ref 4.21–5.77)
RBC # BLD: ABNORMAL 10*6/UL
SEG NEUTROPHILS: 79 % (ref 36–65)
SEGMENTED NEUTROPHILS ABSOLUTE COUNT: 6.53 K/UL (ref 1.5–8.1)
SODIUM BLD-SCNC: 133 MMOL/L (ref 135–144)
SPECIMEN DESCRIPTION: NORMAL
SPECIMEN DESCRIPTION: NORMAL
WBC # BLD: 8.3 K/UL (ref 3.5–11.3)
WBC # BLD: ABNORMAL 10*3/UL

## 2020-06-06 PROCEDURE — 6370000000 HC RX 637 (ALT 250 FOR IP): Performed by: NURSE PRACTITIONER

## 2020-06-06 PROCEDURE — 84100 ASSAY OF PHOSPHORUS: CPT

## 2020-06-06 PROCEDURE — 94640 AIRWAY INHALATION TREATMENT: CPT

## 2020-06-06 PROCEDURE — 93306 TTE W/DOPPLER COMPLETE: CPT

## 2020-06-06 PROCEDURE — 6360000002 HC RX W HCPCS: Performed by: INTERNAL MEDICINE

## 2020-06-06 PROCEDURE — 94761 N-INVAS EAR/PLS OXIMETRY MLT: CPT

## 2020-06-06 PROCEDURE — 6370000000 HC RX 637 (ALT 250 FOR IP): Performed by: INTERNAL MEDICINE

## 2020-06-06 PROCEDURE — 80048 BASIC METABOLIC PNL TOTAL CA: CPT

## 2020-06-06 PROCEDURE — 2580000003 HC RX 258: Performed by: INTERNAL MEDICINE

## 2020-06-06 PROCEDURE — 6370000000 HC RX 637 (ALT 250 FOR IP): Performed by: SPECIALIST

## 2020-06-06 PROCEDURE — 85025 COMPLETE CBC W/AUTO DIFF WBC: CPT

## 2020-06-06 PROCEDURE — 82947 ASSAY GLUCOSE BLOOD QUANT: CPT

## 2020-06-06 PROCEDURE — 6370000000 HC RX 637 (ALT 250 FOR IP): Performed by: UROLOGY

## 2020-06-06 PROCEDURE — 83735 ASSAY OF MAGNESIUM: CPT

## 2020-06-06 PROCEDURE — 36415 COLL VENOUS BLD VENIPUNCTURE: CPT

## 2020-06-06 RX ORDER — PREDNISONE 10 MG/1
10 TABLET ORAL DAILY
Qty: 4 TABLET | Refills: 0 | Status: SHIPPED | OUTPATIENT
Start: 2020-06-06 | End: 2020-06-10

## 2020-06-06 RX ORDER — POLYETHYLENE GLYCOL 3350 17 G/17G
17 POWDER, FOR SOLUTION ORAL DAILY PRN
Qty: 527 G | Refills: 0 | Status: SHIPPED | OUTPATIENT
Start: 2020-06-06 | End: 2020-07-06

## 2020-06-06 RX ORDER — BICALUTAMIDE 50 MG/1
50 TABLET, FILM COATED ORAL DAILY
Qty: 90 TABLET | Refills: 3 | Status: ON HOLD | OUTPATIENT
Start: 2020-06-07 | End: 2020-11-15 | Stop reason: HOSPADM

## 2020-06-06 RX ORDER — ISOSORBIDE MONONITRATE 30 MG/1
30 TABLET, EXTENDED RELEASE ORAL DAILY
Qty: 30 TABLET | Refills: 0 | Status: SHIPPED | OUTPATIENT
Start: 2020-06-07 | End: 2020-11-10 | Stop reason: SDDI

## 2020-06-06 RX ORDER — PREDNISONE 1 MG/1
5 TABLET ORAL DAILY
Qty: 10 TABLET | Refills: 0 | Status: SHIPPED | OUTPATIENT
Start: 2020-06-06 | End: 2020-06-16

## 2020-06-06 RX ORDER — METOPROLOL SUCCINATE 50 MG/1
50 TABLET, EXTENDED RELEASE ORAL DAILY
Status: DISCONTINUED | OUTPATIENT
Start: 2020-06-07 | End: 2020-06-06 | Stop reason: HOSPADM

## 2020-06-06 RX ORDER — SODIUM BICARBONATE 650 MG/1
650 TABLET ORAL 2 TIMES DAILY
Qty: 60 TABLET | Refills: 0 | Status: SHIPPED | OUTPATIENT
Start: 2020-06-06 | End: 2020-11-10

## 2020-06-06 RX ORDER — FUROSEMIDE 40 MG/1
40 TABLET ORAL
Qty: 60 TABLET | Refills: 0 | Status: ON HOLD | OUTPATIENT
Start: 2020-06-08 | End: 2020-06-15 | Stop reason: HOSPADM

## 2020-06-06 RX ORDER — PREDNISONE 10 MG/1
10 TABLET ORAL
Qty: 4 TABLET | Refills: 0 | Status: ON HOLD | OUTPATIENT
Start: 2020-06-06 | End: 2020-06-15

## 2020-06-06 RX ORDER — PREDNISONE 20 MG/1
20 TABLET ORAL DAILY
Qty: 4 TABLET | Refills: 0 | Status: SHIPPED | OUTPATIENT
Start: 2020-06-06 | End: 2020-06-10

## 2020-06-06 RX ORDER — CEFUROXIME AXETIL 250 MG/1
250 TABLET ORAL EVERY 12 HOURS SCHEDULED
Qty: 10 TABLET | Refills: 0 | Status: SHIPPED | OUTPATIENT
Start: 2020-06-06 | End: 2020-06-15 | Stop reason: HOSPADM

## 2020-06-06 RX ORDER — METOPROLOL SUCCINATE 50 MG/1
50 TABLET, EXTENDED RELEASE ORAL DAILY
Qty: 30 TABLET | Refills: 0 | Status: SHIPPED | OUTPATIENT
Start: 2020-06-07

## 2020-06-06 RX ORDER — IPRATROPIUM BROMIDE AND ALBUTEROL SULFATE 2.5; .5 MG/3ML; MG/3ML
3 SOLUTION RESPIRATORY (INHALATION) 4 TIMES DAILY
Qty: 360 ML | Refills: 0 | Status: ON HOLD | OUTPATIENT
Start: 2020-06-06 | End: 2020-11-11

## 2020-06-06 RX ORDER — HYDRALAZINE HYDROCHLORIDE 50 MG/1
50 TABLET, FILM COATED ORAL EVERY 8 HOURS SCHEDULED
Status: DISCONTINUED | OUTPATIENT
Start: 2020-06-06 | End: 2020-06-06 | Stop reason: HOSPADM

## 2020-06-06 RX ORDER — HYDRALAZINE HYDROCHLORIDE 50 MG/1
50 TABLET, FILM COATED ORAL EVERY 8 HOURS SCHEDULED
Qty: 90 TABLET | Refills: 0 | Status: SHIPPED | OUTPATIENT
Start: 2020-06-06

## 2020-06-06 RX ADMIN — IPRATROPIUM BROMIDE AND ALBUTEROL SULFATE 1 AMPULE: .5; 3 SOLUTION RESPIRATORY (INHALATION) at 11:51

## 2020-06-06 RX ADMIN — SODIUM BICARBONATE 650 MG: 650 TABLET ORAL at 09:00

## 2020-06-06 RX ADMIN — BICALUTAMIDE 50 MG: 50 TABLET ORAL at 09:00

## 2020-06-06 RX ADMIN — ACETAMINOPHEN 650 MG: 325 TABLET ORAL at 15:17

## 2020-06-06 RX ADMIN — ALLOPURINOL 100 MG: 100 TABLET ORAL at 09:01

## 2020-06-06 RX ADMIN — SODIUM CHLORIDE, PRESERVATIVE FREE 10 ML: 5 INJECTION INTRAVENOUS at 09:04

## 2020-06-06 RX ADMIN — MELATONIN 1000 UNITS: at 09:01

## 2020-06-06 RX ADMIN — BUDESONIDE INHALATION 500 MCG: 0.5 SUSPENSION RESPIRATORY (INHALATION) at 09:15

## 2020-06-06 RX ADMIN — HYDRALAZINE HYDROCHLORIDE 25 MG: 25 TABLET, FILM COATED ORAL at 06:12

## 2020-06-06 RX ADMIN — HEPARIN SODIUM 5000 UNITS: 5000 INJECTION INTRAVENOUS; SUBCUTANEOUS at 06:12

## 2020-06-06 RX ADMIN — IPRATROPIUM BROMIDE AND ALBUTEROL SULFATE 1 AMPULE: .5; 3 SOLUTION RESPIRATORY (INHALATION) at 15:27

## 2020-06-06 RX ADMIN — SODIUM CHLORIDE, PRESERVATIVE FREE 10 ML: 5 INJECTION INTRAVENOUS at 09:03

## 2020-06-06 RX ADMIN — ALBUTEROL SULFATE 2.5 MG: 2.5 SOLUTION RESPIRATORY (INHALATION) at 00:21

## 2020-06-06 RX ADMIN — METOPROLOL SUCCINATE 25 MG: 25 TABLET, EXTENDED RELEASE ORAL at 09:01

## 2020-06-06 RX ADMIN — ARFORMOTEROL TARTRATE 15 MCG: 15 SOLUTION RESPIRATORY (INHALATION) at 09:15

## 2020-06-06 RX ADMIN — FUROSEMIDE 40 MG: 40 TABLET ORAL at 09:01

## 2020-06-06 RX ADMIN — HEPARIN SODIUM 5000 UNITS: 5000 INJECTION INTRAVENOUS; SUBCUTANEOUS at 14:19

## 2020-06-06 RX ADMIN — PANTOPRAZOLE SODIUM 40 MG: 40 TABLET, DELAYED RELEASE ORAL at 06:12

## 2020-06-06 RX ADMIN — PREDNISONE 20 MG: 20 TABLET ORAL at 09:01

## 2020-06-06 RX ADMIN — CEFUROXIME AXETIL 250 MG: 250 TABLET ORAL at 09:01

## 2020-06-06 RX ADMIN — ISOSORBIDE MONONITRATE 30 MG: 30 TABLET ORAL at 09:01

## 2020-06-06 RX ADMIN — ONDANSETRON 4 MG: 2 INJECTION INTRAMUSCULAR; INTRAVENOUS at 03:04

## 2020-06-06 RX ADMIN — HYDRALAZINE HYDROCHLORIDE 50 MG: 50 TABLET, FILM COATED ORAL at 14:19

## 2020-06-06 RX ADMIN — IPRATROPIUM BROMIDE AND ALBUTEROL SULFATE 1 AMPULE: .5; 3 SOLUTION RESPIRATORY (INHALATION) at 09:15

## 2020-06-06 ASSESSMENT — PAIN DESCRIPTION - PAIN TYPE
TYPE: ACUTE PAIN
TYPE: ACUTE PAIN

## 2020-06-06 ASSESSMENT — PAIN DESCRIPTION - ORIENTATION
ORIENTATION: RIGHT
ORIENTATION: RIGHT

## 2020-06-06 ASSESSMENT — PAIN SCALES - GENERAL
PAINLEVEL_OUTOF10: 3
PAINLEVEL_OUTOF10: 6
PAINLEVEL_OUTOF10: 5

## 2020-06-06 ASSESSMENT — PAIN DESCRIPTION - LOCATION
LOCATION: RIB CAGE
LOCATION: RIB CAGE

## 2020-06-06 NOTE — PROGRESS NOTES
.All patient belongings collected. Midline and telemetry removed. Discharge paperwork given and explained to patient. All questions answered. Paper scripts and lab work papers given to patient. Patient awaiting family to pick him up. Patient going home with current home care services.

## 2020-06-06 NOTE — PROGRESS NOTES
06/06/2020    MCV 93.9 06/06/2020     06/06/2020     BMP:    Lab Results   Component Value Date     (L) 06/06/2020     (L) 06/05/2020     (L) 06/04/2020    K 4.5 06/06/2020    K 4.3 06/05/2020    K 4.3 06/04/2020    CL 99 06/06/2020    CL 98 06/05/2020     06/04/2020    CO2 21 06/06/2020    CO2 22 06/05/2020    CO2 20 06/04/2020    BUN 46 (H) 06/06/2020    BUN 45 (H) 06/05/2020    BUN 41 (H) 06/04/2020    CREATININE 2.58 (H) 06/06/2020    CREATININE 2.94 (H) 06/05/2020    CREATININE 2.73 (H) 06/04/2020    GLUCOSE 82 06/06/2020    GLUCOSE 84 06/05/2020    GLUCOSE 91 06/04/2020     CMP:   Lab Results   Component Value Date     06/06/2020    K 4.5 06/06/2020    CL 99 06/06/2020    CO2 21 06/06/2020    BUN 46 06/06/2020    CREATININE 2.58 06/06/2020    GLUCOSE 82 06/06/2020    CALCIUM 8.4 06/06/2020    PROT 6.5 05/30/2020    LABALBU 3.2 05/30/2020    BILITOT 0.25 05/30/2020    ALKPHOS 88 05/30/2020    AST 17 05/30/2020    ALT 11 05/30/2020      Hepatic:   Lab Results   Component Value Date    AST 17 05/30/2020    AST 17 05/29/2020    AST 35 05/16/2020    ALT 11 05/30/2020    ALT 11 05/29/2020    ALT 11 05/16/2020    BILITOT 0.25 (L) 05/30/2020    BILITOT 0.32 05/29/2020    BILITOT 0.16 (L) 05/16/2020    ALKPHOS 88 05/30/2020    ALKPHOS 87 05/29/2020    ALKPHOS 77 05/16/2020     BNP:   Lab Results   Component Value Date    BNP 39 12/12/2012     Lipids: No results found for: CHOL, HDL  INR:   Lab Results   Component Value Date    INR 1.0 06/01/2020    INR 1.0 05/14/2020    INR 1.0 04/12/2020     PTH: No results found for: PTH  Phosphorus:    Lab Results   Component Value Date    PHOS 3.7 06/06/2020     Ionized Calcium: No results found for: IONCA  Magnesium:   Lab Results   Component Value Date    MG 1.8 06/06/2020     Albumin:   Lab Results   Component Value Date    LABALBU 3.2 05/30/2020     Last 3 CK, CKMB, Troponin: @LABRCNT(CKTOTAL:3,CKMB:3,TROPONINI:3)       URINE:)No results found for: eRese Uribe     Radiology:   Reviewed. Assessment:  Acute kidney injury. Negative urine eosinophils. Cr is stable. CKD stage 3/4  Hyponatremia  Cardiomyopathy, EF 45%  Recent history of hemoptysis  Bilateral pleural effusion  Metabolic acidosis  Proteinuria with protein creatinine ratio of 2.8. HORACIO, ANCA and antiGBM negative. Plan:  Changed Lasix to 40 mg every other day (6/3). Monitor GFR electrolytes volume status blood pressure. Fluid restriction of 1200 mL per 24 hours. Continue sodium bicarbonate at the current dose with a target serum bicarb of 22. Avoid nephrotoxic drugs and IV contrast exposure. Follow up chemistries. We will continue to follow along with you.        Electronically signed by Eva Kang MD  on 6/6/2020 at 10:13 AM

## 2020-06-06 NOTE — DISCHARGE SUMMARY
Physician Discharge Summary     Patient ID:  Andrea Shi  [id number]  81 y.o.  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 malnutrition (HCC)    Facial edema    Common iliac aneurysm (HCC)    Essential hypertension    Stage 3 chronic kidney disease (HCC)    Acute kidney injury superimposed on chronic kidney disease ([address]    NSTEMI (non-ST elevated myocardial infarction) ([address]    Pathological fracture of lumbar vertebra due to secondary osteoporosis ([address]    Age-related osteoporosis with current pathological fracture    Intractable back pain    Iron deficiency anemia    Anemia, blood loss       Discharge Diagnoses:   Acute bronchitis  COPD exacerbation  Acute systolic CHF  Acute kidney injury on top of CKD 3  Iron deficiency anemia pleural effusions    Elevated troponins  History of laryngeal CA with metastasis to the floor of the mouth had surgery and reconstructive surgery about a year ago  Tracheostomy present from before  L2 compression fracture  History of prostate CA with currently elevated PSA  Consults: cardiology, pulmonary/intensive care, GI, nephrology and orthopedic surgery    Procedures: Thoracentesis    Hospital Course: 61-year-old gentleman admitted with shortness of breath wheezing admitted as acute bronchitis COPD exacerbation and acute CHF systolic was found also to have acute kidney injury on top of CKD 3 to CKD 4, IV fluids IV antibiotics steroids bronchodilators oxygen and diuretics were all instituted patient had slow recovery, needed thoracentesis, was found to have iron deficiency anemia GI saw the patient will be followed as an outpatient for EGD and colonoscopy    Discharge Exam:  See progress note from today    Disposition:

## 2020-06-07 LAB
CULTURE: ABNORMAL
DIRECT EXAM: ABNORMAL
Lab: ABNORMAL
SPECIMEN DESCRIPTION: ABNORMAL

## 2020-06-08 ENCOUNTER — CARE COORDINATION (OUTPATIENT)
Dept: CASE MANAGEMENT | Age: 70
End: 2020-06-08

## 2020-06-09 ENCOUNTER — CARE COORDINATION (OUTPATIENT)
Dept: CASE MANAGEMENT | Age: 70
End: 2020-06-09

## 2020-06-11 ENCOUNTER — APPOINTMENT (OUTPATIENT)
Dept: GENERAL RADIOLOGY | Age: 70
DRG: 280 | End: 2020-06-11
Payer: MEDICARE

## 2020-06-11 ENCOUNTER — HOSPITAL ENCOUNTER (INPATIENT)
Age: 70
LOS: 3 days | Discharge: HOME HEALTH | DRG: 280 | End: 2020-06-15
Attending: EMERGENCY MEDICINE | Admitting: INTERNAL MEDICINE
Payer: MEDICARE

## 2020-06-11 LAB
ABSOLUTE EOS #: 0.05 K/UL (ref 0–0.44)
ABSOLUTE IMMATURE GRANULOCYTE: 0.05 K/UL (ref 0–0.3)
ABSOLUTE LYMPH #: 1.51 K/UL (ref 1.1–3.7)
ABSOLUTE MONO #: 0.3 K/UL (ref 0.1–1.2)
BASOPHILS # BLD: 0 % (ref 0–2)
BASOPHILS ABSOLUTE: <0.03 K/UL (ref 0–0.2)
DIFFERENTIAL TYPE: ABNORMAL
EOSINOPHILS RELATIVE PERCENT: 1 % (ref 1–4)
HCT VFR BLD CALC: 25.2 % (ref 40.7–50.3)
HEMOGLOBIN: 7.9 G/DL (ref 13–17)
IMMATURE GRANULOCYTES: 1 %
LYMPHOCYTES # BLD: 20 % (ref 24–43)
MCH RBC QN AUTO: 29.6 PG (ref 25.2–33.5)
MCHC RBC AUTO-ENTMCNC: 31.3 G/DL (ref 28.4–34.8)
MCV RBC AUTO: 94.4 FL (ref 82.6–102.9)
MONOCYTES # BLD: 4 % (ref 3–12)
NRBC AUTOMATED: 0 PER 100 WBC
PDW BLD-RTO: 17.5 % (ref 11.8–14.4)
PLATELET # BLD: 207 K/UL (ref 138–453)
PLATELET ESTIMATE: ABNORMAL
PMV BLD AUTO: 10.4 FL (ref 8.1–13.5)
RBC # BLD: 2.67 M/UL (ref 4.21–5.77)
RBC # BLD: ABNORMAL 10*6/UL
SEG NEUTROPHILS: 75 % (ref 36–65)
SEGMENTED NEUTROPHILS ABSOLUTE COUNT: 5.83 K/UL (ref 1.5–8.1)
TROPONIN INTERP: ABNORMAL
TROPONIN T: ABNORMAL NG/ML
TROPONIN, HIGH SENSITIVITY: 100 NG/L (ref 0–22)
WBC # BLD: 7.8 K/UL (ref 3.5–11.3)
WBC # BLD: ABNORMAL 10*3/UL

## 2020-06-11 PROCEDURE — 99285 EMERGENCY DEPT VISIT HI MDM: CPT

## 2020-06-11 PROCEDURE — 83880 ASSAY OF NATRIURETIC PEPTIDE: CPT

## 2020-06-11 PROCEDURE — 87086 URINE CULTURE/COLONY COUNT: CPT

## 2020-06-11 PROCEDURE — 71045 X-RAY EXAM CHEST 1 VIEW: CPT

## 2020-06-11 PROCEDURE — 81001 URINALYSIS AUTO W/SCOPE: CPT

## 2020-06-11 PROCEDURE — 80053 COMPREHEN METABOLIC PANEL: CPT

## 2020-06-11 PROCEDURE — 96375 TX/PRO/DX INJ NEW DRUG ADDON: CPT

## 2020-06-11 PROCEDURE — 85025 COMPLETE CBC W/AUTO DIFF WBC: CPT

## 2020-06-11 PROCEDURE — 83690 ASSAY OF LIPASE: CPT

## 2020-06-11 PROCEDURE — 6360000002 HC RX W HCPCS: Performed by: EMERGENCY MEDICINE

## 2020-06-11 PROCEDURE — 93005 ELECTROCARDIOGRAM TRACING: CPT | Performed by: EMERGENCY MEDICINE

## 2020-06-11 PROCEDURE — 84484 ASSAY OF TROPONIN QUANT: CPT

## 2020-06-11 PROCEDURE — 96365 THER/PROPH/DIAG IV INF INIT: CPT

## 2020-06-11 RX ORDER — METHYLPREDNISOLONE SODIUM SUCCINATE 125 MG/2ML
125 INJECTION, POWDER, LYOPHILIZED, FOR SOLUTION INTRAMUSCULAR; INTRAVENOUS ONCE
Status: COMPLETED | OUTPATIENT
Start: 2020-06-11 | End: 2020-06-11

## 2020-06-11 RX ORDER — ALBUTEROL SULFATE 90 UG/1
2 AEROSOL, METERED RESPIRATORY (INHALATION)
Status: DISCONTINUED | OUTPATIENT
Start: 2020-06-11 | End: 2020-06-12

## 2020-06-11 RX ORDER — MAGNESIUM SULFATE 1 G/100ML
1 INJECTION INTRAVENOUS ONCE
Status: COMPLETED | OUTPATIENT
Start: 2020-06-11 | End: 2020-06-12

## 2020-06-11 RX ADMIN — METHYLPREDNISOLONE SODIUM SUCCINATE 125 MG: 125 INJECTION, POWDER, FOR SOLUTION INTRAMUSCULAR; INTRAVENOUS at 23:12

## 2020-06-11 RX ADMIN — MAGNESIUM SULFATE 1 G: 1 INJECTION INTRAVENOUS at 23:12

## 2020-06-11 ASSESSMENT — PAIN DESCRIPTION - DESCRIPTORS: DESCRIPTORS: DISCOMFORT

## 2020-06-11 ASSESSMENT — PAIN SCALES - WONG BAKER: WONGBAKER_NUMERICALRESPONSE: 8

## 2020-06-11 ASSESSMENT — PAIN DESCRIPTION - LOCATION: LOCATION: ABDOMEN;CHEST

## 2020-06-12 ENCOUNTER — APPOINTMENT (OUTPATIENT)
Dept: GENERAL RADIOLOGY | Age: 70
DRG: 280 | End: 2020-06-12
Payer: MEDICARE

## 2020-06-12 ENCOUNTER — APPOINTMENT (OUTPATIENT)
Dept: CT IMAGING | Age: 70
DRG: 280 | End: 2020-06-12
Payer: MEDICARE

## 2020-06-12 PROBLEM — J90 PLEURAL EFFUSION, BILATERAL: Status: ACTIVE | Noted: 2020-06-12

## 2020-06-12 PROBLEM — I50.33 DIASTOLIC CHF, ACUTE ON CHRONIC (HCC): Status: ACTIVE | Noted: 2020-06-12

## 2020-06-12 LAB
-: ABNORMAL
ALBUMIN SERPL-MCNC: 3.3 G/DL (ref 3.5–5.2)
ALBUMIN/GLOBULIN RATIO: 1.3 (ref 1–2.5)
ALP BLD-CCNC: 107 U/L (ref 40–129)
ALT SERPL-CCNC: 46 U/L (ref 5–41)
AMORPHOUS: ABNORMAL
ANION GAP SERPL CALCULATED.3IONS-SCNC: 14 MMOL/L (ref 9–17)
AST SERPL-CCNC: 43 U/L
BACTERIA: ABNORMAL
BILIRUB SERPL-MCNC: <0.1 MG/DL (ref 0.3–1.2)
BILIRUBIN URINE: NEGATIVE
BNP INTERPRETATION: ABNORMAL
BUN BLDV-MCNC: 47 MG/DL (ref 8–23)
BUN/CREAT BLD: ABNORMAL (ref 9–20)
CALCIUM SERPL-MCNC: 7.6 MG/DL (ref 8.6–10.4)
CASTS UA: ABNORMAL /LPF (ref 0–8)
CHLORIDE BLD-SCNC: 97 MMOL/L (ref 98–107)
CO2: 19 MMOL/L (ref 20–31)
COLOR: YELLOW
CREAT SERPL-MCNC: 2.38 MG/DL (ref 0.7–1.2)
CRYSTALS, UA: ABNORMAL /HPF
EKG ATRIAL RATE: 75 BPM
EKG P AXIS: 21 DEGREES
EKG P-R INTERVAL: 140 MS
EKG Q-T INTERVAL: 408 MS
EKG QRS DURATION: 88 MS
EKG QTC CALCULATION (BAZETT): 455 MS
EKG R AXIS: 6 DEGREES
EKG T AXIS: -126 DEGREES
EKG VENTRICULAR RATE: 75 BPM
EPITHELIAL CELLS UA: ABNORMAL /HPF (ref 0–5)
GFR AFRICAN AMERICAN: 33 ML/MIN
GFR NON-AFRICAN AMERICAN: 27 ML/MIN
GFR SERPL CREATININE-BSD FRML MDRD: ABNORMAL ML/MIN/{1.73_M2}
GFR SERPL CREATININE-BSD FRML MDRD: ABNORMAL ML/MIN/{1.73_M2}
GLUCOSE BLD-MCNC: 81 MG/DL (ref 70–99)
GLUCOSE URINE: NEGATIVE
KETONES, URINE: NEGATIVE
LEUKOCYTE ESTERASE, URINE: NEGATIVE
LIPASE: 23 U/L (ref 13–60)
MUCUS: ABNORMAL
MYOGLOBIN: 128 NG/ML (ref 28–72)
NITRITE, URINE: NEGATIVE
OTHER OBSERVATIONS UA: ABNORMAL
PH UA: 6.5 (ref 5–8)
POTASSIUM SERPL-SCNC: 4.8 MMOL/L (ref 3.7–5.3)
PRO-BNP: ABNORMAL PG/ML
PROTEIN UA: ABNORMAL
RBC UA: ABNORMAL /HPF (ref 0–4)
RENAL EPITHELIAL, UA: ABNORMAL /HPF
SODIUM BLD-SCNC: 130 MMOL/L (ref 135–144)
SPECIFIC GRAVITY UA: 1.01 (ref 1–1.03)
TOTAL PROTEIN: 5.8 G/DL (ref 6.4–8.3)
TRICHOMONAS: ABNORMAL
TROPONIN INTERP: ABNORMAL
TROPONIN INTERP: ABNORMAL
TROPONIN T: ABNORMAL NG/ML
TROPONIN T: ABNORMAL NG/ML
TROPONIN, HIGH SENSITIVITY: 84 NG/L (ref 0–22)
TROPONIN, HIGH SENSITIVITY: 98 NG/L (ref 0–22)
TURBIDITY: CLEAR
URINE HGB: NEGATIVE
UROBILINOGEN, URINE: NORMAL
WBC UA: ABNORMAL /HPF (ref 0–5)
YEAST: ABNORMAL

## 2020-06-12 PROCEDURE — 6360000002 HC RX W HCPCS: Performed by: NURSE PRACTITIONER

## 2020-06-12 PROCEDURE — 94640 AIRWAY INHALATION TREATMENT: CPT

## 2020-06-12 PROCEDURE — 2580000003 HC RX 258: Performed by: NURSE PRACTITIONER

## 2020-06-12 PROCEDURE — 6370000000 HC RX 637 (ALT 250 FOR IP): Performed by: NURSE PRACTITIONER

## 2020-06-12 PROCEDURE — 6360000002 HC RX W HCPCS: Performed by: EMERGENCY MEDICINE

## 2020-06-12 PROCEDURE — 6360000002 HC RX W HCPCS: Performed by: INTERNAL MEDICINE

## 2020-06-12 PROCEDURE — 94761 N-INVAS EAR/PLS OXIMETRY MLT: CPT

## 2020-06-12 PROCEDURE — 76937 US GUIDE VASCULAR ACCESS: CPT

## 2020-06-12 PROCEDURE — 93010 ELECTROCARDIOGRAM REPORT: CPT | Performed by: INTERNAL MEDICINE

## 2020-06-12 PROCEDURE — 83874 ASSAY OF MYOGLOBIN: CPT

## 2020-06-12 PROCEDURE — 6360000002 HC RX W HCPCS: Performed by: STUDENT IN AN ORGANIZED HEALTH CARE EDUCATION/TRAINING PROGRAM

## 2020-06-12 PROCEDURE — 96375 TX/PRO/DX INJ NEW DRUG ADDON: CPT

## 2020-06-12 PROCEDURE — 2060000000 HC ICU INTERMEDIATE R&B

## 2020-06-12 PROCEDURE — 71045 X-RAY EXAM CHEST 1 VIEW: CPT

## 2020-06-12 PROCEDURE — 84484 ASSAY OF TROPONIN QUANT: CPT

## 2020-06-12 PROCEDURE — 99223 1ST HOSP IP/OBS HIGH 75: CPT | Performed by: INTERNAL MEDICINE

## 2020-06-12 PROCEDURE — 71250 CT THORAX DX C-: CPT

## 2020-06-12 PROCEDURE — 2700000000 HC OXYGEN THERAPY PER DAY

## 2020-06-12 RX ORDER — BICALUTAMIDE 50 MG/1
50 TABLET, FILM COATED ORAL DAILY
Status: DISCONTINUED | OUTPATIENT
Start: 2020-06-12 | End: 2020-06-15 | Stop reason: HOSPADM

## 2020-06-12 RX ORDER — CALCIUM GLUCONATE 20 MG/ML
2 INJECTION, SOLUTION INTRAVENOUS ONCE
Status: COMPLETED | OUTPATIENT
Start: 2020-06-12 | End: 2020-06-12

## 2020-06-12 RX ORDER — AMLODIPINE BESYLATE 10 MG/1
10 TABLET ORAL DAILY
Status: DISCONTINUED | OUTPATIENT
Start: 2020-06-12 | End: 2020-06-15 | Stop reason: HOSPADM

## 2020-06-12 RX ORDER — ALBUTEROL SULFATE 2.5 MG/3ML
2.5 SOLUTION RESPIRATORY (INHALATION)
Status: DISCONTINUED | OUTPATIENT
Start: 2020-06-12 | End: 2020-06-15 | Stop reason: HOSPADM

## 2020-06-12 RX ORDER — BUDESONIDE AND FORMOTEROL FUMARATE DIHYDRATE 160; 4.5 UG/1; UG/1
2 AEROSOL RESPIRATORY (INHALATION) 2 TIMES DAILY
Status: DISCONTINUED | OUTPATIENT
Start: 2020-06-12 | End: 2020-06-15 | Stop reason: HOSPADM

## 2020-06-12 RX ORDER — PROMETHAZINE HYDROCHLORIDE 25 MG/1
12.5 TABLET ORAL EVERY 6 HOURS PRN
Status: DISCONTINUED | OUTPATIENT
Start: 2020-06-12 | End: 2020-06-15 | Stop reason: HOSPADM

## 2020-06-12 RX ORDER — POLYETHYLENE GLYCOL 3350 17 G/17G
17 POWDER, FOR SOLUTION ORAL DAILY PRN
Status: DISCONTINUED | OUTPATIENT
Start: 2020-06-12 | End: 2020-06-15 | Stop reason: HOSPADM

## 2020-06-12 RX ORDER — ISOSORBIDE MONONITRATE 30 MG/1
30 TABLET, EXTENDED RELEASE ORAL DAILY
Status: DISCONTINUED | OUTPATIENT
Start: 2020-06-12 | End: 2020-06-15 | Stop reason: HOSPADM

## 2020-06-12 RX ORDER — MORPHINE SULFATE 4 MG/ML
4 INJECTION, SOLUTION INTRAMUSCULAR; INTRAVENOUS ONCE
Status: COMPLETED | OUTPATIENT
Start: 2020-06-12 | End: 2020-06-12

## 2020-06-12 RX ORDER — IPRATROPIUM BROMIDE AND ALBUTEROL SULFATE 2.5; .5 MG/3ML; MG/3ML
3 SOLUTION RESPIRATORY (INHALATION) 4 TIMES DAILY
Status: DISCONTINUED | OUTPATIENT
Start: 2020-06-12 | End: 2020-06-15 | Stop reason: HOSPADM

## 2020-06-12 RX ORDER — FAMOTIDINE 20 MG/1
20 TABLET, FILM COATED ORAL DAILY
Status: DISCONTINUED | OUTPATIENT
Start: 2020-06-12 | End: 2020-06-15 | Stop reason: HOSPADM

## 2020-06-12 RX ORDER — ONDANSETRON 2 MG/ML
4 INJECTION INTRAMUSCULAR; INTRAVENOUS ONCE
Status: COMPLETED | OUTPATIENT
Start: 2020-06-12 | End: 2020-06-12

## 2020-06-12 RX ORDER — HYDRALAZINE HYDROCHLORIDE 50 MG/1
50 TABLET, FILM COATED ORAL EVERY 8 HOURS SCHEDULED
Status: DISCONTINUED | OUTPATIENT
Start: 2020-06-12 | End: 2020-06-15 | Stop reason: HOSPADM

## 2020-06-12 RX ORDER — NICOTINE 21 MG/24HR
1 PATCH, TRANSDERMAL 24 HOURS TRANSDERMAL DAILY PRN
Status: DISCONTINUED | OUTPATIENT
Start: 2020-06-12 | End: 2020-06-15 | Stop reason: HOSPADM

## 2020-06-12 RX ORDER — PREDNISONE 1 MG/1
5 TABLET ORAL DAILY
Status: DISCONTINUED | OUTPATIENT
Start: 2020-06-12 | End: 2020-06-12

## 2020-06-12 RX ORDER — METOPROLOL SUCCINATE 50 MG/1
50 TABLET, EXTENDED RELEASE ORAL DAILY
Status: DISCONTINUED | OUTPATIENT
Start: 2020-06-12 | End: 2020-06-15 | Stop reason: HOSPADM

## 2020-06-12 RX ORDER — SODIUM CHLORIDE 0.9 % (FLUSH) 0.9 %
10 SYRINGE (ML) INJECTION EVERY 12 HOURS SCHEDULED
Status: DISCONTINUED | OUTPATIENT
Start: 2020-06-12 | End: 2020-06-15 | Stop reason: HOSPADM

## 2020-06-12 RX ORDER — DIPHENHYDRAMINE HCL 25 MG
25 TABLET ORAL ONCE
Status: COMPLETED | OUTPATIENT
Start: 2020-06-12 | End: 2020-06-12

## 2020-06-12 RX ORDER — ACETAMINOPHEN 325 MG/1
650 TABLET ORAL EVERY 6 HOURS PRN
Status: DISCONTINUED | OUTPATIENT
Start: 2020-06-12 | End: 2020-06-15 | Stop reason: HOSPADM

## 2020-06-12 RX ORDER — LORAZEPAM 2 MG/ML
1 INJECTION INTRAMUSCULAR ONCE
Status: COMPLETED | OUTPATIENT
Start: 2020-06-12 | End: 2020-06-12

## 2020-06-12 RX ORDER — CEFUROXIME AXETIL 250 MG/1
250 TABLET ORAL EVERY 12 HOURS SCHEDULED
Status: DISCONTINUED | OUTPATIENT
Start: 2020-06-12 | End: 2020-06-12 | Stop reason: ALTCHOICE

## 2020-06-12 RX ORDER — ALLOPURINOL 300 MG/1
300 TABLET ORAL DAILY
Status: DISCONTINUED | OUTPATIENT
Start: 2020-06-12 | End: 2020-06-15 | Stop reason: HOSPADM

## 2020-06-12 RX ORDER — FUROSEMIDE 10 MG/ML
20 INJECTION INTRAMUSCULAR; INTRAVENOUS ONCE
Status: COMPLETED | OUTPATIENT
Start: 2020-06-12 | End: 2020-06-12

## 2020-06-12 RX ORDER — DICYCLOMINE HYDROCHLORIDE 10 MG/1
10 CAPSULE ORAL 2 TIMES DAILY
Status: DISCONTINUED | OUTPATIENT
Start: 2020-06-12 | End: 2020-06-15 | Stop reason: HOSPADM

## 2020-06-12 RX ORDER — TRAMADOL HYDROCHLORIDE 50 MG/1
50 TABLET ORAL EVERY 6 HOURS PRN
Status: DISCONTINUED | OUTPATIENT
Start: 2020-06-12 | End: 2020-06-15 | Stop reason: HOSPADM

## 2020-06-12 RX ORDER — METHYLPREDNISOLONE SODIUM SUCCINATE 40 MG/ML
40 INJECTION, POWDER, LYOPHILIZED, FOR SOLUTION INTRAMUSCULAR; INTRAVENOUS EVERY 8 HOURS
Status: DISCONTINUED | OUTPATIENT
Start: 2020-06-12 | End: 2020-06-14

## 2020-06-12 RX ORDER — PREDNISONE 20 MG/1
10 TABLET ORAL
Status: DISCONTINUED | OUTPATIENT
Start: 2020-06-12 | End: 2020-06-12

## 2020-06-12 RX ORDER — IPRATROPIUM BROMIDE AND ALBUTEROL SULFATE 2.5; .5 MG/3ML; MG/3ML
1 SOLUTION RESPIRATORY (INHALATION)
Status: DISCONTINUED | OUTPATIENT
Start: 2020-06-12 | End: 2020-06-12

## 2020-06-12 RX ORDER — FUROSEMIDE 20 MG/1
40 TABLET ORAL
Status: DISCONTINUED | OUTPATIENT
Start: 2020-06-12 | End: 2020-06-12

## 2020-06-12 RX ORDER — PANTOPRAZOLE SODIUM 40 MG/1
40 TABLET, DELAYED RELEASE ORAL
Status: DISCONTINUED | OUTPATIENT
Start: 2020-06-12 | End: 2020-06-15 | Stop reason: HOSPADM

## 2020-06-12 RX ORDER — ONDANSETRON 2 MG/ML
4 INJECTION INTRAMUSCULAR; INTRAVENOUS EVERY 6 HOURS PRN
Status: DISCONTINUED | OUTPATIENT
Start: 2020-06-12 | End: 2020-06-15 | Stop reason: HOSPADM

## 2020-06-12 RX ORDER — ACETAMINOPHEN 650 MG/1
650 SUPPOSITORY RECTAL EVERY 6 HOURS PRN
Status: DISCONTINUED | OUTPATIENT
Start: 2020-06-12 | End: 2020-06-15 | Stop reason: HOSPADM

## 2020-06-12 RX ORDER — SODIUM BICARBONATE 650 MG/1
650 TABLET ORAL 2 TIMES DAILY
Status: DISCONTINUED | OUTPATIENT
Start: 2020-06-12 | End: 2020-06-13

## 2020-06-12 RX ORDER — VITAMIN B COMPLEX
2000 TABLET ORAL DAILY
Status: DISCONTINUED | OUTPATIENT
Start: 2020-06-12 | End: 2020-06-15 | Stop reason: HOSPADM

## 2020-06-12 RX ORDER — FUROSEMIDE 10 MG/ML
40 INJECTION INTRAMUSCULAR; INTRAVENOUS DAILY
Status: DISCONTINUED | OUTPATIENT
Start: 2020-06-12 | End: 2020-06-13

## 2020-06-12 RX ORDER — SODIUM CHLORIDE 0.9 % (FLUSH) 0.9 %
10 SYRINGE (ML) INJECTION PRN
Status: DISCONTINUED | OUTPATIENT
Start: 2020-06-12 | End: 2020-06-15 | Stop reason: HOSPADM

## 2020-06-12 RX ADMIN — ALLOPURINOL 300 MG: 300 TABLET ORAL at 10:01

## 2020-06-12 RX ADMIN — BICALUTAMIDE 50 MG: 50 TABLET ORAL at 18:39

## 2020-06-12 RX ADMIN — ISOSORBIDE MONONITRATE 30 MG: 30 TABLET ORAL at 10:05

## 2020-06-12 RX ADMIN — BUDESONIDE AND FORMOTEROL FUMARATE DIHYDRATE 2 PUFF: 160; 4.5 AEROSOL RESPIRATORY (INHALATION) at 09:50

## 2020-06-12 RX ADMIN — ALBUTEROL SULFATE 2.5 MG: 5 SOLUTION RESPIRATORY (INHALATION) at 01:12

## 2020-06-12 RX ADMIN — ONDANSETRON 4 MG: 2 INJECTION INTRAMUSCULAR; INTRAVENOUS at 00:45

## 2020-06-12 RX ADMIN — AMLODIPINE BESYLATE 10 MG: 10 TABLET ORAL at 10:02

## 2020-06-12 RX ADMIN — LORAZEPAM 1 MG: 2 INJECTION INTRAMUSCULAR; INTRAVENOUS at 01:26

## 2020-06-12 RX ADMIN — METHYLPREDNISOLONE SODIUM SUCCINATE 40 MG: 40 INJECTION, POWDER, FOR SOLUTION INTRAMUSCULAR; INTRAVENOUS at 18:40

## 2020-06-12 RX ADMIN — FAMOTIDINE 20 MG: 20 TABLET, FILM COATED ORAL at 10:05

## 2020-06-12 RX ADMIN — VITAMIN D, TAB 1000IU (100/BT) 2000 UNITS: 25 TAB at 10:05

## 2020-06-12 RX ADMIN — IPRATROPIUM BROMIDE AND ALBUTEROL SULFATE 3 ML: .5; 3 SOLUTION RESPIRATORY (INHALATION) at 13:50

## 2020-06-12 RX ADMIN — HYDRALAZINE HYDROCHLORIDE 50 MG: 50 TABLET, FILM COATED ORAL at 14:10

## 2020-06-12 RX ADMIN — IPRATROPIUM BROMIDE AND ALBUTEROL SULFATE 3 ML: .5; 3 SOLUTION RESPIRATORY (INHALATION) at 18:03

## 2020-06-12 RX ADMIN — FUROSEMIDE 20 MG: 10 INJECTION, SOLUTION INTRAMUSCULAR; INTRAVENOUS at 01:26

## 2020-06-12 RX ADMIN — ENOXAPARIN SODIUM 30 MG: 30 INJECTION SUBCUTANEOUS at 10:06

## 2020-06-12 RX ADMIN — IPRATROPIUM BROMIDE 0.5 MG: 0.5 SOLUTION RESPIRATORY (INHALATION) at 01:12

## 2020-06-12 RX ADMIN — SODIUM BICARBONATE 650 MG: 650 TABLET ORAL at 22:07

## 2020-06-12 RX ADMIN — Medication 10 ML: at 20:50

## 2020-06-12 RX ADMIN — DICYCLOMINE HYDROCHLORIDE 10 MG: 10 CAPSULE ORAL at 10:04

## 2020-06-12 RX ADMIN — DIPHENHYDRAMINE HCL 25 MG: 25 TABLET ORAL at 22:07

## 2020-06-12 RX ADMIN — PANTOPRAZOLE SODIUM 40 MG: 40 TABLET, DELAYED RELEASE ORAL at 10:04

## 2020-06-12 RX ADMIN — IPRATROPIUM BROMIDE AND ALBUTEROL SULFATE 3 ML: .5; 3 SOLUTION RESPIRATORY (INHALATION) at 21:57

## 2020-06-12 RX ADMIN — SODIUM BICARBONATE 650 MG: 650 TABLET ORAL at 10:01

## 2020-06-12 RX ADMIN — CALCIUM GLUCONATE 2 G: 20 INJECTION, SOLUTION INTRAVENOUS at 02:45

## 2020-06-12 RX ADMIN — DICYCLOMINE HYDROCHLORIDE 10 MG: 10 CAPSULE ORAL at 18:48

## 2020-06-12 RX ADMIN — METOPROLOL SUCCINATE 50 MG: 50 TABLET, FILM COATED, EXTENDED RELEASE ORAL at 10:05

## 2020-06-12 RX ADMIN — HYDRALAZINE HYDROCHLORIDE 50 MG: 50 TABLET, FILM COATED ORAL at 10:02

## 2020-06-12 RX ADMIN — BUDESONIDE AND FORMOTEROL FUMARATE DIHYDRATE 2 PUFF: 160; 4.5 AEROSOL RESPIRATORY (INHALATION) at 22:07

## 2020-06-12 RX ADMIN — FUROSEMIDE 40 MG: 20 TABLET ORAL at 10:03

## 2020-06-12 RX ADMIN — MORPHINE SULFATE 4 MG: 4 INJECTION INTRAVENOUS at 00:45

## 2020-06-12 RX ADMIN — ACETAMINOPHEN 650 MG: 325 TABLET ORAL at 13:04

## 2020-06-12 ASSESSMENT — PAIN SCALES - GENERAL: PAINLEVEL_OUTOF10: 8

## 2020-06-12 ASSESSMENT — ENCOUNTER SYMPTOMS
VOMITING: 0
SHORTNESS OF BREATH: 1
SORE THROAT: 0
EYE PAIN: 0
DIARRHEA: 0
NAUSEA: 0
ABDOMINAL PAIN: 1
COUGH: 1

## 2020-06-12 NOTE — PROGRESS NOTES
MDI. Re-evaluate at least every  48 hours.  If no treatments were needed in the last 24 hours, discontinue  assessments; Notify physician if condition deteriorates

## 2020-06-12 NOTE — H&P
Chronic left facial edema.  Lung cancer ([address] 3-4 years ago    [facility] [place]    Prostate cancer [facility])         Past Surgical History:     Past Surgical History:   Procedure Laterality Date    ABDOMINAL AORTIC ANEURYSM REPAIR, ENDOVASCULAR Right 4/7/2020    RIGHT ILIAC ARTERY ANEURYSM REPAIR ENDOVASCULAR performed by Katarina Ash MD at [address] [place] [address]  03/04/2020    No stents placed   [facility]  3-4 years ago    lung removed and \"voice box\" removed        Medications Prior to Admission:     Prior to Admission medications    Medication Sig Start Date End Date Taking?  Authorizing Provider   sodium bicarbonate 650 MG tablet Take 1 tablet by mouth 2 times daily 6/6/20   He Jacobs MD   isosorbide mononitrate (IMDUR) 30 MG extended release tablet Take 1 tablet by mouth daily 6/7/20   He Jacobs MD   ipratropium-albuterol (DUONEB) 0.5-2.5 (3) MG/3ML SOLN nebulizer solution Inhale 3 mLs into the lungs 4 times daily 6/6/20   He Jacobs MD   hydrALAZINE (APRESOLINE) 50 MG tablet Take 1 tablet by mouth every 8 hours 6/6/20   He Jacobs MD   metoprolol succinate (TOPROL XL) 50 MG extended release tablet Take 1 tablet by mouth daily 6/7/20   He Jacobs MD   furosemide (LASIX) 40 MG tablet Take 1 tablet by mouth every 48 hours 6/8/20   He Jacobs MD   polyethylene glycol (GLYCOLAX) 17 g packet Take 17 g by mouth daily as needed for Constipation 6/6/20 7/6/20  He Jacobs MD   predniSONE (DELTASONE) 10 MG tablet Take 1 tablet by mouth every 48 hours for 4 doses 6/6/20 6/13/20  He Jacobs MD   predniSONE (DELTASONE) 5 MG tablet Take 1 tablet by mouth daily for 10 days 6/6/20 6/16/20  He Jacobs MD   bicalutamide (CASODEX) 50 MG chemo tablet Take 1 tablet by mouth daily 6/7/20   He Jacobs MD   dicyclomine (BENTYL) 10 MG capsule Take 1 capsule by mouth 2 times daily for 14 days 5/16/20 Time: 06/11/20 10:27 PM   Result Value Ref Range    Ventricular Rate 75 BPM    Atrial Rate 75 BPM    P-R Interval 140 ms    QRS Duration 88 ms    Q-T Interval 408 ms    QTc Calculation (Bazett) 455 ms    P Axis 21 degrees    R Axis 6 degrees    T Axis -126 degrees   Urinalysis with Microscopic    Collection Time: 06/11/20 10:45 PM   Result Value Ref Range    Color, UA YELLOW YELLOW    Turbidity UA CLEAR CLEAR    Glucose, Ur NEGATIVE NEGATIVE    Bilirubin Urine NEGATIVE NEGATIVE    Ketones, Urine NEGATIVE NEGATIVE    Specific Gravity, UA 1.009 1.005 - 1.030    Urine Hgb NEGATIVE NEGATIVE    pH, UA 6.5 5.0 - 8.0    Protein, UA 3+ (A) NEGATIVE    Urobilinogen, Urine Normal Normal    Nitrite, Urine NEGATIVE NEGATIVE    Leukocyte Esterase, Urine NEGATIVE NEGATIVE    -          WBC, UA None 0 - 5 /HPF    RBC, UA 0 TO 2 0 - 4 /HPF    Casts UA NOT REPORTED 0 - 8 /LPF    Crystals, UA NOT REPORTED None /HPF    Epithelial Cells UA None 0 - 5 /HPF    Renal Epithelial, UA NOT REPORTED 0 /HPF    Bacteria, UA NOT REPORTED None    Mucus, UA NOT REPORTED None    Trichomonas, UA NOT REPORTED None    Amorphous, UA NOT REPORTED None    Other Observations UA NOT REPORTED NOT REQ.     Yeast, UA NOT REPORTED None   CBC WITH AUTO DIFFERENTIAL    Collection Time: 06/11/20 11:19 PM   Result Value Ref Range    WBC 7.8 3.5 - 11.3 k/uL    RBC 2.67 (L) 4.21 - 5.77 m/uL    Hemoglobin 7.9 (L) 13.0 - 17.0 g/dL    Hematocrit 25.2 (L) 40.7 - 50.3 %    MCV 94.4 82.6 - 102.9 fL    MCH 29.6 25.2 - 33.5 pg    MCHC 31.3 28.4 - 34.8 g/dL    RDW 17.5 (H) 11.8 - 14.4 %    Platelets 108 253 - 131 k/uL    MPV 10.4 8.1 - 13.5 fL    NRBC Automated 0.0 0.0 per 100 WBC    Differential Type NOT REPORTED     WBC Morphology NOT REPORTED     RBC Morphology ANISOCYTOSIS PRESENT     Platelet Estimate NOT REPORTED     Seg Neutrophils 75 (H) 36 - 65 %    Lymphocytes 20 (L) 24 - 43 %    Monocytes 4 3 - 12 %    Eosinophils % 1 1 - 4 %    Basophils 0 0 - 2 %    Immature PM    Copy sent to Dr. Karyn Thomson MD

## 2020-06-12 NOTE — ED PROVIDER NOTES
[facility] [address] [facility]     Emergency Department     Faculty Attestation        I performed a history and physical examination of the patient and discussed management with the resident. I reviewed the residents note and agree with the documented findings and plan of care. Any areas of disagreement are noted on the chart. I was personally present for the key portions of any procedures. I have documented in the chart those procedures where I was not present during the key portions. I have reviewed the emergency nurses triage note. I agree with the chief complaint, past medical history, past surgical history, allergies, medications, social and family history as documented unless otherwise noted below. For Physician Assistant/ Nurse Practitioner cases/documentation I have personally evaluated this patient and have completed at least one if not all key elements of the E/M (history, physical exam, and MDM). Additional findings are as noted. Vital Signs: BP: (!) 132/93  Pulse: 72  Resp: 11  Temp: 98.2 °F (36.8 °C) SpO2: 96 %  PCP:  Fracisco Freeman MD    Pertinent Comments:     Patient is a 20-VXST-BVF male with complicated past medical history including lung cancer and prostate cancer with also laryngeal cancer status post operation and no functional voice after. Patient has trach stoma seen and breathing easily through this but did have shortness of breath earlier tonight possibly chest pain. Family thought he may have had abdominal pain but he denies that at this time. No vomiting or diarrhea. Patient was recently admitted a week ago to [facility] for pneumonia and acute kidney injury as well. Of note patient also has nonischemic cardiomyopathy with last EF a few days ago 45%. And last cardiac catheterization on 3/4/2020 showed only blockage of 10 to 20% in the LAD with 0% all other arteries.     Patient does appear to get moderately to severely winded doing minimal tasks as well. Plan: We will obtain cardiac work-up including abdominal work-up and reevaluate after. Patient's chest x-ray does show mild CHF with severe cardiomegaly. His troponin appears to be at his baseline of approximately 100. His BNP has gone from 29,000-51,000 in the last 1-1/2 weeks. Renal function at approximately his baseline over the last 2 months, with no significant acidosis or hyperkalemia. EKG Interpretation    Interpreted by emergency department physician    Rhythm: normal sinus   Rate: normal at 75 bpm  Axis: normal  Conduction: normal  ST Segments: no acute change  T Waves: Deep T wave inversions in the lateral leads as well as T wave inversions in the inferior leads  Q Waves: no acute change    Clinical Impression:  nonspecific EKG with potentially new inferior T wave inversions        Critical Care  None    This patient was evaluated in the Emergency Department for symptoms described in the history of present illness. He/she was evaluated in the context of the global COVID-19 pandemic, which necessitated consideration that the patient might be at risk for infection with the SARS-CoV-2 virus that causes COVID-19. Institutional protocols and algorithms that pertain to the evaluation of patients at risk for COVID-19 are in a state of rapid change based on information released by regulatory bodies including the CDC and federal and state organizations. These policies and algorithms were followed during the patient's care in the ED. (Please note that portions of this note were completed with a voice recognition program. Efforts were made to edit the dictations but occasionally words are mis-transcribed.  Whenever words are used in this note in any gender, they shall be construed as though they were used in the gender appropriate to the circumstances; and whenever words are used in this note in the singular or plural form, they shall be construed as though they were used in the form appropriate to the circumstances.)    MD Kartik Flynn  Attending Emergency Medicine Physician            Kelton Nicholson MD  06/11/20 0401       Kelton Nicholson MD  06/11/20 2734       Kelton Nicholson MD  06/11/20 0054       Kelton Nicholson MD  06/11/20 9066       Kelton Nicholson MD  06/11/20 4 Presley Maria MD  06/12/20 0005

## 2020-06-12 NOTE — ED PROVIDER NOTES
No free intraperitoneal fluid. No significant lymphadenopathy. Bones/Soft Tissues: Diffuse degenerative changes. 1. Cardiomegaly and bilateral pleural effusions as seen previously. 2. Cholelithiasis unchanged. 3. No bowel obstruction. Ct Abdomen Pelvis Wo Contrast Additional Contrast? None    Result Date: 5/16/2020  EXAMINATION: CT OF THE ABDOMEN AND PELVIS WITHOUT CONTRAST 5/16/2020 9:54 am TECHNIQUE: CT of the abdomen and pelvis was performed without the administration of intravenous contrast. Multiplanar reformatted images are provided for review. Dose modulation, iterative reconstruction, and/or weight based adjustment of the mA/kV was utilized to reduce the radiation dose to as low as reasonably achievable. COMPARISON: CT 01/03/2020. Lumbar radiographs 05/11/2020. HISTORY: ORDERING SYSTEM PROVIDED HISTORY: pain TECHNOLOGIST PROVIDED HISTORY: pain Reason for Exam: Left side abd pain x several days, constipation Acuity: Acute Type of Exam: Initial FINDINGS: LOWER CHEST: Pleural effusions and dependent atelectasis. Interstitial opacities in the lung bases are suggestive of underlying edema. Lipomatous enlargement in the inferolateral left pleural space is noted. ORGANS: Lack of intravenous contrast limits evaluation of the solid organs and bowel. The liver, gallbladder, pancreas, spleen, kidneys and adrenals reveal no acute abnormality. Cholelithiasis. GI/BOWEL: No bowel dilatation or wall thickening. Diverticulosis. Mild stool burden. PELVIS: No acute findings. PERITONEUM/RETROPERITONEUM: No lymphadenopathy is noted. No free air or free fluid. Status post aorto bi iliac stent graft placement. Residual right common iliac aneurysm sac measures up to 4.3 cm, similar in appearance. Right hypogastric vascular coiling. Extensive calcified atheromatous plaque. BONES/SOFT TISSUES: Mild induration of the soft tissues in the groin may be related to recent vascular access.   Small fat containing periumbilical hernia. Interval mild superior endplate deformity of L2, while new since the prior CT exam this was identified on more recent lumbar imaging. Severe degenerative change in the hips, left greater than right. 1.  Pleural effusions and dependent atelectasis. Interstitial findings in the lung bases suggestive of edema. 2.  No acute inflammatory process identified in the abdomen or pelvis. Mild stool burden. 3.  Status post aorto bi-iliac stent placement. Similar appearance of right common iliac artery aneurysm. 4.  Additional chronic and benign findings, as above. Xr Foot Right (min 3 Views)    Result Date: 5/14/2020  EXAMINATION: THREE XRAY VIEWS OF THE RIGHT FOOT 5/14/2020 10:29 am COMPARISON: None. HISTORY: ORDERING SYSTEM PROVIDED HISTORY: pain medial great to toe TECHNOLOGIST PROVIDED HISTORY: pain medial great to toe Reason for Exam: Patient states no foot issues. Having difficulty breathing and cough. Acuity: Unknown Type of Exam: Unknown FINDINGS: There is a hallux valgus deformity present. Moderate to severe degenerative changes of the 1st metatarsal phalangeal joint are noted. There is diffuse osteopenia. No acute fracture is identified. Severe atherosclerotic calcifications are present. 1. No acute osseous abnormality of the right foot evident. 2. Hallux valgus deformity with moderate to severe degenerative changes of the 1st metatarsal phalangeal joint. 3. Osteopenia. Ct Soft Tissue Neck Wo Contrast    Result Date: 5/14/2020  EXAMINATION: CT OF THE NECK WITHOUT CONTRAST  5/14/2020 TECHNIQUE: CT of the neck was performed without the administration of intravenous contrast. Multiplanar reformatted images are provided for review. Dose modulation, iterative reconstruction, and/or weight based adjustment of the mA/kV was utilized to reduce the radiation dose to as low as reasonably achievable.  COMPARISON: 03/05/2020 and earlier studies HISTORY: [facility] HISTORY: trach sit with sig blood, pain, swelling TECHNOLOGIST PROVIDED HISTORY: trach sit with sig blood, pain, swelling History of laryngeal cancer, tracheostomy placement with worsening cough, shortness of breath and hemoptysis. FINDINGS: PHARYNX/LARYNX:  Postsurgical changes status post laryngectomy, anterior mandibulectomy and flap reconstruction are noted. There is no soft tissue mass identified. There is no focal hematoma evident. SALIVARY GLANDS/THYROID:  The parotid glands are normal in appearance. The submandibular glands are surgically absent. A small amount of residual thyroid tissue is noted. LYMPH NODES:  There is no pathologically enlarged lymphadenopathy identified. There is a stable 2 cm subcutaneous nodule along the inferior margin of the right face suggestive of a sebaceous cyst given its stability since 2018. BRAIN/ORBITS/SINUSES:  Cerebral volume loss is again noted. There is no acute abnormality identified within the visualized portions of the brain. The orbits are normal in appearance. The visualized portions of the paranasal sinuses and mastoid air cells are clear. LUNG APICES/SUPERIOR MEDIASTINUM:  Bilateral pleural effusions are present. Please see separate CT chest, reported separately but performed in the same setting. BONES:  There is severe disc space narrowing from C3-4 to C6-7. There is multilevel degenerative facet arthropathy. There is mild-to-moderate spinal canal stenosis most pronounced at C5-6 and C6-7. Severe multilevel neural foraminal narrowing is present. 1. Stable postsurgical changes with no recurrent tumor evident. 2. No etiology identified to explain the patient's hemoptysis. Ct Chest Wo Contrast    Result Date: 6/12/2020  EXAMINATION: CT OF THE CHEST WITHOUT CONTRAST 6/12/2020 12:46 am TECHNIQUE: CT of the chest was performed without the administration of intravenous contrast. Multiplanar reformatted images are provided for review.  Dose modulation, performed without the administration of intravenous contrast. Multiplanar reformatted images are provided for review. Dose modulation, iterative reconstruction, and/or weight based adjustment of the mA/kV was utilized to reduce the radiation dose to as low as reasonably achievable. COMPARISON: None. HISTORY: ORDERING SYSTEM PROVIDED HISTORY: Fall, initial encounter TECHNOLOGIST PROVIDED HISTORY: Reason for Exam: fall FINDINGS: BONES/ALIGNMENT: A subtle dextroscoliotic curvature is noted. Otherwise, spine alignment is unremarkable. Visualized thoracic vertebra are well maintained in height without acute fracture. However, known superior endplate compression fracture of L2, acute, is redemonstrated, unchanged from prior study of 16 May 2020. DEGENERATIVE CHANGES: Mild degenerative and degenerative disc changes are noted. No significant neural foraminal narrowing is noted. No gross canal stenosis is evident. SOFT TISSUES: No paraspinal mass is noted but the patient has bilateral pleural effusions, moderate on the right and small on the left. Atherosclerotic disease is noted. 1.  No acute fracture in the thoracic spine proper. The patient has a known acute superior endplate compression fracture of L2 of approximately 20%. 2.  Pleural effusions, moderate on the right and small on the left. Other incidental findings as above. Xr Chest Portable    Result Date: 6/11/2020  EXAMINATION: ONE XRAY VIEW OF THE CHEST 6/11/2020 11:14 pm COMPARISON: June 5, 2020 HISTORY: ORDERING SYSTEM PROVIDED HISTORY: chest pain TECHNOLOGIST PROVIDED HISTORY: chest pain Reason for Exam: portable Upright FINDINGS: Marginal inspiration is noted. Cardiomegaly is again visualized. Mild degree of vascular congestion is noted. No pneumothorax is present. Pleural effusions are seen bilaterally, right greater than left. Multiple old rib fractures are again seen bilaterally. Surgical clips are seen within the right axilla.

## 2020-06-12 NOTE — ED PROVIDER NOTES
Care assumed from Dr. Justin Pan    Patient admitted for pleural effusions and CHF.   Patient stable      Abhay Torres MD  06/12/20 763 [address] Michaela Malone MD  06/12/20 [id number]

## 2020-06-12 NOTE — ED NOTES
Patient up at bedside. Patient urinated on floor and urinal. Urine labeled and sent to lab. Patient complaining of pain. 4 orders released. Alert and orientated.      Blake Dupont RN  06/12/20 [id number]

## 2020-06-12 NOTE — ED NOTES
Bed: 21  Expected date: 6/11/20  Expected time: 10:12 PM  Means of arrival: [facility]  Comments:  LS4  WALDO Wong RN  06/11/20 [id number]

## 2020-06-12 NOTE — CARE COORDINATION
Case Management Initial Discharge Plan  James Valladares,             Met with:patient to discuss discharge plans. Information verified: address, contacts, phone number, , insurance Yes    Emergency Contact/Next of Kin name & number: Natasha Mckeon [phone]    PCP: Jean Paul Jose MD  Date of last visit: this month    Insurance Provider: Medicare and Medicaid    Discharge Planning    Living Arrangements:  Family Members   Support Systems:  Family Members    Home has 1 stories  4 stairs to climb to get into front door, n/a stairs to climb to reach second floor  Location of bedroom/bathroom in home main    Patient able to perform ADL's:Independent    Current Services (outpatient & in home) home care and dme  DME equipment: nebulizer  DME provider: [facility]    Receiving oral anticoagulation therapy? No    If indicated:   Physician managing anticoagulation treatment: n/a  Where does patient obtain lab work for ATC treatment? n/a      Potential Assistance Needed:  N/A    Patient agreeable to home care: Yes  Freedom of choice provided:  yes    Prior SNF/Rehab Placement and Facility: in the past  Agreeable to SNF/Rehab: No  [place] of choice provided: n/a     Evaluation: n/a    Expected Discharge date:       Patient expects to be discharged to: Follow Up Appointment: Best Day/ Time:      Transportation provider: brother  Transportation arrangements needed for discharge: No    Readmission Risk              Risk of Unplanned Readmission:        59             Does patient have a readmission risk score greater than 14?: Yes  If yes, follow-up appointment must be made within 7 days of discharge. Goals of Care: improve respiratory status      Discharge Plan: home with [facility], [facility]. Has nebulizer, [facility].            Electronically signed by Rodney Wang RN on 20 at 3:09 PM EDT

## 2020-06-12 NOTE — PROGRESS NOTES
Occupational Therapy Not Seen Note    DATE: 2020  Name: Audi Guthrie  : 1950  MRN: [id number]    Patient not available for Occupational Therapy due to:     Other: Pt just arrived on unit; await POC/notes    Next Scheduled Treatment: 2020    Electronically signed by DEVANG Moffett on 2020 at 1:50 PM    '

## 2020-06-12 NOTE — ED PROVIDER NOTES
obstructive pulmonary disease) ([facility] [address] GERD (gastroesophageal reflux disease), Gout, Hypertension, Hyponatremia, Iliac artery aneurysm, right ([facility] [address] Laryngeal cancer ([facility] [address] Lung cancer ([facility] [address] and Prostate cancer ([facility] [address] has a past surgical history that includes Tracheal surgery (3-4 years ago); Prostate surgery; Cardiac catheterization (03/04/2020); and AAA repair, endovascular (Right, 4/7/2020).     Social History     Socioeconomic History    Marital status: Single     Spouse name: Not on file    Number of children: Not on file    Years of education: Not on file    Highest education level: Not on file   Occupational History    Not on file   Social Needs    Financial resource strain: Not on file    Food insecurity     Worry: Not on file     Inability: Not on file    Transportation needs     Medical: Not on file     Non-medical: Not on file   Tobacco Use    Smoking status: Former Smoker     Types: Cigarettes    Smokeless tobacco: Never Used    Tobacco comment: quit smoking 20 years ago    Substance and Sexual Activity    Alcohol use: Not Currently     Alcohol/week: 5.0 standard drinks     Types: 5 Cans of beer per week    Drug use: No    Sexual activity: Never   Lifestyle    Physical activity     Days per week: Not on file     Minutes per session: Not on file    Stress: Not on file   Relationships    Social connections     Talks on phone: Not on file     Gets together: Not on file     Attends Yazidi service: Not on file     Active member of club or organization: Not on file     Attends meetings of clubs or organizations: Not on file     Relationship status: Not on file    Intimate partner violence     Fear of current or ex partner: Not on file     Emotionally abused: Not on file     Physically abused: Not on file     Forced sexual activity: Not on file   Other Topics Concern    Not on file   Social History Narrative    Not on file       Family History   Problem Relation Age of Onset    Diabetes Mother     Heart Disease Mother         Sudden cardiac death    Diabetes Sister     Heart Disease Sister        Allergies:  Amino acids and Lisinopril    Home Medications:  Prior to Admission medications    Medication Sig Start Date End Date Taking?  Authorizing Provider   sodium bicarbonate 650 MG tablet Take 1 tablet by mouth 2 times daily 6/6/20   Cynthia Patterson MD   isosorbide mononitrate (IMDUR) 30 MG extended release tablet Take 1 tablet by mouth daily 6/7/20   Cynthia Patterson MD   ipratropium-albuterol (DUONEB) 0.5-2.5 (3) MG/3ML SOLN nebulizer solution Inhale 3 mLs into the lungs 4 times daily 6/6/20   Cynthia Patterson MD   hydrALAZINE (APRESOLINE) 50 MG tablet Take 1 tablet by mouth every 8 hours 6/6/20   Cynthia Patterson MD   metoprolol succinate (TOPROL XL) 50 MG extended release tablet Take 1 tablet by mouth daily 6/7/20   Cynthia Patterson MD   furosemide (LASIX) 40 MG tablet Take 1 tablet by mouth every 48 hours 6/8/20   Cynthia Patterson MD   polyethylene glycol (GLYCOLAX) 17 g packet Take 17 g by mouth daily as needed for Constipation 6/6/20 7/6/20  Cynthia Patterson MD   predniSONE (DELTASONE) 10 MG tablet Take 1 tablet by mouth every 48 hours for 4 doses 6/6/20 6/13/20  Cynthia Patterson MD   predniSONE (DELTASONE) 5 MG tablet Take 1 tablet by mouth daily for 10 days 6/6/20 6/16/20  Cynthia Patterson MD   bicalutamide (CASODEX) 50 MG chemo tablet Take 1 tablet by mouth daily 6/7/20   Cynthia Patterson MD   dicyclomine (BENTYL) 10 MG capsule Take 1 capsule by mouth 2 times daily for 14 days 5/16/20 5/30/20  Myrtle Forde MD   amLODIPine (NORVASC) 10 MG tablet Take 1 tablet by mouth daily 4/18/20   Cynthia Patterson MD   Cholecalciferol (VITAMIN D3) 50 MCG (2000 UT) CAPS Take 1 capsule by mouth daily    Historical Provider, MD   albuterol sulfate  (90 Base) MCG/ACT inhaler Inhale 2 puffs into the lungs every 6 hours as needed for Wheezing or Shortness of Breath    Historical Provider,  hydrALAZINE (APRESOLINE) tablet 50 mg    metoprolol succinate (TOPROL XL) extended release tablet 50 mg    DISCONTD: cefUROXime (CEFTIN) tablet 250 mg    furosemide (LASIX) tablet 40 mg    polyethylene glycol (GLYCOLAX) packet 17 g    predniSONE (DELTASONE) tablet 10 mg    predniSONE (DELTASONE) tablet 5 mg    bicalutamide (CASODEX) chemo tablet 50 mg    sodium chloride flush 0.9 % injection 10 mL    sodium chloride flush 0.9 % injection 10 mL    OR Linked Order Group     acetaminophen (TYLENOL) tablet 650 mg     acetaminophen (TYLENOL) suppository 650 mg    polyethylene glycol (GLYCOLAX) packet 17 g    OR Linked Order Group     promethazine (PHENERGAN) tablet 12.5 mg     ondansetron (ZOFRAN) injection 4 mg    famotidine (PEPCID) tablet 20 mg    nicotine (NICODERM CQ) 21 MG/24HR 1 patch     If indicated/pateint smokes    enoxaparin (LOVENOX) injection 30 mg    albuterol (PROVENTIL) nebulizer solution 2.5 mg    ipratropium-albuterol (DUONEB) nebulizer solution 1 ampule    furosemide (LASIX) injection 40 mg    albuterol (PROVENTIL) nebulizer solution 2.5 mg       DIAGNOSTIC RESULTS / EMERGENCY DEPARTMENT COURSE / MDM     LABS:  Results for orders placed or performed during the hospital encounter of 06/11/20   CBC WITH AUTO DIFFERENTIAL   Result Value Ref Range    WBC 7.8 3.5 - 11.3 k/uL    RBC 2.67 (L) 4.21 - 5.77 m/uL    Hemoglobin 7.9 (L) 13.0 - 17.0 g/dL    Hematocrit 25.2 (L) 40.7 - 50.3 %    MCV 94.4 82.6 - 102.9 fL    MCH 29.6 25.2 - 33.5 pg    MCHC 31.3 28.4 - 34.8 g/dL    RDW 17.5 (H) 11.8 - 14.4 %    Platelets 265 667 - 817 k/uL    MPV 10.4 8.1 - 13.5 fL    NRBC Automated 0.0 0.0 per 100 WBC    Differential Type NOT REPORTED     WBC Morphology NOT REPORTED     RBC Morphology ANISOCYTOSIS PRESENT     Platelet Estimate NOT REPORTED     Seg Neutrophils 75 (H) 36 - 65 %    Lymphocytes 20 (L) 24 - 43 %    Monocytes 4 3 - 12 %    Eosinophils % 1 1 - 4 %    Basophils 0 0 - 2 %    Immature

## 2020-06-12 NOTE — ED PROVIDER NOTES
[facility]  ED  Emergency Department  Emergency Medicine Resident Sign-out     Care of Kavon Salcedo was assumed from Dr. Som Collazo and is being seen for Chest Pain (1 nitro PTA) and Abdominal Pain  . The patient's initial evaluation and plan have been discussed with the prior provider who initially evaluated the patient.      EMERGENCY DEPARTMENT COURSE / MEDICAL DECISION MAKING:       MEDICATIONS GIVEN:  Orders Placed This Encounter   Medications    methylPREDNISolone sodium (SOLU-MEDROL) injection 125 mg    magnesium sulfate 1 g in dextrose 5% 100 mL IVPB    albuterol sulfate  (90 Base) MCG/ACT inhaler 2 puff    AND Linked Order Group     albuterol sulfate  (90 Base) MCG/ACT inhaler 2 puff     ipratropium (ATROVENT HFA) 17 MCG/ACT inhaler 2 puff    DISCONTD: calcium gluconate 2 g in dextrose 5 % 100 mL IVPB    calcium gluconate 2 g in sodium chloride 100 mL    morphine injection 4 mg    ondansetron (ZOFRAN) injection 4 mg    furosemide (LASIX) injection 20 mg    albuterol (PROVENTIL) nebulizer solution 2.5 mg    ipratropium (ATROVENT) 0.02 % nebulizer solution 0.5 mg    LORazepam (ATIVAN) injection 1 mg       LABS / RADIOLOGY:     Labs Reviewed   CBC WITH AUTO DIFFERENTIAL - Abnormal; Notable for the following components:       Result Value    RBC 2.67 (*)     Hemoglobin 7.9 (*)     Hematocrit 25.2 (*)     RDW 17.5 (*)     Seg Neutrophils 75 (*)     Lymphocytes 20 (*)     Immature Granulocytes 1 (*)     All other components within normal limits   COMPREHENSIVE METABOLIC PANEL - Abnormal; Notable for the following components:    BUN 47 (*)     CREATININE 2.38 (*)     Calcium 7.6 (*)     Sodium 130 (*)     Chloride 97 (*)     CO2 19 (*)     ALT 46 (*)     AST 43 (*)     Total Bilirubin <0.10 (*)     Total Protein 5.8 (*)     Alb 3.3 (*)     GFR Non- 27 (*)     GFR  33 (*)     All other components within normal limits   TROPONIN - HISTORY: gross hemoptysis via trach, sob TECHNOLOGIST PROVIDED HISTORY: gross hemoptysis via trach, sob FINDINGS: Mediastinum: Heart is mildly enlarged. There is no pericardial effusion. There is extensive coronary artery calcification. Thoracic aorta and pulmonary arteries are normal in caliber. Tracheostomy defect is noted. The tracheobronchial tree is patent. There is an enlarged right paratracheal lymph node with thin peripheral calcification, measuring approximately 2 cm, which is not significantly changed. No other mediastinal lymphadenopathy. No obvious hilar lymphadenopathy on this unenhanced study. Lungs/pleura: There is a moderate right and small left pleural effusion. There is mild bibasilar atelectasis and scattered fibrotic change in the lungs. No focal airspace consolidation or pneumothorax. No significant pulmonary nodule identified. Upper Abdomen: Limited visualized upper abdominal structures demonstrate gallstones in the gallbladder. Small cysts are noted in the right kidney. There is a partially visualized aortic stent. Soft Tissues/Bones: There are multiple chronic bilateral rib fracture deformities. A few subacute healing fractures are noted at the anterior lower left ribs. There is a subtle anterior compression fracture at the superior endplate of L2, which is new when compared to a CT abdomen and pelvis dated 01/03/2020. No acute osseous abnormality identified. 1.  No definite findings on unenhanced CT chest to account for reported hemoptysis. No evidence of endobronchial mass. 2.  Moderate right and small left pleural effusions with mild bibasilar atelectasis. 3.  Unchanged right paratracheal lymphadenopathy. 4.  Age indeterminate mild compression fracture at L2, new when compared to 01/03/2020.      Ct Thoracic Spine Wo Contrast    Result Date: 5/19/2020  EXAMINATION: CT OF THE THORACIC SPINE WITHOUT CONTRAST  5/19/2020 5:06 pm: TECHNIQUE: CT of the thoracic spine was performed without the administration of intravenous contrast. Multiplanar reformatted images are provided for review. Dose modulation, iterative reconstruction, and/or weight based adjustment of the mA/kV was utilized to reduce the radiation dose to as low as reasonably achievable. COMPARISON: None. HISTORY: ORDERING SYSTEM PROVIDED HISTORY: Fall, initial encounter TECHNOLOGIST PROVIDED HISTORY: Reason for Exam: fall FINDINGS: BONES/ALIGNMENT: A subtle dextroscoliotic curvature is noted. Otherwise, spine alignment is unremarkable. Visualized thoracic vertebra are well maintained in height without acute fracture. However, known superior endplate compression fracture of L2, acute, is redemonstrated, unchanged from prior study of 16 May 2020. DEGENERATIVE CHANGES: Mild degenerative and degenerative disc changes are noted. No significant neural foraminal narrowing is noted. No gross canal stenosis is evident. SOFT TISSUES: No paraspinal mass is noted but the patient has bilateral pleural effusions, moderate on the right and small on the left. Atherosclerotic disease is noted. 1.  No acute fracture in the thoracic spine proper. The patient has a known acute superior endplate compression fracture of L2 of approximately 20%. 2.  Pleural effusions, moderate on the right and small on the left. Other incidental findings as above. Xr Chest Portable    Result Date: 6/11/2020  EXAMINATION: ONE XRAY VIEW OF THE CHEST 6/11/2020 11:14 pm COMPARISON: June 5, 2020 HISTORY: ORDERING SYSTEM PROVIDED HISTORY: chest pain TECHNOLOGIST PROVIDED HISTORY: chest pain Reason for Exam: portable Upright FINDINGS: Marginal inspiration is noted. Cardiomegaly is again visualized. Mild degree of vascular congestion is noted. No pneumothorax is present. Pleural effusions are seen bilaterally, right greater than left. Multiple old rib fractures are again seen bilaterally. Surgical clips are seen within the right axilla. decrease in right pleural effusion without evidence of pneumothorax status post thoracentesis. Small residual blunting of the right costophrenic angle and atelectasis. Part of this could be due to scarring. There are clips projecting over the right chest and axillary region. Post-traumatic/postsurgical deformity of the right ribs. Stable cardiomegaly with mild calcific plaque of an ectatic thoracic aorta. Improving pleural/parenchymal density left lung base. Monitor leads overlie the chest. Contrast in the stomach from recent swallowing study. No evidence of pneumothorax status post right thoracentesis. Nm Bone Scan Whole Body    Result Date: 6/2/2020  EXAMINATION: WHOLE BODY BONE SCAN  6/2/2020 TECHNIQUE: The patient was injected intravenously with 24.9 mCi of 99 mTc MDP and scintigraphy of the entire skeleton was performed approximately three hours later. Spot images of the head, chest, and pelvis are obtained. COMPARISON: CT scan of the abdomen and pelvis 06/01/2020 and CT scan of the chest 05/29/2020 HISTORY: ORDERING SYSTEM PROVIDED HISTORY: Prostate cancer rising serum PSA TECHNOLOGIST PROVIDED HISTORY: Prostate cancer rising serum PSA Reason for Exam: Prostate CA Acuity: Unknown Type of Exam: Unknown FINDINGS: Intense linear uptake corresponds to the L2 superior endplate compression fracture. Scattered foci of uptake in anterior ribs correspond to multiple rib fractures seen on the CT scan. Foci of activity in the shoulders and knees are most likely degenerative. The remainder of the osseous structures and soft tissues are unremarkable. Physiologic activity is present in the skeletal and renal collecting systems. 1.  No evidence of skeletal metastatic disease. 2.  Intense linear uptake at the L2 superior endplate suggesting an acute or subacute fracture.      Us Retroperitoneal Complete    Result Date: 6/1/2020  EXAMINATION: RETROPERITONEAL ULTRASOUND OF THE KIDNEYS AND URINARY BLADDER hospitalization found to have pleural effusion which was drained. History of laryngeal cancer, prostate cancer lung cancer.     Work-up concerning for acute on chronic CHF given Lasix.     CT showing bilateral pleural effusions pulmonary edema admitted to [facility] for further work-up and management. OUTSTANDING TASKS / RECOMMENDATIONS:    1. Awaiting transfer to the floor     FINAL IMPRESSION:     1. Acute on chronic combined systolic and diastolic CHF (congestive heart failure) ([address]    2. Pleural effusion    3. Chronic kidney disease, unspecified CKD stage    4. Acute on chronic anemia    5.  Elevated troponin        DISPOSITION:         DISPOSITION:  []  Discharge   []  Transfer -    [x]  Admission - [facility]     []  Against Medical Advice   []  Eloped   FOLLOW-UP: Gena Rivas MD  [address]  [facility]: New Prescriptions    No medications on file          Cristian Rios [place]  Emergency Medicine Resident  [address] Cristian Rios [place]  Resident  06/12/20 [id number]

## 2020-06-13 ENCOUNTER — APPOINTMENT (OUTPATIENT)
Dept: GENERAL RADIOLOGY | Age: 70
DRG: 280 | End: 2020-06-13
Payer: MEDICARE

## 2020-06-13 ENCOUNTER — APPOINTMENT (OUTPATIENT)
Dept: ULTRASOUND IMAGING | Age: 70
DRG: 280 | End: 2020-06-13
Payer: MEDICARE

## 2020-06-13 LAB
ANION GAP SERPL CALCULATED.3IONS-SCNC: 17 MMOL/L (ref 9–17)
ANION GAP SERPL CALCULATED.3IONS-SCNC: 23 MMOL/L (ref 9–17)
BUN BLDV-MCNC: 66 MG/DL (ref 8–23)
BUN BLDV-MCNC: 66 MG/DL (ref 8–23)
BUN/CREAT BLD: ABNORMAL (ref 9–20)
BUN/CREAT BLD: ABNORMAL (ref 9–20)
CALCIUM SERPL-MCNC: 7.7 MG/DL (ref 8.6–10.4)
CALCIUM SERPL-MCNC: 8 MG/DL (ref 8.6–10.4)
CHLORIDE BLD-SCNC: 97 MMOL/L (ref 98–107)
CHLORIDE BLD-SCNC: 97 MMOL/L (ref 98–107)
CO2: 16 MMOL/L (ref 20–31)
CO2: 18 MMOL/L (ref 20–31)
COMPLEMENT C3: 106 MG/DL (ref 90–180)
COMPLEMENT C4: 53 MG/DL (ref 10–40)
CREAT SERPL-MCNC: 3.07 MG/DL (ref 0.7–1.2)
CREAT SERPL-MCNC: 3.08 MG/DL (ref 0.7–1.2)
CULTURE: NO GROWTH
FREE KAPPA/LAMBDA RATIO: 1.82 (ref 0.26–1.65)
GFR AFRICAN AMERICAN: 25 ML/MIN
GFR AFRICAN AMERICAN: 25 ML/MIN
GFR NON-AFRICAN AMERICAN: 20 ML/MIN
GFR NON-AFRICAN AMERICAN: 20 ML/MIN
GFR SERPL CREATININE-BSD FRML MDRD: ABNORMAL ML/MIN/{1.73_M2}
GLUCOSE BLD-MCNC: 116 MG/DL (ref 70–99)
GLUCOSE BLD-MCNC: 153 MG/DL (ref 70–99)
GLUCOSE, FLUID: 161 MG/DL
HCT VFR BLD CALC: 26.9 % (ref 40.7–50.3)
HEMOGLOBIN: 8.5 G/DL (ref 13–17)
KAPPA FREE LIGHT CHAINS QNT: 2.58 MG/DL (ref 0.37–1.94)
LACTATE DEHYDROGENASE, FLUID: 78 U/L
LAMBDA FREE LIGHT CHAINS QNT: 1.42 MG/DL (ref 0.57–2.63)
Lab: NORMAL
MCH RBC QN AUTO: 29.9 PG (ref 25.2–33.5)
MCHC RBC AUTO-ENTMCNC: 31.6 G/DL (ref 28.4–34.8)
MCV RBC AUTO: 94.7 FL (ref 82.6–102.9)
NRBC AUTOMATED: 0 PER 100 WBC
PDW BLD-RTO: 17.5 % (ref 11.8–14.4)
PH FLUID: 8
PLATELET # BLD: 241 K/UL (ref 138–453)
PMV BLD AUTO: 10.8 FL (ref 8.1–13.5)
POTASSIUM SERPL-SCNC: 5.2 MMOL/L (ref 3.7–5.3)
POTASSIUM SERPL-SCNC: 5.6 MMOL/L (ref 3.7–5.3)
RBC # BLD: 2.84 M/UL (ref 4.21–5.77)
RHEUMATOID FACTOR: <10 IU/ML
SODIUM BLD-SCNC: 132 MMOL/L (ref 135–144)
SODIUM BLD-SCNC: 136 MMOL/L (ref 135–144)
SPECIMEN DESCRIPTION: NORMAL
SPECIMEN TYPE: NORMAL
TOTAL PROTEIN, BODY FLUID: 1.7 G/DL
WBC # BLD: 5.9 K/UL (ref 3.5–11.3)

## 2020-06-13 PROCEDURE — 6370000000 HC RX 637 (ALT 250 FOR IP): Performed by: NURSE PRACTITIONER

## 2020-06-13 PROCEDURE — 88305 TISSUE EXAM BY PATHOLOGIST: CPT

## 2020-06-13 PROCEDURE — 2580000003 HC RX 258: Performed by: INTERNAL MEDICINE

## 2020-06-13 PROCEDURE — 89051 BODY FLUID CELL COUNT: CPT

## 2020-06-13 PROCEDURE — 83986 ASSAY PH BODY FLUID NOS: CPT

## 2020-06-13 PROCEDURE — 88112 CYTOPATH CELL ENHANCE TECH: CPT

## 2020-06-13 PROCEDURE — 32555 ASPIRATE PLEURA W/ IMAGING: CPT | Performed by: INTERNAL MEDICINE

## 2020-06-13 PROCEDURE — 6360000002 HC RX W HCPCS: Performed by: INTERNAL MEDICINE

## 2020-06-13 PROCEDURE — 84165 PROTEIN E-PHORESIS SERUM: CPT

## 2020-06-13 PROCEDURE — 87075 CULTR BACTERIA EXCEPT BLOOD: CPT

## 2020-06-13 PROCEDURE — 80051 ELECTROLYTE PANEL: CPT

## 2020-06-13 PROCEDURE — 87206 SMEAR FLUORESCENT/ACID STAI: CPT

## 2020-06-13 PROCEDURE — 0W993ZZ DRAINAGE OF RIGHT PLEURAL CAVITY, PERCUTANEOUS APPROACH: ICD-10-PCS | Performed by: INTERNAL MEDICINE

## 2020-06-13 PROCEDURE — 83883 ASSAY NEPHELOMETRY NOT SPEC: CPT

## 2020-06-13 PROCEDURE — 87070 CULTURE OTHR SPECIMN AEROBIC: CPT

## 2020-06-13 PROCEDURE — 83516 IMMUNOASSAY NONANTIBODY: CPT

## 2020-06-13 PROCEDURE — 86160 COMPLEMENT ANTIGEN: CPT

## 2020-06-13 PROCEDURE — 86431 RHEUMATOID FACTOR QUANT: CPT

## 2020-06-13 PROCEDURE — 86334 IMMUNOFIX E-PHORESIS SERUM: CPT

## 2020-06-13 PROCEDURE — 85027 COMPLETE CBC AUTOMATED: CPT

## 2020-06-13 PROCEDURE — 51798 US URINE CAPACITY MEASURE: CPT

## 2020-06-13 PROCEDURE — 32555 ASPIRATE PLEURA W/ IMAGING: CPT

## 2020-06-13 PROCEDURE — 82945 GLUCOSE OTHER FLUID: CPT

## 2020-06-13 PROCEDURE — 2580000003 HC RX 258: Performed by: NURSE PRACTITIONER

## 2020-06-13 PROCEDURE — 99222 1ST HOSP IP/OBS MODERATE 55: CPT | Performed by: INTERNAL MEDICINE

## 2020-06-13 PROCEDURE — 2060000000 HC ICU INTERMEDIATE R&B

## 2020-06-13 PROCEDURE — 80048 BASIC METABOLIC PNL TOTAL CA: CPT

## 2020-06-13 PROCEDURE — 84155 ASSAY OF PROTEIN SERUM: CPT

## 2020-06-13 PROCEDURE — 99221 1ST HOSP IP/OBS SF/LOW 40: CPT | Performed by: INTERNAL MEDICINE

## 2020-06-13 PROCEDURE — 94640 AIRWAY INHALATION TREATMENT: CPT

## 2020-06-13 PROCEDURE — 6360000002 HC RX W HCPCS: Performed by: NURSE PRACTITIONER

## 2020-06-13 PROCEDURE — 87116 MYCOBACTERIA CULTURE: CPT

## 2020-06-13 PROCEDURE — 84157 ASSAY OF PROTEIN OTHER: CPT

## 2020-06-13 PROCEDURE — 87205 SMEAR GRAM STAIN: CPT

## 2020-06-13 PROCEDURE — 86038 ANTINUCLEAR ANTIBODIES: CPT

## 2020-06-13 PROCEDURE — 6370000000 HC RX 637 (ALT 250 FOR IP): Performed by: INTERNAL MEDICINE

## 2020-06-13 PROCEDURE — 71045 X-RAY EXAM CHEST 1 VIEW: CPT

## 2020-06-13 PROCEDURE — 2700000000 HC OXYGEN THERAPY PER DAY

## 2020-06-13 PROCEDURE — 99232 SBSQ HOSP IP/OBS MODERATE 35: CPT | Performed by: INTERNAL MEDICINE

## 2020-06-13 PROCEDURE — 83615 LACTATE (LD) (LDH) ENZYME: CPT

## 2020-06-13 PROCEDURE — 36415 COLL VENOUS BLD VENIPUNCTURE: CPT

## 2020-06-13 PROCEDURE — 2500000003 HC RX 250 WO HCPCS: Performed by: INTERNAL MEDICINE

## 2020-06-13 RX ORDER — SODIUM CHLORIDE 9 MG/ML
INJECTION, SOLUTION INTRAVENOUS CONTINUOUS
Status: DISCONTINUED | OUTPATIENT
Start: 2020-06-13 | End: 2020-06-13

## 2020-06-13 RX ORDER — FUROSEMIDE 10 MG/ML
40 INJECTION INTRAMUSCULAR; INTRAVENOUS 2 TIMES DAILY
Status: DISCONTINUED | OUTPATIENT
Start: 2020-06-13 | End: 2020-06-13

## 2020-06-13 RX ORDER — FLUDROCORTISONE ACETATE 0.1 MG/1
100 TABLET ORAL ONCE
Status: COMPLETED | OUTPATIENT
Start: 2020-06-13 | End: 2020-06-13

## 2020-06-13 RX ORDER — DEXTROSE MONOHYDRATE 25 G/50ML
12.5 INJECTION, SOLUTION INTRAVENOUS ONCE
Status: COMPLETED | OUTPATIENT
Start: 2020-06-13 | End: 2020-06-13

## 2020-06-13 RX ORDER — SODIUM POLYSTYRENE SULFONATE 15 G/60ML
15 SUSPENSION ORAL; RECTAL ONCE
Status: COMPLETED | OUTPATIENT
Start: 2020-06-13 | End: 2020-06-13

## 2020-06-13 RX ADMIN — FAMOTIDINE 20 MG: 20 TABLET, FILM COATED ORAL at 09:32

## 2020-06-13 RX ADMIN — IPRATROPIUM BROMIDE AND ALBUTEROL SULFATE 3 ML: .5; 3 SOLUTION RESPIRATORY (INHALATION) at 10:30

## 2020-06-13 RX ADMIN — DICYCLOMINE HYDROCHLORIDE 10 MG: 10 CAPSULE ORAL at 16:52

## 2020-06-13 RX ADMIN — METHYLPREDNISOLONE SODIUM SUCCINATE 40 MG: 40 INJECTION, POWDER, FOR SOLUTION INTRAMUSCULAR; INTRAVENOUS at 20:46

## 2020-06-13 RX ADMIN — HYDRALAZINE HYDROCHLORIDE 50 MG: 50 TABLET, FILM COATED ORAL at 00:50

## 2020-06-13 RX ADMIN — ENOXAPARIN SODIUM 30 MG: 30 INJECTION SUBCUTANEOUS at 09:36

## 2020-06-13 RX ADMIN — Medication 10 ML: at 09:32

## 2020-06-13 RX ADMIN — Medication 10 ML: at 20:47

## 2020-06-13 RX ADMIN — SODIUM POLYSTYRENE SULFONATE 15 G: 15 SUSPENSION ORAL; RECTAL at 13:43

## 2020-06-13 RX ADMIN — BICALUTAMIDE 50 MG: 50 TABLET ORAL at 09:31

## 2020-06-13 RX ADMIN — HYDRALAZINE HYDROCHLORIDE 50 MG: 50 TABLET, FILM COATED ORAL at 16:52

## 2020-06-13 RX ADMIN — ISOSORBIDE MONONITRATE 30 MG: 30 TABLET ORAL at 09:32

## 2020-06-13 RX ADMIN — IPRATROPIUM BROMIDE AND ALBUTEROL SULFATE 3 ML: .5; 3 SOLUTION RESPIRATORY (INHALATION) at 15:52

## 2020-06-13 RX ADMIN — HYDRALAZINE HYDROCHLORIDE 50 MG: 50 TABLET, FILM COATED ORAL at 09:36

## 2020-06-13 RX ADMIN — INSULIN HUMAN 10 UNITS: 100 INJECTION, SOLUTION PARENTERAL at 13:33

## 2020-06-13 RX ADMIN — FUROSEMIDE 40 MG: 10 INJECTION, SOLUTION INTRAMUSCULAR; INTRAVENOUS at 09:54

## 2020-06-13 RX ADMIN — Medication: at 18:26

## 2020-06-13 RX ADMIN — SODIUM BICARBONATE 650 MG: 650 TABLET ORAL at 09:32

## 2020-06-13 RX ADMIN — DICYCLOMINE HYDROCHLORIDE 10 MG: 10 CAPSULE ORAL at 09:32

## 2020-06-13 RX ADMIN — VITAMIN D, TAB 1000IU (100/BT) 2000 UNITS: 25 TAB at 09:32

## 2020-06-13 RX ADMIN — METHYLPREDNISOLONE SODIUM SUCCINATE 40 MG: 40 INJECTION, POWDER, FOR SOLUTION INTRAMUSCULAR; INTRAVENOUS at 05:22

## 2020-06-13 RX ADMIN — FLUDROCORTISONE ACETATE 100 MCG: 0.1 TABLET ORAL at 18:26

## 2020-06-13 RX ADMIN — METHYLPREDNISOLONE SODIUM SUCCINATE 40 MG: 40 INJECTION, POWDER, FOR SOLUTION INTRAMUSCULAR; INTRAVENOUS at 13:33

## 2020-06-13 RX ADMIN — METOPROLOL SUCCINATE 50 MG: 50 TABLET, FILM COATED, EXTENDED RELEASE ORAL at 09:32

## 2020-06-13 RX ADMIN — SODIUM CHLORIDE: 9 INJECTION, SOLUTION INTRAVENOUS at 13:25

## 2020-06-13 RX ADMIN — ALBUTEROL SULFATE 2.5 MG: 2.5 SOLUTION RESPIRATORY (INHALATION) at 02:47

## 2020-06-13 RX ADMIN — AMLODIPINE BESYLATE 10 MG: 10 TABLET ORAL at 09:32

## 2020-06-13 RX ADMIN — DEXTROSE MONOHYDRATE 12.5 G: 25 INJECTION, SOLUTION INTRAVENOUS at 13:33

## 2020-06-13 RX ADMIN — IPRATROPIUM BROMIDE AND ALBUTEROL SULFATE 3 ML: .5; 3 SOLUTION RESPIRATORY (INHALATION) at 21:36

## 2020-06-13 RX ADMIN — PANTOPRAZOLE SODIUM 40 MG: 40 TABLET, DELAYED RELEASE ORAL at 09:32

## 2020-06-13 RX ADMIN — ALLOPURINOL 300 MG: 300 TABLET ORAL at 09:32

## 2020-06-13 ASSESSMENT — PAIN DESCRIPTION - DESCRIPTORS: DESCRIPTORS: DISCOMFORT

## 2020-06-13 ASSESSMENT — PAIN DESCRIPTION - ONSET: ONSET: GRADUAL

## 2020-06-13 ASSESSMENT — PAIN DESCRIPTION - FREQUENCY: FREQUENCY: CONTINUOUS

## 2020-06-13 ASSESSMENT — PAIN SCALES - GENERAL: PAINLEVEL_OUTOF10: 8

## 2020-06-13 ASSESSMENT — PAIN DESCRIPTION - PAIN TYPE: TYPE: ACUTE PAIN

## 2020-06-13 ASSESSMENT — PAIN DESCRIPTION - LOCATION: LOCATION: HEAD

## 2020-06-13 ASSESSMENT — PULMONARY FUNCTION TESTS: PEFR_L/MIN: 18

## 2020-06-13 NOTE — PROGRESS NOTES
[facility]    Progress Note    6/13/2020    12:45 PM    Name:   Andrea Shi  MRN:     [id number]     Acct:      [de-identified]   Room:   2010/2010-01  IP Day:  1  Admit Date:  6/11/2020 10:22 PM    PCP:   Annie Jaramillo MD  Code Status:  Full Code    Subjective:     C/C:   Chief Complaint   Patient presents with    Chest Pain     1 nitro PTA    Abdominal Pain     Interval History Status: improved. Patient states he is less short of breath today. He denies any chest pain. He is hoping to get the thoracentesis done this weekend by IR. Brief History:     Andrea Shi is a 81 y.o. Non-/non  male who presents with Chest Pain (1 nitro PTA) and Abdominal Pain   and is admitted to the hospital for the management of Diastolic CHF, acute on chronic ([facility] [address]    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 He had surgery and reconstruction in 2018, and recently was admitted to [address] [place] [address] earlier in June 2, 2020. He was found to have a compression fracture of the back after a fall, L2. He was also found to have an Nstemi and A/CKD at that time.       He felt some chest pain and worsening dyspnea which prompted him to present to the ED this admission. He denies abdominal pain, N//v. He lives at home. He is able to cover his trach and speak and answer questions with short responses. He denies any fever, chills or much cough. He just feels like he cannot take a deep breath.     Review of Systems:     Constitutional:  negative for chills, fevers, sweats  Respiratory:  negative for cough, dyspnea on exertion, positive shortness of breath,   Cardiovascular:  negative for chest pain, chest pressure/discomfort, lower extremity edema, palpitations  Gastrointestinal:  negative for abdominal Contrast    Result Date: 6/12/2020  Mild left and large right effusion with adjacent consolidation representing atelectasis versus pneumonia. Stable right mediastinal lymph node. Xr Chest Portable    Result Date: 6/12/2020  Interval worsening of bilateral airspace disease and pleural effusions. Overall findings are consistent with edema. Xr Chest Portable    Result Date: 6/11/2020  Cardiomegaly, with mild vascular congestion Bilateral pleural effusions and bibasilar atelectasis       Physical Examination:        General appearance:  alert, cooperative and no distress, + left facial edema  Mental Status:  oriented to person, place and time and normal affect  Lungs: Reduced BS bibasilar R > L, normal effort, trach stoma present, no drainage  Heart:  regular rate and rhythm, no murmur  Abdomen:  soft, nontender, nondistended, normal bowel sounds, no masses, hepatomegaly, splenomegaly  Extremities:  no edema, redness, tenderness in the calves  Skin:  no gross lesions, rashes, induration    Assessment:        Hospital Problems           Last Modified POA    * (Principal) Diastolic CHF, acute on chronic ([address] 6/12/2020 Yes    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 Dyspnea from Bilat pleural effusions R >L - Needs repeat thoracentesis. Stop IV Lasix for now due to LITTLE, aerosols, oxygen, incentive spirometer, consult Pulmonary for assistance  2. COPD- IV steriods, aerosols  3. Acute diastolic CHF exac- BNP > 99,531, monitor I/Os, negative 445 ml, hold Lasix for now for LITTLE, appreciate cardiology consult  4.  LITTLE/CKD 3-above baseline likely from overdiuresis, consult nephrology, discussed with Dr. Sheba Flores, add IVF 50 mL/hour, continue sodium bicarb tabs twice daily  5. Hyperkalemia-from #4, give insulin 10 units regular/D50, repeat potassium later today. Give 1 dose Kayexalate p.o.  6. Hx laryngeal/ tongue cancer  7. Gi/ DVT proph  8.  PT/OT    Susana Hardwick MD  6/13/2020  12:45 PM

## 2020-06-13 NOTE — CONSULTS
[facility]    Consult / H&P               Today's Date: 6/13/2020  Patient Name: Kavon Salcedo  Date of admission: 6/11/2020 10:22 PM  Patient's age: 81 y.o., 1950  Admission Dx: Pleural effusion, bilateral [J90]    Reason for Consult:  Cardiac evaluation    Requesting Physician: Rox Garcia MD    CHIEF COMPLAINT: chest pain and dyspnea     History Obtained From:  patient, electronic medical record    HISTORY OF PRESENT ILLNESS:      The patient is a 81 y.o.  male past medical history including lung cancer and prostate cancer with also laryngeal cancer status post operation and no functional voice after. Patient has trach stoma seen and breathing easily through it, He has COPD and was recently admitted to [facility] [address] with a dx of COPD exacerbation, PNA, and pleural effusions that required thoracentesis. This time presented to ED with shortness of breath and intermittent chest pain. patient has nonischemic cardiomyopathy with last EF 45% on Echo 5/29/20. last cardiac catheterization on 3/4/2020 showed only blockage of 10 to 20% in the LAD with 0% all other arteries. Patient does appear to get moderately to severely winded doing minimal tasks as well.       Patient's chest x-ray does show mild CHF with severe cardiomegaly and B/L pleural effusion. His troponin appears to be at his baseline of approximately 100. His BNP has gone from 29,000-51,000 in the last 1-1/2 weeks. Renal function at approximately his baseline over the last 2 months, with no significant acidosis or hyperkalemia.       Past Medical History:   has a past medical history of Arthritis, Asthma, CKD (chronic kidney disease), stage III ([address] COPD (chronic obstructive pulmonary disease) ([address] GERD (gastroesophageal reflux disease), Gout, Hypertension, Hyponatremia, Iliac artery aneurysm, right ([address] Laryngeal cancer ([address] Lung cancer ([address] and Prostate cancer ([address]     Past Surgical History: Historical Provider, MD   budesonide-formoterol (SYMBICORT) 160-4.5 MCG/ACT AERO Inhale 2 puffs into the lungs 2 times daily    Historical Provider, MD   omeprazole (PRILOSEC) 20 MG delayed release capsule Take 20 mg by mouth every morning (before breakfast)    Historical Provider, MD   allopurinol (ZYLOPRIM) 300 MG tablet Take 300 mg by mouth daily. Historical Provider, MD     Allergies:  Amino acids and Lisinopril    Social History:   reports that he has quit smoking. His smoking use included cigarettes. He has never used smokeless tobacco. He reports previous alcohol use of about 5.0 standard drinks of alcohol per week. He reports that he does not use drugs. Family History: family history includes Diabetes in his mother and sister; Heart Disease in his mother and sister. No h/o sudden cardiac death. No for premature CAD    REVIEW OF SYSTEMS:    · CONSTITUTIONAL:  negative for fevers, chills, sweats, fatigue, weight loss  · HEENT:  negative for vision, hearing changes, runny nose, throat pain  · RESPIRATORY:  Positive for shortness of breath, no cough, congestion, wheezing  · CARDIOVASCULAR:  negative for chest pain, palpitations  · GASTROINTESTINAL:  negative for nausea, vomiting, diarrhea, constipation, change in bowel habits, abdominal pain   · GENITOURINARY:  negative for difficulty of urination, burning with urination, frequency   · INTEGUMENT:  negative for rash, skin lesions, easy bruising   · HEMATOLOGIC/LYMPHATIC:  negative for swelling/edema   · ALLERGIC/IMMUNOLOGIC:  negative for urticaria , itching  · ENDOCRINE:  negative increase in drinking, increase in urination, hot or cold intolerance  · MUSCULOSKELETAL:  negative joint pains, muscle aches, swelling of joints  · NEUROLOGICAL:  negative for headaches, dizziness, lightheadedness, numbness, pain, tingling extremities  · BEHAVIOR/PSYCH:  negative for depression, anxiety      PHYSICAL EXAM:      /85   Pulse 72   Temp 97.4 °F (36.3 °C) (Oral)   Resp 17   Ht 5' 9\" (1.753 m)   Wt 146 lb 9.6 oz (66.5 kg)   SpO2 95%   BMI 21.65 kg/m²    · General Appearance: alert, ill appearing, and in no acute distress  · Mental status: oriented to person, place, and time  · Head: + Facial edema to left side of face  · Neck: supple, no carotid bruits, + trach  · Lungs: Reduced BS bibasilar with minimal inspiratory crackles   · Cardiovascular: normal rate, regular rhythm, no murmur, gallop, rub  · Abdomen: Soft, nontender, nondistended, normal bowel sounds, no hepatomegaly or splenomegaly  · Neurologic: There are no new focal motor or sensory deficits, normal muscle tone and bulk, no abnormal sensation, normal speech, cranial nerves II through XII grossly intact  · Skin: No gross lesions, rashes, bruising or bleeding on exposed skin area  · Extremities: peripheral pulses palpable, no pedal edema or calf pain with palpation  · Psych: normal affect    Labs:   CBC:   Recent Labs     06/11/20  2319   WBC 7.8   HGB 7.9*   HCT 25.2*        BMP:   Recent Labs     06/11/20  2319   *   K 4.8   CO2 19*   BUN 47*   CREATININE 2.38*   LABGLOM 27*   GLUCOSE 81     LIVER PROFILE:  Recent Labs     06/11/20  2319   AST 43*   ALT 46*   LABALBU 3.3*       IMPRESSION:    1. Acute on chronic systolic and diastolic CHF. Pro BNP >51,000 (baseline 28,000). EF 45% with mod LVH on echo 5/29/20  2. Severe TR/MR and pulmonary hypertension   3. NSTEMI likely type 2 from underlying CKD   4. NICMP, last cath in march 2020 with normal coronaries   5. recurrent B/L plural effusion  6. H/o Laryngeal cancer/lung cancer and prostate cancer   7. S/p trach   8. CKD stage III     RECOMMENDATIONS:  1. Increaser Iv Lasix 40 to bid, strict I/Os, daily weight and document. 2. Thoracentesis at the discretion of pulmonology   3. Continue Imdur, BB, hydralazine, aspirin    4. No further cardiac ischemic workup at the moment  5. Will follow       Plan to be discuss with Dr Adalgisa French.  Etelvina Dooley

## 2020-06-13 NOTE — CONSULTS
Nephrology Consult Note    Reason for Consult:  LITTLE on CKD, hyperkalemia  Requesting Physician: Dr. Coco Nava    Chief Complaint:  Chest pain    History Obtained From:  Patient, EMR, RN    History of Present Illness: This is a 81 y.o. male who presented to the hospital for evaluation of acute CP, worsening dyspnea. Feels he cannot take a deep breath. Denies abdominal pain, n/v/d, denies fever, chills, denies significant cough. He was dmitted yesterday for the management of diastolic CHF acute on chronic based on CXR findings of pleural effusions and congestion  PMH COPD, laryngeal/glottic squamous cell cancer s/p trach and then developed advanced oral cavity caner of left mandible and tongue. Then he had reconstructive surgery in 2018. Recently hospitalized in [place]. [facility] about 10 days ago found to have COPD exacerbation, PNA and pleural effusions that required thoracentesis. Patient's chest x-ray at admission showed mild CHF with severe cardiomegaly and B/L pleural effusion.   His troponin appears to be at his baseline of approximately 100.    His BNP has gone from 29,000-51,000 in the last 1-1/2 weeks.      He just  underwent a R thoracentesis 450ml of straw colored pleural fluid. \  He feels less SOB now. He got a dose of Lasix 40mg IV once yesterday. None today. Cr 2.38 at admission to 3.08 today. Baseline Cr over past 2 months seems to be 2.6-3.0.     K at admission 4.8 today 5.6, CO2 19 at admission now 16. Na at admission 130, today 136. Home meds show sodium bicarb, Imdur, hydralazine, Toprol, Lasix 40mg every other day. Norvasc,     Per review of records he had an extensive renal failure w/u earlier in 2020. He was seen in consult by Dr. Rudolph Arce on 5/30/20. He has known h/o CKD stage 4 follows with Dr Saumya Cadena. Previous USS have shown echogenic kidneys about 10 cm in size. Urine sediment has been benign, serologies including serology for Hep B/C negative.   Nephrology bicarbonate tablet 650 mg, BID  isosorbide mononitrate (IMDUR) extended release tablet 30 mg, Daily  ipratropium-albuterol (DUONEB) nebulizer solution 3 mL, 4x Daily  hydrALAZINE (APRESOLINE) tablet 50 mg, 3 times per day  metoprolol succinate (TOPROL XL) extended release tablet 50 mg, Daily  polyethylene glycol (GLYCOLAX) packet 17 g, Daily PRN  bicalutamide (CASODEX) chemo tablet 50 mg, Daily  sodium chloride flush 0.9 % injection 10 mL, 2 times per day  sodium chloride flush 0.9 % injection 10 mL, PRN  acetaminophen (TYLENOL) tablet 650 mg, Q6H PRN    Or  acetaminophen (TYLENOL) suppository 650 mg, Q6H PRN  polyethylene glycol (GLYCOLAX) packet 17 g, Daily PRN  promethazine (PHENERGAN) tablet 12.5 mg, Q6H PRN    Or  ondansetron (ZOFRAN) injection 4 mg, Q6H PRN  famotidine (PEPCID) tablet 20 mg, Daily  nicotine (NICODERM CQ) 21 MG/24HR 1 patch, Daily PRN  enoxaparin (LOVENOX) injection 30 mg, Daily  albuterol (PROVENTIL) nebulizer solution 2.5 mg, Q2H PRN  albuterol (PROVENTIL) nebulizer solution 2.5 mg, As Directed RT PRN  methylPREDNISolone sodium (SOLU-MEDROL) injection 40 mg, Q8H  traMADol (ULTRAM) tablet 50 mg, Q6H PRN        Allergies:  Amino acids and Lisinopril    Social History:   Social History     Socioeconomic History    Marital status: Single     Spouse name: Not on file    Number of children: Not on file    Years of education: Not on file    Highest education level: Not on file   Occupational History    Not on file   Social Needs    Financial resource strain: Not on file    Food insecurity     Worry: Not on file     Inability: Not on file   [employer] needs     Medical: Not on file     Non-medical: Not on file   Tobacco Use    Smoking status: Former Smoker     Types: Cigarettes    Smokeless tobacco: Never Used    Tobacco comment: quit smoking 20 years ago    Substance and Sexual Activity    Alcohol use: Not Currently     Alcohol/week: 5.0 standard drinks     Types: 5 Cans of beer per [employer]AM[employer] 1.2 10/27/2019    GGPCT 19 10/27/2019    PATH ELECTRONICALLY SIGNED. Chase King M.D. 10/28/2019     UPEP:   Lab Results   Component Value Date     10/28/2019        C3: No results found for: C3  C4: No results found for: C4  MPO ANCA:    Lab Results   Component Value Date    MPO 12 05/31/2020    . PR3 ANCA:    Lab Results   Component Value Date    PR3 8 05/31/2020     Urine Sodium:    Lab Results   Component Value Date    CASTILLO <20 05/31/2020      Urine Creatinine:    Lab Results   Component Value Date    LABCREA 139.5 05/31/2020     Urine Eosinophils:   Lab Results   Component Value Date    UREO NONE SEEN 05/31/2020     Urine Protein:    Lab Results   Component Value Date     10/28/2019     Urinalysis:  U/A:   Lab Results   Component Value Date    NITRU NEGATIVE 06/11/2020    COLORU YELLOW 06/11/2020    PHUR 6.5 06/11/2020    WBCUA None 06/11/2020    RBCUA 0 TO 2 06/11/2020    MUCUS NOT REPORTED 06/11/2020    TRICHOMONAS NOT REPORTED 06/11/2020    YEAST NOT REPORTED 06/11/2020    BACTERIA NOT REPORTED 06/11/2020    SPECGRAV 1.009 06/11/2020    LEUKOCYTESUR NEGATIVE 06/11/2020    UROBILINOGEN Normal 06/11/2020    BILIRUBINUR NEGATIVE 06/11/2020    GLUCOSEU NEGATIVE 06/11/2020    [address] NEGATIVE 06/11/2020    AMORPHOUS NOT REPORTED 06/11/2020         Radiology:  Reviewed as available. Assessment:  1. LITTLE, from pre renal azotemia from depleted effective volume from insensible losses and loop diuretic use non-oliguric, baseline 2.5-3.0 peaked at 3.6  2. CKD stage 4 from ischemic nephrosclerosis, baseline 2.5-3.0, follows with Dr Tracy Bell  2. Hyponatremia  3. Hyperkalemia from decreased distal sodium delivery and extracellular shift from acidosis   4. Anion gap Metabolic acidosis from LITTLE and advanced CKD  5. Bilateral pleural effusions likely transudative   6. COPD with frequent excarbations  7. Diastolic dysfxn, normal coronaries  8. Laryngeal/tongue cancer s/p resection, tracheostomy  9.

## 2020-06-13 NOTE — CONSULTS
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 malnutrition (HCC)    Facial edema    Common iliac aneurysm (HCC)    Essential hypertension    Stage 3 chronic kidney disease (HCC)    Acute kidney injury superimposed on chronic kidney disease ([address]    [facility]non[facility] elevated [facility]    Pathological fracture of lumbar vertebra due to secondary osteoporosis (HCC)    Age-related osteoporosis with current pathological fracture    Intractable back pain    Iron deficiency anemia    Anemia, blood loss    Chronic bilateral pleural effusions    Diastolic CHF, acute on chronic (HCC)     · bilat pleural effusions, likely related to CHF    RECOMMENDATIONS   1. Proceed with diagnostic/therapeurtic thoracentesis  2. Cont treatment for CHF  3. Degree of airways obstruction not clear and permanent trach precludes PFTs. 4. Would dc steroids: not wheezing currently and will facilitate diuresis  5. Humidification (aersol trach collar) at night to facilitate secretion removal  6. Albuterol/ipratropium aerosols prn. CONSULT DETAILS     Reason for Consult     SOB/pleural effusions  Consulting Nicolas Calloway MD    Primary Care Physician - Liam Rudolph MD     HPI   Smith Puri is a 81 y.o. male admitted for creasing shortness of breath presumably related to decompensated heart failure. Communication difficult with patient due to laryngectomy and permanent trach. Uses esophageal speech.   Indicates that he quit smoking many years ago (at the or Shortness of Breath  budesonide-formoterol (SYMBICORT) 160-4.5 MCG/ACT AERO, Inhale 2 puffs into the lungs 2 times daily  omeprazole (PRILOSEC) 20 MG delayed release capsule, Take 20 mg by mouth every morning (before breakfast)  allopurinol (ZYLOPRIM) 300 MG tablet, Take 300 mg by mouth daily. DIET   DIET DENTAL SOFT; Low Sodium (2 GM); Dental Soft    ALLERGIES   Amino acids and Lisinopril    SOCIAL HISTORY   Tobacco History  Social History     Tobacco Use   Smoking Status Former Smoker    Types: Cigarettes   Smokeless Tobacco Never Used   Tobacco Comment    quit smoking 20 years ago      Alcohol History  Social History     Substance and Sexual Activity   Alcohol Use Not Currently    Alcohol/week: 5.0 standard drinks    Types: 5 Cans of beer per week       FAMILY HISTORY         Problem Relation Age of Onset    Diabetes Mother     Heart Disease Mother         Sudden cardiac death    Diabetes Sister     Heart Disease Sister        SLEEP HISTORY   n/a    ROS     General Denies any fever or chills  HEENT Denies any diplopia, tinnitus or vertigo  Resp positive for  dyspnea  Cardiac Denies any chest pain, palpitations, claudication or edema  GI Denies any melena, hematochezia, hematemesis or pyrosis   Denies any frequency, urgency, hesitancy or incontinence  Heme Denies bruising or bleeding easily  Endocrine Denies any history of diabetes or thyroid disease  Neuro Denies any focal motor or sensory deficits    VITALS    height is 5' 9\" (1.753 m) and weight is 146 lb 9.6 oz (66.5 kg). His oral temperature is 97.5 °F (36.4 °C). His blood pressure is 133/74 and his pulse is 74. His respiration is 17 and oxygen saturation is 97%.        Temperature Range: Temp: 97.5 °F (36.4 °C) Temp  Av.4 °F (36.3 °C)  Min: 96.8 °F (36 °C)  Max: 97.9 °F (36.6 °C)  BP Range:  Systolic (63SUJ), FTW:019 , Min:123 , HPI:280     Diastolic (06GZT), DHB:44, Min:71, Max:101    Pulse Range: Pulse  Av  Min: 72  Max:

## 2020-06-13 NOTE — PROGRESS NOTES
PROVIDE ADEQUATE OXYGENATION WITH ACCEPTABLE SP02/ABG'S    [x]  IDENTIFY APPROPRIATE OXYGEN THERAPY  [x]   MONITOR SP02/ABG'S AS NEEDED   BRONCHOSPASM/BRONCHOCONSTRICTION     [x]         IMPROVE AERATION/BREATH SOUNDS  [x]   ADMINISTER BRONCHODILATOR THERAPY AS APPROPRIATE  [x]   ASSESS BREATH SOUNDS  []   IMPLEMENT AEROSOL/MDI PROTOCOL  [x]   PATIENT EDUCATION AS NEEDED    [x]   PATIENT EDUCATION AS NEEDED

## 2020-06-14 LAB
ANION GAP SERPL CALCULATED.3IONS-SCNC: 16 MMOL/L (ref 9–17)
ANION GAP SERPL CALCULATED.3IONS-SCNC: 16 MMOL/L (ref 9–17)
ANION GAP SERPL CALCULATED.3IONS-SCNC: 18 MMOL/L (ref 9–17)
BUN BLDV-MCNC: 66 MG/DL (ref 8–23)
BUN/CREAT BLD: ABNORMAL (ref 9–20)
CALCIUM SERPL-MCNC: 7.6 MG/DL (ref 8.6–10.4)
CHLORIDE BLD-SCNC: 92 MMOL/L (ref 98–107)
CHLORIDE BLD-SCNC: 93 MMOL/L (ref 98–107)
CHLORIDE BLD-SCNC: 93 MMOL/L (ref 98–107)
CO2: 18 MMOL/L (ref 20–31)
CO2: 21 MMOL/L (ref 20–31)
CO2: 22 MMOL/L (ref 20–31)
CREAT SERPL-MCNC: 3.25 MG/DL (ref 0.7–1.2)
GFR AFRICAN AMERICAN: 23 ML/MIN
GFR NON-AFRICAN AMERICAN: 19 ML/MIN
GFR SERPL CREATININE-BSD FRML MDRD: ABNORMAL ML/MIN/{1.73_M2}
GFR SERPL CREATININE-BSD FRML MDRD: ABNORMAL ML/MIN/{1.73_M2}
GLUCOSE BLD-MCNC: 144 MG/DL (ref 75–110)
GLUCOSE BLD-MCNC: 144 MG/DL (ref 75–110)
GLUCOSE BLD-MCNC: 145 MG/DL (ref 75–110)
GLUCOSE BLD-MCNC: 146 MG/DL (ref 70–99)
GLUCOSE BLD-MCNC: 146 MG/DL (ref 75–110)
LACTATE DEHYDROGENASE: 242 U/L (ref 135–225)
POTASSIUM SERPL-SCNC: 4.1 MMOL/L (ref 3.7–5.3)
POTASSIUM SERPL-SCNC: 4.5 MMOL/L (ref 3.7–5.3)
POTASSIUM SERPL-SCNC: 4.8 MMOL/L (ref 3.7–5.3)
SODIUM BLD-SCNC: 128 MMOL/L (ref 135–144)
SODIUM BLD-SCNC: 130 MMOL/L (ref 135–144)
SODIUM BLD-SCNC: 131 MMOL/L (ref 135–144)

## 2020-06-14 PROCEDURE — 2580000003 HC RX 258: Performed by: NURSE PRACTITIONER

## 2020-06-14 PROCEDURE — 36415 COLL VENOUS BLD VENIPUNCTURE: CPT

## 2020-06-14 PROCEDURE — 2060000000 HC ICU INTERMEDIATE R&B

## 2020-06-14 PROCEDURE — 80048 BASIC METABOLIC PNL TOTAL CA: CPT

## 2020-06-14 PROCEDURE — 6370000000 HC RX 637 (ALT 250 FOR IP): Performed by: INTERNAL MEDICINE

## 2020-06-14 PROCEDURE — 6360000002 HC RX W HCPCS: Performed by: NURSE PRACTITIONER

## 2020-06-14 PROCEDURE — 99232 SBSQ HOSP IP/OBS MODERATE 35: CPT | Performed by: INTERNAL MEDICINE

## 2020-06-14 PROCEDURE — 97530 THERAPEUTIC ACTIVITIES: CPT

## 2020-06-14 PROCEDURE — 2580000003 HC RX 258: Performed by: INTERNAL MEDICINE

## 2020-06-14 PROCEDURE — 82947 ASSAY GLUCOSE BLOOD QUANT: CPT

## 2020-06-14 PROCEDURE — 6370000000 HC RX 637 (ALT 250 FOR IP): Performed by: NURSE PRACTITIONER

## 2020-06-14 PROCEDURE — 6360000002 HC RX W HCPCS: Performed by: INTERNAL MEDICINE

## 2020-06-14 PROCEDURE — 83615 LACTATE (LD) (LDH) ENZYME: CPT

## 2020-06-14 PROCEDURE — 2500000003 HC RX 250 WO HCPCS: Performed by: INTERNAL MEDICINE

## 2020-06-14 PROCEDURE — 80051 ELECTROLYTE PANEL: CPT

## 2020-06-14 PROCEDURE — 2700000000 HC OXYGEN THERAPY PER DAY

## 2020-06-14 PROCEDURE — 97162 PT EVAL MOD COMPLEX 30 MIN: CPT

## 2020-06-14 PROCEDURE — 94640 AIRWAY INHALATION TREATMENT: CPT

## 2020-06-14 RX ORDER — PREDNISONE 20 MG/1
20 TABLET ORAL DAILY
Status: DISCONTINUED | OUTPATIENT
Start: 2020-06-15 | End: 2020-06-15 | Stop reason: HOSPADM

## 2020-06-14 RX ORDER — TORSEMIDE 20 MG/1
40 TABLET ORAL DAILY
Status: DISCONTINUED | OUTPATIENT
Start: 2020-06-14 | End: 2020-06-15 | Stop reason: HOSPADM

## 2020-06-14 RX ADMIN — HYDRALAZINE HYDROCHLORIDE 50 MG: 50 TABLET, FILM COATED ORAL at 08:31

## 2020-06-14 RX ADMIN — ISOSORBIDE MONONITRATE 30 MG: 30 TABLET ORAL at 08:30

## 2020-06-14 RX ADMIN — FAMOTIDINE 20 MG: 20 TABLET, FILM COATED ORAL at 08:30

## 2020-06-14 RX ADMIN — IPRATROPIUM BROMIDE AND ALBUTEROL SULFATE 3 ML: .5; 3 SOLUTION RESPIRATORY (INHALATION) at 21:35

## 2020-06-14 RX ADMIN — Medication 10 ML: at 22:38

## 2020-06-14 RX ADMIN — BUDESONIDE AND FORMOTEROL FUMARATE DIHYDRATE 2 PUFF: 160; 4.5 AEROSOL RESPIRATORY (INHALATION) at 21:35

## 2020-06-14 RX ADMIN — AMLODIPINE BESYLATE 10 MG: 10 TABLET ORAL at 08:31

## 2020-06-14 RX ADMIN — ALLOPURINOL 300 MG: 300 TABLET ORAL at 08:30

## 2020-06-14 RX ADMIN — METHYLPREDNISOLONE SODIUM SUCCINATE 40 MG: 40 INJECTION, POWDER, FOR SOLUTION INTRAMUSCULAR; INTRAVENOUS at 06:14

## 2020-06-14 RX ADMIN — HYDRALAZINE HYDROCHLORIDE 50 MG: 50 TABLET, FILM COATED ORAL at 17:27

## 2020-06-14 RX ADMIN — IPRATROPIUM BROMIDE AND ALBUTEROL SULFATE 3 ML: .5; 3 SOLUTION RESPIRATORY (INHALATION) at 07:44

## 2020-06-14 RX ADMIN — IPRATROPIUM BROMIDE AND ALBUTEROL SULFATE 3 ML: .5; 3 SOLUTION RESPIRATORY (INHALATION) at 11:42

## 2020-06-14 RX ADMIN — DICYCLOMINE HYDROCHLORIDE 10 MG: 10 CAPSULE ORAL at 17:26

## 2020-06-14 RX ADMIN — IPRATROPIUM BROMIDE AND ALBUTEROL SULFATE 3 ML: .5; 3 SOLUTION RESPIRATORY (INHALATION) at 15:12

## 2020-06-14 RX ADMIN — ENOXAPARIN SODIUM 30 MG: 30 INJECTION SUBCUTANEOUS at 08:31

## 2020-06-14 RX ADMIN — Medication: at 11:16

## 2020-06-14 RX ADMIN — TRAMADOL HYDROCHLORIDE 50 MG: 50 TABLET, FILM COATED ORAL at 00:18

## 2020-06-14 RX ADMIN — VITAMIN D, TAB 1000IU (100/BT) 2000 UNITS: 25 TAB at 08:31

## 2020-06-14 RX ADMIN — DICYCLOMINE HYDROCHLORIDE 10 MG: 10 CAPSULE ORAL at 08:30

## 2020-06-14 RX ADMIN — BICALUTAMIDE 50 MG: 50 TABLET ORAL at 08:31

## 2020-06-14 RX ADMIN — HYDRALAZINE HYDROCHLORIDE 50 MG: 50 TABLET, FILM COATED ORAL at 00:18

## 2020-06-14 RX ADMIN — METOPROLOL SUCCINATE 50 MG: 50 TABLET, FILM COATED, EXTENDED RELEASE ORAL at 08:31

## 2020-06-14 RX ADMIN — PANTOPRAZOLE SODIUM 40 MG: 40 TABLET, DELAYED RELEASE ORAL at 06:14

## 2020-06-14 RX ADMIN — TORSEMIDE 40 MG: 20 TABLET ORAL at 18:29

## 2020-06-14 RX ADMIN — Medication 10 ML: at 08:30

## 2020-06-14 ASSESSMENT — PAIN SCALES - GENERAL
PAINLEVEL_OUTOF10: 8
PAINLEVEL_OUTOF10: 0
PAINLEVEL_OUTOF10: 3
PAINLEVEL_OUTOF10: 0

## 2020-06-14 ASSESSMENT — PAIN DESCRIPTION - LOCATION
LOCATION: HEAD
LOCATION: HEAD

## 2020-06-14 ASSESSMENT — PAIN DESCRIPTION - DESCRIPTORS
DESCRIPTORS: HEADACHE
DESCRIPTORS: HEADACHE

## 2020-06-14 ASSESSMENT — PAIN DESCRIPTION - PAIN TYPE
TYPE: ACUTE PAIN
TYPE: ACUTE PAIN

## 2020-06-14 NOTE — PROGRESS NOTES
discharge home from our standpoint. I will follow-up on pleural fluid cytology. 2. Continue treatment for diastolic heart failure. 3. Limits salt. 4. Aerosol trach collar at night to vent desiccation of bronchial secretions. 5. Follow-up with primary care physician. Would be happy to see for pulmonary if desired.       Electronically signed by Debbie Foster DO on 6/14/2020 at 1:46 PM

## 2020-06-14 NOTE — PROGRESS NOTES
Nephrology Progress Note    Subjective:     Patient seen and examined, doing well. Sitting up in chair, asking to go home if able. SOB resolved after thoracentesis. Transudative fluid. Remains on Imdur, BB, hydralazine, Norvasc, Asprin. I am told cardiology has no further ischemic w/u planned at this time. 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   UO not accurate    Objective:  CURRENT TEMPERATURE:  Temp: 97.7 °F (36.5 °C)  MAXIMUM TEMPERATURE OVER 24HRS:  Temp (24hrs), Av.9 °F (36.6 °C), Min:97.7 °F (36.5 °C), Max:98.2 °F (36.8 °C)    CURRENT RESPIRATORY RATE:  Resp: 16  CURRENT PULSE:  Pulse: 72  CURRENT BLOOD PRESSURE:  BP: 139/81  24HR BLOOD PRESSURE RANGE:  Systolic (12KGN), NZE:976 , Min:126 , BBT:144   ; Diastolic (86WUC), DPA:64, Min:69, Max:84    24HR INTAKE/OUTPUT:      Intake/Output Summary (Last 24 hours) at 2020 1255  Last data filed at 2020 0702  Gross per 24 hour   Intake 2240 ml   Output 400 ml   Net 1840 ml     Patient Vitals for the past 96 hrs (Last 3 readings):   Weight   20 0255 142 lb (64.4 kg)   20 0652 146 lb 9.6 oz (66.5 kg)   20 1241 142 lb (64.4 kg)     Physical Exam:  General appearance:Awake, alert, in no acute distress  Skin: warm and dry, no rash or erythema  Eyes: conjunctivae normal and sclera anicteric  ENT: : +trach, dry mucous membranes   Neck: no carotid bruit,no JVD,no carotid Lymphadenopathy, noThyromegaly Pulmonary: diminished bilaterally, exp wheezes  Cardiovascular: Normal S1 & S2,  No S3 or  S4, no Pericardial Rub no Murmur   Abdomen: soft nontender, bowel sounds present, no organomegaly, no fluid wave  Extremities:no cyanosis, clubbing or pitting edema    Labs:   CBC:  Recent Labs     20  2319 20  1011   WBC 7.8 5.9   RBC 2.67* 2.84*   HGB 7.9* 8.5*   HCT 25.2* 26.9*   MCV 94.4 94.7   MCH 29.6 29.9   MCHC 31.3 31.6   RDW 17.5* 17.5*    241   MPV 10.4 10.8      BMP:   Recent Labs     20  1011

## 2020-06-14 NOTE — CARE COORDINATION
Patient/family seen: Yes       Informed patient/family of [facility] Medical Bundle Program with potential outreach by either Care Transitions Team or [facility] Team based on hospital admission and location.        [facility] Notification Letter given: Yes         Current discharge plan: return home current with [facility]

## 2020-06-14 NOTE — PROGRESS NOTES
assessed for rehabilitation services?: Yes  Response To Previous Treatment: Not applicable  Family / Caregiver Present: No  Follows Commands: Within Functional Limits  Subjective  Subjective: RN and pt agreeable to PT. Pt alert in bed upon arrival.   Pain Screening  Patient Currently in Pain: Yes  Pain Assessment  Pain Assessment: 0-10  Pain Level: 3  Pain Type: Acute pain  Pain Location: Head  Pain Descriptors: Headache  Non-Pharmaceutical Pain Intervention(s): Ambulation/Increased Activity;Repositioned; Emotional support  Response to Pain Intervention: Patient Satisfied  Vital Signs  Patient Currently in Pain: Yes  Pre Treatment Pain Screening  Intervention List: Patient able to continue with treatment    Orientation  Orientation  Overall Orientation Status: Within Functional Limits  Social/Functional History  Social/Functional History  Lives With: (brother)  Type of Home: House  Home Layout: One level  Home Access: Stairs to enter with rails(pt also goes to basement sometimes)  Entrance Stairs - Number of Steps: 5  Entrance Stairs - Rails: Both  Bathroom Shower/Tub: Tub/Shower unit  Bathroom Equipment: Grab bars in shower  Bathroom Accessibility: Accessible  Home Equipment: (none)  Receives Help From: Family  ADL Assistance: Independent  Homemaking Assistance: Needs assistance  Homemaking Responsibilities: Yes  Ambulation Assistance: Independent  Transfer Assistance: Independent  Cognition        Objective             Strength RLE  Strength RLE: WFL  Strength LLE  Strength LLE: WFL  Strength RUE  Strength RUE: WFL  Strength LUE  Strength LUE: WFL     Sensation  Overall Sensation Status: WFL(Pt denies any numbness or tingling)  Bed mobility  Supine to Sit: Stand by assistance  Sit to Supine: Stand by assistance  Scooting: Stand by assistance  Transfers  Sit to Stand: Stand by assistance  Stand to sit: Stand by assistance  Ambulation  Ambulation?: Yes  Ambulation 1  Surface: level tile  Device: Rolling Walker  Assistance: Contact guard assistance  Quality of Gait: good сергей, no LOB  Distance: 300'  Stairs/Curb  Stairs?: No     Balance  Posture: Fair  Sitting - Static: Good  Sitting - Dynamic: Good  Standing - Static: Good  Standing - Dynamic: Good;-  Comments: RW used while assessing standing balance        Plan   Plan  Times per week: 3-5x/wk  Current Treatment Recommendations: Strengthening, Balance Training, Functional Mobility Training, Transfer Training, Endurance Training, Gait Training, Stair training, Home Exercise Program, Safety Education & Training, Patient/Caregiver Education & Training, Equipment Evaluation, Education, & procurement  Safety Devices  Type of devices: Call light within reach, Nurse notified, Gait belt, Left in bed, All fall risk precautions in place  Restraints  Initially in place: No    AM-PAC Score  AM-PAC Inpatient Mobility Raw Score : 22 (06/14/20 1501)  AM-PAC Inpatient T-Scale Score : 53.28 (06/14/20 1501)  Mobility Inpatient CMS 0-100% Score: 20.91 (06/14/20 1501)  Mobility Inpatient CMS G-Code Modifier : Brent Bedolla (06/14/20 1501)          Goals  Short term goals  Time Frame for Short term goals: 10 visits  Short term goal 1: Pt will be I bed mobility  Short term goal 2: Pt will be I transfers  Short term goal 3: Pt will be Basil amb 300' RW SPC  Short term goal 4: Pt will navigate flight of steps Basil       Therapy Time   Individual Concurrent Group Co-treatment   Time In 0855         Time Out 0914         Minutes 19         Timed Code Treatment Minutes: 8 Minutes       Dilma Kim, PT

## 2020-06-14 NOTE — PROGRESS NOTES
REPORTED NOT REPORTED     BNP:   Recent Labs     06/11/20  2319   PROBNP 51,089*      No results for input(s): BNP in the last 72 hours. Lipids: No results for input(s): CHOL, HDL in the last 72 hours. Invalid input(s): LDLCALCU  INR: No results for input(s): INR in the last 72 hours. Objective:   Vitals: /76   Pulse 72   Temp 97.9 °F (36.6 °C) (Oral)   Resp 16   Ht 5' 9\" (1.753 m)   Wt 142 lb (64.4 kg)   SpO2 96%   BMI 20.97 kg/m²    Last Recorded Weight:  [unfilled]    Constitutional and General Appearance: alert, cooperative, no distress and appears stated age  Respiratory:  · On auscultation: clear to auscultation bilaterally but decrease breath sounds  Cardiovascular:  · The apical impulse is not displaced  · Heart tones are crisp and normal. regular S1 and S2. No murmurs. · Jugular venous pulsation Normal  · Peripheral pulses are symmetrical and full   Abdomen:   · No masses or tenderness  · Bowel sounds present  Extremities:  ·  No Cyanosis or Clubbing  ·  Lower extremity edema: No  ·  Skin: Warm and dry  Neurological:  · Alert and oriented.   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)     Laryngeal cancer (HCC)     Mouth swelling     COPD (chronic obstructive pulmonary disease) (HCC)     Severe malnutrition (HCC)     Facial edema     Common iliac aneurysm (HCC)     Essential hypertension     Stage 3 chronic kidney disease ([address]     Acute kidney injury superimposed on chronic kidney disease ([facility])     NSTEMI (non-ST elevated myocardial infarction) ([address]     Pathological fracture of lumbar vertebra due to secondary osteoporosis (HCC)     Age-related osteoporosis with current pathological fracture     Intractable back pain     Iron deficiency anemia     Anemia, blood loss     Chronic bilateral pleural effusions     Diastolic CHF, acute on chronic (HCC)      Assessment / Acute Cardiac Problems:

## 2020-06-15 VITALS
WEIGHT: 152.5 LBS | HEIGHT: 69 IN | TEMPERATURE: 96.8 F | RESPIRATION RATE: 14 BRPM | HEART RATE: 70 BPM | DIASTOLIC BLOOD PRESSURE: 64 MMHG | BODY MASS INDEX: 22.59 KG/M2 | SYSTOLIC BLOOD PRESSURE: 117 MMHG | OXYGEN SATURATION: 96 %

## 2020-06-15 LAB
ABSOLUTE EOS #: 0 K/UL (ref 0–0.4)
ABSOLUTE IMMATURE GRANULOCYTE: 0 K/UL (ref 0–0.3)
ABSOLUTE LYMPH #: 0.61 K/UL (ref 1–4.8)
ABSOLUTE MONO #: 0.73 K/UL (ref 0.1–0.8)
ALBUMIN (CALCULATED): 3.2 G/DL (ref 3.2–5.2)
ALBUMIN PERCENT: 59 % (ref 45–65)
ALPHA 1 PERCENT: 3 % (ref 3–6)
ALPHA 2 PERCENT: 17 % (ref 6–13)
ALPHA-1-GLOBULIN: 0.2 G/DL (ref 0.1–0.4)
ALPHA-2-GLOBULIN: 0.9 G/DL (ref 0.5–0.9)
ANION GAP SERPL CALCULATED.3IONS-SCNC: 18 MMOL/L (ref 9–17)
ANTI-NUCLEAR ANTIBODY (ANA): NEGATIVE
APPEARANCE FLUID: NORMAL
BASO FLUID: NORMAL %
BASOPHILS # BLD: 0 % (ref 0–2)
BASOPHILS ABSOLUTE: 0 K/UL (ref 0–0.2)
BETA GLOBULIN: 0.7 G/DL (ref 0.5–1.1)
BETA PERCENT: 13 % (ref 11–19)
BUN BLDV-MCNC: 63 MG/DL (ref 8–23)
BUN/CREAT BLD: ABNORMAL (ref 9–20)
CALCIUM SERPL-MCNC: 7.7 MG/DL (ref 8.6–10.4)
CASE NUMBER:: NORMAL
CHLORIDE BLD-SCNC: 93 MMOL/L (ref 98–107)
CO2: 21 MMOL/L (ref 20–31)
COLOR FLUID: NORMAL
CREAT SERPL-MCNC: 3.24 MG/DL (ref 0.7–1.2)
DIFFERENTIAL TYPE: ABNORMAL
EOSINOPHIL FLUID: NORMAL %
EOSINOPHILS RELATIVE PERCENT: 0 % (ref 1–4)
FLUID DIFF COMMENT: NORMAL
GAMMA GLOBULIN %: 8 % (ref 9–20)
GAMMA GLOBULIN: 0.4 G/DL (ref 0.5–1.5)
GFR AFRICAN AMERICAN: 23 ML/MIN
GFR NON-AFRICAN AMERICAN: 19 ML/MIN
GFR SERPL CREATININE-BSD FRML MDRD: ABNORMAL ML/MIN/{1.73_M2}
GFR SERPL CREATININE-BSD FRML MDRD: ABNORMAL ML/MIN/{1.73_M2}
GLUCOSE BLD-MCNC: 102 MG/DL (ref 70–99)
HCT VFR BLD CALC: 26.7 % (ref 40.7–50.3)
HEMOGLOBIN: 8.7 G/DL (ref 13–17)
IMMATURE GRANULOCYTES: 0 %
LYMPHOCYTES # BLD: 5 % (ref 24–44)
LYMPHOCYTES, BODY FLUID: 41 %
MCH RBC QN AUTO: 30 PG (ref 25.2–33.5)
MCHC RBC AUTO-ENTMCNC: 32.6 G/DL (ref 28.4–34.8)
MCV RBC AUTO: 92.1 FL (ref 82.6–102.9)
MONOCYTE, FLUID: NORMAL %
MONOCYTES # BLD: 6 % (ref 1–7)
MORPHOLOGY: ABNORMAL
MORPHOLOGY: ABNORMAL
NEUTROPHIL, FLUID: 28 %
NRBC AUTOMATED: 0 PER 100 WBC
OTHER CELLS FLUID: NORMAL %
PATHOLOGIST: ABNORMAL
PATHOLOGIST: NORMAL
PDW BLD-RTO: 17.7 % (ref 11.8–14.4)
PHOSPHORUS: 4.6 MG/DL (ref 2.5–4.5)
PLATELET # BLD: 195 K/UL (ref 138–453)
PLATELET ESTIMATE: ABNORMAL
PMV BLD AUTO: 10.7 FL (ref 8.1–13.5)
POTASSIUM SERPL-SCNC: 3.8 MMOL/L (ref 3.7–5.3)
PROTEIN ELECTROPHORESIS, SERUM: ABNORMAL
RBC # BLD: 2.9 M/UL (ref 4.21–5.77)
RBC # BLD: ABNORMAL 10*6/UL
RBC FLUID: <3000 /MM3
SEG NEUTROPHILS: 89 % (ref 36–66)
SEGMENTED NEUTROPHILS ABSOLUTE COUNT: 10.76 K/UL (ref 1.8–7.7)
SERUM IFX INTERP: NORMAL
SODIUM BLD-SCNC: 132 MMOL/L (ref 135–144)
SPECIMEN DESCRIPTION: NORMAL
SPECIMEN TYPE: NORMAL
TOTAL PROT. SUM,%: 100 % (ref 98–102)
TOTAL PROT. SUM: 5.4 G/DL (ref 6.3–8.2)
TOTAL PROTEIN: 5.4 G/DL (ref 6.4–8.3)
WBC # BLD: 12.1 K/UL (ref 3.5–11.3)
WBC # BLD: ABNORMAL 10*3/UL
WBC FLUID: 236 /MM3

## 2020-06-15 PROCEDURE — 94640 AIRWAY INHALATION TREATMENT: CPT

## 2020-06-15 PROCEDURE — 2580000003 HC RX 258: Performed by: NURSE PRACTITIONER

## 2020-06-15 PROCEDURE — 6370000000 HC RX 637 (ALT 250 FOR IP): Performed by: NURSE PRACTITIONER

## 2020-06-15 PROCEDURE — 6370000000 HC RX 637 (ALT 250 FOR IP): Performed by: INTERNAL MEDICINE

## 2020-06-15 PROCEDURE — 36415 COLL VENOUS BLD VENIPUNCTURE: CPT

## 2020-06-15 PROCEDURE — 2700000000 HC OXYGEN THERAPY PER DAY

## 2020-06-15 PROCEDURE — 6360000002 HC RX W HCPCS: Performed by: NURSE PRACTITIONER

## 2020-06-15 PROCEDURE — 80048 BASIC METABOLIC PNL TOTAL CA: CPT

## 2020-06-15 PROCEDURE — 84100 ASSAY OF PHOSPHORUS: CPT

## 2020-06-15 PROCEDURE — 99232 SBSQ HOSP IP/OBS MODERATE 35: CPT | Performed by: INTERNAL MEDICINE

## 2020-06-15 PROCEDURE — 99239 HOSP IP/OBS DSCHRG MGMT >30: CPT | Performed by: INTERNAL MEDICINE

## 2020-06-15 PROCEDURE — 94761 N-INVAS EAR/PLS OXIMETRY MLT: CPT

## 2020-06-15 PROCEDURE — 97110 THERAPEUTIC EXERCISES: CPT

## 2020-06-15 PROCEDURE — 97116 GAIT TRAINING THERAPY: CPT

## 2020-06-15 PROCEDURE — 85025 COMPLETE CBC W/AUTO DIFF WBC: CPT

## 2020-06-15 RX ORDER — TORSEMIDE 20 MG/1
40 TABLET ORAL DAILY
Qty: 60 TABLET | Refills: 0 | Status: SHIPPED | OUTPATIENT
Start: 2020-06-16 | End: 2020-11-10 | Stop reason: SDDI

## 2020-06-15 RX ORDER — PREDNISONE 10 MG/1
10 TABLET ORAL
Qty: 4 TABLET | Refills: 0 | Status: SHIPPED | OUTPATIENT
Start: 2020-06-15 | End: 2020-06-22

## 2020-06-15 RX ORDER — TORSEMIDE 20 MG/1
40 TABLET ORAL DAILY
Qty: 60 TABLET | Refills: 0 | Status: SHIPPED | OUTPATIENT
Start: 2020-06-16 | End: 2020-06-15

## 2020-06-15 RX ADMIN — DICYCLOMINE HYDROCHLORIDE 10 MG: 10 CAPSULE ORAL at 08:18

## 2020-06-15 RX ADMIN — ENOXAPARIN SODIUM 30 MG: 30 INJECTION SUBCUTANEOUS at 08:18

## 2020-06-15 RX ADMIN — ISOSORBIDE MONONITRATE 30 MG: 30 TABLET ORAL at 08:18

## 2020-06-15 RX ADMIN — PANTOPRAZOLE SODIUM 40 MG: 40 TABLET, DELAYED RELEASE ORAL at 08:18

## 2020-06-15 RX ADMIN — BUDESONIDE AND FORMOTEROL FUMARATE DIHYDRATE 2 PUFF: 160; 4.5 AEROSOL RESPIRATORY (INHALATION) at 09:35

## 2020-06-15 RX ADMIN — TORSEMIDE 40 MG: 20 TABLET ORAL at 08:18

## 2020-06-15 RX ADMIN — HYDRALAZINE HYDROCHLORIDE 50 MG: 50 TABLET, FILM COATED ORAL at 01:09

## 2020-06-15 RX ADMIN — PREDNISONE 20 MG: 20 TABLET ORAL at 08:18

## 2020-06-15 RX ADMIN — HYDRALAZINE HYDROCHLORIDE 50 MG: 50 TABLET, FILM COATED ORAL at 08:18

## 2020-06-15 RX ADMIN — ACETAMINOPHEN 650 MG: 325 TABLET ORAL at 04:41

## 2020-06-15 RX ADMIN — METOPROLOL SUCCINATE 50 MG: 50 TABLET, FILM COATED, EXTENDED RELEASE ORAL at 08:18

## 2020-06-15 RX ADMIN — VITAMIN D, TAB 1000IU (100/BT) 2000 UNITS: 25 TAB at 08:18

## 2020-06-15 RX ADMIN — BICALUTAMIDE 50 MG: 50 TABLET ORAL at 08:35

## 2020-06-15 RX ADMIN — AMLODIPINE BESYLATE 10 MG: 10 TABLET ORAL at 08:18

## 2020-06-15 RX ADMIN — ALLOPURINOL 300 MG: 300 TABLET ORAL at 08:18

## 2020-06-15 RX ADMIN — FAMOTIDINE 20 MG: 20 TABLET, FILM COATED ORAL at 08:18

## 2020-06-15 RX ADMIN — Medication 10 ML: at 08:30

## 2020-06-15 RX ADMIN — IPRATROPIUM BROMIDE AND ALBUTEROL SULFATE 3 ML: .5; 3 SOLUTION RESPIRATORY (INHALATION) at 09:25

## 2020-06-15 ASSESSMENT — PAIN SCALES - GENERAL: PAINLEVEL_OUTOF10: 6

## 2020-06-15 NOTE — PLAN OF CARE
BRONCHOSPASM/BRONCHOCONSTRICTION     [x]         IMPROVE AERATION/BREATH SOUNDS  [x]   ADMINISTER BRONCHODILATOR THERAPY AS APPROPRIATE  [x]   ASSESS BREATH SOUNDS  [x]   IMPLEMENT AEROSOL/MDI PROTOCOL  [x]   PATIENT EDUCATION AS NEEDED       PROVIDE ADEQUATE OXYGENATION WITH ACCEPTABLE SP02/ABG'S    [x]  IDENTIFY APPROPRIATE OXYGEN THERAPY  [x]   MONITOR SP02/ABG'S AS NEEDED   [x]   PATIENT EDUCATION AS NEEDED
Problem: Pain:  Description: Pain management should include both nonpharmacologic and pharmacologic interventions.   Goal: Pain level will decrease  Description: Pain level will decrease  6/15/2020 1451 by Roshni Peres RN  Outcome: Met This Shift  6/15/2020 0506 by Miguel Angel Biggs RN  Outcome: Ongoing  Goal: Control of acute pain  Description: Control of acute pain  6/15/2020 1451 by Roshni Peres RN  Outcome: Met This Shift  6/15/2020 0506 by Miguel Angel Biggs RN  Outcome: Ongoing  Goal: Control of chronic pain  Description: Control of chronic pain  6/15/2020 1451 by Roshni Peres RN  Outcome: Met This Shift  6/15/2020 0506 by Miguel Angel Biggs RN  Outcome: Ongoing     Problem: Falls - Risk of:  Goal: Will remain free from falls  Description: Will remain free from falls  6/15/2020 1451 by Roshni Peres RN  Outcome: Met This Shift  6/15/2020 0506 by Miguel Angel Biggs RN  Outcome: Ongoing  Goal: Absence of physical injury  Description: Absence of physical injury  6/15/2020 1451 by Roshni Peres RN  Outcome: Met This Shift  6/15/2020 0506 by Miguel Angel Biggs RN  Outcome: Ongoing     Problem: Safety:  Goal: Free from accidental physical injury  Description: Free from accidental physical injury  6/15/2020 1451 by Roshni Peres RN  Outcome: Met This Shift  6/15/2020 0506 by Miguel Angel Biggs RN  Outcome: Ongoing  Goal: Free from intentional harm  Description: Free from intentional harm  6/15/2020 1451 by Roshni Peres RN  Outcome: Met This Shift  6/15/2020 0506 by Miguel Angel Biggs RN  Outcome: Ongoing     Problem: Daily Care:  Goal: Daily care needs are met  Description: Daily care needs are met  6/15/2020 1451 by Roshni Peres RN  Outcome: Met This Shift  6/15/2020 0506 by Miguel Angel Biggs RN  Outcome: Ongoing     Problem: Skin Integrity:  Goal: Skin integrity will stabilize  Description: Skin integrity will
Problem: Pain:  Description: Pain management should include both nonpharmacologic and pharmacologic interventions.   Goal: Pain level will decrease  Outcome: Ongoing  Goal: Control of acute pain  Outcome: Ongoing  Goal: Control of chronic pain  Outcome: Ongoing     Problem: Falls - Risk of:  Goal: Will remain free from falls  Outcome: Ongoing  Goal: Absence of physical injury  Outcome: Ongoing     Problem: Safety:  Goal: Free from accidental physical injury  Outcome: Ongoing  Goal: Free from intentional harm  Outcome: Ongoing     Problem: Daily Care:  Goal: Daily care needs are met  Outcome: Ongoing     Problem: Skin Integrity:  Goal: Skin integrity will stabilize  Outcome: Ongoing     Problem: Discharge Planning:  Goal: Patients continuum of care needs are met  Outcome: Ongoing
Problem: Pain:  Goal: Pain level will decrease  Description: Pain level will decrease  Outcome: Ongoing  Goal: Control of acute pain  Description: Control of acute pain  Outcome: Ongoing  Goal: Control of chronic pain  Description: Control of chronic pain  Outcome: Ongoing  - Perform a comprehensive assessment of pain to include location, characteristics, onset, duration, frequency, quality, intensity or severity, and precipitating factors of pain. - Consider cultural influences on pain response   - Reduce or eliminate factors that precipitate or increase pain   - Teach the use of nonpharmacologic techniques, such as relaxation, distraction, and massage).    - Use analgesics appropriately Evaluate the effectiveness of pain control measures   - The patient will verbalize decrease in pain by rating pain less than 2 on 0 to10 scale within 1 to 2 hours of receiving pain medication   - Report uncontrolled pain to the health care provider
of care needs are met  6/14/2020 0706 by Aly Noel RN  Outcome: Ongoing  6/13/2020 2200 by Mariela Orantes RN  Outcome: Ongoing     Problem: OXYGENATION/RESPIRATORY FUNCTION  Goal: Patient will achieve/maintain normal respiratory rate/effort  Description: Respiratory rate and effort will be within normal limits for the patient  6/14/2020 0706 by Aly Noel RN  Outcome: Ongoing  6/13/2020 2200 by Mariela Orantes RN  Outcome: Ongoing  Goal: Patient will maintain patent airway  6/14/2020 0706 by Aly Noel RN  Outcome: Ongoing  6/13/2020 2200 by Mariela Orantes RN  Outcome: Ongoing     Problem: HEMODYNAMIC STATUS  Goal: Patient has stable vital signs and fluid balance  6/14/2020 0706 by Aly Noel RN  Outcome: Ongoing  6/13/2020 2200 by Mariela Orantes RN  Outcome: Ongoing     Problem: FLUID AND ELECTROLYTE IMBALANCE  Goal: Fluid and electrolyte balance are achieved/maintained  6/14/2020 0706 by Aly Noel RN  Outcome: Ongoing  6/13/2020 2200 by Mariela Orantes RN  Outcome: Ongoing     Problem: ACTIVITY INTOLERANCE/IMPAIRED MOBILITY  Goal: Mobility/activity is maintained at optimum level for patient  6/14/2020 0706 by Aly Noel RN  Outcome: Ongoing  6/13/2020 2200 by Mairela Orantes RN  Outcome: Ongoing

## 2020-06-15 NOTE — PROGRESS NOTES
input(s): INR in the last 72 hours. DIAGNOSTIC DATA  ECHO 6/6/2020  Summary  Mild to moderate left ventricular hypertrophy  Global left ventricular systolic function is mildly reduced  Estimated ejection fraction is 45 % . Mitral valve sclerosis without stenosis. Severe mitral regurgitation. Severe tricuspid regurgitation. Severe pulmonary hypertension. ECHO 4/14/2020  Summary  Mild left ventricular hypertrophy  Global left ventricular systolic function is mildly reduced  Estimated ejection fraction is 45 % . Right atrium is mildly dilated . Moderate tricuspid regurgitation. Moderate pulmonary hypertension. No pericardial effusion seen. Normal aortic root dimension.      CATH 3/4/2020  Angiographic Findings      Cardiac Arteries and Lesion Findings     LMCA: Normal 0% stenosis.     LAD: Mild irregularities 10-20%.     LCx: Normal 0% stenosis.     RCA: Normal 0% stenosis.     Ramus: Normal 0% stenosis.      Coronary Tree      Dominance: Right    Objective:   Vitals: /71   Pulse 72   Temp 96.3 °F (35.7 °C) (Oral)   Resp 14   Ht 5' 9\" (1.753 m)   Wt 152 lb 8 oz (69.2 kg)   SpO2 94%   BMI 22.52 kg/m²   General appearance: alert and cooperative with exam  HEENT: Head: Normocephalic, no lesions, without obvious abnormality. Neck:no JVD, trachea midline, no adenopathy  Lungs: Clear to auscultation decreased in the bases. Heart: Regular rate and rhythm, s1/s2 auscultated, no murmurs. SR on tele  HR 72  Abdomen: soft, non-tender, bowel sounds active  Extremities: no edema  Neurologic: not done        Assessment / Acute Cardiac Problems:   1. Acute on chronic systolic and diastolic CHF. Pro BNP >51,000 (baseline 28,000). EF 45% with mod LVH on echo 6/6/2020.   2. Severe TR/MR and pulmonary hypertension   3. NSTEMI likely type 2 from underlying CKD   4. NICMP, last cath in march 2020 with normal coronaries   5. recurrent B/L plural effusion. S/p thoracentesis showing transudative fluid  6.  H/o

## 2020-06-15 NOTE — CARE COORDINATION
[facility] to inform them of patient's discharge.  They will collect needed information from [facility]

## 2020-06-15 NOTE — DISCHARGE INSTR - COC
Continuity of Care Form    Patient Name: Leighton Allen   :  1950  MRN:  [id number]    Admit date:  2020  Discharge date:  06/15/2020    Code Status Order: Full Code   Advance Directives:   Advance Care Flowsheet Documentation     Date/Time Healthcare Directive Type of Healthcare Directive Copy in [address] Agent's Name Healthcare Agent's Phone Number    [phone]  No, patient does not have an advance directive for healthcare treatment -- -- -- -- --          Admitting Physician:  Olga Islas MD  PCP: Rosa Barnes MD    Discharging Nurse: [facility] Unit/Room#:   Discharging Unit Phone Number: [phone]    Emergency Contact:   Extended Emergency Contact Information  Primary Emergency Contact: [facility] Phone: [phone]  Mobile Phone: [phone]  Relation: Brother/Sister  Secondary Emergency Contact: [address] Phone: [phone]  Mobile Phone: [phone]  Relation: Brother/Sister    Past Surgical History:  Past Surgical History:   Procedure Laterality Date    ABDOMINAL AORTIC ANEURYSM REPAIR, ENDOVASCULAR Right 2020    RIGHT ILIAC ARTERY ANEURYSM REPAIR ENDOVASCULAR performed by Lion Pope MD at [address] [place] [address]  2020    No stents placed   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 C04.0    Laryngeal cancer ([facility]

## 2020-06-15 NOTE — PROGRESS NOTES
[address]    Progress Note    6/15/2020    8:40 AM    Name:   Olga Barfield  MRN:     [id number]     Acct:      [de-identified]   Room:   2010/2010-01  IP Day:  3  Admit Date:  6/11/2020 10:22 PM    PCP:   Zoraida Franklin MD  Code Status:  Full Code    Subjective:     C/C:   Chief Complaint   Patient presents with    Chest Pain     1 nitro PTA    Abdominal Pain     Interval History Status: improved. Patient is feeling better. Less SOB, no cough. He wants to go home. Brief History:     Olga Barfield is a 81 y.o. Non-/non  male who presents with Chest Pain (1 nitro PTA) and Abdominal Pain   and is admitted to the hospital for the management of Diastolic CHF, acute on chronic ([facility]    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 He had surgery and reconstruction in 2018, and recently was admitted to [address] [place] [address] earlier in June 2, 2020. He was found to have a compression fracture of the back after a fall, L2. He was also found to have an Nstemi and A/CKD at that time.       He felt some chest pain and worsening dyspnea which prompted him to present to the ED this admission. He denies abdominal pain, N//v. He lives at home. He is able to cover his trach and speak and answer questions with short responses. He denies any fever, chills or much cough. He just feels like he cannot take a deep breath.     Review of Systems:     Constitutional:  negative for chills, fevers, sweats  Respiratory:  negative for cough, dyspnea on exertion, positive shortness of breath,   Cardiovascular:  negative for chest pain, chest pressure/discomfort, lower extremity edema, palpitations  Gastrointestinal:  negative for abdominal pain, constipation, diarrhea, nausea, vomiting  Neurological: negative for dizziness, headache    Medications: Allergies: Allergies   Allergen Reactions    Amino Acids     Lisinopril Swelling       Current Meds:   Scheduled Meds:    predniSONE  20 mg Oral Daily    torsemide  40 mg Oral Daily    allopurinol  300 mg Oral Daily    budesonide-formoterol  2 puff Inhalation BID    pantoprazole  40 mg Oral QAM AC    Vitamin D  2,000 Units Oral Daily    amLODIPine  10 mg Oral Daily    dicyclomine  10 mg Oral BID    isosorbide mononitrate  30 mg Oral Daily    ipratropium-albuterol  3 mL Inhalation 4x Daily    hydrALAZINE  50 mg Oral 3 times per day    metoprolol succinate  50 mg Oral Daily    bicalutamide  50 mg Oral Daily    sodium chloride flush  10 mL Intravenous 2 times per day    famotidine  20 mg Oral Daily    enoxaparin  30 mg Subcutaneous Daily     Continuous Infusions:     PRN Meds: ipratropium, polyethylene glycol, sodium chloride flush, acetaminophen **OR** acetaminophen, polyethylene glycol, promethazine **OR** ondansetron, nicotine, albuterol, albuterol, traMADol    Data:     Past Medical History:   has a past medical history of Arthritis, Asthma, CKD (chronic kidney disease), stage III ([facility] COPD (chronic obstructive pulmonary disease) ([facility] GERD (gastroesophageal reflux disease), Gout, Hypertension, Hyponatremia, Iliac artery aneurysm, right ([facility] Laryngeal cancer ([facility] Lung cancer ([facility] and Prostate cancer ([facility] Social History:   reports that he has quit smoking. His smoking use included cigarettes. He has never used smokeless tobacco. He reports previous alcohol use of about 5.0 standard drinks of alcohol per week. He reports that he does not use drugs.      Family History:   Family History   Problem Relation Age of Onset    Diabetes Mother     Heart Disease Mother         Sudden cardiac death    Diabetes Sister     Heart Disease Sister        Vitals:  /71   Pulse 72   Temp 96.3 °F (35.7 °C) (Oral)   Resp 14   Ht 5' 9\" (1.753 m)   Wt 152 lb 8 oz (69.2 kg)   SpO2 94%   BMI 22.52 kg/m²   Temp (24hrs), Av.7 °F (36.5 °C), Min:96.3 °F (35.7 °C), Max:98.2 °F (36.8 °C)    Recent Labs     20  0803 20  1114 20  1654 20  2015   POCGLU 145* 144* 144* 146*       I/O (24Hr): Intake/Output Summary (Last 24 hours) at 6/15/2020 0840  Last data filed at 6/15/2020 0518  Gross per 24 hour   Intake 1844 ml   Output 620 ml   Net 1224 ml       Labs:  Hematology:  Recent Labs     20  1011   WBC 5.9   RBC 2.84*   HGB 8.5*   HCT 26.9*   MCV 94.7   MCH 29.9   MCHC 31.6   RDW 17.5*      MPV 10.8     Chemistry:  Recent Labs     20  0849  20  1011 20  1650 20  2323 20  0620 20  1155   NA  --    < > 136 132* 128* 130* 131*   K  --    < > 5.6* 5.2 4.8 4.5 4.1   CL  --    < > 97* 97* 92* 93* 93*   CO2  --    < > 16* 18* 18* 21 22   GLUCOSE  --   --  153* 116*  --  146*  --    BUN  --   --  66* 66*  --  66*  --    CREATININE  --   --  3.08* 3.07*  --  3.25*  --    ANIONGAP  --    < > 23* 17 18* 16 16   LABGLOM  --   --  20* 20*  --  19*  --    GFRAA  --   --  25* 25*  --  23*  --    CALCIUM  --   --  8.0* 7.7*  --  7.6*  --    TROPHS 84*  --   --   --   --   --   --    MYOGLOBIN 128*  --   --   --   --   --   --     < > = values in this interval not displayed.      Recent Labs     20  1830 20  0620 20  0803 20  1114 20  1654 20  2015   PROT 5.4*  --   --   --   --   --    LDH  --  242*  --   --   --   --    POCGLU  --   --  145* 144* 144* 146*     ABG:  Lab Results   Component Value Date    POCPH 7.38 10/27/2019    POCPCO2 24 10/27/2019    POCPO2 99 10/27/2019    POCHCO3 14.2 10/27/2019    NBEA 11 10/27/2019    PBEA NOT REPORTED 10/27/2019    QAP1ZWW 15 10/27/2019    WVQI0VYU 98 10/27/2019    FIO2 NOT REPORTED 2020     Lab Results   Component Value Date/Time    SPECIAL NOT REPORTED 2020 01:57 PM     Lab Results   Component Value Date/Time    CULTURE PENDING 06/13/2020 01:57 PM       Radiology:  Ct Chest Wo Contrast    Result Date: 6/12/2020  Mild left and large right effusion with adjacent consolidation representing atelectasis versus pneumonia. Stable right mediastinal lymph node. Xr Chest Portable    Result Date: 6/12/2020  Interval worsening of bilateral airspace disease and pleural effusions. Overall findings are consistent with edema. Xr Chest Portable    Result Date: 6/11/2020  Cardiomegaly, with mild vascular congestion Bilateral pleural effusions and bibasilar atelectasis       Physical Examination:        General appearance:  alert, cooperative and no distress, + left facial edema  Mental Status:  oriented to person, place and time and normal affect  Lungs: Improved BS, crackles bibasilar R > L, normal effort, trach stoma present, no drainage  Heart:  regular rate and rhythm, no murmur  Abdomen:  soft, nontender, nondistended, normal bowel sounds, no masses, hepatomegaly, splenomegaly  Extremities:  no edema, redness, tenderness in the calves  Skin:  no gross lesions, rashes, induration    Assessment:        Hospital Problems           Last Modified POA    * (Principal) Diastolic CHF, acute on chronic ([address] 6/12/2020 Yes    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 Dyspnea from Bilat pleural effusions R >L - S/p right thoracentesis. Aerosols, oxygen, incentive spirometer, Dw Dr. Linda Nava  2. COPD- po steriod taper, con't aerosols  3. Acute diastolic CHF exac- BNP > 37,254, monitor I/Os, restarted pm Demadex, appreciate cardiology consult  4.  LITTLE/CKD 3-above baseline likely from overdiuresis,con't IVF sodium bicarb, monitor Bun/ Cr, Nephrology following, repeat BMP in 3 days. Pt heading toward needing HD, but he does not want to do this in the future  5. Hyperkalemia- resolved  6. Hx laryngeal/ tongue cancer  7. Gi/ DVT proph  8.  PT/OT    Pt wants to go home, DC with home care    Inessa Chahal MD  6/15/2020  8:40 AM

## 2020-06-15 NOTE — PROGRESS NOTES
CLINICAL PHARMACY NOTE: MEDS TO [address] Select Patient?: No  Total # of Prescriptions Filled: 2   The following medications were delivered to the patient:  · PREDNISONE   · TORSEMIDE   Total # of Interventions Completed: 0  Time Spent (min): 0    Additional Documentation:

## 2020-06-15 NOTE — PROGRESS NOTES
06/11/2020    TRICHOMONAS NOT REPORTED 06/11/2020    YEAST NOT REPORTED 06/11/2020    BACTERIA NOT REPORTED 06/11/2020    SPECGRAV 1.009 06/11/2020    LEUKOCYTESUR NEGATIVE 06/11/2020    UROBILINOGEN Normal 06/11/2020    BILIRUBINUR NEGATIVE 06/11/2020    GLUCOSEU NEGATIVE 06/11/2020    [address] NEGATIVE 06/11/2020    AMORPHOUS NOT REPORTED 06/11/2020     Radiology:  Reviewed as available. Assessment:  1. Acute kidney injury, from pre renal azotemia evolving likely into acute tubular necrosis, from depleted effective volume from insensible losses and loop diuretic use non-oliguric, baseline 2.5-3.0 peaked at 3.25  2. CKD stage 4 from ischemic nephrosclerosis, baseline 2.5-3.0, follows with Dr Pop Madrigal  2. Hyponatremia, stable  3. Hyperkalemia from decreased distal sodium delivery and extracellular shift from acidosis, improved   4. Anion gap Metabolic acidosis from LITTLE and advanced CKD, stable  5. Bilateral pleural effusions likely transudative   6. COPD with frequent excarbations  7. Diastolic dysfxn, normal coronaries  8. Laryngeal/tongue cancer s/p resection, tracheostomy  9. Prostate carcinoma on casodex, need to rule out obstruction  10. 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 Await BMP, CBC  2. Continue torsemide 40 mg oral daily  3. Fluid restriction 1500 ml/day  4. Strict I/O, daily weights, avoid nephrotoxins. 5. BMP in am  6. Awaiting serum protein immunofixation  7. High likelihood of needing dialysis at some point     Please do not hesitate to call with questions.          Electronically signed by Ramesh Rhodes MD on 6/15/2020 at 8:43 AM

## 2020-06-15 NOTE — DISCHARGE SUMMARY
known as:  GLYCOLAX  Take 17 g by mouth daily as needed for Constipation     * predniSONE 5 MG tablet  Commonly known as:  DELTASONE  Take 1 tablet by mouth daily for 10 days     * predniSONE 10 MG tablet  Commonly known as:  DELTASONE  Take 1 tablet by mouth every 48 hours for 4 doses     sodium bicarbonate 650 MG tablet  Take 1 tablet by mouth 2 times daily     Symbicort 160-4.5 MCG/ACT Aero  Generic drug:  budesonide-formoterol     Vitamin D3 50 MCG (2000 UT) Caps         * This list has 2 medication(s) that are the same as other medications prescribed for you. Read the directions carefully, and ask your doctor or other care provider to review them with you. STOP taking these medications    cefUROXime 250 MG tablet  Commonly known as:  CEFTIN     furosemide 40 MG tablet  Commonly known as:  LASIX           Where to Get Your Medications      These medications were sent to [facility] [address] [facility], [address], [address] [facility] [address]    Phone:  [phone]   · predniSONE 10 MG tablet  · torsemide 20 MG tablet         No discharge procedures on file. Time Spent on discharge is  20 mins in patient examination, evaluation, counseling as well as medication reconciliation, prescriptions for required medications, discharge plan and follow up. Electronically signed by   Torito Bonilla MD  6/15/2020  2:18 PM      Thank you Dr. Fracisco Freeman MD for the opportunity to be involved in this patient's care.

## 2020-06-15 NOTE — PROGRESS NOTES
Physical Therapy  Facility/Department: [facility] CAR 2  Daily Treatment Note  NAME: Fatoumata Morales  : 1950  MRN: [id number]    Date of Service: 6/15/2020    Discharge Recommendations:    No further therapy required at discharge.      PT Equipment Recommendations  Other: Pt has a cane       Assessment     Body structures, Functions, Activity limitations: Decreased functional mobility ; Decreased balance;Decreased endurance  Assessment: Pt grossly CGA for mobility, amb 300' SPC CGA. Pt ascended/descended 6 steps using one HR with a SPC in the other hand. Pt used a non reciprocal pattern. Pt would benefit from continued acute PT to address deficits. Prognosis: Good  Decision Making: Medium Complexity  PT Education: Plan of Care;General Safety;PT Role;Functional Mobility Training;Transfer Training  REQUIRES PT FOLLOW UP: Yes  Activity Tolerance  Activity Tolerance: Patient Tolerated treatment well         Patient Diagnosis(es): The primary encounter diagnosis was Acute on chronic combined systolic and diastolic CHF (congestive heart failure) ([address] Diagnoses of Pleural effusion, Chronic kidney disease, unspecified CKD stage, Acute on chronic anemia, and Elevated troponin were also pertinent to this visit. has a past medical history of Arthritis, Asthma, CKD (chronic kidney disease), stage III ([address] COPD (chronic obstructive pulmonary disease) ([address] GERD (gastroesophageal reflux disease), Gout, Hypertension, Hyponatremia, Iliac artery aneurysm, right ([address] Laryngeal cancer ([address] Lung cancer ([address] and Prostate cancer ([address] has a past surgical history that includes Tracheal surgery (3-4 years ago); Prostate surgery; Cardiac catheterization (2020); and AAA repair, endovascular (Right, 2020).     Restrictions  Restrictions/Precautions  Restrictions/Precautions: Contact Precautions, Fall Risk, Up as Tolerated  Required Braces or Orthoses?: No  Subjective   Pt sitting on the EOB ready to go for a

## 2020-06-16 LAB
ANCA MYELOPEROXIDASE: 10 AU/ML
ANCA PROTEINASE 3: 9 AU/ML
SURGICAL PATHOLOGY REPORT: NORMAL

## 2020-06-19 LAB
CULTURE: ABNORMAL
DIRECT EXAM: ABNORMAL
Lab: ABNORMAL
SPECIMEN DESCRIPTION: ABNORMAL

## 2020-06-29 LAB
CULTURE: NORMAL
Lab: NORMAL
SPECIMEN DESCRIPTION: NORMAL

## 2020-07-20 LAB
CULTURE: NORMAL
DIRECT EXAM: NORMAL
Lab: NORMAL
SPECIMEN DESCRIPTION: NORMAL

## 2020-07-27 LAB
CULTURE: NORMAL
DIRECT EXAM: NORMAL
Lab: NORMAL
SPECIMEN DESCRIPTION: NORMAL

## 2020-08-01 ENCOUNTER — HOSPITAL ENCOUNTER (OUTPATIENT)
Dept: MRI IMAGING | Age: 70
Discharge: HOME | End: 2020-08-03
Payer: MEDICARE

## 2020-08-01 PROCEDURE — 72146 MRI CHEST SPINE W/O DYE: CPT

## 2020-08-01 PROCEDURE — 72148 MRI LUMBAR SPINE W/O DYE: CPT

## 2020-08-15 ENCOUNTER — HOSPITAL ENCOUNTER (EMERGENCY)
Age: 70
Discharge: HOME | End: 2020-08-15
Attending: EMERGENCY MEDICINE
Payer: MEDICARE

## 2020-08-15 VITALS
SYSTOLIC BLOOD PRESSURE: 143 MMHG | HEIGHT: 61 IN | HEART RATE: 69 BPM | BODY MASS INDEX: 33.04 KG/M2 | WEIGHT: 175 LBS | DIASTOLIC BLOOD PRESSURE: 78 MMHG | OXYGEN SATURATION: 100 % | TEMPERATURE: 97.9 F | RESPIRATION RATE: 17 BRPM

## 2020-08-15 PROCEDURE — 99284 EMERGENCY DEPT VISIT MOD MDM: CPT

## 2020-08-15 PROCEDURE — 93005 ELECTROCARDIOGRAM TRACING: CPT | Performed by: EMERGENCY MEDICINE

## 2020-08-16 NOTE — ED PROVIDER NOTES
EMERGENCY DEPARTMENT ENCOUNTER    Pt Name: Ant Whitney  MRN: [id number]  [place] 1950  Date of evaluation: 8/15/20  CHIEF COMPLAINT       Chief Complaint   Patient presents with    Shortness of Breath     HISTORY OF PRESENT ILLNESS   Patient is a 28-year-old male with multiple comorbidities, status post tracheostomy, who presents to the ED worried that he has something stuck in his airway. He is requesting to have his tracheostomy suctioned. He does not have a suction device at home. No fevers, cough, shortness of breath. Also no chest pain, abdominal pain, nausea,, changes in urine or stool. REVIEW OF SYSTEMS     Review of Systems   All other systems reviewed and are negative. PASTMEDICAL HISTORY     Past Medical History:   Diagnosis Date    Arthritis     Asthma     CKD (chronic kidney disease), stage III (HCC)     COPD (chronic obstructive pulmonary disease) (HCC)     GERD (gastroesophageal reflux disease)     Gout     Hypertension     Hyponatremia     Iliac artery aneurysm, right (HCC)     Laryngeal cancer (HCC)     Laryngeal Cancer. Chronic left facial edema.      Lung cancer ([address] 3-4 years ago    [facility] [place]    Prostate cancer [facility])      SURGICAL HISTORY       Past Surgical History:   Procedure Laterality Date    ABDOMINAL AORTIC ANEURYSM REPAIR, ENDOVASCULAR Right 4/7/2020    RIGHT ILIAC ARTERY ANEURYSM REPAIR ENDOVASCULAR performed by Geovanni Calixto MD at [address] [place] [address]  03/04/2020    No stents placed   [facility]  3-4 years ago    lung removed and \"voice box\" removed     CURRENT MEDICATIONS       Discharge Medication List as of 8/15/2020 10:01 PM      CONTINUE these medications which have NOT CHANGED    Details   torsemide (DEMADEX) 20 MG tablet Take 2 tablets by mouth daily, Disp-60 tablet, R-0Normal      sodium bicarbonate 650 MG tablet Take 1 tablet by mouth 2 times daily, Disp-60 tablet, PHYSICAL EXAM     INITIAL VITALS: BP (!) 143/78   Pulse 69   Temp 97.9 °F (36.6 °C) (Oral)   Resp 17   Ht 5' 1\" (1.549 m)   Wt 175 lb (79.4 kg)   SpO2 100%   BMI 33.07 kg/m²    Physical Exam  HENT:      Head: Normocephalic. Right Ear: External ear normal.      Left Ear: External ear normal.      Nose: Nose normal.   Eyes:      Conjunctiva/sclera: Conjunctivae normal.   Neck:      Comments: Tracheostomy site clear, dry, no signs infection. Cardiovascular:      Rate and Rhythm: Normal rate. Pulmonary:      Effort: Pulmonary effort is normal. No respiratory distress. Breath sounds: No stridor. No wheezing. Abdominal:      General: Abdomen is flat. Skin:     General: Skin is dry. Neurological:      Mental Status: He is alert. Mental status is at baseline. Psychiatric:         Mood and Affect: Mood normal.         Behavior: Behavior normal.         MEDICAL DECISION MAKING:   The patient is hemodynamically stable, afebrile, nontoxic-appearing. Physical exam unremarkable. ED plan for inline suction of trach site,  reassess. DIAGNOSTIC RESULTS   EKG:All EKG's are interpreted by the Emergency Department Physician who either signs or Co-signs this chart in the absence of a cardiologist.        RADIOLOGY:All plain film, CT, MRI, and formal ultrasound images (except ED bedside ultrasound) are read by the radiologist, see reports below, unless otherwisenoted in MDM or here. No orders to display     LABS: All lab results were reviewed by myself, and all abnormals are listed below. Labs Reviewed - No data to display    EMERGENCY DEPARTMENTCOURSE:   Patient did well in the ED. Respiratory suctioned his tracheostomy. Patient able to exhale forcefully. No further work-up indicated at this time. Nursing notes reviewed. At this time this is what I find, the patient appears well and does not appear sick or toxic.     I gave my usual and customary discussion of the risks and benefits of discharge versus admission. I answered the patient's questions. I gave the patient strict return precautions. Patient expressed understanding of the discharge instructions. Dictated but not reviewed. Vitals:    Vitals:    08/15/20 2019   BP: (!) 143/78   Pulse: 69   Resp: 17   Temp: 97.9 °F (36.6 °C)   TempSrc: Oral   SpO2: 100%   Weight: 175 lb (79.4 kg)   Height: 5' 1\" (1.549 m)       The patient was given the following medications while in the emergency department:  No orders of the defined types were placed in this encounter. CONSULTS:  None    FINAL IMPRESSION      1.  Encounter for medical screening examination          DISPOSITION/PLAN   DISPOSITION        [facility] [phone]    In 2 days      DISCHARGE MEDICATIONS:  Discharge Medication List as of 8/15/2020 10:01 PM        Toby Naranjo MD  Attending Emergency Physician                   Chuck Melton MD  08/16/20 8889

## 2020-08-17 ENCOUNTER — CARE COORDINATION (OUTPATIENT)
Dept: CARE COORDINATION | Age: 70
End: 2020-08-17

## 2020-08-17 LAB
EKG ATRIAL RATE: 66 BPM
EKG P AXIS: 67 DEGREES
EKG P-R INTERVAL: 160 MS
EKG Q-T INTERVAL: 450 MS
EKG QRS DURATION: 106 MS
EKG QTC CALCULATION (BAZETT): 471 MS
EKG R AXIS: -2 DEGREES
EKG T AXIS: 125 DEGREES
EKG VENTRICULAR RATE: 66 BPM

## 2020-08-17 NOTE — CARE COORDINATION
Patient contacted regarding Jory Parker. Discussed COVID-19 related testing which was not done at this time. Test results were not done. Patient informed of results, if available? Na  CC spoke with Salomon Mansfield, the patient's brother. Care Transition Nurse/ Ambulatory Care Manager contacted the family by telephone to perform post discharge assessment. Verified name and  with family as identifiers. Provided introduction to self, and explanation of the CTN/ACM role, and reason for call due to risk factors for infection and/or exposure to COVID-19. Symptoms reviewed with family who verbalized the following symptoms: no worsening symptoms. Due to no new or worsening symptoms encounter was not routed to provider for escalation. Discussed follow-up appointments. If no appointment was previously scheduled, appointment scheduling offered: [facility] follow up appointment(s): No future appointments. Non-[facility] follow up appointment(s):      Advance Care Planning:   Does patient have an Advance Directive:  patient declined education. Patient has following risk factors of: heart failure, COPD, chronic kidney disease and Ca, obesity. CTN/ACM reviewed discharge instructions, medical action plan and red flags such as increased shortness of breath, increasing fever and signs of decompensation with family who verbalized understanding. Discussed exposure protocols and quarantine with CDC Guidelines What to do if you are sick with coronavirus disease .  Family was given an opportunity for questions and concerns. The family agrees to contact the [facility] exposure line [phone], local [facility] department family declined and PCP office for questions related to their healthcare. CTN/ACM provided contact information for future needs.     Reviewed and educated family on any new and changed medications related to discharge diagnosis     Patient/family/caregiver given information for [facility] Benavides and agrees to enroll yes  Patient's preferred e-mail:    Patient's preferred phone number: [phone]  Based on Loop alert triggers, patient will be contacted by nurse care manager for worsening symptoms. Pt will be further monitored by COVID Loop Team based on severity of symptoms and risk factors.

## 2020-10-22 ENCOUNTER — TELEPHONE (OUTPATIENT)
Dept: ONCOLOGY | Age: 70
End: 2020-10-22

## 2020-10-22 NOTE — TELEPHONE ENCOUNTER
RECEIVED FAXED REFERRAL TO DR Georgia Tripp FROM DR ABEL'S OFFICE, PT IS ALREADY EXISTING PATIENT OF DR Rossana Castro. CALLED PT TO SCHEDULE APPOINTMENT, NO ANSWER, LEFT VOICEMAIL.

## 2020-10-23 ENCOUNTER — TELEPHONE (OUTPATIENT)
Dept: RADIATION ONCOLOGY | Facility: MEDICAL CENTER | Age: 70
End: 2020-10-23

## 2020-10-23 NOTE — TELEPHONE ENCOUNTER
Dr Kayli Recio from [facility] called to see if we could see TOR EVELIN [facility]. He will send updated chart information and demographics.

## 2020-10-27 ENCOUNTER — TELEPHONE (OUTPATIENT)
Dept: RADIATION ONCOLOGY | Facility: MEDICAL CENTER | Age: 70
End: 2020-10-27

## 2020-10-27 NOTE — TELEPHONE ENCOUNTER
I called Dr Ekaterina Mendez office to see if they were going to send referral information. They faxed orders. I called Lupillo Sheth to schedule consult. I spoke with his brother Morrell Boast. He states Lupillo Sheth needs some time before he comes due to his brother  and he needs to take care of that first. I scheduled him on 2020 @ 2 pm. I mailed appointment card.

## 2020-11-03 ENCOUNTER — TELEPHONE (OUTPATIENT)
Dept: ONCOLOGY | Age: 70
End: 2020-11-03

## 2020-11-10 ENCOUNTER — APPOINTMENT (OUTPATIENT)
Dept: GENERAL RADIOLOGY | Age: 70
DRG: 190 | End: 2020-11-10
Payer: MEDICARE

## 2020-11-10 ENCOUNTER — HOSPITAL ENCOUNTER (INPATIENT)
Age: 70
LOS: 3 days | Discharge: HOME | DRG: 190 | End: 2020-11-15
Attending: EMERGENCY MEDICINE | Admitting: INTERNAL MEDICINE
Payer: MEDICARE

## 2020-11-10 ENCOUNTER — APPOINTMENT (OUTPATIENT)
Dept: CT IMAGING | Age: 70
DRG: 190 | End: 2020-11-10
Payer: MEDICARE

## 2020-11-10 PROBLEM — J98.59 MEDIASTINAL MASS: Status: ACTIVE | Noted: 2020-11-10

## 2020-11-10 LAB
ABSOLUTE EOS #: 0.56 K/UL (ref 0–0.44)
ABSOLUTE IMMATURE GRANULOCYTE: 0.01 K/UL (ref 0–0.3)
ABSOLUTE LYMPH #: 0.88 K/UL (ref 1.1–3.7)
ABSOLUTE MONO #: 0.49 K/UL (ref 0.1–1.2)
ANION GAP SERPL CALCULATED.3IONS-SCNC: 16 MMOL/L (ref 9–17)
BASOPHILS # BLD: 1 % (ref 0–2)
BASOPHILS ABSOLUTE: 0.04 K/UL (ref 0–0.2)
BNP INTERPRETATION: ABNORMAL
BUN BLDV-MCNC: 33 MG/DL (ref 8–23)
BUN/CREAT BLD: 10 (ref 9–20)
CALCIUM SERPL-MCNC: 9.6 MG/DL (ref 8.6–10.4)
CHLORIDE BLD-SCNC: 98 MMOL/L (ref 98–107)
CO2: 17 MMOL/L (ref 20–31)
CREAT SERPL-MCNC: 3.32 MG/DL (ref 0.7–1.2)
DIFFERENTIAL TYPE: ABNORMAL
EKG ATRIAL RATE: 68 BPM
EKG P AXIS: 55 DEGREES
EKG P-R INTERVAL: 144 MS
EKG Q-T INTERVAL: 414 MS
EKG QRS DURATION: 92 MS
EKG QTC CALCULATION (BAZETT): 440 MS
EKG R AXIS: 24 DEGREES
EKG T AXIS: 86 DEGREES
EKG VENTRICULAR RATE: 68 BPM
EOSINOPHILS RELATIVE PERCENT: 9 % (ref 1–4)
GFR AFRICAN AMERICAN: 23 ML/MIN
GFR NON-AFRICAN AMERICAN: 19 ML/MIN
GFR SERPL CREATININE-BSD FRML MDRD: ABNORMAL ML/MIN/{1.73_M2}
GFR SERPL CREATININE-BSD FRML MDRD: ABNORMAL ML/MIN/{1.73_M2}
GLUCOSE BLD-MCNC: 82 MG/DL (ref 70–99)
HCT VFR BLD CALC: 28.2 % (ref 40.7–50.3)
HEMOGLOBIN: 9.1 G/DL (ref 13–17)
IMMATURE GRANULOCYTES: 0 %
LYMPHOCYTES # BLD: 14 % (ref 24–43)
MCH RBC QN AUTO: 28 PG (ref 25.2–33.5)
MCHC RBC AUTO-ENTMCNC: 32.3 G/DL (ref 28.4–34.8)
MCV RBC AUTO: 86.8 FL (ref 82.6–102.9)
MONOCYTES # BLD: 8 % (ref 3–12)
NRBC AUTOMATED: 0 PER 100 WBC
PDW BLD-RTO: 14.8 % (ref 11.8–14.4)
PLATELET # BLD: 248 K/UL (ref 138–453)
PLATELET ESTIMATE: ABNORMAL
PMV BLD AUTO: 9.3 FL (ref 8.1–13.5)
POTASSIUM SERPL-SCNC: 4.3 MMOL/L (ref 3.7–5.3)
PRO-BNP: ABNORMAL PG/ML
RBC # BLD: 3.25 M/UL (ref 4.21–5.77)
RBC # BLD: ABNORMAL 10*6/UL
SEG NEUTROPHILS: 68 % (ref 36–65)
SEGMENTED NEUTROPHILS ABSOLUTE COUNT: 4.37 K/UL (ref 1.5–8.1)
SODIUM BLD-SCNC: 131 MMOL/L (ref 135–144)
TROPONIN INTERP: ABNORMAL
TROPONIN INTERP: ABNORMAL
TROPONIN T: ABNORMAL NG/ML
TROPONIN T: ABNORMAL NG/ML
TROPONIN, HIGH SENSITIVITY: 83 NG/L (ref 0–22)
TROPONIN, HIGH SENSITIVITY: 87 NG/L (ref 0–22)
WBC # BLD: 6.4 K/UL (ref 3.5–11.3)
WBC # BLD: ABNORMAL 10*3/UL

## 2020-11-10 PROCEDURE — 84484 ASSAY OF TROPONIN QUANT: CPT

## 2020-11-10 PROCEDURE — 80048 BASIC METABOLIC PNL TOTAL CA: CPT

## 2020-11-10 PROCEDURE — 6370000000 HC RX 637 (ALT 250 FOR IP): Performed by: INTERNAL MEDICINE

## 2020-11-10 PROCEDURE — 71250 CT THORAX DX C-: CPT

## 2020-11-10 PROCEDURE — 2580000003 HC RX 258: Performed by: INTERNAL MEDICINE

## 2020-11-10 PROCEDURE — G0378 HOSPITAL OBSERVATION PER HR: HCPCS

## 2020-11-10 PROCEDURE — 6360000002 HC RX W HCPCS: Performed by: INTERNAL MEDICINE

## 2020-11-10 PROCEDURE — 71045 X-RAY EXAM CHEST 1 VIEW: CPT

## 2020-11-10 PROCEDURE — 99282 EMERGENCY DEPT VISIT SF MDM: CPT

## 2020-11-10 PROCEDURE — 83880 ASSAY OF NATRIURETIC PEPTIDE: CPT

## 2020-11-10 PROCEDURE — 93005 ELECTROCARDIOGRAM TRACING: CPT | Performed by: EMERGENCY MEDICINE

## 2020-11-10 PROCEDURE — 85025 COMPLETE CBC W/AUTO DIFF WBC: CPT

## 2020-11-10 RX ORDER — BUDESONIDE AND FORMOTEROL FUMARATE DIHYDRATE 160; 4.5 UG/1; UG/1
2 AEROSOL RESPIRATORY (INHALATION) 2 TIMES DAILY
Status: DISCONTINUED | OUTPATIENT
Start: 2020-11-10 | End: 2020-11-15 | Stop reason: HOSPADM

## 2020-11-10 RX ORDER — ONDANSETRON 2 MG/ML
4 INJECTION INTRAMUSCULAR; INTRAVENOUS EVERY 6 HOURS PRN
Status: DISCONTINUED | OUTPATIENT
Start: 2020-11-10 | End: 2020-11-15 | Stop reason: HOSPADM

## 2020-11-10 RX ORDER — SODIUM CHLORIDE 0.9 % (FLUSH) 0.9 %
10 SYRINGE (ML) INJECTION EVERY 12 HOURS SCHEDULED
Status: DISCONTINUED | OUTPATIENT
Start: 2020-11-10 | End: 2020-11-15 | Stop reason: HOSPADM

## 2020-11-10 RX ORDER — ACETAMINOPHEN 650 MG/1
650 SUPPOSITORY RECTAL EVERY 6 HOURS PRN
Status: DISCONTINUED | OUTPATIENT
Start: 2020-11-10 | End: 2020-11-15 | Stop reason: HOSPADM

## 2020-11-10 RX ORDER — SODIUM BICARBONATE 650 MG/1
650 TABLET ORAL 3 TIMES DAILY
Status: DISCONTINUED | OUTPATIENT
Start: 2020-11-10 | End: 2020-11-15 | Stop reason: SDUPTHER

## 2020-11-10 RX ORDER — METOPROLOL SUCCINATE 50 MG/1
50 TABLET, EXTENDED RELEASE ORAL DAILY
Status: DISCONTINUED | OUTPATIENT
Start: 2020-11-11 | End: 2020-11-15 | Stop reason: HOSPADM

## 2020-11-10 RX ORDER — ALBUTEROL SULFATE 90 UG/1
2 AEROSOL, METERED RESPIRATORY (INHALATION) EVERY 6 HOURS PRN
Status: DISCONTINUED | OUTPATIENT
Start: 2020-11-10 | End: 2020-11-15 | Stop reason: HOSPADM

## 2020-11-10 RX ORDER — VITAMIN B COMPLEX
2000 TABLET ORAL DAILY
Status: DISCONTINUED | OUTPATIENT
Start: 2020-11-11 | End: 2020-11-15 | Stop reason: HOSPADM

## 2020-11-10 RX ORDER — BICALUTAMIDE 50 MG/1
50 TABLET, FILM COATED ORAL DAILY
Status: DISCONTINUED | OUTPATIENT
Start: 2020-11-11 | End: 2020-11-15 | Stop reason: HOSPADM

## 2020-11-10 RX ORDER — SODIUM BICARBONATE 650 MG/1
650 TABLET ORAL 3 TIMES DAILY
Status: ON HOLD | COMMUNITY
End: 2020-11-24 | Stop reason: HOSPADM

## 2020-11-10 RX ORDER — PANTOPRAZOLE SODIUM 40 MG/1
40 TABLET, DELAYED RELEASE ORAL
Status: DISCONTINUED | OUTPATIENT
Start: 2020-11-11 | End: 2020-11-15 | Stop reason: HOSPADM

## 2020-11-10 RX ORDER — HEPARIN SODIUM 5000 [USP'U]/ML
5000 INJECTION, SOLUTION INTRAVENOUS; SUBCUTANEOUS EVERY 8 HOURS SCHEDULED
Status: DISCONTINUED | OUTPATIENT
Start: 2020-11-10 | End: 2020-11-15 | Stop reason: HOSPADM

## 2020-11-10 RX ORDER — AMLODIPINE BESYLATE 10 MG/1
10 TABLET ORAL DAILY
Status: DISCONTINUED | OUTPATIENT
Start: 2020-11-11 | End: 2020-11-15 | Stop reason: HOSPADM

## 2020-11-10 RX ORDER — HYDRALAZINE HYDROCHLORIDE 50 MG/1
50 TABLET, FILM COATED ORAL EVERY 8 HOURS SCHEDULED
Status: DISCONTINUED | OUTPATIENT
Start: 2020-11-10 | End: 2020-11-15 | Stop reason: HOSPADM

## 2020-11-10 RX ORDER — SODIUM CHLORIDE 0.9 % (FLUSH) 0.9 %
10 SYRINGE (ML) INJECTION PRN
Status: DISCONTINUED | OUTPATIENT
Start: 2020-11-10 | End: 2020-11-15 | Stop reason: HOSPADM

## 2020-11-10 RX ORDER — METHYLPREDNISOLONE SODIUM SUCCINATE 40 MG/ML
40 INJECTION, POWDER, LYOPHILIZED, FOR SOLUTION INTRAMUSCULAR; INTRAVENOUS EVERY 8 HOURS
Status: DISCONTINUED | OUTPATIENT
Start: 2020-11-10 | End: 2020-11-12

## 2020-11-10 RX ORDER — SODIUM CHLORIDE 9 MG/ML
INJECTION, SOLUTION INTRAVENOUS CONTINUOUS
Status: DISCONTINUED | OUTPATIENT
Start: 2020-11-10 | End: 2020-11-15 | Stop reason: HOSPADM

## 2020-11-10 RX ORDER — IPRATROPIUM BROMIDE AND ALBUTEROL SULFATE 2.5; .5 MG/3ML; MG/3ML
3 SOLUTION RESPIRATORY (INHALATION) 4 TIMES DAILY
Status: DISCONTINUED | OUTPATIENT
Start: 2020-11-10 | End: 2020-11-15 | Stop reason: HOSPADM

## 2020-11-10 RX ORDER — ACETAMINOPHEN 325 MG/1
650 TABLET ORAL EVERY 4 HOURS PRN
Status: DISCONTINUED | OUTPATIENT
Start: 2020-11-10 | End: 2020-11-10 | Stop reason: SDUPTHER

## 2020-11-10 RX ORDER — SODIUM CHLORIDE 0.9 % (FLUSH) 0.9 %
10 SYRINGE (ML) INJECTION EVERY 12 HOURS SCHEDULED
Status: DISCONTINUED | OUTPATIENT
Start: 2020-11-10 | End: 2020-11-10 | Stop reason: SDUPTHER

## 2020-11-10 RX ORDER — PROMETHAZINE HYDROCHLORIDE 12.5 MG/1
12.5 TABLET ORAL EVERY 6 HOURS PRN
Status: DISCONTINUED | OUTPATIENT
Start: 2020-11-10 | End: 2020-11-15 | Stop reason: HOSPADM

## 2020-11-10 RX ORDER — ACETAMINOPHEN 325 MG/1
650 TABLET ORAL EVERY 6 HOURS PRN
Status: DISCONTINUED | OUTPATIENT
Start: 2020-11-10 | End: 2020-11-12 | Stop reason: DRUGHIGH

## 2020-11-10 RX ORDER — SODIUM CHLORIDE 0.9 % (FLUSH) 0.9 %
10 SYRINGE (ML) INJECTION PRN
Status: DISCONTINUED | OUTPATIENT
Start: 2020-11-10 | End: 2020-11-10 | Stop reason: SDUPTHER

## 2020-11-10 RX ORDER — POLYETHYLENE GLYCOL 3350 17 G/17G
17 POWDER, FOR SOLUTION ORAL DAILY PRN
Status: DISCONTINUED | OUTPATIENT
Start: 2020-11-10 | End: 2020-11-15 | Stop reason: HOSPADM

## 2020-11-10 RX ADMIN — Medication 10 ML: at 22:38

## 2020-11-10 RX ADMIN — HEPARIN SODIUM 5000 UNITS: 5000 INJECTION INTRAVENOUS; SUBCUTANEOUS at 22:38

## 2020-11-10 RX ADMIN — SODIUM CHLORIDE: 9 INJECTION, SOLUTION INTRAVENOUS at 22:38

## 2020-11-10 RX ADMIN — SODIUM BICARBONATE 650 MG: 650 TABLET ORAL at 22:37

## 2020-11-10 RX ADMIN — AZITHROMYCIN MONOHYDRATE 500 MG: 500 INJECTION, POWDER, LYOPHILIZED, FOR SOLUTION INTRAVENOUS at 22:37

## 2020-11-10 RX ADMIN — METHYLPREDNISOLONE SODIUM SUCCINATE 40 MG: 40 INJECTION, POWDER, FOR SOLUTION INTRAMUSCULAR; INTRAVENOUS at 22:37

## 2020-11-10 RX ADMIN — HYDRALAZINE HYDROCHLORIDE 50 MG: 50 TABLET, FILM COATED ORAL at 22:37

## 2020-11-10 RX ADMIN — ACETAMINOPHEN 650 MG: 325 TABLET ORAL at 22:37

## 2020-11-10 ASSESSMENT — PAIN DESCRIPTION - PAIN TYPE: TYPE: CHRONIC PAIN

## 2020-11-10 ASSESSMENT — PAIN DESCRIPTION - PROGRESSION: CLINICAL_PROGRESSION: NOT CHANGED

## 2020-11-10 ASSESSMENT — ENCOUNTER SYMPTOMS
COLOR CHANGE: 0
DIARRHEA: 0
EYE DISCHARGE: 0
COUGH: 1
SORE THROAT: 0
EYE REDNESS: 0
SHORTNESS OF BREATH: 1
RHINORRHEA: 0
VOMITING: 0
NAUSEA: 0

## 2020-11-10 ASSESSMENT — PAIN - FUNCTIONAL ASSESSMENT: PAIN_FUNCTIONAL_ASSESSMENT: PREVENTS OR INTERFERES SOME ACTIVE ACTIVITIES AND ADLS

## 2020-11-10 ASSESSMENT — PAIN DESCRIPTION - DESCRIPTORS: DESCRIPTORS: ACHING;DISCOMFORT

## 2020-11-10 ASSESSMENT — PAIN SCALES - GENERAL
PAINLEVEL_OUTOF10: 8
PAINLEVEL_OUTOF10: 8
PAINLEVEL_OUTOF10: 6
PAINLEVEL_OUTOF10: 10
PAINLEVEL_OUTOF10: 3

## 2020-11-10 ASSESSMENT — PAIN DESCRIPTION - ONSET: ONSET: GRADUAL

## 2020-11-10 ASSESSMENT — PAIN DESCRIPTION - FREQUENCY: FREQUENCY: OTHER (COMMENT)

## 2020-11-10 ASSESSMENT — PAIN DESCRIPTION - LOCATION: LOCATION: BACK

## 2020-11-10 NOTE — ED PROVIDER NOTES
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 ([facility] C04.0    Laryngeal cancer ([facility]) C32.9    Mouth swelling R22.0    COPD (chronic obstructive pulmonary disease) ([facility] J44.9    Severe malnutrition ([facility] E43    Facial edema R60.0    Common iliac aneurysm ([facility]) I72.3    Essential hypertension I10    Stage 3 chronic kidney disease N18.30    Acute kidney injury (nontraumatic) ([facility]) N17.9    NSTEMI (non-ST elevated myocardial infarction) ([facility] I21.4    Pathological fracture of lumbar vertebra due to secondary osteoporosis ([facility]) M80.88XA    Age-related osteoporosis with current pathological fracture M80.00XA    Intractable back pain M54.9    Iron deficiency anemia D50.9    Anemia, blood loss D50.0    Chronic bilateral pleural effusions P03    Diastolic CHF, acute on chronic ([facility]) I50.33    Hyponatremia E87.1    Mediastinal mass J98.59     SURGICAL HISTORY       Past Surgical History:   Procedure Laterality Date    ABDOMINAL AORTIC ANEURYSM REPAIR, ENDOVASCULAR Right 4/7/2020    RIGHT ILIAC ARTERY ANEURYSM REPAIR ENDOVASCULAR performed by Edwin Rothman MD at [address] [facility]  03/04/2020    No stents placed    PROSTATE SURGERY      TRACHEAL SURGERY  3-4 years ago    lung removed and \"voice box\" removed     CURRENT MEDICATIONS       Previous Medications    ALBUTEROL SULFATE  (90 BASE) MCG/ACT INHALER    Inhale 2 puffs into the lungs every 6 hours as needed for Wheezing or Shortness of Breath    ALLOPURINOL (ZYLOPRIM) 300 MG TABLET    Take 300 mg by mouth daily.     AMLODIPINE (NORVASC) 10 MG TABLET    Take 1 tablet by mouth daily    BICALUTAMIDE (CASODEX) 50 MG CHEMO TABLET    Take 1 tablet by mouth daily    BUDESONIDE-FORMOTEROL (SYMBICORT) 160-4.5 MCG/ACT AERO    Inhale 2 puffs into the lungs 2 times daily    CHOLECALCIFEROL (VITAMIN D3) 50 MCG (2000 UT) CAPS    Take 1 capsule by mouth daily DICYCLOMINE (BENTYL) 10 MG CAPSULE    Take 1 capsule by mouth 2 times daily for 14 days    HYDRALAZINE (APRESOLINE) 50 MG TABLET    Take 1 tablet by mouth every 8 hours    IPRATROPIUM-ALBUTEROL (DUONEB) 0.5-2.5 (3) MG/3ML SOLN NEBULIZER SOLUTION    Inhale 3 mLs into the lungs 4 times daily    ISOSORBIDE MONONITRATE (IMDUR) 30 MG EXTENDED RELEASE TABLET    Take 1 tablet by mouth daily    METOPROLOL SUCCINATE (TOPROL XL) 50 MG EXTENDED RELEASE TABLET    Take 1 tablet by mouth daily    OMEPRAZOLE (PRILOSEC) 20 MG DELAYED RELEASE CAPSULE    Take 20 mg by mouth every morning (before breakfast)    SODIUM BICARBONATE 650 MG TABLET    Take 1 tablet by mouth 2 times daily    TORSEMIDE (DEMADEX) 20 MG TABLET    Take 2 tablets by mouth daily     ALLERGIES     is allergic to amino acids and lisinopril. FAMILY HISTORY     He indicated that his mother is . He indicated that his father is . He indicated that his sister is . SOCIAL HISTORY       Social History     Tobacco Use    Smoking status: Former Smoker     Types: Cigarettes    Smokeless tobacco: Never Used    Tobacco comment: quit smoking 20 years ago    Substance Use Topics    Alcohol use: Not Currently     Alcohol/week: 5.0 standard drinks     Types: 5 Cans of beer per week    Drug use: No     PHYSICAL EXAM     INITIAL VITALS: BP (!) 181/11   Pulse 79   Temp 97.6 °F (36.4 °C) (Oral)   Resp 22   Ht 5' 1\" (1.549 m)   Wt 175 lb (79.4 kg)   SpO2 98%   BMI 33.07 kg/m²    Physical Exam  Constitutional:       Appearance: Normal appearance. He is well-developed. He is not ill-appearing or toxic-appearing. HENT:      Head: Normocephalic and atraumatic. Eyes:      Conjunctiva/sclera: Conjunctivae normal.      Pupils: Pupils are equal, round, and reactive to light. Neck:      Musculoskeletal: Normal range of motion and neck supple. Trachea: Trachea normal.   Cardiovascular:      Rate and Rhythm: Normal rate and regular rhythm. Heart sounds: S1 normal and S2 normal. No murmur. Pulmonary:      Effort: Pulmonary effort is normal. No accessory muscle usage or respiratory distress. Breath sounds: Normal breath sounds. Chest:      Chest wall: No deformity or tenderness. Abdominal:      General: Bowel sounds are normal. There is no distension or abdominal bruit. Palpations: Abdomen is not rigid. Tenderness: There is no abdominal tenderness. There is no guarding or rebound. Skin:     General: Skin is warm. Findings: No rash. Neurological:      Mental Status: He is alert and oriented to person, place, and time. GCS: GCS eye subscore is 4. GCS verbal subscore is 5. GCS motor subscore is 6. Psychiatric:         Speech: Speech normal.         MEDICAL DECISION MAKIN-year-old male presents with complaints of shortness of breath, the patient does not have any significant findings on his exam, plan is basic labs cardiac enzymes and reevaluation. 3:26 PM EST  Patient's CT scan shows evidence of a mediastinal mass that was not present on a previous CAT scan, I discussed the case with the family physician who agrees with admission with pulmonary and oncology consultations. CRITICAL CARE:       PROCEDURES:    Procedures    DIAGNOSTIC RESULTS   EKG:All EKG's are interpreted by the Emergency Department Physician who either signs or Co-signs this chart in the absence of a cardiologist.    Patient's EKG shows sinus rhythm with a rate of 68, SD QRS QTC and was unremarkable and the patient has left axis deviation, no ST elevations or depressions, no significant T wave changes. Nonspecific EKG. RADIOLOGY:All plain film, CT, MRI, and formal ultrasound images (except ED bedside ultrasound) are read by the radiologist, see reports below, unless otherwisenoted in MDM or here. CT CHEST WO CONTRAST   Final Result   1.   Large medial right upper lobe/superior mediastinal mass with mass effect   on the trachea and the defined types were placed in this encounter. CONSULTS:  IP CONSULT TO HOSPITALIST  IP CONSULT TO PULMONOLOGY  IP CONSULT TO ONCOLOGY    FINAL IMPRESSION      1. Mediastinal mass    2. Dyspnea and respiratory abnormalities          DISPOSITION/PLAN   DISPOSITION Admitted 11/10/2020 03:26:00 PM      PATIENT REFERRED TO:  No follow-up provider specified.   DISCHARGE MEDICATIONS:  New Prescriptions    No medications on file     Gregory La MD  Attending Emergency Physician                   Gregory La MD  11/10/20 1416

## 2020-11-11 PROBLEM — E43 SEVERE MALNUTRITION (HCC): Chronic | Status: ACTIVE | Noted: 2020-11-11

## 2020-11-11 LAB
ABSOLUTE EOS #: 0 K/UL (ref 0–0.4)
ABSOLUTE IMMATURE GRANULOCYTE: 0 K/UL (ref 0–0.3)
ABSOLUTE LYMPH #: 0.92 K/UL (ref 1–4.8)
ABSOLUTE MONO #: 0.21 K/UL (ref 0.2–0.8)
ALBUMIN SERPL-MCNC: 3.3 G/DL (ref 3.5–5.2)
ALBUMIN/GLOBULIN RATIO: ABNORMAL (ref 1–2.5)
ALP BLD-CCNC: 104 U/L (ref 40–129)
ALT SERPL-CCNC: 9 U/L (ref 5–41)
ANION GAP SERPL CALCULATED.3IONS-SCNC: 10 MMOL/L (ref 9–17)
AST SERPL-CCNC: 15 U/L
BASOPHILS # BLD: 0 %
BASOPHILS ABSOLUTE: 0 K/UL (ref 0–0.2)
BILIRUB SERPL-MCNC: 0.25 MG/DL (ref 0.3–1.2)
BUN BLDV-MCNC: 32 MG/DL (ref 8–23)
BUN/CREAT BLD: 10 (ref 9–20)
CALCIUM SERPL-MCNC: 9.7 MG/DL (ref 8.6–10.4)
CHLORIDE BLD-SCNC: 100 MMOL/L (ref 98–107)
CO2: 18 MMOL/L (ref 20–31)
CREAT SERPL-MCNC: 3.21 MG/DL (ref 0.7–1.2)
DIFFERENTIAL TYPE: ABNORMAL
EOSINOPHILS RELATIVE PERCENT: 0 % (ref 1–4)
GFR AFRICAN AMERICAN: 23 ML/MIN
GFR NON-AFRICAN AMERICAN: 19 ML/MIN
GFR SERPL CREATININE-BSD FRML MDRD: ABNORMAL ML/MIN/{1.73_M2}
GFR SERPL CREATININE-BSD FRML MDRD: ABNORMAL ML/MIN/{1.73_M2}
GLUCOSE BLD-MCNC: 130 MG/DL (ref 70–99)
HCT VFR BLD CALC: 27.8 % (ref 40.7–50.3)
HEMOGLOBIN: 9.2 G/DL (ref 13–17)
IMMATURE GRANULOCYTES: 0 %
LYMPHOCYTES # BLD: 13 % (ref 24–44)
MCH RBC QN AUTO: 27.7 PG (ref 25.2–33.5)
MCHC RBC AUTO-ENTMCNC: 33.1 G/DL (ref 28.4–34.8)
MCV RBC AUTO: 83.7 FL (ref 82.6–102.9)
MONOCYTES # BLD: 3 % (ref 1–7)
NRBC AUTOMATED: 0 PER 100 WBC
PDW BLD-RTO: 14.5 % (ref 11.8–14.4)
PLATELET # BLD: 275 K/UL (ref 138–453)
PLATELET ESTIMATE: ABNORMAL
PMV BLD AUTO: 9.5 FL (ref 8.1–13.5)
POTASSIUM SERPL-SCNC: 4.4 MMOL/L (ref 3.7–5.3)
RBC # BLD: 3.32 M/UL (ref 4.21–5.77)
RBC # BLD: ABNORMAL 10*6/UL
SARS-COV-2, RAPID: NORMAL
SARS-COV-2: NORMAL
SARS-COV-2: NOT DETECTED
SEG NEUTROPHILS: 84 % (ref 36–66)
SEGMENTED NEUTROPHILS ABSOLUTE COUNT: 5.97 K/UL (ref 1.8–7.7)
SODIUM BLD-SCNC: 128 MMOL/L (ref 135–144)
SOURCE: NORMAL
TOTAL PROTEIN: 7.7 G/DL (ref 6.4–8.3)
TOTAL PROTEIN: 7.9 G/DL (ref 6.4–8.3)
WBC # BLD: 7.1 K/UL (ref 3.5–11.3)
WBC # BLD: ABNORMAL 10*3/UL

## 2020-11-11 PROCEDURE — 84155 ASSAY OF PROTEIN SERUM: CPT

## 2020-11-11 PROCEDURE — 36415 COLL VENOUS BLD VENIPUNCTURE: CPT

## 2020-11-11 PROCEDURE — 80053 COMPREHEN METABOLIC PANEL: CPT

## 2020-11-11 PROCEDURE — 97116 GAIT TRAINING THERAPY: CPT

## 2020-11-11 PROCEDURE — 6370000000 HC RX 637 (ALT 250 FOR IP): Performed by: INTERNAL MEDICINE

## 2020-11-11 PROCEDURE — 99215 OFFICE O/P EST HI 40 MIN: CPT | Performed by: INTERNAL MEDICINE

## 2020-11-11 PROCEDURE — U0003 INFECTIOUS AGENT DETECTION BY NUCLEIC ACID (DNA OR RNA); SEVERE ACUTE RESPIRATORY SYNDROME CORONAVIRUS 2 (SARS-COV-2) (CORONAVIRUS DISEASE [COVID-19]), AMPLIFIED PROBE TECHNIQUE, MAKING USE OF HIGH THROUGHPUT TECHNOLOGIES AS DESCRIBED BY CMS-2020-01-R: HCPCS

## 2020-11-11 PROCEDURE — 0BC18ZZ EXTIRPATION OF MATTER FROM TRACHEA, VIA NATURAL OR ARTIFICIAL OPENING ENDOSCOPIC: ICD-10-PCS | Performed by: OTOLARYNGOLOGY

## 2020-11-11 PROCEDURE — 6360000002 HC RX W HCPCS: Performed by: INTERNAL MEDICINE

## 2020-11-11 PROCEDURE — 85025 COMPLETE CBC W/AUTO DIFF WBC: CPT

## 2020-11-11 PROCEDURE — 97530 THERAPEUTIC ACTIVITIES: CPT

## 2020-11-11 PROCEDURE — 97162 PT EVAL MOD COMPLEX 30 MIN: CPT

## 2020-11-11 PROCEDURE — G0378 HOSPITAL OBSERVATION PER HR: HCPCS

## 2020-11-11 PROCEDURE — 87116 MYCOBACTERIA CULTURE: CPT

## 2020-11-11 PROCEDURE — 2580000003 HC RX 258: Performed by: INTERNAL MEDICINE

## 2020-11-11 RX ORDER — MIRTAZAPINE 15 MG/1
7.5 TABLET, FILM COATED ORAL NIGHTLY
COMMUNITY

## 2020-11-11 RX ORDER — HYDROCODONE BITARTRATE AND ACETAMINOPHEN 5; 325 MG/1; MG/1
1 TABLET ORAL EVERY 6 HOURS PRN
Status: DISCONTINUED | OUTPATIENT
Start: 2020-11-11 | End: 2020-11-15 | Stop reason: HOSPADM

## 2020-11-11 RX ADMIN — HEPARIN SODIUM 5000 UNITS: 5000 INJECTION INTRAVENOUS; SUBCUTANEOUS at 05:40

## 2020-11-11 RX ADMIN — SODIUM BICARBONATE 650 MG: 650 TABLET ORAL at 14:23

## 2020-11-11 RX ADMIN — SODIUM BICARBONATE 650 MG: 650 TABLET ORAL at 08:21

## 2020-11-11 RX ADMIN — HYDROCODONE BITARTRATE AND ACETAMINOPHEN 1 TABLET: 5; 325 TABLET ORAL at 10:04

## 2020-11-11 RX ADMIN — SODIUM CHLORIDE: 9 INJECTION, SOLUTION INTRAVENOUS at 19:00

## 2020-11-11 RX ADMIN — AMLODIPINE BESYLATE 10 MG: 10 TABLET ORAL at 08:21

## 2020-11-11 RX ADMIN — SODIUM BICARBONATE 650 MG: 650 TABLET ORAL at 21:45

## 2020-11-11 RX ADMIN — ACETAMINOPHEN 650 MG: 325 TABLET ORAL at 12:33

## 2020-11-11 RX ADMIN — Medication 2000 UNITS: at 08:21

## 2020-11-11 RX ADMIN — METOPROLOL SUCCINATE 50 MG: 50 TABLET, EXTENDED RELEASE ORAL at 08:21

## 2020-11-11 RX ADMIN — ACETAMINOPHEN 650 MG: 325 TABLET ORAL at 21:45

## 2020-11-11 RX ADMIN — HYDRALAZINE HYDROCHLORIDE 50 MG: 50 TABLET, FILM COATED ORAL at 05:36

## 2020-11-11 RX ADMIN — HYDRALAZINE HYDROCHLORIDE 50 MG: 50 TABLET, FILM COATED ORAL at 21:45

## 2020-11-11 RX ADMIN — METHYLPREDNISOLONE SODIUM SUCCINATE 40 MG: 40 INJECTION, POWDER, FOR SOLUTION INTRAMUSCULAR; INTRAVENOUS at 05:35

## 2020-11-11 RX ADMIN — METHYLPREDNISOLONE SODIUM SUCCINATE 40 MG: 40 INJECTION, POWDER, FOR SOLUTION INTRAMUSCULAR; INTRAVENOUS at 21:45

## 2020-11-11 RX ADMIN — PANTOPRAZOLE SODIUM 40 MG: 40 TABLET, DELAYED RELEASE ORAL at 05:36

## 2020-11-11 RX ADMIN — HYDRALAZINE HYDROCHLORIDE 50 MG: 50 TABLET, FILM COATED ORAL at 14:23

## 2020-11-11 RX ADMIN — Medication 10 ML: at 21:46

## 2020-11-11 RX ADMIN — BICALUTAMIDE 50 MG: 50 TABLET ORAL at 08:21

## 2020-11-11 RX ADMIN — HYDROCODONE BITARTRATE AND ACETAMINOPHEN 1 TABLET: 5; 325 TABLET ORAL at 17:05

## 2020-11-11 RX ADMIN — METHYLPREDNISOLONE SODIUM SUCCINATE 40 MG: 40 INJECTION, POWDER, FOR SOLUTION INTRAMUSCULAR; INTRAVENOUS at 14:23

## 2020-11-11 RX ADMIN — AZITHROMYCIN MONOHYDRATE 500 MG: 500 INJECTION, POWDER, LYOPHILIZED, FOR SOLUTION INTRAVENOUS at 21:45

## 2020-11-11 RX ADMIN — ACETAMINOPHEN 650 MG: 325 TABLET ORAL at 06:24

## 2020-11-11 RX ADMIN — HEPARIN SODIUM 5000 UNITS: 5000 INJECTION INTRAVENOUS; SUBCUTANEOUS at 14:23

## 2020-11-11 ASSESSMENT — PAIN DESCRIPTION - LOCATION
LOCATION: HEAD

## 2020-11-11 ASSESSMENT — PAIN DESCRIPTION - DESCRIPTORS
DESCRIPTORS: HEADACHE
DESCRIPTORS: HEADACHE

## 2020-11-11 ASSESSMENT — PAIN DESCRIPTION - FREQUENCY
FREQUENCY: CONTINUOUS
FREQUENCY: CONTINUOUS

## 2020-11-11 ASSESSMENT — PAIN DESCRIPTION - PAIN TYPE
TYPE: ACUTE PAIN

## 2020-11-11 ASSESSMENT — PAIN SCALES - GENERAL
PAINLEVEL_OUTOF10: 5
PAINLEVEL_OUTOF10: 0
PAINLEVEL_OUTOF10: 3
PAINLEVEL_OUTOF10: 7
PAINLEVEL_OUTOF10: 7
PAINLEVEL_OUTOF10: 0
PAINLEVEL_OUTOF10: 5
PAINLEVEL_OUTOF10: 0
PAINLEVEL_OUTOF10: 7
PAINLEVEL_OUTOF10: 6
PAINLEVEL_OUTOF10: 7
PAINLEVEL_OUTOF10: 0
PAINLEVEL_OUTOF10: 0

## 2020-11-11 ASSESSMENT — PAIN DESCRIPTION - PROGRESSION
CLINICAL_PROGRESSION: GRADUALLY WORSENING
CLINICAL_PROGRESSION: GRADUALLY WORSENING

## 2020-11-11 ASSESSMENT — PAIN - FUNCTIONAL ASSESSMENT
PAIN_FUNCTIONAL_ASSESSMENT: PREVENTS OR INTERFERES SOME ACTIVE ACTIVITIES AND ADLS
PAIN_FUNCTIONAL_ASSESSMENT: PREVENTS OR INTERFERES WITH MANY ACTIVE NOT PASSIVE ACTIVITIES

## 2020-11-11 ASSESSMENT — PAIN DESCRIPTION - ONSET
ONSET: GRADUAL
ONSET: GRADUAL

## 2020-11-11 NOTE — PROGRESS NOTES
Comprehensive Nutrition Assessment    Type and Reason for Visit:  Positive Nutrition Screen(Unplanned weight loss, decreased appetite, difficulty chewing or swallowing food)    Nutrition Recommendations/Plan:   1. Add Dental Soft restriction to Carb Control diet  2. Start Ensure Enlive 3x/day  3. Monitor p.o intakes, chewing/ swallowing ability and labs    Nutrition Assessment:  Patient admission is related to mediastinal mass, cough and shortness of breath. Patient has a previous history of laryngeal cancer status post tracheostomy; development of oral cancer status post reconstruction. Start Ensure [place] per patient's request. During assessment patient agreed that he had difficulty chewing due to no teeth and may need thickened liquids. Add Dental Soft retriction to Carb Control diet. Start Ensure Enlive 2x/day. Monitor p.o intakes, chewing/ swallowing ability and labs. Malnutrition Assessment:  Malnutrition Status:  Severe malnutrition    Context:  Chronic Illness     Findings of the 6 clinical characteristics of malnutrition:  Energy Intake:  7 - 75% or less estimated energy requirements for 1 month or longer  Weight Loss:  7 - Greater than 10% over 6 months     Body Fat Loss:  Unable to assess     Muscle Mass Loss:  Unable to assess    Fluid Accumulation:  No significant fluid accumulation Extremities   Strength:  Not Performed    Estimated Daily Nutrient Needs:  Energy (kcal):  7159-4844 kcal based on 28-30 kcal/kg; Weight Used for Energy Requirements:  Admission     Protein (g):  69-74 gm based on 1.2-1.3 gm/kg; Weight Used for Protein Requirements:  Admission        Fluid (ml/day):   ; Method Used for Fluid Requirements:         Nutrition Related Findings:  No edema. Medical Hx: laryngeal cancer status post tracheostomy; oral cancer status post reconstruction. Edentulous.  Possible issues with swallowing      Wounds:  None       Current Nutrition Therapies:    Dietary Nutrition Supplements: Standard High Calorie Oral Supplement  DIET CARB CONTROL; Dental Soft    Anthropometric Measures:  · Height: 5' 1\" (154.9 cm)  · Current Body Weight: 126 lb (57.2 kg)   · Admission Body Weight: 126 lb (57.2 kg)    · Usual Body Weight: 152 lb (68.9 kg)(6/11/20)     · Ideal Body Weight: 112 lbs; % Ideal Body Weight 112.5 %   · BMI: 23.8  · BMI Categories: Normal Weight (BMI 18.5-24. 9)       Nutrition Diagnosis:   · Severe malnutrition related to inadequate protein-energy intake(history of oral cancer) as evidenced by poor dentition, weight loss greater than or equal to 10% in 6 months      Nutrition Interventions:   Food and/or Nutrient Delivery:  Modify Current Diet, Start Oral Nutrition Supplement  Nutrition Education/Counseling:  Education not indicated   Coordination of Nutrition Care:  Continue to monitor while inpatient    Goals:  PO intakes are greater than 75% at meals       Nutrition Monitoring and Evaluation:   Food/Nutrient Intake Outcomes:  Food and Nutrient Intake, Supplement Intake  Physical Signs/Symptoms Outcomes:  Biochemical Data, Fluid Status or Edema, Skin, Chewing or Swallowing, Weight     Discharge Planning:    Continue current diet               Sherwin Delgado  MFN, RDN, LDN  Lead Clinical Dietitian  RD Office Phone [phone]

## 2020-11-11 NOTE — FLOWSHEET NOTE
Patient sleeping; no family present. Writer prays for patient; leaves note of support on tray table. Spiritual Care will follow as needed.      11/11/20 1146   Encounter Summary   Services provided to: Patient   Referral/Consult From: Radu   Continue Visiting   (11/11/20 sleeping)   Complexity of Encounter Low   Length of Encounter 15 minutes   Routine   Type Initial   Assessment Sleeping   Intervention Prayer

## 2020-11-11 NOTE — PLAN OF CARE
Problem: Falls - Risk of:  Goal: Will remain free from falls  Description: Will remain free from falls  Outcome: Ongoing  Goal: Absence of physical injury  Description: Absence of physical injury  Outcome: Ongoing  Bed locked and in low position. Side rails up x 2. Bed alarm on. Call light and personal items within reach. Non skin socks on. Hourly rounding.

## 2020-11-11 NOTE — CARE COORDINATION
Case Management Initial Discharge Plan  Ricardo Fine,         Hailee Risk              Risk of Unplanned Readmission:        0             Met with:patient to discuss discharge plans. Information verified: address, contacts, phone number, , insurance Yes  PCP: Tom Salazar MD  Date of last visit: 2 months ago     Insurance Provider: medicare and medicaid     Discharge Planning  Current Residence:  Private home   Living Arrangements:  Family Members , brother Ana Zayas has 1 stories/2 stairs to climb  Support Systems:  Family Members       Current Services PTA:  None  Agency: none      Patient able to perform ADL's:Assisted  DME in home:  Cane, bath bench, neb   DME used to aid ambulation prior to admission:   [employer]   DME used during admission:  tbd     Potential Assistance Needed:  [address]: [employer] on [address]    Potential Assistance Purchasing Medications:  No  Does patient want to participate in local refill/ meds to beds program?  Not Assessed    Patient agreeable to home care: Yes  Freedom of choice provided:  yes    The Plan for Transition of Care is related to the following treatment goals: skilled RN and therapy     The Patient and/or patient representative   was provided with a choice of provider and agrees   with the discharge plan. [x] Yes [] No    Freedom of choice list was provided with basic dialogue that supports the patient's individualized plan of care/goals, treatment preferences and shares the quality data associated with the providers.  [x] Yes [] No    Type of Home Care Services:  RN   Patient expects to be discharged to:  home    Prior SNF/Rehab Placement and Facility: [facility]   Agreeable to SNF/Rehab: No  [place] of choice provided: n/a   Evaluation: n/a    Expected Discharge date:  20  Follow Up Appointment: Best Day/ Time: Monday AM    Transportation provider: heidi Yanez   Transportation arrangements needed for discharge: No    Discharge Plan:   Met with patient to complete discharge assessment. Patient has stoma where his old trach used to be. Can mouth words and if holds stoma can speak few words. Patient known to writer from prior admission. Patient lives with brother she. He uses his cane all the time. He did have home care in past with [facility] and stopped services in June but is interested in having again. Epic referral placed for RN visits. Patient is admitted with mediastinal mass in setting of past laryngeal cancer. Oncology, ENT and pulmonary have been consulted. Will have to follow plan of care.      EDDIE in epic     Patient interested in Meals on wheel program. Handout given and updated on EDDIE     Electronically signed by Ziggy Cotton RN on 11/11/20 at 9:54 AM EST

## 2020-11-11 NOTE — H&P
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 received surfgery  Chemo radiation  Had  Recurrent metstatic disease was seen in [address] [place] [address] advised efrem rsadiation sees dr Vikram Carlson, comes with increased dyspnea tachypnea no fever no chills no chest pain no palpitation      Past Medical History:   Diagnosis Date    Arthritis     Asthma     CKD (chronic kidney disease), stage III     COPD (chronic obstructive pulmonary disease) (HCC)     GERD (gastroesophageal reflux disease)     Gout     Hypertension     Hyponatremia     Iliac artery aneurysm, right (HCC)     Laryngeal cancer (HCC)     Laryngeal Cancer. Chronic left facial edema.      Lung cancer ([address] 3-4 years ago    [facility] [place]    Prostate cancer [facility])          Past Surgical History:   Procedure Laterality Date    ABDOMINAL AORTIC ANEURYSM REPAIR, ENDOVASCULAR Right 4/7/2020    RIGHT ILIAC ARTERY ANEURYSM REPAIR ENDOVASCULAR performed by Ct Zhou MD at [address]  03/04/2020    No stents placed   [facility]  3-4 years ago    lung removed and \"voice box\" removed       Medications Prior to Admission:    Medications Prior to Admission: sodium bicarbonate 650 MG tablet, Take 650 mg by mouth 3 times daily  ipratropium-albuterol (DUONEB) 0.5-2.5 (3) MG/3ML SOLN nebulizer solution, Inhale 3 mLs into the lungs 4 times daily  hydrALAZINE (APRESOLINE) 50 MG tablet, Take 1 tablet by mouth every 8 hours  metoprolol succinate (TOPROL XL) 50 MG extended release tablet, Take 1 tablet by mouth daily  bicalutamide (CASODEX) 50 MG chemo tablet, Take 1 tablet by mouth daily  amLODIPine (NORVASC) 10 MG tablet, Take 1 tablet by mouth daily  Cholecalciferol (VITAMIN D3) 50 MCG (2000 UT) CAPS, Take 1 capsule by mouth daily  albuterol sulfate  (90 Base) MCG/ACT inhaler, Inhale 2 puffs into the lungs every 6 hours as needed for Wheezing or Shortness of Breath  budesonide-formoterol (SYMBICORT) 160-4.5 MCG/ACT AERO, Inhale 2 puffs into the lungs 2 times daily  omeprazole (PRILOSEC) 20 MG delayed release capsule, Take 20 mg by mouth every morning (before breakfast)  allopurinol (ZYLOPRIM) 300 MG tablet, Take 300 mg by mouth daily. Allergies:    Amino acids and Lisinopril    Social History:    reports that he has quit smoking. His smoking use included cigarettes. He has never used smokeless tobacco. He reports previous alcohol use of about 5.0 standard drinks of alcohol per week. He reports that he does not use drugs. Family History:   family history includes Diabetes in his mother and sister; Heart Disease in his mother and sister.     REVIEW OF SYSTEMS:    Constitutional: negative, No fever no chills  Eyes: negative  Ears, nose, mouth, throat, and face: negative  Respiratory: negative, No cough positive shortness breath positive wheezing tachypnea  Cardiovascular: negative, Spinal palpitation no dizziness  Gastrointestinal: negative  Genitourinary:negative  Integument/breast: negative  Hematologic/lymphatic: negative  Musculoskeletal:negative  Neurological: negative, no TIA no seizures  Behavioral/Psych: negative  Endocrine: negative, Polyuria no polydipsia no hypoglycemia  Allergic/Immunologic: negative  PHYSICAL EXAM:  General Appearance: in no acute distress and alert  Skin: warm and dry, no rash or erythema  Head: normocephalic and atraumatic loss of temporal fat pad  Eyes: pupils equal, round, and reactive to light, conjunctivae normal and sclera anicteric    Neck: neck supple and non tender without mass   Pulmonary/Chest: Entry equal bilateral rhonchi and expiratory wheezing no crackles use of intercostal muscles cardiovascular: normal rate, regular rhythm, normal S1 and S2, no gallops, intact distal pulses and no bilateral pleural effusions    Diastolic CHF, acute on chronic (HCC)    Hyponatremia    Mediastinal mass       PLAN:    Admit oncology pulmonary ent consult dvt prophylaxis home meds reviewed restarted iv steroids bronchodilators  dvt prophylaxis , avoid nephrotoxic drugs antibiotics see orders          Lora Olivares MD  9:26 PM  11/10/2020

## 2020-11-11 NOTE — CONSULTS
Department of Otolaryngology    Assesment/Plan: Tracheal and airway obstruction secondary to crusting. He needs to have a humidified trach shield at all times. The laryngectomy site needs to be covered with an apron when not using humidified shield. CHIEF COMPLAINT: Trouble breathing    HISTORY OF PRESENT ILLNESS:              Emma Vaughn  is a 78 y.o. male with history of laryngectomy. He has not been taking good care of his stoma site. He has not utilize any hygiene around it. He does not wear a stoma vent. He presents with acute shortness of breath and trouble ambulating. Past Medical History:        Diagnosis Date    Arthritis     Asthma     CKD (chronic kidney disease), stage III     COPD (chronic obstructive pulmonary disease) (HCC)     GERD (gastroesophageal reflux disease)     Gout     Hypertension     Hyponatremia     Iliac artery aneurysm, right (HCC)     Laryngeal cancer (HCC)     Laryngeal Cancer. Chronic left facial edema.      Lung cancer ([address] 3-4 years ago    [facility] [place]    Prostate cancer [facility])      Past Surgical History:        Procedure Laterality Date    ABDOMINAL AORTIC ANEURYSM REPAIR, ENDOVASCULAR Right 4/7/2020    RIGHT ILIAC ARTERY ANEURYSM REPAIR ENDOVASCULAR performed by Jessie Nicolas MD at [address] [place] [address]  03/04/2020    No stents placed    PROSTATE SURGERY      TRACHEAL SURGERY  3-4 years ago    lung removed and \"voice box\" removed     Current Medications:   Current Facility-Administered Medications: HYDROcodone-acetaminophen (NORCO) 5-325 MG per tablet 1 tablet, 1 tablet, Oral, Q6H PRN  albuterol sulfate  (90 Base) MCG/ACT inhaler 2 puff, 2 puff, Inhalation, Q6H PRN  amLODIPine (NORVASC) tablet 10 mg, 10 mg, Oral, Daily  bicalutamide (CASODEX) chemo tablet 50 mg, 50 mg, Oral, Daily  budesonide-formoterol (SYMBICORT) 160-4.5 MCG/ACT inhaler 2 puff, 2 puff, Inhalation, BID  Vitamin D (CHOLECALCIFEROL) tablet 2,000 Units, 2,000 Units, Oral, Daily  hydrALAZINE (APRESOLINE) tablet 50 mg, 50 mg, Oral, 3 times per day  ipratropium-albuterol (DUONEB) nebulizer solution 3 mL, 3 mL, Inhalation, 4x Daily  metoprolol succinate (TOPROL XL) extended release tablet 50 mg, 50 mg, Oral, Daily  pantoprazole (PROTONIX) tablet 40 mg, 40 mg, Oral, QAM AC  sodium bicarbonate tablet 650 mg, 650 mg, Oral, TID  sodium chloride flush 0.9 % injection 10 mL, 10 mL, Intravenous, 2 times per day  sodium chloride flush 0.9 % injection 10 mL, 10 mL, Intravenous, PRN  acetaminophen (TYLENOL) tablet 650 mg, 650 mg, Oral, Q6H PRN **OR** acetaminophen (TYLENOL) suppository 650 mg, 650 mg, Rectal, Q6H PRN  polyethylene glycol (GLYCOLAX) packet 17 g, 17 g, Oral, Daily PRN  promethazine (PHENERGAN) tablet 12.5 mg, 12.5 mg, Oral, Q6H PRN **OR** ondansetron (ZOFRAN) injection 4 mg, 4 mg, Intravenous, Q6H PRN  heparin (porcine) injection 5,000 Units, 5,000 Units, Subcutaneous, 3 times per day  0.9 % sodium chloride infusion, , Intravenous, Continuous  methylPREDNISolone sodium (SOLU-MEDROL) injection 40 mg, 40 mg, Intravenous, Q8H  azithromycin (ZITHROMAX) 500 mg in D5W 250ml addavial, 500 mg, Intravenous, Q24H  Allergies:  Amino acids and Lisinopril    Social History:    Social History     Socioeconomic History    Marital status: Single     Spouse name: None    Number of children: None    Years of education: None    Highest education level: None   Occupational History    None   Social Needs    Financial resource strain: None    Food insecurity     Worry: None     Inability: None    Transportation needs     Medical: None     Non-medical: None   Tobacco Use    Smoking status: Former Smoker     Types: Cigarettes    Smokeless tobacco: Never Used    Tobacco comment: quit smoking 20 years ago    Substance and Sexual Activity    Alcohol use: Not Currently     Alcohol/week: 5.0 standard drinks     Types: 5 Cans of beer per week    Drug use:  No  Sexual activity: Never   Lifestyle    Physical activity     Days per week: None     Minutes per session: None    Stress: None   Relationships    Social connections     Talks on phone: None     Gets together: None     Attends Orthodox service: None     Active member of club or organization: None     Attends meetings of clubs or organizations: None     Relationship status: None    Intimate partner violence     Fear of current or ex partner: None     Emotionally abused: None     Physically abused: None     Forced sexual activity: None   Other Topics Concern    None   Social History Narrative    None       Family History:        Problem Relation Age of Onset    Diabetes Mother     Heart Disease Mother         Sudden cardiac death    Diabetes Sister     Heart Disease Sister        REVIEW OF SYSTEMS:  As above and:  CONSTITUTIONAL:  negative  EYES:  negative   HEENT:  negative   RESPIRATORY:  negative   CARDIOVASCULAR:  negative    GASTROINTESTINAL:  negative   GENITOURINARY:  negative   INTEGUMENT/BREAST:  negative   HEMATOLOGIC/LYMPHATIC:  negative   ALLERGIC/IMMUNOLOGIC:  negative   ENDOCRINE:  negative   MUSCULOSKELETAL:  negative   NEUROLOGICAL:  negative   BEHAVIOR/PSYCH:  negative     PHYSICAL EXAM:    VITALS:  /79   Pulse 68   Temp 97.3 °F (36.3 °C) (Oral)   Resp 15   Ht 5' 1\" (1.549 m)   Wt 126 lb 6 oz (57.3 kg)   SpO2 99%   BMI 23.88 kg/m²       CONSTITUTIONAL:  awake, alert, cooperative, no apparent distress, and appears stated age  EYES:  Lids and lashes normal, pupils equal, round and reactive to light, extra ocular muscles intact, sclera clear, conjunctiva normal  ENT:  Normocephalic, without obvious abnormality, atraumatic, sinuses nontender on palpation, external ears without lesions, oral pharynx postsurgical changes   NECK: Laryngectomy stoma site with narrowing and crusting  MUSCULOSKELETAL:  There is no redness, warmth, or swelling of the joints. Full range of motion noted. Motor strength is 5 out of 5 all extremities bilaterally. Tone is normal.  NEUROLOGIC:  Awake, alert, oriented to name, place and time. Cranial nerves II-XII are grossly intact. Motor is 5 out of 5 bilaterally. Sensory is intact.   Status post laryngectomy    DATA:    Labs and Radiology reports/films reviewed

## 2020-11-11 NOTE — PLAN OF CARE
Problem: Falls - Risk of:  Goal: Will remain free from falls  Description: Will remain free from falls. Fall risk assessment completed. Patient instructed to use call light. Bed locked and in lowest position, side rails up 2/4, call light and bedside table within reach, clutter removed, and non-skid footwear on when pt out of bed. Hourly rounds will continue. Bed alarm in use. 11/11/2020 1451 by Baldomero Raymundo RN  Outcome: Ongoing     Problem: Pain:  Goal: Control of chronic pain  Description: Control of chronic pain. Pain assessment completed. Patient demonstrates understanding of pain rating scale and interventions. At this time patient states pain is well controlled. Will continue to monitor and reassess with each rounding and PRN.     Outcome: Ongoing     Problem: Breathing Pattern - Ineffective:  Goal: Ability to achieve and maintain a regular respiratory rate will improve  Description: Ability to achieve and maintain a regular respiratory rate will improve  Outcome: Ongoing

## 2020-11-11 NOTE — PROGRESS NOTES
Physical Therapy    Facility/Department: [facility] PROGRESSIVE CARE  Initial Assessment    NAME: Lorna Pradhan  : 1950  MRN: [id number]    Date of Service: 2020    Discharge Recommendations:  Home with assist PRN, Home with Home health PT     Pt presented to ED on 11/10/20 with complaints of shortness of breath. Patient states he been short of breath for past couple of days, he has a history of visits to the ER for tracheal suctioning but is not certain that this is what is causing the problem, he denies any chest pain, he has no fevers or chills, he denies any hemoptysis or sputum production. Patient describes his symptoms as moderate    RN reports patient is medically stable for therapy treatment this date. Chart reviewed prior to treatment and patient is agreeable for therapy. Assessment   Body structures, Functions, Activity limitations: Decreased functional mobility ; Decreased safe awareness;Decreased endurance;Decreased balance  Assessment: Pt with minimal deficits noted in bed mobility, transfers, ambulation, balance, and endurance this session. Pt requires continued IP PT & is appropriate to D/C Home with assist & HH PT to maximize independence with functional mobility, balance, safety awareness & activity tolerance. Prognosis: Good  Decision Making: Medium Complexity  Exam: ROM, MMT, functional mobility, activity tolerance, Balance, & [employer] AM-PAC 6 Clicks Basic Mobility  Clinical Presentation: evolving  PT Education: Goals;PT Role;Plan of Care;Transfer Training;Functional Mobility Training  Patient Education: safe functional mobility, safety awareness, prevention of sedentary complication  REQUIRES PT FOLLOW UP: Yes  Activity Tolerance  Activity Tolerance: Patient limited by endurance       Patient Diagnosis(es): The primary encounter diagnosis was Mediastinal mass. A diagnosis of Dyspnea and respiratory abnormalities was also pertinent to this visit.      has a past medical history of Arthritis, Asthma, CKD (chronic kidney disease), stage III, COPD (chronic obstructive pulmonary disease) ([facility] [address] GERD (gastroesophageal reflux disease), Gout, Hypertension, Hyponatremia, Iliac artery aneurysm, right ([facility] Laryngeal cancer ([facility] [address] Lung cancer ([facility] [address] and Prostate cancer ([facility] [address] has a past surgical history that includes Tracheal surgery (3-4 years ago); Prostate surgery; Cardiac catheterization (03/04/2020); and AAA repair, endovascular (Right, 4/7/2020).     Restrictions  Restrictions/Precautions  Restrictions/Precautions: General Precautions, Fall Risk  Position Activity Restriction  Other position/activity restrictions: up with assist, contact isolation, trach, RUE IV  Vision/Hearing  Vision: Impaired  Vision Exceptions: Wears glasses for reading  Hearing: Within functional limits     Subjective  General  Chart Reviewed: Yes  Patient assessed for rehabilitation services?: Yes  Additional Pertinent Hx: previous laryngeal cancer status post tracheostomy placement, stoma  Response To Previous Treatment: Not applicable  General Comment  Comments: RN okays PT  Subjective  Subjective: Pt agreeable to PT  Pain Screening  Patient Currently in Pain: Yes  Pain Assessment  Pain Assessment: 0-10  Pain Type: Acute pain  Pain Location: Head  Vital Signs  Patient Currently in Pain: Yes       Orientation  Orientation  Overall Orientation Status: Within Functional Limits(mouths words or writes due to stoma)  Social/Functional History  Social/Functional History  Lives With: Other (comment)(Brother)  Type of Home: House  Home Layout: One level  Home Access: Stairs to enter with rails  Entrance Stairs - Number of Steps: 5  Entrance Stairs - Rails: Both  Bathroom Shower/Tub: Tub/Shower unit  Bathroom Toilet: Handicap height(nearby [facility] for support)  Bathroom Equipment: Grab bars in shower  Bathroom Accessibility: Accessible  Home Equipment: Barbra Peed Help From: Family  ADL Assistance: Independent(just supervision safety)  Homemaking Assistance: Needs assistance  Ambulation Assistance: Independent  Transfer Assistance: Independent  Active : No  Patient's  Info: Brother  Occupation: On disability  Type of occupation: warehouse  Additional Comments: no falls  Cognition   Cognition  Overall Cognitive Status: Exceptions  Arousal/Alertness: Appropriate responses to stimuli  Following Commands: Follows one step commands with increased time; Follows one step commands with repetition  Attention Span: Attends with cues to redirect  Safety Judgement: Decreased awareness of need for assistance;Decreased awareness of need for safety  Problem Solving: Decreased awareness of errors;Assistance required to identify errors made;Assistance required to correct errors made  Insights: Decreased awareness of deficits  Initiation: Requires cues for some  Sequencing: Requires cues for some    Objective     Observation/Palpation  Posture: Fair  Observation: resting in bed, thin & frail, has stoma at trach site    AROM RLE (degrees)  RLE AROM: WFL  AROM LLE (degrees)  LLE AROM : WFL  AROM RUE (degrees)  RUE General AROM: see OT assessment  AROM LUE (degrees)  LUE General AROM: see OT assessment  Strength RLE  Strength RLE: WFL  Strength LLE  Strength LLE: WFL  Strength RUE  Comment: see OT assessment  Strength LUE  Comment: see OT assessment  Tone RLE  RLE Tone: Normotonic  Tone LLE  LLE Tone: Normotonic  Coordination  Movements Are Fluid And Coordinated: Yes  Motor Control  Gross Motor?: WFL  Sensation  Overall Sensation Status: WFL  Bed mobility  Rolling to Left: Stand by assistance  Supine to Sit: Contact guard assistance  Sit to Supine: Contact guard assistance  Scooting: Contact guard assistance  Comment: Cues/assist to reach to bedrail & use of upper body to assist  Transfers  Sit to Stand: Contact guard assistance  Stand to sit: Contact guard assistance  Bed to Chair: Contact guard assistance  Stand Pivot Transfers: Contact guard assistance  Lateral Transfers: Contact guard assistance  Comment: Ed + tactile assist on correct use of upper body for safe sit/stand  Ambulation  Ambulation?: Yes  Ambulation 1  Surface: level tile  Device: (IV pole)  Other Apparatus: O2  Assistance: Minimal assistance  Quality of Gait: step to pattern, slow & guarded  Distance: 30ft   Pt amb to BR for toileting, Contact Guard Assistance to sit to toilet. Pt stood with Contact Guard Assistance,stood 3 minutes for pericare & to pull up brief, amb 2ft to sink x 3 minutes       Balance  Sitting - Static: Good  Sitting - Dynamic: Good  Standing - Static: Good;-  Standing - Dynamic: Fair;+  Exercises  Comments: Ed pt on functional mobility, safety awareness, prevention of sedentary complication    All lines intact, call light within reach, and patient positioned comfortably at end of treatment. All patient needs addressed prior to ending therapy session. Plan   Plan  Times per week: 1-2x/D, 5-6D/week  Current Treatment Recommendations: Strengthening, Balance Training, Functional Mobility Training, Transfer Training, Endurance Training, Gait Training, Home Exercise Program, Safety Education & Training, Patient/Caregiver Education & Training  Safety Devices  Type of devices: Call light within reach, Bed alarm in place, Gait belt, Patient at risk for falls, Left in bed, Nurse notified    G-Code       OutComes Score                                                  AM-PAC Score  AM-PAC Inpatient Mobility Raw Score : 18 (11/11/20 1220)  AM-PAC Inpatient T-Scale Score : 43.63 (11/11/20 1220)  Mobility Inpatient CMS 0-100% Score: 46.58 (11/11/20 1220)  Mobility Inpatient CMS G-Code Modifier : CK (11/11/20 1220)          Goals  Short term goals  Time Frame for Short term goals: 12 visits  Short term goal 1: Inc bed-mobility & transfers to independent to enable pt to safely get in/OOB  Short term goal 2:  Inc gait to amb 200ft or > indep w/ RW to enable pt to return to previous level of independence  Short term goal 3: Pt able to go up/down 5 steps with Moe rails indep  Short term goal 4:  Inc standing balance to good & able to tolerate 30-40 min of activity to include 15-20 reps of ex & functional mobility of at least 5 minutes standing to facilitate activity tolerance to [facility] & pt independence for performance of ADL's & functional mobilit with reduced fall risk  Short term goal 5: Ed pt on home ex's, safety & energy principles & issue written home program;       Therapy Time   Individual Concurrent Group Co-treatment   Time In 1148         Time Out 1220         Minutes 32+10=42         Timed Code Treatment Minutes: 25 Minutes    Additional 10 minutes for chart review          [address], PT

## 2020-11-11 NOTE — PROGRESS NOTES
Transitions of Care Pharmacy Service   Medication Review    The patient's list of current home medications has been reviewed. Source(s) of information: patient, Niki, PCP office        Please feel free to call me with any questions about this encounter. Thank you. Dequan Majanos, [facility]   Transitions of Care Pharmacy Service  Phone:  [phone]  Fax: [phone]      Electronically signed by Dequan Ibrahima [address] [place] [address] on 11/11/2020 at 2:59 PM           Medications Prior to Admission:   sodium bicarbonate 650 MG tablet, Take 650 mg by mouth 3 times daily  hydrALAZINE (APRESOLINE) 50 MG tablet, Take 1 tablet by mouth every 8 hours  metoprolol succinate (TOPROL XL) 50 MG extended release tablet, Take 1 tablet by mouth daily  bicalutamide (CASODEX) 50 MG chemo tablet, Take 1 tablet by mouth daily  amLODIPine (NORVASC) 10 MG tablet, Take 1 tablet by mouth daily  mirtazapine (REMERON) 15 MG tablet, Take 7.5 mg by mouth nightly Takes 1/2 tab (=7.5mg) nightly  Cholecalciferol (VITAMIN D3) 50 MCG (2000 UT) CAPS, Take 1 capsule by mouth daily  albuterol sulfate  (90 Base) MCG/ACT inhaler, Inhale 2 puffs into the lungs every 6 hours as needed for Wheezing or Shortness of Breath  budesonide-formoterol (SYMBICORT) 160-4.5 MCG/ACT AERO, Inhale 2 puffs into the lungs 2 times daily  omeprazole (PRILOSEC) 20 MG delayed release capsule, Take 20 mg by mouth every morning (before breakfast)  allopurinol (ZYLOPRIM) 300 MG tablet, Take 300 mg by mouth daily.

## 2020-11-11 NOTE — PROGRESS NOTES
Subjective:     Follow-up COPD exacerbation  Still with some shortness of breath occasional wheezing mild tachypnea at times no fevers or chills  ROS  Fevers or chills no GI/ complaints no cardiac complaints no TIA no bleeding no headache no sore throat no skin lesions no polyuria polydipsia or hypoglycemia  physical exam  General Appearance: in no acute distress and alert  Skin: warm and dry, no rash or erythema  Head: normocephalic and atraumatic  Eyes: pupils equal, round, and reactive to light, sclera anicteric and pallor  Neck: neck supple and non tender without mass and tracheostomy  Pulmonary/Chest: Air entry equal few rhonchi mild wheezing no crackles no use of intercostal muscles  Cardiovascular: normal rate, regular rhythm, normal S1 and S2, no gallops, intact distal pulses and no carotid bruits  Abdomen: soft, non-tender, non-distended, normal bowel sounds, no masses or organomegaly  Extremities: no edema and pulses no Homans' sign  Neurologic: Alert oriented x3 no focal deficits    /79   Pulse 68   Temp 97.3 °F (36.3 °C) (Oral)   Resp 15   Ht 5' 1\" (1.549 m)   Wt 126 lb 6 oz (57.3 kg)   SpO2 99%   BMI 23.88 kg/m²     CBC:   Lab Results   Component Value Date    WBC 7.1 11/11/2020    RBC 3.32 11/11/2020    HGB 9.2 11/11/2020    HCT 27.8 11/11/2020    MCV 83.7 11/11/2020    MCH 27.7 11/11/2020    MCHC 33.1 11/11/2020    RDW 14.5 11/11/2020     11/11/2020    MPV 9.5 11/11/2020     BMP:    Lab Results   Component Value Date     11/11/2020    K 4.4 11/11/2020     11/11/2020    CO2 18 11/11/2020    BUN 32 11/11/2020    LABALBU 3.3 11/11/2020    CREATININE 3.21 11/11/2020    CALCIUM 9.7 11/11/2020    GFRAA 23 11/11/2020    LABGLOM 19 11/11/2020    GLUCOSE 130 11/11/2020        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    COPD (chronic obstructive pulmonary disease) ([facility]    Severe malnutrition ([facility] [address]    Facial edema    Common iliac aneurysm (HCC)    Essential hypertension    Stage 3 chronic kidney disease    Acute kidney injury (nontraumatic) (HCC)    NSTEMI (non-ST elevated myocardial infarction) ([facility] [address] [facility]    Pathological fracture of lumbar vertebra due to secondary osteoporosis (HCC)    Age-related osteoporosis with current pathological fracture    Intractable back pain    Iron deficiency anemia    Anemia, blood loss    Chronic bilateral pleural effusions    Diastolic CHF, acute on chronic (HCC)    Hyponatremia    Mediastinal mass     COPD exacerbation  Mediastinal mass with adenopathy likely recurrent disease  History of laryngeal cancer with tracheostomy  Floor of the mouth cancer likely recurrence from the review of [place]  Essential hypertension  Iron deficiency anemia  Chronic diastolic CHF  CKD 4  Plan:    Labs labs reviewed continue with IV steroids bronchodilators, start low hydration avoid nephrotoxic drugs physical therapy occupational therapy ENT pulmonary and oncology consultation patient with recurrent disease question of any need for chemo or radiation apparently the masses pressure of the trachea see orders      Catia Finley MD  6:54 PM

## 2020-11-11 NOTE — DISCHARGE INSTR - COC
Continuity of Care Form    Patient Name: Margarette Wu   :  1950  MRN:  [id number]    Admit date:  11/10/2020  Discharge date:  11/15/2020    Code Status Order: Full Code   Advance Directives:   [facility] Documentation       Date/Time Healthcare Directive Type of Healthcare Directive Copy in [address] Agent's Name Healthcare Agent's Phone Number    11/10/20 0011  Yes, patient has an advance directive for healthcare treatment  --  --  --  --  --            Admitting Physician:  Jone Cedeño MD  PCP: Jone Cedeño MD    Discharging Nurse:  THE [facility] Unit/Room#: 1001/1001-02  Discharging Unit Phone Number:     Emergency Contact:   Extended Emergency Contact Information  Primary Emergency Contact: [address] [facility] Phone: [phone]  Mobile Phone: [phone]  Relation: Brother/Sister  Secondary Emergency Contact: [address] Phone: [phone]  Mobile Phone: [phone]  Relation: Brother/Sister    Past Surgical History:  Past Surgical History:   Procedure Laterality Date    ABDOMINAL AORTIC ANEURYSM REPAIR, ENDOVASCULAR Right 2020    RIGHT ILIAC ARTERY ANEURYSM REPAIR ENDOVASCULAR performed by Rox Pond MD at [address] [facility]  2020    No stents placed   [facility]  3-4 years ago    lung removed and \"voice box\" removed       Immunization History:   Immunization History   Administered Date(s) Administered    Influenza, High Dose (Fluzone 65 yrs and older) 10/20/2015    Influenza, Triv, inactivated, subunit, adjuvanted, IM (Fluad 65 yrs and older) 10/16/2019    Pneumococcal Conjugate 13-valent (Wilhemenia Sensing) 2016    Pneumococcal Polysaccharide ([id number]) 2012       Active Problems:  Patient Active Problem List   Diagnosis Code    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 ([facility] [address] C04.0    Laryngeal cancer ([facility] [address] C32.9    Mouth swelling R22.0    COPD (chronic obstructive pulmonary disease) ([facility]) J44.9    Severe malnutrition ([facility]) E43    Facial edema R60.0    Common iliac aneurysm ([facility]) I72.3    Essential hypertension I10    Stage 3 chronic kidney disease N18.30    Acute kidney injury (nontraumatic) ([facility]) N17.9    NSTEMI (non-ST elevated myocardial infarction) ([facility]) I21.4    Pathological fracture of lumbar vertebra due to secondary osteoporosis ([facility]) M80.88XA    Age-related osteoporosis with current pathological fracture M80.00XA    Intractable back pain M54.9    Iron deficiency anemia D50.9    Anemia, blood loss D50.0    Chronic bilateral pleural effusions X80    Diastolic CHF, acute on chronic ([facility]) I50.33    Hyponatremia E87.1    Mediastinal mass J98.59       Isolation/Infection:   Isolation            Contact          Patient Infection Status       Infection Onset Added Last Indicated Last Indicated By Review Planned Expiration Resolved Resolved By    MRSA 04/12/20 04/15/20 04/12/20 Respiratory Culture        Sputum - 4/2020    Resolved    COVID-19 Rule Out 06/01/20 06/01/20 06/01/20 COVID-19 (Ordered)   06/01/20 Rule-Out Test Resulted    COVID-19 Rule Out 05/26/20 05/26/20 05/26/20 COVID-19 (Ordered)   05/27/20 Rule-Out Test Resulted    COVID-19 Rule Out 04/12/20 04/12/20 04/12/20 COVID-19 (Ordered)   04/13/20 Rule-Out Test Resulted            Nurse Assessment:  Last Vital Signs: BP (!) 157/92   Pulse 77   Temp 98.2 °F (36.8 °C) (Oral)   Resp 19   Ht 5' 1\" (1.549 m)   Wt 126 lb 6 oz (57.3 kg)   SpO2 98%   BMI 23.88 kg/m²     Last documented pain score (0-10 scale): Pain Level: 0  Last Weight:   Wt Readings from Last 1 Encounters:   11/11/20 126 lb 6 oz (57.3 kg)     Mental Status:  oriented, alert, coherent, logical, thought processes intact and able to concentrate and follow conversation    IV Access:  - None    Nursing Mobility/ADLs:  Walking   Assisted  Transfer  Assisted  Bathing Assisted  Dressing  Assisted  Toileting  Assisted  Feeding  Assisted  Med Admin  Assisted  Med Delivery   whole    Wound Care Documentation and Therapy:        Elimination:  Continence:   · Bowel: Yes  · Bladder: Yes  Urinary Catheter: None   Colostomy/Ileostomy/Ileal Conduit: No       Date of Last BM: 11-12-20, miralax given 11-13-20    Intake/Output Summary (Last 24 hours) at 11/11/2020 1123  Last data filed at 11/11/2020 1054  Gross per 24 hour   Intake 787 ml   Output 175 ml   Net 612 ml     I/O last 3 completed shifts: In: 5 [P.O.:30; I.V.:557]  Out: 175 [Urine:175]    Safety Concerns: At Risk for Falls    Impairments/Disabilities:      Speech, stoma present    Nutrition Therapy:  Current Nutrition Therapy:   - Oral Diet:  Dental Soft and carb control    Routes of Feeding: Oral  Liquids: No Restrictions  Daily Fluid Restriction: no  Last Modified Barium Swallow with Video (Video Swallowing Test): not done    Treatments at the Time of Hospital Discharge:   Respiratory Treatments: as ordered prior to admission   Oxygen Therapy:  is not on home oxygen therapy. Ventilator:    - humidified trach collar per [facility]     Rehab Therapies: Physical Therapy  Weight Bearing Status/Restrictions: No weight bearing restirctions  Other Medical Equipment (for information only, NOT a DME order):  cane  Other Treatments:   1 skilled RN assessment  2. Medication reconciliation   3. MSW for resources in the home. Gave meal on wheel resources  4. New cool mist trach collar per [facility] [employer]. Call [phone] with questions.       Patient's personal belongings (please select all that are sent with patient):  None    RN SIGNATURE:  Electronically signed by Winthrop Cabot, RN on 11/15/20 at 3:07 PM EST    CASE MANAGEMENT/SOCIAL WORK SECTION    Inpatient Status Date: 11/10    Readmission Risk Assessment Score:  Readmission Risk              Risk of Unplanned Readmission:        0           Discharging to Facility/ Agency   · Name: [facility] living  · Phone: [phone]  · Fax: [phone]      / signature: Electronically signed by Radha Brown RN on 11/11/20 at 11:25 AM EST    PHYSICIAN SECTION    Prognosis: Fair    Condition at Discharge: Stable    Rehab Potential (if transferring to Rehab): Guarded    Recommended Labs or Other Treatments After Discharge: BMP CBC 94/62/1730    Physician Certification: I certify the above information and transfer of Darek Herbert  is necessary for the continuing treatment of the diagnosis listed and that he requires [address] for less 30 days.      Update Admission H&P: No change in H&P    PHYSICIAN SIGNATURE:  Electronically signed by Harshal Talley MD on 11/15/20 at 2:13 PM EST

## 2020-11-11 NOTE — CONSULTS
Pulmonary Medicine and Critical Care Consult  [facility] APRN-CNP/Gus Trevizo MD      Patient - Venus Wong   MRN -  [id number]   Acct # - [de-identified]   - 1950      Date of Admission -  11/10/2020 11:42 AM  Date of evaluation -  2020  Room - [facility]/100-   Adán Castro MD Primary Care Physician - Juma Forde MD     Reason for Consult    Lung mass    Assessment   · Right upper lobe/superior mediastinal mass, 7.4 cm X 5 .9 cm  · Lymphadenopathy  · Moderate right pleural effusion, recurrent  · S/p right thoracentesis on 2020 and 2020  · COPD  · Moderate to severe pulmonary hypertension, RVSP 65 mmHg  · History of laryngeal cancer s/p tracheostomy   ? subsequent development of oral cancer s/p surgery with reconstruction  · LITTLE on CKD/hyponatremia  · Right renal cyst 1.5 cm  · NSTEMI/combined diastolic and systolic heart failure    Recommendations   · Continue IV antibiotics, Zithromax  · IV solu-medrol 40 mg every 8 hours  · Albuterol and Ipratropium 4 times a day and as needed  · Symbicort  · Oxygen via trach collar if necessary  · IV fluids, monitor renal function/sodium  · Bronchoscopy versus CT-guided biopsy by interventional radiology  · X-ray chest in am  · Labs: CBC and BMP in am  · DVT prophylaxis with subcu heparin  · Above assessment and plan will be reviewed with Dr. Radha Genao.   Patient plan will be finalized following review by Dr. 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 malnutrition ([address]    Facial edema    Common iliac aneurysm ([address]    Essential hypertension    Stage 3 chronic kidney disease    Acute kidney injury (nontraumatic) ([address]    NSTEMI (non-ST elevated myocardial infarction) ([address]    Pathological fracture of Right 4/7/2020    RIGHT ILIAC ARTERY ANEURYSM REPAIR ENDOVASCULAR performed by Gurmeet Rodriguez MD at [address] [facility]  03/04/2020    No stents placed    PROSTATE SURGERY      TRACHEAL SURGERY  3-4 years ago    lung removed and \"voice box\" removed       Meds    Current Medications    amLODIPine  10 mg Oral Daily    bicalutamide  50 mg Oral Daily    budesonide-formoterol  2 puff Inhalation BID    Vitamin D  2,000 Units Oral Daily    hydrALAZINE  50 mg Oral 3 times per day    ipratropium-albuterol  3 mL Inhalation 4x Daily    metoprolol succinate  50 mg Oral Daily    pantoprazole  40 mg Oral QAM AC    sodium bicarbonate  650 mg Oral TID    sodium chloride flush  10 mL Intravenous 2 times per day    heparin (porcine)  5,000 Units Subcutaneous 3 times per day    methylPREDNISolone  40 mg Intravenous Q8H    azithromycin  500 mg Intravenous Q24H     HYDROcodone-acetaminophen, albuterol sulfate HFA, sodium chloride flush, acetaminophen **OR** acetaminophen, polyethylene glycol, promethazine **OR** ondansetron  IV Drips/Infusions   sodium chloride 50 mL/hr at 11/11/20 1210     Home Medications  Medications Prior to Admission: sodium bicarbonate 650 MG tablet, Take 650 mg by mouth 3 times daily  ipratropium-albuterol (DUONEB) 0.5-2.5 (3) MG/3ML SOLN nebulizer solution, Inhale 3 mLs into the lungs 4 times daily  hydrALAZINE (APRESOLINE) 50 MG tablet, Take 1 tablet by mouth every 8 hours  metoprolol succinate (TOPROL XL) 50 MG extended release tablet, Take 1 tablet by mouth daily  bicalutamide (CASODEX) 50 MG chemo tablet, Take 1 tablet by mouth daily  amLODIPine (NORVASC) 10 MG tablet, Take 1 tablet by mouth daily  Cholecalciferol (VITAMIN D3) 50 MCG (2000 UT) CAPS, Take 1 capsule by mouth daily  albuterol sulfate  (90 Base) MCG/ACT inhaler, Inhale 2 puffs into the lungs every 6 hours as needed for Wheezing or Shortness of Breath  budesonide-formoterol (SYMBICORT) 160-4.5 MCG/ACT AERO, Inhale 2 puffs into the lungs 2 times daily  omeprazole (PRILOSEC) 20 MG delayed release capsule, Take 20 mg by mouth every morning (before breakfast)  allopurinol (ZYLOPRIM) 300 MG tablet, Take 300 mg by mouth daily. Allergies    Amino acids and Lisinopril  Social History     Social History     Tobacco Use    Smoking status: Former Smoker     Types: Cigarettes    Smokeless tobacco: Never Used    Tobacco comment: quit smoking 20 years ago    Substance Use Topics    Alcohol use: Not Currently     Alcohol/week: 5.0 standard drinks     Types: 5 Cans of beer per week     Family History          Problem Relation Age of Onset    Diabetes Mother     Heart Disease Mother         Sudden cardiac death    Diabetes Sister     Heart Disease Sister      ROS - 6 systems   General Denies any fever or chills  HEENT Denies any diplopia, tinnitus or vertigo  Resp positive for  cough with sputum and dyspnea  Cardiac Denies any chest pain, palpitations, claudication or edema  GI Denies any melena, hematochezia, hematemesis or pyrosis   Denies any frequency, urgency, hesitancy or incontinence  Heme Denies bruising or bleeding easily  Endocrine Denies any history of diabetes or thyroid disease  Neuro Denies any focal motor or sensory deficits  Psychiatric Denies anxiety, depression, suicidal ideation  Skin Denies rashes, itching, open sores  Vitals     height is 5' 1\" (1.549 m) and weight is 126 lb 6 oz (57.3 kg). His axillary temperature is 98.2 °F (36.8 °C). His blood pressure is 144/80 (abnormal) and his pulse is 67. His respiration is 18 and oxygen saturation is 98%. Body mass index is 23.88 kg/m². I/O        Intake/Output Summary (Last 24 hours) at 11/11/2020 1259  Last data filed at 11/11/2020 1054  Gross per 24 hour   Intake 787 ml   Output 175 ml   Net 612 ml     I/O last 3 completed shifts:   In: 5 [P.O.:30; I.V.:557]  Out: 175 [Urine:175]   Patient Vitals for the past 96 hrs (Last 3 readings):   Weight APRN-CNP  Pulmonary Critical Care and Sleep Medicine,  Patient seen under the supervision of Rosemary Rodrigues MD, [facility]

## 2020-11-11 NOTE — PROCEDURES
Patient with respiratory distress secondary to tracheal and airway obstruction. Patient consented to procedure to remove the obstruction. Flexible scope passed through the laryngectomy stoma site into the trachea and to the bronchi. Examination demonstrated large obstructive across in the middle of the trachea.   Alligator forceps were used to remove the crust.  Patient immediately started breathing better

## 2020-11-12 ENCOUNTER — APPOINTMENT (OUTPATIENT)
Dept: GENERAL RADIOLOGY | Age: 70
DRG: 190 | End: 2020-11-12
Payer: MEDICARE

## 2020-11-12 ENCOUNTER — APPOINTMENT (OUTPATIENT)
Dept: CT IMAGING | Age: 70
DRG: 190 | End: 2020-11-12
Payer: MEDICARE

## 2020-11-12 ENCOUNTER — APPOINTMENT (OUTPATIENT)
Dept: INTERVENTIONAL RADIOLOGY/VASCULAR | Age: 70
DRG: 190 | End: 2020-11-12
Payer: MEDICARE

## 2020-11-12 LAB
ABSOLUTE EOS #: <0.03 K/UL (ref 0–0.44)
ABSOLUTE IMMATURE GRANULOCYTE: 0.04 K/UL (ref 0–0.3)
ABSOLUTE LYMPH #: 0.8 K/UL (ref 1.1–3.7)
ABSOLUTE MONO #: 0.2 K/UL (ref 0.1–1.2)
ANION GAP SERPL CALCULATED.3IONS-SCNC: 11 MMOL/L (ref 9–17)
BASOPHILS # BLD: 0 % (ref 0–2)
BASOPHILS ABSOLUTE: <0.03 K/UL (ref 0–0.2)
BUN BLDV-MCNC: 37 MG/DL (ref 8–23)
BUN/CREAT BLD: 12 (ref 9–20)
CALCIUM SERPL-MCNC: 9.3 MG/DL (ref 8.6–10.4)
CHLORIDE BLD-SCNC: 99 MMOL/L (ref 98–107)
CO2: 18 MMOL/L (ref 20–31)
CREAT SERPL-MCNC: 3.2 MG/DL (ref 0.7–1.2)
DIFFERENTIAL TYPE: ABNORMAL
EOSINOPHILS RELATIVE PERCENT: 0 % (ref 1–4)
GFR AFRICAN AMERICAN: 24 ML/MIN
GFR NON-AFRICAN AMERICAN: 19 ML/MIN
GFR SERPL CREATININE-BSD FRML MDRD: ABNORMAL ML/MIN/{1.73_M2}
GFR SERPL CREATININE-BSD FRML MDRD: ABNORMAL ML/MIN/{1.73_M2}
GLUCOSE BLD-MCNC: 117 MG/DL (ref 70–99)
HCT VFR BLD CALC: 28.1 % (ref 40.7–50.3)
HEMOGLOBIN: 9.2 G/DL (ref 13–17)
IMMATURE GRANULOCYTES: 1 %
INR BLD: 1.1
LYMPHOCYTES # BLD: 10 % (ref 24–43)
MCH RBC QN AUTO: 27.8 PG (ref 25.2–33.5)
MCHC RBC AUTO-ENTMCNC: 32.7 G/DL (ref 28.4–34.8)
MCV RBC AUTO: 84.9 FL (ref 82.6–102.9)
MONOCYTES # BLD: 3 % (ref 3–12)
NRBC AUTOMATED: 0 PER 100 WBC
PARTIAL THROMBOPLASTIN TIME: 27 SEC (ref 23.9–33.8)
PDW BLD-RTO: 14.6 % (ref 11.8–14.4)
PLATELET # BLD: 292 K/UL (ref 138–453)
PLATELET ESTIMATE: ABNORMAL
PMV BLD AUTO: 9.9 FL (ref 8.1–13.5)
POTASSIUM SERPL-SCNC: 4.6 MMOL/L (ref 3.7–5.3)
PROTHROMBIN TIME: 13.8 SEC (ref 11.5–14.2)
RBC # BLD: 3.31 M/UL (ref 4.21–5.77)
RBC # BLD: ABNORMAL 10*6/UL
SEG NEUTROPHILS: 86 % (ref 36–65)
SEGMENTED NEUTROPHILS ABSOLUTE COUNT: 6.61 K/UL (ref 1.5–8.1)
SODIUM BLD-SCNC: 128 MMOL/L (ref 135–144)
WBC # BLD: 7.7 K/UL (ref 3.5–11.3)
WBC # BLD: ABNORMAL 10*3/UL

## 2020-11-12 PROCEDURE — 6370000000 HC RX 637 (ALT 250 FOR IP): Performed by: INTERNAL MEDICINE

## 2020-11-12 PROCEDURE — 85730 THROMBOPLASTIN TIME PARTIAL: CPT

## 2020-11-12 PROCEDURE — 88305 TISSUE EXAM BY PATHOLOGIST: CPT

## 2020-11-12 PROCEDURE — 85610 PROTHROMBIN TIME: CPT

## 2020-11-12 PROCEDURE — 88112 CYTOPATH CELL ENHANCE TECH: CPT

## 2020-11-12 PROCEDURE — 82945 GLUCOSE OTHER FLUID: CPT

## 2020-11-12 PROCEDURE — 6360000002 HC RX W HCPCS: Performed by: NURSE PRACTITIONER

## 2020-11-12 PROCEDURE — 71045 X-RAY EXAM CHEST 1 VIEW: CPT

## 2020-11-12 PROCEDURE — 70450 CT HEAD/BRAIN W/O DYE: CPT

## 2020-11-12 PROCEDURE — 88185 FLOWCYTOMETRY/TC ADD-ON: CPT

## 2020-11-12 PROCEDURE — 87206 SMEAR FLUORESCENT/ACID STAI: CPT

## 2020-11-12 PROCEDURE — 0W993ZZ DRAINAGE OF RIGHT PLEURAL CAVITY, PERCUTANEOUS APPROACH: ICD-10-PCS | Performed by: RADIOLOGY

## 2020-11-12 PROCEDURE — 2700000000 HC OXYGEN THERAPY PER DAY

## 2020-11-12 PROCEDURE — 99214 OFFICE O/P EST MOD 30 MIN: CPT | Performed by: INTERNAL MEDICINE

## 2020-11-12 PROCEDURE — 94761 N-INVAS EAR/PLS OXIMETRY MLT: CPT

## 2020-11-12 PROCEDURE — 32555 ASPIRATE PLEURA W/ IMAGING: CPT | Performed by: RADIOLOGY

## 2020-11-12 PROCEDURE — G0378 HOSPITAL OBSERVATION PER HR: HCPCS

## 2020-11-12 PROCEDURE — 89051 BODY FLUID CELL COUNT: CPT

## 2020-11-12 PROCEDURE — 94640 AIRWAY INHALATION TREATMENT: CPT

## 2020-11-12 PROCEDURE — 88184 FLOWCYTOMETRY/ TC 1 MARKER: CPT

## 2020-11-12 PROCEDURE — 85025 COMPLETE CBC W/AUTO DIFF WBC: CPT

## 2020-11-12 PROCEDURE — 80048 BASIC METABOLIC PNL TOTAL CA: CPT

## 2020-11-12 PROCEDURE — C1729 CATH, DRAINAGE: HCPCS

## 2020-11-12 PROCEDURE — 2580000003 HC RX 258: Performed by: INTERNAL MEDICINE

## 2020-11-12 PROCEDURE — 36415 COLL VENOUS BLD VENIPUNCTURE: CPT

## 2020-11-12 PROCEDURE — 6360000002 HC RX W HCPCS: Performed by: INTERNAL MEDICINE

## 2020-11-12 PROCEDURE — 83615 LACTATE (LD) (LDH) ENZYME: CPT

## 2020-11-12 RX ORDER — ACETAMINOPHEN 325 MG/1
650 TABLET ORAL EVERY 4 HOURS PRN
Status: DISCONTINUED | OUTPATIENT
Start: 2020-11-12 | End: 2020-11-15 | Stop reason: HOSPADM

## 2020-11-12 RX ORDER — METHYLPREDNISOLONE SODIUM SUCCINATE 40 MG/ML
30 INJECTION, POWDER, LYOPHILIZED, FOR SOLUTION INTRAMUSCULAR; INTRAVENOUS EVERY 12 HOURS
Status: COMPLETED | OUTPATIENT
Start: 2020-11-12 | End: 2020-11-12

## 2020-11-12 RX ADMIN — SODIUM CHLORIDE: 9 INJECTION, SOLUTION INTRAVENOUS at 17:16

## 2020-11-12 RX ADMIN — AZITHROMYCIN MONOHYDRATE 500 MG: 500 INJECTION, POWDER, LYOPHILIZED, FOR SOLUTION INTRAVENOUS at 21:36

## 2020-11-12 RX ADMIN — IPRATROPIUM BROMIDE AND ALBUTEROL SULFATE 3 ML: .5; 3 SOLUTION RESPIRATORY (INHALATION) at 07:25

## 2020-11-12 RX ADMIN — METHYLPREDNISOLONE SODIUM SUCCINATE 30 MG: 40 INJECTION, POWDER, FOR SOLUTION INTRAMUSCULAR; INTRAVENOUS at 17:15

## 2020-11-12 RX ADMIN — ACETAMINOPHEN 650 MG: 325 TABLET ORAL at 03:33

## 2020-11-12 RX ADMIN — Medication 10 ML: at 20:32

## 2020-11-12 RX ADMIN — METOPROLOL SUCCINATE 50 MG: 50 TABLET, EXTENDED RELEASE ORAL at 07:54

## 2020-11-12 RX ADMIN — Medication 2000 UNITS: at 07:55

## 2020-11-12 RX ADMIN — HYDRALAZINE HYDROCHLORIDE 50 MG: 50 TABLET, FILM COATED ORAL at 05:33

## 2020-11-12 RX ADMIN — IPRATROPIUM BROMIDE AND ALBUTEROL SULFATE 3 ML: .5; 3 SOLUTION RESPIRATORY (INHALATION) at 11:07

## 2020-11-12 RX ADMIN — SODIUM BICARBONATE 650 MG: 650 TABLET ORAL at 14:18

## 2020-11-12 RX ADMIN — HYDRALAZINE HYDROCHLORIDE 50 MG: 50 TABLET, FILM COATED ORAL at 20:30

## 2020-11-12 RX ADMIN — IPRATROPIUM BROMIDE AND ALBUTEROL SULFATE 3 ML: .5; 3 SOLUTION RESPIRATORY (INHALATION) at 20:06

## 2020-11-12 RX ADMIN — IPRATROPIUM BROMIDE AND ALBUTEROL SULFATE 3 ML: .5; 3 SOLUTION RESPIRATORY (INHALATION) at 14:58

## 2020-11-12 RX ADMIN — SODIUM BICARBONATE 650 MG: 650 TABLET ORAL at 20:30

## 2020-11-12 RX ADMIN — HYDROCODONE BITARTRATE AND ACETAMINOPHEN 1 TABLET: 5; 325 TABLET ORAL at 07:54

## 2020-11-12 RX ADMIN — HYDROCODONE BITARTRATE AND ACETAMINOPHEN 1 TABLET: 5; 325 TABLET ORAL at 14:19

## 2020-11-12 RX ADMIN — HYDROCODONE BITARTRATE AND ACETAMINOPHEN 1 TABLET: 5; 325 TABLET ORAL at 23:24

## 2020-11-12 RX ADMIN — AMLODIPINE BESYLATE 10 MG: 10 TABLET ORAL at 07:55

## 2020-11-12 RX ADMIN — METHYLPREDNISOLONE SODIUM SUCCINATE 40 MG: 40 INJECTION, POWDER, FOR SOLUTION INTRAMUSCULAR; INTRAVENOUS at 05:33

## 2020-11-12 RX ADMIN — PANTOPRAZOLE SODIUM 40 MG: 40 TABLET, DELAYED RELEASE ORAL at 05:33

## 2020-11-12 RX ADMIN — BICALUTAMIDE 50 MG: 50 TABLET ORAL at 07:55

## 2020-11-12 RX ADMIN — SODIUM BICARBONATE 650 MG: 650 TABLET ORAL at 07:55

## 2020-11-12 RX ADMIN — HYDRALAZINE HYDROCHLORIDE 50 MG: 50 TABLET, FILM COATED ORAL at 14:19

## 2020-11-12 ASSESSMENT — PAIN DESCRIPTION - FREQUENCY
FREQUENCY: CONTINUOUS
FREQUENCY: CONTINUOUS
FREQUENCY: INTERMITTENT

## 2020-11-12 ASSESSMENT — PAIN DESCRIPTION - PROGRESSION
CLINICAL_PROGRESSION: GRADUALLY WORSENING
CLINICAL_PROGRESSION: GRADUALLY WORSENING
CLINICAL_PROGRESSION: GRADUALLY IMPROVING

## 2020-11-12 ASSESSMENT — PAIN DESCRIPTION - ONSET
ONSET: GRADUAL

## 2020-11-12 ASSESSMENT — PAIN DESCRIPTION - DESCRIPTORS
DESCRIPTORS: HEADACHE

## 2020-11-12 ASSESSMENT — PAIN SCALES - GENERAL
PAINLEVEL_OUTOF10: 7
PAINLEVEL_OUTOF10: 0
PAINLEVEL_OUTOF10: 4
PAINLEVEL_OUTOF10: 8
PAINLEVEL_OUTOF10: 10
PAINLEVEL_OUTOF10: 8

## 2020-11-12 ASSESSMENT — PAIN DESCRIPTION - LOCATION
LOCATION: HEAD
LOCATION: HEAD

## 2020-11-12 ASSESSMENT — PAIN - FUNCTIONAL ASSESSMENT
PAIN_FUNCTIONAL_ASSESSMENT: PREVENTS OR INTERFERES WITH MANY ACTIVE NOT PASSIVE ACTIVITIES
PAIN_FUNCTIONAL_ASSESSMENT: PREVENTS OR INTERFERES WITH MANY ACTIVE NOT PASSIVE ACTIVITIES
PAIN_FUNCTIONAL_ASSESSMENT: PREVENTS OR INTERFERES SOME ACTIVE ACTIVITIES AND ADLS

## 2020-11-12 ASSESSMENT — PAIN DESCRIPTION - PAIN TYPE
TYPE: ACUTE PAIN
TYPE: CHRONIC PAIN

## 2020-11-12 NOTE — FLOWSHEET NOTE
Pt tolerated thoracentesis procedure without distress. 800 ml of yellow-green pleural fluid removed from right side specimen collected for labs  Dressing applied to procedure site.  Pt returned to room in nad

## 2020-11-12 NOTE — CARE COORDINATION
Discharge Planning:    Spoke with Sudha Fofana from [address] [place] [address] at [phone] to make him aware patient needs a humidified trach collar. Need order and documentation to fax to [facility] at [phone]. Discussed with Nena Cohen RN.

## 2020-11-12 NOTE — PROGRESS NOTES
Today's Date: 11/12/2020  Patient Name: Derrick Jasso  Date of admission: 11/10/2020 11:42 AM  Patient's age: 78 y.o., 1950  Admission Dx: Mediastinal mass [J98.59]    Reason for Consult: mediastinal masss   Requesting Physician: Marquez Gasca MD    CHIEF COMPLAINT:    Chief Complaint   Patient presents with    Shortness of Breath    Cough    Hemoptysis       SUBJECTIVE:    Patient seen and examined   Feeling better   Had a thoracentesis  No fever chills     HISTORY OF PRESENT ILLNESS:    This is a 28-year-old male with heavy drinker, with prior history of glottic SCC who was admitted with shortness of breath, hemoptysis and cough. Brief oncologic history as follows as per recent F oncology note:    He has a history of glottic SCC s/p TL in [place] in 2005 (TL with primary closure, left neck dissection with sacrifice of IJ and SCM, no adjuvant XRT). In 2018 he presented with a large oral cavity cancer. On April 12, 2018 at the [facility] he underwent a composite resection with segmental mandibulectomy, left partial glossectomy and wide local excision of the left floor of mouth with a bilateral selective neck dissection, transosteal extraoral plating of the mandible and free flap reconstruction. Pathology revealed a 7.5 cm moderate to poorly differentiated squamous cell carcinoma extending into mandible with evident PNI but no LVSI. Depth of invasion was 5.1 cm. There was high-grade dysplasia in the retromolar margin. 2 lymph nodes were identified but uninvolved. Pathologic staging at this time was T4a N0 M0. Postoperative radiation therapy was recommended but declined by the patient and his sister. In September 2020 he returned noting increasing pain and swelling, and decreased swallowing. A new left oropharyngeal lesion was identified and biopsied revealing a p16 negative moderate to poorly differentiated squamous cell carcinoma.  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 Subsequent PET scan demonstrated PET avidity in the left floor of mouth/oropharynx as well as an a large right paratracheal mass. There were small to moderate bilateral pleural effusions appreciated. CT scan at this time demonstrated, as well, the left oropharyngeal soft tissue mass and the right superior mediastinal necrotic timi mass    Recently there was a plan for him to see medical oncology and radiation oncology here locally in [facility].     Admission he had a CT scan of the chest which showed large medial right upper lobe/superior mediastinal mass with mass-effect on the trachea and esophagus concerning for neoplastic process. Additionally mildly enlarged right paratracheal lymph node noted. Past Medical History:   has a past medical history of Arthritis, Asthma, CKD (chronic kidney disease), stage III, COPD (chronic obstructive pulmonary disease) ([address] GERD (gastroesophageal reflux disease), Gout, Hypertension, Hyponatremia, Iliac artery aneurysm, right ([facility] [place] [address] Laryngeal cancer ([facility] [address] Lung cancer ([facility] [address] and Prostate cancer ([facility] [address] Past Surgical History:   has a past surgical history that includes Tracheal surgery (3-4 years ago); Prostate surgery; Cardiac catheterization (03/04/2020); and AAA repair, endovascular (Right, 4/7/2020). Medications:    Prior to Admission medications    Medication Sig Start Date End Date Taking?  Authorizing Provider   mirtazapine (REMERON) 15 MG tablet Take 7.5 mg by mouth nightly Takes 1/2 tab (=7.5mg) nightly    Historical Provider, MD   sodium bicarbonate 650 MG tablet Take 650 mg by mouth 3 times daily    Historical Provider, MD   hydrALAZINE (APRESOLINE) 50 MG tablet Take 1 tablet by mouth every 8 hours 6/6/20   Catia Finley MD   metoprolol succinate (TOPROL XL) 50 MG extended release tablet Take 1 tablet by mouth daily 6/7/20   Catia Finley MD   bicalutamide (CASODEX) 50 MG chemo tablet Take 1 tablet by mouth daily 6/7/20   Shiela Murillo MD   amLODIPine (NORVASC) 10 MG tablet Take 1 tablet by mouth daily 4/18/20   Shiela Murillo MD   Cholecalciferol (VITAMIN D3) 50 MCG (2000 UT) CAPS Take 1 capsule by mouth daily    Historical Provider, MD   albuterol sulfate  (90 Base) MCG/ACT inhaler Inhale 2 puffs into the lungs every 6 hours as needed for Wheezing or Shortness of Breath    Historical Provider, MD   budesonide-formoterol (SYMBICORT) 160-4.5 MCG/ACT AERO Inhale 2 puffs into the lungs 2 times daily    Historical Provider, MD   omeprazole (PRILOSEC) 20 MG delayed release capsule Take 20 mg by mouth every morning (before breakfast)    Historical Provider, MD   allopurinol (ZYLOPRIM) 300 MG tablet Take 300 mg by mouth daily.     Historical Provider, MD     Current Facility-Administered Medications   Medication Dose Route Frequency Provider Last Rate Last Dose    methylPREDNISolone sodium (SOLU-MEDROL) injection 30 mg  30 mg Intravenous Q12H GINETTE Liu - CNP   30 mg at 11/12/20 1715    acetaminophen (TYLENOL) tablet 650 mg  650 mg Oral Q4H PRN Silvana Gaines MD        HYDROcodone-acetaminophen [facility]) 5-325 MG per tablet 1 tablet  1 tablet Oral Q6H PRN Shiela Murillo MD   1 tablet at 11/12/20 1419    albuterol sulfate  (90 Base) MCG/ACT inhaler 2 puff  2 puff Inhalation Q6H PRN Shiela Murillo MD        amLODIPine (NORVASC) tablet 10 mg  10 mg Oral Daily Shiela Murillo MD   10 mg at 11/12/20 0755    bicalutamide (CASODEX) chemo tablet 50 mg  50 mg Oral Daily Shiela Murillo MD   50 mg at 11/12/20 0755    budesonide-formoterol (SYMBICORT) 160-4.5 MCG/ACT inhaler 2 puff  2 puff Inhalation BID Shiela Murillo MD        Vitamin D (CHOLECALCIFEROL) tablet 2,000 Units  2,000 Units Oral Daily Shiela Murillo MD   2,000 Units at 11/12/20 0755    hydrALAZINE (APRESOLINE) tablet 50 mg  50 mg Oral 3 times per day Shiela Murillo MD   50 mg at 11/12/20 5073    ipratropium-albuterol (DUONEB) nebulizer solution 3 mL  3 mL Inhalation 4x Daily Tom Salazar MD   3 mL at 11/12/20 1458    metoprolol succinate (TOPROL XL) extended release tablet 50 mg  50 mg Oral Daily Tom Salazar MD   50 mg at 11/12/20 0754    pantoprazole (PROTONIX) tablet 40 mg  40 mg Oral QAM AC Tom Salazar MD   40 mg at 11/12/20 0533    sodium bicarbonate tablet 650 mg  650 mg Oral TID Tom Salazar MD   650 mg at 11/12/20 1418    sodium chloride flush 0.9 % injection 10 mL  10 mL Intravenous 2 times per day Tom Salazar MD   10 mL at 11/11/20 2146    sodium chloride flush 0.9 % injection 10 mL  10 mL Intravenous PRN Tom Salazar MD        acetaminophen (TYLENOL) suppository 650 mg  650 mg Rectal Q6H PRN Tom Salazar MD        polyethylene glycol (GLYCOLAX) packet 17 g  17 g Oral Daily PRN Tom Salazar MD        promethazine (PHENERGAN) tablet 12.5 mg  12.5 mg Oral Q6H PRN Tom Salazar MD        Or    ondansetron (ZOFRAN) injection 4 mg  4 mg Intravenous Q6H PRN Tom Salazar MD        heparin (porcine) injection 5,000 Units  5,000 Units Subcutaneous 3 times per day Tom Salazar MD   5,000 Units at 11/11/20 1423    0.9 % sodium chloride infusion   Intravenous Continuous Tom Salazar MD 50 mL/hr at 11/12/20 1716      azithromycin (ZITHROMAX) 500 mg in D5W 250ml addavial  500 mg Intravenous Q24H Tom Salazar MD   Stopped at 11/11/20 2245       Allergies:  Amino acids and Lisinopril    Social History:   reports that he has quit smoking. His smoking use included cigarettes. He has never used smokeless tobacco. He reports previous alcohol use of about 5.0 standard drinks of alcohol per week. He reports that he does not use drugs. Family History: family history includes Diabetes in his mother and sister; Heart Disease in his mother and sister. REVIEW OF SYSTEMS:    Constitutional: No fever or chills.  No night sweats, no weight loss   Eyes: No eye discharge, double vision, or eye pain   HEENT: negative for sore mouth, sore throat, hoarseness and voice change   Respiratory: ++ cough ,+ sputum, ++dyspnea, wheezing, hemoptysis, chest pain   Cardiovascular:++ chest pain, dyspnea, palpitations, orthopnea, PND   Gastrointestinal: negative for nausea, vomiting, diarrhea, constipation, abdominal pain, Dysphagia, hematemesis and hematochezia   Genitourinary: negative for frequency, dysuria, nocturia, urinary incontinence, and hematuria   Integument: negative for rash, skin lesions, bruises.    Hematologic/Lymphatic: negative for easy bruising, bleeding, lymphadenopathy, or petechiae   Endocrine: negative for heat or cold intolerance,weight changes, change in bowel habits and hair loss   Musculoskeletal: negative for myalgias, arthralgias, pain, joint swelling,and bone pain   Neurological: negative for headaches, dizziness, seizures, weakness, numbness    PHYSICAL EXAM:      BP (!) 141/76   Pulse 66   Temp 97.2 °F (36.2 °C) (Oral)   Resp 18   Ht 5' 1\" (1.549 m)   Wt 129 lb (58.5 kg)   SpO2 100%   BMI 24.37 kg/m²    Temp (24hrs), Av.4 °F (36.3 °C), Min:97.2 °F (36.2 °C), Max:97.7 °F (36.5 °C)    General appearance - well appearing, no in pain or distress   Mental status - alert and cooperative   Eyes - pupils equal and reactive, extraocular eye movements intact   Ears - bilateral TM's and external ear canals normal   Mouth - mucous membranes moist, pharynx normal without lesions   Neck - s/p trach   Lymphatics - no palpable lymphadenopathy, no hepatosplenomegaly   Chest - clear to auscultation, no wheezes, rales or rhonchi, symmetric air entry   Heart - normal rate, regular rhythm, normal S1, S2, no murmurs  Abdomen - soft, nontender, nondistended, no masses or organomegaly   Neurological - alert, oriented, normal speech, no focal findings or movement disorder noted   Musculoskeletal - no joint tenderness, deformity or swelling   Extremities - peripheral ([facility] [E43] 11/11/2020    Mediastinal mass [J98.59] 11/10/2020         IMPRESSION:   1. 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 Oral cavity SCC, T4aN0 involving the anterior and left alveolus with erosion of the mandible as well as extension into the left floor of mouth and dorsal lateral tongue and is s/p left composite resection with segmental mandibulectomy (right mid body to the left angle), left floor of mouth, left partial glossectomy, bilateral neck dissection, left tonsillectomy, and scapular reconstruction, now with recurrent invasive keratinizing squamous cell carcinoma, moderately to poorly differentiated, p16 negative of left oropharynx. 3. Facial swelling  4. Shortness of breath  5. Alcohol abuse       RECOMMENDATIONS:  1. I reviewed the laboratory, imaging studies, diagnosis, prognosis and treatment options  2. Patient will be evaluated by ENT   3. 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 Patient had thoracentesis, await cytology results  5. Patient will need systemic chemotherapy as outpatient  6. He will need therapy radiation as outpatient as well  7. Okay to discharge once stable  8. Follow-up with medical oncology radiation oncology as outpatient      Discussed with pt and Nurse. Thank you for asking us to see this patient. Stan Kurtz MD  Hematologist/Medical Oncologist    Cell: [phone]      This note is created with the assistance of a speech recognition program.  While intending to generate a document that actually reflects the content of the visit, the document can still have some errors including those of syntax and sound a like substitutions which may escape proof reading. It such instances, actual meaning can be extrapolated by contextual diversion.

## 2020-11-12 NOTE — PROGRESS NOTES
Pt returned from IR for thorocentesis. Oxygen in place over trach. Pt in no apparent distress.  Site clean, dry, and intact

## 2020-11-12 NOTE — PLAN OF CARE
Problem: Falls - Risk of:  Goal: Will remain free from falls  Description: Will remain free from falls. Fall risk assessment completed. Patient instructed to use call light. Bed locked and in lowest position, side rails up 2/4, call light and bedside table within reach, clutter removed, and non-skid footwear on when pt out of bed. Hourly rounds will continue. Bed alarm on at this time  11/12/2020 1058 by Natalie Meek RN  Outcome: Ongoing     Problem: Pain:  Goal: Pain level will decrease  Description: Pain level will decrease. Pain assessment completed. Patient demonstrates understanding of pain rating scale and interventions. At this time patient states pain is controlled    Will continue to monitor and reassess with each rounding and PRN.     11/12/2020 1058 by Natalie Meek RN  Outcome: Ongoing     Problem: Breathing Pattern - Ineffective:  Goal: Ability to achieve and maintain a regular respiratory rate will improve  Description: Ability to achieve and maintain a regular respiratory rate will improve  11/12/2020 1058 by Natalie Meek RN  Outcome: Ongoing

## 2020-11-12 NOTE — PROGRESS NOTES
Pulmonary Critical Care Progress Note  KENDAL Capone/Gus Trevizo MD     Patient seen for the follow up of mediastinal mass, right pleural effusion, COPD, pulmonary hypertension     Subjective:  No significant overnight events noted. He is sitting up in bed, on humidified trach collar. He is feeling much better since ENT did scope and removed a significant amount of crusting and scabbing yesterday. He denies chest pain or shortness of breath. He has occasional cough. He reports a slight headache this morning. Examination:  Vitals: BP (!) 154/91   Pulse 74   Temp 97.7 °F (36.5 °C) (Oral)   Resp 18   Ht 5' 1\" (1.549 m)   Wt 129 lb (58.5 kg)   SpO2 100%   BMI 24.37 kg/m²   General appearance: alert and cooperative with exam, sitting up in bed   Neck: No JVD  Lungs: moderate air exchange, diminished right base   Heart: regular rate and rhythm, S1, S2 normal, no gallop  Abdomen: Soft, non tender, + BS  Extremities: no cyanosis or clubbing.  No significant edema    LABs:  CBC:   Recent Labs     11/11/20 0451 11/12/20 0614   WBC 7.1 7.7   HGB 9.2* 9.2*   HCT 27.8* 28.1*    292     BMP:   Recent Labs     11/11/20 0451 11/12/20 0614   * 128*   K 4.4 4.6   CO2 18* 18*   BUN 32* 37*   CREATININE 3.21* 3.20*   LABGLOM 19* 19*   GLUCOSE 130* 117*     PT/INR:   Recent Labs     11/12/20 0614   PROTIME 13.8   INR 1.1     APTT:  Recent Labs     11/12/20 0614   APTT 27.0     LIVER PROFILE:  Recent Labs     11/11/20 0451   AST 15   ALT 9   LABALBU 3.3*     ABG:  Lab Results   Component Value Date    TNL1DZO 15 10/27/2019    FIO2 NOT REPORTED 03/05/2020       Lab Results   Component Value Date    POCPH 7.38 10/27/2019    POCPCO2 24 10/27/2019    POCPO2 99 10/27/2019    POCHCO3 14.2 10/27/2019    NBEA 11 10/27/2019    PBEA NOT REPORTED 10/27/2019    ANK8MEG 15 10/27/2019    RDFM8YPH 98 10/27/2019    FIO2 NOT REPORTED 03/05/2020     Radiology:  11/12/20      Impression:  · Right upper lobe/superior mediastinal mass, 7.4 cm X 5 .9 cm  § Recurrent invasive keratinizing SCC  · Lymphadenopathy  · Moderate right pleural effusion, recurrent  § S/p right thoracentesis on 6/1/2020 and 6/13/2020  · COPD  · Moderate to severe pulmonary hypertension, RVSP 65 mmHg  · History of laryngeal cancer s/p tracheostomy   ? subsequent development of oral cancer s/p surgery with reconstruction  · LITTLE on CKD/hyponatremia  · Right renal cyst 1.5 cm  · NSTEMI/combined diastolic and systolic heart failure    Recommendations:  · Continue IV antibiotics, Zithromax  · IV solu-medrol 40 mg every 8 hours, decrease dose/frequency  · Albuterol and Ipratropium 4 times a day and as needed  · Symbicort  · Oxygen via trach collar if necessary  · IV fluids per primary, monitor renal function/sodium  · IR consulted for right thoracentesis   · ENT following, input noted   · Oncology following, input noted   · X-ray chest in am  · Labs: CBC and BMP in am  · DVT prophylaxis with subcu heparin  · Above assessment and plan will be reviewed with Dr. Sutton Mealing will be finalized following review by Dr. Marvel Dancer  · Will follow with you      GINETTE Villalpando-CNP   Pulmonary Critical Care and Sleep Medicine,  Patient seen under the supervision of Chucho Elise MD, [facility]

## 2020-11-12 NOTE — BRIEF OP NOTE
Brief Postoperative Note for Thoracentesis    Dillan Valladares  YOB: 1950  [id number]    Pre-operative Diagnosis: Enlarging  Right Pleural effusion      Post-operative Diagnosis: Same    Procedure: Ultrasound guided Thoracentesis    Anesthesia: 1% Lidocaine     Surgeons/Assistants: BRANDAN HUNTLEY    Complications: None    Specimens: were obtained    Ultrasound guided rightthoracentesis performed. 800 ml clear yellow green fluid obtained. Dressing applied. Chest x-ray ordered.         Electronically signed by Tamiko Baxter on 11/12/2020 at 3:31 PM

## 2020-11-12 NOTE — PLAN OF CARE
Problem: Breathing Pattern - Ineffective:  Goal: Ability to achieve and maintain a regular respiratory rate will improve  Description: Ability to achieve and maintain a regular respiratory rate will improve  11/12/2020 0420 by Malathi Luong RN  Outcome: Ongoing    Problem: Pain:  Goal: Pain level will decrease  Description: Pain level will decrease  Outcome: Ongoing     Problem: Falls - Risk of:  Goal: Will remain free from falls  Description: Will remain free from falls  11/12/2020 0420 by Malathi Luong RN  Outcome: Ongoing  11/11/2020 1451 by Alexandra Abdullahi RN  Outcome: Ongoing       Problem: Falls - Risk of:  Goal: Will remain free from falls  Description: Will remain free from falls  11/12/2020 0420 by Malathi Luong RN  Outcome: Ongoing

## 2020-11-12 NOTE — PROGRESS NOTES
Subjective:    Of COPD exacerbation  Less shortness of breath no cough no tachypnea  ROS  Fever no chills no GI/ complaints no cardiac complaints, no TIA no bleeding no headache no sore throat no skin lesions, no polyuria no.   Complaining of headaches all over rated about 5/10 lasting for hours at a time better with [place]  physical exam  General Appearance: in no acute distress and alert  Skin: warm and dry, no rash or erythema  Head: normocephalic and atraumatic  Eyes: pupils equal, round, and reactive to light, sclera anicteric and positive pallor  Neck: neck supple and non tender without mass and colostomy  Pulmonary/Chest: Air entry equal minimal rhonchi no wheezing no crackles no use of intercostal muscles  Cardiovascular: normal rate, regular rhythm, normal S1 and S2, no gallops, intact distal pulses and no carotid bruits  Abdomen: soft, non-tender, non-distended, normal bowel sounds, no masses or organomegaly  Extremities: no edema and pulses no Homans' sign  Neurologic: Alert oriented x3 with no focal deficits    BP (!) 141/76   Pulse 66   Temp 97.2 °F (36.2 °C) (Oral)   Resp 18   Ht 5' 1\" (1.549 m)   Wt 129 lb (58.5 kg)   SpO2 100%   BMI 24.37 kg/m²     CBC:   Lab Results   Component Value Date    WBC 7.7 11/12/2020    RBC 3.31 11/12/2020    HGB 9.2 11/12/2020    HCT 28.1 11/12/2020    MCV 84.9 11/12/2020    MCH 27.8 11/12/2020    MCHC 32.7 11/12/2020    RDW 14.6 11/12/2020     11/12/2020    MPV 9.9 11/12/2020     BMP:    Lab Results   Component Value Date     11/12/2020    K 4.6 11/12/2020    CL 99 11/12/2020    CO2 18 11/12/2020    BUN 37 11/12/2020    LABALBU 3.3 11/11/2020    CREATININE 3.20 11/12/2020    CALCIUM 9.3 11/12/2020    GFRAA 24 11/12/2020    LABGLOM 19 11/12/2020    GLUCOSE 117 11/12/2020        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 malnutrition (HCC)    Facial edema    Common iliac aneurysm (HCC)    Essential hypertension    Stage 3 chronic kidney disease    Acute kidney injury (nontraumatic) (HCC)    NSTEMI (non-ST elevated myocardial infarction) ([facility]    Pathological fracture of lumbar vertebra due to secondary osteoporosis (HCC)    Age-related osteoporosis with current pathological fracture    Intractable back pain    Iron deficiency anemia    Anemia, blood loss    Chronic bilateral pleural effusions    Diastolic CHF, acute on chronic (HCC)    Hyponatremia    Mediastinal mass     COPD exacerbation  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

## 2020-11-13 ENCOUNTER — APPOINTMENT (OUTPATIENT)
Dept: GENERAL RADIOLOGY | Age: 70
DRG: 190 | End: 2020-11-13
Payer: MEDICARE

## 2020-11-13 ENCOUNTER — TELEPHONE (OUTPATIENT)
Dept: INFUSION THERAPY | Facility: MEDICAL CENTER | Age: 70
End: 2020-11-13

## 2020-11-13 LAB
ABSOLUTE EOS #: 0 K/UL (ref 0–0.4)
ABSOLUTE IMMATURE GRANULOCYTE: 0 K/UL (ref 0–0.3)
ABSOLUTE LYMPH #: 0.94 K/UL (ref 1–4.8)
ABSOLUTE MONO #: 0.09 K/UL (ref 0.2–0.8)
ANION GAP SERPL CALCULATED.3IONS-SCNC: 10 MMOL/L (ref 9–17)
APPEARANCE FLUID: NORMAL
BASO FLUID: NORMAL %
BASOPHILS # BLD: 0 %
BASOPHILS ABSOLUTE: 0 K/UL (ref 0–0.2)
BUN BLDV-MCNC: 42 MG/DL (ref 8–23)
BUN/CREAT BLD: 14 (ref 9–20)
CALCIUM SERPL-MCNC: 8.9 MG/DL (ref 8.6–10.4)
CASE NUMBER:: NORMAL
CHLORIDE BLD-SCNC: 99 MMOL/L (ref 98–107)
CO2: 20 MMOL/L (ref 20–31)
COLOR FLUID: NORMAL
CREAT SERPL-MCNC: 2.97 MG/DL (ref 0.7–1.2)
DIFFERENTIAL TYPE: ABNORMAL
DIRECT EXAM: NORMAL
EOSINOPHIL FLUID: NORMAL %
EOSINOPHILS RELATIVE PERCENT: 0 % (ref 1–4)
FLUID DIFF COMMENT: NORMAL
GFR AFRICAN AMERICAN: 26 ML/MIN
GFR NON-AFRICAN AMERICAN: 21 ML/MIN
GFR SERPL CREATININE-BSD FRML MDRD: ABNORMAL ML/MIN/{1.73_M2}
GFR SERPL CREATININE-BSD FRML MDRD: ABNORMAL ML/MIN/{1.73_M2}
GLUCOSE BLD-MCNC: 104 MG/DL (ref 70–99)
GLUCOSE, FLUID: 144 MG/DL
HCT VFR BLD CALC: 26.9 % (ref 40.7–50.3)
HEMOGLOBIN: 9.1 G/DL (ref 13–17)
IMMATURE GRANULOCYTES: 0 %
LACTATE DEHYDROGENASE, FLUID: 93 U/L
LYMPHOCYTES # BLD: 10 % (ref 24–44)
LYMPHOCYTES, BODY FLUID: 83 %
Lab: NORMAL
MCH RBC QN AUTO: 28.5 PG (ref 25.2–33.5)
MCHC RBC AUTO-ENTMCNC: 33.8 G/DL (ref 28.4–34.8)
MCV RBC AUTO: 84.3 FL (ref 82.6–102.9)
MONOCYTE, FLUID: NORMAL %
MONOCYTES # BLD: 1 % (ref 1–7)
NEUTROPHIL, FLUID: 3 %
NRBC AUTOMATED: 0 PER 100 WBC
OTHER CELLS FLUID: NORMAL %
PDW BLD-RTO: 14.6 % (ref 11.8–14.4)
PLATELET # BLD: 303 K/UL (ref 138–453)
PLATELET ESTIMATE: ABNORMAL
PMV BLD AUTO: 10.2 FL (ref 8.1–13.5)
POTASSIUM SERPL-SCNC: 4.9 MMOL/L (ref 3.7–5.3)
RBC # BLD: 3.19 M/UL (ref 4.21–5.77)
RBC # BLD: ABNORMAL 10*6/UL
RBC FLUID: <3000 /MM3
SEG NEUTROPHILS: 89 % (ref 36–66)
SEGMENTED NEUTROPHILS ABSOLUTE COUNT: 8.37 K/UL (ref 1.8–7.7)
SODIUM BLD-SCNC: 129 MMOL/L (ref 135–144)
SPECIMEN DESCRIPTION: NORMAL
SPECIMEN DESCRIPTION: NORMAL
SPECIMEN TYPE: NORMAL
WBC # BLD: 9.4 K/UL (ref 3.5–11.3)
WBC # BLD: ABNORMAL 10*3/UL
WBC FLUID: 2089 /MM3

## 2020-11-13 PROCEDURE — 2060000000 HC ICU INTERMEDIATE R&B

## 2020-11-13 PROCEDURE — 6370000000 HC RX 637 (ALT 250 FOR IP): Performed by: INTERNAL MEDICINE

## 2020-11-13 PROCEDURE — 6370000000 HC RX 637 (ALT 250 FOR IP): Performed by: RADIOLOGY

## 2020-11-13 PROCEDURE — 80048 BASIC METABOLIC PNL TOTAL CA: CPT

## 2020-11-13 PROCEDURE — 97530 THERAPEUTIC ACTIVITIES: CPT

## 2020-11-13 PROCEDURE — 6360000002 HC RX W HCPCS: Performed by: INTERNAL MEDICINE

## 2020-11-13 PROCEDURE — 36415 COLL VENOUS BLD VENIPUNCTURE: CPT

## 2020-11-13 PROCEDURE — 94640 AIRWAY INHALATION TREATMENT: CPT

## 2020-11-13 PROCEDURE — 97166 OT EVAL MOD COMPLEX 45 MIN: CPT

## 2020-11-13 PROCEDURE — 85025 COMPLETE CBC W/AUTO DIFF WBC: CPT

## 2020-11-13 PROCEDURE — 97535 SELF CARE MNGMENT TRAINING: CPT

## 2020-11-13 PROCEDURE — 94761 N-INVAS EAR/PLS OXIMETRY MLT: CPT

## 2020-11-13 PROCEDURE — 2580000003 HC RX 258: Performed by: INTERNAL MEDICINE

## 2020-11-13 PROCEDURE — 2700000000 HC OXYGEN THERAPY PER DAY

## 2020-11-13 PROCEDURE — 71045 X-RAY EXAM CHEST 1 VIEW: CPT

## 2020-11-13 RX ADMIN — SODIUM BICARBONATE 650 MG: 650 TABLET ORAL at 20:05

## 2020-11-13 RX ADMIN — PANTOPRAZOLE SODIUM 40 MG: 40 TABLET, DELAYED RELEASE ORAL at 05:01

## 2020-11-13 RX ADMIN — HEPARIN SODIUM 5000 UNITS: 5000 INJECTION INTRAVENOUS; SUBCUTANEOUS at 05:02

## 2020-11-13 RX ADMIN — ACETAMINOPHEN 650 MG: 325 TABLET ORAL at 10:46

## 2020-11-13 RX ADMIN — SODIUM BICARBONATE 650 MG: 650 TABLET ORAL at 08:23

## 2020-11-13 RX ADMIN — HEPARIN SODIUM 5000 UNITS: 5000 INJECTION INTRAVENOUS; SUBCUTANEOUS at 20:05

## 2020-11-13 RX ADMIN — IPRATROPIUM BROMIDE AND ALBUTEROL SULFATE 3 ML: .5; 3 SOLUTION RESPIRATORY (INHALATION) at 11:05

## 2020-11-13 RX ADMIN — POLYETHYLENE GLYCOL 3350 17 G: 17 POWDER, FOR SOLUTION ORAL at 13:56

## 2020-11-13 RX ADMIN — HYDRALAZINE HYDROCHLORIDE 50 MG: 50 TABLET, FILM COATED ORAL at 05:01

## 2020-11-13 RX ADMIN — HYDROCODONE BITARTRATE AND ACETAMINOPHEN 1 TABLET: 5; 325 TABLET ORAL at 13:24

## 2020-11-13 RX ADMIN — SODIUM CHLORIDE: 9 INJECTION, SOLUTION INTRAVENOUS at 13:24

## 2020-11-13 RX ADMIN — HYDROCODONE BITARTRATE AND ACETAMINOPHEN 1 TABLET: 5; 325 TABLET ORAL at 21:59

## 2020-11-13 RX ADMIN — IPRATROPIUM BROMIDE AND ALBUTEROL SULFATE 3 ML: .5; 3 SOLUTION RESPIRATORY (INHALATION) at 20:15

## 2020-11-13 RX ADMIN — AZITHROMYCIN MONOHYDRATE 500 MG: 500 INJECTION, POWDER, LYOPHILIZED, FOR SOLUTION INTRAVENOUS at 20:05

## 2020-11-13 RX ADMIN — HYDRALAZINE HYDROCHLORIDE 50 MG: 50 TABLET, FILM COATED ORAL at 20:05

## 2020-11-13 RX ADMIN — PREDNISONE 30 MG: 20 TABLET ORAL at 08:23

## 2020-11-13 RX ADMIN — ACETAMINOPHEN 650 MG: 325 TABLET ORAL at 06:25

## 2020-11-13 RX ADMIN — BICALUTAMIDE 50 MG: 50 TABLET ORAL at 08:26

## 2020-11-13 RX ADMIN — ACETAMINOPHEN 650 MG: 325 TABLET ORAL at 17:06

## 2020-11-13 RX ADMIN — AMLODIPINE BESYLATE 10 MG: 10 TABLET ORAL at 08:23

## 2020-11-13 RX ADMIN — ONDANSETRON 4 MG: 2 INJECTION INTRAMUSCULAR; INTRAVENOUS at 19:12

## 2020-11-13 RX ADMIN — METOPROLOL SUCCINATE 50 MG: 50 TABLET, EXTENDED RELEASE ORAL at 08:23

## 2020-11-13 RX ADMIN — IPRATROPIUM BROMIDE AND ALBUTEROL SULFATE 3 ML: .5; 3 SOLUTION RESPIRATORY (INHALATION) at 14:47

## 2020-11-13 RX ADMIN — IPRATROPIUM BROMIDE AND ALBUTEROL SULFATE 3 ML: .5; 3 SOLUTION RESPIRATORY (INHALATION) at 07:15

## 2020-11-13 RX ADMIN — Medication 2000 UNITS: at 08:23

## 2020-11-13 RX ADMIN — SODIUM BICARBONATE 650 MG: 650 TABLET ORAL at 13:56

## 2020-11-13 RX ADMIN — HEPARIN SODIUM 5000 UNITS: 5000 INJECTION INTRAVENOUS; SUBCUTANEOUS at 13:56

## 2020-11-13 RX ADMIN — HYDRALAZINE HYDROCHLORIDE 50 MG: 50 TABLET, FILM COATED ORAL at 13:56

## 2020-11-13 ASSESSMENT — PAIN SCALES - GENERAL
PAINLEVEL_OUTOF10: 8
PAINLEVEL_OUTOF10: 7
PAINLEVEL_OUTOF10: 8
PAINLEVEL_OUTOF10: 7
PAINLEVEL_OUTOF10: 0
PAINLEVEL_OUTOF10: 6
PAINLEVEL_OUTOF10: 0
PAINLEVEL_OUTOF10: 0
PAINLEVEL_OUTOF10: 10

## 2020-11-13 ASSESSMENT — PAIN - FUNCTIONAL ASSESSMENT: PAIN_FUNCTIONAL_ASSESSMENT: PREVENTS OR INTERFERES SOME ACTIVE ACTIVITIES AND ADLS

## 2020-11-13 ASSESSMENT — PAIN DESCRIPTION - DESCRIPTORS
DESCRIPTORS: HEADACHE
DESCRIPTORS: HEADACHE

## 2020-11-13 ASSESSMENT — PAIN DESCRIPTION - LOCATION
LOCATION: HEAD
LOCATION: HEAD

## 2020-11-13 ASSESSMENT — PAIN DESCRIPTION - ONSET: ONSET: GRADUAL

## 2020-11-13 ASSESSMENT — PAIN DESCRIPTION - FREQUENCY
FREQUENCY: CONTINUOUS
FREQUENCY: CONTINUOUS

## 2020-11-13 ASSESSMENT — PAIN DESCRIPTION - PAIN TYPE: TYPE: CHRONIC PAIN

## 2020-11-13 ASSESSMENT — PAIN DESCRIPTION - PROGRESSION: CLINICAL_PROGRESSION: GRADUALLY WORSENING

## 2020-11-13 NOTE — TELEPHONE ENCOUNTER
PER DR FOFANA NOTE IN Epic, I TRIED TO CALL JESSICA TO SCHEDULE A F/U WITH DR Priscila Vegas IN 1 WEEK AND I SPOKE TO [address] [place] [address] THAT HE IS CURRENTLY INHOUSE AT [address] [place] [address]. HOPEFULLY THE FLOOR WILL CALL TO SCHEDULE PRIOR TO DISCHARGE.

## 2020-11-13 NOTE — PROGRESS NOTES
Nutrition Assessment     Type and Reason for Visit: Reassess    Nutrition Recommendations/Plan:   1. Continue CARB CONTROL; Dental Soft diet and Ensure Enlive 3x/day  2. Monitor p.o intakes and labs    Nutrition Assessment:  Patient is eating fairly well at meals and likes Ensure Enlive with meals. Dental soft diet appears to be tolerated. Patient is status post thoracentesis with 800 mL of fluid removed (11/12). Continue current diet. Monitor p.o intakes and labs    Malnutrition Assessment:  Malnutrition Status: Severe malnutrition    Estimated Daily Nutrient Needs:  Energy (kcal): 2174-5268 kcal based on 28-30 kcal/kg; Weight Used for Energy Requirements:  Admission     Protein (g): 69-74 gm based on 1.2-1.3 gm/kg; Weight Used for Protein Requirements:  Admission          Nutrition Related Findings: No edema. Edentulous. Medical Hx: laryngeal cancer status post tracheostomy; oral cancer status post reconstruction. Thoracentesis: 800 mL of fluid removed (11/12)      Current Nutrition Therapies:    Dietary Nutrition Supplements: Standard High Calorie Oral Supplement  DIET CARB CONTROL;  Dental Soft    Anthropometric Measures:  · Height: 5' 1\" (154.9 cm)  · Current Body Wt: 130 lb (59 kg)   · BMI: 24.6    Nutrition Diagnosis:   · Severe malnutrition related to inadequate protein-energy intake(history of oral cancer) as evidenced by poor dentition, weight loss greater than or equal to 10% in 6 months      Nutrition Interventions:   Food and/or Nutrient Delivery:  Continue Current Diet, Continue Oral Nutrition Supplement  Nutrition Education/Counseling:  Education not indicated   Coordination of Nutrition Care:  Continue to monitor while inpatient    Goals:  PO intakes are greater than 75% at meals       Nutrition Monitoring and Evaluation:   Food/Nutrient Intake Outcomes:  Food and Nutrient Intake, Supplement Intake  Physical Signs/Symptoms Outcomes:  Biochemical Data, Fluid Status or Edema, Skin, Weight Discharge Planning:    Continue current diet, Continue Oral Nutrition Supplement           Caryle Lipps MFN, RDN, LDN  Lead Clinical Dietitian  RD Office Phone [phone]

## 2020-11-13 NOTE — PROGRESS NOTES
Roxanna Medeiros, NP informed at nurse's station of abnormal differential results of pleural fluid, segs 3.  Lymphocytes 83

## 2020-11-13 NOTE — PROGRESS NOTES
Subjective:    COPD exacerbation  Less shortness of breath no cough no tachypnea no hypoxemia  ROS  Fever no chills no GI/ complaints no cardiac complaints at present no TIA no bleeding no headache no sore throat no skin lesions, complaining of headache diffuse his CT of the brain was negative having Tylenol and Norco not helping the pain  physical exam  General Appearance: in no acute distress and alert  Skin: warm and dry, no rash or erythema  Head: normocephalic and atraumatic  Eyes: pupils equal, round, and reactive to light, sclera anicteric and positive pallor  Neck: neck supple and non tender without mass positive tracheostomy  Pulmonary/Chest: Air entry equal minimal rhonchi decreased at the bases no wheezing no crackles no use of intercostal muscles  Cardiovascular: normal rate, regular rhythm, normal S1 and S2, no gallops, intact distal pulses and no carotid bruits  Abdomen: soft, non-tender, non-distended, normal bowel sounds, no masses or organomegaly  Extremities: no edema and pulses no Homans' sign  Neurologic: Alert oriented x3 with no focal deficit    /68   Pulse 75   Temp 97.3 °F (36.3 °C) (Axillary)   Resp 16   Ht 5' 1\" (1.549 m)   Wt 130 lb 8 oz (59.2 kg)   SpO2 100%   BMI 24.66 kg/m²     CBC:   Lab Results   Component Value Date    WBC 9.4 11/13/2020    RBC 3.19 11/13/2020    HGB 9.1 11/13/2020    HCT 26.9 11/13/2020    MCV 84.3 11/13/2020    MCH 28.5 11/13/2020    MCHC 33.8 11/13/2020    RDW 14.6 11/13/2020     11/13/2020    MPV 10.2 11/13/2020     BMP:    Lab Results   Component Value Date     11/13/2020    K 4.9 11/13/2020    CL 99 11/13/2020    CO2 20 11/13/2020    BUN 42 11/13/2020    LABALBU 3.3 11/11/2020    CREATININE 2.97 11/13/2020    CALCIUM 8.9 11/13/2020    GFRAA 26 11/13/2020    LABGLOM 21 11/13/2020    GLUCOSE 104 11/13/2020        Assessment:  Patient Active Problem List   Diagnosis    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    Mouth swelling    COPD (chronic obstructive pulmonary disease) (HCC)    Severe malnutrition (HCC)    Facial edema    Common iliac aneurysm (HCC)    Essential hypertension    Stage 3 chronic kidney disease    Acute kidney injury (nontraumatic) (HCC)    NSTEMI (non-ST elevated myocardial infarction) [facility]    Pathological fracture of lumbar vertebra due to secondary osteoporosis (HCC)    Age-related osteoporosis with current pathological fracture    Intractable back pain    Iron deficiency anemia    Anemia, blood loss    Chronic bilateral pleural effusions    Diastolic CHF, acute on chronic (HCC)    Hyponatremia    Mediastinal mass     OPD exacerbation much better continue with taper steroids  Mediastinal mass likely metastasis patient will be treated will need chemotherapy as an outpatient with oncology  Laryngeal cancer radiation  Nasopharyngeal cancer  Prostate CA with elevation of PSA  Chronic diastolic CHF  Iron deficiency anemia  CKD 4  Essential hypertension   pleural effusion had thoracentesis awaiting results  Headache had CT of the brain which was negative Tylenol and Norco not helping will have neurology see the patient    Plan:    Meds labs reviewed avoid nephrotoxic drugs continue with ambulation continue with taper steroids neuro consult Dr. Lilia Thompson will be covering tomorrow      Steve Hernandez MD  6:14 PM

## 2020-11-13 NOTE — PLAN OF CARE
Problem: Falls - Risk of:  Goal: Will remain free from falls  Description: Will remain free from falls  11/13/2020 0037 by Samra Myles RN  Outcome: Ongoing     Problem: Pain:  Goal: Pain level will decrease  Description: Pain level will decrease  11/13/2020 0037 by Samra Myles RN  Outcome: Ongoing     Problem: Pain:  Goal: Control of acute pain  Description: Control of acute pain  Outcome: Ongoing     Problem: Breathing Pattern - Ineffective:  Goal: Ability to achieve and maintain a regular respiratory rate will improve  Description: Ability to achieve and maintain a regular respiratory rate will improve  11/13/2020 0037 by Samra Myles RN

## 2020-11-13 NOTE — CARE COORDINATION
Discharge planning    Spoke with [facility] NP from pulmonary and rx obtained for trach collar, and faxed over to [facility] along with documentation  Called and asked for them to look over with RT to ensure rx and documentation is correct    Call to [facility] and RT will go to home to set up once patient is discharged home but prefers that it is done during the day time due to location.

## 2020-11-13 NOTE — PROGRESS NOTES
Occupational Therapy   Occupational Therapy Initial Assessment  Date: 2020   Patient Name: Ricardo Fine  MRN: [id number]     : 1950    RN PENNY reports patient is medically stable for therapy treatment this date. Chart reviewed prior to treatment and patient is agreeable for therapy. All lines intact and patient positioned comfortably at end of treatment. All patient needs addressed prior to ending therapy session. Date of Service: 2020    Discharge Recommendations:  Home with Home health OT, Home with assist PRN, 24 hour supervision or assist  OT Equipment Recommendations  Equipment Needed: Yes  Mobility Devices: Serge Brenda; ADL Assistive Devices  Walker: Rolling  ADL Assistive Devices: Toileting - 3-in-1 Commode;Reacher;Long-handled [place];Long-handled Sponge;Emergency Alert System    Assessment   Performance deficits / Impairments: Decreased functional mobility ; Decreased safe awareness;Decreased balance;Decreased coordination;Decreased ADL status; Decreased posture;Decreased ROM; Decreased endurance;Decreased high-level IADLs  Assessment: Skilled OT is warranted for this pt to increase overall I and safety awareness with ADL and functional performance to return home with assist as needed. Prognosis: Fair  Decision Making: Medium Complexity  OT Education: OT Role;Plan of Care;Energy Conservation;Transfer Training  Patient Education: call light use/fall prevention, pursed lip breathing, benefits of being up OOB, safety in function, recommendations for continued therapy services  REQUIRES OT FOLLOW UP: Yes  Activity Tolerance  Activity Tolerance: Patient limited by fatigue;Patient limited by pain  Activity Tolerance: poor plus  Safety Devices  Safety Devices in place: Yes  Type of devices: Bed alarm in place; Left in bed;Call light within reach; Patient at risk for falls;Gait belt;Nurse notified(Pt refused to sit up in chair due to HA.)           Patient Diagnosis(es): The primary encounter diagnosis was Mediastinal mass. A diagnosis of Dyspnea and respiratory abnormalities was also pertinent to this visit. has a past medical history of Arthritis, Asthma, CKD (chronic kidney disease), stage III, COPD (chronic obstructive pulmonary disease) ([facility] GERD (gastroesophageal reflux disease), Gout, Hypertension, Hyponatremia, Iliac artery aneurysm, right ([facility] Laryngeal cancer ([facility] Lung cancer ([facility] and Prostate cancer ([facility] has a past surgical history that includes Tracheal surgery (3-4 years ago); Prostate surgery; Cardiac catheterization (03/04/2020); and AAA repair, endovascular (Right, 4/7/2020). 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 received surfgery  Chemo radiation  Had  Recurrent metstatic disease was seen in [address] [place] [address] clinic advised chenmo rsadiation sees dr Juan Miguel Alvarez, comes with increased dyspnea tachypnea no fever no chills no chest pain no palpitation      Restrictions  Restrictions/Precautions  Restrictions/Precautions: General Precautions, Fall Risk  Position Activity Restriction  Other position/activity restrictions: up with assist, contact isolation, trach, LUE IV, dental soft diet    Subjective   General  Chart Reviewed: Yes  Patient assessed for rehabilitation services?: Yes  Family / Caregiver Present: No  *pt states he has a HA and rates 8/10 and RN was notified.      Social/Functional History  Social/Functional History  Lives With: Other (Brother-pt states he is retired)  Type of Home: House  Home Layout: One level  Home Access: Stairs to enter with rails  Entrance Stairs - Number of Steps: 5  Entrance Stairs - Rails: Both  Bathroom Shower/Tub: Tub/Shower unit  Bathroom Toilet: Handicap height(nearby [facility] for support)  Bathroom Equipment: Grab bars in shower, Shower chair  Bathroom Accessibility: Accessible  Home Equipment: Violette Celis Help From: Family  ADL Assistance: [address] [place] [address] Avenue: Needs assistance(brother assists with all IADLS)  Homemaking Responsibilities: No  Ambulation Assistance: Independent  Transfer Assistance: Independent  Active : No  Patient's  Info: Brother assists with transportation  Occupation: On disability  Type of occupation: warehouse  Leisure & Hobbies: watch TV  Additional Comments: Pt states he has had some falls. Objective   Vision: Within Functional Limits  Vision Exceptions: (Pt states he does not wear any glasses)  Hearing: Within functional limits    Orientation  Overall Orientation Status: Within Functional Limits  Observation/Palpation  Posture: Fair  Observation: resting in bed, thin & frail, has stoma at trach site. Pt mouths words or uses paper/clipboard and writes for communication as needed. Balance  Sitting Balance: Stand by assistance  Standing Balance: Contact guard assistance  Standing Balance  Time: stand shanelle < 30 seconds with functional tasks  Functional Mobility  Functional - Mobility Device: No device(Pt refused to use AD)  Activity: (side stepping only towards HOB; pt declined to sit up in chair due to HA.)  Assist Level: Contact guard assistance  Functional Mobility Comments: MIN verbal instruction needed for pacing, pursed lip breathing, weight shifting, and awareness/assist with lines to increase overall safety. Toilet Transfers  Toilet Transfers Comments: N/T and pt with no needs    ADL  Feeding: Setup  Grooming: Setup;Minimal assistance(seated)  UE Bathing: Setup;Stand by assistance  LE Bathing: Setup;Contact guard assistance  UE Dressing: Setup; Moderate assistance(donning clean hosp gown as it was soiled)  LE Dressing: Setup;Minimal assistance(Pt was able to luis B socks long sitting in bed with set up/SBA and increased time)  Toileting: Setup;Minimal assistance    Tone RUE  RUE Tone: Normotonic  Tone LUE  LUE Tone: Normotonic  Coordination  Movements Are Fluid And Coordinated: No  Coordination and Movement description: Fine motor impairments     Bed mobility  Rolling to Left: Stand by assistance  Supine to Sit: Contact guard assistance  Sit to Supine: Contact guard assistance(Pt requested back to bed and declined to sit up in chair)  Comment: Min verbal instruction for hand placement on bed rail to assist and pursed lip breathing tech. Transfers  Stand Step Transfers: Contact guard assistance(pt declined AD use; declined to sit up in chair due to HA)  Sit to stand: Contact guard assistance  Stand to sit: Contact guard assistance  Transfer Comments: MOD verbal instruction for hand placement reaching back to surface, pacing, pursed lip breathing, controlled stand to sit and awareness/assist with lines to increase overall safety. Cognition  Overall Cognitive Status: Exceptions  Arousal/Alertness: Delayed responses to stimuli  Following Commands: Follows one step commands with increased time; Follows one step commands with repetition  Attention Span: Attends with cues to redirect  Memory: Decreased short term memory  Safety Judgement: Decreased awareness of need for assistance;Decreased awareness of need for safety  Problem Solving: Decreased awareness of errors;Assistance required to identify errors made;Assistance required to correct errors made;Assistance required to generate solutions;Assistance required to implement solutions  Insights: Decreased awareness of deficits  Initiation: Requires cues for some  Sequencing: Requires cues for some  Perception  Overall Perceptual Status: WFL     Sensation  Overall Sensation Status: WFL        LUE AROM (degrees)  LUE AROM : WFL  RUE AROM (degrees)  RUE AROM : Exceptions  RUE General AROM: R shld AROM for flexion approx 70 degrees/PROM to approx 120 degrees with tightness noted; rest WFLS  LUE Strength  Gross LUE Strength: WFL  LUE Strength Comment: grossly 4 plus/5  RUE Strength  Gross RUE Strength: Exceptions to [facility]  RUE Strength Comment: R shld 3 minus/5 and rest grossly 4 plus/5 Plan   Plan  Times per week: 4-5x/week 1x/day as shanelle  Current Treatment Recommendations: Strengthening, ROM, Balance Training, Functional Mobility Training, Safety Education & Training, Positioning, Pain Management, Home Management Training, Equipment Evaluation, Education, & procurement, Self-Care / ADL, Endurance Training, Neuromuscular Re-education, Patient/Caregiver Education & Training                                                  AM-PAC Score   18          Goals  Short term goals  Time Frame for Short term goals: by discharge, pt to demo  Short term goal 1: bed mob tasks with use of rail as needed to SUP. Short term goal 2: UB ADL to set up and LB ADL to SBA with use of AD/AE as needed. Short term goal 3: ADL transfers and functional mob with AD as needed to SBA-SUP level. Short term goal 4: increase in R shld AROM by 10-15 degrees and be I with BUE HEP with handouts to assist with ADL tasks. Short term goal 5: toileting tasks with use of AD/BSC and grab bar as needed to SBA. Long term goals  Long term goal 1: Pt to stand with SUP and AD as needed shanelle > 5 mins as able to reduce falls with functional tasks. Long term goal 2: Pt/family and caregivers to be I with EC/WS and fall prevention tech as well as AE/DME recommendations with handouts. Patient Goals   Patient goals : HOME!        Therapy Time   Individual Concurrent Group Co-treatment   Time In 1007(plus 10 min chart review/RN communication)         Time Out 4269 [facility] 2050                 [place], [place]

## 2020-11-13 NOTE — PLAN OF CARE
Pt remains free from fall. C/O of headache continues; Tylenol given. VSS. Order for trach collar/oxygen for home use placed and in progress. Continues on IV Zithromax and now on oral Prednisone. Will continue to monitor.

## 2020-11-13 NOTE — PROGRESS NOTES
Pulmonary Critical Care Progress Note  KENDAL Schwab/Gus Trevizo MD     Patient seen for the follow up of mediastinal mass, right pleural effusion, COPD, pulmonary hypertension     Subjective:  No significant overnight events noted. He is sitting up in bed, on humidified trach collar. He denies significant shortness of breath, cough or chest pain. He had right thoracentesis yesterday with 800 mL removed. He has a headache this morning. Examination:  Vitals: /84   Pulse 68   Temp 97.9 °F (36.6 °C) (Oral)   Resp 16   Ht 5' 1\" (1.549 m)   Wt 130 lb 8 oz (59.2 kg)   SpO2 98%   BMI 24.66 kg/m²   General appearance: alert and cooperative with exam, sitting up in bed   Neck: No JVD  Lungs: moderate air exchange, improved air exchange right base  Heart: regular rate and rhythm, S1, S2 normal, no gallop  Abdomen: Soft, non tender, + BS  Extremities: no cyanosis or clubbing.  No significant edema    LABs:  CBC:   Recent Labs     11/12/20 0614 11/13/20 0456   WBC 7.7 9.4   HGB 9.2* 9.1*   HCT 28.1* 26.9*    303     BMP:   Recent Labs     11/12/20 0614 11/13/20 0456   * 129*   K 4.6 4.9   CO2 18* 20   BUN 37* 42*   CREATININE 3.20* 2.97*   LABGLOM 19* 21*   GLUCOSE 117* 104*     PT/INR:   Recent Labs     11/12/20 0614   PROTIME 13.8   INR 1.1     APTT:  Recent Labs     11/12/20 0614   APTT 27.0     LIVER PROFILE:  Recent Labs     11/11/20 0451   AST 15   ALT 9   LABALBU 3.3*     ABG:  Lab Results   Component Value Date    [id number] 15 10/27/2019    FIO2 NOT REPORTED 03/05/2020         Radiology:  11/13/20      Impression:  · Right upper lobe/superior mediastinal mass, 7.4 cm X 5 .9 cm  § Recurrent invasive keratinizing SCC  · Lymphadenopathy  · Moderate right pleural effusion, recurrent  § S/p right thoracentesis on 6/1/2020 and 6/13/2020  § S/p right thoracentesis on 11/12/2020 with 800 mL removed  · COPD  · Moderate to severe pulmonary hypertension, RVSP 65 mmHg  · History of laryngeal cancer s/p tracheostomy   ? subsequent development of oral cancer s/p surgery with reconstruction  · LITTLE on CKD/hyponatremia  · Right renal cyst 1.5 cm  · NSTEMI/combined diastolic and systolic heart failure    Recommendations:  · Continue IV antibiotics, Zithromax  · Discontinue IV solu-medrol   · Prednisone taper  · Albuterol and Ipratropium 4 times a day and as needed  · Symbicort  · Oxygen via trach collar if necessary  · IV fluids per primary, monitor renal function/sodium  · Await pleural fluid cytology  · ENT following, input noted   · Oncology following, input noted   · DVT prophylaxis with subcu heparin  · Okay for discharge planning from pulmonary standpoint when okay with others  · Above assessment and plan will be reviewed with Dr. Rosenbaum Jorge L will be finalized following review by Dr. Bharat Tucker  · Patient will benefit from cool mist aerosol to stoma and supplies    GINETTE Varghese-CNP   Pulmonary Critical Care and Sleep Medicine,  Patient seen under the supervision of Humberto Navarro MD, [facility]

## 2020-11-14 ENCOUNTER — APPOINTMENT (OUTPATIENT)
Dept: MRI IMAGING | Age: 70
DRG: 190 | End: 2020-11-14
Payer: MEDICARE

## 2020-11-14 LAB
ABSOLUTE EOS #: 0.03 K/UL (ref 0–0.44)
ABSOLUTE IMMATURE GRANULOCYTE: 0.04 K/UL (ref 0–0.3)
ABSOLUTE LYMPH #: 1.07 K/UL (ref 1.1–3.7)
ABSOLUTE MONO #: 0.68 K/UL (ref 0.1–1.2)
ANION GAP SERPL CALCULATED.3IONS-SCNC: 10 MMOL/L (ref 9–17)
BASOPHILS # BLD: 0 % (ref 0–2)
BASOPHILS ABSOLUTE: <0.03 K/UL (ref 0–0.2)
BUN BLDV-MCNC: 42 MG/DL (ref 8–23)
BUN/CREAT BLD: 15 (ref 9–20)
CALCIUM SERPL-MCNC: 8.6 MG/DL (ref 8.6–10.4)
CHLORIDE BLD-SCNC: 98 MMOL/L (ref 98–107)
CO2: 21 MMOL/L (ref 20–31)
CREAT SERPL-MCNC: 2.82 MG/DL (ref 0.7–1.2)
DIFFERENTIAL TYPE: ABNORMAL
EOSINOPHILS RELATIVE PERCENT: 0 % (ref 1–4)
GFR AFRICAN AMERICAN: 27 ML/MIN
GFR NON-AFRICAN AMERICAN: 22 ML/MIN
GFR SERPL CREATININE-BSD FRML MDRD: ABNORMAL ML/MIN/{1.73_M2}
GFR SERPL CREATININE-BSD FRML MDRD: ABNORMAL ML/MIN/{1.73_M2}
GLUCOSE BLD-MCNC: 93 MG/DL (ref 70–99)
HCT VFR BLD CALC: 27.5 % (ref 40.7–50.3)
HEMOGLOBIN: 9.1 G/DL (ref 13–17)
IMMATURE GRANULOCYTES: 1 %
LYMPHOCYTES # BLD: 13 % (ref 24–43)
MCH RBC QN AUTO: 28.2 PG (ref 25.2–33.5)
MCHC RBC AUTO-ENTMCNC: 33.1 G/DL (ref 28.4–34.8)
MCV RBC AUTO: 85.1 FL (ref 82.6–102.9)
MONOCYTES # BLD: 8 % (ref 3–12)
NRBC AUTOMATED: 0 PER 100 WBC
PDW BLD-RTO: 14.8 % (ref 11.8–14.4)
PLATELET # BLD: 253 K/UL (ref 138–453)
PLATELET ESTIMATE: ABNORMAL
PMV BLD AUTO: 9.2 FL (ref 8.1–13.5)
POTASSIUM SERPL-SCNC: 4.5 MMOL/L (ref 3.7–5.3)
PROSTATE SPECIFIC ANTIGEN: 1.38 UG/L
RBC # BLD: 3.23 M/UL (ref 4.21–5.77)
RBC # BLD: ABNORMAL 10*6/UL
SEG NEUTROPHILS: 78 % (ref 36–65)
SEGMENTED NEUTROPHILS ABSOLUTE COUNT: 6.33 K/UL (ref 1.5–8.1)
SODIUM BLD-SCNC: 129 MMOL/L (ref 135–144)
WBC # BLD: 8.2 K/UL (ref 3.5–11.3)
WBC # BLD: ABNORMAL 10*3/UL

## 2020-11-14 PROCEDURE — 2060000000 HC ICU INTERMEDIATE R&B

## 2020-11-14 PROCEDURE — 84153 ASSAY OF PSA TOTAL: CPT

## 2020-11-14 PROCEDURE — 6370000000 HC RX 637 (ALT 250 FOR IP): Performed by: INTERNAL MEDICINE

## 2020-11-14 PROCEDURE — 36415 COLL VENOUS BLD VENIPUNCTURE: CPT

## 2020-11-14 PROCEDURE — 85025 COMPLETE CBC W/AUTO DIFF WBC: CPT

## 2020-11-14 PROCEDURE — 94640 AIRWAY INHALATION TREATMENT: CPT

## 2020-11-14 PROCEDURE — 2580000003 HC RX 258: Performed by: PSYCHIATRY & NEUROLOGY

## 2020-11-14 PROCEDURE — 2700000000 HC OXYGEN THERAPY PER DAY

## 2020-11-14 PROCEDURE — 6360000002 HC RX W HCPCS: Performed by: INTERNAL MEDICINE

## 2020-11-14 PROCEDURE — 6370000000 HC RX 637 (ALT 250 FOR IP): Performed by: PSYCHIATRY & NEUROLOGY

## 2020-11-14 PROCEDURE — 80048 BASIC METABOLIC PNL TOTAL CA: CPT

## 2020-11-14 PROCEDURE — 2580000003 HC RX 258: Performed by: INTERNAL MEDICINE

## 2020-11-14 PROCEDURE — 97116 GAIT TRAINING THERAPY: CPT

## 2020-11-14 PROCEDURE — 99232 SBSQ HOSP IP/OBS MODERATE 35: CPT | Performed by: INTERNAL MEDICINE

## 2020-11-14 PROCEDURE — 6370000000 HC RX 637 (ALT 250 FOR IP): Performed by: RADIOLOGY

## 2020-11-14 PROCEDURE — 94761 N-INVAS EAR/PLS OXIMETRY MLT: CPT

## 2020-11-14 PROCEDURE — 70551 MRI BRAIN STEM W/O DYE: CPT

## 2020-11-14 PROCEDURE — 6360000002 HC RX W HCPCS: Performed by: PSYCHIATRY & NEUROLOGY

## 2020-11-14 PROCEDURE — 99223 1ST HOSP IP/OBS HIGH 75: CPT | Performed by: PSYCHIATRY & NEUROLOGY

## 2020-11-14 RX ORDER — DIPHENHYDRAMINE HYDROCHLORIDE 50 MG/ML
25 INJECTION INTRAMUSCULAR; INTRAVENOUS EVERY 12 HOURS
Status: COMPLETED | OUTPATIENT
Start: 2020-11-14 | End: 2020-11-15

## 2020-11-14 RX ORDER — AMITRIPTYLINE HYDROCHLORIDE 10 MG/1
10 TABLET, FILM COATED ORAL NIGHTLY
Status: DISCONTINUED | OUTPATIENT
Start: 2020-11-14 | End: 2020-11-15 | Stop reason: HOSPADM

## 2020-11-14 RX ORDER — ONDANSETRON 2 MG/ML
4 INJECTION INTRAMUSCULAR; INTRAVENOUS EVERY 12 HOURS SCHEDULED
Status: COMPLETED | OUTPATIENT
Start: 2020-11-14 | End: 2020-11-15

## 2020-11-14 RX ORDER — AMITRIPTYLINE HYDROCHLORIDE 25 MG/1
25 TABLET, FILM COATED ORAL NIGHTLY
Status: DISCONTINUED | OUTPATIENT
Start: 2020-11-21 | End: 2020-11-15 | Stop reason: HOSPADM

## 2020-11-14 RX ORDER — KETOROLAC TROMETHAMINE 30 MG/ML
15 INJECTION, SOLUTION INTRAMUSCULAR; INTRAVENOUS EVERY 8 HOURS
Status: COMPLETED | OUTPATIENT
Start: 2020-11-14 | End: 2020-11-15

## 2020-11-14 RX ADMIN — PANTOPRAZOLE SODIUM 40 MG: 40 TABLET, DELAYED RELEASE ORAL at 05:12

## 2020-11-14 RX ADMIN — HYDRALAZINE HYDROCHLORIDE 50 MG: 50 TABLET, FILM COATED ORAL at 05:12

## 2020-11-14 RX ADMIN — HEPARIN SODIUM 5000 UNITS: 5000 INJECTION INTRAVENOUS; SUBCUTANEOUS at 21:09

## 2020-11-14 RX ADMIN — HEPARIN SODIUM 5000 UNITS: 5000 INJECTION INTRAVENOUS; SUBCUTANEOUS at 13:18

## 2020-11-14 RX ADMIN — KETOROLAC TROMETHAMINE 15 MG: 30 INJECTION, SOLUTION INTRAMUSCULAR at 15:52

## 2020-11-14 RX ADMIN — METOPROLOL SUCCINATE 50 MG: 50 TABLET, EXTENDED RELEASE ORAL at 09:05

## 2020-11-14 RX ADMIN — PREDNISONE 30 MG: 20 TABLET ORAL at 09:05

## 2020-11-14 RX ADMIN — IPRATROPIUM BROMIDE AND ALBUTEROL SULFATE 3 ML: .5; 3 SOLUTION RESPIRATORY (INHALATION) at 14:01

## 2020-11-14 RX ADMIN — SODIUM BICARBONATE 650 MG: 650 TABLET ORAL at 21:09

## 2020-11-14 RX ADMIN — KETOROLAC TROMETHAMINE 15 MG: 30 INJECTION, SOLUTION INTRAMUSCULAR at 21:08

## 2020-11-14 RX ADMIN — IPRATROPIUM BROMIDE AND ALBUTEROL SULFATE 3 ML: .5; 3 SOLUTION RESPIRATORY (INHALATION) at 10:06

## 2020-11-14 RX ADMIN — SODIUM BICARBONATE 650 MG: 650 TABLET ORAL at 09:05

## 2020-11-14 RX ADMIN — HYDROCODONE BITARTRATE AND ACETAMINOPHEN 1 TABLET: 5; 325 TABLET ORAL at 09:05

## 2020-11-14 RX ADMIN — MAGNESIUM SULFATE HEPTAHYDRATE 1.5 G: 500 INJECTION, SOLUTION INTRAMUSCULAR; INTRAVENOUS at 15:52

## 2020-11-14 RX ADMIN — AMITRIPTYLINE HYDROCHLORIDE 10 MG: 10 TABLET, FILM COATED ORAL at 21:09

## 2020-11-14 RX ADMIN — ONDANSETRON 4 MG: 2 INJECTION INTRAMUSCULAR; INTRAVENOUS at 15:52

## 2020-11-14 RX ADMIN — BICALUTAMIDE 50 MG: 50 TABLET ORAL at 09:05

## 2020-11-14 RX ADMIN — HYDRALAZINE HYDROCHLORIDE 50 MG: 50 TABLET, FILM COATED ORAL at 13:18

## 2020-11-14 RX ADMIN — AZITHROMYCIN MONOHYDRATE 500 MG: 500 INJECTION, POWDER, LYOPHILIZED, FOR SOLUTION INTRAVENOUS at 21:09

## 2020-11-14 RX ADMIN — IPRATROPIUM BROMIDE AND ALBUTEROL SULFATE 3 ML: .5; 3 SOLUTION RESPIRATORY (INHALATION) at 05:57

## 2020-11-14 RX ADMIN — POLYETHYLENE GLYCOL 3350 17 G: 17 POWDER, FOR SOLUTION ORAL at 13:18

## 2020-11-14 RX ADMIN — Medication 2000 UNITS: at 09:05

## 2020-11-14 RX ADMIN — ACETAMINOPHEN 650 MG: 325 TABLET ORAL at 03:42

## 2020-11-14 RX ADMIN — AMLODIPINE BESYLATE 10 MG: 10 TABLET ORAL at 09:05

## 2020-11-14 RX ADMIN — SODIUM BICARBONATE 650 MG: 650 TABLET ORAL at 13:18

## 2020-11-14 RX ADMIN — HYDRALAZINE HYDROCHLORIDE 50 MG: 50 TABLET, FILM COATED ORAL at 21:09

## 2020-11-14 RX ADMIN — HEPARIN SODIUM 5000 UNITS: 5000 INJECTION INTRAVENOUS; SUBCUTANEOUS at 05:12

## 2020-11-14 RX ADMIN — Medication 10 ML: at 21:09

## 2020-11-14 RX ADMIN — DIPHENHYDRAMINE HYDROCHLORIDE 25 MG: 50 INJECTION, SOLUTION INTRAMUSCULAR; INTRAVENOUS at 15:52

## 2020-11-14 RX ADMIN — IPRATROPIUM BROMIDE AND ALBUTEROL SULFATE 3 ML: .5; 3 SOLUTION RESPIRATORY (INHALATION) at 18:01

## 2020-11-14 ASSESSMENT — PAIN SCALES - GENERAL
PAINLEVEL_OUTOF10: 4
PAINLEVEL_OUTOF10: 6
PAINLEVEL_OUTOF10: 8
PAINLEVEL_OUTOF10: 6
PAINLEVEL_OUTOF10: 6

## 2020-11-14 NOTE — PROGRESS NOTES
[facility], [employer] [facility]      Subjective:    COPD exacerbation  Less shortness of breath no cough no tachypnea no hypoxemia      ROS  Fever no chills no GI/ complaints no cardiac complaints at present no TIA no bleeding no headache no sore throat no skin lesions, complaining of headache diffuse his CT of the brain was negative having Tylenol and Norco not helping the pain      physical exam  General Appearance: in no acute distress and alert  Skin: warm and dry, no rash or erythema  Head: normocephalic and atraumatic  Eyes: pupils equal, round, and reactive to light, sclera anicteric and positive pallor  Neck: neck supple and non tender without mass positive tracheostomy  Pulmonary/Chest: Air entry equal minimal rhonchi decreased at the bases no wheezing no crackles no use of intercostal muscles  Cardiovascular: normal rate, regular rhythm, normal S1 and S2, no gallops, intact distal pulses and no carotid bruits  Abdomen: soft, non-tender, non-distended, normal bowel sounds, no masses or organomegaly  Extremities: no edema and pulses no Homans' sign  Neurologic: Alert oriented x3 with no focal deficit    Vitals  Reviewed     Labs   Reviewed        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    Severe malnutrition (HCC)    Facial edema    Common iliac aneurysm (HCC)    Essential hypertension    Stage 3 chronic kidney disease    Acute kidney injury (nontraumatic) ([address]    NSTEMI (non-ST elevated myocardial infarction) ([address]    Pathological fracture of lumbar vertebra due to secondary osteoporosis (HCC)    Age-related osteoporosis with current pathological fracture    Intractable back pain    Iron deficiency anemia    Anemia, blood loss    Chronic bilateral pleural effusions    Diastolic CHF, acute on chronic ([address]    Hyponatremia    Mediastinal mass     COPD exacerbation much better continue with taper steroids  Mediastinal mass likely metastasis patient will be treated will need chemotherapy as an outpatient with oncology  Laryngeal cancer radiation  Nasopharyngeal cancer  Prostate CA with elevation of PSA  Chronic diastolic CHF  Iron deficiency anemia  CKD 4- improved, stable  Essential hypertension  Headache   Chronic hyponatremia       Plan:    Meds labs reviewed avoid nephrotoxic drugs continue with ambulation continue with taper steroids   Oncology and Pulmonology OK with discharge  Await Neuro input  Pt c/o breathing difficulty  Will continue aerosol treatments, steroids  DC planning        Clarisa Casarez MD  4:41 PM

## 2020-11-14 NOTE — PROGRESS NOTES
Occupational Therapy  DATE: 2020    NAME: Palma Neves  MRN: [id number]   : 1950  [facility]  Occupational Therapy Not Seen Note    Patient not available for Occupational Therapy due to:    [] Testing:    [] Hemodialysis    [] Cancelled by RN:    [x]Refusal by Patient: Patient reports fatigue     [] Surgery:     [] Intubation:     [] Pain Medication:    [] Sedation:     [] Spine Precautions :    [] Medical Instability:    [] Other:      PEDRO Lujan

## 2020-11-14 NOTE — CONSULTS
[address] CONSULT NOTE    PATIENT NAME: Charan Ghotra   PATIENT MRN: [id number]   PRIMARY CARE PHYSICIAN: Marilu Fontana MD  CONSULT REQUESTED BY: Consults  DATE OF SERVICE: 11/14/2020    CHIEF COMPLAINT: Shortness of Breath; Cough; and Hemoptysis       HPI: Charan Ghotra is a 78year old AAM who was admitted for sob on 11/10, neurology is consulted for HA. 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 This Sept, he was found to have a new left oropharyngeal lesion, not sure it is a new primary cancer or recurrent cancer. CT chest on this admission founr right superior mediastnal necrotic timi mass and left oropharyngeal soft tissue mass. Pt was trached 5-6 years ago, he has no confusion. Oncology follows. Pt reports he has had intermittent headache for the past 5 months, mixed sharp, dull, throbbing, aching, 7/10 in intensity, 2 times a month, this time, headache has lasted for 4 days, he has been given tylenol and norco on this admission but headache has not go away. Admits mild constipation, poor sleep due to headache, no issues with urination, he would like to gain more weight.    Creatinine 2.82-3.21  Na+ 128-129      NEUROLOGICAL TESTS  [facility] 11/12/2020  NAF  Microangiopathic change    OTHER TESTS  [facility] chest 11/10/2020  1.  Large medial right upper lobe/superior mediastinal mass with mass effect    on the trachea and esophagus, concerning for neoplastic disease.         2.  Additional mildly enlarged right paratracheal lymph nodes may represent    disease involvement.         3.  Moderate size right pleural effusion.  Dependent subsegmental atelectasis. PAST MEDICAL HISTORY:         Diagnosis Date    Arthritis     Asthma     CKD (chronic kidney disease), stage III     COPD (chronic obstructive pulmonary disease) (HCC)     GERD (gastroesophageal reflux disease)     Gout     Hypertension     Hyponatremia     Iliac artery aneurysm, right (HCC)     Laryngeal cancer (HCC)     Laryngeal Cancer. Chronic left facial edema.      Lung cancer ([address] 3-4 years ago    [facility] [place]    Prostate cancer [facility])         PAST SURGICAL HISTORY:         Procedure Laterality Date    ABDOMINAL AORTIC ANEURYSM REPAIR, ENDOVASCULAR Right 4/7/2020    RIGHT ILIAC ARTERY ANEURYSM REPAIR ENDOVASCULAR performed by Jessie Nicolas MD at [address] [place] [address]  03/04/2020    No stents placed   [facility]  3-4 years ago    lung removed and \"voice box\" removed        SOCAIL HISTORY:     Social History     Socioeconomic History    Marital status: Single     Spouse name: Not on file    Number of children: Not on file    Years of education: Not on file    Highest education level: Not on file   Occupational History    Not on file   Social Needs    Financial resource strain: Not on file    Food insecurity     Worry: Not on file     Inability: Not on file    Transportation needs     Medical: Not on file     Non-medical: Not on file   Tobacco Use    Smoking status: Former Smoker     Types: Cigarettes    Smokeless tobacco: Never Used    Tobacco comment: quit smoking 20 years ago    Substance and Sexual Activity    Alcohol use: Not Currently     Alcohol/week: 5.0 standard drinks     Types: 5 Cans of beer per week    Drug use: No    Sexual activity: Never   Lifestyle    Physical activity     Days per week: Not on file     Minutes per session: Not on file    Stress: Not on file   Relationships    Social connections     Talks on phone: Not on file     Gets together: Not on file     Attends L[employer]-day service: Not on file     Active member of club or organization: Not on file     Attends meetings of clubs or organizations: Not on file     Relationship status: Not on file    Intimate partner violence     Fear of current or ex partner: Not on file     Emotionally abused: Not on file     Physically abused: Not on file     Forced sexual activity: Not on file   Other Topics Concern    Not on file   Social History Narrative    Not on file       MEDICATIONS:     Current Facility-Administered Medications   Medication Dose Route Frequency Provider Last Rate Last Dose    acetaminophen (TYLENOL) tablet 650 mg  650 mg Oral Q4H PRN Lenora Keller MD   650 mg at 11/14/20 0342    predniSONE (DELTASONE) tablet 30 mg  30 mg Oral Daily Ayala Hall MD   30 mg at 11/14/20 0905    HYDROcodone-acetaminophen ([place]) 5-325 MG per tablet 1 tablet  1 tablet Oral Q6H PRN Ayala Hall MD   1 tablet at 11/14/20 0905    albuterol sulfate  (90 Base) MCG/ACT inhaler 2 puff  2 puff Inhalation Q6H PRN Ayala Hall MD        amLODIPine (NORVASC) tablet 10 mg  10 mg Oral Daily Ayala Hall MD   10 mg at 11/14/20 0905    bicalutamide (CASODEX) chemo tablet 50 mg  50 mg Oral Daily Ayala Hall MD   50 mg at 11/14/20 0905    budesonide-formoterol (SYMBICORT) 160-4.5 MCG/ACT inhaler 2 puff  2 puff Inhalation BID Ayala Hall MD        Vitamin D (CHOLECALCIFEROL) tablet 2,000 Units  2,000 Units Oral Daily Ayala Hall MD   2,000 Units at 11/14/20 0905    hydrALAZINE (APRESOLINE) tablet 50 mg  50 mg Oral 3 times per day Ayala Hall MD   50 mg at 11/14/20 1318    ipratropium-albuterol (DUONEB) nebulizer solution 3 mL  3 mL Inhalation 4x Daily Ayala Hall MD   3 mL at 11/14/20 1006    Musculoskeletal [] Neck pain  [] Back pain  [] Muscle pain  [] Restless legs   Dermatologic [] Skin changes   Neurologic [] Memory loss/confusion  [] Seizures  [] Trouble walking or imbalance  [] Dizziness  [x] Sleep disturbance  [x] Weakness  [] Numbness  [] Tremors  [x] Speech Difficulty  [x] Headaches  [] Light Sensitivity  [] Sound Sensitivity   Endocrinology []Excessive thirst  []Excessive hunger   Psychiatric [] Anxiety/Depression  [] Hallucination   Allergy/immunology []Hives/environmental allergies   Hematologic/lymph [] Abnormal bleeding  [] Abnormal bruising       VITALS  BP (!) 147/80   Pulse 68   Temp 97.5 °F (36.4 °C) (Oral)   Resp 18   Ht 5' 1\" (1.549 m)   Wt 134 lb 4.8 oz (60.9 kg)   SpO2 97%   BMI 25.38 kg/m²       PHYSICAL EXAMINATION:       General Appearance:  Alert, cooperative, no signs of distress, appears stated age   Skin:  No rash or lesions   HEENT:  Normocephalic, conjunctiva/corneas clear; eyelids intact   Ears:  Normal external ear and canals   Nose: Nares normal, mucosa normal, no drainage    Throat: Lips and tongue normal; teeth normal;  gums normal   Neck: Supple, intact flexion, extension and rotation;   trachea midline;  no adenopathy;   thyroid: not enlarged;   no carotid pulse abnormality   Lungs:   Respirations unlabored   Heart: Regular rate and rhythm   Abdominal: BS present, soft, NT, ND   Extremities: No cyanosis, no edema   Psych Normal affect      NEUROLOGICAL EXAMINATION:      Mental status    Awake, alert and oriented; trached,       Cranial nerves    II - visual fields intact to confrontation                                                III, IV, VI - PERRLA, EOMI, no pupillary defect; no KEYANA, no nystagmus, no ptosis   V - normal facial sensation                                                               VII - normal facial symmetry                                                             VIII - intact hearing IX, X - symmetrical palate                                                                  XI - symmetrical shoulder shrug                                                       XII - midline tongue without atrophy or fasciculation      Motor function  No abnormal movements; tone and bulk okay  Muscle strength:   RUE: delta 4+/5 (right shoulder pain), biceps 5/5, triceps 5/5,  5/5  LUE: delta 5/5, biceps 5/5, triceps 5/5,  5/5  RLE: hf 5/5, ke 5/5, kf 5/5, df 5/5, pf 5/5  LLE: hf 5/5, ke 5/5, kf 5/5, df 5/5, pf 5/5     Coordination FNF no dysmetria, heel to shin okay, PATRICK okay, negative Romberg      Reflex function Intact 2+ DTR and symmetric. Negative Babinski      Gait                  Normal station and gait, able to do Tandem      Sensory function Intact to light touch/temp/pp/vibration in bilateral upper and lower extremities. Intact joint position sense, no extinction     LABS & TESTS     Lab Results   Component Value Date    WBC 8.2 11/14/2020    HGB 9.1 (L) 11/14/2020    HCT 27.5 (L) 11/14/2020    MCV 85.1 11/14/2020     11/14/2020     ASSESSMENT:   1. HA, multiple contributing factors, medication overuse, r/o brain mets, electrolyte abnormality, poor sleep  2. Mediastinal mass  3. Previous glottic cancer, oral carcinoma  4. Poor sleep  5. Hyponatremia    PLANS:   1. MRI brain w/o (given renal problems, can not do contrast)  2. Elavil 10mg QHS for 1 week, then 25mg QHS for HA prevention, poorly sleep and weight gain  3. Limit tylenol and Norco to avoid medication overuse and rebound  4. Magnesium sulfate 1.5g IV Q12h, benadryl 25mg IV Q12h, for 2 doses; toradol 30mg IM Q8hx3 dose, zofran 4mg Q12h x3 doses to break the cycle  5. Follow with oncology for mediastinal mass   6. Manage per primary for electrolyte abnormality      Thank you for giving us the opportunity to participate in Mr. Valladares's care.  shall you have any questions, please do not hesitate to contact

## 2020-11-14 NOTE — PLAN OF CARE
Problem: Falls - Risk of:  Goal: Will remain free from falls  Description: Will remain free from falls  11/13/2020 2019 by Lyubov Ca RN  Outcome: Ongoing     Problem: Pain:  Description: Pain management should include both nonpharmacologic and pharmacologic interventions.   Goal: Pain level will decrease  Description: Pain level will decrease  11/13/2020 2019 by Lyubov Ca RN  Outcome: Ongoing     Problem: Breathing Pattern - Ineffective:  Goal: Ability to achieve and maintain a regular respiratory rate will improve  Description: Ability to achieve and maintain a regular respiratory rate will improve  11/13/2020 2019 by Lyubov Ca RN  Outcome: Ongoing     Problem: Skin Integrity:  Goal: Will show no infection signs and symptoms  Description: Will show no infection signs and symptoms  11/13/2020 2019 by Lyubov Ca RN  Outcome: Ongoing

## 2020-11-14 NOTE — PROGRESS NOTES
Today's Date: 11/14/2020  Patient Name: Charan Ghotra  Date of admission: 11/10/2020 11:42 AM  Patient's age: 78 y.o., 1950  Admission Dx: Mediastinal mass [J98.59]  Mediastinal mass [J98.59]    Reason for Consult: mediastinal masss   Requesting Physician: Marilu Fontana MD    CHIEF COMPLAINT:    Chief Complaint   Patient presents with    Shortness of Breath    Cough    Hemoptysis       SUBJECTIVE:    Patient seen and examined   Patient resting comfortably. Labs and vitals reviewed  Afebrile overnight. Creatinine is stable  Awaiting results of thoracentesis cytology  No new complaint or interval event per nurse. HISTORY OF PRESENT ILLNESS:    This is a 79-year-old male with heavy drinker, with prior history of glottic SCC who was admitted with shortness of breath, hemoptysis and cough. Brief oncologic history as follows as per recent F oncology note:    He has a history of glottic SCC s/p TL in [place] in 2005 (TL with primary closure, left neck dissection with sacrifice of IJ and SCM, no adjuvant XRT). In 2018 he presented with a large oral cavity cancer. On April 12, 2018 at the [facility] he underwent a composite resection with segmental mandibulectomy, left partial glossectomy and wide local excision of the left floor of mouth with a bilateral selective neck dissection, transosteal extraoral plating of the mandible and free flap reconstruction. Pathology revealed a 7.5 cm moderate to poorly differentiated squamous cell carcinoma extending into mandible with evident PNI but no LVSI. Depth of invasion was 5.1 cm. There was high-grade dysplasia in the retromolar margin. 2 lymph nodes were identified but uninvolved. Pathologic staging at this time was T4a N0 M0. Postoperative radiation therapy was recommended but declined by the patient and his sister. In September 2020 he returned noting increasing pain and swelling, and decreased swallowing.  A new left oropharyngeal lesion was identified and biopsied revealing a p16 negative moderate to poorly differentiated squamous cell carcinoma. 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 Subsequent PET scan demonstrated PET avidity in the left floor of mouth/oropharynx as well as an a large right paratracheal mass. There were small to moderate bilateral pleural effusions appreciated. CT scan at this time demonstrated, as well, the left oropharyngeal soft tissue mass and the right superior mediastinal necrotic timi mass    Recently there was a plan for him to see medical oncology and radiation oncology here locally in [place].     Admission he had a CT scan of the chest which showed large medial right upper lobe/superior mediastinal mass with mass-effect on the trachea and esophagus concerning for neoplastic process. Additionally mildly enlarged right paratracheal lymph node noted. Past Medical History:   has a past medical history of Arthritis, Asthma, CKD (chronic kidney disease), stage III, COPD (chronic obstructive pulmonary disease) ([address] GERD (gastroesophageal reflux disease), Gout, Hypertension, Hyponatremia, Iliac artery aneurysm, right ([address] Laryngeal cancer ([address] Lung cancer ([address] and Prostate cancer ([address] Past Surgical History:   has a past surgical history that includes Tracheal surgery (3-4 years ago); Prostate surgery; Cardiac catheterization (03/04/2020); and AAA repair, endovascular (Right, 4/7/2020). Medications:    Prior to Admission medications    Medication Sig Start Date End Date Taking?  Authorizing Provider   mirtazapine (REMERON) 15 MG tablet Take 7.5 mg by mouth nightly Takes 1/2 tab (=7.5mg) nightly    Historical Provider, MD   sodium bicarbonate 650 MG tablet Take 650 mg by mouth 3 times daily    Historical Provider, MD   hydrALAZINE (APRESOLINE) 50 MG tablet Take 1 tablet by mouth every 8 hours 6/6/20   Remy Cho MD 2,000 Units at 11/13/20 5365    hydrALAZINE (APRESOLINE) tablet 50 mg  50 mg Oral 3 times per day Juanjose Desai MD   50 mg at 11/14/20 0512    ipratropium-albuterol (DUONEB) nebulizer solution 3 mL  3 mL Inhalation 4x Daily Juanjose Desai MD   3 mL at 11/14/20 0557    metoprolol succinate (TOPROL XL) extended release tablet 50 mg  50 mg Oral Daily Juanjose Desai MD   50 mg at 11/13/20 0823    pantoprazole (PROTONIX) tablet 40 mg  40 mg Oral QAM AC Juanjose Desai MD   40 mg at 11/14/20 2039    sodium bicarbonate tablet 650 mg  650 mg Oral TID Juanjose Desai MD   650 mg at 11/13/20 2005    sodium chloride flush 0.9 % injection 10 mL  10 mL Intravenous 2 times per day Juanjose Desai MD   10 mL at 11/12/20 2032    sodium chloride flush 0.9 % injection 10 mL  10 mL Intravenous PRN Juanjose Desai MD        acetaminophen (TYLENOL) suppository 650 mg  650 mg Rectal Q6H PRN Juanjose Desai MD        polyethylene glycol (GLYCOLAX) packet 17 g  17 g Oral Daily PRN Juanjose Desai MD   17 g at 11/13/20 1356    promethazine (PHENERGAN) tablet 12.5 mg  12.5 mg Oral Q6H PRN Juanjose Desai MD        Or    ondansetron (ZOFRAN) injection 4 mg  4 mg Intravenous Q6H PRN Juanjose Desai MD   4 mg at 11/13/20 1912    heparin (porcine) injection 5,000 Units  5,000 Units Subcutaneous 3 times per day Juanjose Desai MD   5,000 Units at 11/14/20 0512    0.9 % sodium chloride infusion   Intravenous Continuous Juanjose Desai MD 50 mL/hr at 11/13/20 1324      azithromycin (ZITHROMAX) 500 mg in D5W 250ml addavial  500 mg Intravenous Q24H Juanjose Desai MD   Stopped at 11/13/20 2159       Allergies:  Amino acids and Lisinopril    Social History:   reports that he has quit smoking. His smoking use included cigarettes. He has never used smokeless tobacco. He reports previous alcohol use of about 5.0 standard drinks of alcohol per week. He reports that he does not use drugs.      Family History: family history includes Diabetes in his mother and sister; Heart Disease in his mother and sister. REVIEW OF SYSTEMS:    Constitutional: No fever or chills. No night sweats, no weight loss   Eyes: No eye discharge, double vision, or eye pain   HEENT: negative for sore mouth, sore throat, hoarseness and voice change   Respiratory: ++ cough ,+ sputum, ++dyspnea, wheezing, hemoptysis, chest pain   Cardiovascular:++ chest pain, dyspnea, palpitations, orthopnea, PND   Gastrointestinal: negative for nausea, vomiting, diarrhea, constipation, abdominal pain, Dysphagia, hematemesis and hematochezia   Genitourinary: negative for frequency, dysuria, nocturia, urinary incontinence, and hematuria   Integument: negative for rash, skin lesions, bruises.    Hematologic/Lymphatic: negative for easy bruising, bleeding, lymphadenopathy, or petechiae   Endocrine: negative for heat or cold intolerance,weight changes, change in bowel habits and hair loss   Musculoskeletal: negative for myalgias, arthralgias, pain, joint swelling,and bone pain   Neurological: negative for headaches, dizziness, seizures, weakness, numbness    PHYSICAL EXAM:      BP (!) 148/83   Pulse 75   Temp 97.5 °F (36.4 °C) (Oral)   Resp 20   Ht 5' 1\" (1.549 m)   Wt 134 lb 4.8 oz (60.9 kg)   SpO2 100%   BMI 25.38 kg/m²    Temp (24hrs), Av.5 °F (36.4 °C), Min:97.3 °F (36.3 °C), Max:97.7 °F (36.5 °C)    General appearance - well appearing, no in pain or distress   Mental status - alert and cooperative   Eyes - pupils equal and reactive, extraocular eye movements intact   Ears - bilateral TM's and external ear canals normal   Mouth - mucous membranes moist, pharynx normal without lesions   Neck - s/p trach   Lymphatics - no palpable lymphadenopathy, no hepatosplenomegaly   Chest - clear to auscultation, no wheezes, rales or rhonchi, symmetric air entry   Heart - normal rate, regular rhythm, normal S1, S2, no murmurs  Abdomen - soft, nontender, nondistended, no masses or organomegaly   Neurological - alert, oriented, normal speech, no focal findings or movement disorder noted   Musculoskeletal - no joint tenderness, deformity or swelling   Extremities - peripheral pulses normal, no pedal edema, no clubbing or cyanosis   Skin - normal coloration and turgor, no rashes, no suspicious skin lesions noted ,    DATA:    Labs:   CBC:   Recent Labs     11/13/20  0456 11/14/20  0640   WBC 9.4 8.2   HGB 9.1* 9.1*   HCT 26.9* 27.5*    253     BMP:   Recent Labs     11/13/20  0456 11/14/20  0640   * 129*   K 4.9 4.5   CO2 20 21   BUN 42* 42*   CREATININE 2.97* 2.82*   LABGLOM 21* 22*   GLUCOSE 104* 93     PT/INR:   Recent Labs     11/12/20  0614   PROTIME 13.8   INR 1.1       IMAGING DATA:  @IMG@   XR CHEST PORTABLE   Final Result   Stable exam demonstrating left basilar opacity. CT HEAD WO CONTRAST   Final Result   No acute intracranial abnormality. Microangiopathic change. XR CHEST PORTABLE   Final Result   No definite pneumothorax status post right thoracentesis. Improved right pleural effusion and aeration to the right lung base. Stable dense opacity correlating with mass to the right upper lobe medially   better delineated on prior CT chest 11/10/2020. IR GUIDED THORACENTESIS PLEURAL   Final Result   Successful ultrasound guided diagnostic and therapeutic right thoracentesis. XR CHEST PORTABLE   Final Result   Slight increase in right-sided pleural effusion when compared to previous. Continued masslike density the right paratracheal region. CT CHEST WO CONTRAST   Final Result   1. Large medial right upper lobe/superior mediastinal mass with mass effect   on the trachea and esophagus, concerning for neoplastic disease. 2.  Additional mildly enlarged right paratracheal lymph nodes may represent   disease involvement. 3.  Moderate size right pleural effusion. Dependent subsegmental atelectasis.          XR CHEST PORTABLE   Final Result   Small right pleural effusion and adjacent airspace disease, likely   atelectasis. Right paratracheal masslike density. Further evaluation with   contrast-enhanced CT recommended. Primary Problem  <principal problem not specified>    Active Hospital Problems    Diagnosis Date Noted    Severe malnutrition ([facility] [E43] 11/11/2020    Mediastinal mass [J98.59] 11/10/2020         IMPRESSION:   1. 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 Oral cavity SCC, T4aN0 involving the anterior and left alveolus with erosion of the mandible as well as extension into the left floor of mouth and dorsal lateral tongue and is s/p left composite resection with segmental mandibulectomy (right mid body to the left angle), left floor of mouth, left partial glossectomy, bilateral neck dissection, left tonsillectomy, and scapular reconstruction, now with recurrent invasive keratinizing squamous cell carcinoma, moderately to poorly differentiated, p16 negative of left oropharynx. 3. Facial swelling  4. Shortness of breath  5. Alcohol abuse       RECOMMENDATIONS:  1. I reviewed the laboratory, imaging studies, diagnosis, prognosis and treatment options  2. Follow-up on results of pleural fluid cytology. 3. 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 New primary lung cancer is also in the differential.  We will follow-up on pleural fluid cytology  4. Patient also has history of prostate cancer. I will check PSA. Patient is on flutamide. He is not able to tell me who prescribes epic flutamide. 5. Patient will need outpatient follow-up. 6. Okay to discharge once stable  7. Follow-up with medical oncology radiation oncology as outpatient      Discussed with pt and Nurse. Thank you for asking us to see this patient.     Catha Schlatter KATIE        This note is created with the assistance of a speech recognition program.  While intending to generate a document that actually reflects the content of the visit, the document can still have some errors including those of syntax and sound a like substitutions which may escape proof reading. It such instances, actual meaning can be extrapolated by contextual diversion.

## 2020-11-14 NOTE — PROGRESS NOTES
Pulmonary Critical Care Progress Note       Patient seen for the follow up of mediastinal mass, right pleural effusion, COPD, pulmonary hypertension     Subjective:   he has no increased shortness of breath with  Humidified oxygen through trach collar. He  Has occasional dry coughcough . He denieschest pain. He had right thoracentesis  2 days ago with 800 mL removed. He has a headache. He is not able to verbalize     Examination:  Vitals: BP (!) 147/80   Pulse 68   Temp 97.5 °F (36.4 °C) (Oral)   Resp 18   Ht 5' 1\" (1.549 m)   Wt 134 lb 4.8 oz (60.9 kg)   SpO2 97%   BMI 25.38 kg/m²   General appearance: alert and cooperative with exam  Neck: No JVD ostomy  Lungs:  Decreased breath sounds on the right base no crackles  Heart: regular rate and rhythm, S1, S2 normal, no gallop  Abdomen: Soft, non tender, + BS  Extremities: no cyanosis or clubbing. No significant edema    LABs:  CBC:   Recent Labs     11/13/20  0456 11/14/20  0640   WBC 9.4 8.2   HGB 9.1* 9.1*   HCT 26.9* 27.5*    253     BMP:   Recent Labs     11/13/20  0456 11/14/20  0640   * 129*   K 4.9 4.5   CO2 20 21   BUN 42* 42*   CREATININE 2.97* 2.82*   LABGLOM 21* 22*   GLUCOSE 104* 93     PT/INR:   Recent Labs     11/12/20  0614   PROTIME 13.8   INR 1.1     APTT:  Recent Labs     11/12/20  0614   APTT 27.0     ABG:  Lab Results   Component Value Date    RJT8VCF 15 10/27/2019    FIO2 NOT REPORTED 03/05/2020       Results for Karon Schultz (MRN [id number]) as of 11/14/2020 11:50   Ref.  Range 11/12/2020 15:30   Appearance, Fluid Unknown NOT REPORTED   Basos, Fluid Latest Ref Range: 0 % NOT REPORTED   Color, Fluid Unknown NOT REPORTED   Eos, Fluid Latest Ref Range: 0 % NOT REPORTED   Glucose, Fluid Latest Units: mg/dL 144   LD, Fluid Latest Units: U/L 93   RBC, Fluid Latest Units: /mm3 <3,000   Lymphocytes, Body Fluid Latest Units: % 83   Monocyte Count, Fluid Latest Units: % NOT REPORTED   Neutrophil Count, Fluid Latest Units: % 3 WBC, Fluid Latest Units: /mm3 2,089   Other Cells, Fluid Latest Units: % MONOCYTES AND MESOTHELIAL CELLS    pleural fluid negative for malignancy      Radiology:  11/13/20  Chest x-ray stable exam with left effusion/opacity      Impression:  · Right upper lobe/superior mediastinal mass, 7.4 cm X 5 .9 cm  § Recurrent invasive keratinizing SCC  · Lymphadenopathy  · Moderate right pleural effusion, recurrent  § S/p right thoracentesis on 6/1/2020 and 6/13/2020  § S/p right thoracentesis on 11/12/2020 with 800 mL removed  · COPD  · Moderate to severe pulmonary hypertension, RVSP 65 mmHg  · History of laryngeal cancer s/p tracheostomy   ? subsequent development of oral cancer s/p surgery with reconstruction  · LITTLE on CKD/hyponatremia  · Right renal cyst 1.5 cm  · NSTEMI/combined diastolic and systolic heart failure    Recommendations:  ·  oxygen by trach collar  ·   DuoNeb by nebulizer  ·  Symbicort  ·  IVZithromax  ·  prednisone taper to stop  · IV fluids /monitor creatinine  · Await pleural fluid cytology  · ENT on consult  · Oncology cultures  · Patient will benefit from cool mist aerosol to stoma and supplies  ·  DVT prophylaxis  ·  discharge planning per primary    Matt Dawson MD [facility] [address] [facility]

## 2020-11-14 NOTE — PLAN OF CARE
Problem: Falls - Risk of:  Goal: Will remain free from falls  Description: Will remain free from falls  Outcome: Ongoing  Note: Siderails up x 2  Hourly rounding. Call light in reach. Instructed to call for assist before attempting out of bed. Remains free from falls and accidental injury at this time. Floor free from obstacles, and bed is locked and in lowest position. Adequate lighting provided. Problem: Falls - Risk of:  Goal: Absence of physical injury  Description: Absence of physical injury  Outcome: Ongoing     Problem: Pain:  Goal: Pain level will decrease  Description: Pain level will decrease  Outcome: Ongoing     Problem: Pain:  Goal: Control of acute pain  Description: Control of acute pain  Outcome: Ongoing  Note: Pain level assessed every four hours; patient reports pounding, throbbing pain rated at a level of  8 out of 10 radiating to front of head. Activities tried to relieve pain; low lighting and brain rest, repositioning and medications as prescribed. Pain relief: patient sleeping with respirations greater than 12.

## 2020-11-15 VITALS
RESPIRATION RATE: 18 BRPM | HEIGHT: 61 IN | SYSTOLIC BLOOD PRESSURE: 150 MMHG | HEART RATE: 72 BPM | DIASTOLIC BLOOD PRESSURE: 73 MMHG | OXYGEN SATURATION: 99 % | BODY MASS INDEX: 25.47 KG/M2 | WEIGHT: 134.9 LBS | TEMPERATURE: 97.9 F

## 2020-11-15 LAB
ABSOLUTE EOS #: <0.03 K/UL (ref 0–0.44)
ABSOLUTE IMMATURE GRANULOCYTE: 0.03 K/UL (ref 0–0.3)
ABSOLUTE LYMPH #: 0.97 K/UL (ref 1.1–3.7)
ABSOLUTE MONO #: 0.48 K/UL (ref 0.1–1.2)
ANION GAP SERPL CALCULATED.3IONS-SCNC: 8 MMOL/L (ref 9–17)
ANION GAP SERPL CALCULATED.3IONS-SCNC: 9 MMOL/L (ref 9–17)
BASOPHILS # BLD: 0 % (ref 0–2)
BASOPHILS ABSOLUTE: <0.03 K/UL (ref 0–0.2)
BUN BLDV-MCNC: 45 MG/DL (ref 8–23)
BUN BLDV-MCNC: 46 MG/DL (ref 8–23)
BUN/CREAT BLD: 15 (ref 9–20)
BUN/CREAT BLD: 16 (ref 9–20)
CALCIUM SERPL-MCNC: 8.5 MG/DL (ref 8.6–10.4)
CALCIUM SERPL-MCNC: 8.7 MG/DL (ref 8.6–10.4)
CHLORIDE BLD-SCNC: 97 MMOL/L (ref 98–107)
CHLORIDE BLD-SCNC: 98 MMOL/L (ref 98–107)
CO2: 21 MMOL/L (ref 20–31)
CO2: 21 MMOL/L (ref 20–31)
CREAT SERPL-MCNC: 2.84 MG/DL (ref 0.7–1.2)
CREAT SERPL-MCNC: 3.09 MG/DL (ref 0.7–1.2)
DIFFERENTIAL TYPE: ABNORMAL
EOSINOPHILS RELATIVE PERCENT: 0 % (ref 1–4)
GFR AFRICAN AMERICAN: 25 ML/MIN
GFR AFRICAN AMERICAN: 27 ML/MIN
GFR NON-AFRICAN AMERICAN: 20 ML/MIN
GFR NON-AFRICAN AMERICAN: 22 ML/MIN
GFR SERPL CREATININE-BSD FRML MDRD: ABNORMAL ML/MIN/{1.73_M2}
GLUCOSE BLD-MCNC: 124 MG/DL (ref 70–99)
GLUCOSE BLD-MCNC: 93 MG/DL (ref 70–99)
HCT VFR BLD CALC: 26.3 % (ref 40.7–50.3)
HEMOGLOBIN: 8.6 G/DL (ref 13–17)
IMMATURE GRANULOCYTES: 0 %
LYMPHOCYTES # BLD: 14 % (ref 24–43)
MCH RBC QN AUTO: 27.9 PG (ref 25.2–33.5)
MCHC RBC AUTO-ENTMCNC: 32.7 G/DL (ref 28.4–34.8)
MCV RBC AUTO: 85.4 FL (ref 82.6–102.9)
MONOCYTES # BLD: 7 % (ref 3–12)
NRBC AUTOMATED: 0 PER 100 WBC
PDW BLD-RTO: 14.9 % (ref 11.8–14.4)
PLATELET # BLD: 266 K/UL (ref 138–453)
PLATELET ESTIMATE: ABNORMAL
PMV BLD AUTO: 10.1 FL (ref 8.1–13.5)
POTASSIUM SERPL-SCNC: 4.8 MMOL/L (ref 3.7–5.3)
POTASSIUM SERPL-SCNC: 5 MMOL/L (ref 3.7–5.3)
RBC # BLD: 3.08 M/UL (ref 4.21–5.77)
RBC # BLD: ABNORMAL 10*6/UL
SEG NEUTROPHILS: 79 % (ref 36–65)
SEGMENTED NEUTROPHILS ABSOLUTE COUNT: 5.42 K/UL (ref 1.5–8.1)
SODIUM BLD-SCNC: 127 MMOL/L (ref 135–144)
SODIUM BLD-SCNC: 127 MMOL/L (ref 135–144)
WBC # BLD: 6.9 K/UL (ref 3.5–11.3)
WBC # BLD: ABNORMAL 10*3/UL

## 2020-11-15 PROCEDURE — 6370000000 HC RX 637 (ALT 250 FOR IP): Performed by: INTERNAL MEDICINE

## 2020-11-15 PROCEDURE — 6360000002 HC RX W HCPCS: Performed by: PSYCHIATRY & NEUROLOGY

## 2020-11-15 PROCEDURE — 85025 COMPLETE CBC W/AUTO DIFF WBC: CPT

## 2020-11-15 PROCEDURE — 36415 COLL VENOUS BLD VENIPUNCTURE: CPT

## 2020-11-15 PROCEDURE — 2700000000 HC OXYGEN THERAPY PER DAY

## 2020-11-15 PROCEDURE — 80048 BASIC METABOLIC PNL TOTAL CA: CPT

## 2020-11-15 PROCEDURE — 94640 AIRWAY INHALATION TREATMENT: CPT

## 2020-11-15 PROCEDURE — 2580000003 HC RX 258: Performed by: INTERNAL MEDICINE

## 2020-11-15 PROCEDURE — 94761 N-INVAS EAR/PLS OXIMETRY MLT: CPT

## 2020-11-15 PROCEDURE — 6360000002 HC RX W HCPCS: Performed by: INTERNAL MEDICINE

## 2020-11-15 PROCEDURE — 97110 THERAPEUTIC EXERCISES: CPT

## 2020-11-15 PROCEDURE — 2580000003 HC RX 258: Performed by: PSYCHIATRY & NEUROLOGY

## 2020-11-15 PROCEDURE — 99232 SBSQ HOSP IP/OBS MODERATE 35: CPT | Performed by: PSYCHIATRY & NEUROLOGY

## 2020-11-15 PROCEDURE — 99232 SBSQ HOSP IP/OBS MODERATE 35: CPT | Performed by: INTERNAL MEDICINE

## 2020-11-15 RX ORDER — PREDNISONE 20 MG/1
20 TABLET ORAL DAILY
Status: DISCONTINUED | OUTPATIENT
Start: 2020-11-16 | End: 2020-11-15 | Stop reason: HOSPADM

## 2020-11-15 RX ORDER — PREDNISONE 10 MG/1
10 TABLET ORAL DAILY
Qty: 10 TABLET | Refills: 0 | Status: SHIPPED | OUTPATIENT
Start: 2020-11-15 | End: 2020-11-25

## 2020-11-15 RX ORDER — SODIUM BICARBONATE 650 MG/1
650 TABLET ORAL 2 TIMES DAILY
Status: DISCONTINUED | OUTPATIENT
Start: 2020-11-15 | End: 2020-11-15 | Stop reason: HOSPADM

## 2020-11-15 RX ORDER — AMITRIPTYLINE HYDROCHLORIDE 25 MG/1
25 TABLET, FILM COATED ORAL NIGHTLY
Qty: 30 TABLET | Refills: 0 | Status: SHIPPED | OUTPATIENT
Start: 2020-11-21

## 2020-11-15 RX ORDER — AMITRIPTYLINE HYDROCHLORIDE 10 MG/1
10 TABLET, FILM COATED ORAL NIGHTLY
Qty: 7 TABLET | Refills: 0 | Status: SHIPPED | OUTPATIENT
Start: 2020-11-15

## 2020-11-15 RX ADMIN — PREDNISONE 30 MG: 20 TABLET ORAL at 08:33

## 2020-11-15 RX ADMIN — HYDRALAZINE HYDROCHLORIDE 50 MG: 50 TABLET, FILM COATED ORAL at 05:30

## 2020-11-15 RX ADMIN — ONDANSETRON 4 MG: 2 INJECTION INTRAMUSCULAR; INTRAVENOUS at 03:03

## 2020-11-15 RX ADMIN — IPRATROPIUM BROMIDE AND ALBUTEROL SULFATE 3 ML: .5; 3 SOLUTION RESPIRATORY (INHALATION) at 06:10

## 2020-11-15 RX ADMIN — PANTOPRAZOLE SODIUM 40 MG: 40 TABLET, DELAYED RELEASE ORAL at 05:30

## 2020-11-15 RX ADMIN — MAGNESIUM SULFATE HEPTAHYDRATE 1.5 G: 500 INJECTION, SOLUTION INTRAMUSCULAR; INTRAVENOUS at 03:03

## 2020-11-15 RX ADMIN — AMLODIPINE BESYLATE 10 MG: 10 TABLET ORAL at 08:34

## 2020-11-15 RX ADMIN — ONDANSETRON 4 MG: 2 INJECTION INTRAMUSCULAR; INTRAVENOUS at 14:28

## 2020-11-15 RX ADMIN — HEPARIN SODIUM 5000 UNITS: 5000 INJECTION INTRAVENOUS; SUBCUTANEOUS at 05:30

## 2020-11-15 RX ADMIN — KETOROLAC TROMETHAMINE 15 MG: 30 INJECTION, SOLUTION INTRAMUSCULAR at 05:30

## 2020-11-15 RX ADMIN — SODIUM BICARBONATE 650 MG: 650 TABLET ORAL at 14:27

## 2020-11-15 RX ADMIN — BICALUTAMIDE 50 MG: 50 TABLET ORAL at 08:34

## 2020-11-15 RX ADMIN — MAGNESIUM SULFATE HEPTAHYDRATE 1.5 G: 500 INJECTION, SOLUTION INTRAMUSCULAR; INTRAVENOUS at 14:28

## 2020-11-15 RX ADMIN — HEPARIN SODIUM 5000 UNITS: 5000 INJECTION INTRAVENOUS; SUBCUTANEOUS at 14:27

## 2020-11-15 RX ADMIN — DIPHENHYDRAMINE HYDROCHLORIDE 25 MG: 50 INJECTION, SOLUTION INTRAMUSCULAR; INTRAVENOUS at 03:03

## 2020-11-15 RX ADMIN — Medication 2000 UNITS: at 08:33

## 2020-11-15 RX ADMIN — DIPHENHYDRAMINE HYDROCHLORIDE 25 MG: 50 INJECTION, SOLUTION INTRAMUSCULAR; INTRAVENOUS at 14:28

## 2020-11-15 RX ADMIN — METOPROLOL SUCCINATE 50 MG: 50 TABLET, EXTENDED RELEASE ORAL at 08:34

## 2020-11-15 RX ADMIN — SODIUM CHLORIDE: 9 INJECTION, SOLUTION INTRAVENOUS at 13:08

## 2020-11-15 RX ADMIN — HYDRALAZINE HYDROCHLORIDE 50 MG: 50 TABLET, FILM COATED ORAL at 14:27

## 2020-11-15 RX ADMIN — IPRATROPIUM BROMIDE AND ALBUTEROL SULFATE 3 ML: .5; 3 SOLUTION RESPIRATORY (INHALATION) at 10:50

## 2020-11-15 RX ADMIN — SODIUM BICARBONATE 650 MG: 650 TABLET ORAL at 08:34

## 2020-11-15 ASSESSMENT — PAIN SCALES - GENERAL
PAINLEVEL_OUTOF10: 6
PAINLEVEL_OUTOF10: 0

## 2020-11-15 NOTE — PROGRESS NOTES
Pulmonary Critical Care Progress Note       Patient seen for the follow up of mediastinal mass, right pleural effusion, COPD, pulmonary hypertension     Subjective:   he has no increased shortness of breath with  Humidified oxygen through trach collar. He  Has occasional dry coughcough . He denieschest pain. He had right thoracentesis  2 days ago with 800 mL removed. He has a headache. He is not able to verbalize     Examination:  Vitals: BP (!) 150/73   Pulse 72   Temp 97.9 °F (36.6 °C) (Oral)   Resp 18   Ht 5' 1\" (1.549 m)   Wt 134 lb 14.4 oz (61.2 kg)   SpO2 99%   BMI 25.49 kg/m²   General appearance: alert and cooperative with exam  Neck: No JVD ostomy  Lungs:  Decreased breath sounds on the right base no crackles  Heart: regular rate and rhythm, S1, S2 normal, no gallop  Abdomen: Soft, non tender, + BS  Extremities: no cyanosis or clubbing. No significant edema    LABs:  CBC:   Recent Labs     11/14/20  0640 11/15/20  0514   WBC 8.2 6.9   HGB 9.1* 8.6*   HCT 27.5* 26.3*    266     BMP:   Recent Labs     11/14/20  0640 11/15/20  0514   * 127*   K 4.5 5.0   CO2 21 21   BUN 42* 45*   CREATININE 2.82* 2.84*   LABGLOM 22* 22*   GLUCOSE 93 93     ABG:  Lab Results   Component Value Date    CXP8MNU 15 10/27/2019    FIO2 NOT REPORTED 03/05/2020       Results for Ralf Briceno (MRN [id number]) as of 11/14/2020 11:50   Ref.  Range 11/12/2020 15:30   Appearance, Fluid Unknown NOT REPORTED   Basos, Fluid Latest Ref Range: 0 % NOT REPORTED   Color, Fluid Unknown NOT REPORTED   Eos, Fluid Latest Ref Range: 0 % NOT REPORTED   Glucose, Fluid Latest Units: mg/dL 144   LD, Fluid Latest Units: U/L 93   RBC, Fluid Latest Units: /mm3 <3,000   Lymphocytes, Body Fluid Latest Units: % 83   Monocyte Count, Fluid Latest Units: % NOT REPORTED   Neutrophil Count, Fluid Latest Units: % 3   WBC, Fluid Latest Units: /mm3 2,089   Other Cells, Fluid Latest Units: % MONOCYTES AND MESOTHELIAL CELLS    pleural fluid negative for malignancy      Radiology:  11/13/20  Chest x-ray stable exam with left effusion/opacity      Impression:    · Right upper lobe/superior mediastinal mass, 7.4 cm X 5 .9 cm  § Recurrent invasive keratinizing SCC  · Lymphadenopathy  · Moderate right pleural effusion, recurrent  § S/p right thoracentesis on 6/1/2020 and 6/13/2020  § S/p right thoracentesis on 11/12/2020 with 800 mL removed  · COPD  · Moderate to severe pulmonary hypertension, RVSP 65 mmHg  · History of laryngeal cancer s/p tracheostomy   ? subsequent development of oral cancer s/p surgery with reconstruction  · LITTLE on CKD/hyponatremia  · Right renal cyst 1.5 cm  · NSTEMI/combined diastolic and systolic heart failure    Recommendations:    ·  oxygen by trach collar  ·   DuoNeb by nebulizer  ·  Symbicort  ·  IV Zithromax  ·   Make prednisone 20 mg daily  · IV fluids /monitor creatinine  · Await pleural fluid cytology  ·  chest x-ray in a.m.   · ENT on consult  · Oncology cultures  ·  physical therapy/increase ambulation  · Patient will benefit from cool mist aerosol to stoma and supplies  ·  DVT prophylaxis  ·  discharge planning per primary    Marissa AhmadiNYU Langone Orth[facility] Hospitalifeany[facility] [address] [facility]

## 2020-11-15 NOTE — PROGRESS NOTES
[address] [place] [address], NEUROLOGY                    NEUROLOGY PROGRESS NOTE    PATIENT NAME: Jacquelin Funez   PATIENT MRN: [id number]   PRIMARY CARE PHYSICIAN: Marilee Bertrand MD  DATE OF SERVICE: 11/15/2020    CHIEF COMPLAINT: \"no headache\"       SUBJECTIVE:    Pt says his headache has resolved after headache cocktail. He slept very well last night with elavil 10mg at bedtime. MRI brain done, did not show acute stroke or mets but old stroke in right parietal.     HPI  78year old AAM was admitted for sob on 11/10, neurology is consulted for HA.      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 This Sept, he was found to have a new left oropharyngeal lesion, not sure it is a new primary cancer or recurrent cancer. CT chest on this admission founr right superior mediastnal necrotic timi mass and left oropharyngeal soft tissue mass. Pt was trached 5-6 years ago, he has no confusion. Oncology follows.      Pt reports he has had intermittent headache for the past 5 months, mixed sharp, dull, throbbing, aching, 7/10 in intensity, 2 times a month, this time, headache has lasted for 4 days, he has been given tylenol and norco on this admission but headache has not go away.      Admits mild constipation, poor sleep due to headache, no issues with urination, he would like to gain more weight.    Creatinine 2.82-3.21  Na+ 128-129        NEUROLOGICAL TESTS  [facility] 11/12/2020  NAF  Microangiopathic change     OTHER TESTS  MRI brain 11/14/20200  Old stroke in sony right parietal, also microvascular changes, volume loss. No acute findings. [address] chest 11/10/2020  1.  Large medial right upper lobe/superior mediastinal mass with mass effect    on the trachea and esophagus, concerning for neoplastic disease.         2.  Additional mildly enlarged right paratracheal lymph nodes may represent    disease involvement.         3.  Moderate size right pleural effusion.  Dependent subsegmental atelectasis. PAST MEDICAL HISTORY:         Diagnosis Date    Arthritis     Asthma     CKD (chronic kidney disease), stage III     COPD (chronic obstructive pulmonary disease) (HCC)     GERD (gastroesophageal reflux disease)     Gout     Hypertension     Hyponatremia     Iliac artery aneurysm, right (HCC)     Laryngeal cancer (HCC)     Laryngeal Cancer. Chronic left facial edema.      Lung cancer ([facility] [address] 3-4 years ago    [facility]state cancer [facility])        MEDICATIONS:     Current Facility-Administered Medications   Medication Dose Route Frequency Provider Last Rate Last Dose    [START ON 11/16/2020] predniSONE (DELTASONE) tablet 20 mg  20 mg Oral Daily Chkii Kimball MD        sodium bicarbonate tablet 650 mg  650 mg Oral BID Walter Chatterjee MD        magnesium sulfate 1.5 g in dextrose 5 % 100 mL IVPB  1.5 g Intravenous Q12H Frantz Mcknight MD 50 mL/hr at 11/15/20 1428 1.5 g at 11/15/20 1428    amitriptyline (ELAVIL) tablet 10 mg  10 mg Oral Nightly Frantz Mcknight MD   10 mg at 11/14/20 2109    [START ON 11/21/2020] amitriptyline (ELAVIL) tablet 25 mg  25 mg Oral Nightly Frantz Mcknight MD        acetaminophen (TYLENOL) tablet 650 mg  650 mg Oral Q4H PRN Beth Miller MD   650 mg at 11/14/20 0342    HYDROcodone-acetaminophen (NORCO) 5-325 MG per tablet 1 tablet  1 tablet Oral Q6H PRN Walter Chatterjee MD   1 tablet at 11/14/20 0905    albuterol sulfate  (90 Base) MCG/ACT inhaler 2 puff  2 puff Inhalation Q6H PRN Tom Salazar MD        amLODIPine (NORVASC) tablet 10 mg  10 mg Oral Daily Tom Salazar MD   10 mg at 11/15/20 0834    bicalutamide (CASODEX) chemo tablet 50 mg  50 mg Oral Daily Tom Salazar MD   50 mg at 11/15/20 0834    budesonide-formoterol (SYMBICORT) 160-4.5 MCG/ACT inhaler 2 puff  2 puff Inhalation BID Tom Salazar MD        Vitamin D (CHOLECALCIFEROL) tablet 2,000 Units  2,000 Units Oral Daily Tom Salazar MD   2,000 Units at 11/15/20 3087    hydrALAZINE (APRESOLINE) tablet 50 mg  50 mg Oral 3 times per day Tom Salazar MD   50 mg at 11/15/20 1427    ipratropium-albuterol (DUONEB) nebulizer solution 3 mL  3 mL Inhalation 4x Daily Tom Salazar MD   3 mL at 11/15/20 1050    metoprolol succinate (TOPROL XL) extended release tablet 50 mg  50 mg Oral Daily Tom Salazar MD   50 mg at 11/15/20 0834    pantoprazole (PROTONIX) tablet 40 mg  40 mg Oral QAM AC Tom Salazar MD   40 mg at 11/15/20 0530    sodium chloride flush 0.9 % injection 10 mL  10 mL Intravenous 2 times per day Tom Salazar MD   10 mL at 11/14/20 2109    sodium chloride flush 0.9 % injection 10 mL  10 mL Intravenous PRN Tom Salazar MD        acetaminophen (TYLENOL) suppository 650 mg  650 mg Rectal Q6H PRN Tom Salazar MD        polyethylene glycol (GLYCOLAX) packet 17 g  17 g Oral Daily PRN Tom Salazar MD   17 g at 11/14/20 1318    promethazine (PHENERGAN) tablet 12.5 mg  12.5 mg Oral Q6H PRN Tom Salazar MD        Or    ondansetron (ZOFRAN) injection 4 mg  4 mg Intravenous Q6H PRN Tom Salazar MD   4 mg at 11/13/20 1912    heparin (porcine) injection 5,000 Units  5,000 Units Subcutaneous 3 times per day Tom Salazar MD   5,000 Units at 11/15/20 1427    0.9 % sodium chloride infusion   Intravenous Continuous Tom Salazar MD 50 mL/hr at 11/15/20 1308      azithromycin (ZITHROMAX) 500 mg in D5W 250ml addavial  500 mg Intravenous Q24H Sandie Reich Farzaneh Padilla MD   Stopped at 11/14/20 7206        ALLERGIES:     Allergies   Allergen Reactions    Amino Acids     Lisinopril Swelling       REVIEW OF SYSTEMS:         All items selected indicate a positive finding. Those items not selected are negative.   Constitutional [] Weight loss/gain   [] Fatigue  [] Fever/Chills   HEENT [] Hearing Loss  [] Visual Disturbance  [] Tinnitus  [] Eye pain   Respiratory [] Shortness of Breath  [] Cough  [] Snoring   Cardiovascular [] Chest Pain  [] Palpitations  [] Lightheaded   GI [] Constipation  [] Diarrhea  [] Swallowing change  [] Nausea/vomiting    [] Urinary Frequency  [] Urinary Urgency   Musculoskeletal [] Neck pain  [] Back pain  [] Muscle pain  [] Restless legs   Dermatologic [] Skin changes   Neurologic [] Memory loss/confusion  [] Seizures  [] Trouble walking or imbalance  [] Dizziness  [] Sleep disturbance  [x] Weakness  [] Numbness  [] Tremors  [x] Speech Difficulty  [] Headaches  [] Light Sensitivity  [] Sound Sensitivity   Endocrinology []Excessive thirst  []Excessive hunger   Psychiatric [] Anxiety/Depression  [] Hallucination   Allergy/immunology []Hives/environmental allergies   Hematologic/lymph [] Abnormal bleeding  [] Abnormal bruising      VITALS  BP (!) 150/73   Pulse 72   Temp 97.9 °F (36.6 °C) (Oral)   Resp 18   Ht 5' 1\" (1.549 m)   Wt 134 lb 14.4 oz (61.2 kg)   SpO2 99%   BMI 25.49 kg/m²       PHYSICAL EXAMINATION:       General Appearance:  Alert, cooperative, no signs of distress, appears stated age   Skin:  No rash or lesions   HEENT:  Normocephalic, conjunctiva/corneas clear; eyelids intact, trach in place   Ears:  Normal external ear and canals   Nose: Nares normal, mucosa normal, no drainage    Throat: Lips and tongue normal; teeth normal;  gums normal   Neck: Supple, intact flexion, extension and rotation;   trachea midline;  no adenopathy;   thyroid: not enlarged;   no carotid pulse abnormality   Lungs:   Respirations unlabored   Heart: Regular rate and rhythm   Abdominal: BS present, soft, NT, ND   Extremities: No cyanosis, no edema   Psych Normal affect      NEUROLOGICAL EXAMINATION:      Mental status    Awake, alert and oriented; trach in place, minimal verbal      Cranial nerves    II - visual fields intact to confrontation                                                III, IV, VI - PERRLA, EOMI, no pupillary defect; no KEYANA, no nystagmus, no ptosis   V - normal facial sensation                                                               VII - normal facial symmetry                                                             VIII - intact hearing                                                                             IX, X - symmetrical palate                                                                  XI - symmetrical shoulder shrug                                                       XII - midline tongue without atrophy or fasciculation      Motor function  No abnormal movements; tone and bulk okay  Muscle strength:   RUE: delta 4+/5, biceps 5/5, triceps 5/5,  5/5  LUE: delta 5/5, biceps 5/5, triceps 5/5,  5/5  RLE: hf 5/5, ke 5/5, kf 5/5, df 5/5, pf 5/5  LLE: hf 5/5, ke 5/5, kf 5/5, df 5/5, pf 5/5     Coordination FNF no dysmetria, heel to shin okay, PATRICK okay, Romberg deferred      Reflex function Intact 2+ DTR and symmetric. Negative Babinski      Gait                  Not tested      Sensory function Intact to light touch/temp/pp/vibration in bilateral upper and lower extremities. Intact joint position sense, no extinction       LABS & TESTS:     LABS & TESTS  Lab Results   Component Value Date    WBC 6.9 11/15/2020    HGB 8.6 (L) 11/15/2020    HCT 26.3 (L) 11/15/2020    MCV 85.4 11/15/2020     11/15/2020     ASSESSMENT:   1. HA, multiple factors, resolved with HA cocktail   2. Medastinal mass  3. Previous history of cancers  4. Poor sleep, resolved  5. Hyponatrium    PLANS:   1.  Continue elavil with titration 2. Limit NSAIDs and narcotics to prevent medication overuse or rebound headache  3. follow with oncology  4. Supportive care       Thank you for this interesting consult, neurology is signing off, please call with any questions, will follow.      Claudene Lacrosse, MD  Neurology Attending  Clinic: [phone]

## 2020-11-15 NOTE — PLAN OF CARE
Problem: Pain:  Goal: Pain level will decrease  Description: Pain level will decrease  Outcome: Met This Shift     Problem: Pain:  Goal: Control of chronic pain  Description: Control of chronic pain  Outcome: Met This Shift     Problem: Falls - Risk of:  Goal: Will remain free from falls  Description: Will remain free from falls  Outcome: Ongoing  Goal: Absence of physical injury  Description: Absence of physical injury  Outcome: Ongoing     Problem: Nutrition  Goal: Optimal nutrition therapy  Outcome: Ongoing     Problem: Breathing Pattern - Ineffective:  Goal: Ability to achieve and maintain a regular respiratory rate will improve  Description: Ability to achieve and maintain a regular respiratory rate will improve  Outcome: Ongoing     Problem: Skin Integrity:  Goal: Will show no infection signs and symptoms  Description: Will show no infection signs and symptoms  Outcome: Ongoing  Goal: Absence of new skin breakdown  Description: Absence of new skin breakdown  Outcome: Ongoing     Problem: Falls - Risk of:  Goal: Will remain free from falls  Description: Will remain free from falls  Outcome: Ongoing     Problem: Falls - Risk of:  Goal: Absence of physical injury  Description: Absence of physical injury  Outcome: Ongoing     Problem: Nutrition  Goal: Optimal nutrition therapy  Outcome: Ongoing     Problem: Breathing Pattern - Ineffective:  Goal: Ability to achieve and maintain a regular respiratory rate will improve  Description: Ability to achieve and maintain a regular respiratory rate will improve  Outcome: Ongoing

## 2020-11-15 NOTE — PROGRESS NOTES
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 Subsequent PET scan demonstrated PET avidity in the left floor of mouth/oropharynx as well as an a large right paratracheal mass. There were small to moderate bilateral pleural effusions appreciated. CT scan at this time demonstrated, as well, the left oropharyngeal soft tissue mass and the right superior mediastinal necrotic timi mass    Recently there was a plan for him to see medical oncology and radiation oncology here locally in [place].     Admission he had a CT scan of the chest which showed large medial right upper lobe/superior mediastinal mass with mass-effect on the trachea and esophagus concerning for neoplastic process. Additionally mildly enlarged right paratracheal lymph node noted. Past Medical History:   has a past medical history of Arthritis, Asthma, CKD (chronic kidney disease), stage III, COPD (chronic obstructive pulmonary disease) ([address] GERD (gastroesophageal reflux disease), Gout, Hypertension, Hyponatremia, Iliac artery aneurysm, right ([address] Laryngeal cancer ([address] Lung cancer ([address] and Prostate cancer ([address] Past Surgical History:   has a past surgical history that includes Tracheal surgery (3-4 years ago); Prostate surgery; Cardiac catheterization (03/04/2020); and AAA repair, endovascular (Right, 4/7/2020). Medications:    Prior to Admission medications    Medication Sig Start Date End Date Taking?  Authorizing Provider   mirtazapine (REMERON) 15 MG tablet Take 7.5 mg by mouth nightly Takes 1/2 tab (=7.5mg) nightly    Historical Provider, MD   sodium bicarbonate 650 MG tablet Take 650 mg by mouth 3 times daily    Historical Provider, MD   hydrALAZINE (APRESOLINE) 50 MG tablet Take 1 tablet by mouth every 8 hours 6/6/20   Paige Doyle MD   metoprolol succinate (TOPROL XL) 50 MG extended release tablet Take 1 tablet by mouth daily 6/7/20   Doris Olivares MD   bicalutamide (CASODEX) 50 MG chemo tablet Take 1 tablet by mouth daily 6/7/20   Doris Olivares MD   amLODIPine (NORVASC) 10 MG tablet Take 1 tablet by mouth daily 4/18/20   Doris Olivares MD   Cholecalciferol (VITAMIN D3) 50 MCG (2000 UT) CAPS Take 1 capsule by mouth daily    Historical Provider, MD   albuterol sulfate  (90 Base) MCG/ACT inhaler Inhale 2 puffs into the lungs every 6 hours as needed for Wheezing or Shortness of Breath    Historical Provider, MD   budesonide-formoterol (SYMBICORT) 160-4.5 MCG/ACT AERO Inhale 2 puffs into the lungs 2 times daily    Historical Provider, MD   omeprazole (PRILOSEC) 20 MG delayed release capsule Take 20 mg by mouth every morning (before breakfast)    Historical Provider, MD   allopurinol (ZYLOPRIM) 300 MG tablet Take 300 mg by mouth daily.     Historical Provider, MD     Current Facility-Administered Medications   Medication Dose Route Frequency Provider Last Rate Last Dose    magnesium sulfate 1.5 g in dextrose 5 % 100 mL IVPB  1.5 g Intravenous Q12H Vashti Gutierrez MD   Stopped at 11/15/20 0518    diphenhydrAMINE (BENADRYL) injection 25 mg  25 mg Intravenous Q12H Vashti Gutierrez MD   25 mg at 11/15/20 0303    ondansetron (ZOFRAN) injection 4 mg  4 mg Intravenous 2 times per day Vashti Gutierrez MD   4 mg at 11/15/20 0303    amitriptyline (ELAVIL) tablet 10 mg  10 mg Oral Nightly Vashti Gutierrez MD   10 mg at 11/14/20 2109    [START ON 11/21/2020] amitriptyline (ELAVIL) tablet 25 mg  25 mg Oral Nightly Vashti Gutierrez MD        acetaminophen (TYLENOL) tablet 650 mg  650 mg Oral Q4H PRN Edenilson Maki MD   650 mg at 11/14/20 0342    predniSONE (DELTASONE) tablet 30 mg  30 mg Oral Daily Doris Olivares MD   30 mg at 11/14/20 0905    HYDROcodone-acetaminophen (NORCO) 5-325 MG per tablet 1 tablet  1 tablet Oral Q6H PRN Doris Olivares MD   1 tablet at 11/14/20 infusion   Intravenous Continuous Shiela Murillo MD 50 mL/hr at 20 1324      azithromycin (ZITHROMAX) 500 mg in D5W 250ml addavial  500 mg Intravenous Q24H Shiela Murillo MD   Stopped at 20 2214       Allergies:  Amino acids and Lisinopril    Social History:   reports that he has quit smoking. His smoking use included cigarettes. He has never used smokeless tobacco. He reports previous alcohol use of about 5.0 standard drinks of alcohol per week. He reports that he does not use drugs. Family History: family history includes Diabetes in his mother and sister; Heart Disease in his mother and sister. REVIEW OF SYSTEMS:    Constitutional: No fever or chills. No night sweats, no weight loss   Eyes: No eye discharge, double vision, or eye pain   HEENT: negative for sore mouth, sore throat, hoarseness and voice change   Respiratory: ++ cough ,+ sputum, ++dyspnea, wheezing, hemoptysis, chest pain   Cardiovascular:++ chest pain, dyspnea, palpitations, orthopnea, PND   Gastrointestinal: negative for nausea, vomiting, diarrhea, constipation, abdominal pain, Dysphagia, hematemesis and hematochezia   Genitourinary: negative for frequency, dysuria, nocturia, urinary incontinence, and hematuria   Integument: negative for rash, skin lesions, bruises.    Hematologic/Lymphatic: negative for easy bruising, bleeding, lymphadenopathy, or petechiae   Endocrine: negative for heat or cold intolerance,weight changes, change in bowel habits and hair loss   Musculoskeletal: negative for myalgias, arthralgias, pain, joint swelling,and bone pain   Neurological: negative for headaches, dizziness, seizures, weakness, numbness    PHYSICAL EXAM:      /75   Pulse 73   Temp 98.2 °F (36.8 °C) (Oral)   Resp 18   Ht 5' 1\" (1.549 m)   Wt 134 lb 14.4 oz (61.2 kg)   SpO2 98%   BMI 25.49 kg/m²    Temp (24hrs), Av.7 °F (36.5 °C), Min:97.3 °F (36.3 °C), Max:98.2 °F (36.8 °C)    General appearance - well appearing, no in pain or distress   Mental status - alert and cooperative   Eyes - pupils equal and reactive, extraocular eye movements intact   Ears - bilateral TM's and external ear canals normal   Mouth - mucous membranes moist, pharynx normal without lesions   Neck - s/p trach   Lymphatics - no palpable lymphadenopathy, no hepatosplenomegaly   Chest - clear to auscultation, no wheezes, rales or rhonchi, symmetric air entry   Heart - normal rate, regular rhythm, normal S1, S2, no murmurs  Abdomen - soft, nontender, nondistended, no masses or organomegaly   Neurological - alert, oriented, normal speech, no focal findings or movement disorder noted   Musculoskeletal - no joint tenderness, deformity or swelling   Extremities - peripheral pulses normal, no pedal edema, no clubbing or cyanosis   Skin - normal coloration and turgor, no rashes, no suspicious skin lesions noted ,    DATA:    Labs:   CBC:   Recent Labs     11/14/20  0640 11/15/20  0514   WBC 8.2 6.9   HGB 9.1* 8.6*   HCT 27.5* 26.3*    266     BMP:   Recent Labs     11/14/20  0640 11/15/20  0514   * 127*   K 4.5 5.0   CO2 21 21   BUN 42* 45*   CREATININE 2.82* 2.84*   LABGLOM 22* 22*   GLUCOSE 93 93     PT/INR:   No results for input(s): PROTIME, INR in the last 72 hours. IMAGING DATA:  @IMG@   MRI BRAIN WO CONTRAST   Final Result   No acute intracranial abnormality visualized. XR CHEST PORTABLE   Final Result   Stable exam demonstrating left basilar opacity. CT HEAD WO CONTRAST   Final Result   No acute intracranial abnormality. Microangiopathic change. XR CHEST PORTABLE   Final Result   No definite pneumothorax status post right thoracentesis. Improved right pleural effusion and aeration to the right lung base. Stable dense opacity correlating with mass to the right upper lobe medially   better delineated on prior CT chest 11/10/2020.          IR GUIDED THORACENTESIS PLEURAL   Final Result   Successful ultrasound guided diagnostic and therapeutic right thoracentesis. XR CHEST PORTABLE   Final Result   Slight increase in right-sided pleural effusion when compared to previous. Continued masslike density the right paratracheal region. CT CHEST WO CONTRAST   Final Result   1. Large medial right upper lobe/superior mediastinal mass with mass effect   on the trachea and esophagus, concerning for neoplastic disease. 2.  Additional mildly enlarged right paratracheal lymph nodes may represent   disease involvement. 3.  Moderate size right pleural effusion. Dependent subsegmental atelectasis. XR CHEST PORTABLE   Final Result   Small right pleural effusion and adjacent airspace disease, likely   atelectasis. Right paratracheal masslike density. Further evaluation with   contrast-enhanced CT recommended. Primary Problem  <principal problem not specified>    Active Hospital Problems    Diagnosis Date Noted    Severe malnutrition ([facility] [E43] 11/11/2020    Mediastinal mass [J98.59] 11/10/2020         IMPRESSION:   1. 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 Oral cavity SCC, [id number] involving the anterior and left alveolus with erosion of the mandible as well as extension into the left floor of mouth and dorsal lateral tongue and is s/p left composite resection with segmental mandibulectomy (right mid body to the left angle), left floor of mouth, left partial glossectomy, bilateral neck dissection, left tonsillectomy, and scapular reconstruction, now with recurrent invasive keratinizing squamous cell carcinoma, moderately to poorly differentiated, p16 negative of left oropharynx. 3. Facial swelling  4. Shortness of breath  5. Alcohol abuse       RECOMMENDATIONS:  1. I reviewed the laboratory, imaging studies, diagnosis, prognosis and treatment options  2.  Follow-up on results of pleural fluid cytology. 3. 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 New primary lung cancer is also in the differential.  We will follow-up on pleural fluid cytology  4. Recheck PSA is 1.6. Patient is on Casodex  5. Patient will need outpatient follow-up. 6. Okay to discharge once stable  7. Follow-up with medical oncology radiation oncology as outpatient      Discussed with pt and Nurse. Thank you for asking us to see this patient. Kyle Villalpando        This note is created with the assistance of a speech recognition program.  While intending to generate a document that actually reflects the content of the visit, the document can still have some errors including those of syntax and sound a like substitutions which may escape proof reading. It such instances, actual meaning can be extrapolated by contextual diversion.

## 2020-11-15 NOTE — PROGRESS NOTES
Physical Therapy  Facility/Department: [facility] PROGRESSIVE CARE  Daily Treatment Note  NAME: Windy Mari  : 1950  MRN: [id number]    Date of Service: 11/15/2020    Discharge Recommendations:  Home with assist PRN, Home with Home health PT        Assessment   Body structures, Functions, Activity limitations: Decreased functional mobility ; Decreased safe awareness;Decreased endurance;Decreased balance  Assessment: Pt continues to require skilled PT to improve bilateral LE muscle strength, transfers, and gait. Prognosis: Good  PT Education: PT Role;Plan of Care  Patient Education: Supine therex  REQUIRES PT FOLLOW UP: Yes  Activity Tolerance  Activity Tolerance: Patient limited by endurance     Patient Diagnosis(es): The primary encounter diagnosis was Mediastinal mass. A diagnosis of Dyspnea and respiratory abnormalities was also pertinent to this visit. has a past medical history of Arthritis, Asthma, CKD (chronic kidney disease), stage III, COPD (chronic obstructive pulmonary disease) ([facility] GERD (gastroesophageal reflux disease), Gout, Hypertension, Hyponatremia, Iliac artery aneurysm, right ([facility] [address] [facility] Laryngeal cancer ([facility] [address] Lung cancer ([facility] [address] and Prostate cancer ([facility] [address] [facility] has a past surgical history that includes Tracheal surgery (3-4 years ago); Prostate surgery; Cardiac catheterization (2020); and AAA repair, endovascular (Right, 2020). Restrictions  Restrictions/Precautions  Restrictions/Precautions: General Precautions, Fall Risk  Position Activity Restriction  Other position/activity restrictions: up with assist, contact isolation, trach, LUE IV, dental soft diet  Subjective   General  Chart Reviewed: Yes  Family / Caregiver Present: No  Subjective  Subjective: Pt shook head against getting out of bed. Pt did agree to supine exercises today.   Pain Screening  Patient Currently in Pain: Denies  Vital Signs  Patient Currently in Pain: Denies       Orientation  Orientation  Overall Orientation Status: Within Functional Limits(mouths words or writes due to stoma)  Cognition      Objective   Bed mobility  Rolling to Left: Stand by assistance  Supine to Sit: Contact guard assistance  Sit to Supine: Contact guard assistance  Scooting: Contact guard assistance  Comment: Attempted seated exercises EOB but pt didn't want to remain sitting EOB. Transfers  Comment: Pt refused to leave bed  Ambulation  Ambulation?: No  Ambulation 1  Comments: Pt refused leaving bed today        Exercises  Comments: Supine therex x 20 AROM                        G-Code     OutComes Score                                                     AM-PAC Score             Goals  Short term goals  Time Frame for Short term goals: 12 visits  Short term goal 1: Inc bed-mobility & transfers to independent to enable pt to safely get in/OOB  Short term goal 2: Inc gait to amb 200ft or > indep w/ RW to enable pt to return to previous level of independence  Short term goal 3: Pt able to go up/down 5 steps with Moe rails indep  Short term goal 4:  Inc standing balance to good & able to tolerate 30-40 min of activity to include 15-20 reps of ex & functional mobility of at least 5 minutes standing to facilitate activity tolerance to [facility] & pt independence for performance of ADL's & functional mobilit with reduced fall risk  Short term goal 5: Ed pt on home ex's, safety & energy principles & issue written home program;    Plan    Plan  Times per week: 1-2x/D, 5-6D/week  Current Treatment Recommendations: Strengthening, Balance Training, Functional Mobility Training, Transfer Training, Endurance Training, Gait Training, Home Exercise Program, Safety Education & Training, Patient/Caregiver Education & Training  Safety Devices  Type of devices: Call light within reach, Bed alarm in place, All fall risk precautions in place, Left in bed     Therapy Time   Individual Concurrent Group Co-treatment   Time In 0923         Time Out 0942         Minutes 19 Giuliana Walsh, PTA

## 2020-11-15 NOTE — DISCHARGE SUMMARY
Physician Discharge Summary     Patient ID:  Carlos Marshall  [id number]  78 y.o.  1950    Admit date: 11/10/2020    Discharge date and time: 11/15/2020    Admission Diagnoses:   Patient Active Problem List   Diagnosis    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 [facility]    Laryngeal cancer ([facility]    Mouth swelling    COPD (chronic obstructive pulmonary disease) ([facility]    Severe malnutrition (HCC)    Facial edema    Common iliac aneurysm (HCC)    Essential hypertension    Stage 3 chronic kidney disease    Acute kidney injury (nontraumatic) (HCC)    NSTEMI (non-ST elevated myocardial infarction) ([facility]    Pathological fracture of lumbar vertebra due to secondary osteoporosis (HCC)    Age-related osteoporosis with current pathological fracture    Intractable back pain    Iron deficiency anemia    Anemia, blood loss    Chronic bilateral pleural effusions    Diastolic CHF, acute on chronic (HCC)    Hyponatremia    Mediastinal mass       Discharge Diagnoses: COPD exacerbation  Mediastinal mass with adenopathy  History of laryngeal cancer  History of nasopharyngeal cancer  Prostate CA  Chronic diastolic CHF  Iron deficiency anemia  CKD 4  Chronic hyponatremia  nonSpecific cephalgia  Pulmonary hypertension  Mitral regurg nonrheumatic  Consults: pulmonary/intensive care, hematology/oncology, neurology and ENT  proocedures: none    Hospital Course: Admitted with shortness of breath treated with COPD exacerbation protocol with IV steroids antibiotic bronchodilators was found to have pleurisy the mediastinal mass with adenopathy he is known to have laryngeal cancer in the past and oral cancer was seen by oncology they are planning to do chemotherapy on him as an outpatient, patient did fairly well with no major complication his sodium was low and he was restarted back on his sodium bicarb no other major complication during his stay except for mild headache with CT of the brain was negative was seen by neurology started on amitriptyline    Discharge Exam:  See progress note from today    Disposition: home  stAble improved  Patient Instructions:   Current Discharge Medication List      START taking these medications    Details   !! amitriptyline (ELAVIL) 10 MG tablet Take 1 tablet by mouth nightly  Qty: 7 tablet, Refills: 0      !! amitriptyline (ELAVIL) 25 MG tablet Take 1 tablet by mouth nightly  Qty: 30 tablet, Refills: 0      predniSONE (DELTASONE) 10 MG tablet Take 1 tablet by mouth daily for 10 days Take 1 tablet daily for 5 days then 1 tablet every other day till gone  Qty: 10 tablet, Refills: 0       !! - Potential duplicate medications found. Please discuss with provider. CONTINUE these medications which have NOT CHANGED    Details   mirtazapine (REMERON) 15 MG tablet Take 7.5 mg by mouth nightly Takes 1/2 tab (=7.5mg) nightly      sodium bicarbonate 650 MG tablet Take 650 mg by mouth 3 times daily      hydrALAZINE (APRESOLINE) 50 MG tablet Take 1 tablet by mouth every 8 hours  Qty: 90 tablet, Refills: 0      metoprolol succinate (TOPROL XL) 50 MG extended release tablet Take 1 tablet by mouth daily  Qty: 30 tablet, Refills: 0      amLODIPine (NORVASC) 10 MG tablet Take 1 tablet by mouth daily  Qty: 30 tablet, Refills: 0      Cholecalciferol (VITAMIN D3) 50 MCG (2000 UT) CAPS Take 1 capsule by mouth daily      albuterol sulfate  (90 Base) MCG/ACT inhaler Inhale 2 puffs into the lungs every 6 hours as needed for Wheezing or Shortness of Breath      budesonide-formoterol (SYMBICORT) 160-4.5 MCG/ACT AERO Inhale 2 puffs into the lungs 2 times daily      omeprazole (PRILOSEC) 20 MG delayed release capsule Take 20 mg by mouth every morning (before breakfast)      allopurinol (ZYLOPRIM) 300 MG tablet Take 300 mg by mouth daily.          STOP taking these medications       ipratropium-albuterol (DUONEB) 0.5-2.5 (3) MG/3ML SOLN nebulizer solution Comments:   Reason for Stopping:         bicalutamide (CASODEX) 50 MG chemo tablet Comments:   Reason for Stopping:             Activity: activity as tolerated  Diet: renal diet    Follow-up with pCP in week  Oncology in 1 week for outpatient chemotherapy advised to avoid nephrotoxic drugs we will send home with visiting home nurse will check BMP and CBC on Monday advised using NSAIDs more than 30 minutes spent on discharge  Signed:   Tom Salazar MD  11/15/2020  2:23 PM

## 2020-11-15 NOTE — PROGRESS NOTES
Subjective:  Exacerbation COPD  Shortness of breath less tachypnea no hypoxemia    ROS  No fever no chills no GI/ complaints no cardiac complaints no TIA no bleeding no headache no sore throat no skin lesions, no polyuria no polydipsia no hypoglycemia  physical exam  General Appearance: in no acute distress and alert  Skin: warm and dry, no rash or erythema  Head: normocephalic and atraumatic  Eyes: sclera anicteric and positive pallor  Neck: neck supple and non tender without mass tracheostomy in place  Pulmonary/Chest: Entry equal no rhonchi no wheezing decreased at the bases no use of intercostal muscles  Cardiovascular: normal rate, regular rhythm, normal S1 and S2, no gallops, intact distal pulses and no carotid bruits  Abdomen: soft, non-tender, non-distended, normal bowel sounds, no masses or organomegaly  Extremities: no edema and has no Homans' sign  Neurologic: Alert oriented x3 no focal deficit    BP (!) 150/73   Pulse 72   Temp 97.9 °F (36.6 °C) (Oral)   Resp 18   Ht 5' 1\" (1.549 m)   Wt 134 lb 14.4 oz (61.2 kg)   SpO2 99%   BMI 25.49 kg/m²     CBC:   Lab Results   Component Value Date    WBC 6.9 11/15/2020    RBC 3.08 11/15/2020    HGB 8.6 11/15/2020    HCT 26.3 11/15/2020    MCV 85.4 11/15/2020    MCH 27.9 11/15/2020    MCHC 32.7 11/15/2020    RDW 14.9 11/15/2020     11/15/2020    MPV 10.1 11/15/2020     BMP:    Lab Results   Component Value Date     11/15/2020    K 5.0 11/15/2020    CL 98 11/15/2020    CO2 21 11/15/2020    BUN 45 11/15/2020    LABALBU 3.3 11/11/2020    CREATININE 2.84 11/15/2020    CALCIUM 8.5 11/15/2020    GFRAA 27 11/15/2020    LABGLOM 22 11/15/2020    GLUCOSE 93 11/15/2020        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 malnutrition (HCC)    Facial edema    Common iliac aneurysm [facility])    Essential hypertension    Stage 3 chronic kidney disease    Acute kidney injury (nontraumatic) (HCC)    NSTEMI (non-ST elevated myocardial infarction) [facility]    Pathological fracture of lumbar vertebra due to secondary osteoporosis (HCC)    Age-related osteoporosis with current pathological fracture    Intractable back pain    Iron deficiency anemia    Anemia, blood loss    Chronic bilateral pleural effusions    Diastolic CHF, acute on chronic (HCC)    Hyponatremia    Mediastinal mass     cOPD exacerbation much better  Mediastinal mass likely metastasis patient will need to follow-up with oncology for chemotherapy treatment as an outpatient  Laryngeal cancer in the past  Nasopharyngeal cancer  Prostate CA  Chronic diastolic CHF  Iron deficiency anemia  CKD 4  Chronic hyponatremia  .   Pulmonary hypertension  Mitral regurg  Plan:    Meds labs reviewed we will recheck BMP if stable will discharge home to follow-up with oncology for chemotherapy overall prognosis is very guarded patient aware restart sodium bicarb      Mamie Yung MD  2:14 PM

## 2020-11-15 NOTE — PROGRESS NOTES
Pt taken out per wheelchair. Patient discharged via private auto with family member (brother-Bert). Discharge paperwork and instructions given to patient and discussed with brother. Patient  And brother acknowledges understanding. Scripts given to Patient (e-scripted to patients pharmacy) for  New medications and side effects explained. Follow up appointments reviewed (directions to make follow up appointment given)  Discharge checklist completed   EDDIE completed and signed per writer and physician for  Any questions answered.      Telemetry monitor returned

## 2020-11-15 NOTE — PLAN OF CARE
Problem: Falls - Risk of:  Goal: Will remain free from falls  Description: Will remain free from falls  11/14/2020 2127 by Violette Potter RN  Outcome: Ongoing     Problem: Pain:  Description: Pain management should include both nonpharmacologic and pharmacologic interventions.   Goal: Pain level will decrease  Description: Pain level will decrease  11/14/2020 2127 by Violette Potter RN  Outcome: Ongoing     Problem: Breathing Pattern - Ineffective:  Goal: Ability to achieve and maintain a regular respiratory rate will improve  Description: Ability to achieve and maintain a regular respiratory rate will improve  11/14/2020 2127 by Violette Potter RN  Outcome: Ongoing     Problem: Skin Integrity:  Goal: Will show no infection signs and symptoms  Description: Will show no infection signs and symptoms  11/14/2020 2127 by Violette Potter RN  Outcome: Ongoing

## 2020-11-16 ENCOUNTER — CARE COORDINATION (OUTPATIENT)
Dept: CASE MANAGEMENT | Age: 70
End: 2020-11-16

## 2020-11-16 LAB — SURGICAL PATHOLOGY REPORT: NORMAL

## 2020-11-16 NOTE — CARE COORDINATION
[facility] Transitions Initial Follow Up Call- COVID risk follow up call      Call within 2 business days of discharge: Yes    Patient: Darek Herbert Patient : 1950   MRN: [id number]  Reason for Admission: mediastinal mass, dyspnea, cough, hemoptysis   Discharge Date: 11/15/20 RARS: Readmission Risk Score: 52      Last Discharge [facility]       Complaint Diagnosis Description Type Department Provider    11/10/20 Shortness of Breath; Cough; Hemoptysis Mediastinal mass . .. ED to Hosp-Admission (Discharged) (ADMITTED) Blane Emerson MD; Padmini Mariee. .. Spoke with: pt brother Charity Alvarez     Patient brother returns call. States pt is doing well today  States he still has exertional dyspnea but denies hemoptysis  Denies fever/ chills       Challenges to be reviewed by the provider   Additional needs identified to be addressed with provider No  none    Discussed COVID-19 related testing which was available at this time. Test results were negative. Patient informed of results, if available? Yes         Method of communication with provider : none    Advance Care Planning:   Does patient have an Advance Directive:  not on file; education provided. Was this a readmission? No  Patient stated reason for admission: short of breath   Patients top risk factors for readmission: lack of knowledge about disease and medical condition    Care Transition Nurse (CTN) contacted the family by telephone to perform post hospital discharge assessment. Verified name and  with family as identifiers. Provided introduction to self, and explanation of the CTN role. CTN reviewed discharge instructions, medical action plan and red flags with family who verbalized understanding. Family given an opportunity to ask questions and does not have any further questions or concerns at this time. Were discharge instructions available to patient? Yes.  Reviewed appropriate site of care based on symptoms and

## 2020-11-16 NOTE — CARE COORDINATION
[facility] Transitions Initial Follow Up Call- 1st attempt COVID risk follow up call       Call within 2 business days of discharge: Yes    Patient: Ricardo Fine Patient : 1950   MRN: [id number]  Reason for Admission: mediastinal mass, dyspnea, cough, hemoptysis  Discharge Date: 11/15/20 RARS: Readmission Risk Score: 52      Last Discharge [address] [address]       Complaint Diagnosis Description Type Department Provider    11/10/20 Shortness of Breath; Cough; Hemoptysis Mediastinal mass . .. ED to Hosp-Admission (Discharged) (ADMITTED) Boris Kim MD; Alejandrina Harris. .. Spoke with: Chaitanya Gibson with [facility]     Attempted to reach patient for initial transitional call.   VM left to return call to [phone]         Non-face-to-face services provided:  Communication with home health agencies or other community services the patient is currently using-call to confirm [facility] today         Follow Up  Future Appointments   Date Time Provider Loraine Honeycutt   2020  2:00 PM MD Elmira Stack RN

## 2020-11-17 ENCOUNTER — HOSPITAL ENCOUNTER (OUTPATIENT)
Dept: RADIATION ONCOLOGY | Facility: MEDICAL CENTER | Age: 70
Discharge: HOME | End: 2020-11-17
Payer: MEDICARE

## 2020-11-17 ENCOUNTER — TELEPHONE (OUTPATIENT)
Dept: ONCOLOGY | Age: 70
End: 2020-11-17

## 2020-11-17 ENCOUNTER — SOCIAL WORK (OUTPATIENT)
Dept: ONCOLOGY | Age: 70
End: 2020-11-17

## 2020-11-17 VITALS
TEMPERATURE: 96.7 F | DIASTOLIC BLOOD PRESSURE: 72 MMHG | SYSTOLIC BLOOD PRESSURE: 168 MMHG | HEART RATE: 92 BPM | OXYGEN SATURATION: 98 % | RESPIRATION RATE: 16 BRPM | WEIGHT: 131.38 LBS | BODY MASS INDEX: 24.82 KG/M2

## 2020-11-17 LAB
FLOW CYTOMETRY SOURCE: NORMAL
FLOW CYTOMETRY, NODE/FLUID: NORMAL

## 2020-11-17 PROCEDURE — 99213 OFFICE O/P EST LOW 20 MIN: CPT

## 2020-11-17 PROCEDURE — 99205 OFFICE O/P NEW HI 60 MIN: CPT | Performed by: RADIOLOGY

## 2020-11-17 NOTE — TELEPHONE ENCOUNTER
Name: Yessenia Long  : 1950  MRN: [id number]    Oncology Navigation Follow-Up Note    Contact Type:  Telephone  Notes: Payal Londono R.N., [facility], alerted writer to pt's case & requested oncology navigation. Upon review of chart pt known to writer r/t previous oncology navigation. Requested Payal Londono confirm pt's emergency contact & instructed writer will begin navigation. Noted Nitza, [facility]/ , attempted to contact pt to schedule f/u. Requested Nitza contacted prior to pt leaving [facility] to coordinate f/u. Payal Londono stated may contact pt's brother, Etelvina Taylor, @ [phone]. Will continue to follow.      Electronically signed by Krzysztof Castillo RN on 2020 at 4:06 PM

## 2020-11-17 NOTE — PROGRESS NOTES
Referring Physician: Conchita Quezada    Pt here for consult for larynx. Patient had previous surgery to his neck. Patient had trach. Patient has never had radiation. Ramesh Gaytan, brother is with patient today and states we can contact him for things. Dr. Leland Arenas to eval. Dr. Leland Arenas is to call Dr. Dayan Conde to see about his plan for treatment. I called and spoke to [facility] at [facility] and pt is scheduled with Dr. Dayan Conde  at [facility]. Vitals:    20 1357   BP: (!) 168/72   Pulse: 92   Resp: 16   Temp: 96.7 °F (35.9 °C)   SpO2: 98%    :  Patient Currently in Pain: No             Wt Readings from Last 1 Encounters:   20 131 lb 6 oz (59.6 kg)        Body mass index is 24.82 kg/m². Immunizations:    Influenza status:    [x]   Current   []   Patient declined    Pneumococcal status:  [x]   Current  []   Patient declined    Smoking Status:    [] Smoker - PPD:   [x] Nonsmoker - Quit Date: 20 yrs ago              [] Never a smoker      No chief complaint on file. Cancer Staging  No matching staging information was found for the patient. Prior Radiation Therapy? No   If yes, site treated:   Facility:                             Date:    Concurrent Chemo/radiation? No   If yes, start date:    Prior Chemotherapy? No   If yes    Facility:                             Date:    Prior Hormonal Therapy? No   If yes   Facility:                             Date:    Head and Neck Cancer? Yes   If yes, please remind physician to place swallow study order and speech therapy order.       Pacemaker/Defibulator/ICD:  No             BREAST/GYN  Patient only:     LMP:    Age at first Menses:    Para:    :    Cup size:    Lymphedema Evaluation[de-identified]   [] left arm      [] right arm  Location:     Measurement (cm)    Upper Bicep :    Lower Bicep :           Current Outpatient Medications   Medication Sig Dispense Refill    predniSONE (DELTASONE) 10 MG tablet Take 1 tablet by mouth daily for 10 days Take 1 tablet daily for 5 days then 1 tablet every other day till gone 10 tablet 0    sodium bicarbonate 650 MG tablet Take 650 mg by mouth 3 times daily      hydrALAZINE (APRESOLINE) 50 MG tablet Take 1 tablet by mouth every 8 hours 90 tablet 0    metoprolol succinate (TOPROL XL) 50 MG extended release tablet Take 1 tablet by mouth daily 30 tablet 0    amLODIPine (NORVASC) 10 MG tablet Take 1 tablet by mouth daily 30 tablet 0    Cholecalciferol (VITAMIN D3) 50 MCG (2000 UT) CAPS Take 1 capsule by mouth daily      albuterol sulfate  (90 Base) MCG/ACT inhaler Inhale 2 puffs into the lungs every 6 hours as needed for Wheezing or Shortness of Breath      budesonide-formoterol (SYMBICORT) 160-4.5 MCG/ACT AERO Inhale 2 puffs into the lungs 2 times daily      omeprazole (PRILOSEC) 20 MG delayed release capsule Take 20 mg by mouth every morning (before breakfast)      allopurinol (ZYLOPRIM) 300 MG tablet Take 300 mg by mouth daily.  amitriptyline (ELAVIL) 10 MG tablet Take 1 tablet by mouth nightly 7 tablet 0    [START ON 11/21/2020] amitriptyline (ELAVIL) 25 MG tablet Take 1 tablet by mouth nightly 30 tablet 0    mirtazapine (REMERON) 15 MG tablet Take 7.5 mg by mouth nightly Takes 1/2 tab (=7.5mg) nightly       No current facility-administered medications for this encounter. Past Medical History:   Diagnosis Date    Arthritis     Asthma     CKD (chronic kidney disease), stage III     COPD (chronic obstructive pulmonary disease) (HCC)     GERD (gastroesophageal reflux disease)     Gout     Hypertension     Hyponatremia     Iliac artery aneurysm, right (HCC)     Laryngeal cancer (HCC)     Laryngeal Cancer. Chronic left facial edema.      Lung cancer ([address] 3-4 years ago    [facility] [place]    Prostate cancer [facility])        Past Surgical History:   Procedure Laterality Date    ABDOMINAL AORTIC ANEURYSM REPAIR, ENDOVASCULAR Right 4/7/2020    RIGHT ILIAC ARTERY ANEURYSM REPAIR ENDOVASCULAR performed by Fabiano Ozuna MD at [facility]  03/04/2020    No stents placed   [facility]  3-4 years ago    lung removed and \"voice box\" removed       Family History   Problem Relation Age of Onset    Diabetes Mother     Heart Disease Mother         Sudden cardiac death    Cancer Father     Diabetes Sister     Heart Disease Sister        Social History     Socioeconomic History    Marital status: Single     Spouse name: Not on file    Number of children: Not on file    Years of education: Not on file    Highest education level: Not on file   Occupational History    Not on file   Social Needs    Financial resource strain: Not on file    Food insecurity     Worry: Not on file     Inability: Not on file    Transportation needs     Medical: Not on file     Non-medical: Not on file   Tobacco Use    Smoking status: Former Smoker     Types: Cigarettes    Smokeless tobacco: Never Used    Tobacco comment: quit smoking 20 years ago    Substance and Sexual Activity    Alcohol use: Not Currently     Alcohol/week: 5.0 standard drinks     Types: 5 Cans of beer per week    Drug use: No    Sexual activity: Never   Lifestyle    Physical activity     Days per week: Not on file     Minutes per session: Not on file    Stress: Not on file   Relationships    Social connections     Talks on phone: Not on file     Gets together: Not on file     Attends Caodaism service: Not on file     Active member of club or organization: Not on file     Attends meetings of clubs or organizations: Not on file     Relationship status: Not on file    Intimate partner violence     Fear of current or ex partner: Not on file     Emotionally abused: Not on file     Physically abused: Not on file     Forced sexual activity: Not on file   Other Topics Concern    Not on file   Social History Narrative    Not on file             FALLS RISK SCREEN  Instructions: assistance as indicated for ambulation activities  4. Reorient confused/cognitively impaired patient  5. Call-light/bell within patient's reach  6. Chair/bed in low position, stretcher/bed with siderails up except when performing patient care activities  7. Educate patient/family/caregiver on falls prevention             Assessment/Plan: Patient was seen today for consultation.          Alonso Monge 11/17/2020 2:11 PM

## 2020-11-17 NOTE — CONSULTS
[facility] Oncology  NEW PATIENT CONSULTATION    Date of Service: 2020  Location: [facility]    Patient ID:   Ellis Brady  : 1950   MRN: [id number]    Diagnosis:     Chief Complaint: \"I have cancer. \"    Dear Dr. Sahil Nielsen,    Thank you for requesting a Radiation Oncology consultation on your patient, Ellis Brady. As you know This is a 53-year-old male with heavy drinker, with prior history of glottic SCC who was admitted with shortness of breath, hemoptysis and cough. Brief oncologic history as follows as per recent [facility] clinic oncology notes.     He has a history of glottic SCC s/p TL in [place] in  (TL with primary closure, left neck dissection with sacrifice of IJ and SCM, no adjuvant XRT).    In 2018 he presented with a large oral cavity cancer. On 2018 at the [facility] he underwent a composite resection with segmental mandibulectomy, left partial glossectomy and wide local excision of the left floor of mouth with a bilateral selective neck dissection, transosteal extraoral plating of the mandible and free flap reconstruction. Pathology revealed a 7.5 cm moderate to poorly differentiated squamous cell carcinoma extending into mandible with evident PNI but no LVSI. Depth of invasion was 5.1 cm. There was high-grade dysplasia in the retromolar margin. 2 lymph nodes were identified but uninvolved. Pathologic staging at this time was T4a N0 M0. Postoperative radiation therapy was recommended but declined by the patient and his sister.  Ealge Daily he returned noting increasing pain and swelling, and decreased swallowing. A new left oropharyngeal lesion was identified and biopsied revealing a p16 negative moderate to poorly differentiated squamous cell carcinoma. 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  Subsequent PET scan demonstrated PET avidity in the left floor of mouth/oropharynx as well as an a large right paratracheal mass. There were small to moderate bilateral pleural effusions appreciated. CT scan at this time demonstrated, as well, the left oropharyngeal soft tissue mass and the right superior mediastinal necrotic timi mass     Recently there was a plan for him to see medical oncology and radiation oncology here locally in [facility].     Admission he had a CT scan of the chest which showed large medial right upper lobe/superior mediastinal mass with mass-effect on the trachea and esophagus concerning for neoplastic process. Additionally mildly enlarged right paratracheal lymph node noted. When I saw him basically apart from leaning is that he cannot speech as well and he cannot swallow food but because of pain secondary to the tumor present in the oral and oropharyngeal area. Patient last about 30 pounds over the past 6 months. Patient so desired that he will not aggressive treatment to improve his condition.                         Medical and Surgical History:  Past Medical History:   Diagnosis Date    Arthritis     Asthma     CKD (chronic kidney disease), stage III     COPD (chronic obstructive pulmonary disease) (HCC)     GERD (gastroesophageal reflux disease)     Gout     Hypertension     Hyponatremia     Iliac artery aneurysm, right (HCC)     Laryngeal cancer (HCC)     Laryngeal Cancer. Chronic left facial edema.      Lung cancer ([address] 3-4 years ago    [facility] [place]    Prostate cancer [facility])      Past Surgical History:   Procedure Laterality Date    ABDOMINAL AORTIC ANEURYSM REPAIR, ENDOVASCULAR Right 4/7/2020    RIGHT ILIAC ARTERY ANEURYSM REPAIR ENDOVASCULAR performed by Ct Zhou MD [facility]  03/04/2020    No stents placed   [facility]  3-4 years ago    lung removed and \"voice box\" removed     Family History   Problem Relation Age of Onset    Diabetes Mother     Heart Disease Mother         Sudden cardiac death    Cancer Father     Diabetes Sister     Heart Disease Sister       Social History     Socioeconomic History    Marital status: Single     Spouse name: None    Number of children: None    Years of education: None    Highest education level: None   Occupational History    None   Social Needs    Financial resource strain: None    Food insecurity     Worry: None     Inability: None    Transportation needs     Medical: None     Non-medical: None   Tobacco Use    Smoking status: Former Smoker     Types: Cigarettes    Smokeless tobacco: Never Used    Tobacco comment: quit smoking 20 years ago    Substance and Sexual Activity    Alcohol use: Not Currently     Alcohol/week: 5.0 standard drinks     Types: 5 Cans of beer per week    Drug use: No    Sexual activity: Never   Lifestyle    Physical activity     Days per week: None     Minutes per session: None    Stress: None   Relationships    Social connections     Talks on phone: None     Gets together: None     Attends Rastafarian service: None     Active member of club or organization: None     Attends meetings of clubs or organizations: None     Relationship status: None    Intimate partner violence     Fear of current or ex partner: None     Emotionally abused: None     Physically abused: None     Forced sexual activity: None   Other Topics Concern    None   Social History Narrative    None        Gyn Hx:    Allergies   Allergen Reactions    Amino Acids     Lisinopril Swelling       Current Outpatient Medications:     predniSONE (DELTASONE) 10 MG tablet, Take 1 tablet by mouth daily for 10 days Take 1 tablet daily for 5 days then 1 tablet every other day till gone, Disp: 10 tablet, Rfl: 0    sodium bicarbonate 650 MG tablet, Take 650 mg by mouth 3 times daily, Disp: , Rfl:     hydrALAZINE (APRESOLINE) 50 MG tablet, Take 1 tablet by mouth every 8 hours, Disp: 90 tablet, Rfl: 0   cervical or supraclavicular lymphadenopathy. LUNGS: Decreased air entry ABD: Normoactive bowel sounds, soft, nontender, no visceromegaly or masses appreciated. LYMPH: No lymphadenopathy appreciated. EXT: Full range of motion in all extremities. No cyanosis/clubbing or edema. NEURO: Alert and fully oriented, answers questions appropriately with the help of his friend,   PSYCH: Affect is appropriate for clinical situation. Insight and judgment do not appear impaired. 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 Prostate cancer, PSA is 1.6. Patient is on Casodex    PLAN: Case will be discussed with Dr. Chrissy Huerta. I believe patient will benefit from concurrent chemoradiation. My radiation field will include the oral and oropharyngeal lesion as well as the lung lesion. I will deliver 6000 cGy in 30 treatment using rapid arc VMAT technique. Patient subsequently will be assessed for the boost for his the oral lesions. The chance of controlling oral cancer is about 50%. Early and delayed side effects were explained briefly to the patient and his friend and that would include tiredness, weight loss sore throat sore mouth dysphagia. If patient develop any of these side effects he will get medical treatment.   Late side effects would include fibrosis at the site of the tumor. Osteoradionecrosis of the mandible is not a very common complications. Patient understand benefits as well as early and delayed side effects and agreed to the treatment again I would like thank you very much for letting me participate in his care and I will keep you updated on him. Aleksandar Mabry MD, [address] [facility]  [phone] (cell)    I spent 60 minutes with the patient. >50% of the time was allotted to education, answering questions, and coordinating care. CC:  Patient Care Team:  Jamey Atkins MD as PCP - Dotti Kayser, MD as PCP - [facility]  Aleksandar Mabry MD as Consulting Physician (Oncology)  Brian Elizondo RN as Care Transitions Nurse  Rei Pierre RN as Nurse Navigator (Oncology)     N.B. This note is created with the assistance of a speech recognition program.  While intending to generate a document that actually reflects the content of the visit, the document can still contain errors including those of syntax and sound-alike substitutions that may escape proof reading. In such instances, actual meaning can be extrapolated by contextual diversion.

## 2020-11-18 ENCOUNTER — HOSPITAL ENCOUNTER (OUTPATIENT)
Facility: MEDICAL CENTER | Age: 70
End: 2020-11-18
Payer: MEDICARE

## 2020-11-18 ENCOUNTER — TELEPHONE (OUTPATIENT)
Dept: ONCOLOGY | Age: 70
End: 2020-11-18

## 2020-11-18 NOTE — TELEPHONE ENCOUNTER
Name: Ellis Brady  : 1950  MRN: [id number]    Oncology Navigation Follow-Up Note    Contact Type:  Telephone  Notes: Attempted to contact pt's brother, Fanta Caicedo, no answer, VM left notified Keyon Christian will be navigating oncology care & requested call writer back. Will continue to follow.     Electronically signed by Katharine Sherwood RN on 2020 at 3:36 PM

## 2020-11-19 ENCOUNTER — HOSPITAL ENCOUNTER (INPATIENT)
Age: 70
LOS: 3 days | Discharge: HOME | DRG: 606 | End: 2020-11-24
Attending: EMERGENCY MEDICINE | Admitting: INTERNAL MEDICINE
Payer: MEDICARE

## 2020-11-19 ENCOUNTER — APPOINTMENT (OUTPATIENT)
Dept: CT IMAGING | Age: 70
DRG: 606 | End: 2020-11-19
Payer: MEDICARE

## 2020-11-19 ENCOUNTER — APPOINTMENT (OUTPATIENT)
Dept: GENERAL RADIOLOGY | Age: 70
DRG: 606 | End: 2020-11-19
Payer: MEDICARE

## 2020-11-19 ENCOUNTER — HOSPITAL ENCOUNTER (EMERGENCY)
Age: 70
Discharge: HOME | DRG: 606 | End: 2020-11-19
Attending: EMERGENCY MEDICINE
Payer: MEDICARE

## 2020-11-19 ENCOUNTER — APPOINTMENT (OUTPATIENT)
Dept: NUCLEAR MEDICINE | Age: 70
DRG: 606 | End: 2020-11-19
Payer: MEDICARE

## 2020-11-19 VITALS
RESPIRATION RATE: 24 BRPM | DIASTOLIC BLOOD PRESSURE: 93 MMHG | HEART RATE: 85 BPM | HEIGHT: 64 IN | OXYGEN SATURATION: 97 % | BODY MASS INDEX: 23.9 KG/M2 | TEMPERATURE: 97.4 F | WEIGHT: 140 LBS | SYSTOLIC BLOOD PRESSURE: 154 MMHG

## 2020-11-19 PROBLEM — J90 BILATERAL PLEURAL EFFUSION: Status: ACTIVE | Noted: 2020-11-19

## 2020-11-19 LAB
ABSOLUTE EOS #: 0 K/UL (ref 0–0.4)
ABSOLUTE EOS #: 0.42 K/UL (ref 0–0.44)
ABSOLUTE IMMATURE GRANULOCYTE: 0 K/UL (ref 0–0.3)
ABSOLUTE IMMATURE GRANULOCYTE: 0.06 K/UL (ref 0–0.3)
ABSOLUTE LYMPH #: 0.44 K/UL (ref 1–4.8)
ABSOLUTE LYMPH #: 1.32 K/UL (ref 1.1–3.7)
ABSOLUTE MONO #: 0.44 K/UL (ref 0.2–0.8)
ABSOLUTE MONO #: 0.98 K/UL (ref 0.1–1.2)
ANION GAP SERPL CALCULATED.3IONS-SCNC: 12 MMOL/L (ref 9–17)
ANION GAP SERPL CALCULATED.3IONS-SCNC: 13 MMOL/L (ref 9–17)
BASOPHILS # BLD: 0 %
BASOPHILS # BLD: 0 % (ref 0–2)
BASOPHILS ABSOLUTE: 0 K/UL (ref 0–0.2)
BASOPHILS ABSOLUTE: <0.03 K/UL (ref 0–0.2)
BNP INTERPRETATION: ABNORMAL
BUN BLDV-MCNC: 40 MG/DL (ref 8–23)
BUN BLDV-MCNC: 41 MG/DL (ref 8–23)
BUN/CREAT BLD: 14 (ref 9–20)
BUN/CREAT BLD: 14 (ref 9–20)
CALCIUM SERPL-MCNC: 8.6 MG/DL (ref 8.6–10.4)
CALCIUM SERPL-MCNC: 8.7 MG/DL (ref 8.6–10.4)
CHLORIDE BLD-SCNC: 97 MMOL/L (ref 98–107)
CHLORIDE BLD-SCNC: 98 MMOL/L (ref 98–107)
CO2: 18 MMOL/L (ref 20–31)
CO2: 20 MMOL/L (ref 20–31)
CREAT SERPL-MCNC: 2.9 MG/DL (ref 0.7–1.2)
CREAT SERPL-MCNC: 2.92 MG/DL (ref 0.7–1.2)
DIFFERENTIAL TYPE: ABNORMAL
DIFFERENTIAL TYPE: ABNORMAL
EOSINOPHILS RELATIVE PERCENT: 0 % (ref 1–4)
EOSINOPHILS RELATIVE PERCENT: 5 % (ref 1–4)
GFR AFRICAN AMERICAN: 26 ML/MIN
GFR AFRICAN AMERICAN: 26 ML/MIN
GFR NON-AFRICAN AMERICAN: 22 ML/MIN
GFR NON-AFRICAN AMERICAN: 22 ML/MIN
GFR SERPL CREATININE-BSD FRML MDRD: ABNORMAL ML/MIN/{1.73_M2}
GLUCOSE BLD-MCNC: 126 MG/DL (ref 70–99)
GLUCOSE BLD-MCNC: 93 MG/DL (ref 70–99)
HCT VFR BLD CALC: 26.6 % (ref 40.7–50.3)
HCT VFR BLD CALC: 28.5 % (ref 40.7–50.3)
HEMOGLOBIN: 8.5 G/DL (ref 13–17)
HEMOGLOBIN: 8.8 G/DL (ref 13–17)
IMMATURE GRANULOCYTES: 0 %
IMMATURE GRANULOCYTES: 1 %
LACTIC ACID: 1.1 MMOL/L (ref 0.5–2.2)
LYMPHOCYTES # BLD: 16 % (ref 24–43)
LYMPHOCYTES # BLD: 6 % (ref 24–44)
MCH RBC QN AUTO: 27.5 PG (ref 25.2–33.5)
MCH RBC QN AUTO: 28.2 PG (ref 25.2–33.5)
MCHC RBC AUTO-ENTMCNC: 30.9 G/DL (ref 28–38)
MCHC RBC AUTO-ENTMCNC: 32 G/DL (ref 28.4–34.8)
MCV RBC AUTO: 86.1 FL (ref 82.6–102.9)
MCV RBC AUTO: 91.3 FL (ref 82.6–102.9)
MONOCYTES # BLD: 12 % (ref 3–12)
MONOCYTES # BLD: 6 % (ref 1–7)
MYOGLOBIN: 102 NG/ML (ref 28–72)
MYOGLOBIN: 98 NG/ML (ref 28–72)
NRBC AUTOMATED: 0 PER 100 WBC
NRBC AUTOMATED: ABNORMAL PER 100 WBC
PDW BLD-RTO: 15.4 % (ref 11.8–14.4)
PDW BLD-RTO: 15.7 % (ref 11.8–14.4)
PLATELET # BLD: 282 K/UL (ref 138–453)
PLATELET # BLD: 292 K/UL (ref 138–453)
PLATELET ESTIMATE: ABNORMAL
PLATELET ESTIMATE: ABNORMAL
PMV BLD AUTO: 9.2 FL (ref 8.1–13.5)
PMV BLD AUTO: 9.3 FL (ref 8.1–13.5)
POTASSIUM SERPL-SCNC: 4.6 MMOL/L (ref 3.7–5.3)
POTASSIUM SERPL-SCNC: 4.8 MMOL/L (ref 3.7–5.3)
PRO-BNP: ABNORMAL PG/ML
RBC # BLD: 3.09 M/UL (ref 4.21–5.77)
RBC # BLD: 3.12 M/UL (ref 4.21–5.77)
RBC # BLD: ABNORMAL 10*6/UL
RBC # BLD: ABNORMAL 10*6/UL
SEG NEUTROPHILS: 67 % (ref 36–65)
SEG NEUTROPHILS: 88 % (ref 36–66)
SEGMENTED NEUTROPHILS ABSOLUTE COUNT: 5.56 K/UL (ref 1.5–8.1)
SEGMENTED NEUTROPHILS ABSOLUTE COUNT: 6.52 K/UL (ref 1.8–7.7)
SODIUM BLD-SCNC: 128 MMOL/L (ref 135–144)
SODIUM BLD-SCNC: 130 MMOL/L (ref 135–144)
TROPONIN INTERP: ABNORMAL
TROPONIN INTERP: ABNORMAL
TROPONIN T: ABNORMAL NG/ML
TROPONIN T: ABNORMAL NG/ML
TROPONIN, HIGH SENSITIVITY: 86 NG/L (ref 0–22)
TROPONIN, HIGH SENSITIVITY: 95 NG/L (ref 0–22)
WBC # BLD: 7.4 K/UL (ref 3.5–11.3)
WBC # BLD: 8.4 K/UL (ref 3.5–11.3)
WBC # BLD: ABNORMAL 10*3/UL
WBC # BLD: ABNORMAL 10*3/UL

## 2020-11-19 PROCEDURE — 85025 COMPLETE CBC W/AUTO DIFF WBC: CPT

## 2020-11-19 PROCEDURE — G0378 HOSPITAL OBSERVATION PER HR: HCPCS

## 2020-11-19 PROCEDURE — 93005 ELECTROCARDIOGRAM TRACING: CPT | Performed by: NURSE PRACTITIONER

## 2020-11-19 PROCEDURE — 99283 EMERGENCY DEPT VISIT LOW MDM: CPT

## 2020-11-19 PROCEDURE — 6370000000 HC RX 637 (ALT 250 FOR IP): Performed by: NURSE PRACTITIONER

## 2020-11-19 PROCEDURE — 96375 TX/PRO/DX INJ NEW DRUG ADDON: CPT

## 2020-11-19 PROCEDURE — 78580 LUNG PERFUSION IMAGING: CPT

## 2020-11-19 PROCEDURE — 99284 EMERGENCY DEPT VISIT MOD MDM: CPT

## 2020-11-19 PROCEDURE — 96372 THER/PROPH/DIAG INJ SC/IM: CPT

## 2020-11-19 PROCEDURE — 6360000002 HC RX W HCPCS: Performed by: NURSE PRACTITIONER

## 2020-11-19 PROCEDURE — 71045 X-RAY EXAM CHEST 1 VIEW: CPT

## 2020-11-19 PROCEDURE — 96374 THER/PROPH/DIAG INJ IV PUSH: CPT

## 2020-11-19 PROCEDURE — 84484 ASSAY OF TROPONIN QUANT: CPT

## 2020-11-19 PROCEDURE — 3430000000 HC RX DIAGNOSTIC RADIOPHARMACEUTICAL: Performed by: NURSE PRACTITIONER

## 2020-11-19 PROCEDURE — 83880 ASSAY OF NATRIURETIC PEPTIDE: CPT

## 2020-11-19 PROCEDURE — 83605 ASSAY OF LACTIC ACID: CPT

## 2020-11-19 PROCEDURE — 80048 BASIC METABOLIC PNL TOTAL CA: CPT

## 2020-11-19 PROCEDURE — A9540 TC99M MAA: HCPCS | Performed by: NURSE PRACTITIONER

## 2020-11-19 PROCEDURE — 71250 CT THORAX DX C-: CPT

## 2020-11-19 PROCEDURE — 83874 ASSAY OF MYOGLOBIN: CPT

## 2020-11-19 RX ORDER — MORPHINE SULFATE 2 MG/ML
2 INJECTION, SOLUTION INTRAMUSCULAR; INTRAVENOUS
Status: DISCONTINUED | OUTPATIENT
Start: 2020-11-19 | End: 2020-11-24 | Stop reason: HOSPADM

## 2020-11-19 RX ORDER — FUROSEMIDE 10 MG/ML
20 INJECTION INTRAMUSCULAR; INTRAVENOUS ONCE
Status: COMPLETED | OUTPATIENT
Start: 2020-11-19 | End: 2020-11-19

## 2020-11-19 RX ORDER — HYDROCODONE BITARTRATE AND ACETAMINOPHEN 5; 325 MG/1; MG/1
1 TABLET ORAL ONCE
Status: COMPLETED | OUTPATIENT
Start: 2020-11-19 | End: 2020-11-19

## 2020-11-19 RX ORDER — HEPARIN SODIUM 5000 [USP'U]/ML
5000 INJECTION, SOLUTION INTRAVENOUS; SUBCUTANEOUS ONCE
Status: COMPLETED | OUTPATIENT
Start: 2020-11-19 | End: 2020-11-19

## 2020-11-19 RX ORDER — ACETAMINOPHEN 325 MG/1
650 TABLET ORAL EVERY 4 HOURS PRN
Status: DISCONTINUED | OUTPATIENT
Start: 2020-11-19 | End: 2020-11-24 | Stop reason: HOSPADM

## 2020-11-19 RX ORDER — SODIUM CHLORIDE 0.9 % (FLUSH) 0.9 %
10 SYRINGE (ML) INJECTION EVERY 12 HOURS SCHEDULED
Status: DISCONTINUED | OUTPATIENT
Start: 2020-11-19 | End: 2020-11-24 | Stop reason: HOSPADM

## 2020-11-19 RX ORDER — SODIUM CHLORIDE 0.9 % (FLUSH) 0.9 %
10 SYRINGE (ML) INJECTION PRN
Status: DISCONTINUED | OUTPATIENT
Start: 2020-11-19 | End: 2020-11-24 | Stop reason: HOSPADM

## 2020-11-19 RX ORDER — HEPARIN SODIUM 5000 [USP'U]/ML
5000 INJECTION, SOLUTION INTRAVENOUS; SUBCUTANEOUS EVERY 8 HOURS SCHEDULED
Status: DISCONTINUED | OUTPATIENT
Start: 2020-11-20 | End: 2020-11-24 | Stop reason: HOSPADM

## 2020-11-19 RX ORDER — MORPHINE SULFATE 4 MG/ML
4 INJECTION, SOLUTION INTRAMUSCULAR; INTRAVENOUS ONCE
Status: COMPLETED | OUTPATIENT
Start: 2020-11-19 | End: 2020-11-19

## 2020-11-19 RX ADMIN — FUROSEMIDE 20 MG: 10 INJECTION, SOLUTION INTRAMUSCULAR; INTRAVENOUS at 20:41

## 2020-11-19 RX ADMIN — HEPARIN SODIUM 5000 UNITS: 5000 INJECTION INTRAVENOUS; SUBCUTANEOUS at 22:29

## 2020-11-19 RX ADMIN — MORPHINE SULFATE 4 MG: 4 INJECTION, SOLUTION INTRAMUSCULAR; INTRAVENOUS at 20:41

## 2020-11-19 RX ADMIN — HYDROCODONE BITARTRATE AND ACETAMINOPHEN 1 TABLET: 5; 325 TABLET ORAL at 19:04

## 2020-11-19 RX ADMIN — Medication 6.2 MILLICURIE: at 21:03

## 2020-11-19 ASSESSMENT — PAIN SCALES - GENERAL
PAINLEVEL_OUTOF10: 8
PAINLEVEL_OUTOF10: 8
PAINLEVEL_OUTOF10: 4
PAINLEVEL_OUTOF10: 8

## 2020-11-19 ASSESSMENT — ENCOUNTER SYMPTOMS
COLOR CHANGE: 0
ABDOMINAL PAIN: 0
SHORTNESS OF BREATH: 1
COUGH: 0
SORE THROAT: 0
NAUSEA: 0
VOMITING: 0
DIARRHEA: 0

## 2020-11-19 NOTE — ED PROVIDER NOTES
[address] ED  eMERGENCY dEPARTMENT eNCOUnter      Pt Name: Denisse Ramírez  MRN: [id number]  [place] 1950  Date of evaluation: 11/19/2020  Provider: GINETTE Graham 6626       Chief Complaint   Patient presents with    Shortness of Breath     increasing sob         HISTORY OF PRESENT ILLNESS  (Location/Symptom, Timing/Onset, Context/Setting, Quality, Duration, Modifying Factors, Severity.)   Denisse Ramírez is a 78 y.o. male who presents to the emergency department via private auto for increased SOB. Onset was today. Denies fever, chills, N/V/D. He has a history of laryngeal cancer (2018) and has had tracheal surgery. He is currently being evaluated by oncology; in Sept, 2020 a new oropharyngeal lesion was identified. Denies pain at this time. He has a history of COPD, asthma, CKD. He was discharged from the hospital on 11/15/2020 after admission for dyspnea and the mediastinal mass. Nursing Notes were reviewed.     ALLERGIES     Amino acids and Lisinopril    CURRENT MEDICATIONS       Discharge Medication List as of 11/19/2020  1:38 PM      CONTINUE these medications which have NOT CHANGED    Details   !! amitriptyline (ELAVIL) 10 MG tablet Take 1 tablet by mouth nightly, Disp-7 tablet,R-0Print      !! amitriptyline (ELAVIL) 25 MG tablet Take 1 tablet by mouth nightly, Disp-30 tablet,R-0Print      predniSONE (DELTASONE) 10 MG tablet Take 1 tablet by mouth daily for 10 days Take 1 tablet daily for 5 days then 1 tablet every other day till gone, Disp-10 tablet,R-0Normal      mirtazapine (REMERON) 15 MG tablet Take 7.5 mg by mouth nightly Takes 1/2 tab (=7.5mg) nightlyHistorical Med      sodium bicarbonate 650 MG tablet Take 650 mg by mouth 3 times dailyHistorical Med      hydrALAZINE (APRESOLINE) 50 MG tablet Take 1 tablet by mouth every 8 hours, Disp-90 tablet, R-0Print      metoprolol succinate (TOPROL XL) 50 MG extended release tablet Take 1 tablet by mouth daily, Disp-30 tablet, R-0Print      amLODIPine (NORVASC) 10 MG tablet Take 1 tablet by mouth daily, Disp-30 tablet, R-0Print      Cholecalciferol (VITAMIN D3) 50 MCG ( UT) CAPS Take 1 capsule by mouth dailyHistorical Med      albuterol sulfate  (90 Base) MCG/ACT inhaler Inhale 2 puffs into the lungs every 6 hours as needed for Wheezing or Shortness of BreathHistorical Med      budesonide-formoterol (SYMBICORT) 160-4.5 MCG/ACT AERO Inhale 2 puffs into the lungs 2 times dailyHistorical Med      omeprazole (PRILOSEC) 20 MG delayed release capsule Take 20 mg by mouth every morning (before breakfast)Historical Med      allopurinol (ZYLOPRIM) 300 MG tablet Take 300 mg by mouth daily. !! - Potential duplicate medications found. Please discuss with provider. PAST MEDICAL HISTORY         Diagnosis Date    Arthritis     Asthma     CKD (chronic kidney disease), stage III     COPD (chronic obstructive pulmonary disease) (HCC)     GERD (gastroesophageal reflux disease)     Gout     Hypertension     Hyponatremia     Iliac artery aneurysm, right (HCC)     Laryngeal cancer (HCC)     Laryngeal Cancer. Chronic left facial edema.      Lung cancer ([address] 3-4 years ago    [facility] [place]    Prostate cancer [facility])        SURGICAL HISTORY           Procedure Laterality Date    ABDOMINAL AORTIC ANEURYSM REPAIR, ENDOVASCULAR Right 2020    RIGHT ILIAC ARTERY ANEURYSM REPAIR ENDOVASCULAR performed by Aba Rosales MD at [address] [place] [address]  2020    No stents placed   [facility]  3-4 years ago    lung removed and \"voice box\" removed         FAMILY HISTORY           Problem Relation Age of Onset    Diabetes Mother     Heart Disease Mother         Sudden cardiac death    Cancer Father     Diabetes Sister     Heart Disease Sister      Family Status   Relation Name Status    Mother      Father lung     Sister          SOCIAL HISTORY      reports that he has quit smoking. His smoking use included cigarettes. He has never used smokeless tobacco. He reports previous alcohol use of about 5.0 standard drinks of alcohol per week. He reports that he does not use drugs. REVIEW OF SYSTEMS    (2-9 systems for level 4, 10 or more for level 5)     Review of Systems   Constitutional: Negative for chills, diaphoresis, fatigue and fever. HENT: Negative for congestion and sore throat. Respiratory: Positive for shortness of breath. Negative for cough. Cardiovascular: Negative for chest pain. Gastrointestinal: Negative for abdominal pain, diarrhea, nausea and vomiting. Skin: Negative for color change, rash and wound. Neurological: Negative for dizziness, weakness, light-headedness and headaches. Except as noted above the remainder of the review of systems was reviewed and negative. PHYSICAL EXAM    (up to 7 for level 4, 8 or more for level 5)     ED Triage Vitals   BP Temp Temp src Pulse Resp SpO2 Height Weight   -- -- -- -- -- -- -- --     Physical Exam  Vitals signs reviewed. Constitutional:       General: He is not in acute distress. Appearance: He is well-developed. He is not diaphoretic. Eyes:      General: No scleral icterus. Conjunctiva/sclera: Conjunctivae normal.   Neck:      Trachea: Tracheostomy present. Cardiovascular:      Rate and Rhythm: Normal rate and regular rhythm. Heart sounds: Normal heart sounds. Pulmonary:      Effort: Pulmonary effort is normal. No respiratory distress. Breath sounds: Normal breath sounds. Musculoskeletal:      Comments: Moves extremities   Skin:     General: Skin is warm and dry. Findings: No rash. Neurological:      Mental Status: He is alert and oriented to person, place, and time.    Psychiatric:         Behavior: Behavior normal.         DIAGNOSTIC RESULTS     RADIOLOGY:   Non-plain film images such as CT, Ultrasound and MRI are read by the radiologist. Maria Esther Felipe radiographic images are visualized and preliminarily interpreted by the emergency physician with the below findings:    Interpretation per the Radiologist below, if available at the time of this note:    Xr Chest Portable    Result Date: 11/19/2020  EXAMINATION: ONE XRAY VIEW OF THE CHEST 11/19/2020 11:33 am COMPARISON: November 13, 2020, chest exam HISTORY: ORDERING SYSTEM PROVIDED HISTORY: SOB TECHNOLOGIST PROVIDED HISTORY: SOB Acuity: Acute Type of Exam: Unknown FINDINGS: Stable cardiac silhouette Emphysematous changes of the lungs with interval increase in mild streaky bibasilar densities and small bilateral pleural effusions, otherwise clear lungs Stable large right superior paratracheal mass Multiple bilateral rib fractures     Interval increase in mild streaky bibasilar atelectasis with small bilateral pleural effusions     LABS:  Labs Reviewed   BASIC METABOLIC PANEL - Abnormal; Notable for the following components:       Result Value    BUN 40 (*)     CREATININE 2.90 (*)     Sodium 130 (*)     GFR Non- 22 (*)     GFR  26 (*)     All other components within normal limits   BRAIN NATRIURETIC PEPTIDE - Abnormal; Notable for the following components:    Pro-BNP 25,231 (*)     All other components within normal limits   CBC WITH AUTO DIFFERENTIAL - Abnormal; Notable for the following components:    RBC 3.09 (*)     Hemoglobin 8.5 (*)     Hematocrit 26.6 (*)     RDW 15.4 (*)     Seg Neutrophils 67 (*)     Lymphocytes 16 (*)     Eosinophils % 5 (*)     Immature Granulocytes 1 (*)     All other components within normal limits   TROP/MYOGLOBIN - Abnormal; Notable for the following components:    Troponin, High Sensitivity 95 (*)     Myoglobin 98 (*)     All other components within normal limits       All other labs were within normal range or not returned as of this dictation.     EMERGENCY DEPARTMENT COURSE and DIFFERENTIAL DIAGNOSIS/MDM: mis-transcribed.)    Lizette Munson, GINETTE - NADIYA Munson, GINETTE - CNP  11/19/20 [id number] Masoud Glez, GINETTE - CNP  11/19/20 18300 Masoud Glez, GINETTE - CNP  11/19/20 1743

## 2020-11-19 NOTE — ED PROVIDER NOTES
The patient was seen and examined by me in conjunction with the mid-level provider. I agree with his/her assessment and treatment plan. Work-up here in the emergency department shows no significant findings.   He does not require admission to the hospital.     Paul Latham MD  11/19/20 [id number]

## 2020-11-19 NOTE — ED NOTES
Pt requested food. Gave box lunch. Called brother Krissy Yanez to pick pt up. Safety maintained.      Charlotte Emerson RN  11/19/20 5410

## 2020-11-20 ENCOUNTER — TELEPHONE (OUTPATIENT)
Dept: RADIATION ONCOLOGY | Facility: MEDICAL CENTER | Age: 70
End: 2020-11-20

## 2020-11-20 ENCOUNTER — APPOINTMENT (OUTPATIENT)
Dept: RADIATION ONCOLOGY | Facility: MEDICAL CENTER | Age: 70
End: 2020-11-20
Payer: MEDICARE

## 2020-11-20 PROBLEM — E43 SEVERE MALNUTRITION (HCC): Chronic | Status: ACTIVE | Noted: 2020-11-20

## 2020-11-20 LAB
-: ABNORMAL
ABSOLUTE EOS #: 0.23 K/UL (ref 0–0.44)
ABSOLUTE IMMATURE GRANULOCYTE: 0.03 K/UL (ref 0–0.3)
ABSOLUTE LYMPH #: 1.17 K/UL (ref 1.1–3.7)
ABSOLUTE MONO #: 0.72 K/UL (ref 0.1–1.2)
AMORPHOUS: ABNORMAL
ANION GAP SERPL CALCULATED.3IONS-SCNC: 11 MMOL/L (ref 9–17)
BACTERIA: ABNORMAL
BASOPHILS # BLD: 0 % (ref 0–2)
BASOPHILS ABSOLUTE: <0.03 K/UL (ref 0–0.2)
BILIRUBIN URINE: NEGATIVE
BUN BLDV-MCNC: 42 MG/DL (ref 8–23)
BUN/CREAT BLD: 14 (ref 9–20)
CALCIUM SERPL-MCNC: 8.2 MG/DL (ref 8.6–10.4)
CASTS UA: ABNORMAL /LPF
CHLORIDE BLD-SCNC: 98 MMOL/L (ref 98–107)
CO2: 21 MMOL/L (ref 20–31)
COLOR: YELLOW
COMMENT UA: ABNORMAL
CREAT SERPL-MCNC: 3.03 MG/DL (ref 0.7–1.2)
CRYSTALS, UA: ABNORMAL /HPF
DIFFERENTIAL TYPE: ABNORMAL
EKG ATRIAL RATE: 91 BPM
EKG P AXIS: 45 DEGREES
EKG P-R INTERVAL: 138 MS
EKG Q-T INTERVAL: 370 MS
EKG QRS DURATION: 84 MS
EKG QTC CALCULATION (BAZETT): 455 MS
EKG R AXIS: 16 DEGREES
EKG T AXIS: -96 DEGREES
EKG VENTRICULAR RATE: 91 BPM
EOSINOPHILS RELATIVE PERCENT: 3 % (ref 1–4)
EPITHELIAL CELLS UA: ABNORMAL /HPF (ref 0–5)
GFR AFRICAN AMERICAN: 25 ML/MIN
GFR NON-AFRICAN AMERICAN: 21 ML/MIN
GFR SERPL CREATININE-BSD FRML MDRD: ABNORMAL ML/MIN/{1.73_M2}
GFR SERPL CREATININE-BSD FRML MDRD: ABNORMAL ML/MIN/{1.73_M2}
GLUCOSE BLD-MCNC: 95 MG/DL (ref 70–99)
GLUCOSE URINE: NEGATIVE
HCT VFR BLD CALC: 23.4 % (ref 40.7–50.3)
HEMOGLOBIN: 7.7 G/DL (ref 13–17)
IMMATURE GRANULOCYTES: 0 %
KETONES, URINE: NEGATIVE
LEUKOCYTE ESTERASE, URINE: NEGATIVE
LYMPHOCYTES # BLD: 16 % (ref 24–43)
MAGNESIUM: 1.6 MG/DL (ref 1.6–2.6)
MCH RBC QN AUTO: 28 PG (ref 25.2–33.5)
MCHC RBC AUTO-ENTMCNC: 32.9 G/DL (ref 28.4–34.8)
MCV RBC AUTO: 85.1 FL (ref 82.6–102.9)
MONOCYTES # BLD: 10 % (ref 3–12)
MUCUS: ABNORMAL
NITRITE, URINE: NEGATIVE
NRBC AUTOMATED: 0 PER 100 WBC
OSMOLALITY URINE: 401 MOSM/KG (ref 300–1170)
OTHER OBSERVATIONS UA: ABNORMAL
PDW BLD-RTO: 15.2 % (ref 11.8–14.4)
PH UA: 7.5 (ref 5–8)
PHOSPHORUS: 4.2 MG/DL (ref 2.5–4.5)
PLATELET # BLD: 258 K/UL (ref 138–453)
PLATELET ESTIMATE: ABNORMAL
PMV BLD AUTO: 8.9 FL (ref 8.1–13.5)
POTASSIUM SERPL-SCNC: 4.3 MMOL/L (ref 3.7–5.3)
PROTEIN UA: ABNORMAL
RBC # BLD: 2.75 M/UL (ref 4.21–5.77)
RBC # BLD: ABNORMAL 10*6/UL
RBC UA: ABNORMAL /HPF (ref 0–2)
RENAL EPITHELIAL, UA: ABNORMAL /HPF
SARS-COV-2, RAPID: NORMAL
SARS-COV-2: NORMAL
SARS-COV-2: NOT DETECTED
SEG NEUTROPHILS: 70 % (ref 36–65)
SEGMENTED NEUTROPHILS ABSOLUTE COUNT: 5.11 K/UL (ref 1.5–8.1)
SODIUM BLD-SCNC: 130 MMOL/L (ref 135–144)
SOURCE: NORMAL
SPECIFIC GRAVITY UA: 1.02 (ref 1–1.03)
TRICHOMONAS: ABNORMAL
TURBIDITY: CLEAR
URINE HGB: NEGATIVE
UROBILINOGEN, URINE: NORMAL
WBC # BLD: 7.3 K/UL (ref 3.5–11.3)
WBC # BLD: ABNORMAL 10*3/UL
WBC UA: ABNORMAL /HPF (ref 0–5)
YEAST: ABNORMAL

## 2020-11-20 PROCEDURE — 2580000003 HC RX 258: Performed by: NURSE PRACTITIONER

## 2020-11-20 PROCEDURE — 85025 COMPLETE CBC W/AUTO DIFF WBC: CPT

## 2020-11-20 PROCEDURE — U0003 INFECTIOUS AGENT DETECTION BY NUCLEIC ACID (DNA OR RNA); SEVERE ACUTE RESPIRATORY SYNDROME CORONAVIRUS 2 (SARS-COV-2) (CORONAVIRUS DISEASE [COVID-19]), AMPLIFIED PROBE TECHNIQUE, MAKING USE OF HIGH THROUGHPUT TECHNOLOGIES AS DESCRIBED BY CMS-2020-01-R: HCPCS

## 2020-11-20 PROCEDURE — 6370000000 HC RX 637 (ALT 250 FOR IP): Performed by: INTERNAL MEDICINE

## 2020-11-20 PROCEDURE — 83735 ASSAY OF MAGNESIUM: CPT

## 2020-11-20 PROCEDURE — 6370000000 HC RX 637 (ALT 250 FOR IP): Performed by: NURSE PRACTITIONER

## 2020-11-20 PROCEDURE — 96372 THER/PROPH/DIAG INJ SC/IM: CPT

## 2020-11-20 PROCEDURE — 83935 ASSAY OF URINE OSMOLALITY: CPT

## 2020-11-20 PROCEDURE — 36415 COLL VENOUS BLD VENIPUNCTURE: CPT

## 2020-11-20 PROCEDURE — 84100 ASSAY OF PHOSPHORUS: CPT

## 2020-11-20 PROCEDURE — 80048 BASIC METABOLIC PNL TOTAL CA: CPT

## 2020-11-20 PROCEDURE — 6360000002 HC RX W HCPCS: Performed by: INTERNAL MEDICINE

## 2020-11-20 PROCEDURE — 84300 ASSAY OF URINE SODIUM: CPT

## 2020-11-20 PROCEDURE — 2700000000 HC OXYGEN THERAPY PER DAY

## 2020-11-20 PROCEDURE — 93010 ELECTROCARDIOGRAM REPORT: CPT | Performed by: INTERNAL MEDICINE

## 2020-11-20 PROCEDURE — 94761 N-INVAS EAR/PLS OXIMETRY MLT: CPT

## 2020-11-20 PROCEDURE — 81001 URINALYSIS AUTO W/SCOPE: CPT

## 2020-11-20 PROCEDURE — 99215 OFFICE O/P EST HI 40 MIN: CPT | Performed by: INTERNAL MEDICINE

## 2020-11-20 PROCEDURE — G0378 HOSPITAL OBSERVATION PER HR: HCPCS

## 2020-11-20 RX ORDER — AMLODIPINE BESYLATE 10 MG/1
10 TABLET ORAL DAILY
Status: DISCONTINUED | OUTPATIENT
Start: 2020-11-20 | End: 2020-11-24 | Stop reason: HOSPADM

## 2020-11-20 RX ORDER — HYDRALAZINE HYDROCHLORIDE 50 MG/1
50 TABLET, FILM COATED ORAL EVERY 8 HOURS SCHEDULED
Status: DISCONTINUED | OUTPATIENT
Start: 2020-11-20 | End: 2020-11-24 | Stop reason: HOSPADM

## 2020-11-20 RX ORDER — ALLOPURINOL 300 MG/1
300 TABLET ORAL DAILY
Status: DISCONTINUED | OUTPATIENT
Start: 2020-11-20 | End: 2020-11-24 | Stop reason: HOSPADM

## 2020-11-20 RX ORDER — PANTOPRAZOLE SODIUM 40 MG/1
40 TABLET, DELAYED RELEASE ORAL
Status: DISCONTINUED | OUTPATIENT
Start: 2020-11-20 | End: 2020-11-24 | Stop reason: HOSPADM

## 2020-11-20 RX ORDER — SODIUM BICARBONATE 650 MG/1
1300 TABLET ORAL 3 TIMES DAILY
Status: DISCONTINUED | OUTPATIENT
Start: 2020-11-20 | End: 2020-11-24 | Stop reason: HOSPADM

## 2020-11-20 RX ORDER — METOPROLOL SUCCINATE 50 MG/1
50 TABLET, EXTENDED RELEASE ORAL DAILY
Status: DISCONTINUED | OUTPATIENT
Start: 2020-11-20 | End: 2020-11-24 | Stop reason: HOSPADM

## 2020-11-20 RX ORDER — MIRTAZAPINE 15 MG/1
7.5 TABLET, FILM COATED ORAL NIGHTLY
Status: DISCONTINUED | OUTPATIENT
Start: 2020-11-20 | End: 2020-11-24 | Stop reason: HOSPADM

## 2020-11-20 RX ORDER — BUDESONIDE AND FORMOTEROL FUMARATE DIHYDRATE 160; 4.5 UG/1; UG/1
2 AEROSOL RESPIRATORY (INHALATION) 2 TIMES DAILY
Status: DISCONTINUED | OUTPATIENT
Start: 2020-11-20 | End: 2020-11-24 | Stop reason: HOSPADM

## 2020-11-20 RX ORDER — AMITRIPTYLINE HYDROCHLORIDE 50 MG/1
25 TABLET, FILM COATED ORAL NIGHTLY
Status: DISCONTINUED | OUTPATIENT
Start: 2020-11-20 | End: 2020-11-24 | Stop reason: HOSPADM

## 2020-11-20 RX ORDER — IPRATROPIUM BROMIDE AND ALBUTEROL SULFATE 2.5; .5 MG/3ML; MG/3ML
1 SOLUTION RESPIRATORY (INHALATION)
Status: DISCONTINUED | OUTPATIENT
Start: 2020-11-20 | End: 2020-11-24 | Stop reason: HOSPADM

## 2020-11-20 RX ORDER — VITAMIN B COMPLEX
2000 TABLET ORAL DAILY
Status: DISCONTINUED | OUTPATIENT
Start: 2020-11-20 | End: 2020-11-24 | Stop reason: HOSPADM

## 2020-11-20 RX ORDER — ALBUTEROL SULFATE 90 UG/1
2 AEROSOL, METERED RESPIRATORY (INHALATION) EVERY 6 HOURS PRN
Status: DISCONTINUED | OUTPATIENT
Start: 2020-11-20 | End: 2020-11-24 | Stop reason: HOSPADM

## 2020-11-20 RX ORDER — PREDNISONE 10 MG/1
10 TABLET ORAL DAILY
Status: DISCONTINUED | OUTPATIENT
Start: 2020-11-20 | End: 2020-11-24 | Stop reason: HOSPADM

## 2020-11-20 RX ORDER — SODIUM BICARBONATE 650 MG/1
650 TABLET ORAL 3 TIMES DAILY
Status: DISCONTINUED | OUTPATIENT
Start: 2020-11-20 | End: 2020-11-20

## 2020-11-20 RX ADMIN — SODIUM CHLORIDE, PRESERVATIVE FREE 10 ML: 5 INJECTION INTRAVENOUS at 00:30

## 2020-11-20 RX ADMIN — SODIUM CHLORIDE, PRESERVATIVE FREE 10 ML: 5 INJECTION INTRAVENOUS at 20:38

## 2020-11-20 RX ADMIN — MIRTAZAPINE 7.5 MG: 15 TABLET, FILM COATED ORAL at 20:38

## 2020-11-20 RX ADMIN — HYDRALAZINE HYDROCHLORIDE 50 MG: 50 TABLET, FILM COATED ORAL at 13:52

## 2020-11-20 RX ADMIN — SODIUM BICARBONATE 1300 MG: 650 TABLET ORAL at 20:37

## 2020-11-20 RX ADMIN — AMITRIPTYLINE HYDROCHLORIDE 25 MG: 50 TABLET, FILM COATED ORAL at 20:37

## 2020-11-20 RX ADMIN — HYDRALAZINE HYDROCHLORIDE 50 MG: 50 TABLET, FILM COATED ORAL at 06:21

## 2020-11-20 RX ADMIN — METOPROLOL SUCCINATE 50 MG: 50 TABLET, FILM COATED, EXTENDED RELEASE ORAL at 09:45

## 2020-11-20 RX ADMIN — PREDNISONE 10 MG: 10 TABLET ORAL at 09:45

## 2020-11-20 RX ADMIN — HEPARIN SODIUM 5000 UNITS: 5000 INJECTION INTRAVENOUS; SUBCUTANEOUS at 13:51

## 2020-11-20 RX ADMIN — Medication 2000 UNITS: at 09:45

## 2020-11-20 RX ADMIN — SODIUM CHLORIDE, PRESERVATIVE FREE 10 ML: 5 INJECTION INTRAVENOUS at 09:45

## 2020-11-20 RX ADMIN — SODIUM BICARBONATE 650 MG: 650 TABLET ORAL at 13:52

## 2020-11-20 RX ADMIN — HEPARIN SODIUM 5000 UNITS: 5000 INJECTION INTRAVENOUS; SUBCUTANEOUS at 21:58

## 2020-11-20 RX ADMIN — HYDRALAZINE HYDROCHLORIDE 50 MG: 50 TABLET, FILM COATED ORAL at 21:58

## 2020-11-20 RX ADMIN — PANTOPRAZOLE SODIUM 40 MG: 40 TABLET, DELAYED RELEASE ORAL at 06:21

## 2020-11-20 RX ADMIN — AMLODIPINE BESYLATE 10 MG: 10 TABLET ORAL at 09:45

## 2020-11-20 RX ADMIN — ALLOPURINOL 300 MG: 300 TABLET ORAL at 09:45

## 2020-11-20 RX ADMIN — SODIUM BICARBONATE 650 MG: 650 TABLET ORAL at 09:45

## 2020-11-20 RX ADMIN — HEPARIN SODIUM 5000 UNITS: 5000 INJECTION INTRAVENOUS; SUBCUTANEOUS at 06:21

## 2020-11-20 ASSESSMENT — ENCOUNTER SYMPTOMS
NAUSEA: 0
COUGH: 0
SORE THROAT: 0
VOMITING: 0
ABDOMINAL PAIN: 0
WHEEZING: 0
SINUS PRESSURE: 0
CONSTIPATION: 0
SHORTNESS OF BREATH: 0
COLOR CHANGE: 0
DIARRHEA: 0
RHINORRHEA: 0

## 2020-11-20 ASSESSMENT — PAIN DESCRIPTION - ORIENTATION: ORIENTATION: MID

## 2020-11-20 ASSESSMENT — PAIN DESCRIPTION - LOCATION: LOCATION: THROAT

## 2020-11-20 ASSESSMENT — PAIN DESCRIPTION - DESCRIPTORS: DESCRIPTORS: DISCOMFORT

## 2020-11-20 ASSESSMENT — PAIN SCALES - GENERAL: PAINLEVEL_OUTOF10: 9

## 2020-11-20 ASSESSMENT — PAIN DESCRIPTION - FREQUENCY: FREQUENCY: INTERMITTENT

## 2020-11-20 ASSESSMENT — PAIN DESCRIPTION - PAIN TYPE: TYPE: CHRONIC PAIN

## 2020-11-20 NOTE — PLAN OF CARE
Problem: Pain:  Goal: Control of acute pain  Description: Control of acute pain  Outcome: Ongoing     Problem: Falls - Risk of:  Goal: Will remain free from falls  Description: Will remain free from falls  Outcome: Ongoing     Problem: Daily Care:  Goal: Daily care needs are met  Description: Daily care needs are met  Outcome: Ongoing

## 2020-11-20 NOTE — PROGRESS NOTES
Admitted from [facility] room 1023. Pt alert and oriented. Pt oriented to call light system and agreeable to call for assistance.  Admission documentation completed

## 2020-11-20 NOTE — FLOWSHEET NOTE
Patient is awake and alert while reclining on the bed. Patient does not speak but only nods or shakes his head in response to questions. Patient is approachable but passive. Patient denies any spiritual or emotional concerns. Patient seems to accept the visit. Spiritual Care will follow as needed.        11/20/20 1015   Encounter Summary   Services provided to: Patient   Referral/Consult From: [facility] Family members   Continue Visiting   (11/20/20)   Complexity of Encounter Low   Length of Encounter 15 minutes   Spiritual Assessment Completed Yes   Routine   Type Initial   Assessment Approachable;Passive   Intervention Active listening;Explored coping resources   Outcome Acceptance

## 2020-11-20 NOTE — PROGRESS NOTES
Received call from [place] in [facility]. Per her, IR department will be unable to do CT guided needle biopsy until Monday, November 22nd. Patient will need coags ordered Monday morning and NPO order at that time.

## 2020-11-20 NOTE — H&P
10 MG tablet, Take 1 tablet by mouth daily  Cholecalciferol (VITAMIN D3) 50 MCG (2000 UT) CAPS, Take 1 capsule by mouth daily  albuterol sulfate  (90 Base) MCG/ACT inhaler, Inhale 2 puffs into the lungs every 6 hours as needed for Wheezing or Shortness of Breath  budesonide-formoterol (SYMBICORT) 160-4.5 MCG/ACT AERO, Inhale 2 puffs into the lungs 2 times daily  omeprazole (PRILOSEC) 20 MG delayed release capsule, Take 20 mg by mouth every morning (before breakfast)  allopurinol (ZYLOPRIM) 300 MG tablet, Take 300 mg by mouth daily. amitriptyline (ELAVIL) 10 MG tablet, Take 1 tablet by mouth nightly    Allergies:    Amino acids and Lisinopril    Social History:    reports that he has quit smoking. His smoking use included cigarettes. He has never used smokeless tobacco. He reports previous alcohol use of about 5.0 standard drinks of alcohol per week. He reports that he does not use drugs. Family History:   family history includes Cancer in his father; Diabetes in his mother and sister; Heart Disease in his mother and sister.     REVIEW OF SYSTEMS:  Constitutional: negative, Fever no chills  Eyes: negative  Ears, nose, mouth, throat, and face: negative  Respiratory: negative, Positive for dyspnea tachypnea no wheezing no PND no orthopnea  Cardiovascular: negative, No chest pain no palpitation no dizziness  Gastrointestinal: negative, Decreased oral intake no nausea vomiting  Genitourinary:negative  Integument/breast: negative  Hematologic/lymphatic: negative  Musculoskeletal:negative  Neurological: negative, Headache no TIA no seizures  Behavioral/Psych: negative  Endocrine: negative, No polyuria no polydipsia no hypoglycemia  Allergic/Immunologic: negative  PHYSICAL EXAM:  General Appearance: in no acute distress and alert  Skin: warm and dry, no rash or erythema  Head: normocephalic and atraumatic  Eyes: pupils equal, round, and reactive to light and sclera anicteric    Neck: Supple positive tracheostomy  Pulmonary/Chest: Entry equal few rhonchi no crackles no wheezing no use of intercostal muscles  Cardiovascular: normal rate, regular rhythm, normal S1 and S2, no gallops, intact distal pulses and no carotid bruits  Abdomen: soft, non-tender, non-distended, normal bowel sounds, no masses or organomegaly  Extremities: no edema and pulses no Homans' sign  Neurologic: Alert oriented x3 with no focal deficit    Vitals:  /65   Pulse 70   Temp 98.1 °F (36.7 °C) (Oral)   Resp 18   Ht 5' 4\" (1.626 m)   Wt 140 lb (63.5 kg)   SpO2 96%   BMI 24.03 kg/m²       LABS:  CBC:   Lab Results   Component Value Date    WBC 7.3 11/20/2020    RBC 2.75 11/20/2020    HGB 7.7 11/20/2020    HCT 23.4 11/20/2020    MCV 85.1 11/20/2020    MCH 28.0 11/20/2020    MCHC 32.9 11/20/2020    RDW 15.2 11/20/2020     11/20/2020    MPV 8.9 11/20/2020     CMP:    Lab Results   Component Value Date     11/20/2020    K 4.3 11/20/2020    CL 98 11/20/2020    CO2 21 11/20/2020    BUN 42 11/20/2020    CREATININE 3.03 11/20/2020    GFRAA 25 11/20/2020    LABGLOM 21 11/20/2020    GLUCOSE 95 11/20/2020    PROT 7.7 11/11/2020    LABALBU 3.3 11/11/2020    CALCIUM 8.2 11/20/2020    BILITOT 0.25 11/11/2020    ALKPHOS 104 11/11/2020    AST 15 11/11/2020    ALT 9 11/11/2020         ASSESSMENT:    Mediastinal mass likely metastatic  History of laryngeal cancer/tracheostomy in place  Oral cancer with 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  COPD  Moderately severe pulmonary hypertension  CKD 4  Chronic combined CHF  Severe malnutrition  Chronic hyponatremia  Patient Active Problem List   Diagnosis    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 malnutrition (HCC)    Facial edema    Common iliac aneurysm (HCC)    Essential hypertension    Stage 3 chronic kidney disease    Acute kidney injury (nontraumatic) (HCC)    NSTEMI (non-ST elevated myocardial infarction) [facility])    Pathological fracture of lumbar vertebra due to secondary osteoporosis (HCC)    Age-related osteoporosis with current pathological fracture    Intractable back pain    Iron deficiency anemia    Anemia, blood loss    Chronic bilateral pleural effusions    Diastolic CHF, acute on chronic (HCC)    Hyponatremia    Mediastinal mass    Bilateral pleural effusion       PLAN:    Labs reviewed DVT prophylaxis.   Nephrotoxic drugs continue with bronchodilators chronic hyponatremia have oncology see the patient see orders            Marilu Fontana MD  6:01 PM  11/20/2020

## 2020-11-20 NOTE — PROGRESS NOTES
140 lb (63.5 kg)    · Usual Body Weight: 152 lb (68.9 kg)(per past records)     · Ideal Body Weight: 130 lbs; % Ideal Body Weight 107.7 %   · BMI: 24  · Adjusted Body Weight:  ; No Adjustment   · BMI Categories: Normal Weight (BMI 22.0 to 24.9) age over 72       Nutrition Diagnosis:   · Severe malnutrition related to catabolic illness, renal dysfunction, inadequate protein-energy intake, swallowing difficulty, biting/chewing (masticatory) difficulty, partial or complete edentulism as evidenced by intake 26-50%, poor intake prior to admission, weight loss, lab values, poor dentition    Nutrition Interventions:   Food and/or Nutrient Delivery:  Start Oral Nutrition Supplement, Modify Current Diet  Nutrition Education/Counseling:  Education not indicated   Coordination of Nutrition Care:  Continue to monitor while inpatient    Goals:  Meet greater than 75% of estimated nutrition needs       Nutrition Monitoring and Evaluation:   Food/Nutrient Intake Outcomes:  Food and Nutrient Intake, Supplement Intake  Physical Signs/Symptoms Outcomes:  Biochemical Data, Weight, Chewing or Swallowing     Discharge Planning:     Too soon to determine       Some areas of assessment may be incomplete due to COVID-19 precautions    Rajesh Willoughby RD, LD  Office phone [phone]

## 2020-11-20 NOTE — ED PROVIDER NOTES
(NORVASC) 10 MG tablet Take 1 tablet by mouth daily  Qty: 30 tablet, Refills: 0      Cholecalciferol (VITAMIN D3) 50 MCG ( UT) CAPS Take 1 capsule by mouth daily      albuterol sulfate  (90 Base) MCG/ACT inhaler Inhale 2 puffs into the lungs every 6 hours as needed for Wheezing or Shortness of Breath      budesonide-formoterol (SYMBICORT) 160-4.5 MCG/ACT AERO Inhale 2 puffs into the lungs 2 times daily      omeprazole (PRILOSEC) 20 MG delayed release capsule Take 20 mg by mouth every morning (before breakfast)      allopurinol (ZYLOPRIM) 300 MG tablet Take 300 mg by mouth daily. !! amitriptyline (ELAVIL) 10 MG tablet Take 1 tablet by mouth nightly  Qty: 7 tablet, Refills: 0       !! - Potential duplicate medications found. Please discuss with provider. PAST MEDICAL HISTORY         Diagnosis Date    Arthritis     Asthma     CKD (chronic kidney disease), stage III     COPD (chronic obstructive pulmonary disease) (HCC)     GERD (gastroesophageal reflux disease)     Gout     Hypertension     Hyponatremia     Iliac artery aneurysm, right (HCC)     Laryngeal cancer (HCC)     Laryngeal Cancer. Chronic left facial edema.      Lung cancer ([address] 3-4 years ago    [facility] [place]    Prostate cancer [facility])        SURGICAL HISTORY           Procedure Laterality Date    ABDOMINAL AORTIC ANEURYSM REPAIR, ENDOVASCULAR Right 2020    RIGHT ILIAC ARTERY ANEURYSM REPAIR ENDOVASCULAR performed by Aye Badillo MD at [address] [place] [address]  2020    No stents placed   [facility]  3-4 years ago    lung removed and \"voice box\" removed         FAMILY HISTORY           Problem Relation Age of Onset    Diabetes Mother     Heart Disease Mother         Sudden cardiac death    Cancer Father     Diabetes Sister     Heart Disease Sister      Family Status   Relation Name Status    Mother      Father lung     Sister          SOCIAL HISTORY      reports that he has quit smoking. His smoking use included cigarettes. He has never used smokeless tobacco. He reports previous alcohol use of about 5.0 standard drinks of alcohol per week. He reports that he does not use drugs. REVIEW OF SYSTEMS    (2-9 systems for level 4, 10 or more for level 5)     Review of Systems   Constitutional: Negative for chills, fever and unexpected weight change. HENT: Negative for congestion, rhinorrhea, sinus pressure and sore throat. Respiratory: Negative for cough, shortness of breath and wheezing. Cardiovascular: Negative for chest pain and palpitations. Gastrointestinal: Negative for abdominal pain, constipation, diarrhea, nausea and vomiting. Genitourinary: Negative for dysuria and hematuria. Musculoskeletal: Negative for arthralgias and myalgias. Skin: Negative for color change and rash. Neurological: Negative for dizziness, weakness and headaches. Hematological: Negative for adenopathy. All other systems reviewed and are negative. Except as noted above the remainder of the review of systems was reviewed and negative. PHYSICAL EXAM    (up to 7 for level 4, 8 or more for level 5)     ED Triage Vitals   BP Temp Temp Source Pulse Resp SpO2 Height Weight   20 1810 20 1810 20 1810 20 1810 20 1810 20 1810 20 2300 20 181   (!) 152/75 98.4 °F (36.9 °C) Oral 102 22 99 % 5' 4\" (1.626 m) 140 lb (63.5 kg)       Physical Exam  Vitals signs reviewed. Constitutional:       Appearance: He is well-developed. HENT:      Head: Normocephalic and atraumatic. Eyes:      Conjunctiva/sclera: Conjunctivae normal.      Pupils: Pupils are equal, round, and reactive to light. Neck:      Musculoskeletal: Normal range of motion and neck supple. Cardiovascular:      Rate and Rhythm: Normal rate and regular rhythm.    Pulmonary:      Effort: Pulmonary effort is normal. No respiratory distress. Breath sounds: Normal breath sounds. No stridor. Abdominal:      General: Bowel sounds are normal.      Palpations: Abdomen is soft. Musculoskeletal: Normal range of motion. Lymphadenopathy:      Cervical: No cervical adenopathy. Skin:     General: Skin is warm and dry. Findings: No rash. Neurological:      Mental Status: He is alert and oriented to person, place, and time. RADIOLOGY:   Non-plain film images such as CT, Ultrasound and MRI are read by the radiologist. Musa Freitas radiographic images are visualized and preliminarily interpreted by the emergency physician with the below findings:    Ct Head Wo Contrast    Result Date: 11/12/2020  EXAMINATION: CT OF THE HEAD WITHOUT CONTRAST  11/12/2020 7:46 pm TECHNIQUE: CT of the head was performed without the administration of intravenous contrast. Dose modulation, iterative reconstruction, and/or weight based adjustment of the mA/kV was utilized to reduce the radiation dose to as low as reasonably achievable. COMPARISON: 06/01/2015 HISTORY: ORDERING SYSTEM PROVIDED HISTORY: headache TECHNOLOGIST PROVIDED HISTORY: headache Reason for Exam: Headache; history of progressing squamous cell carcinoma of glottis, first diagnosed in 2005. Acuity: Acute Type of Exam: Initial FINDINGS: BRAIN/VENTRICLES: There is no acute intracranial hemorrhage, mass effect or midline shift. No abnormal extra-axial fluid collection. The gray-white differentiation is maintained without evidence of an acute infarct. There is no evidence of hydrocephalus. Periventricular and deep subcortical white matter hypoattenuation, consistent microangiopathic change. Mild parenchymal volume loss. Atherosclerosis of the intracranial vasculature. There is right frontal encephalomalacia underlying the craniotomy. Remote lacunar infarct in the left basal ganglia. ORBITS: The visualized portion of the orbits demonstrate no acute abnormality.  SINUSES: Scattered contrast. Multiplanar reformatted images are provided for review. Dose modulation, iterative reconstruction, and/or weight based adjustment of the mA/kV was utilized to reduce the radiation dose to as low as reasonably achievable. COMPARISON: Chest radiograph today. Chest CT 06/12/2020 HISTORY: ORDERING SYSTEM PROVIDED HISTORY: abnormal x ray TECHNOLOGIST PROVIDED HISTORY: abnormal x ray Reason for Exam: F/u abnormal chest xray. Pt has a history of previous laryngeal cancer status post tracheostomy placement that presents with complaints of shortness of breath. Patient states he been short of breath for past couple of days Acuity: Acute Type of Exam: Initial Additional signs and symptoms: GFR 23 Relevant Medical/Surgical History: Hx of cardiac cath, COPD, HTN, lung cancer, CKD FINDINGS: Mediastinum: Cardiomegaly is noted. Calcified atheromatous plaque and coronary calcification. No pericardial effusion. Mass in the medial right upper lobe/superior mediastinum bulging into the superior mediastinum is noted. Additional mildly enlarged right paratracheal lymph nodes are noted measuring up to 11 mm. Lungs/pleura: Medial right upper lobe/superior mediastinum mass displaces the trachea and esophagus to the left. This measures at least 7.4 x 5.9 cm and demonstrates heterogeneous internal attenuation with areas of calcification. Moderate size right pleural effusion. Lipomatous enlargement of the inferior left pleura. Small amount debris in the bronchus intermedius and minimal subsegmental atelectasis in the lung bases noted. Upper Abdomen: No acute findings. Scarring in the left kidney is noted. Partially visualized endovascular abdominal aortic repair. Soft Tissues/Bones: Bilateral old rib fractures. Superior endplate fracture at the L2 level again demonstrated. Status post radical neck dissection.      1.  Large medial right upper lobe/superior mediastinal mass with mass effect on the trachea and esophagus, concerning for neoplastic disease. 2.  Additional mildly enlarged right paratracheal lymph nodes may represent disease involvement. 3.  Moderate size right pleural effusion. Dependent subsegmental atelectasis. Nm Lung Scan Perfusion Only    Result Date: 11/19/2020  EXAMINATION: LUNG PERFUSION IMAGING 11/19/2020 9:27 pm TECHNIQUE: Multiple pulmonary perfusion images were obtained of the chest following the intravenous administration of 6.2 millicuries of IM-36Y MAA. Ventilation imaging was not performed. COMPARISON: Chest radiograph from 11/19/2020 HISTORY: ORDERING SYSTEM PROVIDED HISTORY: SOB TECHNOLOGIST PROVIDED HISTORY: SOB Reason for Exam: SOB Acuity: Unknown Type of Exam: Unknown FINDINGS: Right upper lobe perfusion defect corresponds with the mass in the medial right apex. There are also perfusion defects in the posterior basal segments of both lower lobes corresponding with bilateral pleural effusions. This is an intermediate probability scan pattern for pulmonary embolus.      Xr Chest Portable    Result Date: 11/19/2020  EXAMINATION: ONE XRAY VIEW OF THE CHEST 11/19/2020 11:33 am COMPARISON: November 13, 2020, chest exam HISTORY: ORDERING SYSTEM PROVIDED HISTORY: SOB TECHNOLOGIST PROVIDED HISTORY: SOB Acuity: Acute Type of Exam: Unknown FINDINGS: Stable cardiac silhouette Emphysematous changes of the lungs with interval increase in mild streaky bibasilar densities and small bilateral pleural effusions, otherwise clear lungs Stable large right superior paratracheal mass Multiple bilateral rib fractures     Interval increase in mild streaky bibasilar atelectasis with small bilateral pleural effusions     Xr Chest Portable    Result Date: 11/13/2020  EXAMINATION: ONE XRAY VIEW OF THE CHEST 11/13/2020 5:55 am COMPARISON: November 12, 2020 HISTORY: ORDERING SYSTEM PROVIDED HISTORY: effusion TECHNOLOGIST PROVIDED HISTORY: effusion Reason for Exam: effusion Acuity: Unknown Type of Exam: Unknown FINDINGS: Stable cardiomediastinal silhouette to include the right paratracheal ovoid mass. Left basilar opacity is unchanged. No new focal lung abnormality. No sizable pleural effusion. No pneumothorax. Osseous structures are unchanged. Stable exam demonstrating left basilar opacity. Xr Chest Portable    Result Date: 11/12/2020  EXAMINATION: ONE XRAY VIEW OF THE CHEST 11/12/2020 4:15 pm COMPARISON: 11/12/2020 HISTORY: ORDERING SYSTEM PROVIDED HISTORY: Post thoracentesis TECHNOLOGIST PROVIDED HISTORY: Post thoracentesis Reason for Exam: Post thoracentesis Acuity: Unknown Type of Exam: Unknown Relevant Medical/Surgical History: Post thoracentesis FINDINGS: Overlying items external to the patient somewhat limit evaluation. This includes oxygen mask obscuring right upper lung zone. Within this limitation, no definite evidence for pneumothorax is seen status post thoracentesis. Improved right pleural effusion and aeration to the right lung base. Right lung base now appears to be clear. There is stable mild left basilar likely scarring or atelectasis. Stable dense opacity correlating with mass to the right upper lobe medially. Cardiac and mediastinal silhouettes are stable. Stable appearance to bony structures. No definite pneumothorax status post right thoracentesis. Improved right pleural effusion and aeration to the right lung base. Stable dense opacity correlating with mass to the right upper lobe medially better delineated on prior CT chest 11/10/2020. Xr Chest Portable    Result Date: 11/12/2020  EXAMINATION: ONE XRAY VIEW OF THE CHEST 11/12/2020 6:46 am COMPARISON: November 10/2020 HISTORY: ORDERING SYSTEM PROVIDED HISTORY: Effusion TECHNOLOGIST PROVIDED HISTORY: Effusion Reason for Exam: effusion Acuity: Unknown Type of Exam: Unknown FINDINGS: No evidence of pneumothorax. Again is mass-like density in the right paratracheal region.   There is slight increase in the right-sided pleural white matter microvascular ischemic change. There is a chronic infarct within the high right parietal lobe. The sellar/suprasellar regions appear unremarkable. The normal signal voids within the major intracranial vessels appear maintained. ORBITS: The visualized portion of the orbits demonstrate no acute abnormality. SINUSES: The visualized paranasal sinuses and mastoid air cells are well aerated. BONES/SOFT TISSUES: The bone marrow signal intensity appears normal. The soft tissues demonstrate no acute abnormality. No acute intracranial abnormality visualized. Ir Guided Thoracentesis Pleural    Result Date: 11/12/2020  PROCEDURE: ULTRASOUNDGUIDED RIGHT THORACENTESIS 11/12/2020 HISTORY: ORDERING SYSTEM PROVIDED HISTORY: right thoracentesis TECHNOLOGIST PROVIDED HISTORY: right thoracentesis Enlarging right pleural effusion. History of COPD, asthma and chronic kidney disease. History of prostate cancer. TECHNIQUE: Informed consent was obtained after a detailed explanation of the procedure including risks, benefits, and alternatives. Universal protocol was performed. The right chest was prepped and draped in sterile fashion and local anesthesia was achieved with lidocaine. A 5 [place] needle sheath was advanced under ultrasound guidance into pleural effusion and thoracentesis was performed. Fluid was provided for further analysis. The patient tolerated the procedure well. FINDINGS: A total of 800 mL was removed. Fluid was yellowish-green in color and relatively clear. Successful ultrasound guided diagnostic and therapeutic right thoracentesis. Interpretation per the Radiologist below, if available at the time of this note:    2050 [employer]   Final Result   This is an intermediate probability scan pattern for pulmonary embolus. CT CHEST WO CONTRAST   Final Result   1. No acute process in the chest.  Old bilateral rib fractures.    2. Similar right upper lobe mass measuring 8.2 cm inseparable from the   mediastinum almost certainly representing a primary lung cancer. .   3. Similar moderate right pleural effusion. New small left pleural effusion. LABS:  Labs Reviewed   CBC WITH AUTO DIFFERENTIAL - Abnormal; Notable for the following components:       Result Value    RBC 3.12 (*)     Hemoglobin 8.8 (*)     Hematocrit 28.5 (*)     RDW 15.7 (*)     Seg Neutrophils 88 (*)     Lymphocytes 6 (*)     Eosinophils % 0 (*)     Absolute Lymph # 0.44 (*)     All other components within normal limits   BASIC METABOLIC PANEL - Abnormal; Notable for the following components:    Glucose 126 (*)     BUN 41 (*)     CREATININE 2.92 (*)     Sodium 128 (*)     Chloride 97 (*)     CO2 18 (*)     GFR Non- 22 (*)     GFR  26 (*)     All other components within normal limits   TROP/MYOGLOBIN - Abnormal; Notable for the following components:    Troponin, High Sensitivity 86 (*)     Myoglobin 102 (*)     All other components within normal limits   LACTIC ACID       All other labs were within normal range or not returned as of this dictation. EMERGENCY DEPARTMENT COURSE and DIFFERENTIAL DIAGNOSIS/MDM:   Vitals:    Vitals:    11/19/20 1810 11/19/20 2038 11/19/20 2245 11/19/20 2300   BP: (!) 152/75  (!) 147/90    Pulse: 102 87 76    Resp: 22 18 22    Temp: 98.4 °F (36.9 °C)  97.7 °F (36.5 °C)    TempSrc: Oral  Oral    SpO2: 99% 98% 100%    Weight: 140 lb (63.5 kg)   140 lb (63.5 kg)   Height:    5' 4\" (1.626 m)       Medical Decision Making: Patient has a moderate sided pleural effusion and small left-sided pleural effusion. Patient has had thoracentesis in the past.  His VQ is done to rule out a PE which shows intermediate probability for PE. His vitals are stable here in er. Oxygen saturation on room air is 98 percent,. The patient was given a dose of heparin 5000 units subcu. He will be admitted to the hospital for further evaluation work-up.   Case was discussed with internal medicine per Dr. Candis Valentin  Medications   sodium chloride flush 0.9 % injection 10 mL (has no administration in time range)   sodium chloride flush 0.9 % injection 10 mL (has no administration in time range)   acetaminophen (TYLENOL) tablet 650 mg (has no administration in time range)   morphine (PF) injection 2 mg (has no administration in time range)   heparin (porcine) injection 5,000 Units (has no administration in time range)   albuterol sulfate  (90 Base) MCG/ACT inhaler 2 puff (has no administration in time range)   allopurinol (ZYLOPRIM) tablet 300 mg (has no administration in time range)   amitriptyline (ELAVIL) tablet 25 mg (has no administration in time range)   amLODIPine (NORVASC) tablet 10 mg (has no administration in time range)   budesonide-formoterol (SYMBICORT) 160-4.5 MCG/ACT inhaler 2 puff (has no administration in time range)   Vitamin D3 CAPS 2,000 Units (has no administration in time range)   hydrALAZINE (APRESOLINE) tablet 50 mg (has no administration in time range)   metoprolol succinate (TOPROL XL) extended release tablet 50 mg (has no administration in time range)   mirtazapine (REMERON) tablet 7.5 mg (has no administration in time range)   predniSONE (DELTASONE) tablet 10 mg (has no administration in time range)   pantoprazole (PROTONIX) tablet 40 mg (has no administration in time range)   sodium bicarbonate tablet 650 mg (has no administration in time range)   HYDROcodone-acetaminophen (NORCO) 5-325 MG per tablet 1 tablet (1 tablet Oral Given 11/19/20 1904)   furosemide (LASIX) injection 20 mg (20 mg Intravenous Given 11/19/20 2041)   morphine sulfate (PF) injection 4 mg (4 mg Intravenous Given 11/19/20 2041)   technetium albumin aggregated (MAA) solution 6.2 millicurie (6.2 millicuries Intravenous Given 11/19/20 2103)   heparin (porcine) injection 5,000 Units (5,000 Units Subcutaneous Given 11/19/20 2229)       CONSULTS:  IP CONSULT TO INTERNAL MEDICINE  IP CONSULT TO PULMONOLOGY  IP CONSULT TO ONCOLOGY  IP CONSULT TO NEPHROLOGY    CRITICAL CARE TIME     Due to the high probability of sudden and clinically significant deterioration in the patient's condition he required highest level of my preparedness to intervene urgently. I provided critical care time including documentation time, medication orders and management, reevaluation, vital sign assessment, ordering and reviewing of of lab tests ordering and reviewing of x-ray studies, and admission orders. Aggregate critical care time is 30 minutes including only time during which I was engaged in work directly related to his care and did not include time spent treating other patients simultaneously. FINAL IMPRESSION      1. Bilateral pleural effusion    2. Dyspnea and respiratory abnormalities    3. Lung mass          DISPOSITION/PLAN   DISPOSITION Admitted 11/19/2020 09:45:59 PM      PATIENT REFERRED TO:   No follow-up provider specified.     DISCHARGE MEDICATIONS:     Current Discharge Medication List              (Please note that portions of this note were completed with a voice recognition program.  Efforts were made to edit the dictations but occasionally words are mis-transcribed.)    [id number] [facility] NP, APRN - CNP  Certified Nurse Practitioner          GINETTE Solitario CNP  11/20/20 0015

## 2020-11-20 NOTE — CONSULTS
Consult Note    Reason for Consult:  Renal Insufficiency    Requesting Physician:  Rigo Tavarez MD    HISTORY OF PRESENT ILLNESS:    The patient is a 78 y.o. male who presents with shortness of breath, bilateral pleural effusions and rule out PE. He was discharged several days ago for COPD exacerbation. He has known of mediastinal mass with adenopathy with laryngeal and oral cancer. He has been following with oncology. He has known history of CKD 4 and normally follows with Dr. Mohsen Perez. Creatinine has been trending 2.8-3.2 on recent labs during last admission and in 6/2020. Creatinine 2.9 yesterday. Hx of chronic hyponatremia. Sodium level was 130 and 128 yesterday. Bicarb level was 18 yesterday. No new labs today. SBP trending 130s to 150s. CT of the chest yesterday showed similar moderate right pleural effusion and new small left pleural effusion. Similar right upper lobe mass. He received lasix yesterday. Review Of Systems:   Able to obtain as patient has trach and is difficult to communicate. Past Medical History:   Diagnosis Date    Arthritis     Asthma     CKD (chronic kidney disease), stage III     COPD (chronic obstructive pulmonary disease) (HCC)     GERD (gastroesophageal reflux disease)     Gout     Hypertension     Hyponatremia     Iliac artery aneurysm, right (HCC)     Laryngeal cancer (HCC)     Laryngeal Cancer. Chronic left facial edema.      Lung cancer ([address] 3-4 years ago    [facility] [place]    Prostate cancer [facility])        Past Surgical History:   Procedure Laterality Date    ABDOMINAL AORTIC ANEURYSM REPAIR, ENDOVASCULAR Right 4/7/2020    RIGHT ILIAC ARTERY ANEURYSM REPAIR ENDOVASCULAR performed by Bjorn Jesus MD at [address] [place] [address]  03/04/2020    No stents placed   [facility]  3-4 years ago    lung removed and \"voice box\" removed       Prior to Admission medications    Medication Sig Start Daily    mirtazapine  7.5 mg Oral Nightly    predniSONE  10 mg Oral Daily    pantoprazole  40 mg Oral QAM AC    sodium bicarbonate  650 mg Oral TID    ipratropium-albuterol  1 ampule Inhalation Q4H WA    sodium chloride flush  10 mL Intravenous 2 times per day    heparin (porcine)  5,000 Units Subcutaneous 3 times per day     Continuous Infusions:  PRN Meds:albuterol sulfate HFA, sodium chloride flush, acetaminophen, morphine    Allergies   Allergen Reactions    Amino Acids     Lisinopril Swelling       Social History     Socioeconomic History    Marital status: Single     Spouse name: Not on file    Number of children: Not on file    Years of education: Not on file    Highest education level: Not on file   Occupational History    Not on file   Social Needs    Financial resource strain: Not on file    Food insecurity     Worry: Not on file     Inability: Not on file    Transportation needs     Medical: Not on file     Non-medical: Not on file   Tobacco Use    Smoking status: Former Smoker     Types: Cigarettes    Smokeless tobacco: Never Used    Tobacco comment: quit smoking 20 years ago    Substance and Sexual Activity    Alcohol use: Not Currently     Alcohol/week: 5.0 standard drinks     Types: 5 Cans of beer per week    Drug use: No    Sexual activity: Never   Lifestyle    Physical activity     Days per week: Not on file     Minutes per session: Not on file    Stress: Not on file   Relationships    Social connections     Talks on phone: Not on file     Gets together: Not on file     Attends Sabianism service: Not on file     Active member of club or organization: Not on file     Attends meetings of clubs or organizations: Not on file     Relationship status: Not on file    Intimate partner violence     Fear of current or ex partner: Not on file     Emotionally abused: Not on file     Physically abused: Not on file     Forced sexual activity: Not on file   Other Topics Concern    Not on file   Social History Narrative    Not on file       Family History   Problem Relation Age of Onset    Diabetes Mother     Heart Disease Mother         Sudden cardiac death    Cancer Father     Diabetes Sister     Heart Disease Sister          Physical Exam:  Vitals:    11/20/20 0316 11/20/20 0734 11/20/20 1047 11/20/20 1157   BP: 139/85 (!) 142/88  135/67   Pulse: 80 77  71   Resp: 18 18 18 18   Temp: 98.1 °F (36.7 °C) 98.4 °F (36.9 °C)  98.2 °F (36.8 °C)   TempSrc: Oral Oral  Oral   SpO2: 95% 96% 98% 98%   Weight:       Height:         No intake/output data recorded. General:  Awake, alert, not in distress. Appears to be stated age. HEENT: Atraumatic, normocephalic. Anicteric sclera. Pink and moist oral mucosa. Neck supple. Trach with collar. No JVD. Chest: Bilateral air entry, clear to auscultation, Diminished bilaterally. Cardiovascular: RRR, S1S2, no murmur, rub or gallop. No lower extremity edema. Abdomen: Soft, non tender to palpation. Active bowel sounds x 4 quadrants. Musculoskeletal: Active ROM x 4 extremities. No cyanosis or clubbing. Integumentary: Pink, warm and dry. Free from rash or lesions. Skin turgor normal.  CNS:  Face symmetrical. No tremor.      Data:    CBC:   Lab Results   Component Value Date    WBC 7.4 11/19/2020    HGB 8.8 (L) 11/19/2020    HCT 28.5 (L) 11/19/2020    MCV 91.3 11/19/2020     11/19/2020     BMP:    Lab Results   Component Value Date     (L) 11/19/2020     (L) 11/19/2020     (L) 11/15/2020    K 4.6 11/19/2020    K 4.8 11/19/2020    K 4.8 11/15/2020    CL 97 (L) 11/19/2020    CL 98 11/19/2020    CL 97 (L) 11/15/2020    CO2 18 (L) 11/19/2020    CO2 20 11/19/2020    CO2 21 11/15/2020    BUN 41 (H) 11/19/2020    BUN 40 (H) 11/19/2020    BUN 46 (H) 11/15/2020    CREATININE 2.92 (H) 11/19/2020    CREATININE 2.90 (H) 11/19/2020    CREATININE 3.09 (H) 11/15/2020    GLUCOSE 126 (H) 11/19/2020    GLUCOSE 93 11/19/2020    GLUCOSE 124 (H) 11/15/2020     CMP:   Lab Results   Component Value Date     11/19/2020    K 4.6 11/19/2020    CL 97 11/19/2020    CO2 18 11/19/2020    BUN 41 11/19/2020    CREATININE 2.92 11/19/2020    GLUCOSE 126 11/19/2020    CALCIUM 8.6 11/19/2020    PROT 7.7 11/11/2020    LABALBU 3.3 11/11/2020    BILITOT 0.25 11/11/2020    ALKPHOS 104 11/11/2020    AST 15 11/11/2020    ALT 9 11/11/2020      Hepatic:   Lab Results   Component Value Date    AST 15 11/11/2020    AST 43 (H) 06/11/2020    AST 17 05/30/2020    ALT 9 11/11/2020    ALT 46 (H) 06/11/2020    ALT 11 05/30/2020    BILITOT 0.25 (L) 11/11/2020    BILITOT <0.10 (L) 06/11/2020    BILITOT 0.25 (L) 05/30/2020    ALKPHOS 104 11/11/2020    ALKPHOS 107 06/11/2020    ALKPHOS 88 05/30/2020     BNP:   Lab Results   Component Value Date    BNP 39 12/12/2012     Lipids: No results found for: CHOL, HDL  INR:   Lab Results   Component Value Date    INR 1.1 11/12/2020    INR 1.0 06/01/2020    INR 1.0 05/14/2020     PTH: No results found for: PTH  Phosphorus:    Lab Results   Component Value Date    PHOS 4.6 06/15/2020     Ionized Calcium: No results found for: IONCA  Magnesium:   Lab Results   Component Value Date    MG 1.8 06/06/2020     Albumin:   Lab Results   Component Value Date    LABALBU 3.3 11/11/2020     Last 3 CK, CKMB, Troponin: @LABRCNT(CKTOTAL:3,CKMB:3,TROPONINI:3)       URINE:)No results found for: Cherry Clutter    Radiology:   Reviewed. Assessment:  1. CKD 4  2. Chronic Hyponatremia  3. Metabolic Acidosis   4. Bilateral pleural effusions  5. Right upper lobe medistinal mass/Recurrent invasive keratinizing SCC  6. S/p right thoracentesis 11/12. 7. Moderate to severe pulmonary HTN  8. Combined heart failure    Plan:  1. Follow up labs today and in AM.  2. BP is stable. 3. Increase oral bicarb to 1300mg po TID. 4.  No additional renal work-up needed as creatinine is at baseline. 6.  Check urine sodium and urine osmolality, TSH, cortisol.    7.  Renal diet with

## 2020-11-20 NOTE — CONSULTS
Pulmonary Medicine and [address] [place] [address] APRN-CNP/Gus Willson MD      Patient - Carlos Marshall   MRN -  [id number]   [facility] # - [de-identified]   - 1950      Date of Admission -  2020  6:21 PM  Date of evaluation -  2020  Room - [address] [place] [address]   Grace Reddy MD Primary Care Physician - Lora Olivares MD     Reason for Consult    Rule out PE, bilateral pleural effusions    Assessment   · . Rule out pulmonary embolism, V/Q with intermediate probability  · Right upper lobe/superior mediastinal mass, 7.4 cm X 5 .9 cm  · Recurrent invasive keratinizing SCC  · Lymphadenopathy  · Moderate right pleural effusion, recurrent  § S/p right thoracentesis on 2020 and 2020  § S/p right thoracentesis 20, 800 mL removed  · Small left pleural effusion  · COPD  · Moderate to severe pulmonary hypertension, RVSP 65 mmHg  · History of laryngeal cancer s/p tracheostomy   ? subsequent development of oral cancer s/p surgery with reconstruction  · LITTLE on CKD/hyponatremia  · Right renal cyst 1.5 cm  · Elevated troponin  · Combined diastolic and systolic heart failure  Recommendations   · Perfusion scan reviewed, right upper lobe perfusion defect corresponds with mediastinal mass and perfusion defects in bilateral lower lobes correspond with bilateral pleural effusions/atelectasis on x-ray chest/CT chest and patient is 100% on room air, therefore it is highly unlikely that patient has PE.  · Oxygen via humidified trach collar  · DuoNeb every 4 and as needed  · Symbicort  · Prednisone 10 mg daily  · X-ray chest in am  · Labs: CBC and BMP in am  · Nephrology following  · Oncology following  · DVT prophylaxis with subcu heparin  · Discussed with RT/RN  · Discussed with Dr. Freda Capellan. · Above assessment and plan will be reviewed with Dr. Braxton Willson. Patient plan will be finalized following review by Dr. Braxton Willson.   · Will follow with you    Problem List      Patient and [place]    Prostate cancer [facility])       Past Surgical History        Procedure Laterality Date    ABDOMINAL AORTIC ANEURYSM REPAIR, ENDOVASCULAR Right 4/7/2020    RIGHT ILIAC ARTERY ANEURYSM REPAIR ENDOVASCULAR performed by Nanette Francis MD at [address] [place] [address]  03/04/2020    No stents placed    PROSTATE SURGERY      TRACHEAL SURGERY  3-4 years ago    lung removed and \"voice box\" removed       Meds    Current Medications    allopurinol  300 mg Oral Daily    amitriptyline  25 mg Oral Nightly    amLODIPine  10 mg Oral Daily    budesonide-formoterol  2 puff Inhalation BID    Vitamin D  2,000 Units Oral Daily    hydrALAZINE  50 mg Oral 3 times per day    metoprolol succinate  50 mg Oral Daily    mirtazapine  7.5 mg Oral Nightly    predniSONE  10 mg Oral Daily    pantoprazole  40 mg Oral QAM AC    sodium bicarbonate  650 mg Oral TID    sodium chloride flush  10 mL Intravenous 2 times per day    heparin (porcine)  5,000 Units Subcutaneous 3 times per day     albuterol sulfate HFA, sodium chloride flush, acetaminophen, morphine  IV Drips/Infusions    Home Medications  Medications Prior to Admission: [START ON 11/21/2020] amitriptyline (ELAVIL) 25 MG tablet, Take 1 tablet by mouth nightly  predniSONE (DELTASONE) 10 MG tablet, Take 1 tablet by mouth daily for 10 days Take 1 tablet daily for 5 days then 1 tablet every other day till gone  mirtazapine (REMERON) 15 MG tablet, Take 7.5 mg by mouth nightly Takes 1/2 tab (=7.5mg) nightly  sodium bicarbonate 650 MG tablet, Take 650 mg by mouth 3 times daily  hydrALAZINE (APRESOLINE) 50 MG tablet, Take 1 tablet by mouth every 8 hours  metoprolol succinate (TOPROL XL) 50 MG extended release tablet, Take 1 tablet by mouth daily  amLODIPine (NORVASC) 10 MG tablet, Take 1 tablet by mouth daily  Cholecalciferol (VITAMIN D3) 50 MCG (2000 UT) CAPS, Take 1 capsule by mouth daily  albuterol sulfate  (90 Base) MCG/ACT inhaler, Inhale 2 puffs Patient Vitals for the past 96 hrs (Last 3 readings):   Weight   11/19/20 2300 140 lb (63.5 kg)   11/19/20 1810 140 lb (63.5 kg)     Exam   General Appearance  Awake, alert, oriented, in no acute distress  HEENT - Head is normocephalic, atraumatic. Pupil reactive to light  Neck - Supple, tracheostomy in place  Lungs -decreased air exchange  Cardiovascular - Heart sounds are normal.  Regular rhythm normal rate without murmur, gallop or rub. Abdomen - Soft, nontender, nondistended, no masses or organomegaly  Neurologic - CN II-XII are grossly intact.  There are no focal motor or sensory deficits  Skin - No bruising or bleeding  Extremities - No cyanosis, clubbing or edema    Labs  - Old records and notes have been reviewed in [facility]   CBC     Lab Results   Component Value Date    WBC 7.4 11/19/2020    RBC 3.12 11/19/2020    HGB 8.8 11/19/2020    HCT 28.5 11/19/2020     11/19/2020    MCV 91.3 11/19/2020    MCH 28.2 11/19/2020    MCHC 30.9 11/19/2020    RDW 15.7 11/19/2020    LYMPHOPCT 6 11/19/2020    MONOPCT 6 11/19/2020    BASOPCT 0 11/19/2020    MONOSABS 0.44 11/19/2020    LYMPHSABS 0.44 11/19/2020    EOSABS 0.00 11/19/2020    BASOSABS 0.00 11/19/2020    DIFFTYPE NOT REPORTED 11/19/2020     BMP   Lab Results   Component Value Date     11/19/2020    K 4.6 11/19/2020    CL 97 11/19/2020    CO2 18 11/19/2020    BUN 41 11/19/2020    CREATININE 2.92 11/19/2020    GLUCOSE 126 11/19/2020    CALCIUM 8.6 11/19/2020    MG 1.8 06/06/2020     LFTS  Lab Results   Component Value Date    ALKPHOS 104 11/11/2020    ALT 9 11/11/2020    AST 15 11/11/2020    PROT 7.7 11/11/2020    BILITOT 0.25 11/11/2020    BILIDIR <0.08 01/05/2020    IBILI CANNOT BE CALCULATED 01/05/2020    LABALBU 3.3 11/11/2020     ABG   Lab Results   Component Value Date    OUL4UCJ 15 10/27/2019     PTT  Lab Results   Component Value Date    APTT 27.0 11/12/2020     INR   Lab Results   Component Value Date    INR 1.1 11/12/2020    INR 1.0 06/01/2020    INR 1.0 05/14/2020    PROTIME 13.8 11/12/2020    PROTIME 13.3 06/01/2020    PROTIME 13.1 05/14/2020       Radiology    CXR       CT Scans    (See actual reports for details)    NM lung scan perfusion only:  FINDINGS:    Right upper lobe perfusion defect corresponds with the mass in the medial    right apex.  There are also perfusion defects in the posterior basal segments    of both lower lobes corresponding with bilateral pleural effusions. This is an intermediate probability scan pattern for pulmonary embolus. \"Thank you for asking us to see this patient\"    Case discussed with nurse and patient. Questions and concerns addressed.     Electronically signed by     GINETTE Puente-CNP  Pulmonary Critical Care and Sleep Medicine,  Patient seen under the supervision of Juaquin Live MD, [facility]

## 2020-11-20 NOTE — PROGRESS NOTES
updated with patient status such as SOB , CT results as well as Lung scan results. Dr. Mcrae Situ states that patient is oxygenating well so will see him in AM and if patient needs anything over night to call.

## 2020-11-20 NOTE — CARE COORDINATION
Case Management Initial Discharge Plan  Hailee Granger Risk              Risk of Unplanned Readmission:        0             Met with:patient to discuss discharge plans. Information verified: address, contacts, phone number, , insurance Yes  PCP: Cathie Villalba MD  Date of last visit: unsure     Insurance Provider: medicare and medicaid     Discharge Planning  Current Residence:  Private home   Living Arrangements:  Family Members , brother Bonnie Timmons has 1 stories/2 stairs to climb  Support Systems:  Family Members         Current Services PTA:  None  Agency: none        Patient able to perform ADL's:Assisted  DME in home:  Cane, bath bench, neb, humidified 02 thru [facility] for his stoma    DME used to aid ambulation prior to admission:   Cane   DME used during admission:  tbd      Potential Assistance Needed:  Home Care     Pharmacy: [facility] on [address]       Potential Assistance Purchasing Medications:  No  Does patient want to participate in local refill/ meds to beds program?  Not Assessed     Patient agreeable to home care: Yes  Freedom of choice provided:  yes     The Plan for Transition of Care is related to the following treatment goals: skilled RN and therapy      The Patient and/or patient representative   was provided with a choice of provider and agrees   with the discharge plan. [x]? Yes []? No     Freedom of choice list was provided with basic dialogue that supports the patient's individualized plan of care/goals, treatment preferences and shares the quality data associated with the providers. [x]? Yes []?  No     Type of Home Care Services:  RN   Patient expects to be discharged to:  home     Prior SNF/Rehab Placement and Facility: [facility]   Agreeable to SNF/Rehab: No  [place] of choice provided: n/a   Evaluation: n/a     Expected Discharge date:  20  Follow Up Appointment: Best Day/ Time: Monday AM     Transportation provider: per brother [facility]   Transportation arrangements needed for discharge: No     Discharge Plan:   Met with patient to complete discharge assessment. Patient has stoma where his old trach used to be. Can mouth words and if holds stoma can speak few words. Patient lives with brother she. He uses his cane all the time. Patient known to writer from prior admission. he was just discharged from [facility] on 11/15. He was sent home with new RX for humidified trach collar to his stoma thru [facility]>      He was sent home with with [facility] on 11/15. Call to [facility] and they did confirm they are following and in fact were to open him today. . [facility] in [facility]     Patient admitted with bilateral pleural effusions, CKD. Nephrology and pulmonary are following. Pulmonary is ruling out PE. Will watch for any anticoagulation needs. Patient was to have radiation education today under DR Sorenson but has been cancelled due to admission. He also follows with dr Radha Estrada for oral cancer and lung nodule.        Electronically signed by Reinaldo Farrell RN on 11/20/20 at 3:25 PM EST

## 2020-11-20 NOTE — ED NOTES
Report called to [facility] on PCU     [place], [address] [facility]  11/19/20 4573

## 2020-11-20 NOTE — CONSULTS
Today's Date: 11/20/2020  Patient Name: Ashleigh Gore  Date of admission: 11/19/2020  6:21 PM  Patient's age: 78 y.o., 1950  Admission Dx: Bilateral pleural effusion [J90]    Reason for Consult: management recommendations  Requesting Physician: Remy Cho MD    CHIEF COMPLAINT: Shortness of breath. Chest pain. History Obtained From:  patient, electronic medical record    HISTORY OF PRESENT ILLNESS:      The patient is a 78 y.o.  male who is admitted to the hospital for chief chief complaint of shortness of breath and chest pain. Patient presented to the ER with complains of shortness of breath and left-sided rib pain. CT chest did not show any acute process in the chest showed old bilateral rib fractures. Showed similar right upper lobe mass 8.2 cm inseparable from mediastinum concerning for lung primary. Small right pleural effusion was noted as well. Thoracentesis from 11/12 was benign negative for malignancy. VQ scan done shows intermediate probability for PE.  CTA cannot be performed due to CKD. mirtazapine (REMERON) 15 MG tablet Take 7.5 mg by mouth nightly Takes 1/2 tab (=7.5mg) nightly   Yes Historical Provider, MD   sodium bicarbonate 650 MG tablet Take 650 mg by mouth 3 times daily   Yes Historical Provider, MD   hydrALAZINE (APRESOLINE) 50 MG tablet Take 1 tablet by mouth every 8 hours 6/6/20  Yes Lora Olivares MD   metoprolol succinate (TOPROL XL) 50 MG extended release tablet Take 1 tablet by mouth daily 6/7/20  Yes Lora Olivares MD   amLODIPine (NORVASC) 10 MG tablet Take 1 tablet by mouth daily 4/18/20  Yes Lora Olivares MD   Cholecalciferol (VITAMIN D3) 50 MCG (2000 UT) CAPS Take 1 capsule by mouth daily   Yes Historical Provider, MD   albuterol sulfate  (90 Base) MCG/ACT inhaler Inhale 2 puffs into the lungs every 6 hours as needed for Wheezing or Shortness of Breath   Yes Historical Provider, MD   budesonide-formoterol (SYMBICORT) 160-4.5 MCG/ACT AERO Inhale 2 puffs into the lungs 2 times daily   Yes Historical Provider, MD   omeprazole (PRILOSEC) 20 MG delayed release capsule Take 20 mg by mouth every morning (before breakfast)   Yes Historical Provider, MD   allopurinol (ZYLOPRIM) 300 MG tablet Take 300 mg by mouth daily.    Yes Historical Provider, MD   amitriptyline (ELAVIL) 10 MG tablet Take 1 tablet by mouth nightly 11/15/20   Lora Olivares MD     Current Facility-Administered Medications   Medication Dose Route Frequency Provider Last Rate Last Dose    albuterol sulfate  (90 Base) MCG/ACT inhaler 2 puff  2 puff Inhalation Q6H PRN Lora Olivares MD        allopurinol (ZYLOPRIM) tablet 300 mg  300 mg Oral Daily Lora Olivares MD        amitriptyline (ELAVIL) tablet 25 mg  25 mg Oral Nightly Lora Olivares MD        amLODIPine (NORVASC) tablet 10 mg  10 mg Oral Daily Lora Olivares MD        budesonide-formoterol (SYMBICORT) 160-4.5 MCG/ACT inhaler 2 puff  2 puff Inhalation BID Lora Olivares MD  Vitamin D (CHOLECALCIFEROL) tablet 2,000 Units  2,000 Units Oral Daily Sharath Waters MD        hydrALAZINE (APRESOLINE) tablet 50 mg  50 mg Oral 3 times per day Sharath Waters MD   50 mg at 11/20/20 9413    metoprolol succinate (TOPROL XL) extended release tablet 50 mg  50 mg Oral Daily Sharath Waters MD        mirtazapine (REMERON) tablet 7.5 mg  7.5 mg Oral Nightly Sharath Waters MD        predniSONE (DELTASONE) tablet 10 mg  10 mg Oral Daily Sharath Waters MD        pantoprazole (PROTONIX) tablet 40 mg  40 mg Oral QAM AC Sharath Waters MD   40 mg at 11/20/20 9215    sodium bicarbonate tablet 650 mg  650 mg Oral TID Sharath Waters MD        sodium chloride flush 0.9 % injection 10 mL  10 mL Intravenous 2 times per day Reatha Alan, APRN - CNP   10 mL at 11/20/20 0030    sodium chloride flush 0.9 % injection 10 mL  10 mL Intravenous PRN Reatha De Berry, APRN - CNP        acetaminophen (TYLENOL) tablet 650 mg  650 mg Oral Q4H PRN Reatha De Berry, APRN - CNP        morphine (PF) injection 2 mg  2 mg Intravenous Q2H PRN Reatha De Berry, APRN - CNP        heparin (porcine) injection 5,000 Units  5,000 Units Subcutaneous 3 times per day Sharath Waters MD   5,000 Units at 11/20/20 6539       Allergies:  Amino acids and Lisinopril    Social History:   reports that he has quit smoking. His smoking use included cigarettes. He has never used smokeless tobacco. He reports previous alcohol use of about 5.0 standard drinks of alcohol per week. He reports that he does not use drugs. Family History: family history includes Cancer in his father; Diabetes in his mother and sister; Heart Disease in his mother and sister.     REVIEW OF SYSTEMS: Review of system limited limited as patient cannot talk however communication was done through writing. Patient is resting comfortably. Does not seem to be in discomfort. 10 system review is negative except for complaint of shortness of breath and chest pain    PHYSICAL EXAM:        /85   Pulse 80   Temp 98.1 °F (36.7 °C) (Oral)   Resp 18   Ht 5' 4\" (1.626 m)   Wt 140 lb (63.5 kg)   SpO2 95%   BMI 24.03 kg/m²    Temp (24hrs), Av.9 °F (36.6 °C), Min:97.4 °F (36.3 °C), Max:98.4 °F (36.9 °C)      General appearance not in acute distress  Mental status - alert and cooperative   Eyes - pupils equal and reactive, extraocular eye movements intact   Ears - bilateral TM's and external ear canals normal   Mouth -  Normocephalic, atraumatic. Oral and oropharyngeal examination revealed that the tumor involving the anterior and left alveolus with erosion of the mandible as well as extension into the left floor of mouth and dorsal lateral tongue   Neck - Patient has tracheostomy and the opening of the stoma showed no evidence of any suspicious lesion.   Lymphatics - no palpable lymphadenopathy, no hepatosplenomegaly   Chest -decreased breathing sounds bilaterally  Heart - normal rate, regular rhythm, normal S1, S2, no murmurs  Abdomen - soft, nontender, nondistended, no masses or organomegaly   Neurological - alert, oriented, normal speech, no focal findings or movement disorder noted   Musculoskeletal - no joint tenderness, deformity or swelling   Extremities - peripheral pulses normal, no pedal edema, no clubbing or cyanosis   Skin - normal coloration and turgor, no rashes, no suspicious skin lesions noted ,      DATA:      Labs:     Results for orders placed or performed during the hospital encounter of 20   CBC Auto Differential   Result Value Ref Range    WBC 7.4 3.5 - 11.3 k/uL    RBC 3.12 (L) 4.21 - 5.77 m/uL    Hemoglobin 8.8 (L) 13.0 - 17.0 g/dL    Hematocrit 28.5 (L) 40.7 - 50.3 % MCV 91.3 82.6 - 102.9 fL    MCH 28.2 25.2 - 33.5 pg    MCHC 30.9 28.0 - 38.0 g/dL    RDW 15.7 (H) 11.8 - 14.4 %    Platelets 440 730 - 626 k/uL    MPV 9.3 8.1 - 13.5 fL    NRBC Automated NOT REPORTED 0.0 per 100 WBC    Differential Type NOT REPORTED     WBC Morphology NOT REPORTED     RBC Morphology NOT REPORTED     Platelet Estimate NOT REPORTED     Seg Neutrophils 88 (H) 36 - 66 %    Lymphocytes 6 (L) 24 - 44 %    Monocytes 6 1 - 7 %    Eosinophils % 0 (L) 1 - 4 %    Basophils 0 %    Immature Granulocytes 0 0 %    Segs Absolute 6.52 1.8 - 7.7 k/uL    Absolute Lymph # 0.44 (L) 1.0 - 4.8 k/uL    Absolute Mono # 0.44 0.2 - 0.8 k/uL    Absolute Eos # 0.00 0.0 - 0.4 k/uL    Basophils Absolute 0.00 0.0 - 0.2 k/uL    Absolute Immature Granulocyte 0.00 0.00 - 0.30 k/uL   Basic Metabolic Panel   Result Value Ref Range    Glucose 126 (H) 70 - 99 mg/dL    BUN 41 (H) 8 - 23 mg/dL    CREATININE 2.92 (H) 0.70 - 1.20 mg/dL    Bun/Cre Ratio 14 9 - 20    Calcium 8.6 8.6 - 10.4 mg/dL    Sodium 128 (L) 135 - 144 mmol/L    Potassium 4.6 3.7 - 5.3 mmol/L    Chloride 97 (L) 98 - 107 mmol/L    CO2 18 (L) 20 - 31 mmol/L    Anion Gap 13 9 - 17 mmol/L    GFR Non-African American 22 (L) >60 mL/min    GFR  26 (L) >60 mL/min    GFR Comment          GFR Staging NOT REPORTED    Trop/Myoglobin   Result Value Ref Range    Troponin, High Sensitivity 86 (HH) 0 - 22 ng/L    Troponin T NOT REPORTED <0.03 ng/mL    Troponin Interp NOT REPORTED     Myoglobin 102 (H) 28 - 72 ng/mL   Lactic Acid   Result Value Ref Range    Lactic Acid 1.1 0.5 - 2.2 mmol/L         IMAGING DATA:    Ct Head Wo Contrast    Result Date: 11/12/2020 EXAMINATION: CT OF THE HEAD WITHOUT CONTRAST  11/12/2020 7:46 pm TECHNIQUE: CT of the head was performed without the administration of intravenous contrast. Dose modulation, iterative reconstruction, and/or weight based adjustment of the mA/kV was utilized to reduce the radiation dose to as low as reasonably achievable. COMPARISON: 06/01/2015 HISTORY: ORDERING SYSTEM PROVIDED HISTORY: headache TECHNOLOGIST PROVIDED HISTORY: headache Reason for Exam: Headache; history of progressing squamous cell carcinoma of glottis, first diagnosed in 2005. Acuity: Acute Type of Exam: Initial FINDINGS: BRAIN/VENTRICLES: There is no acute intracranial hemorrhage, mass effect or midline shift. No abnormal extra-axial fluid collection. The gray-white differentiation is maintained without evidence of an acute infarct. There is no evidence of hydrocephalus. Periventricular and deep subcortical white matter hypoattenuation, consistent microangiopathic change. Mild parenchymal volume loss. Atherosclerosis of the intracranial vasculature. There is right frontal encephalomalacia underlying the craniotomy. Remote lacunar infarct in the left basal ganglia. ORBITS: The visualized portion of the orbits demonstrate no acute abnormality. SINUSES: Scattered mucosal thickening within the ethmoid air cells. SOFT TISSUES/SKULL:  Prior right craniotomy. No acute calvarial abnormality. No acute intracranial abnormality. Microangiopathic change.      Ct Chest Wo Contrast    Result Date: 11/19/2020 EXAMINATION: CT OF THE CHEST WITHOUT CONTRAST 11/19/2020 7:23 pm TECHNIQUE: CT of the chest was performed without the administration of intravenous contrast. Multiplanar reformatted images are provided for review. Dose modulation, iterative reconstruction, and/or weight based adjustment of the mA/kV was utilized to reduce the radiation dose to as low as reasonably achievable. COMPARISON: 11/10/2020 HISTORY: ORDERING SYSTEM PROVIDED HISTORY: rib pain, rib fractures TECHNOLOGIST PROVIDED HISTORY: rib pain, rib fractures Reason for Exam: rib pain Acuity: Unknown Type of Exam: Unknown Mechanism of Injury: fall? Relevant Medical/Surgical History: SOB FINDINGS: Mediastinum: Three vessel coronary artery calcification. No mediastinal, hilar, or axillary lymphadenopathy. Mild dilatation of the main pulmonary artery to 3 cm. Lungs/pleura: Similar right upper lobe mass measuring 8.2 x 7.3 cm inseparable from the mediastinum. Bibasilar atelectasis. Similar moderate right pleural effusion. New small left pleural effusion. Upper Abdomen: Few colonic diverticula. Soft Tissues/Bones: Multilevel degenerative disc disease of the thoracic spine. Old bilateral rib fractures. Unchanged mild compression deformity of L1.     1. No acute process in the chest.  Old bilateral rib fractures. 2. Similar right upper lobe mass measuring 8.2 cm inseparable from the mediastinum almost certainly representing a primary lung cancer. . 3. Similar moderate right pleural effusion. New small left pleural effusion.      Ct Chest Wo Contrast    Result Date: 11/10/2020 EXAMINATION: CT OF THE CHEST WITHOUT CONTRAST 11/10/2020 2:10 pm TECHNIQUE: CT of the chest was performed without the administration of intravenous contrast. Multiplanar reformatted images are provided for review. Dose modulation, iterative reconstruction, and/or weight based adjustment of the mA/kV was utilized to reduce the radiation dose to as low as reasonably achievable. COMPARISON: Chest radiograph today. Chest CT 06/12/2020 HISTORY: ORDERING SYSTEM PROVIDED HISTORY: abnormal x ray TECHNOLOGIST PROVIDED HISTORY: abnormal x ray Reason for Exam: F/u abnormal chest xray. Pt has a history of previous laryngeal cancer status post tracheostomy placement that presents with complaints of shortness of breath. Patient states he been short of breath for past couple of days Acuity: Acute Type of Exam: Initial Additional signs and symptoms: GFR 23 Relevant Medical/Surgical History: Hx of cardiac cath, COPD, HTN, lung cancer, CKD FINDINGS: Mediastinum: Cardiomegaly is noted. Calcified atheromatous plaque and coronary calcification. No pericardial effusion. Mass in the medial right upper lobe/superior mediastinum bulging into the superior mediastinum is noted. Additional mildly enlarged right paratracheal lymph nodes are noted measuring up to 11 mm. Lungs/pleura: Medial right upper lobe/superior mediastinum mass displaces the trachea and esophagus to the left. This measures at least 7.4 x 5.9 cm and demonstrates heterogeneous internal attenuation with areas of calcification. Moderate size right pleural effusion. Lipomatous enlargement of the inferior left pleura. Small amount debris in the bronchus intermedius and minimal subsegmental atelectasis in the lung bases noted. Upper Abdomen: No acute findings. Scarring in the left kidney is noted. Partially visualized endovascular abdominal aortic repair. Soft Tissues/Bones: Bilateral old rib fractures. Superior endplate fracture at the L2 level again demonstrated. Status post radical neck dissection. 1.  Large medial right upper lobe/superior mediastinal mass with mass effect on the trachea and esophagus, concerning for neoplastic disease. 2.  Additional mildly enlarged right paratracheal lymph nodes may represent disease involvement. 3.  Moderate size right pleural effusion. Dependent subsegmental atelectasis. Nm Lung Scan Perfusion Only    Result Date: 11/19/2020  EXAMINATION: LUNG PERFUSION IMAGING 11/19/2020 9:27 pm TECHNIQUE: Multiple pulmonary perfusion images were obtained of the chest following the intravenous administration of 6.2 millicuries of [id number] MAA. Ventilation imaging was not performed. COMPARISON: Chest radiograph from 11/19/2020 HISTORY: ORDERING SYSTEM PROVIDED HISTORY: SOB TECHNOLOGIST PROVIDED HISTORY: SOB Reason for Exam: SOB Acuity: Unknown Type of Exam: Unknown FINDINGS: Right upper lobe perfusion defect corresponds with the mass in the medial right apex. There are also perfusion defects in the posterior basal segments of both lower lobes corresponding with bilateral pleural effusions. This is an intermediate probability scan pattern for pulmonary embolus.      Xr Chest Portable    Result Date: 11/19/2020  EXAMINATION: ONE XRAY VIEW OF THE CHEST 11/19/2020 11:33 am COMPARISON: November 13, 2020, chest exam HISTORY: ORDERING SYSTEM PROVIDED HISTORY: SOB TECHNOLOGIST PROVIDED HISTORY: SOB Acuity: Acute Type of Exam: Unknown FINDINGS: Stable cardiac silhouette Emphysematous changes of the lungs with interval increase in mild streaky bibasilar densities and small bilateral pleural effusions, otherwise clear lungs Stable large right superior paratracheal mass Multiple bilateral rib fractures     Interval increase in mild streaky bibasilar atelectasis with small bilateral pleural effusions     Xr Chest Portable    Result Date: 11/13/2020 EXAMINATION: ONE XRAY VIEW OF THE CHEST 11/13/2020 5:55 am COMPARISON: November 12, 2020 HISTORY: ORDERING SYSTEM PROVIDED HISTORY: effusion TECHNOLOGIST PROVIDED HISTORY: effusion Reason for Exam: effusion Acuity: Unknown Type of Exam: Unknown FINDINGS: Stable cardiomediastinal silhouette to include the right paratracheal ovoid mass. Left basilar opacity is unchanged. No new focal lung abnormality. No sizable pleural effusion. No pneumothorax. Osseous structures are unchanged. Stable exam demonstrating left basilar opacity. Xr Chest Portable    Result Date: 11/12/2020  EXAMINATION: ONE XRAY VIEW OF THE CHEST 11/12/2020 4:15 pm COMPARISON: 11/12/2020 HISTORY: ORDERING SYSTEM PROVIDED HISTORY: Post thoracentesis TECHNOLOGIST PROVIDED HISTORY: Post thoracentesis Reason for Exam: Post thoracentesis Acuity: Unknown Type of Exam: Unknown Relevant Medical/Surgical History: Post thoracentesis FINDINGS: Overlying items external to the patient somewhat limit evaluation. This includes oxygen mask obscuring right upper lung zone. Within this limitation, no definite evidence for pneumothorax is seen status post thoracentesis. Improved right pleural effusion and aeration to the right lung base. Right lung base now appears to be clear. There is stable mild left basilar likely scarring or atelectasis. Stable dense opacity correlating with mass to the right upper lobe medially. Cardiac and mediastinal silhouettes are stable. Stable appearance to bony structures. No definite pneumothorax status post right thoracentesis. Improved right pleural effusion and aeration to the right lung base. Stable dense opacity correlating with mass to the right upper lobe medially better delineated on prior CT chest 11/10/2020.      Xr Chest Portable    Result Date: 11/12/2020 EXAMINATION: ONE XRAY VIEW OF THE CHEST 11/12/2020 6:46 am COMPARISON: November 10/2020 HISTORY: ORDERING SYSTEM PROVIDED HISTORY: Effusion TECHNOLOGIST PROVIDED HISTORY: Effusion Reason for Exam: effusion Acuity: Unknown Type of Exam: Unknown FINDINGS: No evidence of pneumothorax. Again is mass-like density in the right paratracheal region. There is slight increase in the right-sided pleural effusion when compared to previous. Heart silhouette is mildly prominent. Visualized bony thorax shows no acute abnormality. Postoperative changes of the right axilla are seen. Slight increase in right-sided pleural effusion when compared to previous. Continued masslike density the right paratracheal region. Xr Chest Portable    Result Date: 11/10/2020  EXAMINATION: ONE XRAY VIEW OF THE CHEST 11/10/2020 12:30 pm COMPARISON: 06/13/2020 HISTORY: ORDERING SYSTEM PROVIDED HISTORY: Chest Pain TECHNOLOGIST PROVIDED HISTORY: Chest Pain Reason for Exam: Chest pain Acuity: Unknown Type of Exam: Unknown FINDINGS: Cardiac silhouette is borderline prominent. Small right pleural effusion and right basilar airspace disease noted. No evidence for pneumothorax. Right paratracheal masslike density noted. Further evaluation with contrast-enhanced CT recommended. Osseous structures and soft tissues are grossly intact. Small right pleural effusion and adjacent airspace disease, likely atelectasis. Right paratracheal masslike density. Further evaluation with contrast-enhanced CT recommended.      Mri Brain Wo Contrast    Result Date: 11/14/2020 EXAMINATION: MRI OF THE BRAIN WITHOUT CONTRAST  11/14/2020 2:55 pm TECHNIQUE: Multiplanar multisequence MRI of the brain was performed without the administration of intravenous contrast. COMPARISON: None. HISTORY: ORDERING SYSTEM PROVIDED HISTORY: persistent headache, history of oral cancer, glottic cancer, new mediastinal mass TECHNOLOGIST PROVIDED HISTORY: persistent headache, history of oral cancer, glottic cancer, new mediastinal mass Reason for Exam: persistent headache, history of oral cancer, glottic cancer, new mediastinal mass Acuity: Chronic Type of Exam: Subsequent/Follow-up FINDINGS: INTRACRANIAL STRUCTURES/VENTRICLES: There is no acute infarct. No mass effect or midline shift. No evidence of an acute intracranial hemorrhage. There is volume loss with mild chronic white matter microvascular ischemic change. There is a chronic infarct within the high right parietal lobe. The sellar/suprasellar regions appear unremarkable. The normal signal voids within the major intracranial vessels appear maintained. ORBITS: The visualized portion of the orbits demonstrate no acute abnormality. SINUSES: The visualized paranasal sinuses and mastoid air cells are well aerated. BONES/SOFT TISSUES: The bone marrow signal intensity appears normal. The soft tissues demonstrate no acute abnormality. No acute intracranial abnormality visualized.      Ir Guided Thoracentesis Pleural    Result Date: 11/12/2020 PROCEDURE: ULTRASOUNDGUIDED RIGHT THORACENTESIS 11/12/2020 HISTORY: ORDERING SYSTEM PROVIDED HISTORY: right thoracentesis TECHNOLOGIST PROVIDED HISTORY: right thoracentesis Enlarging right pleural effusion. History of COPD, asthma and chronic kidney disease. History of prostate cancer. TECHNIQUE: Informed consent was obtained after a detailed explanation of the procedure including risks, benefits, and alternatives. Universal protocol was performed. The right chest was prepped and draped in sterile fashion and local anesthesia was achieved with lidocaine. A 5 Maltese needle sheath was advanced under ultrasound guidance into pleural effusion and thoracentesis was performed. Fluid was provided for further analysis. The patient tolerated the procedure well. FINDINGS: A total of 800 mL was removed. Fluid was yellowish-green in color and relatively clear. Successful ultrasound guided diagnostic and therapeutic right thoracentesis. 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 RECOMMENDATIONS:  1. I personally reviewed results of lab work-up imaging studies and other relevant clinical data. 2. [place] with patient's primary oncologist  3. Discussed with radiation oncologist  4.  Discussed with primary team 5. Clinical suspicion for PE is low. Shortness of breath and chest pain can be explained by the mass-effect on the trachea by the paratracheal mass  6. Recommend biopsy of paratracheal mass to establish metastatic disease versus new primary as it will have impact on the treatment planning  7. Recommend presenting patient tumor board. Reach out to head and neck cancer navigator   8. continue symptomatic and supportive care  9. Continue to follow    Discussed with patient and Nurse. Thank you for asking us to see this patient. Grover Oh MD          This note is created with the assistance of a speech recognition program.  While intending to generate a document that actually reflects the content of the visit, the document can still have some errors including those of syntax and sound a like substitutions which may escape proof reading. It such instances, actual meaning can be extrapolated by contextual diversion.

## 2020-11-20 NOTE — PLAN OF CARE
Problem: Falls - Risk of:  Goal: Will remain free from falls  Description: Will remain free from falls  Outcome: Ongoing  Note: Patient is fall risk per fall scale. Hourly rounding performed. Personal belongings and call light within reach. Bed in low position.    Goal: Absence of physical injury  Description: Absence of physical injury  Outcome: Ongoing aching

## 2020-11-20 NOTE — TELEPHONE ENCOUNTER
[facility] received call that Miguel Avalos is admitted at [facility]. [facility] notified and Dr Edgardo Gale notified.

## 2020-11-21 ENCOUNTER — APPOINTMENT (OUTPATIENT)
Dept: GENERAL RADIOLOGY | Age: 70
DRG: 606 | End: 2020-11-21
Payer: MEDICARE

## 2020-11-21 LAB
ABSOLUTE EOS #: 0.34 K/UL (ref 0–0.44)
ABSOLUTE IMMATURE GRANULOCYTE: 0.03 K/UL (ref 0–0.3)
ABSOLUTE LYMPH #: 1.18 K/UL (ref 1.1–3.7)
ABSOLUTE MONO #: 0.73 K/UL (ref 0.1–1.2)
ANION GAP SERPL CALCULATED.3IONS-SCNC: 10 MMOL/L (ref 9–17)
BASOPHILS # BLD: 0 % (ref 0–2)
BASOPHILS ABSOLUTE: <0.03 K/UL (ref 0–0.2)
BUN BLDV-MCNC: 38 MG/DL (ref 8–23)
BUN/CREAT BLD: 13 (ref 9–20)
CALCIUM SERPL-MCNC: 8.3 MG/DL (ref 8.6–10.4)
CHLORIDE BLD-SCNC: 99 MMOL/L (ref 98–107)
CO2: 20 MMOL/L (ref 20–31)
CREAT SERPL-MCNC: 3.04 MG/DL (ref 0.7–1.2)
DIFFERENTIAL TYPE: ABNORMAL
EOSINOPHILS RELATIVE PERCENT: 5 % (ref 1–4)
GFR AFRICAN AMERICAN: 25 ML/MIN
GFR NON-AFRICAN AMERICAN: 21 ML/MIN
GFR SERPL CREATININE-BSD FRML MDRD: ABNORMAL ML/MIN/{1.73_M2}
GFR SERPL CREATININE-BSD FRML MDRD: ABNORMAL ML/MIN/{1.73_M2}
GLUCOSE BLD-MCNC: 85 MG/DL (ref 70–99)
HCT VFR BLD CALC: 25.3 % (ref 40.7–50.3)
HEMOGLOBIN: 8.4 G/DL (ref 13–17)
IMMATURE GRANULOCYTES: 0 %
LYMPHOCYTES # BLD: 17 % (ref 24–43)
MAGNESIUM: 1.5 MG/DL (ref 1.6–2.6)
MCH RBC QN AUTO: 28.1 PG (ref 25.2–33.5)
MCHC RBC AUTO-ENTMCNC: 33.2 G/DL (ref 28.4–34.8)
MCV RBC AUTO: 84.6 FL (ref 82.6–102.9)
MONOCYTES # BLD: 11 % (ref 3–12)
NRBC AUTOMATED: 0 PER 100 WBC
PDW BLD-RTO: 15.1 % (ref 11.8–14.4)
PHOSPHORUS: 4.2 MG/DL (ref 2.5–4.5)
PLATELET # BLD: 287 K/UL (ref 138–453)
PLATELET ESTIMATE: ABNORMAL
PMV BLD AUTO: 9.1 FL (ref 8.1–13.5)
POTASSIUM SERPL-SCNC: 4.3 MMOL/L (ref 3.7–5.3)
RBC # BLD: 2.99 M/UL (ref 4.21–5.77)
RBC # BLD: ABNORMAL 10*6/UL
SEG NEUTROPHILS: 67 % (ref 36–65)
SEGMENTED NEUTROPHILS ABSOLUTE COUNT: 4.59 K/UL (ref 1.5–8.1)
SODIUM BLD-SCNC: 129 MMOL/L (ref 135–144)
SODIUM,UR: 85 MMOL/L
TSH SERPL DL<=0.05 MIU/L-ACNC: 1.54 MIU/L (ref 0.3–5)
WBC # BLD: 6.9 K/UL (ref 3.5–11.3)
WBC # BLD: ABNORMAL 10*3/UL

## 2020-11-21 PROCEDURE — 6360000002 HC RX W HCPCS: Performed by: INTERNAL MEDICINE

## 2020-11-21 PROCEDURE — 6370000000 HC RX 637 (ALT 250 FOR IP): Performed by: INTERNAL MEDICINE

## 2020-11-21 PROCEDURE — 84100 ASSAY OF PHOSPHORUS: CPT

## 2020-11-21 PROCEDURE — 71045 X-RAY EXAM CHEST 1 VIEW: CPT

## 2020-11-21 PROCEDURE — 83735 ASSAY OF MAGNESIUM: CPT

## 2020-11-21 PROCEDURE — 2700000000 HC OXYGEN THERAPY PER DAY

## 2020-11-21 PROCEDURE — 6370000000 HC RX 637 (ALT 250 FOR IP): Performed by: NURSE PRACTITIONER

## 2020-11-21 PROCEDURE — 2060000000 HC ICU INTERMEDIATE R&B

## 2020-11-21 PROCEDURE — 94640 AIRWAY INHALATION TREATMENT: CPT

## 2020-11-21 PROCEDURE — 80048 BASIC METABOLIC PNL TOTAL CA: CPT

## 2020-11-21 PROCEDURE — 96372 THER/PROPH/DIAG INJ SC/IM: CPT

## 2020-11-21 PROCEDURE — 96365 THER/PROPH/DIAG IV INF INIT: CPT

## 2020-11-21 PROCEDURE — 2580000003 HC RX 258: Performed by: NURSE PRACTITIONER

## 2020-11-21 PROCEDURE — 94761 N-INVAS EAR/PLS OXIMETRY MLT: CPT

## 2020-11-21 PROCEDURE — 99232 SBSQ HOSP IP/OBS MODERATE 35: CPT | Performed by: INTERNAL MEDICINE

## 2020-11-21 PROCEDURE — 36415 COLL VENOUS BLD VENIPUNCTURE: CPT

## 2020-11-21 PROCEDURE — 84443 ASSAY THYROID STIM HORMONE: CPT

## 2020-11-21 PROCEDURE — 85025 COMPLETE CBC W/AUTO DIFF WBC: CPT

## 2020-11-21 PROCEDURE — 96366 THER/PROPH/DIAG IV INF ADDON: CPT

## 2020-11-21 RX ORDER — MAGNESIUM SULFATE IN WATER 40 MG/ML
2 INJECTION, SOLUTION INTRAVENOUS ONCE
Status: COMPLETED | OUTPATIENT
Start: 2020-11-21 | End: 2020-11-21

## 2020-11-21 RX ADMIN — HEPARIN SODIUM 5000 UNITS: 5000 INJECTION INTRAVENOUS; SUBCUTANEOUS at 21:13

## 2020-11-21 RX ADMIN — SODIUM CHLORIDE, PRESERVATIVE FREE 10 ML: 5 INJECTION INTRAVENOUS at 09:17

## 2020-11-21 RX ADMIN — IPRATROPIUM BROMIDE AND ALBUTEROL SULFATE 1 AMPULE: .5; 3 SOLUTION RESPIRATORY (INHALATION) at 11:04

## 2020-11-21 RX ADMIN — HYDRALAZINE HYDROCHLORIDE 50 MG: 50 TABLET, FILM COATED ORAL at 17:10

## 2020-11-21 RX ADMIN — IPRATROPIUM BROMIDE AND ALBUTEROL SULFATE 1 AMPULE: .5; 3 SOLUTION RESPIRATORY (INHALATION) at 07:15

## 2020-11-21 RX ADMIN — HYDRALAZINE HYDROCHLORIDE 50 MG: 50 TABLET, FILM COATED ORAL at 05:40

## 2020-11-21 RX ADMIN — PREDNISONE 10 MG: 10 TABLET ORAL at 09:16

## 2020-11-21 RX ADMIN — PANTOPRAZOLE SODIUM 40 MG: 40 TABLET, DELAYED RELEASE ORAL at 05:40

## 2020-11-21 RX ADMIN — HEPARIN SODIUM 5000 UNITS: 5000 INJECTION INTRAVENOUS; SUBCUTANEOUS at 17:10

## 2020-11-21 RX ADMIN — ALLOPURINOL 300 MG: 300 TABLET ORAL at 09:16

## 2020-11-21 RX ADMIN — MIRTAZAPINE 7.5 MG: 15 TABLET, FILM COATED ORAL at 21:15

## 2020-11-21 RX ADMIN — METOPROLOL SUCCINATE 50 MG: 50 TABLET, FILM COATED, EXTENDED RELEASE ORAL at 09:16

## 2020-11-21 RX ADMIN — IPRATROPIUM BROMIDE AND ALBUTEROL SULFATE 1 AMPULE: .5; 3 SOLUTION RESPIRATORY (INHALATION) at 17:59

## 2020-11-21 RX ADMIN — HEPARIN SODIUM 5000 UNITS: 5000 INJECTION INTRAVENOUS; SUBCUTANEOUS at 05:40

## 2020-11-21 RX ADMIN — Medication 2000 UNITS: at 09:16

## 2020-11-21 RX ADMIN — AMLODIPINE BESYLATE 10 MG: 10 TABLET ORAL at 09:16

## 2020-11-21 RX ADMIN — SODIUM BICARBONATE 1300 MG: 650 TABLET ORAL at 17:09

## 2020-11-21 RX ADMIN — SODIUM CHLORIDE, PRESERVATIVE FREE 10 ML: 5 INJECTION INTRAVENOUS at 21:15

## 2020-11-21 RX ADMIN — MAGNESIUM SULFATE HEPTAHYDRATE 2 G: 40 INJECTION, SOLUTION INTRAVENOUS at 09:17

## 2020-11-21 RX ADMIN — IPRATROPIUM BROMIDE AND ALBUTEROL SULFATE 1 AMPULE: .5; 3 SOLUTION RESPIRATORY (INHALATION) at 14:20

## 2020-11-21 RX ADMIN — AMITRIPTYLINE HYDROCHLORIDE 25 MG: 50 TABLET, FILM COATED ORAL at 21:14

## 2020-11-21 RX ADMIN — SODIUM BICARBONATE 1300 MG: 650 TABLET ORAL at 21:15

## 2020-11-21 RX ADMIN — SODIUM BICARBONATE 1300 MG: 650 TABLET ORAL at 09:16

## 2020-11-21 ASSESSMENT — PAIN SCALES - GENERAL: PAINLEVEL_OUTOF10: 0

## 2020-11-21 NOTE — PROGRESS NOTES
PULMONARY PROGRESS NOTE:    Interval History: chronic pleural effusion    Shortness of Breath: yes, worsening  Cough: yes  Sputum: yes  Hemoptysis: no  Chest Pain: no  Fever: no  Swelling Feet: no  Headache: no  Nausea, Emesis, Abdominal Pain: no  Diarrhea: no  Constipation: no    Events since last visit: SOB    PHYSICAL EXAMINATION:  afebrile  General : Awake, alert, oriented to time, place, and person  Neck - supple, no lymphadenopathy, JVD not raised  Heart - regular rhythm, S1 and S2 normal; no additional sounds heard  Lungs - Air Entry- fair bilaterally; breath sounds : vesicular, 98-99% on 5 l TC  Abdomen - soft, no tenderness  Upper Extremities  - no cyanosis, mottling; edema : absent  Lower Extremities: no cyanosis, mottling; edema : absent    Current Laboratory, Radiologic, Microbiologic, and Diagnostic studies reviewed    ASSESSMENT / PLAN:  · .  Rule out pulmonary embolism, V/Q with intermediate probability  · Right upper lobe/superior mediastinal mass, 7.4 cm X 5 .9 cm  ?  Recurrent invasive keratinizing SCC  · Lymphadenopathy  · Moderate right pleural effusion, recurrent  § S/p right thoracentesis on 6/1/2020 and 6/13/2020  § S/p right thoracentesis 11/12/20, 800 mL removed  · Small left pleural effusion  · COPD  · Moderate to severe pulmonary hypertension, RVSP 65 mmHg  · History of laryngeal cancer s/p tracheostomy   ? subsequent development of oral cancer s/p surgery with reconstruction  · LITTLE on CKD/hyponatremia  · Right renal cyst 1.5 cm  · Elevated troponin  · Combined diastolic and systolic heart failure  · Perfusion scan reviewed, right upper lobe perfusion defect corresponds with mediastinal mass and perfusion defects in bilateral lower lobes correspond with bilateral pleural effusions/atelectasis on x-ray chest/CT chest and patient is 100% on room air, therefore it is highly unlikely that patient has PE.  · today's CXR with worsening rt pleural effusion    · Oxygen via humidified trach collar  · DuoNeb every 4 and as needed  · Symbicort  · Prednisone 10 mg daily  · Labs: CBC and BMP in am  · Nephrology following  · Oncology following  · DVT prophylaxis with subcu heparin  · May need pleurex catheter due to recurrent rt pleural effusion, will check CXR again in am 11-22-20    Plan of care discussed with Dr Elizabeth Guzman, APRN - CNP 11-21-20

## 2020-11-21 NOTE — PROGRESS NOTES
PULMONARY PROGRESS NOTE:     Interval History: chronic pleural effusion     Shortness of Breath: yes, worsening  Cough: yes  Sputum: yes  Hemoptysis: no  Chest Pain: no  Fever: no  Swelling Feet: no  Headache: no  Nausea, Emesis, Abdominal Pain: no  Diarrhea: no  Constipation: no     Events since last visit: SOB     PHYSICAL EXAMINATION:  afebrile  General : Awake, alert, oriented to time, place, and person  Neck - supple, no lymphadenopathy, JVD not raised  Heart - regular rhythm, S1 and S2 normal; no additional sounds heard  Lungs - Air Entry- fair bilaterally; breath sounds : vesicular, 98-99% on 5 l TC  Abdomen - soft, no tenderness  Upper Extremities  - no cyanosis, mottling; edema : absent  Lower Extremities: no cyanosis, mottling; edema : absent     Current Laboratory, Radiologic, Microbiologic, and Diagnostic studies reviewed     ASSESSMENT / PLAN:  · .  Rule out pulmonary embolism, V/Q with intermediate probability  · Right upper lobe/superior mediastinal mass, 7.4 cm X 5 .9 cm  ?  Recurrent invasive keratinizing SCC  · Lymphadenopathy  · Moderate right pleural effusion, recurrent  § S/p right thoracentesis on 6/1/2020 and 6/13/2020  § S/p right thoracentesis 11/12/20, 800 mL removed  · Small left pleural effusion  · COPD  · Moderate to severe pulmonary hypertension, RVSP 65 mmHg  · History of laryngeal cancer s/p tracheostomy   ? subsequent development of oral cancer s/p surgery with reconstruction  · LITTLE on CKD/hyponatremia  · Right renal cyst 1.5 cm  · Elevated troponin  · Combined diastolic and systolic heart failure  · Perfusion scan reviewed, right upper lobe perfusion defect corresponds with mediastinal mass and perfusion defects in bilateral lower lobes correspond with bilateral pleural effusions/atelectasis on x-ray chest/CT chest and patient is 100% on room air, therefore it is highly unlikely that patient has PE.  · today's CXR with worsening rt pleural effusion     · Oxygen via humidified trach collar  · DuoNeb every 4 and as needed  · Symbicort  · Prednisone 10 mg daily  · Labs: CBC and BMP in am  · Nephrology following  · Oncology following  · DVT prophylaxis with subcu heparin  · May need pleurex catheter due to recurrent rt pleural effusion, will check CXR again in am 11-22-20     Electronically signed by Jael Trejo on 11/21/20 at 3:26 PM

## 2020-11-21 NOTE — PROGRESS NOTES
edema    Common iliac aneurysm (HCC)    Essential hypertension    Stage 3 chronic kidney disease    Acute kidney injury (nontraumatic) (HCC)    NSTEMI (non-ST elevated myocardial infarction) ([facility] [address]    Pathological fracture of lumbar vertebra due to secondary osteoporosis (HCC)    Age-related osteoporosis with current pathological fracture    Intractable back pain    Iron deficiency anemia    Anemia, blood loss    Chronic bilateral pleural effusions    Diastolic CHF, acute on chronic (HCC)    Hyponatremia    Mediastinal mass    Bilateral pleural effusion     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

## 2020-11-21 NOTE — PLAN OF CARE
Problem: Pain:  Goal: Pain level will decrease  Description: Pain level will decrease  11/21/2020 1301 by Tania Luke RN  Outcome: Ongoing  Note: Pain level assessment complete. Patient educated on pain scale and control interventions  PRN pain medication given per patient request  Patient instructed to call out with new onset of pain or unrelieved pain    11/21/2020 0034 by Leo De La Cruz RN  Outcome: Ongoing  Goal: Control of acute pain  Description: Control of acute pain  11/21/2020 0034 by Leo De La Cruz RN  Outcome: Ongoing  Goal: Control of chronic pain  Description: Control of chronic pain  11/21/2020 0034 by Leo De La Cruz RN  Outcome: Ongoing  Goal: Patient's pain/discomfort is manageable  Description: Patient's pain/discomfort is manageable  11/21/2020 0034 by Leo De La Cruz RN  Outcome: Ongoing     Problem: Falls - Risk of:  Goal: Will remain free from falls  Description: Will remain free from falls  11/21/2020 1301 by Tania Luke RN  Outcome: Ongoing  Note: Patient is a fall risk during this admission. Fall risk assessment was performed. Patient is absent of falls. Bed is in the lowest position. Wheels on the bed are locked. Call light and bed side table are within reach. Clutter is removed. Patient was educated to call out when needing assistance or wanting to get out of bed. Patient offered toileting assistance during rounding. Hourly rounds have been performed.   Fall precautions in place, using bed alaarms  11/21/2020 0034 by Leo De La Cruz RN  Outcome: Ongoing  Goal: Absence of physical injury  Description: Absence of physical injury  11/21/2020 0034 by Leo De La Cruz RN  Outcome: Ongoing     Problem: Nutrition  Goal: Optimal nutrition therapy  11/21/2020 1301 by Tania Luke RN  Outcome: Ongoing  11/21/2020 0034 by Leo De La Cruz RN  Outcome: Ongoing     Problem: Skin Integrity:  Goal: Will show no infection signs and symptoms  Description: Will show no infection signs and

## 2020-11-21 NOTE — PLAN OF CARE
Problem: Pain:  Goal: Pain level will decrease  Description: Pain level will decrease  Outcome: Ongoing     Problem: Pain:  Goal: Control of acute pain  Description: Control of acute pain  11/21/2020 0034 by Gilmre Nicholson RN  Outcome: Ongoing     Problem: Pain:  Goal: Control of chronic pain  Description: Control of chronic pain  Outcome: Ongoing     Problem: Pain:  Goal: Patient's pain/discomfort is manageable  Description: Patient's pain/discomfort is manageable  Outcome: Ongoing     Problem: Falls - Risk of:  Goal: Will remain free from falls  Description: Will remain free from falls  11/21/2020 0034 by Gilmer Nicholson RN  Outcome: Ongoing     Problem: Falls - Risk of:  Goal: Absence of physical injury  Description: Absence of physical injury  Outcome: Ongoing     Problem: Nutrition  Goal: Optimal nutrition therapy  Outcome: Ongoing     Problem: Skin Integrity:  Goal: Will show no infection signs and symptoms  Description: Will show no infection signs and symptoms  Outcome: Ongoing     Problem: Skin Integrity:  Goal: Absence of new skin breakdown  Description: Absence of new skin breakdown  Outcome: Ongoing     Problem: Infection:  Goal: Will remain free from infection  Description: Will remain free from infection  Outcome: Ongoing     Problem: Safety:  Goal: Free from accidental physical injury  Description: Free from accidental physical injury  Outcome: Ongoing     Problem: Safety:  Goal: Free from intentional harm  Description: Free from intentional harm  Outcome: Ongoing     Problem: Daily Care:  Goal: Daily care needs are met  Description: Daily care needs are met  11/21/2020 0034 by Gilmer Nicholson RN  Outcome: Ongoing     Problem: Skin Integrity:  Goal: Skin integrity will stabilize  Description: Skin integrity will stabilize  Outcome: Ongoing     Problem: Discharge Planning:  Goal: Patients continuum of care needs are met  Description: Patients continuum of care needs are met  Outcome: Ongoing

## 2020-11-21 NOTE — PROGRESS NOTES
Today's Date: 11/21/2020  Patient Name: Tony Guerrero  Date of admission: 11/19/2020  6:21 PM  Patient's age: 78 y.o., 1950  Admission Dx: Bilateral pleural effusion [J90]    Reason for Consult: management recommendations  Requesting Physician: Bereket Wooten MD    CHIEF COMPLAINT: Shortness of breath. Chest pain. History Obtained From:  patient, electronic medical record    HISTORY OF PRESENT ILLNESS:      The patient is a 78 y.o.  male who is admitted to the hospital for chief chief complaint of shortness of breath and chest pain. Patient presented to the ER with complains of shortness of breath and left-sided rib pain. CT chest did not show any acute process in the chest showed old bilateral rib fractures. Showed similar right upper lobe mass 8.2 cm inseparable from mediastinum concerning for lung primary. Small right pleural effusion was noted as well. Thoracentesis from 11/12 was benign negative for malignancy. VQ scan done shows intermediate probability for PE.  CTA cannot be performed due to CKD. 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 No adjuvant radiation therapy was given. 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 Underwent resection at [address] [place] [address] with bilateral selective neck dissection. Neurological stage was T4 N0 M0. Patient was recommended to have postoperative radiation but he declined. 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 PET scan showed ability in the left floor of mouth/oropharynx as well as right paratracheal mass. Patient was also recently hospitalized and CT scan showed a large right upper lobe mass with mass-effect on the trachea and esophagus concerning from malignancy.   Patient has lost about 30 pounds over the last 6 months. She also has history of prostate cancer and is on Casodex. INTERIM HISTORY  Patient is seen and evaluated. He overall feels well. Biopsy of the mediastinal mass is scheduled. No bleeding or bruising. Tracheostomy is functioning well. No fever or chills  Past Medical History:   has a past medical history of Arthritis, Asthma, CKD (chronic kidney disease), stage III, COPD (chronic obstructive pulmonary disease) ([facility] [address] GERD (gastroesophageal reflux disease), Gout, Hypertension, Hyponatremia, Iliac artery aneurysm, right ([facility] [address] Laryngeal cancer ([facility] [address] Lung cancer ([facility] [address] and Prostate cancer ([facility] [address] Past Surgical History:   has a past surgical history that includes Tracheal surgery (3-4 years ago); Prostate surgery; Cardiac catheterization (03/04/2020); and AAA repair, endovascular (Right, 4/7/2020). Medications:    Prior to Admission medications    Medication Sig Start Date End Date Taking?  Authorizing Provider   amitriptyline (ELAVIL) 25 MG tablet Take 1 tablet by mouth nightly 11/21/20  Yes Karlos Weaver MD   predniSONE (DELTASONE) 10 MG tablet Take 1 tablet by mouth daily for 10 days Take 1 tablet daily for 5 days then 1 tablet every other day till gone 11/15/20 11/25/20 Yes Karlos Weaver MD   mirtazapine (REMERON) 15 MG tablet Take 7.5 mg by mouth nightly Takes 1/2 tab (=7.5mg) nightly   Yes Historical Provider, MD   sodium bicarbonate 650 MG tablet Take 650 mg by mouth 3 times daily   Yes Historical Provider, MD   hydrALAZINE (APRESOLINE) 50 MG tablet Take 1 tablet by mouth every 8 hours 6/6/20  Yes Karlos Weaver MD   metoprolol succinate (TOPROL XL) 50 MG extended release tablet Take 1 tablet by mouth daily 6/7/20  Yes Karlos Weaver MD   amLODIPine (NORVASC) 10 MG tablet Take 1 tablet by mouth daily 4/18/20  Yes Karlos Weaver MD   Cholecalciferol (VITAMIN D3) 50 MCG (2000 UT) CAPS Take 1 capsule by mouth daily   Yes Historical Provider, MD   albuterol sulfate HFA 108 (90 Base) MCG/ACT inhaler Inhale 2 puffs into the lungs every 6 hours as needed for Wheezing or Shortness of Breath   Yes Historical Provider, MD   budesonide-formoterol (SYMBICORT) 160-4.5 MCG/ACT AERO Inhale 2 puffs into the lungs 2 times daily   Yes Historical Provider, MD   omeprazole (PRILOSEC) 20 MG delayed release capsule Take 20 mg by mouth every morning (before breakfast)   Yes Historical Provider, MD   allopurinol (ZYLOPRIM) 300 MG tablet Take 300 mg by mouth daily.    Yes Historical Provider, MD   amitriptyline (ELAVIL) 10 MG tablet Take 1 tablet by mouth nightly 11/15/20   Jone Cedeño MD     Current Facility-Administered Medications   Medication Dose Route Frequency Provider Last Rate Last Dose    albuterol sulfate  (90 Base) MCG/ACT inhaler 2 puff  2 puff Inhalation Q6H PRN Jone Cedeño MD        allopurinol (ZYLOPRIM) tablet 300 mg  300 mg Oral Daily Jone Cedeño MD   300 mg at 11/21/20 0916    amitriptyline (ELAVIL) tablet 25 mg  25 mg Oral Nightly Jone Cedeño MD   25 mg at 11/20/20 2037    amLODIPine (NORVASC) tablet 10 mg  10 mg Oral Daily Jone Cedeño MD   10 mg at 11/21/20 0916    budesonide-formoterol (SYMBICORT) 160-4.5 MCG/ACT inhaler 2 puff  2 puff Inhalation BID Jone Cedeño MD        Vitamin D (CHOLECALCIFEROL) tablet 2,000 Units  2,000 Units Oral Daily Jone Cedeño MD   2,000 Units at 11/21/20 0916    hydrALAZINE (APRESOLINE) tablet 50 mg  50 mg Oral 3 times per day Jone Cedeño MD   50 mg at 11/21/20 0540    metoprolol succinate (TOPROL XL) extended release tablet 50 mg  50 mg Oral Daily Jone Cedeño MD   50 mg at 11/21/20 0916    mirtazapine (REMERON) tablet 7.5 mg  7.5 mg Oral Nightly Jone Cedeño MD   7.5 mg at 11/20/20 2038    predniSONE (DELTASONE) tablet 10 mg  10 mg Oral Daily Jone Cedeño MD   10 mg at 11/21/20 0916    pantoprazole (PROTONIX) tablet 40 mg  40 mg Oral [facility] Jone Cedeño MD   40 mg at 11/21/20 0540    ipratropium-albuterol (DUONEB) nebulizer solution 1 ampule  1 ampule Inhalation Q4H KASIE Silva, APRN - CNP   1 ampule at 20 1420    sodium bicarbonate tablet 1,300 mg  1,300 mg Oral TID Amaya Vaughn APRN - CNP   1,300 mg at 20 0916    sodium chloride flush 0.9 % injection 10 mL  10 mL Intravenous 2 times per day Radha Duron, APRN - CNP   10 mL at 20 0917    sodium chloride flush 0.9 % injection 10 mL  10 mL Intravenous PRN Radha Duron APRN - CNP        acetaminophen (TYLENOL) tablet 650 mg  650 mg Oral Q4H PRN GINETTE Guerra - CNP        morphine (PF) injection 2 mg  2 mg Intravenous Q2H PRN GINETTE Guerra - CNP        heparin (porcine) injection 5,000 Units  5,000 Units Subcutaneous 3 times per day Karlos Weaver MD   5,000 Units at 20 0540       Allergies:  Amino acids and Lisinopril    Social History:   reports that he has quit smoking. His smoking use included cigarettes. He has never used smokeless tobacco. He reports previous alcohol use of about 5.0 standard drinks of alcohol per week. He reports that he does not use drugs. Family History: family history includes Cancer in his father; Diabetes in his mother and sister; Heart Disease in his mother and sister. REVIEW OF SYSTEMS:      Review of system limited limited as patient cannot talk however communication was done through writing. Patient is resting comfortably. Does not seem to be in discomfort.   10 system review is negative except for complaint of shortness of breath and chest pain    PHYSICAL EXAM:        /66   Pulse 69   Temp 97.3 °F (36.3 °C) (Oral)   Resp 18   Ht 5' 4\" (1.626 m)   Wt 140 lb (63.5 kg)   SpO2 99%   BMI 24.03 kg/m²    Temp (24hrs), Av.9 °F (36.6 °C), Min:97.3 °F (36.3 °C), Max:99 °F (37.2 °C)      General appearance not in acute distress  Mental status - alert and cooperative   Eyes - pupils equal and reactive, extraocular eye movements intact Ears - bilateral TM's and external ear canals normal   Mouth -  Normocephalic, atraumatic. Oral and oropharyngeal examination revealed that the tumor involving the anterior and left alveolus with erosion of the mandible as well as extension into the left floor of mouth and dorsal lateral tongue   Neck - Patient has tracheostomy and the opening of the stoma showed no evidence of any suspicious lesion.   Lymphatics - no palpable lymphadenopathy, no hepatosplenomegaly   Chest -decreased breathing sounds bilaterally  Heart - normal rate, regular rhythm, normal S1, S2, no murmurs  Abdomen - soft, nontender, nondistended, no masses or organomegaly   Neurological - alert, oriented, normal speech, no focal findings or movement disorder noted   Musculoskeletal - no joint tenderness, deformity or swelling   Extremities - peripheral pulses normal, no pedal edema, no clubbing or cyanosis   Skin - normal coloration and turgor, no rashes, no suspicious skin lesions noted ,      DATA:      Labs:     Results for orders placed or performed during the hospital encounter of 11/19/20   CBC Auto Differential   Result Value Ref Range    WBC 7.4 3.5 - 11.3 k/uL    RBC 3.12 (L) 4.21 - 5.77 m/uL    Hemoglobin 8.8 (L) 13.0 - 17.0 g/dL    Hematocrit 28.5 (L) 40.7 - 50.3 %    MCV 91.3 82.6 - 102.9 fL    MCH 28.2 25.2 - 33.5 pg    MCHC 30.9 28.0 - 38.0 g/dL    RDW 15.7 (H) 11.8 - 14.4 %    Platelets 749 565 - 873 k/uL    MPV 9.3 8.1 - 13.5 fL    NRBC Automated NOT REPORTED 0.0 per 100 WBC    Differential Type NOT REPORTED     WBC Morphology NOT REPORTED     RBC Morphology NOT REPORTED     Platelet Estimate NOT REPORTED     Seg Neutrophils 88 (H) 36 - 66 %    Lymphocytes 6 (L) 24 - 44 %    Monocytes 6 1 - 7 %    Eosinophils % 0 (L) 1 - 4 %    Basophils 0 %    Immature Granulocytes 0 0 %    Segs Absolute 6.52 1.8 - 7.7 k/uL    Absolute Lymph # 0.44 (L) 1.0 - 4.8 k/uL    Absolute Mono # 0.44 0.2 - 0.8 k/uL    Absolute Eos # 0.00 0.0 - 0.4 k/uL Basophils Absolute 0.00 0.0 - 0.2 k/uL    Absolute Immature Granulocyte 0.00 0.00 - 0.30 k/uL   Basic Metabolic Panel   Result Value Ref Range    Glucose 126 (H) 70 - 99 mg/dL    BUN 41 (H) 8 - 23 mg/dL    CREATININE 2.92 (H) 0.70 - 1.20 mg/dL    Bun/Cre Ratio 14 9 - 20    Calcium 8.6 8.6 - 10.4 mg/dL    Sodium 128 (L) 135 - 144 mmol/L    Potassium 4.6 3.7 - 5.3 mmol/L    Chloride 97 (L) 98 - 107 mmol/L    CO2 18 (L) 20 - 31 mmol/L    Anion Gap 13 9 - 17 mmol/L    GFR Non-African American 22 (L) >60 mL/min    GFR  26 (L) >60 mL/min    GFR Comment          GFR Staging NOT REPORTED    Trop/Myoglobin   Result Value Ref Range    Troponin, High Sensitivity 86 (HH) 0 - 22 ng/L    Troponin T NOT REPORTED <0.03 ng/mL    Troponin Interp NOT REPORTED     Myoglobin 102 (H) 28 - 72 ng/mL   Lactic Acid   Result Value Ref Range    Lactic Acid 1.1 0.5 - 2.2 mmol/L   COVID-19    Specimen: Other   Result Value Ref Range    SARS-CoV-2 Not Detected Not Detected    SARS-CoV-2, Rapid          Source . NASOPHARYNGEAL SWAB     SARS-CoV-2         CBC Auto Differential   Result Value Ref Range    WBC 7.3 3.5 - 11.3 k/uL    RBC 2.75 (L) 4.21 - 5.77 m/uL    Hemoglobin 7.7 (L) 13.0 - 17.0 g/dL    Hematocrit 23.4 (L) 40.7 - 50.3 %    MCV 85.1 82.6 - 102.9 fL    MCH 28.0 25.2 - 33.5 pg    MCHC 32.9 28.4 - 34.8 g/dL    RDW 15.2 (H) 11.8 - 14.4 %    Platelets 747 610 - 050 k/uL    MPV 8.9 8.1 - 13.5 fL    NRBC Automated 0.0 0.0 per 100 WBC    Differential Type NOT REPORTED     WBC Morphology NOT REPORTED     RBC Morphology ANISOCYTOSIS PRESENT     Platelet Estimate NOT REPORTED     Seg Neutrophils 70 (H) 36 - 65 %    Lymphocytes 16 (L) 24 - 43 %    Monocytes 10 3 - 12 %    Eosinophils % 3 1 - 4 %    Basophils 0 0 - 2 %    Immature Granulocytes 0 0 %    Segs Absolute 5.11 1.50 - 8.10 k/uL    Absolute Lymph # 1.17 1.10 - 3.70 k/uL    Absolute Mono # 0.72 0.10 - 1.20 k/uL    Absolute Eos # 0.23 0.00 - 0.44 k/uL    Basophils Absolute <0.03 0.00 - 0.20 k/uL    Absolute Immature Granulocyte 0.03 0.00 - 0.30 k/uL   Basic Metabolic Panel   Result Value Ref Range    Glucose 95 70 - 99 mg/dL    BUN 42 (H) 8 - 23 mg/dL    CREATININE 3.03 (H) 0.70 - 1.20 mg/dL    Bun/Cre Ratio 14 9 - 20    Calcium 8.2 (L) 8.6 - 10.4 mg/dL    Sodium 130 (L) 135 - 144 mmol/L    Potassium 4.3 3.7 - 5.3 mmol/L    Chloride 98 98 - 107 mmol/L    CO2 21 20 - 31 mmol/L    Anion Gap 11 9 - 17 mmol/L    GFR Non-African American 21 (L) >60 mL/min    GFR  25 (L) >60 mL/min    GFR Comment          GFR Staging NOT REPORTED    Magnesium   Result Value Ref Range    Magnesium 1.6 1.6 - 2.6 mg/dL   Phosphorus   Result Value Ref Range    Phosphorus 4.2 2.5 - 4.5 mg/dL   Urinalysis   Result Value Ref Range    Color, UA YELLOW YELLOW    Turbidity UA CLEAR CLEAR    Glucose, Ur NEGATIVE NEGATIVE    Bilirubin Urine NEGATIVE NEGATIVE    Ketones, Urine NEGATIVE NEGATIVE    Specific Gravity, UA 1.025 1.005 - 1.030    Urine Hgb NEGATIVE NEGATIVE    pH, UA 7.5 5.0 - 8.0    Protein, UA 3+ (A) NEGATIVE    Urobilinogen, Urine Normal Normal    Nitrite, Urine NEGATIVE NEGATIVE    Leukocyte Esterase, Urine NEGATIVE NEGATIVE    Urinalysis Comments NOT REPORTED    Sodium, urine, random   Result Value Ref Range    Sodium,Ur 85 mmol/L   Osmolality, Urine   Result Value Ref Range    Osmolality, Ur 401 300 - 1170 mOsm/kg   CBC Auto Differential   Result Value Ref Range    WBC 6.9 3.5 - 11.3 k/uL    RBC 2.99 (L) 4.21 - 5.77 m/uL    Hemoglobin 8.4 (L) 13.0 - 17.0 g/dL    Hematocrit 25.3 (L) 40.7 - 50.3 %    MCV 84.6 82.6 - 102.9 fL    MCH 28.1 25.2 - 33.5 pg    MCHC 33.2 28.4 - 34.8 g/dL    RDW 15.1 (H) 11.8 - 14.4 %    Platelets 757 926 - 485 k/uL    MPV 9.1 8.1 - 13.5 fL    NRBC Automated 0.0 0.0 per 100 WBC    Differential Type NOT REPORTED     Seg Neutrophils 67 (H) 36 - 65 %    Lymphocytes 17 (L) 24 - 43 %    Monocytes 11 3 - 12 %    Eosinophils % 5 (H) 1 - 4 %    Basophils 0 0 - 2 % Immature Granulocytes 0 0 %    Segs Absolute 4.59 1.50 - 8.10 k/uL    Absolute Lymph # 1.18 1.10 - 3.70 k/uL    Absolute Mono # 0.73 0.10 - 1.20 k/uL    Absolute Eos # 0.34 0.00 - 0.44 k/uL    Basophils Absolute <0.03 0.00 - 0.20 k/uL    Absolute Immature Granulocyte 0.03 0.00 - 0.30 k/uL    WBC Morphology NOT REPORTED     RBC Morphology ANISOCYTOSIS PRESENT     Platelet Estimate NOT REPORTED    Basic Metabolic Panel   Result Value Ref Range    Glucose 85 70 - 99 mg/dL    BUN 38 (H) 8 - 23 mg/dL    CREATININE 3.04 (H) 0.70 - 1.20 mg/dL    Bun/Cre Ratio 13 9 - 20    Calcium 8.3 (L) 8.6 - 10.4 mg/dL    Sodium 129 (L) 135 - 144 mmol/L    Potassium 4.3 3.7 - 5.3 mmol/L    Chloride 99 98 - 107 mmol/L    CO2 20 20 - 31 mmol/L    Anion Gap 10 9 - 17 mmol/L    GFR Non-African American 21 (L) >60 mL/min    GFR  25 (L) >60 mL/min    GFR Comment          GFR Staging NOT REPORTED    TSH w/reflex to FT4   Result Value Ref Range    TSH 1.54 0.30 - 5.00 mIU/L   Magnesium   Result Value Ref Range    Magnesium 1.5 (L) 1.6 - 2.6 mg/dL   Phosphorus   Result Value Ref Range    Phosphorus 4.2 2.5 - 4.5 mg/dL   Microscopic Urinalysis   Result Value Ref Range    -          WBC, UA 0 TO 2 0 - 5 /HPF    RBC, UA 0 TO 2 0 - 2 /HPF    Casts UA NOT REPORTED /LPF    Crystals, UA NOT REPORTED None /HPF    Epithelial Cells UA 2 TO 5 0 - 5 /HPF    Renal Epithelial, UA NOT REPORTED 0 /HPF    Bacteria, UA RARE (A) None    Mucus, UA NOT REPORTED None    Trichomonas, UA NOT REPORTED None    Amorphous, UA NOT REPORTED None    Other Observations UA NOT REPORTED NOT REQ.     Yeast, UA NOT REPORTED None         IMAGING DATA:    Ct Head Wo Contrast    Result Date: 11/12/2020  EXAMINATION: CT OF THE HEAD WITHOUT CONTRAST  11/12/2020 7:46 pm TECHNIQUE: CT of the head was performed without the administration of intravenous contrast. Dose modulation, iterative reconstruction, and/or weight based adjustment of the mA/kV was utilized to reduce the radiation dose to as low as reasonably achievable. COMPARISON: 06/01/2015 HISTORY: ORDERING SYSTEM PROVIDED HISTORY: headache TECHNOLOGIST PROVIDED HISTORY: headache Reason for Exam: Headache; history of progressing squamous cell carcinoma of glottis, first diagnosed in 2005. Acuity: Acute Type of Exam: Initial FINDINGS: BRAIN/VENTRICLES: There is no acute intracranial hemorrhage, mass effect or midline shift. No abnormal extra-axial fluid collection. The gray-white differentiation is maintained without evidence of an acute infarct. There is no evidence of hydrocephalus. Periventricular and deep subcortical white matter hypoattenuation, consistent microangiopathic change. Mild parenchymal volume loss. Atherosclerosis of the intracranial vasculature. There is right frontal encephalomalacia underlying the craniotomy. Remote lacunar infarct in the left basal ganglia. ORBITS: The visualized portion of the orbits demonstrate no acute abnormality. SINUSES: Scattered mucosal thickening within the ethmoid air cells. SOFT TISSUES/SKULL:  Prior right craniotomy. No acute calvarial abnormality. No acute intracranial abnormality. Microangiopathic change. Ct Chest Wo Contrast    Result Date: 11/19/2020  EXAMINATION: CT OF THE CHEST WITHOUT CONTRAST 11/19/2020 7:23 pm TECHNIQUE: CT of the chest was performed without the administration of intravenous contrast. Multiplanar reformatted images are provided for review. Dose modulation, iterative reconstruction, and/or weight based adjustment of the mA/kV was utilized to reduce the radiation dose to as low as reasonably achievable. COMPARISON: 11/10/2020 HISTORY: ORDERING SYSTEM PROVIDED HISTORY: rib pain, rib fractures TECHNOLOGIST PROVIDED HISTORY: rib pain, rib fractures Reason for Exam: rib pain Acuity: Unknown Type of Exam: Unknown Mechanism of Injury: fall? Relevant Medical/Surgical History: SOB FINDINGS: Mediastinum: Three vessel coronary artery calcification.  No mediastinal, hilar, or axillary lymphadenopathy. Mild dilatation of the main pulmonary artery to 3 cm. Lungs/pleura: Similar right upper lobe mass measuring 8.2 x 7.3 cm inseparable from the mediastinum. Bibasilar atelectasis. Similar moderate right pleural effusion. New small left pleural effusion. Upper Abdomen: Few colonic diverticula. Soft Tissues/Bones: Multilevel degenerative disc disease of the thoracic spine. Old bilateral rib fractures. Unchanged mild compression deformity of L1.     1. No acute process in the chest.  Old bilateral rib fractures. 2. Similar right upper lobe mass measuring 8.2 cm inseparable from the mediastinum almost certainly representing a primary lung cancer. . 3. Similar moderate right pleural effusion. New small left pleural effusion. Ct Chest Wo Contrast    Result Date: 11/10/2020  EXAMINATION: CT OF THE CHEST WITHOUT CONTRAST 11/10/2020 2:10 pm TECHNIQUE: CT of the chest was performed without the administration of intravenous contrast. Multiplanar reformatted images are provided for review. Dose modulation, iterative reconstruction, and/or weight based adjustment of the mA/kV was utilized to reduce the radiation dose to as low as reasonably achievable. COMPARISON: Chest radiograph today. Chest CT 06/12/2020 HISTORY: ORDERING SYSTEM PROVIDED HISTORY: abnormal x ray TECHNOLOGIST PROVIDED HISTORY: abnormal x ray Reason for Exam: F/u abnormal chest xray. Pt has a history of previous laryngeal cancer status post tracheostomy placement that presents with complaints of shortness of breath. Patient states he been short of breath for past couple of days Acuity: Acute Type of Exam: Initial Additional signs and symptoms: GFR 23 Relevant Medical/Surgical History: Hx of cardiac cath, COPD, HTN, lung cancer, CKD FINDINGS: Mediastinum: Cardiomegaly is noted. Calcified atheromatous plaque and coronary calcification. No pericardial effusion.   Mass in the medial right upper lobe/superior mediastinum bulging into the superior mediastinum is noted. Additional mildly enlarged right paratracheal lymph nodes are noted measuring up to 11 mm. Lungs/pleura: Medial right upper lobe/superior mediastinum mass displaces the trachea and esophagus to the left. This measures at least 7.4 x 5.9 cm and demonstrates heterogeneous internal attenuation with areas of calcification. Moderate size right pleural effusion. Lipomatous enlargement of the inferior left pleura. Small amount debris in the bronchus intermedius and minimal subsegmental atelectasis in the lung bases noted. Upper Abdomen: No acute findings. Scarring in the left kidney is noted. Partially visualized endovascular abdominal aortic repair. Soft Tissues/Bones: Bilateral old rib fractures. Superior endplate fracture at the L2 level again demonstrated. Status post radical neck dissection. 1.  Large medial right upper lobe/superior mediastinal mass with mass effect on the trachea and esophagus, concerning for neoplastic disease. 2.  Additional mildly enlarged right paratracheal lymph nodes may represent disease involvement. 3.  Moderate size right pleural effusion. Dependent subsegmental atelectasis. Nm Lung Scan Perfusion Only    Result Date: 11/19/2020  EXAMINATION: LUNG PERFUSION IMAGING 11/19/2020 9:27 pm TECHNIQUE: Multiple pulmonary perfusion images were obtained of the chest following the intravenous administration of 6.2 millicuries of [id number] MAA. Ventilation imaging was not performed. COMPARISON: Chest radiograph from 11/19/2020 HISTORY: ORDERING SYSTEM PROVIDED HISTORY: SOB TECHNOLOGIST PROVIDED HISTORY: SOB Reason for Exam: SOB Acuity: Unknown Type of Exam: Unknown FINDINGS: Right upper lobe perfusion defect corresponds with the mass in the medial right apex. There are also perfusion defects in the posterior basal segments of both lower lobes corresponding with bilateral pleural effusions.      This is an intermediate probability scan pattern for pulmonary embolus. Xr Chest Portable    Result Date: 11/19/2020  EXAMINATION: ONE XRAY VIEW OF THE CHEST 11/19/2020 11:33 am COMPARISON: November 13, 2020, chest exam HISTORY: ORDERING SYSTEM PROVIDED HISTORY: SOB TECHNOLOGIST PROVIDED HISTORY: SOB Acuity: Acute Type of Exam: Unknown FINDINGS: Stable cardiac silhouette Emphysematous changes of the lungs with interval increase in mild streaky bibasilar densities and small bilateral pleural effusions, otherwise clear lungs Stable large right superior paratracheal mass Multiple bilateral rib fractures     Interval increase in mild streaky bibasilar atelectasis with small bilateral pleural effusions     Xr Chest Portable    Result Date: 11/13/2020  EXAMINATION: ONE XRAY VIEW OF THE CHEST 11/13/2020 5:55 am COMPARISON: November 12, 2020 HISTORY: ORDERING SYSTEM PROVIDED HISTORY: effusion TECHNOLOGIST PROVIDED HISTORY: effusion Reason for Exam: effusion Acuity: Unknown Type of Exam: Unknown FINDINGS: Stable cardiomediastinal silhouette to include the right paratracheal ovoid mass. Left basilar opacity is unchanged. No new focal lung abnormality. No sizable pleural effusion. No pneumothorax. Osseous structures are unchanged. Stable exam demonstrating left basilar opacity. Xr Chest Portable    Result Date: 11/12/2020  EXAMINATION: ONE XRAY VIEW OF THE CHEST 11/12/2020 4:15 pm COMPARISON: 11/12/2020 HISTORY: ORDERING SYSTEM PROVIDED HISTORY: Post thoracentesis TECHNOLOGIST PROVIDED HISTORY: Post thoracentesis Reason for Exam: Post thoracentesis Acuity: Unknown Type of Exam: Unknown Relevant Medical/Surgical History: Post thoracentesis FINDINGS: Overlying items external to the patient somewhat limit evaluation. This includes oxygen mask obscuring right upper lung zone. Within this limitation, no definite evidence for pneumothorax is seen status post thoracentesis. Improved right pleural effusion and aeration to the right lung base.   Right a detailed explanation of the procedure including risks, benefits, and alternatives. Universal protocol was performed. The right chest was prepped and draped in sterile fashion and local anesthesia was achieved with lidocaine. A 5 Sinhala needle sheath was advanced under ultrasound guidance into pleural effusion and thoracentesis was performed. Fluid was provided for further analysis. The patient tolerated the procedure well. FINDINGS: A total of 800 mL was removed. Fluid was yellowish-green in color and relatively clear. Successful ultrasound guided diagnostic and therapeutic right thoracentesis. 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 RECOMMENDATIONS:  The patient is seen and evaluated. Biopsy is scheduled as mentioned for Monday  Continue supportive care including bronchodilators,  Appreciate nephrology input  I suspect that the patient has metastatic disease and if so, plan palliative radiation to be followed by immunotherapy.         Ricky Gamble MD  Hematologist/Medical Oncologist  Cell: [phone]            This note is created with the assistance of a speech recognition program.  While intending to generate a document that actually reflects the content of the visit, the document can still have some errors including those of syntax and sound a like substitutions which may escape proof reading. It such instances, actual meaning can be extrapolated by contextual diversion.

## 2020-11-21 NOTE — PROGRESS NOTES
Nephrology progress note    Reason for Consult:  Renal Insufficiency    Requesting Physician:  Sebastian Milan MD    Interval history:   Serum sodium stable in the range of 1 29-1 30  No changes in creatinine  Has no new complaints      HISTORY OF PRESENT ILLNESS:    The patient is a 78 y.o. male who presents with shortness of breath, bilateral pleural effusions and rule out PE. He was discharged several days ago for COPD exacerbation. He has known of mediastinal mass with adenopathy with laryngeal and oral cancer. He has been following with oncology. He has known history of CKD 4 and normally follows with Dr. Renea Acosta. Creatinine has been trending 2.8-3.2 on recent labs during last admission and in 6/2020. Creatinine 2.9 yesterday. Hx of chronic hyponatremia. Sodium level was 130 and 128 yesterday. Bicarb level was 18 yesterday. No new labs today. SBP trending 130s to 150s. CT of the chest yesterday showed similar moderate right pleural effusion and new small left pleural effusion. Similar right upper lobe mass. He received lasix yesterday. Prior to Admission medications    Medication Sig Start Date End Date Taking?  Authorizing Provider   amitriptyline (ELAVIL) 25 MG tablet Take 1 tablet by mouth nightly 11/21/20  Yes Sebastian Milan MD   predniSONE (DELTASONE) 10 MG tablet Take 1 tablet by mouth daily for 10 days Take 1 tablet daily for 5 days then 1 tablet every other day till gone 11/15/20 11/25/20 Yes Sebastian Milan MD   mirtazapine (REMERON) 15 MG tablet Take 7.5 mg by mouth nightly Takes 1/2 tab (=7.5mg) nightly   Yes Historical Provider, MD   sodium bicarbonate 650 MG tablet Take 650 mg by mouth 3 times daily   Yes Historical Provider, MD   hydrALAZINE (APRESOLINE) 50 MG tablet Take 1 tablet by mouth every 8 hours 6/6/20  Yes Sebastian Milan MD   metoprolol succinate (TOPROL XL) 50 MG extended release tablet Take 1 tablet by mouth daily 6/7/20  Yes Sebastian Milan MD   amLODIPine shifts: In: 480 [P.O.:480]  Out: 200 [Urine:200]      General:  Awake, alert, not in distress. Appears to be stated age. HEENT: Atraumatic, normocephalic. Anicteric sclera. Pink and moist oral mucosa. Neck supple. Trach with collar. No JVD. Chest: Bilateral air entry, clear to auscultation, Diminished bilaterally. Cardiovascular: RRR, S1S2, no murmur, rub or gallop. No lower extremity edema. Abdomen: Soft, non tender to palpation. Musculoskeletal:  No cyanosis or clubbing. Integumentary: Pink, warm and dry. Free from rash or lesions. Skin turgor normal.  CNS:  Face symmetrical. No tremor.      Data:    CBC:   Lab Results   Component Value Date    WBC 6.9 11/21/2020    HGB 8.4 (L) 11/21/2020    HCT 25.3 (L) 11/21/2020    MCV 84.6 11/21/2020     11/21/2020     BMP:    Lab Results   Component Value Date     (L) 11/21/2020     (L) 11/20/2020     (L) 11/19/2020    K 4.3 11/21/2020    K 4.3 11/20/2020    K 4.6 11/19/2020    CL 99 11/21/2020    CL 98 11/20/2020    CL 97 (L) 11/19/2020    CO2 20 11/21/2020    CO2 21 11/20/2020    CO2 18 (L) 11/19/2020    BUN 38 (H) 11/21/2020    BUN 42 (H) 11/20/2020    BUN 41 (H) 11/19/2020    CREATININE 3.04 (H) 11/21/2020    CREATININE 3.03 (H) 11/20/2020    CREATININE 2.92 (H) 11/19/2020    GLUCOSE 85 11/21/2020    GLUCOSE 95 11/20/2020    GLUCOSE 126 (H) 11/19/2020     CMP:   Lab Results   Component Value Date     11/21/2020    K 4.3 11/21/2020    CL 99 11/21/2020    CO2 20 11/21/2020    BUN 38 11/21/2020    CREATININE 3.04 11/21/2020    GLUCOSE 85 11/21/2020    CALCIUM 8.3 11/21/2020    PROT 7.7 11/11/2020    LABALBU 3.3 11/11/2020    BILITOT 0.25 11/11/2020    ALKPHOS 104 11/11/2020    AST 15 11/11/2020    ALT 9 11/11/2020      Hepatic:   Lab Results   Component Value Date    AST 15 11/11/2020    AST 43 (H) 06/11/2020    AST 17 05/30/2020    ALT 9 11/11/2020    ALT 46 (H) 06/11/2020    ALT 11 05/30/2020    BILITOT 0.25 (L) 11/11/2020 BILITOT <0.10 (L) 06/11/2020    BILITOT 0.25 (L) 05/30/2020    ALKPHOS 104 11/11/2020    ALKPHOS 107 06/11/2020    ALKPHOS 88 05/30/2020     BNP:   Lab Results   Component Value Date    BNP 39 12/12/2012     Lipids: No results found for: CHOL, HDL  INR:   Lab Results   Component Value Date    INR 1.1 11/12/2020    INR 1.0 06/01/2020    INR 1.0 05/14/2020     PTH: No results found for: PTH  Phosphorus:    Lab Results   Component Value Date    PHOS 4.2 11/21/2020     Ionized Calcium: No results found for: IONCA  Magnesium:   Lab Results   Component Value Date    MG 1.5 11/21/2020     Albumin:   Lab Results   Component Value Date    LABALBU 3.3 11/11/2020     Last 3 CK, CKMB, Troponin: @LABRCNT(CKTOTAL:3,CKMB:3,TROPONINI:3)       URINE:)No results found for: Wm Ill    Radiology:   Reviewed. Assessment:  1. CKD 4  2. Chronic Hyponatremia  3. Metabolic Acidosis   4. Bilateral pleural effusions  5. Right upper lobe medistinal mass/Recurrent invasive keratinizing SCC  6. S/p right thoracentesis 11/12. 7. Moderate to severe pulmonary HTN  8. Combined heart failure  9. Hypomagnesemia    Plan:  Daily labs   Replace magnesium with 2 g IV magnesium sulfate  BP is stable. Continue oral bicarb to 1300mg po TID, dose was increased at admission  No additional renal work-up needed as creatinine is at baseline. Urine osmolality was 401, urine sodium is pending  Normal TSH  Serum cortisol is pending  Continue renal diet with 1000mL fluid restriction. Avoid nephrotoxic drugs and IV contrast exposure. Please do not hesitate to contact us for any further questions/concerns. We will continue to follow along with you.          Electronically signed by Carlos Wyman MD  on 11/21/2020 at 9:18 AM

## 2020-11-22 ENCOUNTER — APPOINTMENT (OUTPATIENT)
Dept: GENERAL RADIOLOGY | Age: 70
DRG: 606 | End: 2020-11-22
Payer: MEDICARE

## 2020-11-22 LAB
ABSOLUTE EOS #: 0.13 K/UL (ref 0–0.44)
ABSOLUTE IMMATURE GRANULOCYTE: 0.03 K/UL (ref 0–0.3)
ABSOLUTE LYMPH #: 1.23 K/UL (ref 1.1–3.7)
ABSOLUTE MONO #: 0.81 K/UL (ref 0.1–1.2)
ANION GAP SERPL CALCULATED.3IONS-SCNC: 11 MMOL/L (ref 9–17)
BASOPHILS # BLD: 0 % (ref 0–2)
BASOPHILS ABSOLUTE: <0.03 K/UL (ref 0–0.2)
BUN BLDV-MCNC: 43 MG/DL (ref 8–23)
BUN/CREAT BLD: 12 (ref 9–20)
CALCIUM SERPL-MCNC: 8.2 MG/DL (ref 8.6–10.4)
CHLORIDE BLD-SCNC: 99 MMOL/L (ref 98–107)
CO2: 21 MMOL/L (ref 20–31)
CREAT SERPL-MCNC: 3.62 MG/DL (ref 0.7–1.2)
DIFFERENTIAL TYPE: ABNORMAL
EOSINOPHILS RELATIVE PERCENT: 2 % (ref 1–4)
GFR AFRICAN AMERICAN: 20 ML/MIN
GFR NON-AFRICAN AMERICAN: 17 ML/MIN
GFR SERPL CREATININE-BSD FRML MDRD: ABNORMAL ML/MIN/{1.73_M2}
GFR SERPL CREATININE-BSD FRML MDRD: ABNORMAL ML/MIN/{1.73_M2}
GLUCOSE BLD-MCNC: 93 MG/DL (ref 70–99)
HCT VFR BLD CALC: 23.4 % (ref 40.7–50.3)
HEMOGLOBIN: 7.5 G/DL (ref 13–17)
IMMATURE GRANULOCYTES: 0 %
LYMPHOCYTES # BLD: 17 % (ref 24–43)
MCH RBC QN AUTO: 27.8 PG (ref 25.2–33.5)
MCHC RBC AUTO-ENTMCNC: 32.1 G/DL (ref 28.4–34.8)
MCV RBC AUTO: 86.7 FL (ref 82.6–102.9)
MONOCYTES # BLD: 11 % (ref 3–12)
NRBC AUTOMATED: 0 PER 100 WBC
PDW BLD-RTO: 15.1 % (ref 11.8–14.4)
PLATELET # BLD: 269 K/UL (ref 138–453)
PLATELET ESTIMATE: ABNORMAL
PMV BLD AUTO: 9.1 FL (ref 8.1–13.5)
POTASSIUM SERPL-SCNC: 4.2 MMOL/L (ref 3.7–5.3)
RBC # BLD: 2.7 M/UL (ref 4.21–5.77)
RBC # BLD: ABNORMAL 10*6/UL
SEG NEUTROPHILS: 70 % (ref 36–65)
SEGMENTED NEUTROPHILS ABSOLUTE COUNT: 5.23 K/UL (ref 1.5–8.1)
SODIUM BLD-SCNC: 131 MMOL/L (ref 135–144)
WBC # BLD: 7.4 K/UL (ref 3.5–11.3)
WBC # BLD: ABNORMAL 10*3/UL

## 2020-11-22 PROCEDURE — 6370000000 HC RX 637 (ALT 250 FOR IP): Performed by: NURSE PRACTITIONER

## 2020-11-22 PROCEDURE — 6370000000 HC RX 637 (ALT 250 FOR IP): Performed by: INTERNAL MEDICINE

## 2020-11-22 PROCEDURE — 94761 N-INVAS EAR/PLS OXIMETRY MLT: CPT

## 2020-11-22 PROCEDURE — 80048 BASIC METABOLIC PNL TOTAL CA: CPT

## 2020-11-22 PROCEDURE — 94640 AIRWAY INHALATION TREATMENT: CPT

## 2020-11-22 PROCEDURE — 2580000003 HC RX 258: Performed by: NURSE PRACTITIONER

## 2020-11-22 PROCEDURE — 85025 COMPLETE CBC W/AUTO DIFF WBC: CPT

## 2020-11-22 PROCEDURE — 71045 X-RAY EXAM CHEST 1 VIEW: CPT

## 2020-11-22 PROCEDURE — 36415 COLL VENOUS BLD VENIPUNCTURE: CPT

## 2020-11-22 PROCEDURE — 2060000000 HC ICU INTERMEDIATE R&B

## 2020-11-22 PROCEDURE — 6360000002 HC RX W HCPCS: Performed by: INTERNAL MEDICINE

## 2020-11-22 PROCEDURE — 99231 SBSQ HOSP IP/OBS SF/LOW 25: CPT | Performed by: INTERNAL MEDICINE

## 2020-11-22 PROCEDURE — 2700000000 HC OXYGEN THERAPY PER DAY

## 2020-11-22 RX ADMIN — AMITRIPTYLINE HYDROCHLORIDE 25 MG: 50 TABLET, FILM COATED ORAL at 20:55

## 2020-11-22 RX ADMIN — SODIUM CHLORIDE, PRESERVATIVE FREE 10 ML: 5 INJECTION INTRAVENOUS at 22:21

## 2020-11-22 RX ADMIN — SODIUM CHLORIDE, PRESERVATIVE FREE 10 ML: 5 INJECTION INTRAVENOUS at 08:27

## 2020-11-22 RX ADMIN — SODIUM BICARBONATE 1300 MG: 650 TABLET ORAL at 12:52

## 2020-11-22 RX ADMIN — AMLODIPINE BESYLATE 10 MG: 10 TABLET ORAL at 08:27

## 2020-11-22 RX ADMIN — METOPROLOL SUCCINATE 50 MG: 50 TABLET, FILM COATED, EXTENDED RELEASE ORAL at 08:27

## 2020-11-22 RX ADMIN — PREDNISONE 10 MG: 10 TABLET ORAL at 08:26

## 2020-11-22 RX ADMIN — HEPARIN SODIUM 5000 UNITS: 5000 INJECTION INTRAVENOUS; SUBCUTANEOUS at 05:28

## 2020-11-22 RX ADMIN — HYDRALAZINE HYDROCHLORIDE 50 MG: 50 TABLET, FILM COATED ORAL at 06:31

## 2020-11-22 RX ADMIN — IPRATROPIUM BROMIDE AND ALBUTEROL SULFATE 1 AMPULE: .5; 3 SOLUTION RESPIRATORY (INHALATION) at 15:17

## 2020-11-22 RX ADMIN — IPRATROPIUM BROMIDE AND ALBUTEROL SULFATE 1 AMPULE: .5; 3 SOLUTION RESPIRATORY (INHALATION) at 07:23

## 2020-11-22 RX ADMIN — SODIUM BICARBONATE 1300 MG: 650 TABLET ORAL at 08:26

## 2020-11-22 RX ADMIN — IPRATROPIUM BROMIDE AND ALBUTEROL SULFATE 1 AMPULE: .5; 3 SOLUTION RESPIRATORY (INHALATION) at 11:56

## 2020-11-22 RX ADMIN — Medication 2000 UNITS: at 08:26

## 2020-11-22 RX ADMIN — ALLOPURINOL 300 MG: 300 TABLET ORAL at 08:27

## 2020-11-22 RX ADMIN — IPRATROPIUM BROMIDE AND ALBUTEROL SULFATE 1 AMPULE: .5; 3 SOLUTION RESPIRATORY (INHALATION) at 19:14

## 2020-11-22 RX ADMIN — HYDRALAZINE HYDROCHLORIDE 50 MG: 50 TABLET, FILM COATED ORAL at 20:56

## 2020-11-22 RX ADMIN — HEPARIN SODIUM 5000 UNITS: 5000 INJECTION INTRAVENOUS; SUBCUTANEOUS at 12:52

## 2020-11-22 RX ADMIN — HYDRALAZINE HYDROCHLORIDE 50 MG: 50 TABLET, FILM COATED ORAL at 12:53

## 2020-11-22 RX ADMIN — SODIUM BICARBONATE 1300 MG: 650 TABLET ORAL at 20:55

## 2020-11-22 RX ADMIN — PANTOPRAZOLE SODIUM 40 MG: 40 TABLET, DELAYED RELEASE ORAL at 06:30

## 2020-11-22 RX ADMIN — MIRTAZAPINE 7.5 MG: 15 TABLET, FILM COATED ORAL at 20:56

## 2020-11-22 NOTE — PROGRESS NOTES
PULMONARY PROGRESS NOTE:    Interval History: chronic pleural effusion    Shortness of Breath: yes, worsening  Cough: yes  Sputum: yes  Hemoptysis: no  Chest Pain: no  Fever: no  Swelling Feet: no  Headache: no  Nausea, Emesis, Abdominal Pain: no  Diarrhea: no  Constipation: no    Events since last visit: SOB    PHYSICAL EXAMINATION:  afebrile  General : Awake, alert, oriented to time, place, and person  Neck - supple, no lymphadenopathy, JVD not raised  Heart - regular rhythm, S1 and S2 normal; no additional sounds heard  Lungs - Air Entry- fair bilaterally; breath sounds : vesicular, 100% on 5 l TC + trach stoma   Abdomen - soft, no tenderness  Upper Extremities  - no cyanosis, mottling; edema : absent  Lower Extremities: no cyanosis, mottling; edema : absent    Current Laboratory, Radiologic, Microbiologic, and Diagnostic studies reviewed    ASSESSMENT / PLAN:  · .  Rule out pulmonary embolism, V/Q with intermediate probability  · Right upper lobe/superior mediastinal mass, 7.4 cm X 5 .9 cm  ?  Recurrent invasive keratinizing SCC  · Lymphadenopathy  · Moderate right pleural effusion, recurrent  § S/p right thoracentesis on 6/1/2020 and 6/13/2020  § S/p right thoracentesis 11/12/20, 800 mL removed  · Small left pleural effusion  · COPD  · Moderate to severe pulmonary hypertension, RVSP 65 mmHg  · History of laryngeal cancer s/p tracheostomy   ? subsequent development of oral cancer s/p surgery with reconstruction  · LITTLE on CKD/hyponatremia  · Right renal cyst 1.5 cm  · Elevated troponin  · Combined diastolic and systolic heart failure  · Perfusion scan reviewed, right upper lobe perfusion defect corresponds with mediastinal mass and perfusion defects in bilateral lower lobes correspond with bilateral pleural effusions/atelectasis on x-ray chest/CT chest and patient is 100% on room air, therefore it is highly unlikely that patient has PE.  · today's CXR with stable effusions    · Oxygen via humidified trach collar  · DuoNeb every 4 and as needed  · Symbicort  · Prednisone 10 mg daily  · Labs: CBC and BMP in am  · Nephrology following  · Oncology following  · DVT prophylaxis with subcu heparin  · May need pleurex catheter due to recurrent pleural effusion, discussed with patient who expresses understanding    Plan of care discussed with Dr Jefferey Homans, APRN - CNP 11-22-20

## 2020-11-22 NOTE — PROGRESS NOTES
PULMONARY PROGRESS NOTE:     Interval History: chronic pleural effusion     Shortness of Breath: yes, worsening  Cough: yes  Sputum: yes  Hemoptysis: no  Chest Pain: no  Fever: no  Swelling Feet: no  Headache: no  Nausea, Emesis, Abdominal Pain: no  Diarrhea: no  Constipation: no     Events since last visit: SOB     PHYSICAL EXAMINATION:  afebrile  General : Awake, alert, oriented to time, place, and person  Neck - supple, no lymphadenopathy, JVD not raised  Heart - regular rhythm, S1 and S2 normal; no additional sounds heard  Lungs - Air Entry- fair bilaterally; breath sounds : vesicular, 98-99% on 5 l TC  Abdomen - soft, no tenderness  Upper Extremities  - no cyanosis, mottling; edema : absent  Lower Extremities: no cyanosis, mottling; edema : absent     Current Laboratory, Radiologic, Microbiologic, and Diagnostic studies reviewed     ASSESSMENT / PLAN:  · .  Rule out pulmonary embolism, V/Q with intermediate probability  · Right upper lobe/superior mediastinal mass, 7.4 cm X 5 .9 cm  ?  Recurrent invasive keratinizing SCC  · Lymphadenopathy  · Moderate right pleural effusion, recurrent  § S/p right thoracentesis on 6/1/2020 and 6/13/2020  § S/p right thoracentesis 11/12/20, 800 mL removed  · Small left pleural effusion  · COPD  · Moderate to severe pulmonary hypertension, RVSP 65 mmHg  · History of laryngeal cancer s/p tracheostomy   ? subsequent development of oral cancer s/p surgery with reconstruction  · LITTLE on CKD/hyponatremia  · Right renal cyst 1.5 cm  · Elevated troponin  · Combined diastolic and systolic heart failure  · Perfusion scan reviewed, right upper lobe perfusion defect corresponds with mediastinal mass and perfusion defects in bilateral lower lobes correspond with bilateral pleural effusions/atelectasis on x-ray chest/CT chest and patient is 100% on room air, therefore it is highly unlikely that patient has PE.  · today's CXR with worsening rt pleural effusion     · Oxygen via humidified trach collar  · DuoNeb every 4 and as needed  · Symbicort  · Prednisone 10 mg daily  · Labs: CBC and BMP in am  · Nephrology following  · Oncology following  · DVT prophylaxis with subcu heparin  · May need pleurex catheter due to recurrent rt pleural effusion,      Electronically signed by Kacy Pa on 11/22/20 at 5:29 PM

## 2020-11-22 NOTE — PROGRESS NOTES
Subjective:    Shortness of breath  Still with occasional shortness of breath no tachypnea no PND no orthopnea no fever no chills no cough  ROS  Fever no chills no GI/ complaints no cardiac complaints no TIA no bleeding no headache no sore throat no skin lesions, no polyuria no polydipsia  physical exam  General Appearance: alert and oriented to person, place and time and in no acute distress  Skin: warm and dry, no rash or erythema  Head: normocephalic and atraumatic  Eyes: pupils equal, round, and reactive to light, sclera anicteric and pallor  Neck: neck supple and non tender without mass and tracheostomy in place  Pulmonary/Chest: {Air entry equal few rhonchi decreased at the bases no use of intercostal muscles cardiovascular: normal rate, regular rhythm, normal S1 and S2, no gallops, intact distal pulses and no carotid bruits  Abdomen: soft, non-tender, non-distended, normal bowel sounds, no masses or organomegaly  Extremities: no edema and pulses no Homans' sign    Neurologic: Alert oriented x3 with no focal deficit    BP (!) 140/76   Pulse 71   Temp 98.4 °F (36.9 °C) (Oral)   Resp 16   Ht 5' 4\" (1.626 m)   Wt 129 lb 8 oz (58.7 kg)   SpO2 97%   BMI 22.23 kg/m²     CBC:   Lab Results   Component Value Date    WBC 7.4 11/22/2020    RBC 2.70 11/22/2020    HGB 7.5 11/22/2020    HCT 23.4 11/22/2020    MCV 86.7 11/22/2020    MCH 27.8 11/22/2020    MCHC 32.1 11/22/2020    RDW 15.1 11/22/2020     11/22/2020    MPV 9.1 11/22/2020     BMP:    Lab Results   Component Value Date     11/22/2020    K 4.2 11/22/2020    CL 99 11/22/2020    CO2 21 11/22/2020    BUN 43 11/22/2020    LABALBU 3.3 11/11/2020    CREATININE 3.62 11/22/2020    CALCIUM 8.2 11/22/2020    GFRAA 20 11/22/2020    LABGLOM 17 11/22/2020    GLUCOSE 93 11/22/2020        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 [address]    Pathological fracture of lumbar vertebra due to secondary osteoporosis (HCC)    Age-related osteoporosis with current pathological fracture    Intractable back pain    Iron deficiency anemia    Anemia, blood loss    Chronic bilateral pleural effusions    Diastolic CHF, acute on chronic (HCC)    Hyponatremia    Mediastinal mass    Bilateral pleural effusion     Mediastinal mass with history of oral cancer and pharyngeal cancer for biopsy tomorrow  COPD  Chronic hyponatremia  Severe malnutrition  Chronic combined CHF  CKD 4  Pulmonary hypertension  Plan:    Labs reviewed we will hold DVT prophylaxis for the biopsy tomorrow avoid nephrotoxic drugs continue with bronchodilators oxygen as needed see orders      Catia Finley MD  2:22 PM

## 2020-11-22 NOTE — PROGRESS NOTES
Nephrology progress note    Reason for Consult:  Renal Insufficiency    Requesting Physician:  Karlos Weaver MD    Interval history:   Cr slightly higher at 3.62 today  Serum sodium stable in the range of 128-131  Has no new complaints    HISTORY OF PRESENT ILLNESS:    The patient is a 78 y.o. male who presents with shortness of breath, bilateral pleural effusions and rule out PE. He was discharged several days ago for COPD exacerbation. He has known of mediastinal mass with adenopathy with laryngeal and oral cancer. He has been following with oncology. He has known history of CKD 4 and normally follows with Dr. Bam Johnson. Creatinine has been trending 2.8-3.2 on recent labs during last admission and in 6/2020. Creatinine 2.9 yesterday. Hx of chronic hyponatremia. Sodium level was 130 and 128 yesterday. Bicarb level was 18 yesterday. No new labs today. SBP trending 130s to 150s. CT of the chest yesterday showed similar moderate right pleural effusion and new small left pleural effusion. Similar right upper lobe mass. He received lasix yesterday. Prior to Admission medications    Medication Sig Start Date End Date Taking?  Authorizing Provider   amitriptyline (ELAVIL) 25 MG tablet Take 1 tablet by mouth nightly 11/21/20  Yes Karlos Weaver MD   predniSONE (DELTASONE) 10 MG tablet Take 1 tablet by mouth daily for 10 days Take 1 tablet daily for 5 days then 1 tablet every other day till gone 11/15/20 11/25/20 Yes Karlos Weaver MD   mirtazapine (REMERON) 15 MG tablet Take 7.5 mg by mouth nightly Takes 1/2 tab (=7.5mg) nightly   Yes Historical Provider, MD   sodium bicarbonate 650 MG tablet Take 650 mg by mouth 3 times daily   Yes Historical Provider, MD   hydrALAZINE (APRESOLINE) 50 MG tablet Take 1 tablet by mouth every 8 hours 6/6/20  Yes Karlos Weaver MD   metoprolol succinate (TOPROL XL) 50 MG extended release tablet Take 1 tablet by mouth daily 6/7/20  Yes Karlos Weaver MD   amLODIPine (NORVASC) 10 MG tablet Take 1 tablet by mouth daily 4/18/20  Yes Juanjose Desai MD   Cholecalciferol (VITAMIN D3) 50 MCG (2000 UT) CAPS Take 1 capsule by mouth daily   Yes Historical Provider, MD   albuterol sulfate  (90 Base) MCG/ACT inhaler Inhale 2 puffs into the lungs every 6 hours as needed for Wheezing or Shortness of Breath   Yes Historical Provider, MD   budesonide-formoterol (SYMBICORT) 160-4.5 MCG/ACT AERO Inhale 2 puffs into the lungs 2 times daily   Yes Historical Provider, MD   omeprazole (PRILOSEC) 20 MG delayed release capsule Take 20 mg by mouth every morning (before breakfast)   Yes Historical Provider, MD   allopurinol (ZYLOPRIM) 300 MG tablet Take 300 mg by mouth daily. Yes Historical Provider, MD   amitriptyline (ELAVIL) 10 MG tablet Take 1 tablet by mouth nightly 11/15/20   Juanjose Desai MD       Scheduled Meds:   allopurinol  300 mg Oral Daily    amitriptyline  25 mg Oral Nightly    amLODIPine  10 mg Oral Daily    budesonide-formoterol  2 puff Inhalation BID    Vitamin D  2,000 Units Oral Daily    hydrALAZINE  50 mg Oral 3 times per day    metoprolol succinate  50 mg Oral Daily    mirtazapine  7.5 mg Oral Nightly    predniSONE  10 mg Oral Daily    pantoprazole  40 mg Oral QAM AC    ipratropium-albuterol  1 ampule Inhalation Q4H WA    sodium bicarbonate  1,300 mg Oral TID    sodium chloride flush  10 mL Intravenous 2 times per day    heparin (porcine)  5,000 Units Subcutaneous 3 times per day     Continuous Infusions:  PRN Meds:albuterol sulfate HFA, sodium chloride flush, acetaminophen, morphine   Physical Exam:  Vitals:    11/22/20 0525 11/22/20 0712 11/22/20 0724 11/22/20 0753   BP: (!) 148/84   (!) 148/78   Pulse: 65   69   Resp:    18   Temp:    97.5 °F (36.4 °C)   TempSrc:    Oral   SpO2: 95%  95% 100%   Weight:  129 lb 8 oz (58.7 kg)     Height:         I/O last 3 completed shifts: In: 550 [P.O.:550]  Out: -     General:  Awake, alert, not in distress.  Appears ALKPHOS 88 05/30/2020     BNP:   Lab Results   Component Value Date    BNP 39 12/12/2012     Lipids: No results found for: CHOL, HDL  INR:   Lab Results   Component Value Date    INR 1.1 11/12/2020    INR 1.0 06/01/2020    INR 1.0 05/14/2020     PTH: No results found for: PTH  Phosphorus:    Lab Results   Component Value Date    PHOS 4.2 11/21/2020     Ionized Calcium: No results found for: IONCA  Magnesium:   Lab Results   Component Value Date    MG 1.5 11/21/2020     Albumin:   Lab Results   Component Value Date    LABALBU 3.3 11/11/2020     Last 3 CK, CKMB, Troponin: @LABRCNT(CKTOTAL:3,CKMB:3,TROPONINI:3)       URINE:)No results found for: Pearl Viveros    Radiology:   Reviewed. Assessment:  CKD stage 4  Chronic hyponatremia, normal TSH, serum cortisol is pending  Hypomagnesemia  Metabolic acidosis   Bilateral pleural effusions. S/p right thoracentesis 11/12. Right upper lobe medistinal mass/Recurrent invasive keratinizing SCC  Moderate to severe pulmonary HTN  Combined heart failure    Plan:  BP is stable. Continue oral bicarb to 1300 mg po TID, dose was increased at admission  No additional renal work-up needed as creatinine is close to baseline. Continue renal diet with 1000mL fluid restriction. Avoid nephrotoxic drugs and IV contrast exposure. Daily labs     Please do not hesitate to contact us for any further questions/concerns. We will continue to follow along with you.          Electronically signed by Blu Tavarez MD  on 11/22/2020 at 10:13 AM

## 2020-11-23 ENCOUNTER — TELEPHONE (OUTPATIENT)
Dept: ONCOLOGY | Age: 70
End: 2020-11-23

## 2020-11-23 ENCOUNTER — APPOINTMENT (OUTPATIENT)
Dept: CT IMAGING | Age: 70
DRG: 606 | End: 2020-11-23
Payer: MEDICARE

## 2020-11-23 LAB
ABSOLUTE EOS #: 0.15 K/UL (ref 0–0.44)
ABSOLUTE IMMATURE GRANULOCYTE: 0.03 K/UL (ref 0–0.3)
ABSOLUTE LYMPH #: 1.12 K/UL (ref 1.1–3.7)
ABSOLUTE MONO #: 0.71 K/UL (ref 0.1–1.2)
ANION GAP SERPL CALCULATED.3IONS-SCNC: 11 MMOL/L (ref 9–17)
BASOPHILS # BLD: 0 % (ref 0–2)
BASOPHILS ABSOLUTE: <0.03 K/UL (ref 0–0.2)
BUN BLDV-MCNC: 39 MG/DL (ref 8–23)
BUN/CREAT BLD: 12 (ref 9–20)
CALCIUM SERPL-MCNC: 8.5 MG/DL (ref 8.6–10.4)
CHLORIDE BLD-SCNC: 98 MMOL/L (ref 98–107)
CO2: 23 MMOL/L (ref 20–31)
CREAT SERPL-MCNC: 3.38 MG/DL (ref 0.7–1.2)
DIFFERENTIAL TYPE: ABNORMAL
EOSINOPHILS RELATIVE PERCENT: 2 % (ref 1–4)
GFR AFRICAN AMERICAN: 22 ML/MIN
GFR NON-AFRICAN AMERICAN: 18 ML/MIN
GFR SERPL CREATININE-BSD FRML MDRD: ABNORMAL ML/MIN/{1.73_M2}
GFR SERPL CREATININE-BSD FRML MDRD: ABNORMAL ML/MIN/{1.73_M2}
GLUCOSE BLD-MCNC: 93 MG/DL (ref 70–99)
HCT VFR BLD CALC: 25.9 % (ref 40.7–50.3)
HEMOGLOBIN: 8.3 G/DL (ref 13–17)
IMMATURE GRANULOCYTES: 0 %
INR BLD: 1
LYMPHOCYTES # BLD: 15 % (ref 24–43)
MAGNESIUM: 1.8 MG/DL (ref 1.6–2.6)
MCH RBC QN AUTO: 27.9 PG (ref 25.2–33.5)
MCHC RBC AUTO-ENTMCNC: 32 G/DL (ref 28.4–34.8)
MCV RBC AUTO: 87.2 FL (ref 82.6–102.9)
MONOCYTES # BLD: 10 % (ref 3–12)
NRBC AUTOMATED: 0 PER 100 WBC
PARTIAL THROMBOPLASTIN TIME: 27.7 SEC (ref 23.9–33.8)
PDW BLD-RTO: 15 % (ref 11.8–14.4)
PHOSPHORUS: 3.5 MG/DL (ref 2.5–4.5)
PLATELET # BLD: 281 K/UL (ref 138–453)
PLATELET ESTIMATE: ABNORMAL
PMV BLD AUTO: 9.2 FL (ref 8.1–13.5)
POTASSIUM SERPL-SCNC: 3.8 MMOL/L (ref 3.7–5.3)
PROTHROMBIN TIME: 13 SEC (ref 11.5–14.2)
RBC # BLD: 2.97 M/UL (ref 4.21–5.77)
RBC # BLD: ABNORMAL 10*6/UL
SEG NEUTROPHILS: 73 % (ref 36–65)
SEGMENTED NEUTROPHILS ABSOLUTE COUNT: 5.31 K/UL (ref 1.5–8.1)
SODIUM BLD-SCNC: 132 MMOL/L (ref 135–144)
WBC # BLD: 7.3 K/UL (ref 3.5–11.3)
WBC # BLD: ABNORMAL 10*3/UL

## 2020-11-23 PROCEDURE — 88342 IMHCHEM/IMCYTCHM 1ST ANTB: CPT

## 2020-11-23 PROCEDURE — 2060000000 HC ICU INTERMEDIATE R&B

## 2020-11-23 PROCEDURE — 88305 TISSUE EXAM BY PATHOLOGIST: CPT

## 2020-11-23 PROCEDURE — 6360000002 HC RX W HCPCS: Performed by: NURSE PRACTITIONER

## 2020-11-23 PROCEDURE — 84100 ASSAY OF PHOSPHORUS: CPT

## 2020-11-23 PROCEDURE — 80048 BASIC METABOLIC PNL TOTAL CA: CPT

## 2020-11-23 PROCEDURE — 6370000000 HC RX 637 (ALT 250 FOR IP): Performed by: NURSE PRACTITIONER

## 2020-11-23 PROCEDURE — 88341 IMHCHEM/IMCYTCHM EA ADD ANTB: CPT

## 2020-11-23 PROCEDURE — 94640 AIRWAY INHALATION TREATMENT: CPT

## 2020-11-23 PROCEDURE — 6370000000 HC RX 637 (ALT 250 FOR IP): Performed by: INTERNAL MEDICINE

## 2020-11-23 PROCEDURE — 85025 COMPLETE CBC W/AUTO DIFF WBC: CPT

## 2020-11-23 PROCEDURE — 83735 ASSAY OF MAGNESIUM: CPT

## 2020-11-23 PROCEDURE — 87070 CULTURE OTHR SPECIMN AEROBIC: CPT

## 2020-11-23 PROCEDURE — 99231 SBSQ HOSP IP/OBS SF/LOW 25: CPT | Performed by: INTERNAL MEDICINE

## 2020-11-23 PROCEDURE — 85730 THROMBOPLASTIN TIME PARTIAL: CPT

## 2020-11-23 PROCEDURE — 2580000003 HC RX 258: Performed by: NURSE PRACTITIONER

## 2020-11-23 PROCEDURE — 85610 PROTHROMBIN TIME: CPT

## 2020-11-23 PROCEDURE — 87205 SMEAR GRAM STAIN: CPT

## 2020-11-23 PROCEDURE — 0BBK3ZX EXCISION OF RIGHT LUNG, PERCUTANEOUS APPROACH, DIAGNOSTIC: ICD-10-PCS | Performed by: RADIOLOGY

## 2020-11-23 PROCEDURE — 36415 COLL VENOUS BLD VENIPUNCTURE: CPT

## 2020-11-23 PROCEDURE — 2709999900 CT GUIDED NEEDLE PLACEMENT

## 2020-11-23 PROCEDURE — 2700000000 HC OXYGEN THERAPY PER DAY

## 2020-11-23 PROCEDURE — 32405 CT NEEDLE BIOPSY LUNG PERCUTANEOUS: CPT

## 2020-11-23 RX ORDER — BISACODYL 10 MG
10 SUPPOSITORY, RECTAL RECTAL DAILY PRN
Status: DISCONTINUED | OUTPATIENT
Start: 2020-11-23 | End: 2020-11-24 | Stop reason: HOSPADM

## 2020-11-23 RX ADMIN — SODIUM BICARBONATE 1300 MG: 650 TABLET ORAL at 19:22

## 2020-11-23 RX ADMIN — IPRATROPIUM BROMIDE AND ALBUTEROL SULFATE 1 AMPULE: .5; 3 SOLUTION RESPIRATORY (INHALATION) at 05:55

## 2020-11-23 RX ADMIN — AMITRIPTYLINE HYDROCHLORIDE 25 MG: 50 TABLET, FILM COATED ORAL at 19:23

## 2020-11-23 RX ADMIN — HYDRALAZINE HYDROCHLORIDE 50 MG: 50 TABLET, FILM COATED ORAL at 06:11

## 2020-11-23 RX ADMIN — METOPROLOL SUCCINATE 50 MG: 50 TABLET, FILM COATED, EXTENDED RELEASE ORAL at 16:11

## 2020-11-23 RX ADMIN — PREDNISONE 10 MG: 10 TABLET ORAL at 16:11

## 2020-11-23 RX ADMIN — SODIUM CHLORIDE, PRESERVATIVE FREE 10 ML: 5 INJECTION INTRAVENOUS at 09:00

## 2020-11-23 RX ADMIN — IPRATROPIUM BROMIDE AND ALBUTEROL SULFATE 1 AMPULE: .5; 3 SOLUTION RESPIRATORY (INHALATION) at 18:18

## 2020-11-23 RX ADMIN — HYDRALAZINE HYDROCHLORIDE 50 MG: 50 TABLET, FILM COATED ORAL at 22:54

## 2020-11-23 RX ADMIN — Medication 2000 UNITS: at 16:11

## 2020-11-23 RX ADMIN — SODIUM CHLORIDE, PRESERVATIVE FREE 10 ML: 5 INJECTION INTRAVENOUS at 23:00

## 2020-11-23 RX ADMIN — AMLODIPINE BESYLATE 10 MG: 10 TABLET ORAL at 16:11

## 2020-11-23 RX ADMIN — ALLOPURINOL 300 MG: 300 TABLET ORAL at 16:11

## 2020-11-23 RX ADMIN — PANTOPRAZOLE SODIUM 40 MG: 40 TABLET, DELAYED RELEASE ORAL at 06:11

## 2020-11-23 RX ADMIN — MORPHINE SULFATE 2 MG: 2 INJECTION, SOLUTION INTRAMUSCULAR; INTRAVENOUS at 16:11

## 2020-11-23 RX ADMIN — BISACODYL 10 MG: 10 SUPPOSITORY RECTAL at 11:45

## 2020-11-23 RX ADMIN — IPRATROPIUM BROMIDE AND ALBUTEROL SULFATE 1 AMPULE: .5; 3 SOLUTION RESPIRATORY (INHALATION) at 11:09

## 2020-11-23 RX ADMIN — MIRTAZAPINE 7.5 MG: 15 TABLET, FILM COATED ORAL at 19:22

## 2020-11-23 RX ADMIN — IPRATROPIUM BROMIDE AND ALBUTEROL SULFATE 1 AMPULE: .5; 3 SOLUTION RESPIRATORY (INHALATION) at 16:01

## 2020-11-23 ASSESSMENT — PAIN SCALES - GENERAL
PAINLEVEL_OUTOF10: 0
PAINLEVEL_OUTOF10: 10

## 2020-11-23 NOTE — PROGRESS NOTES
Today's Date: 11/22/2020  Patient Name: Alysa Mullins  Date of admission: 11/19/2020  6:21 PM  Patient's age: 78 y.o., 1950  Admission Dx: Bilateral pleural effusion [J90]  Bilateral pleural effusion [J90]    Reason for Consult: management recommendations  Requesting Physician: Yulisa Shelton MD    CHIEF COMPLAINT: Shortness of breath. Chest pain. History Obtained From:  patient, electronic medical record    HISTORY OF PRESENT ILLNESS:      The patient is a 78 y.o.  male who is admitted to the hospital for chief chief complaint of shortness of breath and chest pain. Patient presented to the ER with complains of shortness of breath and left-sided rib pain. CT chest did not show any acute process in the chest showed old bilateral rib fractures. Showed similar right upper lobe mass 8.2 cm inseparable from mediastinum concerning for lung primary. Small right pleural effusion was noted as well. Thoracentesis from 11/12 was benign negative for malignancy. VQ scan done shows intermediate probability for PE.  CTA cannot be performed due to CKD. 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 No adjuvant radiation therapy was given. 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 Underwent resection at [address] [place] [address] with bilateral selective neck dissection. Neurological stage was T4 N0 M0. Patient was recommended to have postoperative radiation but he declined. 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 PET scan showed ability in the left floor of mouth/oropharynx as well as right paratracheal mass. Patient was also recently hospitalized and CT scan showed a large right upper lobe mass with mass-effect on the trachea and esophagus concerning from malignancy. Patient has lost about 30 pounds over the last 6 months. She also has history of prostate cancer and is on Casodex. INTERIM HISTORY  Patient is seen and evaluated. He overall feels well. Biopsy of the mediastinal mass is scheduled in AM   Past Medical History:   has a past medical history of Arthritis, Asthma, CKD (chronic kidney disease), stage III, COPD (chronic obstructive pulmonary disease) ([facility] [address] GERD (gastroesophageal reflux disease), Gout, Hypertension, Hyponatremia, Iliac artery aneurysm, right ([facility] [address] Laryngeal cancer ([facility] [address] Lung cancer ([facility] [address] and Prostate cancer ([facility] [address] Past Surgical History:   has a past surgical history that includes Tracheal surgery (3-4 years ago); Prostate surgery; Cardiac catheterization (03/04/2020); and AAA repair, endovascular (Right, 4/7/2020). Medications:    Prior to Admission medications    Medication Sig Start Date End Date Taking?  Authorizing Provider   amitriptyline (ELAVIL) 25 MG tablet Take 1 tablet by mouth nightly 11/21/20  Yes Yulisa Shelton MD   predniSONE (DELTASONE) 10 MG tablet Take 1 tablet by mouth daily for 10 days Take 1 tablet daily for 5 days then 1 tablet every other day till gone 11/15/20 11/25/20 Yes Yulisa Shelton MD   mirtazapine (REMERON) 15 MG tablet Take 7.5 mg by mouth nightly Takes 1/2 tab (=7.5mg) nightly   Yes Historical Provider, MD   sodium bicarbonate 650 MG tablet Take 650 mg by mouth 3 times daily   Yes Historical Provider, MD   hydrALAZINE (APRESOLINE) 50 MG tablet Take 1 tablet by mouth every 8 hours 6/6/20  Yes Yulisa Shelton MD   metoprolol succinate (TOPROL XL) 50 MG extended release tablet Take 1 tablet by mouth daily 6/7/20  Yes Yulisa Shelton MD   amLODIPine (NORVASC) 10 MG tablet Take 1 tablet by mouth daily 4/18/20  Yes Yulisa Shelton MD   Cholecalciferol (VITAMIN D3) 50 MCG (2000 UT) CAPS Take 1 capsule by mouth daily   Yes Historical Provider, MD   albuterol sulfate  (90 Base) MCG/ACT inhaler Inhale 2 puffs into the lungs every 6 hours as needed for Wheezing or Shortness of Breath   Yes Historical Provider, MD   budesonide-formoterol (SYMBICORT) 160-4.5 MCG/ACT AERO Inhale 2 puffs into the lungs 2 times daily   Yes Historical Provider, MD   omeprazole (PRILOSEC) 20 MG delayed release capsule Take 20 mg by mouth every morning (before breakfast)   Yes Historical Provider, MD   allopurinol (ZYLOPRIM) 300 MG tablet Take 300 mg by mouth daily.    Yes Historical Provider, MD   amitriptyline (ELAVIL) 10 MG tablet Take 1 tablet by mouth nightly 11/15/20   Lora Olivares MD     Current Facility-Administered Medications   Medication Dose Route Frequency Provider Last Rate Last Dose    albuterol sulfate  (90 Base) MCG/ACT inhaler 2 puff  2 puff Inhalation Q6H PRN Loar Olivares MD        allopurinol (ZYLOPRIM) tablet 300 mg  300 mg Oral Daily Lora Olivares MD   300 mg at 11/22/20 0827    amitriptyline (ELAVIL) tablet 25 mg  25 mg Oral Nightly Lora Olivares MD   25 mg at 11/21/20 2114    amLODIPine (NORVASC) tablet 10 mg  10 mg Oral Daily Lora Olivares MD   10 mg at 11/22/20 0827    budesonide-formoterol (SYMBICORT) 160-4.5 MCG/ACT inhaler 2 puff  2 puff Inhalation BID Lora Olivares MD        Vitamin D (CHOLECALCIFEROL) tablet 2,000 Units  2,000 Units Oral Daily Lora Olivares MD   2,000 Units at 11/22/20 0826    hydrALAZINE (APRESOLINE) tablet 50 mg  50 mg Oral 3 times per day Lora Olivares MD   50 mg at 11/22/20 1253    metoprolol succinate (TOPROL XL) extended release tablet 50 mg  50 mg Oral Daily Lora Olivares MD   50 mg at 11/22/20 0827    mirtazapine (REMERON) tablet 7.5 mg  7.5 mg Oral Nightly Lora Olivares MD   7.5 mg at 11/21/20 2115    predniSONE (DELTASONE) tablet 10 mg  10 mg Oral Daily Lora Olivares MD   10 mg at 11/22/20 0826    pantoprazole (PROTONIX) tablet 40 mg  40 mg Oral QAM AC Lora Olivares MD   40 mg at 11/22/20 0630    ipratropium-albuterol (Robb Osuna) external ear canals normal   Mouth -  Normocephalic, atraumatic. Oral and oropharyngeal examination revealed that the tumor involving the anterior and left alveolus with erosion of the mandible as well as extension into the left floor of mouth and dorsal lateral tongue   Neck - Patient has tracheostomy and the opening of the stoma showed no evidence of any suspicious lesion.   Lymphatics - no palpable lymphadenopathy, no hepatosplenomegaly   Chest -decreased breathing sounds bilaterally  Heart - normal rate, regular rhythm, normal S1, S2, no murmurs  Abdomen - soft, nontender, nondistended, no masses or organomegaly   Neurological - alert, oriented, normal speech, no focal findings or movement disorder noted   Musculoskeletal - no joint tenderness, deformity or swelling   Extremities - peripheral pulses normal, no pedal edema, no clubbing or cyanosis   Skin - normal coloration and turgor, no rashes, no suspicious skin lesions noted ,      DATA:      Labs:     Results for orders placed or performed during the hospital encounter of 11/19/20   CBC Auto Differential   Result Value Ref Range    WBC 7.4 3.5 - 11.3 k/uL    RBC 3.12 (L) 4.21 - 5.77 m/uL    Hemoglobin 8.8 (L) 13.0 - 17.0 g/dL    Hematocrit 28.5 (L) 40.7 - 50.3 %    MCV 91.3 82.6 - 102.9 fL    MCH 28.2 25.2 - 33.5 pg    MCHC 30.9 28.0 - 38.0 g/dL    RDW 15.7 (H) 11.8 - 14.4 %    Platelets 439 730 - 413 k/uL    MPV 9.3 8.1 - 13.5 fL    NRBC Automated NOT REPORTED 0.0 per 100 WBC    Differential Type NOT REPORTED     WBC Morphology NOT REPORTED     RBC Morphology NOT REPORTED     Platelet Estimate NOT REPORTED     Seg Neutrophils 88 (H) 36 - 66 %    Lymphocytes 6 (L) 24 - 44 %    Monocytes 6 1 - 7 %    Eosinophils % 0 (L) 1 - 4 %    Basophils 0 %    Immature Granulocytes 0 0 %    Segs Absolute 6.52 1.8 - 7.7 k/uL    Absolute Lymph # 0.44 (L) 1.0 - 4.8 k/uL    Absolute Mono # 0.44 0.2 - 0.8 k/uL    Absolute Eos # 0.00 0.0 - 0.4 k/uL    Basophils Absolute 0.00 0.0 - 0.2 k/uL    Absolute Immature Granulocyte 0.00 0.00 - 0.30 k/uL   Basic Metabolic Panel   Result Value Ref Range    Glucose 126 (H) 70 - 99 mg/dL    BUN 41 (H) 8 - 23 mg/dL    CREATININE 2.92 (H) 0.70 - 1.20 mg/dL    Bun/Cre Ratio 14 9 - 20    Calcium 8.6 8.6 - 10.4 mg/dL    Sodium 128 (L) 135 - 144 mmol/L    Potassium 4.6 3.7 - 5.3 mmol/L    Chloride 97 (L) 98 - 107 mmol/L    CO2 18 (L) 20 - 31 mmol/L    Anion Gap 13 9 - 17 mmol/L    GFR Non-African American 22 (L) >60 mL/min    GFR  26 (L) >60 mL/min    GFR Comment          GFR Staging NOT REPORTED    Trop/Myoglobin   Result Value Ref Range    Troponin, High Sensitivity 86 (HH) 0 - 22 ng/L    Troponin T NOT REPORTED <0.03 ng/mL    Troponin Interp NOT REPORTED     Myoglobin 102 (H) 28 - 72 ng/mL   Lactic Acid   Result Value Ref Range    Lactic Acid 1.1 0.5 - 2.2 mmol/L   COVID-19    Specimen: Other   Result Value Ref Range    SARS-CoV-2 Not Detected Not Detected    SARS-CoV-2, Rapid          Source . NASOPHARYNGEAL SWAB     SARS-CoV-2         CBC Auto Differential   Result Value Ref Range    WBC 7.3 3.5 - 11.3 k/uL    RBC 2.75 (L) 4.21 - 5.77 m/uL    Hemoglobin 7.7 (L) 13.0 - 17.0 g/dL    Hematocrit 23.4 (L) 40.7 - 50.3 %    MCV 85.1 82.6 - 102.9 fL    MCH 28.0 25.2 - 33.5 pg    MCHC 32.9 28.4 - 34.8 g/dL    RDW 15.2 (H) 11.8 - 14.4 %    Platelets 525 013 - 733 k/uL    MPV 8.9 8.1 - 13.5 fL    NRBC Automated 0.0 0.0 per 100 WBC    Differential Type NOT REPORTED     WBC Morphology NOT REPORTED     RBC Morphology ANISOCYTOSIS PRESENT     Platelet Estimate NOT REPORTED     Seg Neutrophils 70 (H) 36 - 65 %    Lymphocytes 16 (L) 24 - 43 %    Monocytes 10 3 - 12 %    Eosinophils % 3 1 - 4 %    Basophils 0 0 - 2 %    Immature Granulocytes 0 0 %    Segs Absolute 5.11 1.50 - 8.10 k/uL    Absolute Lymph # 1.17 1.10 - 3.70 k/uL    Absolute Mono # 0.72 0.10 - 1.20 k/uL    Absolute Eos # 0.23 0.00 - 0.44 k/uL    Basophils Absolute <0.03 0.00 - 0.20 k/uL Absolute Immature Granulocyte 0.03 0.00 - 0.30 k/uL   Basic Metabolic Panel   Result Value Ref Range    Glucose 95 70 - 99 mg/dL    BUN 42 (H) 8 - 23 mg/dL    CREATININE 3.03 (H) 0.70 - 1.20 mg/dL    Bun/Cre Ratio 14 9 - 20    Calcium 8.2 (L) 8.6 - 10.4 mg/dL    Sodium 130 (L) 135 - 144 mmol/L    Potassium 4.3 3.7 - 5.3 mmol/L    Chloride 98 98 - 107 mmol/L    CO2 21 20 - 31 mmol/L    Anion Gap 11 9 - 17 mmol/L    GFR Non-African American 21 (L) >60 mL/min    GFR  25 (L) >60 mL/min    GFR Comment          GFR Staging NOT REPORTED    Magnesium   Result Value Ref Range    Magnesium 1.6 1.6 - 2.6 mg/dL   Phosphorus   Result Value Ref Range    Phosphorus 4.2 2.5 - 4.5 mg/dL   Urinalysis   Result Value Ref Range    Color, UA YELLOW YELLOW    Turbidity UA CLEAR CLEAR    Glucose, Ur NEGATIVE NEGATIVE    Bilirubin Urine NEGATIVE NEGATIVE    Ketones, Urine NEGATIVE NEGATIVE    Specific Gravity, UA 1.025 1.005 - 1.030    Urine Hgb NEGATIVE NEGATIVE    pH, UA 7.5 5.0 - 8.0    Protein, UA 3+ (A) NEGATIVE    Urobilinogen, Urine Normal Normal    Nitrite, Urine NEGATIVE NEGATIVE    Leukocyte Esterase, Urine NEGATIVE NEGATIVE    Urinalysis Comments NOT REPORTED    Sodium, urine, random   Result Value Ref Range    Sodium,Ur 85 mmol/L   Osmolality, Urine   Result Value Ref Range    Osmolality, Ur 401 300 - 1170 mOsm/kg   CBC Auto Differential   Result Value Ref Range    WBC 6.9 3.5 - 11.3 k/uL    RBC 2.99 (L) 4.21 - 5.77 m/uL    Hemoglobin 8.4 (L) 13.0 - 17.0 g/dL    Hematocrit 25.3 (L) 40.7 - 50.3 %    MCV 84.6 82.6 - 102.9 fL    MCH 28.1 25.2 - 33.5 pg    MCHC 33.2 28.4 - 34.8 g/dL    RDW 15.1 (H) 11.8 - 14.4 %    Platelets 688 007 - 060 k/uL    MPV 9.1 8.1 - 13.5 fL    NRBC Automated 0.0 0.0 per 100 WBC    Differential Type NOT REPORTED     Seg Neutrophils 67 (H) 36 - 65 %    Lymphocytes 17 (L) 24 - 43 %    Monocytes 11 3 - 12 %    Eosinophils % 5 (H) 1 - 4 %    Basophils 0 0 - 2 %    Immature Granulocytes 0 0 % Segs Absolute 4.59 1.50 - 8.10 k/uL    Absolute Lymph # 1.18 1.10 - 3.70 k/uL    Absolute Mono # 0.73 0.10 - 1.20 k/uL    Absolute Eos # 0.34 0.00 - 0.44 k/uL    Basophils Absolute <0.03 0.00 - 0.20 k/uL    Absolute Immature Granulocyte 0.03 0.00 - 0.30 k/uL    WBC Morphology NOT REPORTED     RBC Morphology ANISOCYTOSIS PRESENT     Platelet Estimate NOT REPORTED    Basic Metabolic Panel   Result Value Ref Range    Glucose 85 70 - 99 mg/dL    BUN 38 (H) 8 - 23 mg/dL    CREATININE 3.04 (H) 0.70 - 1.20 mg/dL    Bun/Cre Ratio 13 9 - 20    Calcium 8.3 (L) 8.6 - 10.4 mg/dL    Sodium 129 (L) 135 - 144 mmol/L    Potassium 4.3 3.7 - 5.3 mmol/L    Chloride 99 98 - 107 mmol/L    CO2 20 20 - 31 mmol/L    Anion Gap 10 9 - 17 mmol/L    GFR Non-African American 21 (L) >60 mL/min    GFR  25 (L) >60 mL/min    GFR Comment          GFR Staging NOT REPORTED    TSH w/reflex to FT4   Result Value Ref Range    TSH 1.54 0.30 - 5.00 mIU/L   Magnesium   Result Value Ref Range    Magnesium 1.5 (L) 1.6 - 2.6 mg/dL   Phosphorus   Result Value Ref Range    Phosphorus 4.2 2.5 - 4.5 mg/dL   Microscopic Urinalysis   Result Value Ref Range    -          WBC, UA 0 TO 2 0 - 5 /HPF    RBC, UA 0 TO 2 0 - 2 /HPF    Casts UA NOT REPORTED /LPF    Crystals, UA NOT REPORTED None /HPF    Epithelial Cells UA 2 TO 5 0 - 5 /HPF    Renal Epithelial, UA NOT REPORTED 0 /HPF    Bacteria, UA RARE (A) None    Mucus, UA NOT REPORTED None    Trichomonas, UA NOT REPORTED None    Amorphous, UA NOT REPORTED None    Other Observations UA NOT REPORTED NOT REQ.     Yeast, UA NOT REPORTED None   CBC Auto Differential   Result Value Ref Range    WBC 7.4 3.5 - 11.3 k/uL    RBC 2.70 (L) 4.21 - 5.77 m/uL    Hemoglobin 7.5 (L) 13.0 - 17.0 g/dL    Hematocrit 23.4 (L) 40.7 - 50.3 %    MCV 86.7 82.6 - 102.9 fL    MCH 27.8 25.2 - 33.5 pg    MCHC 32.1 28.4 - 34.8 g/dL    RDW 15.1 (H) 11.8 - 14.4 %    Platelets 577 105 - 073 k/uL    MPV 9.1 8.1 - 13.5 fL    NRBC Automated 0.0 0.0 per 100 WBC    Differential Type NOT REPORTED     WBC Morphology NOT REPORTED     RBC Morphology ANISOCYTOSIS PRESENT     Platelet Estimate NOT REPORTED     Seg Neutrophils 70 (H) 36 - 65 %    Lymphocytes 17 (L) 24 - 43 %    Monocytes 11 3 - 12 %    Eosinophils % 2 1 - 4 %    Basophils 0 0 - 2 %    Immature Granulocytes 0 0 %    Segs Absolute 5.23 1.50 - 8.10 k/uL    Absolute Lymph # 1.23 1.10 - 3.70 k/uL    Absolute Mono # 0.81 0.10 - 1.20 k/uL    Absolute Eos # 0.13 0.00 - 0.44 k/uL    Basophils Absolute <0.03 0.00 - 0.20 k/uL    Absolute Immature Granulocyte 0.03 0.00 - 0.30 k/uL   Basic Metabolic Panel   Result Value Ref Range    Glucose 93 70 - 99 mg/dL    BUN 43 (H) 8 - 23 mg/dL    CREATININE 3.62 (H) 0.70 - 1.20 mg/dL    Bun/Cre Ratio 12 9 - 20    Calcium 8.2 (L) 8.6 - 10.4 mg/dL    Sodium 131 (L) 135 - 144 mmol/L    Potassium 4.2 3.7 - 5.3 mmol/L    Chloride 99 98 - 107 mmol/L    CO2 21 20 - 31 mmol/L    Anion Gap 11 9 - 17 mmol/L    GFR Non-African American 17 (L) >60 mL/min    GFR African American 20 (L) >60 mL/min    GFR Comment          GFR Staging NOT REPORTED          IMAGING DATA:    Ct Head Wo Contrast    Result Date: 11/12/2020  EXAMINATION: CT OF THE HEAD WITHOUT CONTRAST  11/12/2020 7:46 pm TECHNIQUE: CT of the head was performed without the administration of intravenous contrast. Dose modulation, iterative reconstruction, and/or weight based adjustment of the mA/kV was utilized to reduce the radiation dose to as low as reasonably achievable. COMPARISON: 06/01/2015 HISTORY: ORDERING SYSTEM PROVIDED HISTORY: headache TECHNOLOGIST PROVIDED HISTORY: headache Reason for Exam: Headache; history of progressing squamous cell carcinoma of glottis, first diagnosed in 2005. Acuity: Acute Type of Exam: Initial FINDINGS: BRAIN/VENTRICLES: There is no acute intracranial hemorrhage, mass effect or midline shift. No abnormal extra-axial fluid collection.   The gray-white differentiation is maintained without evidence of an acute infarct. There is no evidence of hydrocephalus. Periventricular and deep subcortical white matter hypoattenuation, consistent microangiopathic change. Mild parenchymal volume loss. Atherosclerosis of the intracranial vasculature. There is right frontal encephalomalacia underlying the craniotomy. Remote lacunar infarct in the left basal ganglia. ORBITS: The visualized portion of the orbits demonstrate no acute abnormality. SINUSES: Scattered mucosal thickening within the ethmoid air cells. SOFT TISSUES/SKULL:  Prior right craniotomy. No acute calvarial abnormality. No acute intracranial abnormality. Microangiopathic change. Ct Chest Wo Contrast    Result Date: 11/19/2020  EXAMINATION: CT OF THE CHEST WITHOUT CONTRAST 11/19/2020 7:23 pm TECHNIQUE: CT of the chest was performed without the administration of intravenous contrast. Multiplanar reformatted images are provided for review. Dose modulation, iterative reconstruction, and/or weight based adjustment of the mA/kV was utilized to reduce the radiation dose to as low as reasonably achievable. COMPARISON: 11/10/2020 HISTORY: ORDERING SYSTEM PROVIDED HISTORY: rib pain, rib fractures TECHNOLOGIST PROVIDED HISTORY: rib pain, rib fractures Reason for Exam: rib pain Acuity: Unknown Type of Exam: Unknown Mechanism of Injury: fall? Relevant Medical/Surgical History: SOB FINDINGS: Mediastinum: Three vessel coronary artery calcification. No mediastinal, hilar, or axillary lymphadenopathy. Mild dilatation of the main pulmonary artery to 3 cm. Lungs/pleura: Similar right upper lobe mass measuring 8.2 x 7.3 cm inseparable from the mediastinum. Bibasilar atelectasis. Similar moderate right pleural effusion. New small left pleural effusion. Upper Abdomen: Few colonic diverticula. Soft Tissues/Bones: Multilevel degenerative disc disease of the thoracic spine. Old bilateral rib fractures. Unchanged mild compression deformity of L1. 1. No acute process in the chest.  Old bilateral rib fractures. 2. Similar right upper lobe mass measuring 8.2 cm inseparable from the mediastinum almost certainly representing a primary lung cancer. . 3. Similar moderate right pleural effusion. New small left pleural effusion. Ct Chest Wo Contrast    Result Date: 11/10/2020  EXAMINATION: CT OF THE CHEST WITHOUT CONTRAST 11/10/2020 2:10 pm TECHNIQUE: CT of the chest was performed without the administration of intravenous contrast. Multiplanar reformatted images are provided for review. Dose modulation, iterative reconstruction, and/or weight based adjustment of the mA/kV was utilized to reduce the radiation dose to as low as reasonably achievable. COMPARISON: Chest radiograph today. Chest CT 06/12/2020 HISTORY: ORDERING SYSTEM PROVIDED HISTORY: abnormal x ray TECHNOLOGIST PROVIDED HISTORY: abnormal x ray Reason for Exam: F/u abnormal chest xray. Pt has a history of previous laryngeal cancer status post tracheostomy placement that presents with complaints of shortness of breath. Patient states he been short of breath for past couple of days Acuity: Acute Type of Exam: Initial Additional signs and symptoms: GFR 23 Relevant Medical/Surgical History: Hx of cardiac cath, COPD, HTN, lung cancer, CKD FINDINGS: Mediastinum: Cardiomegaly is noted. Calcified atheromatous plaque and coronary calcification. No pericardial effusion. Mass in the medial right upper lobe/superior mediastinum bulging into the superior mediastinum is noted. Additional mildly enlarged right paratracheal lymph nodes are noted measuring up to 11 mm. Lungs/pleura: Medial right upper lobe/superior mediastinum mass displaces the trachea and esophagus to the left. This measures at least 7.4 x 5.9 cm and demonstrates heterogeneous internal attenuation with areas of calcification. Moderate size right pleural effusion. Lipomatous enlargement of the inferior left pleura.   Small amount debris in the bronchus intermedius and minimal subsegmental atelectasis in the lung bases noted. Upper Abdomen: No acute findings. Scarring in the left kidney is noted. Partially visualized endovascular abdominal aortic repair. Soft Tissues/Bones: Bilateral old rib fractures. Superior endplate fracture at the L2 level again demonstrated. Status post radical neck dissection. 1.  Large medial right upper lobe/superior mediastinal mass with mass effect on the trachea and esophagus, concerning for neoplastic disease. 2.  Additional mildly enlarged right paratracheal lymph nodes may represent disease involvement. 3.  Moderate size right pleural effusion. Dependent subsegmental atelectasis. Nm Lung Scan Perfusion Only    Result Date: 11/19/2020  EXAMINATION: LUNG PERFUSION IMAGING 11/19/2020 9:27 pm TECHNIQUE: Multiple pulmonary perfusion images were obtained of the chest following the intravenous administration of 6.2 millicuries of YM-67M MAA. Ventilation imaging was not performed. COMPARISON: Chest radiograph from 11/19/2020 HISTORY: ORDERING SYSTEM PROVIDED HISTORY: SOB TECHNOLOGIST PROVIDED HISTORY: SOB Reason for Exam: SOB Acuity: Unknown Type of Exam: Unknown FINDINGS: Right upper lobe perfusion defect corresponds with the mass in the medial right apex. There are also perfusion defects in the posterior basal segments of both lower lobes corresponding with bilateral pleural effusions. This is an intermediate probability scan pattern for pulmonary embolus.      Xr Chest Portable    Result Date: 11/19/2020  EXAMINATION: ONE XRAY VIEW OF THE CHEST 11/19/2020 11:33 am COMPARISON: November 13, 2020, chest exam HISTORY: ORDERING SYSTEM PROVIDED HISTORY: SOB TECHNOLOGIST PROVIDED HISTORY: SOB Acuity: Acute Type of Exam: Unknown FINDINGS: Stable cardiac silhouette Emphysematous changes of the lungs with interval increase in mild streaky bibasilar densities and small bilateral pleural effusions, otherwise clear lungs mediastinal mass TECHNOLOGIST PROVIDED HISTORY: persistent headache, history of oral cancer, glottic cancer, new mediastinal mass Reason for Exam: persistent headache, history of oral cancer, glottic cancer, new mediastinal mass Acuity: Chronic Type of Exam: Subsequent/Follow-up FINDINGS: INTRACRANIAL STRUCTURES/VENTRICLES: There is no acute infarct. No mass effect or midline shift. No evidence of an acute intracranial hemorrhage. There is volume loss with mild chronic white matter microvascular ischemic change. There is a chronic infarct within the high right parietal lobe. The sellar/suprasellar regions appear unremarkable. The normal signal voids within the major intracranial vessels appear maintained. ORBITS: The visualized portion of the orbits demonstrate no acute abnormality. SINUSES: The visualized paranasal sinuses and mastoid air cells are well aerated. BONES/SOFT TISSUES: The bone marrow signal intensity appears normal. The soft tissues demonstrate no acute abnormality. No acute intracranial abnormality visualized. Ir Guided Thoracentesis Pleural    Result Date: 11/12/2020  PROCEDURE: ULTRASOUNDGUIDED RIGHT THORACENTESIS 11/12/2020 HISTORY: ORDERING SYSTEM PROVIDED HISTORY: right thoracentesis TECHNOLOGIST PROVIDED HISTORY: right thoracentesis Enlarging right pleural effusion. History of COPD, asthma and chronic kidney disease. History of prostate cancer. TECHNIQUE: Informed consent was obtained after a detailed explanation of the procedure including risks, benefits, and alternatives. Universal protocol was performed. The right chest was prepped and draped in sterile fashion and local anesthesia was achieved with lidocaine. A 5 [place] needle sheath was advanced under ultrasound guidance into pleural effusion and thoracentesis was performed. Fluid was provided for further analysis. The patient tolerated the procedure well. FINDINGS: A total of 800 mL was removed.   Fluid was yellowish-green in color and relatively clear. Successful ultrasound guided diagnostic and therapeutic right thoracentesis. IMPRESSION:   Primary Problem  <principal problem not specified>    Active Hospital Problems    Diagnosis Date Noted    Severe malnutrition ([facility] [address] [E43] 11/20/2020    Bilateral pleural effusion [J90] 11/19/2020     History of squamous cell carcinoma of the glottis status post total laryngectomy in [place] without adjuvant radiation therapy    Squamous cell carcinoma of oral cavity status post resection at [address] [place] [address] with bilateral neck dissection, pathological stage T4 N0 M0. 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 RECOMMENDATIONS:  The patient is seen and evaluated. Biopsy is scheduled as mentioned for Monday  Continue supportive care including bronchodilators,  Await results, if mets, likely needs palliative XRt to be followed by immunotherapy       [facility]  Hematologist/Medical Oncologist  Cell: [phone]            This note is created with the assistance of a speech recognition program.  While intending to generate a document that actually reflects the content of the visit, the document can still have some errors including those of syntax and sound a like substitutions which may escape proof reading. It such instances, actual meaning can be extrapolated by contextual diversion.

## 2020-11-23 NOTE — PROGRESS NOTES
Pulmonary Critical Care Progress Note  Jeanette Garcia CNP / Dr. Pan Alicea M.D. Patient seen for the follow up of right upper lobe lung mass, history of squamous cell carcinoma, pleural effusions, COPD    Subjective:  He is resting in bed, n.p.o. for biopsy this afternoon. He complains of increased shortness of breath, anxiety. He is requesting to be suctioned at this time. He denies any pain. He does admit to a cough. Length of stay: 2 Days    Examination:  Vitals: BP (!) 155/80   Pulse 69   Temp 97.3 °F (36.3 °C) (Oral)   Resp 18   Ht 5' 4\" (1.626 m)   Wt 129 lb 8 oz (58.7 kg)   SpO2 96%   BMI 22.23 kg/m²     General appearance: alert and cooperative with exam, anxious, RN called to bedside  Neck: No JVD  Lungs: Decreased air exchange with no rhonchi or wheeze  Heart: regular rate and rhythm, S1, S2 normal, no gallop  Abdomen: Soft, non tender, + BS  Extremities: no cyanosis or clubbing.  No significant edema    LABs:  CBC:   Recent Labs     11/22/20 0627 11/23/20  0531   WBC 7.4 7.3   HGB 7.5* 8.3*   HCT 23.4* 25.9*    281     BMP:   Recent Labs     11/22/20 0627 11/23/20  0531   * 132*   K 4.2 3.8   CO2 21 23   BUN 43* 39*   CREATININE 3.62* 3.38*   LABGLOM 17* 18*   GLUCOSE 93 93     PT/INR:   Recent Labs     11/23/20 0715   PROTIME 13.0   INR 1.0     APTT:  Recent Labs     11/23/20 0715   APTT 27.7     Impression:  · Intermediate probability of PE on VQ scan  · New right upper lobe mediastinal mass, 7.4 cm x 5.9 cm, highly suspicious for malignancy  · History of SCC of the glottis s/p laryngectomy/tracheostomy 2005  · 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  · Recurrent right pleural effusion/Small left pleural effusion  · Previous thoracentesis, negative for malignancy  · COPD  · Moderate to severe pulmonary hypertension  · CKD/chronic hyponatremia  · Chronic combined systolic and diastolic heart failure  · Severe protein calorie malnutrition    Recommendations:  · Oxygen by trach collar if needed to keep oxygen saturation >> 92%  · Plan for biopsy of paratracheal mass today   · Albuterol and Ipratropium Q 4 hours and prn  · Symbicort 160  · Prednisone 10 mg daily  · Nephrology following/fluid restriction  · Hematology following  · X-ray chest in a.m.  · He may need placement of Pleurx catheter for recurrent right pleural effusion  · Labs: CBC and BMP in am  · DVT prophylaxis with low molecular weight heparin held for procedure  · Incentive spirometry every hour while awake  · Discussed with RN  · Will follow with you    Electronically signed by     GINETTE Shrestha CNP on 11/23/2020 at 9:38 AM  Patient was seen under the supervision of Dr. Tonya Mcrae  Above assessment and plan will be reviewed with Dr. Cherl Frankel.   Patient plan will bee finalized following review by Dr. Carol Jennings and Sleep Medicine,    [facility] AT [place]: [phone]

## 2020-11-23 NOTE — PROGRESS NOTES
Nephrology progress note    Reason for Consult:  Renal Insufficiency    Requesting Physician:  Tom Salazar MD    Interval history:     Patient seen and examined at the bedside   Serum creatinine is somewhat improved today     HISTORY OF PRESENT ILLNESS:    The patient is a 78 y.o. male who presents with shortness of breath, bilateral pleural effusions and rule out PE. He was discharged several days ago for COPD exacerbation. He has known of mediastinal mass with adenopathy with laryngeal and oral cancer. He has been following with oncology. He has known history of CKD 4 and normally follows with Dr. Atif Taylor. Creatinine has been trending 2.8-3.2 on recent labs during last admission and in 6/2020. Creatinine 2.9 yesterday. Hx of chronic hyponatremia. Sodium level was 130 and 128 yesterday. Bicarb level was 18 yesterday. No new labs today. SBP trending 130s to 150s. CT of the chest yesterday showed similar moderate right pleural effusion and new small left pleural effusion. Similar right upper lobe mass. He received lasix yesterday. Prior to Admission medications    Medication Sig Start Date End Date Taking?  Authorizing Provider   amitriptyline (ELAVIL) 25 MG tablet Take 1 tablet by mouth nightly 11/21/20  Yes Tom Salazar MD   predniSONE (DELTASONE) 10 MG tablet Take 1 tablet by mouth daily for 10 days Take 1 tablet daily for 5 days then 1 tablet every other day till gone 11/15/20 11/25/20 Yes Tom Salazar MD   mirtazapine (REMERON) 15 MG tablet Take 7.5 mg by mouth nightly Takes 1/2 tab (=7.5mg) nightly   Yes Historical Provider, MD   sodium bicarbonate 650 MG tablet Take 650 mg by mouth 3 times daily   Yes Historical Provider, MD   hydrALAZINE (APRESOLINE) 50 MG tablet Take 1 tablet by mouth every 8 hours 6/6/20  Yes Tom Salazar MD   metoprolol succinate (TOPROL XL) 50 MG extended release tablet Take 1 tablet by mouth daily 6/7/20  Yes Tom Salazar MD   amLODIPine ([address]) 10 MG tablet Take 1 tablet by mouth daily 4/18/20  Yes Mary Maraino MD   Cholecalciferol (VITAMIN D3) 50 MCG (2000 UT) CAPS Take 1 capsule by mouth daily   Yes Historical Provider, MD   albuterol sulfate  (90 Base) MCG/ACT inhaler Inhale 2 puffs into the lungs every 6 hours as needed for Wheezing or Shortness of Breath   Yes Historical Provider, MD   budesonide-formoterol (SYMBICORT) 160-4.5 MCG/ACT AERO Inhale 2 puffs into the lungs 2 times daily   Yes Historical Provider, MD   omeprazole (PRILOSEC) 20 MG delayed release capsule Take 20 mg by mouth every morning (before breakfast)   Yes Historical Provider, MD   allopurinol (ZYLOPRIM) 300 MG tablet Take 300 mg by mouth daily. Yes Historical Provider, MD   amitriptyline (ELAVIL) 10 MG tablet Take 1 tablet by mouth nightly 11/15/20   Mary Mariano MD       Scheduled Meds:   allopurinol  300 mg Oral Daily    amitriptyline  25 mg Oral Nightly    amLODIPine  10 mg Oral Daily    budesonide-formoterol  2 puff Inhalation BID    Vitamin D  2,000 Units Oral Daily    hydrALAZINE  50 mg Oral 3 times per day    metoprolol succinate  50 mg Oral Daily    mirtazapine  7.5 mg Oral Nightly    predniSONE  10 mg Oral Daily    pantoprazole  40 mg Oral QAM AC    ipratropium-albuterol  1 ampule Inhalation Q4H WA    sodium bicarbonate  1,300 mg Oral TID    sodium chloride flush  10 mL Intravenous 2 times per day    heparin (porcine)  5,000 Units Subcutaneous 3 times per day     Continuous Infusions:  PRN Meds:bisacodyl, albuterol sulfate HFA, sodium chloride flush, acetaminophen, morphine   Physical Exam:  Vitals:    11/23/20 1438 11/23/20 1506 11/23/20 1510 11/23/20 1520   BP: (!) 158/88 (!) 142/72 (!) 143/82 (!) 157/84   Pulse: 69 88 79 89   Resp: 22 18 16 16   Temp:       TempSrc:       SpO2: 96% 96% 96% 96%   Weight:       Height:         No intake/output data recorded. General:  Awake, alert, not in distress. Appears to be stated age.   HEENT: Atraumatic, normocephalic. Anicteric sclera. Pink and moist oral mucosa. Neck supple. Trach with collar. Chest: Bilateral air entry, clear to auscultation, Diminished bilaterally. Cardiovascular: RRR, S1S2, no murmur, rub or gallop. No lower extremity edema. Abdomen: Soft, non tender to palpation. Musculoskeletal:  No cyanosis or clubbing. Integumentary: Pink, warm and dry. Free from rash or lesions. Skin turgor normal.  CNS:  Face symmetrical. No tremor.      Data:    CBC:   Lab Results   Component Value Date    WBC 7.3 11/23/2020    HGB 8.3 (L) 11/23/2020    HCT 25.9 (L) 11/23/2020    MCV 87.2 11/23/2020     11/23/2020     BMP:    Lab Results   Component Value Date     (L) 11/23/2020     (L) 11/22/2020     (L) 11/21/2020    K 3.8 11/23/2020    K 4.2 11/22/2020    K 4.3 11/21/2020    CL 98 11/23/2020    CL 99 11/22/2020    CL 99 11/21/2020    CO2 23 11/23/2020    CO2 21 11/22/2020    CO2 20 11/21/2020    BUN 39 (H) 11/23/2020    BUN 43 (H) 11/22/2020    BUN 38 (H) 11/21/2020    CREATININE 3.38 (H) 11/23/2020    CREATININE 3.62 (H) 11/22/2020    CREATININE 3.04 (H) 11/21/2020    GLUCOSE 93 11/23/2020    GLUCOSE 93 11/22/2020    GLUCOSE 85 11/21/2020     CMP:   Lab Results   Component Value Date     11/23/2020    K 3.8 11/23/2020    CL 98 11/23/2020    CO2 23 11/23/2020    BUN 39 11/23/2020    CREATININE 3.38 11/23/2020    GLUCOSE 93 11/23/2020    CALCIUM 8.5 11/23/2020    PROT 7.7 11/11/2020    LABALBU 3.3 11/11/2020    BILITOT 0.25 11/11/2020    ALKPHOS 104 11/11/2020    AST 15 11/11/2020    ALT 9 11/11/2020      Hepatic:   Lab Results   Component Value Date    AST 15 11/11/2020    AST 43 (H) 06/11/2020    AST 17 05/30/2020    ALT 9 11/11/2020    ALT 46 (H) 06/11/2020    ALT 11 05/30/2020    BILITOT 0.25 (L) 11/11/2020    BILITOT <0.10 (L) 06/11/2020    BILITOT 0.25 (L) 05/30/2020    ALKPHOS 104 11/11/2020    ALKPHOS 107 06/11/2020    ALKPHOS 88 05/30/2020     BNP:   Lab Results Component Value Date    BNP 39 12/12/2012     Lipids: No results found for: CHOL, HDL  INR:   Lab Results   Component Value Date    INR 1.0 11/23/2020    INR 1.1 11/12/2020    INR 1.0 06/01/2020     PTH: No results found for: PTH  Phosphorus:    Lab Results   Component Value Date    PHOS 3.5 11/23/2020     Ionized Calcium: No results found for: IONCA  Magnesium:   Lab Results   Component Value Date    MG 1.8 11/23/2020     Albumin:   Lab Results   Component Value Date    LABALBU 3.3 11/11/2020     Last 3 CK, CKMB, Troponin: @LABRCNT(CKTOTAL:3,CKMB:3,TROPONINI:3)       URINE:)No results found for: Ida Dys    Radiology:   Reviewed. Assessment:    CKD stage 4  Chronic hyponatremia, normal TSH, serum cortisol is pending  Hypomagnesemia  Metabolic acidosis   Bilateral pleural effusions. S/p right thoracentesis 11/12. Right upper lobe medistinal mass/Recurrent invasive keratinizing SCC  Moderate to severe pulmonary HTN  Combined heart failure    Plan:    Patient had lung mass biopsy   Renal functions were relatively stable   Continue oral bicarb   No additional renal work-up is needed at this time   We will continue to monitor renal functions electrolytes volume status   Await biopsy results   Avoid nephrotoxic drugs and IV contrast exposure. We will continue to follow along with you.      Electronically signed by Marj Romano MD  on 11/23/2020 at 3:39 PM

## 2020-11-23 NOTE — BRIEF OP NOTE
Brief Postoperative Note    Bandar Valladares  YOB: 1950  [id number]    Pre-operative Diagnosis: lung mass right    Post-operative Diagnosis: Same    Procedure: lung biopsy    Anesthesia: Local    Surgeons/Assistants: Ton    Estimated Blood Loss: less than 50     Complications: None    Specimens: Was Obtained: 4 core biopsy specimens and approximately 1 cc of purulent material    Findings: successful CT guided biopsy    Electronically signed by Seleta Goldberg, MD on 11/23/2020 at 3:52 PM

## 2020-11-23 NOTE — PROGRESS NOTES
Today's Date: 11/23/2020  Patient Name: Janette Acosta  Date of admission: 11/19/2020  6:21 PM  Patient's age: 78 y.o., 1950  Admission Dx: Bilateral pleural effusion [J90]  Bilateral pleural effusion [J90]    Reason for Consult: management recommendations  Requesting Physician: Shari Galeazzi, MD    CHIEF COMPLAINT: Shortness of breath. Chest pain. History Obtained From:  patient, electronic medical record    HISTORY OF PRESENT ILLNESS:      The patient is a 78 y.o.  male who is admitted to the hospital for chief chief complaint of shortness of breath and chest pain. Patient presented to the ER with complains of shortness of breath and left-sided rib pain. CT chest did not show any acute process in the chest showed old bilateral rib fractures. Showed similar right upper lobe mass 8.2 cm inseparable from mediastinum concerning for lung primary. Small right pleural effusion was noted as well. Thoracentesis from 11/12 was benign negative for malignancy. VQ scan done shows intermediate probability for PE.  CTA cannot be performed due to CKD. 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 No adjuvant radiation therapy was given. 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 Underwent resection at [address] [place] [id number] with bilateral selective neck dissection. Neurological stage was T4 N0 M0. Patient was recommended to have postoperative radiation but he declined. 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 PET scan showed ability in the left floor of mouth/oropharynx as well as right paratracheal mass. Patient was also recently hospitalized and CT scan showed a large right upper lobe mass with mass-effect on the trachea and esophagus concerning from malignancy. Patient has lost about 30 pounds over the last 6 months. She also has history of prostate cancer and is on Casodex. INTERIM HISTORY  Patient is seen and evaluated. He overall feels well. Biopsy will be done this afternoon. He overall feels well and has no complaints. Past Medical History:   has a past medical history of Arthritis, Asthma, CKD (chronic kidney disease), stage III, COPD (chronic obstructive pulmonary disease) ([facility] GERD (gastroesophageal reflux disease), Gout, Hypertension, Hyponatremia, Iliac artery aneurysm, right ([facility] [address] Laryngeal cancer ([facility] [address] Lung cancer ([facility] [address] and [facility] Past Surgical History:   has a past surgical history that includes Tracheal surgery (3-4 years ago); Prostate surgery; Cardiac catheterization (03/04/2020); and AAA repair, endovascular (Right, 4/7/2020). Medications:    Prior to Admission medications    Medication Sig Start Date End Date Taking?  Authorizing Provider   amitriptyline (ELAVIL) 25 MG tablet Take 1 tablet by mouth nightly 11/21/20  Yes Sebastian Milan MD   predniSONE (DELTASONE) 10 MG tablet Take 1 tablet by mouth daily for 10 days Take 1 tablet daily for 5 days then 1 tablet every other day till gone 11/15/20 11/25/20 Yes Sebastian Milan MD   mirtazapine (REMERON) 15 MG tablet Take 7.5 mg by mouth nightly Takes 1/2 tab (=7.5mg) nightly   Yes Historical Provider, MD   sodium bicarbonate 650 MG tablet Take 650 mg by mouth 3 times daily   Yes Historical Provider, MD   hydrALAZINE (APRESOLINE) 50 MG tablet Take 1 tablet by mouth every 8 hours 6/6/20  Yes Sebastian Milan MD   metoprolol succinate (TOPROL XL) 50 MG extended release tablet Take 1 tablet by mouth daily 6/7/20  Yes Sebastian Milan MD   amLODIPine (NORVASC) 10 MG tablet Take 1 tablet by mouth daily 4/18/20  Yes Sebastian Milan MD   Cholecalciferol (VITAMIN D3) 50 MCG (2000 UT) CAPS Take 1 capsule by mouth daily   Yes Historical Provider, MD   albuterol sulfate  (90 Base) MCG/ACT inhaler Inhale 2 puffs into the lungs every 6 hours as needed for Wheezing or Shortness of Breath   Yes Historical Provider, MD   budesonide-formoterol (SYMBICORT) 160-4.5 MCG/ACT AERO Inhale 2 puffs into the lungs 2 times daily   Yes Historical Provider, MD   omeprazole (PRILOSEC) 20 MG delayed release capsule Take 20 mg by mouth every morning (before breakfast)   Yes Historical Provider, MD   allopurinol (ZYLOPRIM) 300 MG tablet Take 300 mg by mouth daily.    Yes Historical Provider, MD   amitriptyline (ELAVIL) 10 MG tablet Take 1 tablet by mouth nightly 11/15/20   Jone Cedeño MD     Current Facility-Administered Medications   Medication Dose Route Frequency Provider Last Rate Last Dose    bisacodyl (DULCOLAX) suppository 10 mg  10 mg Rectal Daily PRN Jone Cedeño MD   10 mg at 11/23/20 1145    albuterol sulfate  (90 Base) MCG/ACT inhaler 2 puff  2 puff Inhalation Q6H PRN Jone Cedeño MD        allopurinol (ZYLOPRIM) tablet 300 mg  300 mg Oral Daily Jone Cedeño MD   300 mg at 11/22/20 0827    amitriptyline (ELAVIL) tablet 25 mg  25 mg Oral Nightly Jone Cedeño MD   25 mg at 11/22/20 2055    amLODIPine (NORVASC) tablet 10 mg  10 mg Oral Daily Jone Cedeño MD   10 mg at 11/22/20 0827    budesonide-formoterol (SYMBICORT) 160-4.5 MCG/ACT inhaler 2 puff  2 puff Inhalation BID Jone Cedeño MD        Vitamin D (CHOLECALCIFEROL) tablet 2,000 Units  2,000 Units Oral Daily Jone Cedeño MD   2,000 Units at 11/22/20 0826    hydrALAZINE (APRESOLINE) tablet 50 mg  50 mg Oral 3 times per day Jone Cedeño MD   50 mg at 11/23/20 6077    metoprolol succinate (TOPROL XL) extended release tablet 50 mg  50 mg Oral Daily Jone Cedeño MD   50 mg at 11/22/20 0827    mirtazapine (REMERON) tablet 7.5 mg  7.5 mg Oral Nightly Jone Cedeño MD   7.5 mg at 11/22/20 2056    predniSONE (DELTASONE) tablet 10 mg  10 mg Oral Daily Jone Cedeño MD   10 mg at 11/22/20 5034    pantoprazole (PROTONIX) tablet 40 mg  40 mg Oral QAM AC Sharath Waters MD   40 mg at 20 0611    ipratropium-albuterol (DUONEB) nebulizer solution 1 ampule  1 ampule Inhalation Q4H KASIE Muniz, APRN - CNP   1 ampule at 20 1109    sodium bicarbonate tablet 1,300 mg  1,300 mg Oral TID Nicolás Dc APRN - CNP   1,300 mg at 20 2055    sodium chloride flush 0.9 % injection 10 mL  10 mL Intravenous 2 times per day Reatha Alan APRN - CNP   10 mL at 20 0900    sodium chloride flush 0.9 % injection 10 mL  10 mL Intravenous PRN Reatha [place], APRN - CNP        acetaminophen (TYLENOL) tablet 650 mg  650 mg Oral Q4H PRN Reatha [place], APRN - CNP        morphine (PF) injection 2 mg  2 mg Intravenous Q2H PRN Reatha [place], APRN - CNP        heparin (porcine) injection 5,000 Units  5,000 Units Subcutaneous 3 times per day Sharath Waters MD   5,000 Units at 20 1252       Allergies:  Amino acids and Lisinopril    Social History:   reports that he has quit smoking. His smoking use included cigarettes. He has never used smokeless tobacco. He reports previous alcohol use of about 5.0 standard drinks of alcohol per week. He reports that he does not use drugs. Family History: family history includes Cancer in his father; Diabetes in his mother and sister; Heart Disease in his mother and sister. REVIEW OF SYSTEMS:      Review of system limited limited as patient cannot talk however communication was done through writing. Patient is resting comfortably. Does not seem to be in discomfort.   10 system review is negative except for complaint of shortness of breath and chest pain    PHYSICAL EXAM:        BP (!) 158/88   Pulse 69   Temp 97.7 °F (36.5 °C) (Axillary)   Resp 22   Ht 5' 4\" (1.626 m)   Wt 129 lb 8 oz (58.7 kg)   SpO2 96%   BMI 22.23 kg/m²    Temp (24hrs), Av.8 °F (36.6 °C), Min:97.3 °F (36.3 °C), Max:98.5 °F (36.9 °C)      General appearance not in acute distress  Mental status - alert and cooperative   Eyes - pupils equal and reactive, extraocular eye movements intact   Ears - bilateral TM's and external ear canals normal   Mouth -  Normocephalic, atraumatic. Oral and oropharyngeal examination revealed that the tumor involving the anterior and left alveolus with erosion of the mandible as well as extension into the left floor of mouth and dorsal lateral tongue   Neck - Patient has tracheostomy and the opening of the stoma showed no evidence of any suspicious lesion.   Lymphatics - no palpable lymphadenopathy, no hepatosplenomegaly   Chest -decreased breathing sounds bilaterally  Heart - normal rate, regular rhythm, normal S1, S2, no murmurs  Abdomen - soft, nontender, nondistended, no masses or organomegaly   Neurological - alert, oriented, normal speech, no focal findings or movement disorder noted   Musculoskeletal - no joint tenderness, deformity or swelling   Extremities - peripheral pulses normal, no pedal edema, no clubbing or cyanosis   Skin - normal coloration and turgor, no rashes, no suspicious skin lesions noted ,      DATA:      Labs:     Results for orders placed or performed during the hospital encounter of 11/19/20   CBC Auto Differential   Result Value Ref Range    WBC 7.4 3.5 - 11.3 k/uL    RBC 3.12 (L) 4.21 - 5.77 m/uL    Hemoglobin 8.8 (L) 13.0 - 17.0 g/dL    Hematocrit 28.5 (L) 40.7 - 50.3 %    MCV 91.3 82.6 - 102.9 fL    MCH 28.2 25.2 - 33.5 pg    MCHC 30.9 28.0 - 38.0 g/dL    RDW 15.7 (H) 11.8 - 14.4 %    Platelets 820 709 - 090 k/uL    MPV 9.3 8.1 - 13.5 fL    NRBC Automated NOT REPORTED 0.0 per 100 WBC    Differential Type NOT REPORTED     WBC Morphology NOT REPORTED     RBC Morphology NOT REPORTED     Platelet Estimate NOT REPORTED     Seg Neutrophils 88 (H) 36 - 66 %    Lymphocytes 6 (L) 24 - 44 %    Monocytes 6 1 - 7 %    Eosinophils % 0 (L) 1 - 4 %    Basophils 0 %    Immature Granulocytes 0 0 %    Segs Absolute 6.52 1.8 - 7.7 k/uL Absolute Lymph # 0.44 (L) 1.0 - 4.8 k/uL    Absolute Mono # 0.44 0.2 - 0.8 k/uL    Absolute Eos # 0.00 0.0 - 0.4 k/uL    Basophils Absolute 0.00 0.0 - 0.2 k/uL    Absolute Immature Granulocyte 0.00 0.00 - 0.30 k/uL   Basic Metabolic Panel   Result Value Ref Range    Glucose 126 (H) 70 - 99 mg/dL    BUN 41 (H) 8 - 23 mg/dL    CREATININE 2.92 (H) 0.70 - 1.20 mg/dL    Bun/Cre Ratio 14 9 - 20    Calcium 8.6 8.6 - 10.4 mg/dL    Sodium 128 (L) 135 - 144 mmol/L    Potassium 4.6 3.7 - 5.3 mmol/L    Chloride 97 (L) 98 - 107 mmol/L    CO2 18 (L) 20 - 31 mmol/L    Anion Gap 13 9 - 17 mmol/L    GFR Non-African American 22 (L) >60 mL/min    GFR  26 (L) >60 mL/min    GFR Comment          GFR Staging NOT REPORTED    Trop/Myoglobin   Result Value Ref Range    Troponin, High Sensitivity 86 (HH) 0 - 22 ng/L    Troponin T NOT REPORTED <0.03 ng/mL    Troponin Interp NOT REPORTED     Myoglobin 102 (H) 28 - 72 ng/mL   Lactic Acid   Result Value Ref Range    Lactic Acid 1.1 0.5 - 2.2 mmol/L   COVID-19    Specimen: Other   Result Value Ref Range    SARS-CoV-2 Not Detected Not Detected    SARS-CoV-2, Rapid          Source . NASOPHARYNGEAL SWAB     SARS-CoV-2         CBC Auto Differential   Result Value Ref Range    WBC 7.3 3.5 - 11.3 k/uL    RBC 2.75 (L) 4.21 - 5.77 m/uL    Hemoglobin 7.7 (L) 13.0 - 17.0 g/dL    Hematocrit 23.4 (L) 40.7 - 50.3 %    MCV 85.1 82.6 - 102.9 fL    MCH 28.0 25.2 - 33.5 pg    MCHC 32.9 28.4 - 34.8 g/dL    RDW 15.2 (H) 11.8 - 14.4 %    Platelets 268 458 - 896 k/uL    MPV 8.9 8.1 - 13.5 fL    NRBC Automated 0.0 0.0 per 100 WBC    Differential Type NOT REPORTED     WBC Morphology NOT REPORTED     RBC Morphology ANISOCYTOSIS PRESENT     Platelet Estimate NOT REPORTED     Seg Neutrophils 70 (H) 36 - 65 %    Lymphocytes 16 (L) 24 - 43 %    Monocytes 10 3 - 12 %    Eosinophils % 3 1 - 4 %    Basophils 0 0 - 2 %    Immature Granulocytes 0 0 %    Segs Absolute 5.11 1.50 - 8.10 k/uL    Absolute Lymph # 1. 17 1.10 - 3.70 k/uL    Absolute Mono # 0.72 0.10 - 1.20 k/uL    Absolute Eos # 0.23 0.00 - 0.44 k/uL    Basophils Absolute <0.03 0.00 - 0.20 k/uL    Absolute Immature Granulocyte 0.03 0.00 - 0.30 k/uL   Basic Metabolic Panel   Result Value Ref Range    Glucose 95 70 - 99 mg/dL    BUN 42 (H) 8 - 23 mg/dL    CREATININE 3.03 (H) 0.70 - 1.20 mg/dL    Bun/Cre Ratio 14 9 - 20    Calcium 8.2 (L) 8.6 - 10.4 mg/dL    Sodium 130 (L) 135 - 144 mmol/L    Potassium 4.3 3.7 - 5.3 mmol/L    Chloride 98 98 - 107 mmol/L    CO2 21 20 - 31 mmol/L    Anion Gap 11 9 - 17 mmol/L    GFR Non-African American 21 (L) >60 mL/min    GFR  25 (L) >60 mL/min    GFR Comment          GFR Staging NOT REPORTED    Magnesium   Result Value Ref Range    Magnesium 1.6 1.6 - 2.6 mg/dL   Phosphorus   Result Value Ref Range    Phosphorus 4.2 2.5 - 4.5 mg/dL   Urinalysis   Result Value Ref Range    Color, UA YELLOW YELLOW    Turbidity UA CLEAR CLEAR    Glucose, Ur NEGATIVE NEGATIVE    Bilirubin Urine NEGATIVE NEGATIVE    Ketones, Urine NEGATIVE NEGATIVE    Specific Gravity, UA 1.025 1.005 - 1.030    Urine Hgb NEGATIVE NEGATIVE    pH, UA 7.5 5.0 - 8.0    Protein, UA 3+ (A) NEGATIVE    Urobilinogen, Urine Normal Normal    Nitrite, Urine NEGATIVE NEGATIVE    Leukocyte Esterase, Urine NEGATIVE NEGATIVE    Urinalysis Comments NOT REPORTED    Sodium, urine, random   Result Value Ref Range    Sodium,Ur 85 mmol/L   Osmolality, Urine   Result Value Ref Range    Osmolality, Ur 401 300 - 1170 mOsm/kg   CBC Auto Differential   Result Value Ref Range    WBC 6.9 3.5 - 11.3 k/uL    RBC 2.99 (L) 4.21 - 5.77 m/uL    Hemoglobin 8.4 (L) 13.0 - 17.0 g/dL    Hematocrit 25.3 (L) 40.7 - 50.3 %    MCV 84.6 82.6 - 102.9 fL    MCH 28.1 25.2 - 33.5 pg    MCHC 33.2 28.4 - 34.8 g/dL    RDW 15.1 (H) 11.8 - 14.4 %    Platelets 488 700 - 122 k/uL    MPV 9.1 8.1 - 13.5 fL    NRBC Automated 0.0 0.0 per 100 WBC    Differential Type NOT REPORTED     Seg Neutrophils 67 (H) 36 - 65 %    Lymphocytes 17 (L) 24 - 43 %    Monocytes 11 3 - 12 %    Eosinophils % 5 (H) 1 - 4 %    Basophils 0 0 - 2 %    Immature Granulocytes 0 0 %    Segs Absolute 4.59 1.50 - 8.10 k/uL    Absolute Lymph # 1.18 1.10 - 3.70 k/uL    Absolute Mono # 0.73 0.10 - 1.20 k/uL    Absolute Eos # 0.34 0.00 - 0.44 k/uL    Basophils Absolute <0.03 0.00 - 0.20 k/uL    Absolute Immature Granulocyte 0.03 0.00 - 0.30 k/uL    WBC Morphology NOT REPORTED     RBC Morphology ANISOCYTOSIS PRESENT     Platelet Estimate NOT REPORTED    Basic Metabolic Panel   Result Value Ref Range    Glucose 85 70 - 99 mg/dL    BUN 38 (H) 8 - 23 mg/dL    CREATININE 3.04 (H) 0.70 - 1.20 mg/dL    Bun/Cre Ratio 13 9 - 20    Calcium 8.3 (L) 8.6 - 10.4 mg/dL    Sodium 129 (L) 135 - 144 mmol/L    Potassium 4.3 3.7 - 5.3 mmol/L    Chloride 99 98 - 107 mmol/L    CO2 20 20 - 31 mmol/L    Anion Gap 10 9 - 17 mmol/L    GFR Non-African American 21 (L) >60 mL/min    GFR  25 (L) >60 mL/min    GFR Comment          GFR Staging NOT REPORTED    TSH w/reflex to FT4   Result Value Ref Range    TSH 1.54 0.30 - 5.00 mIU/L   Magnesium   Result Value Ref Range    Magnesium 1.5 (L) 1.6 - 2.6 mg/dL   Phosphorus   Result Value Ref Range    Phosphorus 4.2 2.5 - 4.5 mg/dL   Microscopic Urinalysis   Result Value Ref Range    -          WBC, UA 0 TO 2 0 - 5 /HPF    RBC, UA 0 TO 2 0 - 2 /HPF    Casts UA NOT REPORTED /LPF    Crystals, UA NOT REPORTED None /HPF    Epithelial Cells UA 2 TO 5 0 - 5 /HPF    Renal Epithelial, UA NOT REPORTED 0 /HPF    Bacteria, UA RARE (A) None    Mucus, UA NOT REPORTED None    Trichomonas, UA NOT REPORTED None    Amorphous, UA NOT REPORTED None    Other Observations UA NOT REPORTED NOT REQ.     Yeast, UA NOT REPORTED None   CBC Auto Differential   Result Value Ref Range    WBC 7.4 3.5 - 11.3 k/uL    RBC 2.70 (L) 4.21 - 5.77 m/uL    Hemoglobin 7.5 (L) 13.0 - 17.0 g/dL    Hematocrit 23.4 (L) 40.7 - 50.3 %    MCV 86.7 82.6 - 102.9 fL    MCH 27.8 25.2 - 33.5 pg    MCHC 32.1 28.4 - 34.8 g/dL    RDW 15.1 (H) 11.8 - 14.4 %    Platelets 174 847 - 263 k/uL    MPV 9.1 8.1 - 13.5 fL    NRBC Automated 0.0 0.0 per 100 WBC    Differential Type NOT REPORTED     WBC Morphology NOT REPORTED     RBC Morphology ANISOCYTOSIS PRESENT     Platelet Estimate NOT REPORTED     Seg Neutrophils 70 (H) 36 - 65 %    Lymphocytes 17 (L) 24 - 43 %    Monocytes 11 3 - 12 %    Eosinophils % 2 1 - 4 %    Basophils 0 0 - 2 %    Immature Granulocytes 0 0 %    Segs Absolute 5.23 1.50 - 8.10 k/uL    Absolute Lymph # 1.23 1.10 - 3.70 k/uL    Absolute Mono # 0.81 0.10 - 1.20 k/uL    Absolute Eos # 0.13 0.00 - 0.44 k/uL    Basophils Absolute <0.03 0.00 - 0.20 k/uL    Absolute Immature Granulocyte 0.03 0.00 - 0.30 k/uL   Basic Metabolic Panel   Result Value Ref Range    Glucose 93 70 - 99 mg/dL    BUN 43 (H) 8 - 23 mg/dL    CREATININE 3.62 (H) 0.70 - 1.20 mg/dL    Bun/Cre Ratio 12 9 - 20    Calcium 8.2 (L) 8.6 - 10.4 mg/dL    Sodium 131 (L) 135 - 144 mmol/L    Potassium 4.2 3.7 - 5.3 mmol/L    Chloride 99 98 - 107 mmol/L    CO2 21 20 - 31 mmol/L    Anion Gap 11 9 - 17 mmol/L    GFR Non-African American 17 (L) >60 mL/min    GFR African American 20 (L) >60 mL/min    GFR Comment          GFR Staging NOT REPORTED    Basic Metabolic Panel   Result Value Ref Range    Glucose 93 70 - 99 mg/dL    BUN 39 (H) 8 - 23 mg/dL    CREATININE 3.38 (H) 0.70 - 1.20 mg/dL    Bun/Cre Ratio 12 9 - 20    Calcium 8.5 (L) 8.6 - 10.4 mg/dL    Sodium 132 (L) 135 - 144 mmol/L    Potassium 3.8 3.7 - 5.3 mmol/L    Chloride 98 98 - 107 mmol/L    CO2 23 20 - 31 mmol/L    Anion Gap 11 9 - 17 mmol/L    GFR Non-African American 18 (L) >60 mL/min    GFR  22 (L) >60 mL/min    GFR Comment          GFR Staging NOT REPORTED    CBC Auto Differential   Result Value Ref Range    WBC 7.3 3.5 - 11.3 k/uL    RBC 2.97 (L) 4.21 - 5.77 m/uL    Hemoglobin 8.3 (L) 13.0 - 17.0 g/dL    Hematocrit 25.9 (L) 40.7 - 50.3 %    MCV 87.2 82.6 - 102.9 fL    MCH 27.9 25.2 - 33.5 pg    MCHC 32.0 28.4 - 34.8 g/dL    RDW 15.0 (H) 11.8 - 14.4 %    Platelets 292 613 - 358 k/uL    MPV 9.2 8.1 - 13.5 fL    NRBC Automated 0.0 0.0 per 100 WBC    Differential Type NOT REPORTED     Seg Neutrophils 73 (H) 36 - 65 %    Lymphocytes 15 (L) 24 - 43 %    Monocytes 10 3 - 12 %    Eosinophils % 2 1 - 4 %    Basophils 0 0 - 2 %    Immature Granulocytes 0 0 %    Segs Absolute 5.31 1.50 - 8.10 k/uL    Absolute Lymph # 1.12 1.10 - 3.70 k/uL    Absolute Mono # 0.71 0.10 - 1.20 k/uL    Absolute Eos # 0.15 0.00 - 0.44 k/uL    Basophils Absolute <0.03 0.00 - 0.20 k/uL    Absolute Immature Granulocyte 0.03 0.00 - 0.30 k/uL    WBC Morphology NOT REPORTED     RBC Morphology ANISOCYTOSIS PRESENT     Platelet Estimate NOT REPORTED    Magnesium   Result Value Ref Range    Magnesium 1.8 1.6 - 2.6 mg/dL   Phosphorus   Result Value Ref Range    Phosphorus 3.5 2.5 - 4.5 mg/dL   PROTIME-INR   Result Value Ref Range    Protime 13.0 11.5 - 14.2 sec    INR 1.0    APTT   Result Value Ref Range    PTT 27.7 23.9 - 33.8 sec         IMAGING DATA:    Ct Head Wo Contrast    Result Date: 11/12/2020  EXAMINATION: CT OF THE HEAD WITHOUT CONTRAST  11/12/2020 7:46 pm TECHNIQUE: CT of the head was performed without the administration of intravenous contrast. Dose modulation, iterative reconstruction, and/or weight based adjustment of the mA/kV was utilized to reduce the radiation dose to as low as reasonably achievable. COMPARISON: 06/01/2015 HISTORY: ORDERING SYSTEM PROVIDED HISTORY: headache TECHNOLOGIST PROVIDED HISTORY: headache Reason for Exam: Headache; history of progressing squamous cell carcinoma of glottis, first diagnosed in 2005. Acuity: Acute Type of Exam: Initial FINDINGS: BRAIN/VENTRICLES: There is no acute intracranial hemorrhage, mass effect or midline shift. No abnormal extra-axial fluid collection.   The gray-white differentiation is maintained without evidence of an acute infarct. There is no evidence of hydrocephalus. Periventricular and deep subcortical white matter hypoattenuation, consistent microangiopathic change. Mild parenchymal volume loss. Atherosclerosis of the intracranial vasculature. There is right frontal encephalomalacia underlying the craniotomy. Remote lacunar infarct in the left basal ganglia. ORBITS: The visualized portion of the orbits demonstrate no acute abnormality. SINUSES: Scattered mucosal thickening within the ethmoid air cells. SOFT TISSUES/SKULL:  Prior right craniotomy. No acute calvarial abnormality. No acute intracranial abnormality. Microangiopathic change. Ct Chest Wo Contrast    Result Date: 11/19/2020  EXAMINATION: CT OF THE CHEST WITHOUT CONTRAST 11/19/2020 7:23 pm TECHNIQUE: CT of the chest was performed without the administration of intravenous contrast. Multiplanar reformatted images are provided for review. Dose modulation, iterative reconstruction, and/or weight based adjustment of the mA/kV was utilized to reduce the radiation dose to as low as reasonably achievable. COMPARISON: 11/10/2020 HISTORY: ORDERING SYSTEM PROVIDED HISTORY: rib pain, rib fractures TECHNOLOGIST PROVIDED HISTORY: rib pain, rib fractures Reason for Exam: rib pain Acuity: Unknown Type of Exam: Unknown Mechanism of Injury: fall? Relevant Medical/Surgical History: SOB FINDINGS: Mediastinum: Three vessel coronary artery calcification. No mediastinal, hilar, or axillary lymphadenopathy. Mild dilatation of the main pulmonary artery to 3 cm. Lungs/pleura: Similar right upper lobe mass measuring 8.2 x 7.3 cm inseparable from the mediastinum. Bibasilar atelectasis. Similar moderate right pleural effusion. New small left pleural effusion. Upper Abdomen: Few colonic diverticula. Soft Tissues/Bones: Multilevel degenerative disc disease of the thoracic spine. Old bilateral rib fractures.   Unchanged mild compression deformity of L1.     1. No acute process in the chest.  Old bilateral rib fractures. 2. Similar right upper lobe mass measuring 8.2 cm inseparable from the mediastinum almost certainly representing a primary lung cancer. . 3. Similar moderate right pleural effusion. New small left pleural effusion. Ct Chest Wo Contrast    Result Date: 11/10/2020  EXAMINATION: CT OF THE CHEST WITHOUT CONTRAST 11/10/2020 2:10 pm TECHNIQUE: CT of the chest was performed without the administration of intravenous contrast. Multiplanar reformatted images are provided for review. Dose modulation, iterative reconstruction, and/or weight based adjustment of the mA/kV was utilized to reduce the radiation dose to as low as reasonably achievable. COMPARISON: Chest radiograph today. Chest CT 06/12/2020 HISTORY: ORDERING SYSTEM PROVIDED HISTORY: abnormal x ray TECHNOLOGIST PROVIDED HISTORY: abnormal x ray Reason for Exam: F/u abnormal chest xray. Pt has a history of previous laryngeal cancer status post tracheostomy placement that presents with complaints of shortness of breath. Patient states he been short of breath for past couple of days Acuity: Acute Type of Exam: Initial Additional signs and symptoms: GFR 23 Relevant Medical/Surgical History: Hx of cardiac cath, COPD, HTN, lung cancer, CKD FINDINGS: Mediastinum: Cardiomegaly is noted. Calcified atheromatous plaque and coronary calcification. No pericardial effusion. Mass in the medial right upper lobe/superior mediastinum bulging into the superior mediastinum is noted. Additional mildly enlarged right paratracheal lymph nodes are noted measuring up to 11 mm. Lungs/pleura: Medial right upper lobe/superior mediastinum mass displaces the trachea and esophagus to the left. This measures at least 7.4 x 5.9 cm and demonstrates heterogeneous internal attenuation with areas of calcification. Moderate size right pleural effusion. Lipomatous enlargement of the inferior left pleura.   Small amount debris in the bronchus intermedius and minimal subsegmental atelectasis in the lung bases noted. Upper Abdomen: No acute findings. Scarring in the left kidney is noted. Partially visualized endovascular abdominal aortic repair. Soft Tissues/Bones: Bilateral old rib fractures. Superior endplate fracture at the L2 level again demonstrated. Status post radical neck dissection. 1.  Large medial right upper lobe/superior mediastinal mass with mass effect on the trachea and esophagus, concerning for neoplastic disease. 2.  Additional mildly enlarged right paratracheal lymph nodes may represent disease involvement. 3.  Moderate size right pleural effusion. Dependent subsegmental atelectasis. Nm Lung Scan Perfusion Only    Result Date: 11/19/2020  EXAMINATION: LUNG PERFUSION IMAGING 11/19/2020 9:27 pm TECHNIQUE: Multiple pulmonary perfusion images were obtained of the chest following the intravenous administration of 6.2 millicuries of PE-24M MAA. Ventilation imaging was not performed. COMPARISON: Chest radiograph from 11/19/2020 HISTORY: ORDERING SYSTEM PROVIDED HISTORY: SOB TECHNOLOGIST PROVIDED HISTORY: SOB Reason for Exam: SOB Acuity: Unknown Type of Exam: Unknown FINDINGS: Right upper lobe perfusion defect corresponds with the mass in the medial right apex. There are also perfusion defects in the posterior basal segments of both lower lobes corresponding with bilateral pleural effusions. This is an intermediate probability scan pattern for pulmonary embolus.      Xr Chest Portable    Result Date: 11/19/2020  EXAMINATION: ONE XRAY VIEW OF THE CHEST 11/19/2020 11:33 am COMPARISON: November 13, 2020, chest exam HISTORY: ORDERING SYSTEM PROVIDED HISTORY: SOB TECHNOLOGIST PROVIDED HISTORY: SOB Acuity: Acute Type of Exam: Unknown FINDINGS: Stable cardiac silhouette Emphysematous changes of the lungs with interval increase in mild streaky bibasilar densities and small bilateral pleural effusions, otherwise clear lungs Stable large right superior paratracheal mass Multiple bilateral rib fractures     Interval increase in mild streaky bibasilar atelectasis with small bilateral pleural effusions     Xr Chest Portable    Result Date: 11/13/2020  EXAMINATION: ONE XRAY VIEW OF THE CHEST 11/13/2020 5:55 am COMPARISON: November 12, 2020 HISTORY: ORDERING SYSTEM PROVIDED HISTORY: effusion TECHNOLOGIST PROVIDED HISTORY: effusion Reason for Exam: effusion Acuity: Unknown Type of Exam: Unknown FINDINGS: Stable cardiomediastinal silhouette to include the right paratracheal ovoid mass. Left basilar opacity is unchanged. No new focal lung abnormality. No sizable pleural effusion. No pneumothorax. Osseous structures are unchanged. Stable exam demonstrating left basilar opacity. Xr Chest Portable    Result Date: 11/12/2020  EXAMINATION: ONE XRAY VIEW OF THE CHEST 11/12/2020 4:15 pm COMPARISON: 11/12/2020 HISTORY: ORDERING SYSTEM PROVIDED HISTORY: Post thoracentesis TECHNOLOGIST PROVIDED HISTORY: Post thoracentesis Reason for Exam: Post thoracentesis Acuity: Unknown Type of Exam: Unknown Relevant Medical/Surgical History: Post thoracentesis FINDINGS: Overlying items external to the patient somewhat limit evaluation. This includes oxygen mask obscuring right upper lung zone. Within this limitation, no definite evidence for pneumothorax is seen status post thoracentesis. Improved right pleural effusion and aeration to the right lung base. Right lung base now appears to be clear. There is stable mild left basilar likely scarring or atelectasis. Stable dense opacity correlating with mass to the right upper lobe medially. Cardiac and mediastinal silhouettes are stable. Stable appearance to bony structures. No definite pneumothorax status post right thoracentesis. Improved right pleural effusion and aeration to the right lung base. Stable dense opacity correlating with mass to the right upper lobe medially better delineated on prior CT chest 11/10/2020. Xr Chest Portable    Result Date: 11/12/2020  EXAMINATION: ONE XRAY VIEW OF THE CHEST 11/12/2020 6:46 am COMPARISON: November 10/2020 HISTORY: ORDERING SYSTEM PROVIDED HISTORY: Effusion TECHNOLOGIST PROVIDED HISTORY: Effusion Reason for Exam: effusion Acuity: Unknown Type of Exam: Unknown FINDINGS: No evidence of pneumothorax. Again is mass-like density in the right paratracheal region. There is slight increase in the right-sided pleural effusion when compared to previous. Heart silhouette is mildly prominent. Visualized bony thorax shows no acute abnormality. Postoperative changes of the right axilla are seen. Slight increase in right-sided pleural effusion when compared to previous. Continued masslike density the right paratracheal region. Xr Chest Portable    Result Date: 11/10/2020  EXAMINATION: ONE XRAY VIEW OF THE CHEST 11/10/2020 12:30 pm COMPARISON: 06/13/2020 HISTORY: ORDERING SYSTEM PROVIDED HISTORY: Chest Pain TECHNOLOGIST PROVIDED HISTORY: Chest Pain Reason for Exam: Chest pain Acuity: Unknown Type of Exam: Unknown FINDINGS: Cardiac silhouette is borderline prominent. Small right pleural effusion and right basilar airspace disease noted. No evidence for pneumothorax. Right paratracheal masslike density noted. Further evaluation with contrast-enhanced CT recommended. Osseous structures and soft tissues are grossly intact. Small right pleural effusion and adjacent airspace disease, likely atelectasis. Right paratracheal masslike density. Further evaluation with contrast-enhanced CT recommended. Mri Brain Wo Contrast    Result Date: 11/14/2020  EXAMINATION: MRI OF THE BRAIN WITHOUT CONTRAST  11/14/2020 2:55 pm TECHNIQUE: Multiplanar multisequence MRI of the brain was performed without the administration of intravenous contrast. COMPARISON: None.  HISTORY: ORDERING SYSTEM PROVIDED HISTORY: persistent headache, history of oral cancer, glottic cancer, new mediastinal mass TECHNOLOGIST PROVIDED HISTORY: persistent headache, history of oral cancer, glottic cancer, new mediastinal mass Reason for Exam: persistent headache, history of oral cancer, glottic cancer, new mediastinal mass Acuity: Chronic Type of Exam: Subsequent/Follow-up FINDINGS: INTRACRANIAL STRUCTURES/VENTRICLES: There is no acute infarct. No mass effect or midline shift. No evidence of an acute intracranial hemorrhage. There is volume loss with mild chronic white matter microvascular ischemic change. There is a chronic infarct within the high right parietal lobe. The sellar/suprasellar regions appear unremarkable. The normal signal voids within the major intracranial vessels appear maintained. ORBITS: The visualized portion of the orbits demonstrate no acute abnormality. SINUSES: The visualized paranasal sinuses and mastoid air cells are well aerated. BONES/SOFT TISSUES: The bone marrow signal intensity appears normal. The soft tissues demonstrate no acute abnormality. No acute intracranial abnormality visualized. Ir Guided Thoracentesis Pleural    Result Date: 11/12/2020  PROCEDURE: ULTRASOUNDGUIDED RIGHT THORACENTESIS 11/12/2020 HISTORY: ORDERING SYSTEM PROVIDED HISTORY: right thoracentesis TECHNOLOGIST PROVIDED HISTORY: right thoracentesis Enlarging right pleural effusion. History of COPD, asthma and chronic kidney disease. History of prostate cancer. TECHNIQUE: Informed consent was obtained after a detailed explanation of the procedure including risks, benefits, and alternatives. Universal protocol was performed. The right chest was prepped and draped in sterile fashion and local anesthesia was achieved with lidocaine. A 5 [place] needle sheath was advanced under ultrasound guidance into pleural effusion and thoracentesis was performed. Fluid was provided for further analysis. The patient tolerated the procedure well. FINDINGS: A total of 800 mL was removed.   Fluid was yellowish-green in color and

## 2020-11-23 NOTE — PLAN OF CARE
Problem: Falls - Risk of:  Goal: Will remain free from falls  Description: Will remain free from falls  Outcome: Ongoing  Note: Patient is fall risk per fall scale. Hourly rounding performed. Personal belongings and call light within reach. Bed in low position.    Goal: Absence of physical injury  Description: Absence of physical injury  Outcome: Ongoing     Problem: Skin Integrity:  Goal: Will show no infection signs and symptoms  Description: Will show no infection signs and symptoms  Outcome: Ongoing  Goal: Absence of new skin breakdown  Description: Absence of new skin breakdown  Outcome: Ongoing

## 2020-11-23 NOTE — TELEPHONE ENCOUNTER
Name: Jane Scott  : 1950  MRN: [id number]    Oncology Navigation Follow-Up Note    Contact Type:  Telephone  Notes: Pt's brother, Karime Jane, called in stating pt currently admitted & requested update on pt. Upon review of chart noted pt admitted @ [facility]. Instructed pt to contact [facility] nurse for update. Bert verbalized understanding & thanked Douglas Garza. Will continue to follow.     Electronically signed by Edda Jacinto RN on 2020 at 8:37 AM

## 2020-11-23 NOTE — PLAN OF CARE
Problem: Pain:  Goal: Control of acute pain  Description: Control of acute pain  Outcome: Ongoing     Problem: Falls - Risk of:  Goal: Will remain free from falls  Description: Will remain free from falls  11/23/2020 1305 by Deanna Cerrato RN  Outcome: Ongoing     Problem: Skin Integrity:  Goal: Will show no infection signs and symptoms  Description: Will show no infection signs and symptoms  11/23/2020 1305 by Deanna Cerrato RN  Outcome: Ongoing     Problem: Safety:  Goal: Free from accidental physical injury  Description: Free from accidental physical injury  Outcome: Ongoing

## 2020-11-24 ENCOUNTER — APPOINTMENT (OUTPATIENT)
Dept: GENERAL RADIOLOGY | Age: 70
DRG: 606 | End: 2020-11-24
Payer: MEDICARE

## 2020-11-24 VITALS
WEIGHT: 129.5 LBS | TEMPERATURE: 97.3 F | RESPIRATION RATE: 18 BRPM | HEART RATE: 76 BPM | OXYGEN SATURATION: 94 % | HEIGHT: 64 IN | DIASTOLIC BLOOD PRESSURE: 69 MMHG | BODY MASS INDEX: 22.11 KG/M2 | SYSTOLIC BLOOD PRESSURE: 116 MMHG

## 2020-11-24 LAB
ABSOLUTE EOS #: <0.03 K/UL (ref 0–0.44)
ABSOLUTE IMMATURE GRANULOCYTE: 0.04 K/UL (ref 0–0.3)
ABSOLUTE LYMPH #: 0.88 K/UL (ref 1.1–3.7)
ABSOLUTE MONO #: 0.46 K/UL (ref 0.1–1.2)
ANION GAP SERPL CALCULATED.3IONS-SCNC: 10 MMOL/L (ref 9–17)
BASOPHILS # BLD: 0 % (ref 0–2)
BASOPHILS ABSOLUTE: <0.03 K/UL (ref 0–0.2)
BUN BLDV-MCNC: 40 MG/DL (ref 8–23)
BUN/CREAT BLD: 12 (ref 9–20)
CALCIUM SERPL-MCNC: 8.8 MG/DL (ref 8.6–10.4)
CHLORIDE BLD-SCNC: 98 MMOL/L (ref 98–107)
CO2: 24 MMOL/L (ref 20–31)
CREAT SERPL-MCNC: 3.45 MG/DL (ref 0.7–1.2)
CULTURE: NO GROWTH
DIFFERENTIAL TYPE: ABNORMAL
DIRECT EXAM: ABNORMAL
DIRECT EXAM: ABNORMAL
EOSINOPHILS RELATIVE PERCENT: 0 % (ref 1–4)
GFR AFRICAN AMERICAN: 22 ML/MIN
GFR NON-AFRICAN AMERICAN: 18 ML/MIN
GFR SERPL CREATININE-BSD FRML MDRD: ABNORMAL ML/MIN/{1.73_M2}
GFR SERPL CREATININE-BSD FRML MDRD: ABNORMAL ML/MIN/{1.73_M2}
GLUCOSE BLD-MCNC: 97 MG/DL (ref 70–99)
HCT VFR BLD CALC: 25.9 % (ref 40.7–50.3)
HEMOGLOBIN: 8.3 G/DL (ref 13–17)
IMMATURE GRANULOCYTES: 1 %
LYMPHOCYTES # BLD: 11 % (ref 24–43)
Lab: ABNORMAL
MAGNESIUM: 1.7 MG/DL (ref 1.6–2.6)
MCH RBC QN AUTO: 27.9 PG (ref 25.2–33.5)
MCHC RBC AUTO-ENTMCNC: 32 G/DL (ref 28.4–34.8)
MCV RBC AUTO: 86.9 FL (ref 82.6–102.9)
MONOCYTES # BLD: 6 % (ref 3–12)
NRBC AUTOMATED: 0 PER 100 WBC
PDW BLD-RTO: 14.9 % (ref 11.8–14.4)
PHOSPHORUS: 5 MG/DL (ref 2.5–4.5)
PLATELET # BLD: 284 K/UL (ref 138–453)
PLATELET ESTIMATE: ABNORMAL
PMV BLD AUTO: 9.1 FL (ref 8.1–13.5)
POTASSIUM SERPL-SCNC: 4.8 MMOL/L (ref 3.7–5.3)
RBC # BLD: 2.98 M/UL (ref 4.21–5.77)
RBC # BLD: ABNORMAL 10*6/UL
SEG NEUTROPHILS: 82 % (ref 36–65)
SEGMENTED NEUTROPHILS ABSOLUTE COUNT: 6.91 K/UL (ref 1.5–8.1)
SODIUM BLD-SCNC: 132 MMOL/L (ref 135–144)
SPECIMEN DESCRIPTION: ABNORMAL
WBC # BLD: 8.3 K/UL (ref 3.5–11.3)
WBC # BLD: ABNORMAL 10*3/UL

## 2020-11-24 PROCEDURE — 6360000002 HC RX W HCPCS: Performed by: NURSE PRACTITIONER

## 2020-11-24 PROCEDURE — 6370000000 HC RX 637 (ALT 250 FOR IP): Performed by: NURSE PRACTITIONER

## 2020-11-24 PROCEDURE — 94640 AIRWAY INHALATION TREATMENT: CPT

## 2020-11-24 PROCEDURE — 80048 BASIC METABOLIC PNL TOTAL CA: CPT

## 2020-11-24 PROCEDURE — 85025 COMPLETE CBC W/AUTO DIFF WBC: CPT

## 2020-11-24 PROCEDURE — 36415 COLL VENOUS BLD VENIPUNCTURE: CPT

## 2020-11-24 PROCEDURE — 2580000003 HC RX 258: Performed by: NURSE PRACTITIONER

## 2020-11-24 PROCEDURE — 6360000002 HC RX W HCPCS: Performed by: INTERNAL MEDICINE

## 2020-11-24 PROCEDURE — 2700000000 HC OXYGEN THERAPY PER DAY

## 2020-11-24 PROCEDURE — 6370000000 HC RX 637 (ALT 250 FOR IP): Performed by: INTERNAL MEDICINE

## 2020-11-24 PROCEDURE — 84100 ASSAY OF PHOSPHORUS: CPT

## 2020-11-24 PROCEDURE — 83735 ASSAY OF MAGNESIUM: CPT

## 2020-11-24 PROCEDURE — 71045 X-RAY EXAM CHEST 1 VIEW: CPT

## 2020-11-24 RX ORDER — SODIUM BICARBONATE 650 MG/1
1300 TABLET ORAL 3 TIMES DAILY
Qty: 90 TABLET | Refills: 0 | Status: SHIPPED | OUTPATIENT
Start: 2020-11-24

## 2020-11-24 RX ADMIN — HYDRALAZINE HYDROCHLORIDE 50 MG: 50 TABLET, FILM COATED ORAL at 05:48

## 2020-11-24 RX ADMIN — PANTOPRAZOLE SODIUM 40 MG: 40 TABLET, DELAYED RELEASE ORAL at 05:48

## 2020-11-24 RX ADMIN — METOPROLOL SUCCINATE 50 MG: 50 TABLET, FILM COATED, EXTENDED RELEASE ORAL at 08:28

## 2020-11-24 RX ADMIN — AMLODIPINE BESYLATE 10 MG: 10 TABLET ORAL at 08:28

## 2020-11-24 RX ADMIN — ALLOPURINOL 300 MG: 300 TABLET ORAL at 08:28

## 2020-11-24 RX ADMIN — Medication 2000 UNITS: at 08:28

## 2020-11-24 RX ADMIN — SODIUM CHLORIDE, PRESERVATIVE FREE 10 ML: 5 INJECTION INTRAVENOUS at 08:28

## 2020-11-24 RX ADMIN — SODIUM BICARBONATE 1300 MG: 650 TABLET ORAL at 14:33

## 2020-11-24 RX ADMIN — HEPARIN SODIUM 5000 UNITS: 5000 INJECTION INTRAVENOUS; SUBCUTANEOUS at 05:48

## 2020-11-24 RX ADMIN — PREDNISONE 10 MG: 10 TABLET ORAL at 08:28

## 2020-11-24 RX ADMIN — HEPARIN SODIUM 5000 UNITS: 5000 INJECTION INTRAVENOUS; SUBCUTANEOUS at 14:33

## 2020-11-24 RX ADMIN — SODIUM BICARBONATE 1300 MG: 650 TABLET ORAL at 08:28

## 2020-11-24 RX ADMIN — IPRATROPIUM BROMIDE AND ALBUTEROL SULFATE 1 AMPULE: .5; 3 SOLUTION RESPIRATORY (INHALATION) at 09:56

## 2020-11-24 RX ADMIN — IPRATROPIUM BROMIDE AND ALBUTEROL SULFATE 1 AMPULE: .5; 3 SOLUTION RESPIRATORY (INHALATION) at 14:14

## 2020-11-24 RX ADMIN — MORPHINE SULFATE 2 MG: 2 INJECTION, SOLUTION INTRAMUSCULAR; INTRAVENOUS at 08:47

## 2020-11-24 RX ADMIN — HYDRALAZINE HYDROCHLORIDE 50 MG: 50 TABLET, FILM COATED ORAL at 14:33

## 2020-11-24 RX ADMIN — IPRATROPIUM BROMIDE AND ALBUTEROL SULFATE 1 AMPULE: .5; 3 SOLUTION RESPIRATORY (INHALATION) at 06:10

## 2020-11-24 ASSESSMENT — PAIN DESCRIPTION - LOCATION: LOCATION: ABDOMEN

## 2020-11-24 ASSESSMENT — PAIN - FUNCTIONAL ASSESSMENT: PAIN_FUNCTIONAL_ASSESSMENT: ACTIVITIES ARE NOT PREVENTED

## 2020-11-24 ASSESSMENT — PAIN SCALES - GENERAL: PAINLEVEL_OUTOF10: 7

## 2020-11-24 ASSESSMENT — PAIN DESCRIPTION - PAIN TYPE: TYPE: CHRONIC PAIN

## 2020-11-24 ASSESSMENT — PAIN DESCRIPTION - DESCRIPTORS: DESCRIPTORS: PATIENT UNABLE TO DESCRIBE

## 2020-11-24 NOTE — DISCHARGE INSTR - COC
Continuity of Care Form    Patient Name: Darek Herbert   :  1950  MRN:  [id number]    Admit date:  2020  Discharge date:  2020    Code Status Order: Full Code   Advance Directives:   [address] [facility] Documentation       Date/Time Healthcare Directive Type of Healthcare Directive Copy in [address] Agent's Name Healthcare Agent's Phone Number    20 [phone]  Yes, patient has an advance directive for healthcare treatment  --  --  --  --  --            Admitting Physician:  Harshal Talley MD  PCP: Harshal Talley MD    Discharging Nurse: Lori Melgar 23 Unit/Room#:   Discharging Unit Phone Number: [phone]    Emergency Contact:   Extended Emergency Contact Information  Primary Emergency Contact: Dionicio Mooney [facility] [address] [facility] Phone: [phone]  Mobile Phone: [phone]  Relation: Brother/Sister  Secondary Emergency Contact: [address] [facility] Phone: [phone]  Mobile Phone: [phone]  Relation: Brother/Sister    Past Surgical History:  Past Surgical History:   Procedure Laterality Date    ABDOMINAL AORTIC ANEURYSM REPAIR, ENDOVASCULAR Right 2020    RIGHT ILIAC ARTERY ANEURYSM REPAIR ENDOVASCULAR performed by Cj Cadena MD at [address] [place] [address]  2020    No stents placed    CT NEEDLE BIOPSY LUNG PERCUTANEOUS  2020    CT NEEDLE BIOPSY LUNG PERCUTANEOUS 2020 [facility] CT SCAN    PROSTATE SURGERY      TRACHEAL SURGERY  3-4 years ago    lung removed and \"voice box\" removed       Immunization History:   Immunization History   Administered Date(s) Administered    Influenza, High Dose (Fluzone 65 yrs and older) 10/20/2015    Influenza, Triv, inactivated, subunit, adjuvanted, IM (Fluad 65 yrs and older) 10/16/2019    Pneumococcal Conjugate 13-valent (Iojjveo04) 2016    Pneumococcal Polysaccharide (Owflwltyn81) 2012       Active Problems:  Patient Active Problem List   Diagnosis Code    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    Laryngeal cancer ([facility]) C32.9    Mouth swelling R22.0    COPD (chronic obstructive pulmonary disease) ([facility]) J44.9    Severe malnutrition ([facility]) E43    Facial edema R60.0    Common iliac aneurysm ([facility]) I72.3    Essential hypertension I10    Stage 3 chronic kidney disease N18.30    Acute kidney injury (nontraumatic) ([facility]) N17.9    NSTEMI (non-ST elevated myocardial infarction) ([facility] [address] I21.4    Pathological fracture of lumbar vertebra due to secondary osteoporosis ([facility]) M80.88XA    Age-related osteoporosis with current pathological fracture M80.00XA    Intractable back pain M54.9    Iron deficiency anemia D50.9    Anemia, blood loss D50.0    Chronic bilateral pleural effusions D10    Diastolic CHF, acute on chronic ([facility]) I50.33    Hyponatremia E87.1    Mediastinal mass J98.59    Bilateral pleural effusion J90       Isolation/Infection:   Isolation            Contact          Patient Infection Status       Infection Onset Added Last Indicated Last Indicated By Review Planned Expiration Resolved Resolved By    [facility] 04/12/20 04/15/20 04/12/20 Respiratory Culture        Sputum - 4/2020    Resolved    COVID-19 Rule Out 06/01/20 06/01/20 06/01/20 COVID-19 (Ordered)   06/01/20 Rule-Out Test Resulted    COVID-19 Rule Out 05/26/20 05/26/20 05/26/20 COVID-19 (Ordered)   05/27/20 Rule-Out Test Resulted    COVID-19 Rule Out 04/12/20 04/12/20 04/12/20 COVID-19 (Ordered)   04/13/20 Rule-Out Test Resulted            Nurse Assessment:  Last Vital Signs: /69   Pulse 76   Temp 97.3 °F (36.3 °C)   Resp 18   Ht 5' 4\" (1.626 m)   Wt 129 lb 8 oz (58.7 kg)   SpO2 94%   BMI 22.23 kg/m²     Last documented pain score (0-10 scale): Pain Level: 7  Last Weight:   Wt Readings from Last 1 Encounters:   11/22/20 129 lb 8 oz (58.7 kg)     Mental Status:  oriented and alert    IV Access:  - None    Nursing Mobility/ADLs:  Walking   Assisted  Transfer  Assisted  Bathing  Assisted  Dressing  Assisted  Toileting  Assisted  Feeding  Independent  Med Admin  Assisted  Med Delivery   whole    Wound Care Documentation and Therapy:        Elimination:  Continence:   · Bowel: Yes  · Bladder: Yes  Urinary Catheter: None   Colostomy/Ileostomy/Ileal Conduit: No       Date of Last BM: ***    Intake/Output Summary (Last 24 hours) at 11/24/2020 1803  Last data filed at 11/24/2020 1330  Gross per 24 hour   Intake 560 ml   Output --   Net 560 ml     I/O last 3 completed shifts: In: 12 [P.O.:560]  Out: -     Safety Concerns:      At Risk for Falls, aspiration risk    Impairments/Disabilities:      Speech    Nutrition Therapy:  Current Nutrition Therapy:   - Oral Diet:  Renal    Routes of Feeding: Oral  Liquids: No Restrictions  Daily Fluid Restriction: yes - amount 1000 ml  Last Modified Barium Swallow with Video (Video Swallowing Test): not done    Treatments at the Time of Hospital Discharge:   Respiratory Treatments: ***  Oxygen Therapy:  {Therapy; copd oxygen:35477:::0}  Ventilator:    - No ventilator support    Rehab Therapies: Physical Therapy and Occupational Therapy  Weight Bearing Status/Restrictions: No weight bearing restirctions  Other Medical Equipment (for information only, NOT a DME order):  {EQUIPMENT:[id number]}  Other Treatments: Skilled nursing assessment  Med teaching and compliance    Patient's personal belongings (please select all that are sent with patient):  {[facility] DME Belongings:[id number]:::0}    RN SIGNATURE:  Electronically signed by Unique Leal RN on 11/24/20 at [address] PM EST    CASE MANAGEMENT/SOCIAL WORK SECTION    Inpatient Status Date: ***    Readmission Risk Assessment Score:  Readmission Risk              Risk of Unplanned Readmission:        50           Discharging to Facility/ Agency   · Name: [facility]  · Address:  · Phone: [phone]  · Fax: [phone]    Dialysis Facility (if applicable)   · Name:  · Address:  · Dialysis Schedule:  · Phone:  · Fax:    / signature: Electronically signed by Tamara Marks RN on 11/24/20 at 6:17 PM EST    PHYSICIAN SECTION    Prognosis: Fair    Condition at Discharge: Stable    Rehab Potential (if transferring to Rehab): Fair    Recommended Labs or Other Treatments After Discharge: bmp cbc 3days    Physician Certification: I certify the above information and transfer of Alysa Mullins  is necessary for the continuing treatment of the diagnosis listed and that he requires [address] for less 30 days.      Update Admission H&P: No change in H&P    PHYSICIAN SIGNATURE:  Electronically signed by Yulisa Shelton MD on 11/24/20 at 6:04 PM EST

## 2020-11-24 NOTE — PROGRESS NOTES
Subjective:     Follow-up COPD  Still with occasional shortness of breath no tachypnea no PND no orthopnea no fever no cough    ROS  No fever no chills no  or GI complaints no cardiac complaints no bleeding no headache no sore throat no skin lesions no TIA no polyuria or polydipsia  physical exam  General Appearance: alert and oriented to person, place and time and in no acute distress  Skin: warm and dry, no rash or erythema  Head: normocephalic and atraumatic soft temporal fat pad  Eyes: pupils equal, round, and reactive to light, conjunctivae normal and sclera anicteric     Neck: neck supple and non tender without mass positive  tracheostomy  Pulmonary/Chest: Air entry equal prolonged expiratory phase minimal rhonchi decreased at the bases no use of intercostal muscles  Cardiovascular: normal rate, regular rhythm, normal S1 and S2, no gallops, intact distal pulses and no carotid bruits  Abdomen: soft, non-tender, non-distended, normal bowel sounds, no masses or organomegaly  Extremities: no edema and has no Homans' sign    Neurologic: Alert oriented x3 with no focal deficit    /73   Pulse 57   Temp 97.5 °F (36.4 °C)   Resp 19   Ht 5' 4\" (1.626 m)   Wt 129 lb 8 oz (58.7 kg)   SpO2 98%   BMI 22.23 kg/m²     CBC:   Lab Results   Component Value Date    WBC 7.3 11/23/2020    RBC 2.97 11/23/2020    HGB 8.3 11/23/2020    HCT 25.9 11/23/2020    MCV 87.2 11/23/2020    MCH 27.9 11/23/2020    MCHC 32.0 11/23/2020    RDW 15.0 11/23/2020     11/23/2020    MPV 9.2 11/23/2020     CMP:    Lab Results   Component Value Date     11/23/2020    K 3.8 11/23/2020    CL 98 11/23/2020    CO2 23 11/23/2020    BUN 39 11/23/2020    CREATININE 3.38 11/23/2020    GFRAA 22 11/23/2020    LABGLOM 18 11/23/2020    GLUCOSE 93 11/23/2020    PROT 7.7 11/11/2020    LABALBU 3.3 11/11/2020    CALCIUM 8.5 11/23/2020    BILITOT 0.25 11/11/2020    ALKPHOS 104 11/11/2020    AST 15 11/11/2020    ALT 9 11/11/2020 Assessment:  Patient Active Problem List   Diagnosis    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 ([facility] [address]    Laryngeal cancer (HCC)    Mouth swelling    COPD (chronic obstructive pulmonary disease) ([address]    Severe malnutrition (HCC)    Facial edema    Common iliac aneurysm ([facility])    Essential hypertension    Stage 3 chronic kidney disease    Acute kidney injury (nontraumatic) ([facility])    NSTEMI (non-ST elevated myocardial infarction) ([address]    Pathological fracture of lumbar vertebra due to secondary osteoporosis ([facility])    Age-related osteoporosis with current pathological fracture    Intractable back pain    Iron deficiency anemia    Anemia, blood loss    Chronic bilateral pleural effusions    Diastolic CHF, acute on chronic (HCC)    Hyponatremia    Mediastinal mass    Bilateral pleural effusion     Mediastinal mass with history of oral cancer and laryngeal cancer and biopsy today  COPD  Chronic hyponatremia  Severe malnutrition  Chronic combined CHF  CKD 4  Pulmonary hypertension  Plan:    Labs reviewed DVT prophylaxis avoid nephrotoxic drugs continue with protein supplements bronchodilators oxygen as needed await final input from pulmonary and oncology likely home soon      Shiela Murillo MD  7:21 PM

## 2020-11-24 NOTE — CARE COORDINATION
Dr. Areli Greenwood rounded and pt discharged. VM left for Jany from [address] [place] [address] to inform to resume services. Pt has F/U appointment with pulmonary in the next 1-2 weeks per Narcisa Rocha CNP.

## 2020-11-24 NOTE — PROGRESS NOTES
Continue Current Diet, Modify Current Diet  Nutrition Education/Counseling:  Education not indicated   Coordination of Nutrition Care:  Continue to monitor while inpatient    Goals:  Meet greater than 75% of estimated nutrition needs       Nutrition Monitoring and Evaluation:   Behavioral-Environmental Outcomes:  None Identified   Food/Nutrient Intake Outcomes:  Food and Nutrient Intake, Supplement Intake  Physical Signs/Symptoms Outcomes:  Biochemical Data, Chewing or Swallowing, Fluid Status or Edema, Weight     Discharge Planning:     Too soon to determine     Bernadette Paez, [address], [address] [place] [address]  Office Number: [phone]

## 2020-11-24 NOTE — PROGRESS NOTES
calorie malnutrition    Recommendations:  · Oxygen by trach collar if needed to keep oxygen saturation > 92%  · Albuterol and Ipratropium Q 4 hours and prn  · Symbicort 160  · Prednisone 10 mg daily  · Nephrology following/fluid restriction  · Hematology following  · S/p CT-guided biopsy, await pathology results   · He may need placement of Pleurx catheter for recurrent right pleural effusion, will monitor as an outpatient  · DVT prophylaxis with low molecular weight heparin held for procedure  · Incentive spirometry every hour while awake  · Okay for discharge from pulmonary standpoint. Patient already has a follow-up appointment scheduled in the next 1 to 2 weeks. · Discussed with RN  · Above assessment and plan will be reviewed with Dr. Sterling Junior.   Patient plan will be finalized following review by Dr. Sterling Junior  · Will follow with you    Electronically signed by     Nannette Hodgkins, APRN - CNP on 11/24/2020 at 12:15 PM  Patient was seen under the supervision of Dr. Miguel Carreon and Sleep Medicine,  [facility] [place]: [phone]

## 2020-11-24 NOTE — PROGRESS NOTES
Nephrology progress note    Reason for Consult:  Renal Insufficiency    Requesting Physician:  Sharath Waters MD    Interval history:     Patient seen and examined at the bedside   Creatinine stable from 3.38 yesterday to 3.45 today. Sodium stable 132. States he is feeling better today. Breathing has improved. HISTORY OF PRESENT ILLNESS:    The patient is a 78 y.o. male who presents with shortness of breath, bilateral pleural effusions and rule out PE. He was discharged several days ago for COPD exacerbation. He has known of mediastinal mass with adenopathy with laryngeal and oral cancer. He has been following with oncology. He has known history of CKD 4 and normally follows with Dr. Jie Thornton. Creatinine has been trending 2.8-3.2 on recent labs during last admission and in 6/2020. Creatinine 2.9 yesterday. Hx of chronic hyponatremia. Sodium level was 130 and 128 yesterday. Bicarb level was 18 yesterday. No new labs today. SBP trending 130s to 150s. CT of the chest yesterday showed similar moderate right pleural effusion and new small left pleural effusion. Similar right upper lobe mass. He received lasix yesterday. Prior to Admission medications    Medication Sig Start Date End Date Taking?  Authorizing Provider   amitriptyline (ELAVIL) 25 MG tablet Take 1 tablet by mouth nightly 11/21/20  Yes Sharath Waters MD   predniSONE (DELTASONE) 10 MG tablet Take 1 tablet by mouth daily for 10 days Take 1 tablet daily for 5 days then 1 tablet every other day till gone 11/15/20 11/25/20 Yes Sharath Waters MD   mirtazapine (REMERON) 15 MG tablet Take 7.5 mg by mouth nightly Takes 1/2 tab (=7.5mg) nightly   Yes Historical Provider, MD   sodium bicarbonate 650 MG tablet Take 650 mg by mouth 3 times daily   Yes Historical Provider, MD   hydrALAZINE (APRESOLINE) 50 MG tablet Take 1 tablet by mouth every 8 hours 6/6/20  Yes Sharath Waters MD   metoprolol succinate (TOPROL XL) 50 MG extended release tablet Take 1 tablet by mouth daily 6/7/20  Yes Cathie Villalba MD   amLODIPine (NORVASC) 10 MG tablet Take 1 tablet by mouth daily 4/18/20  Yes Cathie Villalba MD   Cholecalciferol (VITAMIN D3) 50 MCG (2000 UT) CAPS Take 1 capsule by mouth daily   Yes Historical Provider, MD   albuterol sulfate  (90 Base) MCG/ACT inhaler Inhale 2 puffs into the lungs every 6 hours as needed for Wheezing or Shortness of Breath   Yes Historical Provider, MD   budesonide-formoterol (SYMBICORT) 160-4.5 MCG/ACT AERO Inhale 2 puffs into the lungs 2 times daily   Yes Historical Provider, MD   omeprazole (PRILOSEC) 20 MG delayed release capsule Take 20 mg by mouth every morning (before breakfast)   Yes Historical Provider, MD   allopurinol (ZYLOPRIM) 300 MG tablet Take 300 mg by mouth daily.    Yes Historical Provider, MD   amitriptyline (ELAVIL) 10 MG tablet Take 1 tablet by mouth nightly 11/15/20   Cathie Villalba MD       Scheduled Meds:   allopurinol  300 mg Oral Daily    amitriptyline  25 mg Oral Nightly    amLODIPine  10 mg Oral Daily    budesonide-formoterol  2 puff Inhalation BID    Vitamin D  2,000 Units Oral Daily    hydrALAZINE  50 mg Oral 3 times per day    metoprolol succinate  50 mg Oral Daily    mirtazapine  7.5 mg Oral Nightly    predniSONE  10 mg Oral Daily    pantoprazole  40 mg Oral QAM AC    ipratropium-albuterol  1 ampule Inhalation Q4H WA    sodium bicarbonate  1,300 mg Oral TID    sodium chloride flush  10 mL Intravenous 2 times per day    heparin (porcine)  5,000 Units Subcutaneous 3 times per day     Continuous Infusions:  PRN Meds:bisacodyl, albuterol sulfate HFA, sodium chloride flush, acetaminophen, morphine   Physical Exam:  Vitals:    11/24/20 0438 11/24/20 0735 11/24/20 1057 11/24/20 1112   BP: (!) 159/87 134/74  117/64   Pulse: 78 77  70   Resp: 17 16  18   Temp: 97.7 °F (36.5 °C) 97.7 °F (36.5 °C)  97.5 °F (36.4 °C)   TempSrc: Oral Axillary  Oral   SpO2: 93% 95%  93%   Weight: Height:   5' 4\" (1.626 m)      No intake/output data recorded. General:  Awake, alert, not in distress. Appears to be stated age. HEENT: Atraumatic, normocephalic. Anicteric sclera. Pink and moist oral mucosa. Neck supple. Trach with collar. Chest: Bilateral air entry, clear to auscultation, Diminished bilaterally. Cardiovascular: RRR, S1S2, no murmur, rub or gallop. No lower extremity edema. Abdomen: Soft, non tender to palpation. Musculoskeletal:  No cyanosis or clubbing. Integumentary: Pink, warm and dry. Free from rash or lesions. Skin turgor normal.  CNS:  Face symmetrical. No tremor.      Data:    CBC:   Lab Results   Component Value Date    WBC 8.3 11/24/2020    HGB 8.3 (L) 11/24/2020    HCT 25.9 (L) 11/24/2020    MCV 86.9 11/24/2020     11/24/2020     BMP:    Lab Results   Component Value Date     (L) 11/24/2020     (L) 11/23/2020     (L) 11/22/2020    K 4.8 11/24/2020    K 3.8 11/23/2020    K 4.2 11/22/2020    CL 98 11/24/2020    CL 98 11/23/2020    CL 99 11/22/2020    CO2 24 11/24/2020    CO2 23 11/23/2020    CO2 21 11/22/2020    BUN 40 (H) 11/24/2020    BUN 39 (H) 11/23/2020    BUN 43 (H) 11/22/2020    CREATININE 3.45 (H) 11/24/2020    CREATININE 3.38 (H) 11/23/2020    CREATININE 3.62 (H) 11/22/2020    GLUCOSE 97 11/24/2020    GLUCOSE 93 11/23/2020    GLUCOSE 93 11/22/2020     CMP:   Lab Results   Component Value Date     11/24/2020    K 4.8 11/24/2020    CL 98 11/24/2020    CO2 24 11/24/2020    BUN 40 11/24/2020    CREATININE 3.45 11/24/2020    GLUCOSE 97 11/24/2020    CALCIUM 8.8 11/24/2020    PROT 7.7 11/11/2020    LABALBU 3.3 11/11/2020    BILITOT 0.25 11/11/2020    ALKPHOS 104 11/11/2020    AST 15 11/11/2020    ALT 9 11/11/2020      Hepatic:   Lab Results   Component Value Date    AST 15 11/11/2020    AST 43 (H) 06/11/2020    AST 17 05/30/2020    ALT 9 11/11/2020    ALT 46 (H) 06/11/2020    ALT 11 05/30/2020    BILITOT 0.25 (L) 11/11/2020    BILITOT <0.10 (L) 06/11/2020    BILITOT 0.25 (L) 05/30/2020    ALKPHOS 104 11/11/2020    ALKPHOS 107 06/11/2020    ALKPHOS 88 05/30/2020     BNP:   Lab Results   Component Value Date    BNP 39 12/12/2012     Lipids: No results found for: CHOL, HDL  INR:   Lab Results   Component Value Date    INR 1.0 11/23/2020    INR 1.1 11/12/2020    INR 1.0 06/01/2020     PTH: No results found for: PTH  Phosphorus:    Lab Results   Component Value Date    PHOS 5.0 11/24/2020     Ionized Calcium: No results found for: IONCA  Magnesium:   Lab Results   Component Value Date    MG 1.7 11/24/2020     Albumin:   Lab Results   Component Value Date    LABALBU 3.3 11/11/2020     Last 3 CK, CKMB, Troponin: @LABRCNT(CKTOTAL:3,CKMB:3,TROPONINI:3)       URINE:)No results found for: Deep Meneses    Radiology:   Reviewed. Assessment:    CKD stage 4  Chronic hyponatremia, normal TSH, serum cortisol is pending  Hypomagnesemia  Metabolic acidosis   Bilateral pleural effusions. S/p right thoracentesis 11/12. Right upper lobe medistinal mass/Recurrent invasive keratinizing SCC  Moderate to severe pulmonary HTN  Combined heart failure    Plan:    Patient had lung mass biopsy   Renal functions relatively stable   Continue oral bicarb   No additional renal work-up is needed at this time   We will continue to monitor renal functions electrolytes volume status. Chest x-ray today showed small right and trace left pleural effusions. Await biopsy results   BP stable. Avoid nephrotoxic drugs and IV contrast exposure. We will continue to follow along with you. BMP one week. Renal follow up 3-4 weeks with Dr. Roni Montes. Renally stable for discharge. Pt seen in collaboration with Dr. Pili Simon. Electronically signed by Dal Koyanagi, APRN - CNP  on 11/24/2020 at 1:53 PM     Physician Addendum    I have seen and examined patient at bed side. I performed all key and critical portions of this evaluation.    I have discussed and modified plan of care as noted

## 2020-11-24 NOTE — PLAN OF CARE
Problem: Pain:  Goal: Pain level will decrease  Description: Pain level will decrease  Outcome: Ongoing  Goal: Control of acute pain  Description: Control of acute pain  Outcome: Ongoing     Problem: Falls - Risk of:  Goal: Will remain free from falls  Description: Will remain free from falls  Outcome: Ongoing  Note: Patient is fall risk per fall scale. Hourly rounding performed. Personal belongings and call light within reach. Bed in low position.    Goal: Absence of physical injury  Description: Absence of physical injury  Outcome: Ongoing

## 2020-11-24 NOTE — DISCHARGE SUMMARY
Physician Discharge Summary     Patient ID:  Carmine Bronson  [id number]  78 y.o.  1950    Admit date: 11/19/2020    Discharge date and time: 11/24/2020    Admission Diagnoses:   Patient Active Problem List   Diagnosis    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    Laryngeal cancer [facility]    Mouth swelling    COPD (chronic obstructive pulmonary disease) [facility]    Severe malnutrition (HCC)    Facial edema    Common iliac aneurysm (HCC)    Essential hypertension    Stage 3 chronic kidney disease    [facility]umatic[facility]    NSTEMI (non-ST elevated myocardial infarction) ([facility]    Pathological fracture of lumbar vertebra due to secondary osteoporosis (HCC)    Age-related osteoporosis with current pathological fracture    Intractable back pain    Iron deficiency anemia    Anemia, blood loss    Chronic bilateral pleural effusions    Diastolic CHF, acute on chronic (HCC)    Hyponatremia    Mediastinal mass    Bilateral pleural effusion       Discharge Diagnoses:   Mediastinal mass biopsy inconclusive recommending another biopsy  History of oral cancer and laryngeal cancer  COPD  Chronic combined CHF  Severe malnutrition  CKD 4  Pleural effusion    Consults: pulmonary/intensive care, nephrology and hematology/oncology    Procedures: Mediastinal mass biopsy    Hospital Course: 82-year-old gentleman with known mediastinal mass has been scheduled with oncology and radiation oncology for treatment as an outpatient comes back with shortness of breath admitted oxygen therapy to the bronchodilators pulmonary oncology and nephrology consultations obtained he underwent biopsy of the mediastinal mass, path report came back after discharge which was inconclusive recommending another biopsy    Discharge Exam:  See progress note from today    Disposition: home  Stable improved  Patient Instructions:   Current Discharge Medication List      CONTINUE these medications which have CHANGED

## 2020-11-24 NOTE — PROGRESS NOTES
Have attempted to reach brother Karime Jane for ride home per his instruction earlier today. Message left on home and cell phones. Called sister Jodelle Harada who is unable to  pt but will try to get hold of Bert.

## 2020-11-24 NOTE — PROGRESS NOTES
ca  Copd   ch combined chf   severe malnutrition  ckd4   pl effusionlan:  meds labs reviewed  Home today  Visiting home nurse to see oncology soon outpt c hemo raduiation        Ayala Hall MD  6:04 PM

## 2020-11-25 ENCOUNTER — HOSPITAL ENCOUNTER (EMERGENCY)
Age: 70
Discharge: HOME | End: 2020-11-25
Attending: EMERGENCY MEDICINE
Payer: MEDICARE

## 2020-11-25 ENCOUNTER — TELEPHONE (OUTPATIENT)
Dept: ONCOLOGY | Age: 70
End: 2020-11-25

## 2020-11-25 ENCOUNTER — APPOINTMENT (OUTPATIENT)
Dept: GENERAL RADIOLOGY | Age: 70
End: 2020-11-25
Payer: MEDICARE

## 2020-11-25 VITALS
HEART RATE: 83 BPM | BODY MASS INDEX: 20.9 KG/M2 | SYSTOLIC BLOOD PRESSURE: 136 MMHG | TEMPERATURE: 97.4 F | HEIGHT: 66 IN | DIASTOLIC BLOOD PRESSURE: 78 MMHG | OXYGEN SATURATION: 96 % | RESPIRATION RATE: 20 BRPM

## 2020-11-25 PROCEDURE — 99283 EMERGENCY DEPT VISIT LOW MDM: CPT

## 2020-11-25 PROCEDURE — 71045 X-RAY EXAM CHEST 1 VIEW: CPT

## 2020-11-25 RX ORDER — SULFAMETHOXAZOLE AND TRIMETHOPRIM 800; 160 MG/1; MG/1
1 TABLET ORAL 2 TIMES DAILY
Qty: 20 TABLET | Refills: 0 | Status: SHIPPED | OUTPATIENT
Start: 2020-11-25 | End: 2020-12-05

## 2020-11-25 ASSESSMENT — ENCOUNTER SYMPTOMS
EYE DISCHARGE: 0
ABDOMINAL PAIN: 0
DIARRHEA: 0
FACIAL SWELLING: 0
EYE REDNESS: 0
SHORTNESS OF BREATH: 1
CONSTIPATION: 0
COLOR CHANGE: 0
VOMITING: 0
COUGH: 0

## 2020-11-25 NOTE — PROGRESS NOTES
Brother has returned call and is on his way to  pt. disch  instructions reviewed, scrip tgiven. Dressed and ready.  Awaits ride

## 2020-11-25 NOTE — ED PROVIDER NOTES
[address] [place] [address] ED  EMERGENCY DEPARTMENT ENCOUNTER      Pt Name: Carlos Marshall  MRN: [id number]  [place] 1950  Date of evaluation: 11/25/2020  Provider: Adrian Paul MD    [address] [place] [address]       Chief Complaint   Patient presents with    Cough     with bloody sputum           HISTORY OF PRESENT ILLNESS  (Location/Symptom, Timing/Onset, Context/Setting, Quality, Duration, Modifying Factors, Severity.)   Carlos Marshall is a 78 y.o. male who presents to the emergency department for difficulty breathing. He is a very poor historian. He has a history of laryngeal cancer and has trach. He has had a small amount of bleeding from the trach. He has not had a known fever. He has been feeling this way since he was discharged from the hospital and he was admitted for the same issue. Denies any pain. Nursing Notes were reviewed. ALLERGIES     Amino acids and Lisinopril    CURRENT MEDICATIONS       Previous Medications    ALBUTEROL SULFATE  (90 BASE) MCG/ACT INHALER    Inhale 2 puffs into the lungs every 6 hours as needed for Wheezing or Shortness of Breath    ALLOPURINOL (ZYLOPRIM) 300 MG TABLET    Take 300 mg by mouth daily.     AMITRIPTYLINE (ELAVIL) 10 MG TABLET    Take 1 tablet by mouth nightly    AMITRIPTYLINE (ELAVIL) 25 MG TABLET    Take 1 tablet by mouth nightly    AMLODIPINE (NORVASC) 10 MG TABLET    Take 1 tablet by mouth daily    BUDESONIDE-FORMOTEROL (SYMBICORT) 160-4.5 MCG/ACT AERO    Inhale 2 puffs into the lungs 2 times daily    CHOLECALCIFEROL (VITAMIN D3) 50 MCG (2000 UT) CAPS    Take 1 capsule by mouth daily    HYDRALAZINE (APRESOLINE) 50 MG TABLET    Take 1 tablet by mouth every 8 hours    METOPROLOL SUCCINATE (TOPROL XL) 50 MG EXTENDED RELEASE TABLET    Take 1 tablet by mouth daily    MIRTAZAPINE (REMERON) 15 MG TABLET    Take 7.5 mg by mouth nightly Takes 1/2 tab (=7.5mg) nightly    OMEPRAZOLE (PRILOSEC) 20 MG DELAYED RELEASE CAPSULE    Take 20 mg by mouth every morning (before breakfast)    PREDNISONE (DELTASONE) 10 MG TABLET    Take 1 tablet by mouth daily for 10 days Take 1 tablet daily for 5 days then 1 tablet every other day till gone    SODIUM BICARBONATE 650 MG TABLET    Take 2 tablets by mouth 3 times daily       PAST MEDICAL HISTORY         Diagnosis Date    Arthritis     Asthma     CKD (chronic kidney disease), stage III     COPD (chronic obstructive pulmonary disease) (HCC)     GERD (gastroesophageal reflux disease)     Gout     Hypertension     Hyponatremia     Iliac artery aneurysm, right (HCC)     Laryngeal cancer (HCC)     Laryngeal Cancer. Chronic left facial edema.  Lung cancer ([address] 3-4 years ago    [facility] [place]    Prostate cancer [facility])        SURGICAL HISTORY           Procedure Laterality Date    ABDOMINAL AORTIC ANEURYSM REPAIR, ENDOVASCULAR Right 2020    RIGHT ILIAC ARTERY ANEURYSM REPAIR ENDOVASCULAR performed by Juan Leroy MD at [address] [place] [address]  2020    No stents placed    CT NEEDLE BIOPSY LUNG PERCUTANEOUS  2020    CT NEEDLE BIOPSY LUNG PERCUTANEOUS 2020 [facility] CT SCAN    PROSTATE SURGERY      TRACHEAL SURGERY  3-4 years ago    lung removed and \"voice box\" removed         FAMILY HISTORY           Problem Relation Age of Onset    Diabetes Mother     Heart Disease Mother         Sudden cardiac death    Cancer Father     Diabetes Sister     Heart Disease Sister      Family Status   Relation Name Status    Mother     [de-identified] Father lung     Sister          SOCIAL HISTORY      reports that he has quit smoking. His smoking use included cigarettes. He has never used smokeless tobacco. He reports previous alcohol use of about 5.0 standard drinks of alcohol per week. He reports that he does not use drugs. REVIEW OF SYSTEMS    (2-9 systems for level 4, 10 or more for level 5)     Review of Systems   Constitutional: Negative for chills, fatigue and fever. HENT: Negative for congestion, ear discharge and facial swelling. Eyes: Negative for discharge and redness. Respiratory: Positive for shortness of breath. Negative for cough. Cardiovascular: Negative for chest pain. Gastrointestinal: Negative for abdominal pain, constipation, diarrhea and vomiting. Genitourinary: Negative for dysuria and hematuria. Musculoskeletal: Negative for arthralgias. Skin: Negative for color change and rash. Neurological: Negative for syncope, numbness and headaches. Hematological: Negative for adenopathy. Psychiatric/Behavioral: Negative for confusion. The patient is not nervous/anxious. Except as noted above the remainder of the review of systems was reviewed and negative. PHYSICAL EXAM    (up to 7 for level 4, 8 or more for level 5)     Vitals:    11/25/20 1055   BP: 136/78   Pulse: 83   Resp: 20   Temp: 97.4 °F (36.3 °C)   TempSrc: Oral   SpO2: 96%   Height: 5' 6\" (1.676 m)       Physical Exam  Vitals signs reviewed. Constitutional:       General: He is not in acute distress. Appearance: He is well-developed. He is not diaphoretic. HENT:      Head: Normocephalic and atraumatic. Eyes:      General:         Right eye: No discharge. Left eye: No discharge. Neck:      Musculoskeletal: Neck supple. Cardiovascular:      Rate and Rhythm: Normal rate and regular rhythm. Pulmonary:      Effort: Pulmonary effort is normal. No respiratory distress. Breath sounds: No stridor. Comments: No acute distress. No bleeding from the trach. Musculoskeletal: Normal range of motion. Lymphadenopathy:      Cervical: No cervical adenopathy. Skin:     Findings: No erythema or rash. Neurological:      Mental Status: He is alert and oriented to person, place, and time.    Psychiatric:         Behavior: Behavior normal.     Limited physical exam carried out due to risk of viral transmission        DIAGNOSTIC RESULTS     EKG: All EKG's are [address]  [address] [facility]  [phone]      As needed    [facility] ED  [address] [facility]  [phone]    If symptoms worsen      DISCHARGE MEDICATIONS:     New Prescriptions    SULFAMETHOXAZOLE-TRIMETHOPRIM (BACTRIM DS) 800-160 MG PER TABLET    Take 1 tablet by mouth 2 times daily for 10 days         (Please note that portions of this note were completed with a voice recognition program.  Efforts were made to edit the dictations but occasionally words are mis-transcribed.)    Erma Ballesteros MD  Attending Emergency Physician           Erma Ballesteros MD  11/25/20 5294

## 2020-11-27 LAB — SURGICAL PATHOLOGY REPORT: NORMAL

## 2020-11-30 ENCOUNTER — CARE COORDINATION (OUTPATIENT)
Dept: CASE MANAGEMENT | Age: 70
End: 2020-11-30

## 2020-11-30 ENCOUNTER — HOSPITAL ENCOUNTER (OUTPATIENT)
Facility: MEDICAL CENTER | Age: 70
End: 2020-11-30
Payer: MEDICARE

## 2020-11-30 NOTE — CARE COORDINATION
[facility] Transitions Follow Up Call- 1st attempt at final COVID risk follow up call       2020    Patient: Favio Trejo  Patient : 1950   MRN: [id number]  Reason for Admission: mediastinal mass, dyspnea, cough, hemoptysis/ readmission for pleural effusion  Discharge Date: 20 RARS: Readmission Risk Score: 50         Attempted to reach patient for final transitional call.   VM left to return call to [phone]    Follow Up  Future Appointments   Date Time Provider Loraine Honeycutt   2020  4:15 PM Belle Stevens MD [facility] Jus Willoughby RN

## 2020-12-01 ENCOUNTER — TELEPHONE (OUTPATIENT)
Dept: ONCOLOGY | Age: 70
End: 2020-12-01

## 2020-12-01 ENCOUNTER — TELEPHONE (OUTPATIENT)
Dept: RADIATION ONCOLOGY | Facility: MEDICAL CENTER | Age: 70
End: 2020-12-01

## 2020-12-01 NOTE — TELEPHONE ENCOUNTER
Tressa Diallo NP for [facility] called because patient was admitted at [address] and is now at [facility] and is having some hemoptysis so they wanted to know if we had any recent simulation of the patient? I informed her that we did not sim him yet, but that was the plan. She said they might give him a treatment and then send him back to us due to his condition. She will keep me updated.

## 2020-12-01 NOTE — TELEPHONE ENCOUNTER
Name: Ricardo Fine  : 1950  MRN: [id number]    Oncology Navigation Follow-Up Note    Contact Type:  Telephone  Notes: Upon review of chart noted pt's 2020 f/u with Dr. Mehdi Montoya canceled r/t hospitalized. Upon review of [facility] care everywhere noted pt currently admitted @ [facility]. Will continue to follow.     Electronically signed by Chapis Da Silva RN on 2020 at 8:03 AM

## 2020-12-03 ENCOUNTER — CARE COORDINATION (OUTPATIENT)
Dept: CASE MANAGEMENT | Age: 70
End: 2020-12-03

## 2020-12-09 ENCOUNTER — TELEPHONE (OUTPATIENT)
Dept: ONCOLOGY | Age: 70
End: 2020-12-09

## 2020-12-09 NOTE — TELEPHONE ENCOUNTER
Name: Tony Guerrero  : 1950  MRN: [id number]    Oncology Navigation Follow-Up Note    Contact Type:  Telephone  Notes: Upon review of [facility] care everywhere noted pt continues admitted @ [facility]. Will continue to follow.     Electronically signed by Joe Byrd RN on 2020 at 9:52 AM

## 2020-12-16 ENCOUNTER — TELEPHONE (OUTPATIENT)
Dept: ONCOLOGY | Age: 70
End: 2020-12-16

## 2020-12-16 NOTE — TELEPHONE ENCOUNTER
Name: Margarette Wu  : 1950  MRN: [id number]    Oncology Navigation Follow-Up Note    Contact Type:  Telephone  Notes: Upon review of [facility] care everywhere noted pt continues admitted @ [facility], last palliative XRT scheduled tomorrow, & pt d/c plan [address] [place] [address] with hospice care. Dr. Tejeda Sic updated.     Electronically signed by Jorge Melendez RN on 2020 at 1:41 PM

## 2021-01-14 LAB
ACID FAST: NORMAL
AFB FINAL REPORT: NORMAL
ZZ NTE WITH NAME CLEAN UP: SPECIMEN SOURCE: NORMAL

## 2022-03-21 NOTE — PLAN OF CARE
Problem: Falls - Risk of:  Goal: Absence of physical injury  Description: Absence of physical injury  Outcome: Ongoing   Pt fall risk, fall band present, falling star, safety alarm activated and in use as needed. Hourly rounding performed. Pt encouraged to use call light. See Chandana Salazar fall risk assessment. Resident of Jacobo HERNADEZ

## (undated) DEVICE — AMPLATZ ULTRA STIFF WIRE GUIDE: Brand: AMPLATZ

## (undated) DEVICE — CATHETER PTCA 8FR L100CM BLLN DIA10-46MM SHTH 12FR GWIRE

## (undated) DEVICE — RADIFOCUS TORQUE DEVICE MULTI-TORQUE VISE: Brand: RADIFOCUS TORQUE DEVICE

## (undated) DEVICE — Z DISCONTINUED USE 2716050 TUBE TRACH SZ 6 L76MM OD10.8MM ID6.4MM CUF W/ INNR CANN

## (undated) DEVICE — Device

## (undated) DEVICE — RADIFOCUS GLIDEWIRE: Brand: GLIDEWIRE

## (undated) DEVICE — SHEATH INTRO 16FR L28CM 0.035IN GWIRE HYDRPHLC LOK

## (undated) DEVICE — CATHETER ANGIO 5FR L100CM 0.038IN PERIPH HD HUNTER 1 SEL

## (undated) DEVICE — SHEATH INTRO 14FR L28CM 0.035IN GWIRE HYDRPHLC LOK

## (undated) DEVICE — NEEDLE ANGIO 18GA L6.98CM ART ENTRY W/O STYL 1 PART PERC

## (undated) DEVICE — GUIDEWIRE VASC L260CM DIA0.035IN TIP L7CM PTFE S STL STR

## (undated) DEVICE — CATHETER ANGIO 5FR L65CM 0.038IN KMP SEL SFT RADPQ TIP HI